# Patient Record
Sex: MALE | Race: WHITE | NOT HISPANIC OR LATINO | Employment: OTHER | URBAN - METROPOLITAN AREA
[De-identification: names, ages, dates, MRNs, and addresses within clinical notes are randomized per-mention and may not be internally consistent; named-entity substitution may affect disease eponyms.]

---

## 2020-01-14 ENCOUNTER — HOSPITAL ENCOUNTER (INPATIENT)
Facility: HOSPITAL | Age: 65
LOS: 20 days | Discharge: NURSING, REHAB OR LONG-TERM CARE | End: 2020-02-03
Attending: INTERNAL MEDICINE | Admitting: THORACIC SURGERY (CARDIOTHORACIC VASCULAR SURGERY)

## 2020-01-14 ENCOUNTER — APPOINTMENT (OUTPATIENT)
Dept: GENERAL RADIOLOGY | Facility: HOSPITAL | Age: 65
End: 2020-01-14

## 2020-01-14 ENCOUNTER — APPOINTMENT (OUTPATIENT)
Dept: CARDIOLOGY | Facility: HOSPITAL | Age: 65
End: 2020-01-14

## 2020-01-14 DIAGNOSIS — I48.91 ATRIAL FIBRILLATION WITH RVR (HCC): ICD-10-CM

## 2020-01-14 DIAGNOSIS — I33.0 ACUTE BACTERIAL ENDOCARDITIS: ICD-10-CM

## 2020-01-14 DIAGNOSIS — I25.10 CORONARY ARTERY DISEASE DUE TO LIPID RICH PLAQUE: Primary | ICD-10-CM

## 2020-01-14 DIAGNOSIS — I25.10 CAD S/P PERCUTANEOUS CORONARY ANGIOPLASTY: ICD-10-CM

## 2020-01-14 DIAGNOSIS — Z72.0 TOBACCO ABUSE: ICD-10-CM

## 2020-01-14 DIAGNOSIS — I05.9 ENDOCARDITIS OF MITRAL VALVE: ICD-10-CM

## 2020-01-14 DIAGNOSIS — I25.83 CORONARY ARTERY DISEASE DUE TO LIPID RICH PLAQUE: Primary | ICD-10-CM

## 2020-01-14 DIAGNOSIS — Z00.6 EXAMINATION FOR NORMAL COMPARISON OR CONTROL IN CLINICAL RESEARCH: ICD-10-CM

## 2020-01-14 DIAGNOSIS — I10 ESSENTIAL HYPERTENSION: ICD-10-CM

## 2020-01-14 DIAGNOSIS — Z98.61 CAD S/P PERCUTANEOUS CORONARY ANGIOPLASTY: ICD-10-CM

## 2020-01-14 DIAGNOSIS — N17.9 ACUTE RENAL FAILURE, UNSPECIFIED ACUTE RENAL FAILURE TYPE (HCC): ICD-10-CM

## 2020-01-14 PROBLEM — E11.40 DIABETIC NEUROPATHY (HCC): Chronic | Status: ACTIVE | Noted: 2020-01-14

## 2020-01-14 PROBLEM — N13.8 BPH WITH OBSTRUCTION/LOWER URINARY TRACT SYMPTOMS: Chronic | Status: ACTIVE | Noted: 2020-01-14

## 2020-01-14 PROBLEM — E78.5 DYSLIPIDEMIA: Chronic | Status: ACTIVE | Noted: 2020-01-14

## 2020-01-14 PROBLEM — K21.9 GERD WITHOUT ESOPHAGITIS: Chronic | Status: ACTIVE | Noted: 2020-01-14

## 2020-01-14 PROBLEM — E11.9 NON-INSULIN DEPENDENT TYPE 2 DIABETES MELLITUS (HCC): Chronic | Status: ACTIVE | Noted: 2020-01-14

## 2020-01-14 PROBLEM — N40.1 BPH WITH OBSTRUCTION/LOWER URINARY TRACT SYMPTOMS: Chronic | Status: ACTIVE | Noted: 2020-01-14

## 2020-01-14 PROBLEM — F41.1 GAD (GENERALIZED ANXIETY DISORDER): Chronic | Status: ACTIVE | Noted: 2020-01-14

## 2020-01-14 PROBLEM — R57.0 SHOCK, CARDIOGENIC (HCC): Status: ACTIVE | Noted: 2020-01-13

## 2020-01-14 PROBLEM — E53.8 B12 DEFICIENCY: Chronic | Status: ACTIVE | Noted: 2020-01-14

## 2020-01-14 PROBLEM — R57.0 SHOCK, CARDIOGENIC (HCC): Status: RESOLVED | Noted: 2020-01-13 | Resolved: 2020-01-14

## 2020-01-14 PROBLEM — E78.5 DYSLIPIDEMIA: Status: ACTIVE | Noted: 2020-01-14

## 2020-01-14 PROBLEM — E66.9 OBESITY (BMI 30-39.9): Chronic | Status: ACTIVE | Noted: 2020-01-14

## 2020-01-14 PROBLEM — E55.9 VITAMIN D DEFICIENCY: Chronic | Status: ACTIVE | Noted: 2020-01-14

## 2020-01-14 LAB
ANION GAP SERPL CALCULATED.3IONS-SCNC: 14 MMOL/L (ref 5–15)
ANION GAP SERPL CALCULATED.3IONS-SCNC: 14 MMOL/L (ref 5–15)
APTT PPP: 28.2 SECONDS (ref 61–76.5)
APTT PPP: 32.4 SECONDS (ref 61–76.5)
APTT PPP: 44.6 SECONDS (ref 61–76.5)
BASOPHILS # BLD AUTO: 0.1 10*3/MM3 (ref 0–0.2)
BASOPHILS # BLD AUTO: 0.1 10*3/MM3 (ref 0–0.2)
BASOPHILS NFR BLD AUTO: 0.7 % (ref 0–1.5)
BASOPHILS NFR BLD AUTO: 0.9 % (ref 0–1.5)
BH CV ECHO MEAS - ACS: 2.4 CM
BH CV ECHO MEAS - AO MAX PG (FULL): 13.5 MMHG
BH CV ECHO MEAS - AO MAX PG: 25.1 MMHG
BH CV ECHO MEAS - AO MEAN PG (FULL): 4.9 MMHG
BH CV ECHO MEAS - AO MEAN PG: 13 MMHG
BH CV ECHO MEAS - AO ROOT AREA (BSA CORRECTED): 1.5
BH CV ECHO MEAS - AO ROOT AREA: 10.3 CM^2
BH CV ECHO MEAS - AO ROOT DIAM: 3.6 CM
BH CV ECHO MEAS - AO V2 MAX: 250.5 CM/SEC
BH CV ECHO MEAS - AO V2 MEAN: 168.7 CM/SEC
BH CV ECHO MEAS - AO V2 VTI: 43 CM
BH CV ECHO MEAS - AVA(I,A): 3.1 CM^2
BH CV ECHO MEAS - AVA(I,D): 3.1 CM^2
BH CV ECHO MEAS - AVA(V,A): 2.8 CM^2
BH CV ECHO MEAS - AVA(V,D): 2.8 CM^2
BH CV ECHO MEAS - BSA(HAYCOCK): 2.5 M^2
BH CV ECHO MEAS - BSA: 2.4 M^2
BH CV ECHO MEAS - BZI_BMI: 39 KILOGRAMS/M^2
BH CV ECHO MEAS - BZI_METRIC_HEIGHT: 177.8 CM
BH CV ECHO MEAS - BZI_METRIC_WEIGHT: 123.4 KG
BH CV ECHO MEAS - EDV(CUBED): 71.1 ML
BH CV ECHO MEAS - EDV(MOD-SP2): 56.5 ML
BH CV ECHO MEAS - EDV(MOD-SP4): 105.5 ML
BH CV ECHO MEAS - EDV(TEICH): 76 ML
BH CV ECHO MEAS - EF(CUBED): 52.2 %
BH CV ECHO MEAS - EF(MOD-BP): 67 %
BH CV ECHO MEAS - EF(MOD-SP2): 53.2 %
BH CV ECHO MEAS - EF(MOD-SP4): 73.9 %
BH CV ECHO MEAS - EF(TEICH): 44.5 %
BH CV ECHO MEAS - ESV(CUBED): 34 ML
BH CV ECHO MEAS - ESV(MOD-SP2): 26.4 ML
BH CV ECHO MEAS - ESV(MOD-SP4): 27.5 ML
BH CV ECHO MEAS - ESV(TEICH): 42.2 ML
BH CV ECHO MEAS - FS: 21.8 %
BH CV ECHO MEAS - IVS/LVPW: 0.92
BH CV ECHO MEAS - IVSD: 1.9 CM
BH CV ECHO MEAS - LA DIMENSION(2D): 3.7 CM
BH CV ECHO MEAS - LA DIMENSION: 3.3 CM
BH CV ECHO MEAS - LA/AO: 0.92
BH CV ECHO MEAS - LV DIASTOLIC VOL/BSA (35-75): 44.3 ML/M^2
BH CV ECHO MEAS - LV MASS(C)D: 402.9 GRAMS
BH CV ECHO MEAS - LV MASS(C)DI: 169.4 GRAMS/M^2
BH CV ECHO MEAS - LV MAX PG: 11.6 MMHG
BH CV ECHO MEAS - LV MEAN PG: 8.1 MMHG
BH CV ECHO MEAS - LV SYSTOLIC VOL/BSA (12-30): 11.6 ML/M^2
BH CV ECHO MEAS - LV V1 MAX: 170.1 CM/SEC
BH CV ECHO MEAS - LV V1 MEAN: 135.5 CM/SEC
BH CV ECHO MEAS - LV V1 VTI: 31.9 CM
BH CV ECHO MEAS - LVIDD: 4.1 CM
BH CV ECHO MEAS - LVIDS: 3.2 CM
BH CV ECHO MEAS - LVOT AREA: 4.1 CM^2
BH CV ECHO MEAS - LVOT DIAM: 2.3 CM
BH CV ECHO MEAS - LVPWD: 2.1 CM
BH CV ECHO MEAS - MV A MAX VEL: 111.8 CM/SEC
BH CV ECHO MEAS - MV DEC SLOPE: 319.3 CM/SEC^2
BH CV ECHO MEAS - MV DEC TIME: 0.3 SEC
BH CV ECHO MEAS - MV E MAX VEL: 48 CM/SEC
BH CV ECHO MEAS - MV E/A: 0.43
BH CV ECHO MEAS - MV MAX PG: 6 MMHG
BH CV ECHO MEAS - MV MEAN PG: 2.4 MMHG
BH CV ECHO MEAS - MV V2 MAX: 122.1 CM/SEC
BH CV ECHO MEAS - MV V2 MEAN: 72.6 CM/SEC
BH CV ECHO MEAS - MV V2 VTI: 28.4 CM
BH CV ECHO MEAS - MVA(VTI): 4.7 CM^2
BH CV ECHO MEAS - RAP SYSTOLE: 8 MMHG
BH CV ECHO MEAS - RV MAX PG: 2.3 MMHG
BH CV ECHO MEAS - RV MEAN PG: 1.3 MMHG
BH CV ECHO MEAS - RV V1 MAX: 76.3 CM/SEC
BH CV ECHO MEAS - RV V1 MEAN: 54.4 CM/SEC
BH CV ECHO MEAS - RV V1 VTI: 15.3 CM
BH CV ECHO MEAS - RVDD: 3.3 CM
BH CV ECHO MEAS - RVSP: 22.7 MMHG
BH CV ECHO MEAS - SI(AO): 185.4 ML/M^2
BH CV ECHO MEAS - SI(CUBED): 15.6 ML/M^2
BH CV ECHO MEAS - SI(LVOT): 55.5 ML/M^2
BH CV ECHO MEAS - SI(MOD-SP2): 12.6 ML/M^2
BH CV ECHO MEAS - SI(MOD-SP4): 32.8 ML/M^2
BH CV ECHO MEAS - SI(TEICH): 14.2 ML/M^2
BH CV ECHO MEAS - SV(AO): 441 ML
BH CV ECHO MEAS - SV(CUBED): 37.1 ML
BH CV ECHO MEAS - SV(LVOT): 132.1 ML
BH CV ECHO MEAS - SV(MOD-SP2): 30.1 ML
BH CV ECHO MEAS - SV(MOD-SP4): 78 ML
BH CV ECHO MEAS - SV(TEICH): 33.9 ML
BH CV ECHO MEAS - TR MAX VEL: 192 CM/SEC
BUN BLD-MCNC: 28 MG/DL (ref 8–23)
BUN BLD-MCNC: 29 MG/DL (ref 8–23)
BUN/CREAT SERPL: 26.2 (ref 7–25)
BUN/CREAT SERPL: 31.9 (ref 7–25)
CALCIUM SPEC-SCNC: 9.3 MG/DL (ref 8.6–10.5)
CALCIUM SPEC-SCNC: 9.3 MG/DL (ref 8.6–10.5)
CHLORIDE SERPL-SCNC: 103 MMOL/L (ref 98–107)
CHLORIDE SERPL-SCNC: 103 MMOL/L (ref 98–107)
CO2 SERPL-SCNC: 21 MMOL/L (ref 22–29)
CO2 SERPL-SCNC: 22 MMOL/L (ref 22–29)
CREAT BLD-MCNC: 0.91 MG/DL (ref 0.76–1.27)
CREAT BLD-MCNC: 1.07 MG/DL (ref 0.76–1.27)
DEPRECATED RDW RBC AUTO: 43.8 FL (ref 37–54)
DEPRECATED RDW RBC AUTO: 43.8 FL (ref 37–54)
EOSINOPHIL # BLD AUTO: 0.4 10*3/MM3 (ref 0–0.4)
EOSINOPHIL # BLD AUTO: 0.4 10*3/MM3 (ref 0–0.4)
EOSINOPHIL NFR BLD AUTO: 3.4 % (ref 0.3–6.2)
EOSINOPHIL NFR BLD AUTO: 3.4 % (ref 0.3–6.2)
ERYTHROCYTE [DISTWIDTH] IN BLOOD BY AUTOMATED COUNT: 14.8 % (ref 12.3–15.4)
ERYTHROCYTE [DISTWIDTH] IN BLOOD BY AUTOMATED COUNT: 14.9 % (ref 12.3–15.4)
GFR SERPL CREATININE-BSD FRML MDRD: 70 ML/MIN/1.73
GFR SERPL CREATININE-BSD FRML MDRD: 84 ML/MIN/1.73
GLUCOSE BLD-MCNC: 85 MG/DL (ref 65–99)
GLUCOSE BLD-MCNC: 89 MG/DL (ref 65–99)
GLUCOSE BLDC GLUCOMTR-MCNC: 83 MG/DL (ref 70–105)
GLUCOSE BLDC GLUCOMTR-MCNC: 86 MG/DL (ref 70–105)
GLUCOSE BLDC GLUCOMTR-MCNC: 96 MG/DL (ref 70–105)
GLUCOSE BLDC GLUCOMTR-MCNC: 97 MG/DL (ref 70–105)
HCT VFR BLD AUTO: 34.6 % (ref 37.5–51)
HCT VFR BLD AUTO: 36.8 % (ref 37.5–51)
HGB BLD-MCNC: 11.2 G/DL (ref 13–17.7)
HGB BLD-MCNC: 11.6 G/DL (ref 13–17.7)
INR PPP: 1.03 (ref 0.9–1.1)
LYMPHOCYTES # BLD AUTO: 1.4 10*3/MM3 (ref 0.7–3.1)
LYMPHOCYTES # BLD AUTO: 1.6 10*3/MM3 (ref 0.7–3.1)
LYMPHOCYTES NFR BLD AUTO: 12.2 % (ref 19.6–45.3)
LYMPHOCYTES NFR BLD AUTO: 14.7 % (ref 19.6–45.3)
MAGNESIUM SERPL-MCNC: 1.9 MG/DL (ref 1.6–2.4)
MCH RBC QN AUTO: 26.3 PG (ref 26.6–33)
MCH RBC QN AUTO: 26.8 PG (ref 26.6–33)
MCHC RBC AUTO-ENTMCNC: 31.6 G/DL (ref 31.5–35.7)
MCHC RBC AUTO-ENTMCNC: 32.4 G/DL (ref 31.5–35.7)
MCV RBC AUTO: 82.9 FL (ref 79–97)
MCV RBC AUTO: 83.4 FL (ref 79–97)
MONOCYTES # BLD AUTO: 0.8 10*3/MM3 (ref 0.1–0.9)
MONOCYTES # BLD AUTO: 0.8 10*3/MM3 (ref 0.1–0.9)
MONOCYTES NFR BLD AUTO: 6.5 % (ref 5–12)
MONOCYTES NFR BLD AUTO: 7.1 % (ref 5–12)
MRSA DNA SPEC QL NAA+PROBE: NORMAL
NEUTROPHILS # BLD AUTO: 8.2 10*3/MM3 (ref 1.7–7)
NEUTROPHILS # BLD AUTO: 9.2 10*3/MM3 (ref 1.7–7)
NEUTROPHILS NFR BLD AUTO: 73.9 % (ref 42.7–76)
NEUTROPHILS NFR BLD AUTO: 77.2 % (ref 42.7–76)
NRBC BLD AUTO-RTO: 0.1 /100 WBC (ref 0–0.2)
NRBC BLD AUTO-RTO: 0.1 /100 WBC (ref 0–0.2)
NT-PROBNP SERPL-MCNC: 357.4 PG/ML (ref 5–900)
PLATELET # BLD AUTO: 279 10*3/MM3 (ref 140–450)
PLATELET # BLD AUTO: 292 10*3/MM3 (ref 140–450)
PMV BLD AUTO: 8 FL (ref 6–12)
PMV BLD AUTO: 8.5 FL (ref 6–12)
POTASSIUM BLD-SCNC: 4.4 MMOL/L (ref 3.5–5.2)
POTASSIUM BLD-SCNC: 4.6 MMOL/L (ref 3.5–5.2)
PROTHROMBIN TIME: 10.8 SECONDS (ref 9.6–11.7)
RBC # BLD AUTO: 4.17 10*6/MM3 (ref 4.14–5.8)
RBC # BLD AUTO: 4.41 10*6/MM3 (ref 4.14–5.8)
SODIUM BLD-SCNC: 138 MMOL/L (ref 136–145)
SODIUM BLD-SCNC: 139 MMOL/L (ref 136–145)
T4 FREE SERPL-MCNC: 1.37 NG/DL (ref 0.93–1.7)
TROPONIN T SERPL-MCNC: 0.01 NG/ML (ref 0–0.03)
TSH SERPL DL<=0.05 MIU/L-ACNC: 2.16 UIU/ML (ref 0.27–4.2)
WBC NRBC COR # BLD: 11.1 10*3/MM3 (ref 3.4–10.8)
WBC NRBC COR # BLD: 11.9 10*3/MM3 (ref 3.4–10.8)

## 2020-01-14 PROCEDURE — 80048 BASIC METABOLIC PNL TOTAL CA: CPT | Performed by: NURSE PRACTITIONER

## 2020-01-14 PROCEDURE — 87150 DNA/RNA AMPLIFIED PROBE: CPT | Performed by: NURSE PRACTITIONER

## 2020-01-14 PROCEDURE — 87186 SC STD MICRODIL/AGAR DIL: CPT | Performed by: NURSE PRACTITIONER

## 2020-01-14 PROCEDURE — 93306 TTE W/DOPPLER COMPLETE: CPT | Performed by: INTERNAL MEDICINE

## 2020-01-14 PROCEDURE — 82962 GLUCOSE BLOOD TEST: CPT

## 2020-01-14 PROCEDURE — 87641 MR-STAPH DNA AMP PROBE: CPT | Performed by: INTERNAL MEDICINE

## 2020-01-14 PROCEDURE — 63710000001 INSULIN GLARGINE PER 5 UNITS: Performed by: NURSE PRACTITIONER

## 2020-01-14 PROCEDURE — 87040 BLOOD CULTURE FOR BACTERIA: CPT | Performed by: NURSE PRACTITIONER

## 2020-01-14 PROCEDURE — 93005 ELECTROCARDIOGRAM TRACING: CPT | Performed by: NURSE PRACTITIONER

## 2020-01-14 PROCEDURE — 99254 IP/OBS CNSLTJ NEW/EST MOD 60: CPT | Performed by: INTERNAL MEDICINE

## 2020-01-14 PROCEDURE — 71045 X-RAY EXAM CHEST 1 VIEW: CPT

## 2020-01-14 PROCEDURE — 84484 ASSAY OF TROPONIN QUANT: CPT | Performed by: NURSE PRACTITIONER

## 2020-01-14 PROCEDURE — 93306 TTE W/DOPPLER COMPLETE: CPT

## 2020-01-14 PROCEDURE — 84439 ASSAY OF FREE THYROXINE: CPT | Performed by: NURSE PRACTITIONER

## 2020-01-14 PROCEDURE — 85025 COMPLETE CBC W/AUTO DIFF WBC: CPT | Performed by: NURSE PRACTITIONER

## 2020-01-14 PROCEDURE — 85610 PROTHROMBIN TIME: CPT | Performed by: NURSE PRACTITIONER

## 2020-01-14 PROCEDURE — 83735 ASSAY OF MAGNESIUM: CPT | Performed by: NURSE PRACTITIONER

## 2020-01-14 PROCEDURE — 85730 THROMBOPLASTIN TIME PARTIAL: CPT | Performed by: INTERNAL MEDICINE

## 2020-01-14 PROCEDURE — 25010000002 HEPARIN (PORCINE) PER 1000 UNITS: Performed by: NURSE PRACTITIONER

## 2020-01-14 PROCEDURE — 25010000002 SULFUR HEXAFLUORIDE MICROSPH 60.7-25 MG RECONSTITUTED SUSPENSION: Performed by: INTERNAL MEDICINE

## 2020-01-14 PROCEDURE — 84443 ASSAY THYROID STIM HORMONE: CPT | Performed by: NURSE PRACTITIONER

## 2020-01-14 PROCEDURE — 83880 ASSAY OF NATRIURETIC PEPTIDE: CPT | Performed by: NURSE PRACTITIONER

## 2020-01-14 PROCEDURE — 25010000002 HYDRALAZINE PER 20 MG: Performed by: NURSE PRACTITIONER

## 2020-01-14 PROCEDURE — 85730 THROMBOPLASTIN TIME PARTIAL: CPT | Performed by: NURSE PRACTITIONER

## 2020-01-14 PROCEDURE — 87077 CULTURE AEROBIC IDENTIFY: CPT | Performed by: NURSE PRACTITIONER

## 2020-01-14 RX ORDER — FINASTERIDE 5 MG/1
5 TABLET, FILM COATED ORAL DAILY
COMMUNITY

## 2020-01-14 RX ORDER — ASPIRIN 81 MG/1
81 TABLET ORAL DAILY
COMMUNITY

## 2020-01-14 RX ORDER — LISINOPRIL 5 MG/1
10 TABLET ORAL DAILY
Status: DISCONTINUED | OUTPATIENT
Start: 2020-01-14 | End: 2020-01-19

## 2020-01-14 RX ORDER — FOLIC ACID 1 MG/1
1 TABLET ORAL DAILY
Status: DISCONTINUED | OUTPATIENT
Start: 2020-01-14 | End: 2020-01-22 | Stop reason: HOSPADM

## 2020-01-14 RX ORDER — SODIUM CHLORIDE 0.9 % (FLUSH) 0.9 %
10 SYRINGE (ML) INJECTION AS NEEDED
Status: DISCONTINUED | OUTPATIENT
Start: 2020-01-14 | End: 2020-01-22 | Stop reason: HOSPADM

## 2020-01-14 RX ORDER — CETIRIZINE HYDROCHLORIDE 10 MG/1
10 TABLET ORAL DAILY
Status: DISCONTINUED | OUTPATIENT
Start: 2020-01-14 | End: 2020-01-22 | Stop reason: HOSPADM

## 2020-01-14 RX ORDER — ASPIRIN 81 MG/1
81 TABLET ORAL DAILY
Status: DISCONTINUED | OUTPATIENT
Start: 2020-01-14 | End: 2020-01-22 | Stop reason: HOSPADM

## 2020-01-14 RX ORDER — INSULIN GLARGINE 100 [IU]/ML
10 INJECTION, SOLUTION SUBCUTANEOUS 2 TIMES DAILY
Status: DISCONTINUED | OUTPATIENT
Start: 2020-01-14 | End: 2020-01-22 | Stop reason: HOSPADM

## 2020-01-14 RX ORDER — BISACODYL 10 MG
10 SUPPOSITORY, RECTAL RECTAL DAILY PRN
Status: DISCONTINUED | OUTPATIENT
Start: 2020-01-14 | End: 2020-01-22 | Stop reason: HOSPADM

## 2020-01-14 RX ORDER — HYDRALAZINE HYDROCHLORIDE 20 MG/ML
20 INJECTION INTRAMUSCULAR; INTRAVENOUS EVERY 6 HOURS PRN
Status: DISCONTINUED | OUTPATIENT
Start: 2020-01-14 | End: 2020-01-19

## 2020-01-14 RX ORDER — FINASTERIDE 5 MG/1
5 TABLET, FILM COATED ORAL DAILY
Status: DISCONTINUED | OUTPATIENT
Start: 2020-01-14 | End: 2020-01-22 | Stop reason: HOSPADM

## 2020-01-14 RX ORDER — ONDANSETRON 2 MG/ML
4 INJECTION INTRAMUSCULAR; INTRAVENOUS EVERY 6 HOURS PRN
Status: DISCONTINUED | OUTPATIENT
Start: 2020-01-14 | End: 2020-01-22 | Stop reason: HOSPADM

## 2020-01-14 RX ORDER — PANTOPRAZOLE SODIUM 40 MG/1
40 TABLET, DELAYED RELEASE ORAL
Status: DISCONTINUED | OUTPATIENT
Start: 2020-01-14 | End: 2020-01-22 | Stop reason: HOSPADM

## 2020-01-14 RX ORDER — PRASUGREL 10 MG/1
10 TABLET, FILM COATED ORAL DAILY
Status: DISCONTINUED | OUTPATIENT
Start: 2020-01-14 | End: 2020-01-17

## 2020-01-14 RX ORDER — PRASUGREL 10 MG/1
10 TABLET, FILM COATED ORAL DAILY
COMMUNITY
End: 2020-02-03 | Stop reason: HOSPADM

## 2020-01-14 RX ORDER — GINSENG 100 MG
CAPSULE ORAL DAILY
Status: DISCONTINUED | OUTPATIENT
Start: 2020-01-14 | End: 2020-01-22 | Stop reason: HOSPADM

## 2020-01-14 RX ORDER — VITAMIN E 268 MG
800 CAPSULE ORAL DAILY
COMMUNITY

## 2020-01-14 RX ORDER — ACETAMINOPHEN 325 MG/1
650 TABLET ORAL EVERY 4 HOURS PRN
Status: DISCONTINUED | OUTPATIENT
Start: 2020-01-14 | End: 2020-01-22 | Stop reason: HOSPADM

## 2020-01-14 RX ORDER — DOCUSATE SODIUM 100 MG/1
100 CAPSULE, LIQUID FILLED ORAL 2 TIMES DAILY PRN
Status: DISCONTINUED | OUTPATIENT
Start: 2020-01-14 | End: 2020-01-22 | Stop reason: HOSPADM

## 2020-01-14 RX ORDER — BISACODYL 10 MG
10 SUPPOSITORY, RECTAL RECTAL DAILY
COMMUNITY
End: 2020-05-20

## 2020-01-14 RX ORDER — INSULIN GLARGINE 100 [IU]/ML
10 INJECTION, SOLUTION SUBCUTANEOUS 2 TIMES DAILY
COMMUNITY
End: 2020-02-03 | Stop reason: HOSPADM

## 2020-01-14 RX ORDER — GABAPENTIN 100 MG/1
200 CAPSULE ORAL 3 TIMES DAILY
Status: DISCONTINUED | OUTPATIENT
Start: 2020-01-14 | End: 2020-01-22 | Stop reason: HOSPADM

## 2020-01-14 RX ORDER — ONDANSETRON 4 MG/1
4 TABLET, FILM COATED ORAL EVERY 6 HOURS PRN
Status: DISCONTINUED | OUTPATIENT
Start: 2020-01-14 | End: 2020-01-22 | Stop reason: HOSPADM

## 2020-01-14 RX ORDER — GINSENG 100 MG
CAPSULE ORAL DAILY
COMMUNITY

## 2020-01-14 RX ORDER — TAMSULOSIN HYDROCHLORIDE 0.4 MG/1
0.4 CAPSULE ORAL DAILY
Status: DISCONTINUED | OUTPATIENT
Start: 2020-01-14 | End: 2020-01-22 | Stop reason: HOSPADM

## 2020-01-14 RX ORDER — FUROSEMIDE 20 MG/1
20 TABLET ORAL DAILY
COMMUNITY
End: 2020-02-03 | Stop reason: HOSPADM

## 2020-01-14 RX ORDER — GABAPENTIN 100 MG/1
200 CAPSULE ORAL 3 TIMES DAILY
COMMUNITY

## 2020-01-14 RX ORDER — ACETAMINOPHEN 650 MG/1
650 SUPPOSITORY RECTAL EVERY 4 HOURS PRN
Status: DISCONTINUED | OUTPATIENT
Start: 2020-01-14 | End: 2020-01-22 | Stop reason: HOSPADM

## 2020-01-14 RX ORDER — PANTOPRAZOLE SODIUM 40 MG/1
40 TABLET, DELAYED RELEASE ORAL EVERY MORNING
Status: DISCONTINUED | OUTPATIENT
Start: 2020-01-14 | End: 2020-01-14 | Stop reason: SDUPTHER

## 2020-01-14 RX ORDER — CHOLECALCIFEROL (VITAMIN D3) 125 MCG
1000 CAPSULE ORAL DAILY
COMMUNITY

## 2020-01-14 RX ORDER — DEXTROSE MONOHYDRATE 25 G/50ML
25 INJECTION, SOLUTION INTRAVENOUS
Status: DISCONTINUED | OUTPATIENT
Start: 2020-01-14 | End: 2020-01-22 | Stop reason: HOSPADM

## 2020-01-14 RX ORDER — ATORVASTATIN CALCIUM 40 MG/1
40 TABLET, FILM COATED ORAL DAILY
COMMUNITY
End: 2020-02-03 | Stop reason: HOSPADM

## 2020-01-14 RX ORDER — FLUOXETINE HYDROCHLORIDE 20 MG/1
40 CAPSULE ORAL DAILY
Status: DISCONTINUED | OUTPATIENT
Start: 2020-01-14 | End: 2020-01-22 | Stop reason: HOSPADM

## 2020-01-14 RX ORDER — LANOLIN ALCOHOL/MO/W.PET/CERES
1000 CREAM (GRAM) TOPICAL DAILY
Status: DISCONTINUED | OUTPATIENT
Start: 2020-01-14 | End: 2020-01-22 | Stop reason: HOSPADM

## 2020-01-14 RX ORDER — SODIUM CHLORIDE 0.9 % (FLUSH) 0.9 %
10 SYRINGE (ML) INJECTION EVERY 12 HOURS SCHEDULED
Status: DISCONTINUED | OUTPATIENT
Start: 2020-01-14 | End: 2020-01-22 | Stop reason: HOSPADM

## 2020-01-14 RX ORDER — ATORVASTATIN CALCIUM 40 MG/1
40 TABLET, FILM COATED ORAL DAILY
Status: DISCONTINUED | OUTPATIENT
Start: 2020-01-14 | End: 2020-01-15

## 2020-01-14 RX ORDER — LORATADINE 10 MG/1
CAPSULE, LIQUID FILLED ORAL
COMMUNITY

## 2020-01-14 RX ORDER — DOCUSATE SODIUM 100 MG/1
100 CAPSULE, LIQUID FILLED ORAL 2 TIMES DAILY
COMMUNITY

## 2020-01-14 RX ORDER — FOLIC ACID 1 MG/1
1 TABLET ORAL DAILY
COMMUNITY

## 2020-01-14 RX ORDER — HEPARIN SODIUM 10000 [USP'U]/100ML
8 INJECTION, SOLUTION INTRAVENOUS
Status: DISCONTINUED | OUTPATIENT
Start: 2020-01-14 | End: 2020-01-14

## 2020-01-14 RX ORDER — LISINOPRIL 10 MG/1
10 TABLET ORAL DAILY
COMMUNITY
End: 2020-02-03 | Stop reason: HOSPADM

## 2020-01-14 RX ORDER — ERGOCALCIFEROL 1.25 MG/1
50000 CAPSULE ORAL WEEKLY
COMMUNITY
End: 2020-05-20

## 2020-01-14 RX ORDER — OMEPRAZOLE 20 MG/1
20 CAPSULE, DELAYED RELEASE ORAL DAILY
COMMUNITY

## 2020-01-14 RX ORDER — TAMSULOSIN HYDROCHLORIDE 0.4 MG/1
1 CAPSULE ORAL DAILY
COMMUNITY

## 2020-01-14 RX ORDER — NICOTINE POLACRILEX 4 MG
15 LOZENGE BUCCAL
Status: DISCONTINUED | OUTPATIENT
Start: 2020-01-14 | End: 2020-01-22 | Stop reason: HOSPADM

## 2020-01-14 RX ORDER — FLUOXETINE HYDROCHLORIDE 20 MG/1
40 CAPSULE ORAL DAILY
COMMUNITY

## 2020-01-14 RX ORDER — CARVEDILOL 25 MG/1
25 TABLET ORAL 2 TIMES DAILY WITH MEALS
COMMUNITY
End: 2020-02-03 | Stop reason: HOSPADM

## 2020-01-14 RX ORDER — BUMETANIDE 0.25 MG/ML
0.5 INJECTION INTRAMUSCULAR; INTRAVENOUS ONCE
Status: COMPLETED | OUTPATIENT
Start: 2020-01-14 | End: 2020-01-14

## 2020-01-14 RX ORDER — FUROSEMIDE 20 MG/1
20 TABLET ORAL DAILY
Status: DISCONTINUED | OUTPATIENT
Start: 2020-01-14 | End: 2020-01-19

## 2020-01-14 RX ADMIN — APIXABAN 5 MG: 5 TABLET, FILM COATED ORAL at 20:23

## 2020-01-14 RX ADMIN — FUROSEMIDE 20 MG: 20 TABLET ORAL at 12:33

## 2020-01-14 RX ADMIN — HYDRALAZINE HYDROCHLORIDE 20 MG: 20 INJECTION INTRAMUSCULAR; INTRAVENOUS at 18:29

## 2020-01-14 RX ADMIN — SODIUM CHLORIDE 5 MG/HR: 0.9 INJECTION, SOLUTION INTRAVENOUS at 07:46

## 2020-01-14 RX ADMIN — HYDRALAZINE HYDROCHLORIDE 20 MG: 20 INJECTION INTRAMUSCULAR; INTRAVENOUS at 05:18

## 2020-01-14 RX ADMIN — PANTOPRAZOLE SODIUM 40 MG: 40 TABLET, DELAYED RELEASE ORAL at 05:18

## 2020-01-14 RX ADMIN — GABAPENTIN 200 MG: 100 CAPSULE ORAL at 20:45

## 2020-01-14 RX ADMIN — SODIUM CHLORIDE, PRESERVATIVE FREE 10 ML: 5 INJECTION INTRAVENOUS at 01:54

## 2020-01-14 RX ADMIN — INSULIN GLARGINE 10 UNITS: 100 INJECTION, SOLUTION SUBCUTANEOUS at 20:43

## 2020-01-14 RX ADMIN — ATORVASTATIN CALCIUM 40 MG: 40 TABLET, FILM COATED ORAL at 12:29

## 2020-01-14 RX ADMIN — FINASTERIDE 5 MG: 5 TABLET, FILM COATED ORAL at 12:33

## 2020-01-14 RX ADMIN — ASPIRIN 81 MG: 81 TABLET, DELAYED RELEASE ORAL at 12:30

## 2020-01-14 RX ADMIN — SODIUM CHLORIDE, PRESERVATIVE FREE 10 ML: 5 INJECTION INTRAVENOUS at 20:24

## 2020-01-14 RX ADMIN — FOLIC ACID 1 MG: 1 TABLET ORAL at 12:33

## 2020-01-14 RX ADMIN — FLUOXETINE 40 MG: 20 CAPSULE ORAL at 12:29

## 2020-01-14 RX ADMIN — GABAPENTIN 200 MG: 100 CAPSULE ORAL at 12:33

## 2020-01-14 RX ADMIN — HEPARIN SODIUM AND DEXTROSE 12 UNITS/KG/HR: 10000; 5 INJECTION INTRAVENOUS at 08:57

## 2020-01-14 RX ADMIN — SULFUR HEXAFLUORIDE 3 ML: KIT at 10:50

## 2020-01-14 RX ADMIN — CETIRIZINE HYDROCHLORIDE 10 MG: 10 TABLET, FILM COATED ORAL at 12:30

## 2020-01-14 RX ADMIN — METOPROLOL TARTRATE 25 MG: 25 TABLET ORAL at 12:30

## 2020-01-14 RX ADMIN — BUMETANIDE 0.5 MG: 0.25 INJECTION INTRAMUSCULAR; INTRAVENOUS at 03:54

## 2020-01-14 RX ADMIN — CYANOCOBALAMIN TAB 1000 MCG 1000 MCG: 1000 TAB at 12:33

## 2020-01-14 RX ADMIN — SODIUM CHLORIDE, PRESERVATIVE FREE 10 ML: 5 INJECTION INTRAVENOUS at 08:59

## 2020-01-14 RX ADMIN — TAMSULOSIN HYDROCHLORIDE 0.4 MG: 0.4 CAPSULE ORAL at 12:29

## 2020-01-14 RX ADMIN — APIXABAN 5 MG: 5 TABLET, FILM COATED ORAL at 12:33

## 2020-01-14 RX ADMIN — LISINOPRIL 10 MG: 5 TABLET ORAL at 12:30

## 2020-01-14 RX ADMIN — GABAPENTIN 200 MG: 100 CAPSULE ORAL at 16:59

## 2020-01-14 RX ADMIN — BACITRACIN: 500 OINTMENT TOPICAL at 12:33

## 2020-01-14 RX ADMIN — INSULIN GLARGINE 10 UNITS: 100 INJECTION, SOLUTION SUBCUTANEOUS at 12:34

## 2020-01-14 RX ADMIN — PRASUGREL 10 MG: 10 TABLET, FILM COATED ORAL at 12:29

## 2020-01-14 RX ADMIN — METOPROLOL TARTRATE 25 MG: 25 TABLET ORAL at 20:23

## 2020-01-14 RX ADMIN — SODIUM CHLORIDE 7.5 MG/HR: 0.9 INJECTION, SOLUTION INTRAVENOUS at 10:53

## 2020-01-15 ENCOUNTER — APPOINTMENT (OUTPATIENT)
Dept: CT IMAGING | Facility: HOSPITAL | Age: 65
End: 2020-01-15

## 2020-01-15 ENCOUNTER — APPOINTMENT (OUTPATIENT)
Dept: MRI IMAGING | Facility: HOSPITAL | Age: 65
End: 2020-01-15

## 2020-01-15 LAB
ANION GAP SERPL CALCULATED.3IONS-SCNC: 12 MMOL/L (ref 5–15)
ANION GAP SERPL CALCULATED.3IONS-SCNC: 12 MMOL/L (ref 5–15)
APTT PPP: 25.3 SECONDS (ref 61–76.5)
APTT PPP: 27.4 SECONDS (ref 24–31)
BACTERIA BLD CULT: ABNORMAL
BACTERIA UR QL AUTO: ABNORMAL /HPF
BASOPHILS # BLD AUTO: 0.1 10*3/MM3 (ref 0–0.2)
BASOPHILS # BLD AUTO: 0.1 10*3/MM3 (ref 0–0.2)
BASOPHILS NFR BLD AUTO: 0.6 % (ref 0–1.5)
BASOPHILS NFR BLD AUTO: 0.8 % (ref 0–1.5)
BILIRUB UR QL STRIP: NEGATIVE
BUN BLD-MCNC: 21 MG/DL (ref 8–23)
BUN BLD-MCNC: 22 MG/DL (ref 8–23)
BUN/CREAT SERPL: 19.3 (ref 7–25)
BUN/CREAT SERPL: 22.1 (ref 7–25)
CALCIUM SPEC-SCNC: 9.3 MG/DL (ref 8.6–10.5)
CALCIUM SPEC-SCNC: 9.3 MG/DL (ref 8.6–10.5)
CHLORIDE SERPL-SCNC: 100 MMOL/L (ref 98–107)
CHLORIDE SERPL-SCNC: 101 MMOL/L (ref 98–107)
CHOLEST SERPL-MCNC: 90 MG/DL (ref 0–200)
CK SERPL-CCNC: 39 U/L (ref 20–200)
CLARITY UR: ABNORMAL
CO2 SERPL-SCNC: 28 MMOL/L (ref 22–29)
CO2 SERPL-SCNC: 29 MMOL/L (ref 22–29)
COLOR UR: YELLOW
CREAT BLD-MCNC: 0.95 MG/DL (ref 0.76–1.27)
CREAT BLD-MCNC: 1.14 MG/DL (ref 0.76–1.27)
DEPRECATED RDW RBC AUTO: 44.2 FL (ref 37–54)
DEPRECATED RDW RBC AUTO: 44.6 FL (ref 37–54)
EOSINOPHIL # BLD AUTO: 0.2 10*3/MM3 (ref 0–0.4)
EOSINOPHIL # BLD AUTO: 0.2 10*3/MM3 (ref 0–0.4)
EOSINOPHIL NFR BLD AUTO: 1.9 % (ref 0.3–6.2)
EOSINOPHIL NFR BLD AUTO: 2.8 % (ref 0.3–6.2)
ERYTHROCYTE [DISTWIDTH] IN BLOOD BY AUTOMATED COUNT: 15.1 % (ref 12.3–15.4)
ERYTHROCYTE [DISTWIDTH] IN BLOOD BY AUTOMATED COUNT: 15.3 % (ref 12.3–15.4)
GFR SERPL CREATININE-BSD FRML MDRD: 65 ML/MIN/1.73
GFR SERPL CREATININE-BSD FRML MDRD: 80 ML/MIN/1.73
GLUCOSE BLD-MCNC: 105 MG/DL (ref 65–99)
GLUCOSE BLD-MCNC: 125 MG/DL (ref 65–99)
GLUCOSE BLDC GLUCOMTR-MCNC: 104 MG/DL (ref 70–105)
GLUCOSE BLDC GLUCOMTR-MCNC: 111 MG/DL (ref 70–105)
GLUCOSE BLDC GLUCOMTR-MCNC: 138 MG/DL (ref 70–105)
GLUCOSE BLDC GLUCOMTR-MCNC: 151 MG/DL (ref 70–105)
GLUCOSE BLDC GLUCOMTR-MCNC: 163 MG/DL (ref 70–105)
GLUCOSE BLDC GLUCOMTR-MCNC: 184 MG/DL (ref 70–105)
GLUCOSE UR STRIP-MCNC: NEGATIVE MG/DL
HBA1C MFR BLD: 5.7 % (ref 3.5–5.6)
HCT VFR BLD AUTO: 34.7 % (ref 37.5–51)
HCT VFR BLD AUTO: 38.8 % (ref 37.5–51)
HDLC SERPL-MCNC: 29 MG/DL (ref 40–60)
HGB BLD-MCNC: 11.6 G/DL (ref 13–17.7)
HGB BLD-MCNC: 12.6 G/DL (ref 13–17.7)
HGB UR QL STRIP.AUTO: ABNORMAL
HYALINE CASTS UR QL AUTO: ABNORMAL /LPF
KETONES UR QL STRIP: NEGATIVE
LDLC SERPL CALC-MCNC: 37 MG/DL (ref 0–100)
LDLC/HDLC SERPL: 1.28 {RATIO}
LEUKOCYTE ESTERASE UR QL STRIP.AUTO: ABNORMAL
LYMPHOCYTES # BLD AUTO: 0.9 10*3/MM3 (ref 0.7–3.1)
LYMPHOCYTES # BLD AUTO: 1.2 10*3/MM3 (ref 0.7–3.1)
LYMPHOCYTES NFR BLD AUTO: 14.1 % (ref 19.6–45.3)
LYMPHOCYTES NFR BLD AUTO: 9.4 % (ref 19.6–45.3)
MCH RBC QN AUTO: 27.1 PG (ref 26.6–33)
MCH RBC QN AUTO: 27.2 PG (ref 26.6–33)
MCHC RBC AUTO-ENTMCNC: 32.5 G/DL (ref 31.5–35.7)
MCHC RBC AUTO-ENTMCNC: 33.3 G/DL (ref 31.5–35.7)
MCV RBC AUTO: 81.6 FL (ref 79–97)
MCV RBC AUTO: 83.4 FL (ref 79–97)
MONOCYTES # BLD AUTO: 0.7 10*3/MM3 (ref 0.1–0.9)
MONOCYTES # BLD AUTO: 0.8 10*3/MM3 (ref 0.1–0.9)
MONOCYTES NFR BLD AUTO: 7.4 % (ref 5–12)
MONOCYTES NFR BLD AUTO: 9.3 % (ref 5–12)
NEUTROPHILS # BLD AUTO: 6.2 10*3/MM3 (ref 1.7–7)
NEUTROPHILS # BLD AUTO: 7.5 10*3/MM3 (ref 1.7–7)
NEUTROPHILS NFR BLD AUTO: 73.2 % (ref 42.7–76)
NEUTROPHILS NFR BLD AUTO: 80.5 % (ref 42.7–76)
NITRITE UR QL STRIP: NEGATIVE
NRBC BLD AUTO-RTO: 0 /100 WBC (ref 0–0.2)
NRBC BLD AUTO-RTO: 0.3 /100 WBC (ref 0–0.2)
PH UR STRIP.AUTO: 6.5 [PH] (ref 5–8)
PLATELET # BLD AUTO: 275 10*3/MM3 (ref 140–450)
PLATELET # BLD AUTO: 343 10*3/MM3 (ref 140–450)
PMV BLD AUTO: 8.2 FL (ref 6–12)
PMV BLD AUTO: 8.8 FL (ref 6–12)
POTASSIUM BLD-SCNC: 3.9 MMOL/L (ref 3.5–5.2)
POTASSIUM BLD-SCNC: 4.4 MMOL/L (ref 3.5–5.2)
PROT UR QL STRIP: NEGATIVE
RBC # BLD AUTO: 4.25 10*6/MM3 (ref 4.14–5.8)
RBC # BLD AUTO: 4.65 10*6/MM3 (ref 4.14–5.8)
RBC # UR: ABNORMAL /HPF
REF LAB TEST METHOD: ABNORMAL
SODIUM BLD-SCNC: 141 MMOL/L (ref 136–145)
SODIUM BLD-SCNC: 141 MMOL/L (ref 136–145)
SP GR UR STRIP: 1.01 (ref 1–1.03)
SQUAMOUS #/AREA URNS HPF: ABNORMAL /HPF
TRIGL SERPL-MCNC: 119 MG/DL (ref 0–150)
UROBILINOGEN UR QL STRIP: ABNORMAL
VIT B12 BLD-MCNC: >2000 PG/ML (ref 211–946)
VLDLC SERPL-MCNC: 23.8 MG/DL
WBC NRBC COR # BLD: 8.4 10*3/MM3 (ref 3.4–10.8)
WBC NRBC COR # BLD: 9.3 10*3/MM3 (ref 3.4–10.8)
WBC UR QL AUTO: ABNORMAL /HPF

## 2020-01-15 PROCEDURE — 63710000001 INSULIN LISPRO (HUMAN) PER 5 UNITS: Performed by: INTERNAL MEDICINE

## 2020-01-15 PROCEDURE — 87086 URINE CULTURE/COLONY COUNT: CPT | Performed by: NURSE PRACTITIONER

## 2020-01-15 PROCEDURE — 87186 SC STD MICRODIL/AGAR DIL: CPT | Performed by: NURSE PRACTITIONER

## 2020-01-15 PROCEDURE — 83036 HEMOGLOBIN GLYCOSYLATED A1C: CPT | Performed by: NURSE PRACTITIONER

## 2020-01-15 PROCEDURE — 63710000001 INSULIN GLARGINE PER 5 UNITS: Performed by: NURSE PRACTITIONER

## 2020-01-15 PROCEDURE — 97110 THERAPEUTIC EXERCISES: CPT

## 2020-01-15 PROCEDURE — 70551 MRI BRAIN STEM W/O DYE: CPT

## 2020-01-15 PROCEDURE — 97166 OT EVAL MOD COMPLEX 45 MIN: CPT

## 2020-01-15 PROCEDURE — 99222 1ST HOSP IP/OBS MODERATE 55: CPT | Performed by: NURSE PRACTITIONER

## 2020-01-15 PROCEDURE — 81001 URINALYSIS AUTO W/SCOPE: CPT | Performed by: NURSE PRACTITIONER

## 2020-01-15 PROCEDURE — 74176 CT ABD & PELVIS W/O CONTRAST: CPT

## 2020-01-15 PROCEDURE — 85025 COMPLETE CBC W/AUTO DIFF WBC: CPT | Performed by: NURSE PRACTITIONER

## 2020-01-15 PROCEDURE — 25010000002 DAPTOMYCIN PER 1 MG: Performed by: INTERNAL MEDICINE

## 2020-01-15 PROCEDURE — 82550 ASSAY OF CK (CPK): CPT | Performed by: NURSE PRACTITIONER

## 2020-01-15 PROCEDURE — 99233 SBSQ HOSP IP/OBS HIGH 50: CPT | Performed by: INTERNAL MEDICINE

## 2020-01-15 PROCEDURE — 70450 CT HEAD/BRAIN W/O DYE: CPT

## 2020-01-15 PROCEDURE — 97163 PT EVAL HIGH COMPLEX 45 MIN: CPT

## 2020-01-15 PROCEDURE — 70498 CT ANGIOGRAPHY NECK: CPT

## 2020-01-15 PROCEDURE — 80048 BASIC METABOLIC PNL TOTAL CA: CPT | Performed by: INTERNAL MEDICINE

## 2020-01-15 PROCEDURE — 0 IOPAMIDOL PER 1 ML: Performed by: INTERNAL MEDICINE

## 2020-01-15 PROCEDURE — 97535 SELF CARE MNGMENT TRAINING: CPT

## 2020-01-15 PROCEDURE — 82962 GLUCOSE BLOOD TEST: CPT

## 2020-01-15 PROCEDURE — 70496 CT ANGIOGRAPHY HEAD: CPT

## 2020-01-15 PROCEDURE — 80048 BASIC METABOLIC PNL TOTAL CA: CPT | Performed by: NURSE PRACTITIONER

## 2020-01-15 PROCEDURE — 85730 THROMBOPLASTIN TIME PARTIAL: CPT | Performed by: NURSE PRACTITIONER

## 2020-01-15 PROCEDURE — 82607 VITAMIN B-12: CPT | Performed by: NURSE PRACTITIONER

## 2020-01-15 PROCEDURE — 87077 CULTURE AEROBIC IDENTIFY: CPT | Performed by: NURSE PRACTITIONER

## 2020-01-15 PROCEDURE — 99232 SBSQ HOSP IP/OBS MODERATE 35: CPT | Performed by: INTERNAL MEDICINE

## 2020-01-15 PROCEDURE — 25010000002 HYDRALAZINE PER 20 MG: Performed by: NURSE PRACTITIONER

## 2020-01-15 PROCEDURE — 80061 LIPID PANEL: CPT | Performed by: NURSE PRACTITIONER

## 2020-01-15 PROCEDURE — 87040 BLOOD CULTURE FOR BACTERIA: CPT | Performed by: NURSE PRACTITIONER

## 2020-01-15 RX ADMIN — LISINOPRIL 10 MG: 5 TABLET ORAL at 08:18

## 2020-01-15 RX ADMIN — TAMSULOSIN HYDROCHLORIDE 0.4 MG: 0.4 CAPSULE ORAL at 08:18

## 2020-01-15 RX ADMIN — SODIUM CHLORIDE, PRESERVATIVE FREE 10 ML: 5 INJECTION INTRAVENOUS at 08:20

## 2020-01-15 RX ADMIN — INSULIN LISPRO 3 UNITS: 100 INJECTION, SOLUTION INTRAVENOUS; SUBCUTANEOUS at 12:09

## 2020-01-15 RX ADMIN — INSULIN GLARGINE 10 UNITS: 100 INJECTION, SOLUTION SUBCUTANEOUS at 08:20

## 2020-01-15 RX ADMIN — METOPROLOL TARTRATE 25 MG: 25 TABLET ORAL at 08:19

## 2020-01-15 RX ADMIN — GABAPENTIN 200 MG: 100 CAPSULE ORAL at 20:56

## 2020-01-15 RX ADMIN — INSULIN GLARGINE 10 UNITS: 100 INJECTION, SOLUTION SUBCUTANEOUS at 20:52

## 2020-01-15 RX ADMIN — IOPAMIDOL 100 ML: 755 INJECTION, SOLUTION INTRAVENOUS at 15:30

## 2020-01-15 RX ADMIN — CETIRIZINE HYDROCHLORIDE 10 MG: 10 TABLET, FILM COATED ORAL at 08:19

## 2020-01-15 RX ADMIN — SODIUM CHLORIDE, PRESERVATIVE FREE 10 ML: 5 INJECTION INTRAVENOUS at 20:56

## 2020-01-15 RX ADMIN — GABAPENTIN 200 MG: 100 CAPSULE ORAL at 18:26

## 2020-01-15 RX ADMIN — GABAPENTIN 200 MG: 100 CAPSULE ORAL at 08:18

## 2020-01-15 RX ADMIN — FUROSEMIDE 20 MG: 20 TABLET ORAL at 08:18

## 2020-01-15 RX ADMIN — FOLIC ACID 1 MG: 1 TABLET ORAL at 08:19

## 2020-01-15 RX ADMIN — FINASTERIDE 5 MG: 5 TABLET, FILM COATED ORAL at 08:18

## 2020-01-15 RX ADMIN — ATORVASTATIN CALCIUM 40 MG: 40 TABLET, FILM COATED ORAL at 08:19

## 2020-01-15 RX ADMIN — DAPTOMYCIN 900 MG: 500 INJECTION, POWDER, LYOPHILIZED, FOR SOLUTION INTRAVENOUS at 11:29

## 2020-01-15 RX ADMIN — HYDRALAZINE HYDROCHLORIDE 20 MG: 20 INJECTION INTRAMUSCULAR; INTRAVENOUS at 05:19

## 2020-01-15 RX ADMIN — APIXABAN 5 MG: 5 TABLET, FILM COATED ORAL at 20:56

## 2020-01-15 RX ADMIN — METOPROLOL TARTRATE 25 MG: 25 TABLET ORAL at 20:56

## 2020-01-15 RX ADMIN — INSULIN LISPRO 3 UNITS: 100 INJECTION, SOLUTION INTRAVENOUS; SUBCUTANEOUS at 18:26

## 2020-01-15 RX ADMIN — ASPIRIN 81 MG: 81 TABLET, DELAYED RELEASE ORAL at 08:18

## 2020-01-15 RX ADMIN — PANTOPRAZOLE SODIUM 40 MG: 40 TABLET, DELAYED RELEASE ORAL at 05:19

## 2020-01-15 RX ADMIN — APIXABAN 5 MG: 5 TABLET, FILM COATED ORAL at 08:18

## 2020-01-15 RX ADMIN — CYANOCOBALAMIN TAB 1000 MCG 1000 MCG: 1000 TAB at 08:18

## 2020-01-15 RX ADMIN — PRASUGREL 10 MG: 10 TABLET, FILM COATED ORAL at 08:18

## 2020-01-15 RX ADMIN — FLUOXETINE 40 MG: 20 CAPSULE ORAL at 08:18

## 2020-01-16 ENCOUNTER — APPOINTMENT (OUTPATIENT)
Dept: OTHER | Facility: HOSPITAL | Age: 65
End: 2020-01-16

## 2020-01-16 ENCOUNTER — APPOINTMENT (OUTPATIENT)
Dept: CARDIOLOGY | Facility: HOSPITAL | Age: 65
End: 2020-01-16

## 2020-01-16 ENCOUNTER — ANESTHESIA EVENT (OUTPATIENT)
Dept: CARDIOLOGY | Facility: HOSPITAL | Age: 65
End: 2020-01-16

## 2020-01-16 ENCOUNTER — ANESTHESIA (OUTPATIENT)
Dept: CARDIOLOGY | Facility: HOSPITAL | Age: 65
End: 2020-01-16

## 2020-01-16 VITALS — OXYGEN SATURATION: 94 % | SYSTOLIC BLOOD PRESSURE: 96 MMHG | DIASTOLIC BLOOD PRESSURE: 52 MMHG

## 2020-01-16 LAB
ANION GAP SERPL CALCULATED.3IONS-SCNC: 12 MMOL/L (ref 5–15)
APTT PPP: 28 SECONDS (ref 61–76.5)
BACTERIA SPEC AEROBE CULT: NORMAL
BASOPHILS # BLD AUTO: 0.1 10*3/MM3 (ref 0–0.2)
BASOPHILS NFR BLD AUTO: 1.1 % (ref 0–1.5)
BH CV ECHO MEAS - BSA(HAYCOCK): 2.4 M^2
BH CV ECHO MEAS - BSA: 2.3 M^2
BH CV ECHO MEAS - BZI_BMI: 35.9 KILOGRAMS/M^2
BH CV ECHO MEAS - BZI_METRIC_HEIGHT: 177.8 CM
BH CV ECHO MEAS - BZI_METRIC_WEIGHT: 113.4 KG
BH CV ECHO MEAS - MV A MAX VEL: 109.4 CM/SEC
BH CV ECHO MEAS - MV DEC SLOPE: 422.4 CM/SEC^2
BH CV ECHO MEAS - MV E MAX VEL: 71.4 CM/SEC
BH CV ECHO MEAS - MV E/A: 0.65
BH CV ECHO MEAS - MV MAX PG: 6.6 MMHG
BH CV ECHO MEAS - MV MEAN PG: 2.3 MMHG
BH CV ECHO MEAS - MV P1/2T MAX VEL: 89.7 CM/SEC
BH CV ECHO MEAS - MV P1/2T: 62.2 MSEC
BH CV ECHO MEAS - MV V2 MAX: 128.6 CM/SEC
BH CV ECHO MEAS - MV V2 MEAN: 69.3 CM/SEC
BH CV ECHO MEAS - MV V2 VTI: 20.9 CM
BH CV ECHO MEAS - MVA P1/2T LCG: 2.5 CM^2
BH CV ECHO MEAS - MVA(P1/2T): 3.5 CM^2
BUN BLD-MCNC: 23 MG/DL (ref 8–23)
BUN/CREAT SERPL: 23.5 (ref 7–25)
CALCIUM SPEC-SCNC: 8.9 MG/DL (ref 8.6–10.5)
CHLORIDE SERPL-SCNC: 103 MMOL/L (ref 98–107)
CO2 SERPL-SCNC: 25 MMOL/L (ref 22–29)
CREAT BLD-MCNC: 0.98 MG/DL (ref 0.76–1.27)
DEPRECATED RDW RBC AUTO: 43.3 FL (ref 37–54)
EOSINOPHIL # BLD AUTO: 0.2 10*3/MM3 (ref 0–0.4)
EOSINOPHIL NFR BLD AUTO: 2.4 % (ref 0.3–6.2)
ERYTHROCYTE [DISTWIDTH] IN BLOOD BY AUTOMATED COUNT: 15 % (ref 12.3–15.4)
GFR SERPL CREATININE-BSD FRML MDRD: 77 ML/MIN/1.73
GLUCOSE BLD-MCNC: 102 MG/DL (ref 65–99)
GLUCOSE BLDC GLUCOMTR-MCNC: 103 MG/DL (ref 70–105)
GLUCOSE BLDC GLUCOMTR-MCNC: 111 MG/DL (ref 70–105)
GLUCOSE BLDC GLUCOMTR-MCNC: 154 MG/DL (ref 70–105)
GLUCOSE BLDC GLUCOMTR-MCNC: 98 MG/DL (ref 70–105)
HCT VFR BLD AUTO: 34.6 % (ref 37.5–51)
HGB BLD-MCNC: 11.4 G/DL (ref 13–17.7)
LYMPHOCYTES # BLD AUTO: 1.4 10*3/MM3 (ref 0.7–3.1)
LYMPHOCYTES NFR BLD AUTO: 17 % (ref 19.6–45.3)
MCH RBC QN AUTO: 26.9 PG (ref 26.6–33)
MCHC RBC AUTO-ENTMCNC: 32.9 G/DL (ref 31.5–35.7)
MCV RBC AUTO: 81.6 FL (ref 79–97)
MONOCYTES # BLD AUTO: 0.8 10*3/MM3 (ref 0.1–0.9)
MONOCYTES NFR BLD AUTO: 10.1 % (ref 5–12)
NEUTROPHILS # BLD AUTO: 5.6 10*3/MM3 (ref 1.7–7)
NEUTROPHILS NFR BLD AUTO: 69.4 % (ref 42.7–76)
NRBC BLD AUTO-RTO: 0 /100 WBC (ref 0–0.2)
PLATELET # BLD AUTO: 288 10*3/MM3 (ref 140–450)
PMV BLD AUTO: 7.9 FL (ref 6–12)
POTASSIUM BLD-SCNC: 3.6 MMOL/L (ref 3.5–5.2)
RBC # BLD AUTO: 4.25 10*6/MM3 (ref 4.14–5.8)
SODIUM BLD-SCNC: 140 MMOL/L (ref 136–145)
WBC NRBC COR # BLD: 8.1 10*3/MM3 (ref 3.4–10.8)

## 2020-01-16 PROCEDURE — 82962 GLUCOSE BLOOD TEST: CPT

## 2020-01-16 PROCEDURE — 93325 DOPPLER ECHO COLOR FLOW MAPG: CPT

## 2020-01-16 PROCEDURE — 97530 THERAPEUTIC ACTIVITIES: CPT

## 2020-01-16 PROCEDURE — 25010000002 PROPOFOL 10 MG/ML EMULSION: Performed by: ANESTHESIOLOGY

## 2020-01-16 PROCEDURE — 93325 DOPPLER ECHO COLOR FLOW MAPG: CPT | Performed by: INTERNAL MEDICINE

## 2020-01-16 PROCEDURE — 99232 SBSQ HOSP IP/OBS MODERATE 35: CPT | Performed by: NURSE PRACTITIONER

## 2020-01-16 PROCEDURE — 85730 THROMBOPLASTIN TIME PARTIAL: CPT | Performed by: NURSE PRACTITIONER

## 2020-01-16 PROCEDURE — 93312 ECHO TRANSESOPHAGEAL: CPT

## 2020-01-16 PROCEDURE — 97164 PT RE-EVAL EST PLAN CARE: CPT

## 2020-01-16 PROCEDURE — 99232 SBSQ HOSP IP/OBS MODERATE 35: CPT | Performed by: INTERNAL MEDICINE

## 2020-01-16 PROCEDURE — 63710000001 INSULIN LISPRO (HUMAN) PER 5 UNITS: Performed by: INTERNAL MEDICINE

## 2020-01-16 PROCEDURE — 93320 DOPPLER ECHO COMPLETE: CPT | Performed by: INTERNAL MEDICINE

## 2020-01-16 PROCEDURE — 25010000002 DAPTOMYCIN PER 1 MG: Performed by: INTERNAL MEDICINE

## 2020-01-16 PROCEDURE — 80048 BASIC METABOLIC PNL TOTAL CA: CPT | Performed by: NURSE PRACTITIONER

## 2020-01-16 PROCEDURE — 63710000001 INSULIN GLARGINE PER 5 UNITS: Performed by: NURSE PRACTITIONER

## 2020-01-16 PROCEDURE — 93312 ECHO TRANSESOPHAGEAL: CPT | Performed by: INTERNAL MEDICINE

## 2020-01-16 PROCEDURE — 93320 DOPPLER ECHO COMPLETE: CPT

## 2020-01-16 PROCEDURE — B24BZZ4 ULTRASONOGRAPHY OF HEART WITH AORTA, TRANSESOPHAGEAL: ICD-10-PCS | Performed by: INTERNAL MEDICINE

## 2020-01-16 PROCEDURE — 85025 COMPLETE CBC W/AUTO DIFF WBC: CPT | Performed by: NURSE PRACTITIONER

## 2020-01-16 RX ORDER — PROPOFOL 10 MG/ML
VIAL (ML) INTRAVENOUS AS NEEDED
Status: DISCONTINUED | OUTPATIENT
Start: 2020-01-16 | End: 2020-01-16 | Stop reason: SURG

## 2020-01-16 RX ADMIN — INSULIN LISPRO 3 UNITS: 100 INJECTION, SOLUTION INTRAVENOUS; SUBCUTANEOUS at 18:10

## 2020-01-16 RX ADMIN — APIXABAN 5 MG: 5 TABLET, FILM COATED ORAL at 20:57

## 2020-01-16 RX ADMIN — BACITRACIN: 500 OINTMENT TOPICAL at 08:50

## 2020-01-16 RX ADMIN — METOPROLOL TARTRATE 25 MG: 25 TABLET ORAL at 20:57

## 2020-01-16 RX ADMIN — SODIUM CHLORIDE, PRESERVATIVE FREE 10 ML: 5 INJECTION INTRAVENOUS at 20:57

## 2020-01-16 RX ADMIN — PROPOFOL 50 MG: 10 INJECTION, EMULSION INTRAVENOUS at 13:01

## 2020-01-16 RX ADMIN — SODIUM CHLORIDE, PRESERVATIVE FREE 10 ML: 5 INJECTION INTRAVENOUS at 08:42

## 2020-01-16 RX ADMIN — DAPTOMYCIN 750 MG: 500 INJECTION, POWDER, LYOPHILIZED, FOR SOLUTION INTRAVENOUS at 14:18

## 2020-01-16 RX ADMIN — PROPOFOL 50 MG: 10 INJECTION, EMULSION INTRAVENOUS at 12:56

## 2020-01-16 RX ADMIN — GABAPENTIN 200 MG: 100 CAPSULE ORAL at 18:10

## 2020-01-16 RX ADMIN — GABAPENTIN 200 MG: 100 CAPSULE ORAL at 20:57

## 2020-01-16 RX ADMIN — PROPOFOL 50 MG: 10 INJECTION, EMULSION INTRAVENOUS at 12:37

## 2020-01-16 RX ADMIN — PROPOFOL 100 MG: 10 INJECTION, EMULSION INTRAVENOUS at 12:48

## 2020-01-16 RX ADMIN — INSULIN GLARGINE 10 UNITS: 100 INJECTION, SOLUTION SUBCUTANEOUS at 21:06

## 2020-01-16 RX ADMIN — PROPOFOL 50 MG: 10 INJECTION, EMULSION INTRAVENOUS at 12:53

## 2020-01-16 NOTE — ANESTHESIA POSTPROCEDURE EVALUATION
Patient: Jamin Hennessy    Procedure Summary     Date:  01/16/20 Room / Location:  [facility]    Anesthesia Start:  1236 Anesthesia Stop:  1308    Procedure:  ADULT TRANSESOPHAGEAL ECHO (AARON) W/ CONT IF NECESSARY PER PROTOCOL Diagnosis:  (Endocarditis)    Scheduled Providers:  Sarmad Littlejohn DO Provider:  Antony Haywood DO    Anesthesia Type:  Not recorded ASA Status:  Not recorded          Anesthesia Type: No value filed.    Vitals  Vitals Value Taken Time   BP 96/52 1/16/2020  1:08 PM   Temp     Pulse     Resp     SpO2 94 % 1/16/2020  1:08 PM           Post Anesthesia Care and Evaluation    Patient location during evaluation: PHASE II  Patient participation: complete - patient participated  Level of consciousness: awake  Pain scale: See nurse's notes for pain score.  Pain management: adequate  Airway patency: patent  Anesthetic complications: No anesthetic complications  PONV Status: none  Cardiovascular status: acceptable  Respiratory status: acceptable  Hydration status: acceptable    Comments: Patient seen and examined postoperatively; vital signs stable; SpO2 greater than or equal to 90%; cardiopulmonary status stable; nausea/vomiting adequately controlled; pain adequately controlled; no apparent anesthesia complications; patient discharged from anesthesia care when discharge criteria were met

## 2020-01-16 NOTE — ANESTHESIA PREPROCEDURE EVALUATION
Anesthesia Evaluation     Patient summary reviewed and Nursing notes reviewed   NPO Solid Status: > 2 hours  NPO Liquid Status: > 2 hours           Airway   Mallampati: I  TM distance: >3 FB  Neck ROM: full  No difficulty expected  Dental - normal exam   (+) edentulous    Pulmonary - normal exam   (+) sleep apnea,   Cardiovascular - normal exam    (+) hypertension poorly controlled 2 medications or greater, past MI  >12 months, CAD, dysrhythmias PAC, hyperlipidemia,       Neuro/Psych  (+) psychiatric history Anxiety,     GI/Hepatic/Renal/Endo    (+)  GERD,  diabetes mellitus type 2 poorly controlled, thyroid problem hypothyroidism    Musculoskeletal (-) negative ROS    Abdominal  - normal exam    Bowel sounds: normal.   Substance History - negative use     OB/GYN negative ob/gyn ROS         Other                        Anesthesia Plan    ASA 3     MAC     intravenous induction     Anesthetic plan, all risks, benefits, and alternatives have been provided, discussed and informed consent has been obtained with: patient.

## 2020-01-17 LAB
ANION GAP SERPL CALCULATED.3IONS-SCNC: 12 MMOL/L (ref 5–15)
APTT PPP: 26.4 SECONDS (ref 61–76.5)
BACTERIA SPEC AEROBE CULT: ABNORMAL
BASOPHILS # BLD AUTO: 0.1 10*3/MM3 (ref 0–0.2)
BASOPHILS NFR BLD AUTO: 1.3 % (ref 0–1.5)
BUN BLD-MCNC: 20 MG/DL (ref 8–23)
BUN/CREAT SERPL: 23.5 (ref 7–25)
CALCIUM SPEC-SCNC: 9 MG/DL (ref 8.6–10.5)
CHLORIDE SERPL-SCNC: 104 MMOL/L (ref 98–107)
CLOSE TME COLL+ADP + EPINEP PNL BLD: 53 % (ref 86–100)
CO2 SERPL-SCNC: 23 MMOL/L (ref 22–29)
CREAT BLD-MCNC: 0.85 MG/DL (ref 0.76–1.27)
DEPRECATED RDW RBC AUTO: 43.8 FL (ref 37–54)
EOSINOPHIL # BLD AUTO: 0.3 10*3/MM3 (ref 0–0.4)
EOSINOPHIL NFR BLD AUTO: 4.1 % (ref 0.3–6.2)
ERYTHROCYTE [DISTWIDTH] IN BLOOD BY AUTOMATED COUNT: 15.1 % (ref 12.3–15.4)
GFR SERPL CREATININE-BSD FRML MDRD: 91 ML/MIN/1.73
GLUCOSE BLD-MCNC: 114 MG/DL (ref 65–99)
GLUCOSE BLDC GLUCOMTR-MCNC: 111 MG/DL (ref 70–105)
GLUCOSE BLDC GLUCOMTR-MCNC: 114 MG/DL (ref 70–105)
GLUCOSE BLDC GLUCOMTR-MCNC: 143 MG/DL (ref 70–105)
GLUCOSE BLDC GLUCOMTR-MCNC: 247 MG/DL (ref 70–105)
GRAM STN SPEC: ABNORMAL
GRAM STN SPEC: ABNORMAL
HCT VFR BLD AUTO: 35.3 % (ref 37.5–51)
HGB BLD-MCNC: 11.7 G/DL (ref 13–17.7)
ISOLATED FROM: ABNORMAL
LYMPHOCYTES # BLD AUTO: 1.3 10*3/MM3 (ref 0.7–3.1)
LYMPHOCYTES NFR BLD AUTO: 15.3 % (ref 19.6–45.3)
MCH RBC QN AUTO: 27.2 PG (ref 26.6–33)
MCHC RBC AUTO-ENTMCNC: 33.2 G/DL (ref 31.5–35.7)
MCV RBC AUTO: 82.2 FL (ref 79–97)
MONOCYTES # BLD AUTO: 0.8 10*3/MM3 (ref 0.1–0.9)
MONOCYTES NFR BLD AUTO: 9.8 % (ref 5–12)
NEUTROPHILS # BLD AUTO: 5.8 10*3/MM3 (ref 1.7–7)
NEUTROPHILS NFR BLD AUTO: 69.5 % (ref 42.7–76)
NRBC BLD AUTO-RTO: 0 /100 WBC (ref 0–0.2)
PLATELET # BLD AUTO: 307 10*3/MM3 (ref 140–450)
PMV BLD AUTO: 7.6 FL (ref 6–12)
POTASSIUM BLD-SCNC: 3.8 MMOL/L (ref 3.5–5.2)
RBC # BLD AUTO: 4.3 10*6/MM3 (ref 4.14–5.8)
SODIUM BLD-SCNC: 139 MMOL/L (ref 136–145)
WBC NRBC COR # BLD: 8.4 10*3/MM3 (ref 3.4–10.8)

## 2020-01-17 PROCEDURE — 63710000001 INSULIN GLARGINE PER 5 UNITS: Performed by: NURSE PRACTITIONER

## 2020-01-17 PROCEDURE — 25010000002 AMPICILLIN PER 500 MG: Performed by: INTERNAL MEDICINE

## 2020-01-17 PROCEDURE — 99255 IP/OBS CONSLTJ NEW/EST HI 80: CPT | Performed by: PHYSICIAN ASSISTANT

## 2020-01-17 PROCEDURE — 87040 BLOOD CULTURE FOR BACTERIA: CPT | Performed by: INTERNAL MEDICINE

## 2020-01-17 PROCEDURE — 99232 SBSQ HOSP IP/OBS MODERATE 35: CPT | Performed by: INTERNAL MEDICINE

## 2020-01-17 PROCEDURE — 97112 NEUROMUSCULAR REEDUCATION: CPT

## 2020-01-17 PROCEDURE — 93010 ELECTROCARDIOGRAM REPORT: CPT | Performed by: INTERNAL MEDICINE

## 2020-01-17 PROCEDURE — 97530 THERAPEUTIC ACTIVITIES: CPT

## 2020-01-17 PROCEDURE — 80048 BASIC METABOLIC PNL TOTAL CA: CPT | Performed by: NURSE PRACTITIONER

## 2020-01-17 PROCEDURE — 25010000002 GENTAMICIN PER 80 MG: Performed by: INTERNAL MEDICINE

## 2020-01-17 PROCEDURE — 63710000001 INSULIN LISPRO (HUMAN) PER 5 UNITS: Performed by: INTERNAL MEDICINE

## 2020-01-17 PROCEDURE — 85025 COMPLETE CBC W/AUTO DIFF WBC: CPT | Performed by: NURSE PRACTITIONER

## 2020-01-17 PROCEDURE — 25010000002 GENTAMICIN PER 80 MG: Performed by: NURSE PRACTITIONER

## 2020-01-17 PROCEDURE — 82962 GLUCOSE BLOOD TEST: CPT

## 2020-01-17 PROCEDURE — 85730 THROMBOPLASTIN TIME PARTIAL: CPT | Performed by: NURSE PRACTITIONER

## 2020-01-17 PROCEDURE — 97535 SELF CARE MNGMENT TRAINING: CPT

## 2020-01-17 PROCEDURE — 85576 BLOOD PLATELET AGGREGATION: CPT | Performed by: NURSE PRACTITIONER

## 2020-01-17 RX ORDER — ATORVASTATIN CALCIUM 40 MG/1
40 TABLET, FILM COATED ORAL NIGHTLY
Status: DISCONTINUED | OUTPATIENT
Start: 2020-01-17 | End: 2020-01-22 | Stop reason: HOSPADM

## 2020-01-17 RX ADMIN — AMPICILLIN SODIUM 2 G: 2 INJECTION, POWDER, FOR SOLUTION INTRAMUSCULAR; INTRAVENOUS at 17:42

## 2020-01-17 RX ADMIN — CETIRIZINE HYDROCHLORIDE 10 MG: 10 TABLET, FILM COATED ORAL at 09:19

## 2020-01-17 RX ADMIN — GABAPENTIN 200 MG: 100 CAPSULE ORAL at 21:45

## 2020-01-17 RX ADMIN — FINASTERIDE 5 MG: 5 TABLET, FILM COATED ORAL at 09:18

## 2020-01-17 RX ADMIN — ATORVASTATIN CALCIUM 40 MG: 40 TABLET, FILM COATED ORAL at 21:45

## 2020-01-17 RX ADMIN — BACITRACIN 1 APPLICATION: 500 OINTMENT TOPICAL at 09:21

## 2020-01-17 RX ADMIN — LISINOPRIL 10 MG: 5 TABLET ORAL at 09:18

## 2020-01-17 RX ADMIN — GABAPENTIN 200 MG: 100 CAPSULE ORAL at 16:44

## 2020-01-17 RX ADMIN — GENTAMICIN SULFATE 100 MG: 40 INJECTION, SOLUTION INTRAMUSCULAR; INTRAVENOUS at 21:20

## 2020-01-17 RX ADMIN — PANTOPRAZOLE SODIUM 40 MG: 40 TABLET, DELAYED RELEASE ORAL at 06:17

## 2020-01-17 RX ADMIN — SODIUM CHLORIDE, PRESERVATIVE FREE 10 ML: 5 INJECTION INTRAVENOUS at 09:31

## 2020-01-17 RX ADMIN — FLUOXETINE 40 MG: 20 CAPSULE ORAL at 09:18

## 2020-01-17 RX ADMIN — GENTAMICIN SULFATE 100 MG: 40 INJECTION, SOLUTION INTRAMUSCULAR; INTRAVENOUS at 13:32

## 2020-01-17 RX ADMIN — INSULIN LISPRO 5 UNITS: 100 INJECTION, SOLUTION INTRAVENOUS; SUBCUTANEOUS at 17:42

## 2020-01-17 RX ADMIN — ASPIRIN 81 MG: 81 TABLET, DELAYED RELEASE ORAL at 09:18

## 2020-01-17 RX ADMIN — PRASUGREL 10 MG: 10 TABLET, FILM COATED ORAL at 09:18

## 2020-01-17 RX ADMIN — AMPICILLIN SODIUM 2 G: 2 INJECTION, POWDER, FOR SOLUTION INTRAMUSCULAR; INTRAVENOUS at 21:46

## 2020-01-17 RX ADMIN — INSULIN GLARGINE 10 UNITS: 100 INJECTION, SOLUTION SUBCUTANEOUS at 09:22

## 2020-01-17 RX ADMIN — CYANOCOBALAMIN TAB 1000 MCG 1000 MCG: 1000 TAB at 09:18

## 2020-01-17 RX ADMIN — AMPICILLIN SODIUM 2 G: 2 INJECTION, POWDER, FOR SOLUTION INTRAMUSCULAR; INTRAVENOUS at 09:30

## 2020-01-17 RX ADMIN — METOPROLOL TARTRATE 25 MG: 25 TABLET ORAL at 21:45

## 2020-01-17 RX ADMIN — INSULIN GLARGINE 10 UNITS: 100 INJECTION, SOLUTION SUBCUTANEOUS at 21:46

## 2020-01-17 RX ADMIN — GABAPENTIN 200 MG: 100 CAPSULE ORAL at 09:18

## 2020-01-17 RX ADMIN — METOPROLOL TARTRATE 25 MG: 25 TABLET ORAL at 09:18

## 2020-01-17 RX ADMIN — TAMSULOSIN HYDROCHLORIDE 0.4 MG: 0.4 CAPSULE ORAL at 09:18

## 2020-01-17 RX ADMIN — APIXABAN 5 MG: 5 TABLET, FILM COATED ORAL at 09:18

## 2020-01-17 RX ADMIN — AMPICILLIN SODIUM 2 G: 2 INJECTION, POWDER, FOR SOLUTION INTRAMUSCULAR; INTRAVENOUS at 14:45

## 2020-01-17 RX ADMIN — FUROSEMIDE 20 MG: 20 TABLET ORAL at 09:18

## 2020-01-17 RX ADMIN — FOLIC ACID 1 MG: 1 TABLET ORAL at 09:18

## 2020-01-18 ENCOUNTER — APPOINTMENT (OUTPATIENT)
Dept: RESPIRATORY THERAPY | Facility: HOSPITAL | Age: 65
End: 2020-01-18

## 2020-01-18 LAB
ALBUMIN SERPL-MCNC: 3.3 G/DL (ref 3.5–5.2)
ANION GAP SERPL CALCULATED.3IONS-SCNC: 10 MMOL/L (ref 5–15)
APTT PPP: 25.6 SECONDS (ref 61–76.5)
BACTERIA SPEC AEROBE CULT: ABNORMAL
BASOPHILS # BLD AUTO: 0.1 10*3/MM3 (ref 0–0.2)
BASOPHILS NFR BLD AUTO: 1 % (ref 0–1.5)
BUN BLD-MCNC: 18 MG/DL (ref 8–23)
BUN/CREAT SERPL: 20.2 (ref 7–25)
CALCIUM SPEC-SCNC: 9 MG/DL (ref 8.6–10.5)
CHLORIDE SERPL-SCNC: 104 MMOL/L (ref 98–107)
CK SERPL-CCNC: 35 U/L (ref 20–200)
CO2 SERPL-SCNC: 26 MMOL/L (ref 22–29)
CREAT BLD-MCNC: 0.89 MG/DL (ref 0.76–1.27)
DEPRECATED RDW RBC AUTO: 43.8 FL (ref 37–54)
EOSINOPHIL # BLD AUTO: 0.3 10*3/MM3 (ref 0–0.4)
EOSINOPHIL NFR BLD AUTO: 3.7 % (ref 0.3–6.2)
ERYTHROCYTE [DISTWIDTH] IN BLOOD BY AUTOMATED COUNT: 15.1 % (ref 12.3–15.4)
GENTAMICIN SERPL-MCNC: 1.73 MCG/ML (ref 0.5–2)
GENTAMICIN SERPL-MCNC: 1.97 MCG/ML (ref 0.5–10)
GENTAMICIN SERPL-MCNC: 3.53 MCG/ML (ref 0.5–10)
GFR SERPL CREATININE-BSD FRML MDRD: 86 ML/MIN/1.73
GLUCOSE BLD-MCNC: 108 MG/DL (ref 65–99)
GLUCOSE BLDC GLUCOMTR-MCNC: 104 MG/DL (ref 70–105)
GLUCOSE BLDC GLUCOMTR-MCNC: 124 MG/DL (ref 70–105)
GLUCOSE BLDC GLUCOMTR-MCNC: 131 MG/DL (ref 70–105)
GLUCOSE BLDC GLUCOMTR-MCNC: 88 MG/DL (ref 70–105)
GRAM STN SPEC: ABNORMAL
GRAM STN SPEC: ABNORMAL
HCT VFR BLD AUTO: 32.5 % (ref 37.5–51)
HGB BLD-MCNC: 10.9 G/DL (ref 13–17.7)
ISOLATED FROM: ABNORMAL
LYMPHOCYTES # BLD AUTO: 0.6 10*3/MM3 (ref 0.7–3.1)
LYMPHOCYTES NFR BLD AUTO: 8.3 % (ref 19.6–45.3)
MCH RBC QN AUTO: 27.5 PG (ref 26.6–33)
MCHC RBC AUTO-ENTMCNC: 33.4 G/DL (ref 31.5–35.7)
MCV RBC AUTO: 82.1 FL (ref 79–97)
MONOCYTES # BLD AUTO: 0.7 10*3/MM3 (ref 0.1–0.9)
MONOCYTES NFR BLD AUTO: 9.2 % (ref 5–12)
NEUTROPHILS # BLD AUTO: 6 10*3/MM3 (ref 1.7–7)
NEUTROPHILS NFR BLD AUTO: 77.8 % (ref 42.7–76)
NRBC BLD AUTO-RTO: 0.1 /100 WBC (ref 0–0.2)
PHOSPHATE SERPL-MCNC: 3.8 MG/DL (ref 2.5–4.5)
PLATELET # BLD AUTO: 289 10*3/MM3 (ref 140–450)
PMV BLD AUTO: 8 FL (ref 6–12)
POTASSIUM BLD-SCNC: 3.9 MMOL/L (ref 3.5–5.2)
RBC # BLD AUTO: 3.96 10*6/MM3 (ref 4.14–5.8)
SODIUM BLD-SCNC: 140 MMOL/L (ref 136–145)
URATE SERPL-MCNC: 6 MG/DL (ref 3.4–7)
WBC NRBC COR # BLD: 7.8 10*3/MM3 (ref 3.4–10.8)

## 2020-01-18 PROCEDURE — 82962 GLUCOSE BLOOD TEST: CPT

## 2020-01-18 PROCEDURE — 80170 ASSAY OF GENTAMICIN: CPT | Performed by: INTERNAL MEDICINE

## 2020-01-18 PROCEDURE — 84550 ASSAY OF BLOOD/URIC ACID: CPT | Performed by: INTERNAL MEDICINE

## 2020-01-18 PROCEDURE — 85730 THROMBOPLASTIN TIME PARTIAL: CPT | Performed by: NURSE PRACTITIONER

## 2020-01-18 PROCEDURE — 94060 EVALUATION OF WHEEZING: CPT

## 2020-01-18 PROCEDURE — 80069 RENAL FUNCTION PANEL: CPT | Performed by: INTERNAL MEDICINE

## 2020-01-18 PROCEDURE — 63710000001 INSULIN GLARGINE PER 5 UNITS: Performed by: NURSE PRACTITIONER

## 2020-01-18 PROCEDURE — 25010000002 AMPICILLIN PER 500 MG: Performed by: INTERNAL MEDICINE

## 2020-01-18 PROCEDURE — 82550 ASSAY OF CK (CPK): CPT | Performed by: INTERNAL MEDICINE

## 2020-01-18 PROCEDURE — 99232 SBSQ HOSP IP/OBS MODERATE 35: CPT | Performed by: INTERNAL MEDICINE

## 2020-01-18 PROCEDURE — 99231 SBSQ HOSP IP/OBS SF/LOW 25: CPT | Performed by: PSYCHIATRY & NEUROLOGY

## 2020-01-18 PROCEDURE — 25010000002 GENTAMICIN PER 80 MG: Performed by: NURSE PRACTITIONER

## 2020-01-18 PROCEDURE — 94640 AIRWAY INHALATION TREATMENT: CPT

## 2020-01-18 PROCEDURE — 94727 GAS DIL/WSHOT DETER LNG VOL: CPT

## 2020-01-18 PROCEDURE — 85025 COMPLETE CBC W/AUTO DIFF WBC: CPT | Performed by: NURSE PRACTITIONER

## 2020-01-18 RX ORDER — ALBUTEROL SULFATE 2.5 MG/3ML
2.5 SOLUTION RESPIRATORY (INHALATION) ONCE AS NEEDED
Status: COMPLETED | OUTPATIENT
Start: 2020-01-18 | End: 2020-01-18

## 2020-01-18 RX ORDER — SODIUM CHLORIDE 9 MG/ML
100 INJECTION, SOLUTION INTRAVENOUS CONTINUOUS
Status: DISPENSED | OUTPATIENT
Start: 2020-01-19 | End: 2020-01-20

## 2020-01-18 RX ADMIN — METOPROLOL TARTRATE 25 MG: 25 TABLET ORAL at 09:09

## 2020-01-18 RX ADMIN — AMPICILLIN SODIUM 2 G: 2 INJECTION, POWDER, FOR SOLUTION INTRAMUSCULAR; INTRAVENOUS at 21:08

## 2020-01-18 RX ADMIN — CYANOCOBALAMIN TAB 1000 MCG 1000 MCG: 1000 TAB at 09:09

## 2020-01-18 RX ADMIN — GENTAMICIN SULFATE 100 MG: 40 INJECTION, SOLUTION INTRAMUSCULAR; INTRAVENOUS at 11:22

## 2020-01-18 RX ADMIN — FUROSEMIDE 20 MG: 20 TABLET ORAL at 09:09

## 2020-01-18 RX ADMIN — AMPICILLIN SODIUM 2 G: 2 INJECTION, POWDER, FOR SOLUTION INTRAMUSCULAR; INTRAVENOUS at 09:09

## 2020-01-18 RX ADMIN — METOPROLOL TARTRATE 25 MG: 25 TABLET ORAL at 21:08

## 2020-01-18 RX ADMIN — BACITRACIN: 500 OINTMENT TOPICAL at 09:11

## 2020-01-18 RX ADMIN — AMPICILLIN SODIUM 2 G: 2 INJECTION, POWDER, FOR SOLUTION INTRAMUSCULAR; INTRAVENOUS at 17:10

## 2020-01-18 RX ADMIN — GABAPENTIN 200 MG: 100 CAPSULE ORAL at 09:09

## 2020-01-18 RX ADMIN — INSULIN GLARGINE 10 UNITS: 100 INJECTION, SOLUTION SUBCUTANEOUS at 09:12

## 2020-01-18 RX ADMIN — ALBUTEROL SULFATE 2.5 MG: 2.5 SOLUTION RESPIRATORY (INHALATION) at 17:00

## 2020-01-18 RX ADMIN — CETIRIZINE HYDROCHLORIDE 10 MG: 10 TABLET, FILM COATED ORAL at 09:09

## 2020-01-18 RX ADMIN — TAMSULOSIN HYDROCHLORIDE 0.4 MG: 0.4 CAPSULE ORAL at 09:11

## 2020-01-18 RX ADMIN — GABAPENTIN 200 MG: 100 CAPSULE ORAL at 21:07

## 2020-01-18 RX ADMIN — AMPICILLIN SODIUM 2 G: 2 INJECTION, POWDER, FOR SOLUTION INTRAMUSCULAR; INTRAVENOUS at 05:52

## 2020-01-18 RX ADMIN — FINASTERIDE 5 MG: 5 TABLET, FILM COATED ORAL at 09:09

## 2020-01-18 RX ADMIN — ASPIRIN 81 MG: 81 TABLET, DELAYED RELEASE ORAL at 09:08

## 2020-01-18 RX ADMIN — FOLIC ACID 1 MG: 1 TABLET ORAL at 09:09

## 2020-01-18 RX ADMIN — SODIUM CHLORIDE, PRESERVATIVE FREE 10 ML: 5 INJECTION INTRAVENOUS at 09:11

## 2020-01-18 RX ADMIN — ATORVASTATIN CALCIUM 40 MG: 40 TABLET, FILM COATED ORAL at 21:07

## 2020-01-18 RX ADMIN — AMPICILLIN SODIUM 2 G: 2 INJECTION, POWDER, FOR SOLUTION INTRAMUSCULAR; INTRAVENOUS at 01:44

## 2020-01-18 RX ADMIN — PANTOPRAZOLE SODIUM 40 MG: 40 TABLET, DELAYED RELEASE ORAL at 05:52

## 2020-01-18 RX ADMIN — GENTAMICIN SULFATE 100 MG: 40 INJECTION, SOLUTION INTRAMUSCULAR; INTRAVENOUS at 03:13

## 2020-01-18 RX ADMIN — INSULIN GLARGINE 10 UNITS: 100 INJECTION, SOLUTION SUBCUTANEOUS at 21:08

## 2020-01-18 RX ADMIN — AMPICILLIN SODIUM 2 G: 2 INJECTION, POWDER, FOR SOLUTION INTRAMUSCULAR; INTRAVENOUS at 13:08

## 2020-01-18 RX ADMIN — LISINOPRIL 10 MG: 5 TABLET ORAL at 09:09

## 2020-01-18 RX ADMIN — GABAPENTIN 200 MG: 100 CAPSULE ORAL at 15:21

## 2020-01-18 RX ADMIN — FLUOXETINE 40 MG: 20 CAPSULE ORAL at 09:09

## 2020-01-19 LAB
ALBUMIN SERPL-MCNC: 3.3 G/DL (ref 3.5–5.2)
ALBUMIN/GLOB SERPL: 0.9 G/DL
ALP SERPL-CCNC: 48 U/L (ref 39–117)
ALT SERPL W P-5'-P-CCNC: 19 U/L (ref 1–41)
ANION GAP SERPL CALCULATED.3IONS-SCNC: 10 MMOL/L (ref 5–15)
APTT PPP: 25.2 SECONDS (ref 61–76.5)
AST SERPL-CCNC: 16 U/L (ref 1–40)
BACTERIA SPEC AEROBE CULT: ABNORMAL
BACTERIA SPEC AEROBE CULT: ABNORMAL
BASOPHILS # BLD AUTO: 0.1 10*3/MM3 (ref 0–0.2)
BASOPHILS NFR BLD AUTO: 1.2 % (ref 0–1.5)
BILIRUB SERPL-MCNC: 0.3 MG/DL (ref 0.2–1.2)
BUN BLD-MCNC: 13 MG/DL (ref 8–23)
BUN/CREAT SERPL: 13.4 (ref 7–25)
CALCIUM SPEC-SCNC: 8.9 MG/DL (ref 8.6–10.5)
CHLORIDE SERPL-SCNC: 104 MMOL/L (ref 98–107)
CO2 SERPL-SCNC: 24 MMOL/L (ref 22–29)
CREAT BLD-MCNC: 0.97 MG/DL (ref 0.76–1.27)
DEPRECATED RDW RBC AUTO: 43.3 FL (ref 37–54)
EOSINOPHIL # BLD AUTO: 0.3 10*3/MM3 (ref 0–0.4)
EOSINOPHIL NFR BLD AUTO: 4 % (ref 0.3–6.2)
ERYTHROCYTE [DISTWIDTH] IN BLOOD BY AUTOMATED COUNT: 14.9 % (ref 12.3–15.4)
GFR SERPL CREATININE-BSD FRML MDRD: 78 ML/MIN/1.73
GLOBULIN UR ELPH-MCNC: 3.5 GM/DL
GLUCOSE BLD-MCNC: 119 MG/DL (ref 65–99)
GLUCOSE BLDC GLUCOMTR-MCNC: 112 MG/DL (ref 70–105)
GLUCOSE BLDC GLUCOMTR-MCNC: 122 MG/DL (ref 70–105)
GLUCOSE BLDC GLUCOMTR-MCNC: 137 MG/DL (ref 70–105)
GLUCOSE BLDC GLUCOMTR-MCNC: 89 MG/DL (ref 70–105)
GRAM STN SPEC: ABNORMAL
HCT VFR BLD AUTO: 32.8 % (ref 37.5–51)
HGB BLD-MCNC: 10.8 G/DL (ref 13–17.7)
ISOLATED FROM: ABNORMAL
ISOLATED FROM: ABNORMAL
LYMPHOCYTES # BLD AUTO: 1 10*3/MM3 (ref 0.7–3.1)
LYMPHOCYTES NFR BLD AUTO: 12 % (ref 19.6–45.3)
MAGNESIUM SERPL-MCNC: 2.1 MG/DL (ref 1.6–2.4)
MCH RBC QN AUTO: 27 PG (ref 26.6–33)
MCHC RBC AUTO-ENTMCNC: 33 G/DL (ref 31.5–35.7)
MCV RBC AUTO: 81.8 FL (ref 79–97)
MONOCYTES # BLD AUTO: 0.8 10*3/MM3 (ref 0.1–0.9)
MONOCYTES NFR BLD AUTO: 9.7 % (ref 5–12)
NEUTROPHILS # BLD AUTO: 6.1 10*3/MM3 (ref 1.7–7)
NEUTROPHILS NFR BLD AUTO: 73.1 % (ref 42.7–76)
NRBC BLD AUTO-RTO: 0.1 /100 WBC (ref 0–0.2)
PHOSPHATE SERPL-MCNC: 3.6 MG/DL (ref 2.5–4.5)
PLATELET # BLD AUTO: 305 10*3/MM3 (ref 140–450)
PMV BLD AUTO: 7.6 FL (ref 6–12)
POTASSIUM BLD-SCNC: 3.9 MMOL/L (ref 3.5–5.2)
PROT SERPL-MCNC: 6.8 G/DL (ref 6–8.5)
RBC # BLD AUTO: 4 10*6/MM3 (ref 4.14–5.8)
SODIUM BLD-SCNC: 138 MMOL/L (ref 136–145)
WBC NRBC COR # BLD: 8.3 10*3/MM3 (ref 3.4–10.8)

## 2020-01-19 PROCEDURE — 63710000001 INSULIN GLARGINE PER 5 UNITS: Performed by: NURSE PRACTITIONER

## 2020-01-19 PROCEDURE — 99232 SBSQ HOSP IP/OBS MODERATE 35: CPT | Performed by: INTERNAL MEDICINE

## 2020-01-19 PROCEDURE — 25010000002 AMPICILLIN PER 500 MG: Performed by: INTERNAL MEDICINE

## 2020-01-19 PROCEDURE — 25010000002 HYDRALAZINE PER 20 MG: Performed by: NURSE PRACTITIONER

## 2020-01-19 PROCEDURE — 85025 COMPLETE CBC W/AUTO DIFF WBC: CPT | Performed by: NURSE PRACTITIONER

## 2020-01-19 PROCEDURE — 82962 GLUCOSE BLOOD TEST: CPT

## 2020-01-19 PROCEDURE — 84100 ASSAY OF PHOSPHORUS: CPT | Performed by: INTERNAL MEDICINE

## 2020-01-19 PROCEDURE — 80053 COMPREHEN METABOLIC PANEL: CPT | Performed by: INTERNAL MEDICINE

## 2020-01-19 PROCEDURE — 99232 SBSQ HOSP IP/OBS MODERATE 35: CPT | Performed by: NURSE PRACTITIONER

## 2020-01-19 PROCEDURE — 25010000002 GENTAMICIN PER 80 MG: Performed by: NURSE PRACTITIONER

## 2020-01-19 PROCEDURE — 83735 ASSAY OF MAGNESIUM: CPT | Performed by: INTERNAL MEDICINE

## 2020-01-19 PROCEDURE — 85730 THROMBOPLASTIN TIME PARTIAL: CPT | Performed by: NURSE PRACTITIONER

## 2020-01-19 RX ORDER — HYDRALAZINE HYDROCHLORIDE 20 MG/ML
10 INJECTION INTRAMUSCULAR; INTRAVENOUS EVERY 4 HOURS PRN
Status: DISCONTINUED | OUTPATIENT
Start: 2020-01-19 | End: 2020-01-19

## 2020-01-19 RX ORDER — HYDRALAZINE HYDROCHLORIDE 20 MG/ML
20 INJECTION INTRAMUSCULAR; INTRAVENOUS EVERY 4 HOURS PRN
Status: DISCONTINUED | OUTPATIENT
Start: 2020-01-19 | End: 2020-01-22 | Stop reason: HOSPADM

## 2020-01-19 RX ORDER — AMLODIPINE BESYLATE 5 MG/1
5 TABLET ORAL
Status: DISCONTINUED | OUTPATIENT
Start: 2020-01-19 | End: 2020-01-22 | Stop reason: HOSPADM

## 2020-01-19 RX ORDER — METOPROLOL TARTRATE 50 MG/1
50 TABLET, FILM COATED ORAL EVERY 12 HOURS SCHEDULED
Status: DISCONTINUED | OUTPATIENT
Start: 2020-01-19 | End: 2020-01-22 | Stop reason: HOSPADM

## 2020-01-19 RX ADMIN — Medication 600 MG: at 21:03

## 2020-01-19 RX ADMIN — AMPICILLIN SODIUM 2 G: 2 INJECTION, POWDER, FOR SOLUTION INTRAMUSCULAR; INTRAVENOUS at 10:35

## 2020-01-19 RX ADMIN — GABAPENTIN 200 MG: 100 CAPSULE ORAL at 21:03

## 2020-01-19 RX ADMIN — FLUOXETINE 40 MG: 20 CAPSULE ORAL at 08:09

## 2020-01-19 RX ADMIN — GABAPENTIN 200 MG: 100 CAPSULE ORAL at 08:09

## 2020-01-19 RX ADMIN — GABAPENTIN 200 MG: 100 CAPSULE ORAL at 16:55

## 2020-01-19 RX ADMIN — ASPIRIN 81 MG: 81 TABLET, DELAYED RELEASE ORAL at 08:10

## 2020-01-19 RX ADMIN — GENTAMICIN SULFATE 100 MG: 40 INJECTION, SOLUTION INTRAMUSCULAR; INTRAVENOUS at 00:56

## 2020-01-19 RX ADMIN — SODIUM CHLORIDE, PRESERVATIVE FREE 10 ML: 5 INJECTION INTRAVENOUS at 21:14

## 2020-01-19 RX ADMIN — FOLIC ACID 1 MG: 1 TABLET ORAL at 08:10

## 2020-01-19 RX ADMIN — FINASTERIDE 5 MG: 5 TABLET, FILM COATED ORAL at 08:10

## 2020-01-19 RX ADMIN — ACETAMINOPHEN 650 MG: 325 TABLET, FILM COATED ORAL at 03:38

## 2020-01-19 RX ADMIN — METOPROLOL TARTRATE 25 MG: 25 TABLET ORAL at 08:10

## 2020-01-19 RX ADMIN — FUROSEMIDE 20 MG: 20 TABLET ORAL at 08:10

## 2020-01-19 RX ADMIN — INSULIN GLARGINE 10 UNITS: 100 INJECTION, SOLUTION SUBCUTANEOUS at 08:08

## 2020-01-19 RX ADMIN — INSULIN GLARGINE 10 UNITS: 100 INJECTION, SOLUTION SUBCUTANEOUS at 21:02

## 2020-01-19 RX ADMIN — GENTAMICIN SULFATE 100 MG: 40 INJECTION, SOLUTION INTRAMUSCULAR; INTRAVENOUS at 13:45

## 2020-01-19 RX ADMIN — LISINOPRIL 10 MG: 5 TABLET ORAL at 08:10

## 2020-01-19 RX ADMIN — TAMSULOSIN HYDROCHLORIDE 0.4 MG: 0.4 CAPSULE ORAL at 08:10

## 2020-01-19 RX ADMIN — AMPICILLIN SODIUM 2 G: 2 INJECTION, POWDER, FOR SOLUTION INTRAMUSCULAR; INTRAVENOUS at 17:38

## 2020-01-19 RX ADMIN — CETIRIZINE HYDROCHLORIDE 10 MG: 10 TABLET, FILM COATED ORAL at 08:09

## 2020-01-19 RX ADMIN — HYDRALAZINE HYDROCHLORIDE 10 MG: 20 INJECTION INTRAMUSCULAR; INTRAVENOUS at 01:40

## 2020-01-19 RX ADMIN — HYDRALAZINE HYDROCHLORIDE 20 MG: 20 INJECTION INTRAMUSCULAR; INTRAVENOUS at 05:17

## 2020-01-19 RX ADMIN — AMPICILLIN SODIUM 2 G: 2 INJECTION, POWDER, FOR SOLUTION INTRAMUSCULAR; INTRAVENOUS at 05:17

## 2020-01-19 RX ADMIN — ATORVASTATIN CALCIUM 40 MG: 40 TABLET, FILM COATED ORAL at 21:03

## 2020-01-19 RX ADMIN — CYANOCOBALAMIN TAB 1000 MCG 1000 MCG: 1000 TAB at 08:09

## 2020-01-19 RX ADMIN — SODIUM CHLORIDE, PRESERVATIVE FREE 10 ML: 5 INJECTION INTRAVENOUS at 08:30

## 2020-01-19 RX ADMIN — AMLODIPINE BESYLATE 5 MG: 5 TABLET ORAL at 10:35

## 2020-01-19 RX ADMIN — AMPICILLIN SODIUM 2 G: 2 INJECTION, POWDER, FOR SOLUTION INTRAMUSCULAR; INTRAVENOUS at 01:39

## 2020-01-19 RX ADMIN — PANTOPRAZOLE SODIUM 40 MG: 40 TABLET, DELAYED RELEASE ORAL at 05:17

## 2020-01-19 RX ADMIN — AMPICILLIN SODIUM 2 G: 2 INJECTION, POWDER, FOR SOLUTION INTRAMUSCULAR; INTRAVENOUS at 21:03

## 2020-01-19 RX ADMIN — AMPICILLIN SODIUM 2 G: 2 INJECTION, POWDER, FOR SOLUTION INTRAMUSCULAR; INTRAVENOUS at 13:45

## 2020-01-19 RX ADMIN — METOPROLOL TARTRATE 50 MG: 50 TABLET, FILM COATED ORAL at 21:03

## 2020-01-19 RX ADMIN — Medication 600 MG: at 08:10

## 2020-01-20 ENCOUNTER — APPOINTMENT (OUTPATIENT)
Dept: CARDIOLOGY | Facility: HOSPITAL | Age: 65
End: 2020-01-20

## 2020-01-20 PROBLEM — I33.0 ACUTE BACTERIAL ENDOCARDITIS: Status: ACTIVE | Noted: 2020-01-13

## 2020-01-20 LAB
ALBUMIN SERPL-MCNC: 3.4 G/DL (ref 3.5–5.2)
ALBUMIN/GLOB SERPL: 1.1 G/DL
ALP SERPL-CCNC: 47 U/L (ref 39–117)
ALT SERPL W P-5'-P-CCNC: 20 U/L (ref 1–41)
ANION GAP SERPL CALCULATED.3IONS-SCNC: 9 MMOL/L (ref 5–15)
APTT PPP: 27 SECONDS (ref 61–76.5)
ARTERIAL PATENCY WRIST A: ABNORMAL
AST SERPL-CCNC: 16 U/L (ref 1–40)
ATMOSPHERIC PRESS: ABNORMAL MM[HG]
BACTERIA UR QL AUTO: ABNORMAL /HPF
BASE EXCESS BLDA CALC-SCNC: -9.9 MMOL/L (ref 0–3)
BASOPHILS # BLD AUTO: 0.1 10*3/MM3 (ref 0–0.2)
BASOPHILS NFR BLD AUTO: 1.3 % (ref 0–1.5)
BDY SITE: ABNORMAL
BH CV XLRA MEAS - DIST GSV THIGH DIST LEFT: 0.32 CM
BH CV XLRA MEAS - DIST GSV THIGH DIST RIGHT: 0.18 CM
BH CV XLRA MEAS - GSV ANKLE DIST LEFT: 0.24 CM
BH CV XLRA MEAS - GSV ANKLE DIST RIGHT: 0.3 CM
BH CV XLRA MEAS - MID GSV CALF LEFT: 0.19 CM
BH CV XLRA MEAS - MID GSV CALF RIGHT: 0.17 CM
BH CV XLRA MEAS - MID GSV THIGH  LEFT: 0.34 CM
BH CV XLRA MEAS - MID GSV THIGH  RIGHT: 0.22 CM
BH CV XLRA MEAS - PROX GSV CALF DIST LEFT: 0.28 CM
BH CV XLRA MEAS - PROX GSV CALF DIST RIGHT: 0.2 CM
BH CV XLRA MEAS - PROX GSV THIGH  LEFT: 0.3 CM
BH CV XLRA MEAS - PROX GSV THIGH  RIGHT: 0.25 CM
BH CV XLRA MEAS LEFT CCA RATIO VEL: 191 CM/SEC
BH CV XLRA MEAS LEFT DIST CCA EDV: -17.4 CM/SEC
BH CV XLRA MEAS LEFT DIST CCA PSV: -84.5 CM/SEC
BH CV XLRA MEAS LEFT DIST ICA EDV: -18.6 CM/SEC
BH CV XLRA MEAS LEFT DIST ICA PSV: -60.6 CM/SEC
BH CV XLRA MEAS LEFT ICA RATIO VEL: -77.4 CM/SEC
BH CV XLRA MEAS LEFT ICA/CCA RATIO: -0.41
BH CV XLRA MEAS LEFT PROX CCA EDV: 23.2 CM/SEC
BH CV XLRA MEAS LEFT PROX CCA PSV: 191 CM/SEC
BH CV XLRA MEAS LEFT PROX ECA PSV: -95.3 CM/SEC
BH CV XLRA MEAS LEFT PROX ICA EDV: -15.4 CM/SEC
BH CV XLRA MEAS LEFT PROX ICA PSV: -77.4 CM/SEC
BH CV XLRA MEAS LEFT PROX SCLA PSV: 187 CM/SEC
BH CV XLRA MEAS LEFT VERTEBRAL A PSV: 65.3 CM/SEC
BH CV XLRA MEAS RIGHT CCA RATIO VEL: 127 CM/SEC
BH CV XLRA MEAS RIGHT DIST CCA EDV: 16.8 CM/SEC
BH CV XLRA MEAS RIGHT DIST CCA PSV: 86.4 CM/SEC
BH CV XLRA MEAS RIGHT DIST ICA EDV: -16.2 CM/SEC
BH CV XLRA MEAS RIGHT DIST ICA PSV: -46.6 CM/SEC
BH CV XLRA MEAS RIGHT ICA RATIO VEL: 68 CM/SEC
BH CV XLRA MEAS RIGHT ICA/CCA RATIO: 0.54
BH CV XLRA MEAS RIGHT PROX CCA EDV: 22.5 CM/SEC
BH CV XLRA MEAS RIGHT PROX CCA PSV: 127 CM/SEC
BH CV XLRA MEAS RIGHT PROX ECA PSV: -64.8 CM/SEC
BH CV XLRA MEAS RIGHT PROX ICA EDV: 18.1 CM/SEC
BH CV XLRA MEAS RIGHT PROX ICA PSV: 68 CM/SEC
BH CV XLRA MEAS RIGHT PROX SCLA PSV: 127 CM/SEC
BH CV XLRA MEAS RIGHT VERTEBRAL A PSV: 47.2 CM/SEC
BILIRUB SERPL-MCNC: 0.3 MG/DL (ref 0.2–1.2)
BILIRUB UR QL STRIP: NEGATIVE
BUN BLD-MCNC: 14 MG/DL (ref 8–23)
BUN/CREAT SERPL: 14 (ref 7–25)
CALCIUM SPEC-SCNC: 8.9 MG/DL (ref 8.6–10.5)
CHLORIDE SERPL-SCNC: 102 MMOL/L (ref 98–107)
CLARITY UR: CLEAR
CLOSE TME COLL+ADP + EPINEP PNL BLD: 76 % (ref 86–100)
CO2 BLDA-SCNC: 15.8 MMOL/L (ref 22–29)
CO2 SERPL-SCNC: 25 MMOL/L (ref 22–29)
COLOR UR: YELLOW
CREAT BLD-MCNC: 1 MG/DL (ref 0.76–1.27)
DEPRECATED RDW RBC AUTO: 43.3 FL (ref 37–54)
EOSINOPHIL # BLD AUTO: 0.4 10*3/MM3 (ref 0–0.4)
EOSINOPHIL NFR BLD AUTO: 4.1 % (ref 0.3–6.2)
ERYTHROCYTE [DISTWIDTH] IN BLOOD BY AUTOMATED COUNT: 14.8 % (ref 12.3–15.4)
GENTAMICIN SERPL-MCNC: 1.27 MCG/ML (ref 0.5–2)
GENTAMICIN SERPL-MCNC: 1.33 MCG/ML (ref 0.5–10)
GENTAMICIN SERPL-MCNC: 2.58 MCG/ML (ref 0.5–10)
GENTAMICIN SERPL-MCNC: 5.14 MCG/ML (ref 0.5–10)
GFR SERPL CREATININE-BSD FRML MDRD: 75 ML/MIN/1.73
GLOBULIN UR ELPH-MCNC: 3.2 GM/DL
GLUCOSE BLD-MCNC: 101 MG/DL (ref 65–99)
GLUCOSE BLDC GLUCOMTR-MCNC: 101 MG/DL (ref 70–105)
GLUCOSE BLDC GLUCOMTR-MCNC: 105 MG/DL (ref 70–105)
GLUCOSE BLDC GLUCOMTR-MCNC: 106 MG/DL (ref 70–105)
GLUCOSE BLDC GLUCOMTR-MCNC: 94 MG/DL (ref 70–105)
GLUCOSE UR STRIP-MCNC: NEGATIVE MG/DL
HCO3 BLDA-SCNC: 14.9 MMOL/L (ref 21–28)
HCT VFR BLD AUTO: 32.8 % (ref 37.5–51)
HEMODILUTION: NO
HGB BLD-MCNC: 10.9 G/DL (ref 13–17.7)
HGB UR QL STRIP.AUTO: ABNORMAL
HYALINE CASTS UR QL AUTO: ABNORMAL /LPF
KETONES UR QL STRIP: NEGATIVE
LEUKOCYTE ESTERASE UR QL STRIP.AUTO: ABNORMAL
LYMPHOCYTES # BLD AUTO: 1.1 10*3/MM3 (ref 0.7–3.1)
LYMPHOCYTES NFR BLD AUTO: 12.4 % (ref 19.6–45.3)
MCH RBC QN AUTO: 27.5 PG (ref 26.6–33)
MCHC RBC AUTO-ENTMCNC: 33.3 G/DL (ref 31.5–35.7)
MCV RBC AUTO: 82.6 FL (ref 79–97)
MODALITY: ABNORMAL
MONOCYTES # BLD AUTO: 0.7 10*3/MM3 (ref 0.1–0.9)
MONOCYTES NFR BLD AUTO: 8.1 % (ref 5–12)
NEUTROPHILS # BLD AUTO: 6.6 10*3/MM3 (ref 1.7–7)
NEUTROPHILS NFR BLD AUTO: 74.1 % (ref 42.7–76)
NITRITE UR QL STRIP: NEGATIVE
NRBC BLD AUTO-RTO: 0 /100 WBC (ref 0–0.2)
PCO2 BLDA: 27.7 MM HG (ref 35–48)
PH BLDA: 7.34 PH UNITS (ref 7.35–7.45)
PH UR STRIP.AUTO: 7.5 [PH] (ref 5–8)
PHOSPHATE SERPL-MCNC: 4 MG/DL (ref 2.5–4.5)
PLATELET # BLD AUTO: 329 10*3/MM3 (ref 140–450)
PMV BLD AUTO: 8.1 FL (ref 6–12)
PO2 BLDA: 83.6 MM HG (ref 83–108)
POTASSIUM BLD-SCNC: 3.9 MMOL/L (ref 3.5–5.2)
POTASSIUM BLD-SCNC: 3.9 MMOL/L (ref 3.5–5.2)
PROT SERPL-MCNC: 6.6 G/DL (ref 6–8.5)
PROT UR QL STRIP: ABNORMAL
RBC # BLD AUTO: 3.97 10*6/MM3 (ref 4.14–5.8)
RBC # UR: ABNORMAL /HPF
REF LAB TEST METHOD: ABNORMAL
SAO2 % BLDCOA: 95.8 % (ref 94–98)
SODIUM BLD-SCNC: 136 MMOL/L (ref 136–145)
SP GR UR STRIP: 1.02 (ref 1–1.03)
SQUAMOUS #/AREA URNS HPF: ABNORMAL /HPF
UROBILINOGEN UR QL STRIP: ABNORMAL
WBC NRBC COR # BLD: 8.9 10*3/MM3 (ref 3.4–10.8)
WBC UR QL AUTO: ABNORMAL /HPF

## 2020-01-20 PROCEDURE — 80053 COMPREHEN METABOLIC PANEL: CPT | Performed by: INTERNAL MEDICINE

## 2020-01-20 PROCEDURE — 4A023N7 MEASUREMENT OF CARDIAC SAMPLING AND PRESSURE, LEFT HEART, PERCUTANEOUS APPROACH: ICD-10-PCS | Performed by: INTERNAL MEDICINE

## 2020-01-20 PROCEDURE — B2111ZZ FLUOROSCOPY OF MULTIPLE CORONARY ARTERIES USING LOW OSMOLAR CONTRAST: ICD-10-PCS | Performed by: INTERNAL MEDICINE

## 2020-01-20 PROCEDURE — 99232 SBSQ HOSP IP/OBS MODERATE 35: CPT | Performed by: PHYSICIAN ASSISTANT

## 2020-01-20 PROCEDURE — 93458 L HRT ARTERY/VENTRICLE ANGIO: CPT | Performed by: INTERNAL MEDICINE

## 2020-01-20 PROCEDURE — 99153 MOD SED SAME PHYS/QHP EA: CPT | Performed by: INTERNAL MEDICINE

## 2020-01-20 PROCEDURE — 25010000002 GENTAMICIN PER 80 MG: Performed by: INTERNAL MEDICINE

## 2020-01-20 PROCEDURE — 25010000002 HYDRALAZINE PER 20 MG: Performed by: NURSE PRACTITIONER

## 2020-01-20 PROCEDURE — 85025 COMPLETE CBC W/AUTO DIFF WBC: CPT | Performed by: NURSE PRACTITIONER

## 2020-01-20 PROCEDURE — 84100 ASSAY OF PHOSPHORUS: CPT | Performed by: INTERNAL MEDICINE

## 2020-01-20 PROCEDURE — 99232 SBSQ HOSP IP/OBS MODERATE 35: CPT | Performed by: INTERNAL MEDICINE

## 2020-01-20 PROCEDURE — 80170 ASSAY OF GENTAMICIN: CPT | Performed by: INTERNAL MEDICINE

## 2020-01-20 PROCEDURE — 85576 BLOOD PLATELET AGGREGATION: CPT | Performed by: INTERNAL MEDICINE

## 2020-01-20 PROCEDURE — 93970 EXTREMITY STUDY: CPT

## 2020-01-20 PROCEDURE — 25010000002 GENTAMICIN PER 80 MG: Performed by: NURSE PRACTITIONER

## 2020-01-20 PROCEDURE — 85730 THROMBOPLASTIN TIME PARTIAL: CPT | Performed by: NURSE PRACTITIONER

## 2020-01-20 PROCEDURE — 81001 URINALYSIS AUTO W/SCOPE: CPT | Performed by: PHYSICIAN ASSISTANT

## 2020-01-20 PROCEDURE — 25010000002 MIDAZOLAM PER 1 MG: Performed by: INTERNAL MEDICINE

## 2020-01-20 PROCEDURE — 82330 ASSAY OF CALCIUM: CPT

## 2020-01-20 PROCEDURE — 84132 ASSAY OF SERUM POTASSIUM: CPT | Performed by: THORACIC SURGERY (CARDIOTHORACIC VASCULAR SURGERY)

## 2020-01-20 PROCEDURE — 82962 GLUCOSE BLOOD TEST: CPT

## 2020-01-20 PROCEDURE — 25010000002 AMPICILLIN PER 500 MG: Performed by: INTERNAL MEDICINE

## 2020-01-20 PROCEDURE — 93880 EXTRACRANIAL BILAT STUDY: CPT

## 2020-01-20 PROCEDURE — C1769 GUIDE WIRE: HCPCS | Performed by: INTERNAL MEDICINE

## 2020-01-20 PROCEDURE — 99152 MOD SED SAME PHYS/QHP 5/>YRS: CPT | Performed by: INTERNAL MEDICINE

## 2020-01-20 PROCEDURE — 82803 BLOOD GASES ANY COMBINATION: CPT

## 2020-01-20 PROCEDURE — 93005 ELECTROCARDIOGRAM TRACING: CPT | Performed by: NURSE PRACTITIONER

## 2020-01-20 PROCEDURE — 85018 HEMOGLOBIN: CPT

## 2020-01-20 PROCEDURE — 63710000001 INSULIN GLARGINE PER 5 UNITS: Performed by: NURSE PRACTITIONER

## 2020-01-20 PROCEDURE — 80051 ELECTROLYTE PANEL: CPT

## 2020-01-20 PROCEDURE — 63710000001 INSULIN GLARGINE PER 5 UNITS: Performed by: INTERNAL MEDICINE

## 2020-01-20 PROCEDURE — 25010000002 FENTANYL CITRATE (PF) 100 MCG/2ML SOLUTION: Performed by: INTERNAL MEDICINE

## 2020-01-20 PROCEDURE — 0 IOPAMIDOL PER 1 ML: Performed by: INTERNAL MEDICINE

## 2020-01-20 PROCEDURE — B2151ZZ FLUOROSCOPY OF LEFT HEART USING LOW OSMOLAR CONTRAST: ICD-10-PCS | Performed by: INTERNAL MEDICINE

## 2020-01-20 PROCEDURE — C1894 INTRO/SHEATH, NON-LASER: HCPCS | Performed by: INTERNAL MEDICINE

## 2020-01-20 PROCEDURE — 25010000002 HYDRALAZINE PER 20 MG: Performed by: INTERNAL MEDICINE

## 2020-01-20 RX ORDER — ATROPINE SULFATE 1 MG/ML
.5-1 INJECTION, SOLUTION INTRAMUSCULAR; INTRAVENOUS; SUBCUTANEOUS
Status: DISCONTINUED | OUTPATIENT
Start: 2020-01-20 | End: 2020-01-22 | Stop reason: HOSPADM

## 2020-01-20 RX ORDER — ONDANSETRON 2 MG/ML
4 INJECTION INTRAMUSCULAR; INTRAVENOUS EVERY 6 HOURS PRN
Status: DISCONTINUED | OUTPATIENT
Start: 2020-01-20 | End: 2020-01-20 | Stop reason: SDUPTHER

## 2020-01-20 RX ORDER — SODIUM CHLORIDE 9 MG/ML
30 INJECTION, SOLUTION INTRAVENOUS CONTINUOUS PRN
Status: DISCONTINUED | OUTPATIENT
Start: 2020-01-22 | End: 2020-01-22 | Stop reason: HOSPADM

## 2020-01-20 RX ORDER — SODIUM CHLORIDE 0.9 % (FLUSH) 0.9 %
30 SYRINGE (ML) INJECTION ONCE AS NEEDED
Status: DISCONTINUED | OUTPATIENT
Start: 2020-01-20 | End: 2020-01-22 | Stop reason: HOSPADM

## 2020-01-20 RX ORDER — CHLORHEXIDINE GLUCONATE 500 MG/1
1 CLOTH TOPICAL EVERY 12 HOURS
Status: DISCONTINUED | OUTPATIENT
Start: 2020-01-20 | End: 2020-01-20

## 2020-01-20 RX ORDER — ONDANSETRON 4 MG/1
4 TABLET, FILM COATED ORAL EVERY 6 HOURS PRN
Status: DISCONTINUED | OUTPATIENT
Start: 2020-01-20 | End: 2020-01-20 | Stop reason: SDUPTHER

## 2020-01-20 RX ORDER — ACETAMINOPHEN 325 MG/1
650 TABLET ORAL EVERY 4 HOURS PRN
Status: DISCONTINUED | OUTPATIENT
Start: 2020-01-20 | End: 2020-01-20 | Stop reason: SDUPTHER

## 2020-01-20 RX ORDER — CHLORHEXIDINE GLUCONATE 500 MG/1
1 CLOTH TOPICAL EVERY 12 HOURS
Status: DISCONTINUED | OUTPATIENT
Start: 2020-01-20 | End: 2020-01-22 | Stop reason: HOSPADM

## 2020-01-20 RX ORDER — FENTANYL CITRATE 50 UG/ML
INJECTION, SOLUTION INTRAMUSCULAR; INTRAVENOUS AS NEEDED
Status: DISCONTINUED | OUTPATIENT
Start: 2020-01-20 | End: 2020-01-20 | Stop reason: HOSPADM

## 2020-01-20 RX ORDER — MIDAZOLAM HYDROCHLORIDE 1 MG/ML
INJECTION INTRAMUSCULAR; INTRAVENOUS AS NEEDED
Status: DISCONTINUED | OUTPATIENT
Start: 2020-01-20 | End: 2020-01-20 | Stop reason: HOSPADM

## 2020-01-20 RX ORDER — SODIUM CHLORIDE 9 MG/ML
250 INJECTION, SOLUTION INTRAVENOUS ONCE AS NEEDED
Status: COMPLETED | OUTPATIENT
Start: 2020-01-20 | End: 2020-01-22

## 2020-01-20 RX ORDER — CHLORHEXIDINE GLUCONATE 0.12 MG/ML
15 RINSE ORAL EVERY 12 HOURS SCHEDULED
Status: DISCONTINUED | OUTPATIENT
Start: 2020-01-20 | End: 2020-01-22

## 2020-01-20 RX ORDER — CHLORHEXIDINE GLUCONATE 0.12 MG/ML
15 RINSE ORAL EVERY 12 HOURS SCHEDULED
Status: DISCONTINUED | OUTPATIENT
Start: 2020-01-21 | End: 2020-01-20

## 2020-01-20 RX ORDER — LIDOCAINE HYDROCHLORIDE 20 MG/ML
INJECTION, SOLUTION INFILTRATION; PERINEURAL AS NEEDED
Status: DISCONTINUED | OUTPATIENT
Start: 2020-01-20 | End: 2020-01-20 | Stop reason: HOSPADM

## 2020-01-20 RX ADMIN — FINASTERIDE 5 MG: 5 TABLET, FILM COATED ORAL at 08:31

## 2020-01-20 RX ADMIN — ACETAMINOPHEN 650 MG: 325 TABLET, FILM COATED ORAL at 21:50

## 2020-01-20 RX ADMIN — AMPICILLIN SODIUM 2 G: 2 INJECTION, POWDER, FOR SOLUTION INTRAMUSCULAR; INTRAVENOUS at 05:24

## 2020-01-20 RX ADMIN — CHLORHEXIDINE GLUCONATE 1 APPLICATION: 500 CLOTH TOPICAL at 22:43

## 2020-01-20 RX ADMIN — HYDROCORTISONE 1 APPLICATION: 25 CREAM TOPICAL at 05:22

## 2020-01-20 RX ADMIN — GENTAMICIN SULFATE 90 MG: 40 INJECTION, SOLUTION INTRAMUSCULAR; INTRAVENOUS at 15:18

## 2020-01-20 RX ADMIN — METOPROLOL TARTRATE 50 MG: 50 TABLET, FILM COATED ORAL at 08:31

## 2020-01-20 RX ADMIN — AMPICILLIN SODIUM 2 G: 2 INJECTION, POWDER, FOR SOLUTION INTRAMUSCULAR; INTRAVENOUS at 21:50

## 2020-01-20 RX ADMIN — PANTOPRAZOLE SODIUM 40 MG: 40 TABLET, DELAYED RELEASE ORAL at 05:23

## 2020-01-20 RX ADMIN — GENTAMICIN SULFATE 100 MG: 40 INJECTION, SOLUTION INTRAMUSCULAR; INTRAVENOUS at 00:40

## 2020-01-20 RX ADMIN — AMPICILLIN SODIUM 2 G: 2 INJECTION, POWDER, FOR SOLUTION INTRAMUSCULAR; INTRAVENOUS at 10:00

## 2020-01-20 RX ADMIN — AMPICILLIN SODIUM 2 G: 2 INJECTION, POWDER, FOR SOLUTION INTRAMUSCULAR; INTRAVENOUS at 10:28

## 2020-01-20 RX ADMIN — METOPROLOL TARTRATE 50 MG: 50 TABLET, FILM COATED ORAL at 17:10

## 2020-01-20 RX ADMIN — INSULIN GLARGINE 10 UNITS: 100 INJECTION, SOLUTION SUBCUTANEOUS at 21:51

## 2020-01-20 RX ADMIN — Medication: at 03:06

## 2020-01-20 RX ADMIN — ACETAMINOPHEN 650 MG: 325 TABLET, FILM COATED ORAL at 10:28

## 2020-01-20 RX ADMIN — Medication 600 MG: at 21:51

## 2020-01-20 RX ADMIN — Medication: at 11:01

## 2020-01-20 RX ADMIN — SODIUM CHLORIDE, PRESERVATIVE FREE 10 ML: 5 INJECTION INTRAVENOUS at 21:51

## 2020-01-20 RX ADMIN — ACETAMINOPHEN 650 MG: 325 TABLET, FILM COATED ORAL at 10:17

## 2020-01-20 RX ADMIN — GABAPENTIN 200 MG: 100 CAPSULE ORAL at 22:42

## 2020-01-20 RX ADMIN — AMPICILLIN SODIUM 2 G: 2 INJECTION, POWDER, FOR SOLUTION INTRAMUSCULAR; INTRAVENOUS at 02:38

## 2020-01-20 RX ADMIN — TAMSULOSIN HYDROCHLORIDE 0.4 MG: 0.4 CAPSULE ORAL at 08:31

## 2020-01-20 RX ADMIN — CETIRIZINE HYDROCHLORIDE 10 MG: 10 TABLET, FILM COATED ORAL at 08:31

## 2020-01-20 RX ADMIN — FLUOXETINE 40 MG: 20 CAPSULE ORAL at 08:31

## 2020-01-20 RX ADMIN — ATORVASTATIN CALCIUM 40 MG: 40 TABLET, FILM COATED ORAL at 21:51

## 2020-01-20 RX ADMIN — Medication 600 MG: at 08:31

## 2020-01-20 RX ADMIN — AMPICILLIN SODIUM 2 G: 2 INJECTION, POWDER, FOR SOLUTION INTRAMUSCULAR; INTRAVENOUS at 14:31

## 2020-01-20 RX ADMIN — CYANOCOBALAMIN TAB 1000 MCG 1000 MCG: 1000 TAB at 08:31

## 2020-01-20 RX ADMIN — SODIUM CHLORIDE, PRESERVATIVE FREE 10 ML: 5 INJECTION INTRAVENOUS at 08:30

## 2020-01-20 RX ADMIN — FOLIC ACID 1 MG: 1 TABLET ORAL at 08:31

## 2020-01-20 RX ADMIN — AMPICILLIN SODIUM 2 G: 2 INJECTION, POWDER, FOR SOLUTION INTRAMUSCULAR; INTRAVENOUS at 17:09

## 2020-01-20 RX ADMIN — ASPIRIN 81 MG: 81 TABLET, DELAYED RELEASE ORAL at 08:31

## 2020-01-20 RX ADMIN — Medication: at 17:55

## 2020-01-20 RX ADMIN — GABAPENTIN 200 MG: 100 CAPSULE ORAL at 08:31

## 2020-01-20 RX ADMIN — INSULIN GLARGINE 10 UNITS: 100 INJECTION, SOLUTION SUBCUTANEOUS at 08:30

## 2020-01-20 RX ADMIN — HYDRALAZINE HYDROCHLORIDE 20 MG: 20 INJECTION INTRAMUSCULAR; INTRAVENOUS at 04:07

## 2020-01-20 RX ADMIN — CHLORHEXIDINE GLUCONATE 0.12% ORAL RINSE 15 ML: 1.2 LIQUID ORAL at 22:43

## 2020-01-20 RX ADMIN — AMLODIPINE BESYLATE 5 MG: 5 TABLET ORAL at 08:31

## 2020-01-20 RX ADMIN — ACETAMINOPHEN 650 MG: 325 TABLET, FILM COATED ORAL at 00:40

## 2020-01-20 RX ADMIN — ACETAMINOPHEN 650 MG: 325 TABLET, FILM COATED ORAL at 05:26

## 2020-01-20 RX ADMIN — GABAPENTIN 200 MG: 100 CAPSULE ORAL at 15:18

## 2020-01-20 RX ADMIN — HYDRALAZINE HYDROCHLORIDE 20 MG: 20 INJECTION INTRAMUSCULAR; INTRAVENOUS at 22:44

## 2020-01-21 ENCOUNTER — ANESTHESIA EVENT (OUTPATIENT)
Dept: PERIOP | Facility: HOSPITAL | Age: 65
End: 2020-01-21

## 2020-01-21 LAB
ABO GROUP BLD: NORMAL
ALBUMIN SERPL-MCNC: 3.9 G/DL (ref 3.5–5.2)
ALBUMIN/GLOB SERPL: 1.1 G/DL
ALP SERPL-CCNC: 57 U/L (ref 39–117)
ALT SERPL W P-5'-P-CCNC: 24 U/L (ref 1–41)
ANION GAP SERPL CALCULATED.3IONS-SCNC: 12 MMOL/L (ref 5–15)
APTT PPP: 24.1 SECONDS (ref 61–76.5)
AST SERPL-CCNC: 26 U/L (ref 1–40)
BASOPHILS # BLD AUTO: 0.1 10*3/MM3 (ref 0–0.2)
BASOPHILS NFR BLD AUTO: 1.1 % (ref 0–1.5)
BILIRUB SERPL-MCNC: 0.3 MG/DL (ref 0.2–1.2)
BLD GP AB SCN SERPL QL: NEGATIVE
BUN BLD-MCNC: 12 MG/DL (ref 8–23)
BUN/CREAT SERPL: 11.2 (ref 7–25)
CALCIUM SPEC-SCNC: 9 MG/DL (ref 8.6–10.5)
CHLORIDE SERPL-SCNC: 102 MMOL/L (ref 98–107)
CLOSE TME COLL+ADP + EPINEP PNL BLD: 73 % (ref 86–100)
CO2 SERPL-SCNC: 23 MMOL/L (ref 22–29)
CREAT BLD-MCNC: 1.07 MG/DL (ref 0.76–1.27)
DEPRECATED RDW RBC AUTO: 44.2 FL (ref 37–54)
EOSINOPHIL # BLD AUTO: 0.4 10*3/MM3 (ref 0–0.4)
EOSINOPHIL NFR BLD AUTO: 3.9 % (ref 0.3–6.2)
ERYTHROCYTE [DISTWIDTH] IN BLOOD BY AUTOMATED COUNT: 15.2 % (ref 12.3–15.4)
GENTAMICIN SERPL-MCNC: 0.88 MCG/ML (ref 0.5–2)
GFR SERPL CREATININE-BSD FRML MDRD: 70 ML/MIN/1.73
GLOBULIN UR ELPH-MCNC: 3.6 GM/DL
GLUCOSE BLD-MCNC: 134 MG/DL (ref 65–99)
GLUCOSE BLDC GLUCOMTR-MCNC: 100 MG/DL (ref 70–105)
GLUCOSE BLDC GLUCOMTR-MCNC: 111 MG/DL (ref 70–105)
GLUCOSE BLDC GLUCOMTR-MCNC: 173 MG/DL (ref 70–105)
GLUCOSE BLDC GLUCOMTR-MCNC: 180 MG/DL (ref 70–105)
HCT VFR BLD AUTO: 37 % (ref 37.5–51)
HGB BLD-MCNC: 12.3 G/DL (ref 13–17.7)
INR PPP: 1.01 (ref 0.9–1.1)
LYMPHOCYTES # BLD AUTO: 0.8 10*3/MM3 (ref 0.7–3.1)
LYMPHOCYTES NFR BLD AUTO: 9.2 % (ref 19.6–45.3)
MCH RBC QN AUTO: 27.1 PG (ref 26.6–33)
MCHC RBC AUTO-ENTMCNC: 33.1 G/DL (ref 31.5–35.7)
MCV RBC AUTO: 81.7 FL (ref 79–97)
MONOCYTES # BLD AUTO: 0.7 10*3/MM3 (ref 0.1–0.9)
MONOCYTES NFR BLD AUTO: 7.8 % (ref 5–12)
NEUTROPHILS # BLD AUTO: 7.1 10*3/MM3 (ref 1.7–7)
NEUTROPHILS NFR BLD AUTO: 78 % (ref 42.7–76)
NRBC BLD AUTO-RTO: 0 /100 WBC (ref 0–0.2)
PHOSPHATE SERPL-MCNC: 3.4 MG/DL (ref 2.5–4.5)
PLATELET # BLD AUTO: 356 10*3/MM3 (ref 140–450)
PMV BLD AUTO: 8 FL (ref 6–12)
POTASSIUM BLD-SCNC: 4 MMOL/L (ref 3.5–5.2)
PROT SERPL-MCNC: 7.5 G/DL (ref 6–8.5)
PROTHROMBIN TIME: 10.6 SECONDS (ref 9.6–11.7)
RBC # BLD AUTO: 4.53 10*6/MM3 (ref 4.14–5.8)
RH BLD: NEGATIVE
SODIUM BLD-SCNC: 137 MMOL/L (ref 136–145)
T&S EXPIRATION DATE: NORMAL
WBC NRBC COR # BLD: 9.1 10*3/MM3 (ref 3.4–10.8)

## 2020-01-21 PROCEDURE — 97530 THERAPEUTIC ACTIVITIES: CPT

## 2020-01-21 PROCEDURE — 25010000002 HYDRALAZINE PER 20 MG: Performed by: INTERNAL MEDICINE

## 2020-01-21 PROCEDURE — 85730 THROMBOPLASTIN TIME PARTIAL: CPT | Performed by: INTERNAL MEDICINE

## 2020-01-21 PROCEDURE — 86901 BLOOD TYPING SEROLOGIC RH(D): CPT | Performed by: PHYSICIAN ASSISTANT

## 2020-01-21 PROCEDURE — 86900 BLOOD TYPING SEROLOGIC ABO: CPT

## 2020-01-21 PROCEDURE — 84100 ASSAY OF PHOSPHORUS: CPT | Performed by: INTERNAL MEDICINE

## 2020-01-21 PROCEDURE — 86923 COMPATIBILITY TEST ELECTRIC: CPT

## 2020-01-21 PROCEDURE — 80053 COMPREHEN METABOLIC PANEL: CPT | Performed by: PHYSICIAN ASSISTANT

## 2020-01-21 PROCEDURE — 94799 UNLISTED PULMONARY SVC/PX: CPT

## 2020-01-21 PROCEDURE — 25010000002 AMPICILLIN PER 500 MG: Performed by: INTERNAL MEDICINE

## 2020-01-21 PROCEDURE — 63710000001 INSULIN LISPRO (HUMAN) PER 5 UNITS: Performed by: INTERNAL MEDICINE

## 2020-01-21 PROCEDURE — 85025 COMPLETE CBC W/AUTO DIFF WBC: CPT | Performed by: INTERNAL MEDICINE

## 2020-01-21 PROCEDURE — 85610 PROTHROMBIN TIME: CPT | Performed by: PHYSICIAN ASSISTANT

## 2020-01-21 PROCEDURE — 63710000001 INSULIN GLARGINE PER 5 UNITS: Performed by: INTERNAL MEDICINE

## 2020-01-21 PROCEDURE — 82962 GLUCOSE BLOOD TEST: CPT

## 2020-01-21 PROCEDURE — 25010000002 GENTAMICIN PER 80 MG: Performed by: INTERNAL MEDICINE

## 2020-01-21 PROCEDURE — 86850 RBC ANTIBODY SCREEN: CPT | Performed by: PHYSICIAN ASSISTANT

## 2020-01-21 PROCEDURE — 86900 BLOOD TYPING SEROLOGIC ABO: CPT | Performed by: PHYSICIAN ASSISTANT

## 2020-01-21 PROCEDURE — 85576 BLOOD PLATELET AGGREGATION: CPT | Performed by: INTERNAL MEDICINE

## 2020-01-21 PROCEDURE — 99232 SBSQ HOSP IP/OBS MODERATE 35: CPT | Performed by: INTERNAL MEDICINE

## 2020-01-21 PROCEDURE — 86901 BLOOD TYPING SEROLOGIC RH(D): CPT

## 2020-01-21 PROCEDURE — 80170 ASSAY OF GENTAMICIN: CPT | Performed by: INTERNAL MEDICINE

## 2020-01-21 PROCEDURE — 97535 SELF CARE MNGMENT TRAINING: CPT

## 2020-01-21 RX ORDER — SODIUM CHLORIDE 9 MG/ML
9 INJECTION, SOLUTION INTRAVENOUS CONTINUOUS PRN
Status: DISCONTINUED | OUTPATIENT
Start: 2020-01-21 | End: 2020-01-22 | Stop reason: HOSPADM

## 2020-01-21 RX ORDER — LIDOCAINE HYDROCHLORIDE 10 MG/ML
0.5 INJECTION, SOLUTION EPIDURAL; INFILTRATION; INTRACAUDAL; PERINEURAL ONCE AS NEEDED
Status: DISCONTINUED | OUTPATIENT
Start: 2020-01-21 | End: 2020-01-22 | Stop reason: HOSPADM

## 2020-01-21 RX ORDER — SODIUM CHLORIDE 0.9 % (FLUSH) 0.9 %
3-10 SYRINGE (ML) INJECTION AS NEEDED
Status: DISCONTINUED | OUTPATIENT
Start: 2020-01-21 | End: 2020-01-22 | Stop reason: HOSPADM

## 2020-01-21 RX ORDER — SODIUM CHLORIDE 0.9 % (FLUSH) 0.9 %
3 SYRINGE (ML) INJECTION EVERY 12 HOURS SCHEDULED
Status: DISCONTINUED | OUTPATIENT
Start: 2020-01-21 | End: 2020-01-22 | Stop reason: HOSPADM

## 2020-01-21 RX ADMIN — GABAPENTIN 200 MG: 100 CAPSULE ORAL at 09:03

## 2020-01-21 RX ADMIN — Medication 3 ML: at 14:29

## 2020-01-21 RX ADMIN — FOLIC ACID 1 MG: 1 TABLET ORAL at 09:03

## 2020-01-21 RX ADMIN — SODIUM CHLORIDE, PRESERVATIVE FREE 10 ML: 5 INJECTION INTRAVENOUS at 20:22

## 2020-01-21 RX ADMIN — BACITRACIN 1 APPLICATION: 500 OINTMENT TOPICAL at 09:03

## 2020-01-21 RX ADMIN — PANTOPRAZOLE SODIUM 40 MG: 40 TABLET, DELAYED RELEASE ORAL at 05:54

## 2020-01-21 RX ADMIN — AMPICILLIN SODIUM 2 G: 2 INJECTION, POWDER, FOR SOLUTION INTRAMUSCULAR; INTRAVENOUS at 09:02

## 2020-01-21 RX ADMIN — AMPICILLIN SODIUM 2 G: 2 INJECTION, POWDER, FOR SOLUTION INTRAMUSCULAR; INTRAVENOUS at 17:19

## 2020-01-21 RX ADMIN — Medication: at 04:12

## 2020-01-21 RX ADMIN — METOPROLOL TARTRATE 50 MG: 50 TABLET, FILM COATED ORAL at 17:19

## 2020-01-21 RX ADMIN — INSULIN GLARGINE 10 UNITS: 100 INJECTION, SOLUTION SUBCUTANEOUS at 20:20

## 2020-01-21 RX ADMIN — CYANOCOBALAMIN TAB 1000 MCG 1000 MCG: 1000 TAB at 09:03

## 2020-01-21 RX ADMIN — CHLORHEXIDINE GLUCONATE 1 APPLICATION: 500 CLOTH TOPICAL at 20:22

## 2020-01-21 RX ADMIN — ACETAMINOPHEN 650 MG: 325 TABLET, FILM COATED ORAL at 05:54

## 2020-01-21 RX ADMIN — INSULIN LISPRO 3 UNITS: 100 INJECTION, SOLUTION INTRAVENOUS; SUBCUTANEOUS at 20:20

## 2020-01-21 RX ADMIN — GENTAMICIN SULFATE 90 MG: 40 INJECTION, SOLUTION INTRAMUSCULAR; INTRAVENOUS at 04:12

## 2020-01-21 RX ADMIN — CHLORHEXIDINE GLUCONATE 0.12% ORAL RINSE 15 ML: 1.2 LIQUID ORAL at 05:54

## 2020-01-21 RX ADMIN — AMPICILLIN SODIUM 2 G: 2 INJECTION, POWDER, FOR SOLUTION INTRAMUSCULAR; INTRAVENOUS at 21:54

## 2020-01-21 RX ADMIN — INSULIN GLARGINE 10 UNITS: 100 INJECTION, SOLUTION SUBCUTANEOUS at 09:02

## 2020-01-21 RX ADMIN — AMPICILLIN SODIUM 2 G: 2 INJECTION, POWDER, FOR SOLUTION INTRAMUSCULAR; INTRAVENOUS at 13:35

## 2020-01-21 RX ADMIN — HYDRALAZINE HYDROCHLORIDE 20 MG: 20 INJECTION INTRAMUSCULAR; INTRAVENOUS at 03:25

## 2020-01-21 RX ADMIN — INSULIN LISPRO 3 UNITS: 100 INJECTION, SOLUTION INTRAVENOUS; SUBCUTANEOUS at 12:16

## 2020-01-21 RX ADMIN — AMPICILLIN SODIUM 2 G: 2 INJECTION, POWDER, FOR SOLUTION INTRAMUSCULAR; INTRAVENOUS at 05:54

## 2020-01-21 RX ADMIN — CHLORHEXIDINE GLUCONATE 1 APPLICATION: 500 CLOTH TOPICAL at 05:55

## 2020-01-21 RX ADMIN — FINASTERIDE 5 MG: 5 TABLET, FILM COATED ORAL at 09:03

## 2020-01-21 RX ADMIN — GABAPENTIN 200 MG: 100 CAPSULE ORAL at 20:21

## 2020-01-21 RX ADMIN — AMPICILLIN SODIUM 2 G: 2 INJECTION, POWDER, FOR SOLUTION INTRAMUSCULAR; INTRAVENOUS at 01:10

## 2020-01-21 RX ADMIN — ASPIRIN 81 MG: 81 TABLET, DELAYED RELEASE ORAL at 09:03

## 2020-01-21 RX ADMIN — TAMSULOSIN HYDROCHLORIDE 0.4 MG: 0.4 CAPSULE ORAL at 09:03

## 2020-01-21 RX ADMIN — ATORVASTATIN CALCIUM 40 MG: 40 TABLET, FILM COATED ORAL at 20:21

## 2020-01-21 RX ADMIN — FLUOXETINE 40 MG: 20 CAPSULE ORAL at 09:02

## 2020-01-21 RX ADMIN — GENTAMICIN SULFATE 90 MG: 40 INJECTION, SOLUTION INTRAMUSCULAR; INTRAVENOUS at 16:06

## 2020-01-21 RX ADMIN — CETIRIZINE HYDROCHLORIDE 10 MG: 10 TABLET, FILM COATED ORAL at 09:03

## 2020-01-21 RX ADMIN — MUPIROCIN 1 APPLICATION: 20 OINTMENT TOPICAL at 20:20

## 2020-01-21 RX ADMIN — AMLODIPINE BESYLATE 5 MG: 5 TABLET ORAL at 09:03

## 2020-01-21 RX ADMIN — SODIUM CHLORIDE, PRESERVATIVE FREE 10 ML: 5 INJECTION INTRAVENOUS at 09:03

## 2020-01-21 RX ADMIN — GABAPENTIN 200 MG: 100 CAPSULE ORAL at 16:06

## 2020-01-21 RX ADMIN — Medication 3 ML: at 20:23

## 2020-01-21 RX ADMIN — METOPROLOL TARTRATE 50 MG: 50 TABLET, FILM COATED ORAL at 05:54

## 2020-01-21 RX ADMIN — CHLORHEXIDINE GLUCONATE 0.12% ORAL RINSE 15 ML: 1.2 LIQUID ORAL at 20:20

## 2020-01-22 ENCOUNTER — ANESTHESIA (OUTPATIENT)
Dept: PERIOP | Facility: HOSPITAL | Age: 65
End: 2020-01-22

## 2020-01-22 ENCOUNTER — APPOINTMENT (OUTPATIENT)
Dept: GENERAL RADIOLOGY | Facility: HOSPITAL | Age: 65
End: 2020-01-22

## 2020-01-22 LAB
ACT BLD: 114 SECONDS (ref 89–137)
ACT BLD: 136 SECONDS (ref 89–137)
ACT BLD: 285 SECONDS (ref 89–137)
ACT BLD: 351 SECONDS (ref 89–137)
ACT BLD: 357 SECONDS (ref 89–137)
ACT BLD: 362 SECONDS (ref 89–137)
ACT BLD: 439 SECONDS (ref 89–137)
ALBUMIN SERPL-MCNC: 3.7 G/DL (ref 3.5–5.2)
ALBUMIN SERPL-MCNC: 3.8 G/DL (ref 3.5–5.2)
ALBUMIN SERPL-MCNC: 3.8 G/DL (ref 3.5–5.2)
ALBUMIN/GLOB SERPL: 1 G/DL
ALP SERPL-CCNC: 54 U/L (ref 39–117)
ALT SERPL W P-5'-P-CCNC: 26 U/L (ref 1–41)
ANION GAP SERPL CALCULATED.3IONS-SCNC: 11 MMOL/L (ref 5–15)
APTT PPP: 25.5 SECONDS (ref 24–31)
APTT PPP: 25.5 SECONDS (ref 61–76.5)
APTT PPP: 26.4 SECONDS (ref 24–31)
ARTERIAL PATENCY WRIST A: ABNORMAL
AST SERPL-CCNC: 23 U/L (ref 1–40)
ATMOSPHERIC PRESS: ABNORMAL MM[HG]
BACTERIA SPEC AEROBE CULT: NORMAL
BASE DEFICIT: ABNORMAL
BASE EXCESS BLDA CALC-SCNC: -2.1 MMOL/L (ref 0–3)
BASE EXCESS BLDA CALC-SCNC: -2.4 MMOL/L (ref 0–3)
BASE EXCESS BLDA CALC-SCNC: -2.8 MMOL/L (ref 0–3)
BASE EXCESS BLDA CALC-SCNC: -3.4 MMOL/L (ref 0–3)
BASE EXCESS BLDA CALC-SCNC: -3.4 MMOL/L (ref 0–3)
BASE EXCESS BLDA CALC-SCNC: -4 MMOL/L (ref 0–3)
BASE EXCESS BLDA CALC-SCNC: -4 MMOL/L (ref 0–3)
BASE EXCESS BLDA CALC-SCNC: -4.3 MMOL/L (ref 0–3)
BASE EXCESS BLDA CALC-SCNC: 0 MMOL/L (ref 0–3)
BASE EXCESS BLDA CALC-SCNC: 0 MMOL/L (ref 0–3)
BASE EXCESS BLDA CALC-SCNC: 1 MMOL/L (ref 0–3)
BASE EXCESS BLDA CALC-SCNC: 3 MMOL/L (ref 0–3)
BASE EXCESS BLDA CALC-SCNC: <0 MMOL/L (ref 0–3)
BASE EXCESS BLDV CALC-SCNC: ABNORMAL MMOL/L
BASOPHILS # BLD AUTO: 0 10*3/MM3 (ref 0–0.2)
BASOPHILS # BLD MANUAL: 0.1 10*3/MM3 (ref 0–0.2)
BASOPHILS NFR BLD AUTO: 0.2 % (ref 0–1.5)
BASOPHILS NFR BLD AUTO: 1 % (ref 0–1.5)
BDY SITE: ABNORMAL
BILIRUB SERPL-MCNC: 0.3 MG/DL (ref 0.2–1.2)
BODY TEMPERATURE: 95.6 C
BODY TEMPERATURE: 96.4 C
BODY TEMPERATURE: 97.3 C
BUN BLD-MCNC: 20 MG/DL (ref 8–23)
BUN BLD-MCNC: 21 MG/DL (ref 8–23)
BUN BLD-MCNC: 21 MG/DL (ref 8–23)
BUN/CREAT SERPL: 14.3 (ref 7–25)
BUN/CREAT SERPL: 14.8 (ref 7–25)
BUN/CREAT SERPL: 16 (ref 7–25)
CA-I BLDA-SCNC: 0.34 MMOL/L (ref 1.15–1.33)
CA-I BLDA-SCNC: 0.97 MMOL/L (ref 1.15–1.33)
CA-I BLDA-SCNC: 1.01 MMOL/L (ref 1.15–1.33)
CA-I BLDA-SCNC: 1.09 MMOL/L (ref 1.12–1.32)
CA-I BLDA-SCNC: 1.09 MMOL/L (ref 1.15–1.33)
CA-I BLDA-SCNC: 1.1 MMOL/L (ref 1.12–1.32)
CA-I BLDA-SCNC: 1.1 MMOL/L (ref 1.12–1.32)
CA-I BLDA-SCNC: 1.11 MMOL/L (ref 1.12–1.32)
CA-I BLDA-SCNC: 1.12 MMOL/L (ref 1.12–1.32)
CA-I BLDA-SCNC: 1.15 MMOL/L (ref 1.15–1.33)
CA-I BLDA-SCNC: 1.15 MMOL/L (ref 1.15–1.33)
CA-I BLDA-SCNC: 1.16 MMOL/L (ref 1.15–1.33)
CA-I BLDA-SCNC: 1.17 MMOL/L (ref 1.15–1.33)
CA-I BLDA-SCNC: 1.23 MMOL/L (ref 1.12–1.32)
CA-I BLDA-SCNC: 1.25 MMOL/L (ref 1.12–1.32)
CA-I BLDA-SCNC: 1.36 MMOL/L (ref 1.12–1.32)
CA-I SERPL ISE-MCNC: 1.2 MMOL/L (ref 1.2–1.3)
CA-I SERPL ISE-MCNC: 1.23 MMOL/L (ref 1.2–1.3)
CALCIUM SPEC-SCNC: 8.6 MG/DL (ref 8.6–10.5)
CALCIUM SPEC-SCNC: 9.2 MG/DL (ref 8.6–10.5)
CALCIUM SPEC-SCNC: 9.4 MG/DL (ref 8.6–10.5)
CHLORIDE SERPL-SCNC: 101 MMOL/L (ref 98–107)
CHLORIDE SERPL-SCNC: 103 MMOL/L (ref 98–107)
CHLORIDE SERPL-SCNC: 109 MMOL/L (ref 98–107)
CLOSE TME COLL+ADP + EPINEP PNL BLD: 71 % (ref 86–100)
CLOSE TME COLL+ADP + EPINEP PNL BLD: 76 % (ref 86–100)
CO2 BLDA-SCNC: 22.3 MMOL/L (ref 22–29)
CO2 BLDA-SCNC: 23.1 MMOL/L (ref 22–29)
CO2 BLDA-SCNC: 23.4 MMOL/L (ref 22–29)
CO2 BLDA-SCNC: 23.6 MMOL/L (ref 22–29)
CO2 BLDA-SCNC: 23.6 MMOL/L (ref 22–29)
CO2 BLDA-SCNC: 24.1 MMOL/L (ref 22–29)
CO2 BLDA-SCNC: 24.3 MMOL/L (ref 22–29)
CO2 BLDA-SCNC: 25 MMOL/L (ref 22–29)
CO2 BLDA-SCNC: 26 MMOL/L (ref 23–27)
CO2 BLDA-SCNC: 26 MMOL/L (ref 23–27)
CO2 BLDA-SCNC: 27 MMOL/L (ref 23–27)
CO2 BLDA-SCNC: 27 MMOL/L (ref 23–27)
CO2 BLDA-SCNC: 28 MMOL/L (ref 23–27)
CO2 BLDA-SCNC: 29 MMOL/L (ref 23–27)
CO2 BLDA-SCNC: 30 MMOL/L (ref 23–27)
CO2 CONTENT VENOUS: 25 MMOL/L (ref 24–29)
CO2 SERPL-SCNC: 22 MMOL/L (ref 22–29)
CO2 SERPL-SCNC: 24 MMOL/L (ref 22–29)
CO2 SERPL-SCNC: 24 MMOL/L (ref 22–29)
CREAT BLD-MCNC: 1.31 MG/DL (ref 0.76–1.27)
CREAT BLD-MCNC: 1.4 MG/DL (ref 0.76–1.27)
CREAT BLD-MCNC: 1.42 MG/DL (ref 0.76–1.27)
DEPRECATED RDW RBC AUTO: 43.3 FL (ref 37–54)
DEPRECATED RDW RBC AUTO: 43.3 FL (ref 37–54)
DEPRECATED RDW RBC AUTO: 43.8 FL (ref 37–54)
DEPRECATED RDW RBC AUTO: 43.8 FL (ref 37–54)
EOSINOPHIL # BLD AUTO: 0 10*3/MM3 (ref 0–0.4)
EOSINOPHIL # BLD MANUAL: 0.7 10*3/MM3 (ref 0–0.4)
EOSINOPHIL NFR BLD AUTO: 0 % (ref 0.3–6.2)
EOSINOPHIL NFR BLD MANUAL: 7 % (ref 0.3–6.2)
ERYTHROCYTE [DISTWIDTH] IN BLOOD BY AUTOMATED COUNT: 14.6 % (ref 12.3–15.4)
ERYTHROCYTE [DISTWIDTH] IN BLOOD BY AUTOMATED COUNT: 14.7 % (ref 12.3–15.4)
ERYTHROCYTE [DISTWIDTH] IN BLOOD BY AUTOMATED COUNT: 14.7 % (ref 12.3–15.4)
ERYTHROCYTE [DISTWIDTH] IN BLOOD BY AUTOMATED COUNT: 14.8 % (ref 12.3–15.4)
FIBRINOGEN PPP-MCNC: 322 MG/DL (ref 210–450)
GFR SERPL CREATININE-BSD FRML MDRD: 50 ML/MIN/1.73
GFR SERPL CREATININE-BSD FRML MDRD: 51 ML/MIN/1.73
GFR SERPL CREATININE-BSD FRML MDRD: 55 ML/MIN/1.73
GIANT PLATELETS: ABNORMAL
GLOBULIN UR ELPH-MCNC: 4 GM/DL
GLUCOSE BLD-MCNC: 107 MG/DL (ref 65–99)
GLUCOSE BLD-MCNC: 113 MG/DL (ref 65–99)
GLUCOSE BLD-MCNC: 129 MG/DL (ref 65–99)
GLUCOSE BLDC GLUCOMTR-MCNC: 112 MG/DL (ref 70–105)
GLUCOSE BLDC GLUCOMTR-MCNC: 116 MG/DL (ref 70–105)
GLUCOSE BLDC GLUCOMTR-MCNC: 117 MG/DL (ref 70–105)
GLUCOSE BLDC GLUCOMTR-MCNC: 118 MG/DL (ref 74–100)
GLUCOSE BLDC GLUCOMTR-MCNC: 121 MG/DL (ref 70–105)
GLUCOSE BLDC GLUCOMTR-MCNC: 129 MG/DL (ref 70–105)
GLUCOSE BLDC GLUCOMTR-MCNC: 131 MG/DL (ref 70–105)
GLUCOSE BLDC GLUCOMTR-MCNC: 133 MG/DL (ref 74–100)
GLUCOSE BLDC GLUCOMTR-MCNC: 138 MG/DL (ref 70–105)
GLUCOSE BLDC GLUCOMTR-MCNC: 141 MG/DL (ref 70–105)
GLUCOSE BLDC GLUCOMTR-MCNC: 141 MG/DL (ref 74–100)
GLUCOSE BLDC GLUCOMTR-MCNC: 142 MG/DL (ref 74–100)
GLUCOSE BLDC GLUCOMTR-MCNC: 147 MG/DL (ref 74–100)
GLUCOSE BLDC GLUCOMTR-MCNC: 148 MG/DL (ref 74–100)
GLUCOSE BLDC GLUCOMTR-MCNC: 148 MG/DL (ref 74–100)
GLUCOSE BLDC GLUCOMTR-MCNC: 59 MG/DL (ref 74–100)
GLUCOSE BLDC GLUCOMTR-MCNC: 99 MG/DL (ref 70–105)
HCO3 BLDA-SCNC: 21.1 MMOL/L (ref 21–28)
HCO3 BLDA-SCNC: 21.8 MMOL/L (ref 21–28)
HCO3 BLDA-SCNC: 22.1 MMOL/L (ref 21–28)
HCO3 BLDA-SCNC: 22.3 MMOL/L (ref 21–28)
HCO3 BLDA-SCNC: 22.3 MMOL/L (ref 21–28)
HCO3 BLDA-SCNC: 22.8 MMOL/L (ref 21–28)
HCO3 BLDA-SCNC: 23.1 MMOL/L (ref 21–28)
HCO3 BLDA-SCNC: 23.6 MMOL/L (ref 21–28)
HCO3 BLDA-SCNC: 24.2 MMOL/L (ref 22–26)
HCO3 BLDA-SCNC: 24.6 MMOL/L (ref 22–26)
HCO3 BLDA-SCNC: 25.2 MMOL/L (ref 22–26)
HCO3 BLDA-SCNC: 25.7 MMOL/L (ref 22–26)
HCO3 BLDA-SCNC: 26 MMOL/L (ref 22–26)
HCO3 BLDA-SCNC: 27.4 MMOL/L (ref 22–26)
HCO3 BLDA-SCNC: 28.3 MMOL/L (ref 22–26)
HCO3 BLDV-SCNC: 23.1 MMOL/L (ref 23–28)
HCT VFR BLD AUTO: 23.9 % (ref 37.5–51)
HCT VFR BLD AUTO: 26.7 % (ref 37.5–51)
HCT VFR BLD AUTO: 26.7 % (ref 37.5–51)
HCT VFR BLD AUTO: 27 % (ref 37.5–51)
HCT VFR BLD AUTO: 36.4 % (ref 37.5–51)
HCT VFR BLDA CALC: 20 % (ref 38–51)
HCT VFR BLDA CALC: 22 % (ref 38–51)
HCT VFR BLDA CALC: 23 % (ref 38–51)
HCT VFR BLDA CALC: 24 % (ref 38–51)
HCT VFR BLDA CALC: 24 % (ref 38–51)
HCT VFR BLDA CALC: 26 % (ref 38–51)
HCT VFR BLDA CALC: 27 % (ref 38–51)
HCT VFR BLDA CALC: 27 % (ref 38–51)
HCT VFR BLDA CALC: 29 % (ref 38–51)
HEMODILUTION: NO
HEMODILUTION: YES
HGB BLD-MCNC: 12.2 G/DL (ref 13–17.7)
HGB BLD-MCNC: 8 G/DL (ref 13–17.7)
HGB BLD-MCNC: 9 G/DL (ref 13–17.7)
HGB BLD-MCNC: 9 G/DL (ref 13–17.7)
HGB BLD-MCNC: 9.5 G/DL (ref 13–17.7)
HGB BLDA-MCNC: 7 G/DL (ref 12–17)
HGB BLDA-MCNC: 7.5 G/DL (ref 12–17)
HGB BLDA-MCNC: 7.8 G/DL (ref 12–17)
HGB BLDA-MCNC: 8 G/DL (ref 12–17)
HGB BLDA-MCNC: 8.2 G/DL (ref 12–17)
HGB BLDA-MCNC: 8.2 G/DL (ref 12–17)
HGB BLDA-MCNC: 8.7 G/DL (ref 12–17)
HGB BLDA-MCNC: 8.8 G/DL (ref 12–17)
HGB BLDA-MCNC: 8.8 G/DL (ref 12–17)
HGB BLDA-MCNC: 9.2 G/DL (ref 12–17)
HGB BLDA-MCNC: 9.3 G/DL (ref 12–17)
HGB BLDA-MCNC: 9.7 G/DL (ref 12–17)
HGB BLDA-MCNC: 9.9 G/DL (ref 12–17)
HGB BLDA-MCNC: 9.9 G/DL (ref 12–17)
HOROWITZ INDEX BLD+IHG-RTO: 40 %
HOROWITZ INDEX BLD+IHG-RTO: 60 %
HOROWITZ INDEX BLD+IHG-RTO: 70 %
INR PPP: 1.23 (ref 0.9–1.1)
INR PPP: 1.25 (ref 0.9–1.1)
LYMPHOCYTES # BLD AUTO: 0.7 10*3/MM3 (ref 0.7–3.1)
LYMPHOCYTES # BLD MANUAL: 0.9 10*3/MM3 (ref 0.7–3.1)
LYMPHOCYTES # BLD MANUAL: 1.71 10*3/MM3 (ref 0.7–3.1)
LYMPHOCYTES NFR BLD AUTO: 3.8 % (ref 19.6–45.3)
LYMPHOCYTES NFR BLD MANUAL: 11 % (ref 5–12)
LYMPHOCYTES NFR BLD MANUAL: 6 % (ref 5–12)
LYMPHOCYTES NFR BLD MANUAL: 7 % (ref 19.6–45.3)
LYMPHOCYTES NFR BLD MANUAL: 9 % (ref 19.6–45.3)
MAGNESIUM SERPL-MCNC: 2.2 MG/DL (ref 1.6–2.4)
MCH RBC QN AUTO: 27.8 PG (ref 26.6–33)
MCH RBC QN AUTO: 27.9 PG (ref 26.6–33)
MCH RBC QN AUTO: 28.4 PG (ref 26.6–33)
MCH RBC QN AUTO: 29.4 PG (ref 26.6–33)
MCHC RBC AUTO-ENTMCNC: 33.6 G/DL (ref 31.5–35.7)
MCHC RBC AUTO-ENTMCNC: 33.6 G/DL (ref 31.5–35.7)
MCHC RBC AUTO-ENTMCNC: 33.9 G/DL (ref 31.5–35.7)
MCHC RBC AUTO-ENTMCNC: 35.1 G/DL (ref 31.5–35.7)
MCV RBC AUTO: 82.4 FL (ref 79–97)
MCV RBC AUTO: 82.9 FL (ref 79–97)
MCV RBC AUTO: 83.7 FL (ref 79–97)
MCV RBC AUTO: 84.5 FL (ref 79–97)
MODALITY: ABNORMAL
MONOCYTES # BLD AUTO: 1.1 10*3/MM3 (ref 0.1–0.9)
MONOCYTES # BLD AUTO: 1.46 10*3/MM3 (ref 0.1–0.9)
MONOCYTES # BLD AUTO: 1.7 10*3/MM3 (ref 0.1–0.9)
MONOCYTES NFR BLD AUTO: 9.7 % (ref 5–12)
NEUTROPHILS # BLD AUTO: 15.1 10*3/MM3 (ref 1.7–7)
NEUTROPHILS # BLD AUTO: 21.23 10*3/MM3 (ref 1.7–7)
NEUTROPHILS # BLD AUTO: 7.2 10*3/MM3 (ref 1.7–7)
NEUTROPHILS NFR BLD AUTO: 86.3 % (ref 42.7–76)
NEUTROPHILS NFR BLD MANUAL: 65 % (ref 42.7–76)
NEUTROPHILS NFR BLD MANUAL: 72 % (ref 42.7–76)
NEUTS BAND NFR BLD MANUAL: 15 % (ref 0–5)
NEUTS BAND NFR BLD MANUAL: 7 % (ref 0–5)
NRBC BLD AUTO-RTO: 0 /100 WBC (ref 0–0.2)
PCO2 BLDA: 39.4 MM HG (ref 35–48)
PCO2 BLDA: 40.4 MM HG (ref 35–48)
PCO2 BLDA: 41.9 MM HG (ref 35–48)
PCO2 BLDA: 42.1 MM HG (ref 35–48)
PCO2 BLDA: 42.4 MM HG (ref 35–48)
PCO2 BLDA: 42.5 MM HG (ref 35–48)
PCO2 BLDA: 43.7 MM HG (ref 35–48)
PCO2 BLDA: 45.1 MM HG (ref 35–48)
PCO2 BLDA: 49 MM HG (ref 35–45)
PCO2 BLDA: 50 MM HG (ref 35–45)
PCO2 BLDA: 51 MM HG (ref 35–45)
PCO2 BLDA: 52 MM HG (ref 35–45)
PCO2 BLDA: 53 MM HG (ref 35–45)
PCO2 BLDA: 53 MM HG (ref 35–45)
PCO2 BLDA: 55 MM HG (ref 35–45)
PCO2 BLDV: 53 MM HG (ref 41–51)
PCO2 TEMP ADJ BLD: 38.3 MM HG (ref 35–48)
PCO2 TEMP ADJ BLD: 40.6 MM HG (ref 35–48)
PCO2 TEMP ADJ BLD: 41.9 MM HG (ref 35–48)
PEEP RESPIRATORY: 5 CM[H2O]
PEEP RESPIRATORY: 8 CM[H2O]
PH BLDA: 7.27 PH UNITS (ref 7.35–7.45)
PH BLDA: 7.27 PH UNITS (ref 7.35–7.45)
PH BLDA: 7.3 PH UNITS (ref 7.35–7.45)
PH BLDA: 7.3 PH UNITS (ref 7.35–7.45)
PH BLDA: 7.31 PH UNITS (ref 7.35–7.45)
PH BLDA: 7.31 PH UNITS (ref 7.35–7.45)
PH BLDA: 7.33 PH UNITS (ref 7.35–7.45)
PH BLDA: 7.34 PH UNITS (ref 7.35–7.45)
PH BLDA: 7.34 PH UNITS (ref 7.35–7.45)
PH BLDA: 7.36 PH UNITS (ref 7.35–7.45)
PH BLDA: 7.37 PH UNITS (ref 7.35–7.45)
PH BLDV: 7.25 PH UNITS (ref 7.31–7.41)
PH, TEMP CORRECTED: 7.33 PH UNITS (ref 7.35–7.45)
PH, TEMP CORRECTED: 7.35 PH UNITS (ref 7.35–7.45)
PH, TEMP CORRECTED: 7.38 PH UNITS (ref 7.35–7.45)
PHOSPHATE SERPL-MCNC: 3.1 MG/DL (ref 2.5–4.5)
PHOSPHATE SERPL-MCNC: 3.4 MG/DL (ref 2.5–4.5)
PHOSPHATE SERPL-MCNC: 3.6 MG/DL (ref 2.5–4.5)
PLATELET # BLD AUTO: 226 10*3/MM3 (ref 140–450)
PLATELET # BLD AUTO: 257 10*3/MM3 (ref 140–450)
PLATELET # BLD AUTO: 377 10*3/MM3 (ref 140–450)
PMV BLD AUTO: 7.5 FL (ref 6–12)
PMV BLD AUTO: 7.6 FL (ref 6–12)
PMV BLD AUTO: 8.3 FL (ref 6–12)
PMV BLD AUTO: 8.3 FL (ref 6–12)
PO2 BLDA: 121.3 MM HG (ref 83–108)
PO2 BLDA: 127.2 MM HG (ref 83–108)
PO2 BLDA: 130.1 MM HG (ref 83–108)
PO2 BLDA: 135.8 MM HG (ref 83–108)
PO2 BLDA: 152.9 MM HG (ref 83–108)
PO2 BLDA: 155.5 MM HG (ref 83–108)
PO2 BLDA: 160.4 MM HG (ref 83–108)
PO2 BLDA: 248 MM HG (ref 80–105)
PO2 BLDA: 280.5 MM HG (ref 83–108)
PO2 BLDA: 365 MM HG (ref 80–105)
PO2 BLDA: 367 MM HG (ref 80–105)
PO2 BLDA: 402 MM HG (ref 80–105)
PO2 BLDA: 414 MM HG (ref 80–105)
PO2 BLDA: 432 MM HG (ref 80–105)
PO2 BLDA: 451 MM HG (ref 80–105)
PO2 BLDV: 50 MM HG
PO2 TEMP ADJ BLD: 113.9 MM HG (ref 83–108)
PO2 TEMP ADJ BLD: 151.3 MM HG (ref 83–108)
PO2 TEMP ADJ BLD: 272.7 MM HG (ref 83–108)
POTASSIUM BLD-SCNC: 3.8 MMOL/L (ref 3.5–5.2)
POTASSIUM BLD-SCNC: 3.8 MMOL/L (ref 3.5–5.2)
POTASSIUM BLD-SCNC: 4.3 MMOL/L (ref 3.5–5.2)
POTASSIUM BLDA-SCNC: 1.8 MMOL/L (ref 3.5–4.5)
POTASSIUM BLDA-SCNC: 3.4 MMOL/L (ref 3.5–4.9)
POTASSIUM BLDA-SCNC: 3.5 MMOL/L (ref 3.5–4.5)
POTASSIUM BLDA-SCNC: 3.5 MMOL/L (ref 3.5–4.9)
POTASSIUM BLDA-SCNC: 3.6 MMOL/L (ref 3.5–4.9)
POTASSIUM BLDA-SCNC: 3.8 MMOL/L (ref 3.5–4.9)
POTASSIUM BLDA-SCNC: 3.8 MMOL/L (ref 3.5–4.9)
POTASSIUM BLDA-SCNC: 3.9 MMOL/L (ref 3.5–4.5)
POTASSIUM BLDA-SCNC: 3.9 MMOL/L (ref 3.5–4.5)
POTASSIUM BLDA-SCNC: 4 MMOL/L (ref 3.5–4.5)
POTASSIUM BLDA-SCNC: 4 MMOL/L (ref 3.5–4.9)
POTASSIUM BLDA-SCNC: 4.1 MMOL/L (ref 3.5–4.5)
POTASSIUM BLDA-SCNC: 4.2 MMOL/L (ref 3.5–4.5)
POTASSIUM BLDA-SCNC: 4.2 MMOL/L (ref 3.5–4.5)
POTASSIUM BLDA-SCNC: 4.2 MMOL/L (ref 3.5–4.9)
POTASSIUM BLDA-SCNC: 4.2 MMOL/L (ref 3.5–4.9)
PROT SERPL-MCNC: 7.8 G/DL (ref 6–8.5)
PROTHROMBIN TIME: 12.5 SECONDS (ref 9.6–11.7)
PROTHROMBIN TIME: 12.7 SECONDS (ref 9.6–11.7)
PSV: 10 CMH2O
RBC # BLD AUTO: 2.83 10*6/MM3 (ref 4.14–5.8)
RBC # BLD AUTO: 3.23 10*6/MM3 (ref 4.14–5.8)
RBC # BLD AUTO: 3.24 10*6/MM3 (ref 4.14–5.8)
RBC # BLD AUTO: 4.39 10*6/MM3 (ref 4.14–5.8)
RBC MORPH BLD: NORMAL
RBC MORPH BLD: NORMAL
RESPIRATORY RATE: 16
SAO2 % BLDCOA: 100 % (ref 95–98)
SAO2 % BLDCOA: 98.6 % (ref 94–98)
SAO2 % BLDCOA: 98.6 % (ref 94–98)
SAO2 % BLDCOA: 98.7 % (ref 94–98)
SAO2 % BLDCOA: 98.9 % (ref 94–98)
SAO2 % BLDCOA: 99.2 % (ref 94–98)
SAO2 % BLDCOA: 99.2 % (ref 94–98)
SAO2 % BLDCOA: 99.3 % (ref 94–98)
SAO2 % BLDCOA: 99.9 % (ref 94–98)
SAO2 % BLDCOV: 78 %
SCAN SLIDE: NORMAL
SCAN SLIDE: NORMAL
SMALL PLATELETS BLD QL SMEAR: ADEQUATE
SODIUM BLD-SCNC: 136 MMOL/L (ref 136–145)
SODIUM BLD-SCNC: 138 MMOL/L (ref 136–145)
SODIUM BLD-SCNC: 142 MMOL/L (ref 136–145)
SODIUM BLDA-SCNC: 138 MMOL/L (ref 138–146)
SODIUM BLDA-SCNC: 138 MMOL/L (ref 138–146)
SODIUM BLDA-SCNC: 139 MMOL/L (ref 138–146)
SODIUM BLDA-SCNC: 139 MMOL/L (ref 138–146)
SODIUM BLDA-SCNC: 140 MMOL/L (ref 138–146)
SODIUM BLDA-SCNC: 140 MMOL/L (ref 138–146)
SODIUM BLDA-SCNC: 141 MMOL/L (ref 138–146)
SODIUM BLDA-SCNC: 142 MMOL/L (ref 138–146)
SODIUM BLDA-SCNC: 142 MMOL/L (ref 138–146)
SODIUM BLDA-SCNC: 143 MMOL/L (ref 138–146)
SODIUM BLDA-SCNC: 145 MMOL/L (ref 138–146)
SODIUM BLDA-SCNC: 145 MMOL/L (ref 138–146)
SODIUM BLDA-SCNC: 156 MMOL/L (ref 138–146)
TOXIC GRANULATION: ABNORMAL
VENTILATOR MODE: ABNORMAL
VT ON VENT VENT: 700 ML
WBC MORPH BLD: NORMAL
WBC NRBC COR # BLD: 10 10*3/MM3 (ref 3.4–10.8)
WBC NRBC COR # BLD: 16.9 10*3/MM3 (ref 3.4–10.8)
WBC NRBC COR # BLD: 17.5 10*3/MM3 (ref 3.4–10.8)
WBC NRBC COR # BLD: 24.4 10*3/MM3 (ref 3.4–10.8)

## 2020-01-22 PROCEDURE — 25010000002 AMPICILLIN PER 500 MG: Performed by: INTERNAL MEDICINE

## 2020-01-22 PROCEDURE — 87116 MYCOBACTERIA CULTURE: CPT | Performed by: THORACIC SURGERY (CARDIOTHORACIC VASCULAR SURGERY)

## 2020-01-22 PROCEDURE — 85014 HEMATOCRIT: CPT | Performed by: THORACIC SURGERY (CARDIOTHORACIC VASCULAR SURGERY)

## 2020-01-22 PROCEDURE — A4648 IMPLANTABLE TISSUE MARKER: HCPCS | Performed by: THORACIC SURGERY (CARDIOTHORACIC VASCULAR SURGERY)

## 2020-01-22 PROCEDURE — 82330 ASSAY OF CALCIUM: CPT | Performed by: INTERNAL MEDICINE

## 2020-01-22 PROCEDURE — 83735 ASSAY OF MAGNESIUM: CPT | Performed by: INTERNAL MEDICINE

## 2020-01-22 PROCEDURE — 02570ZK DESTRUCTION OF LEFT ATRIAL APPENDAGE, OPEN APPROACH: ICD-10-PCS | Performed by: THORACIC SURGERY (CARDIOTHORACIC VASCULAR SURGERY)

## 2020-01-22 PROCEDURE — 87186 SC STD MICRODIL/AGAR DIL: CPT | Performed by: THORACIC SURGERY (CARDIOTHORACIC VASCULAR SURGERY)

## 2020-01-22 PROCEDURE — 85049 AUTOMATED PLATELET COUNT: CPT | Performed by: THORACIC SURGERY (CARDIOTHORACIC VASCULAR SURGERY)

## 2020-01-22 PROCEDURE — 80051 ELECTROLYTE PANEL: CPT

## 2020-01-22 PROCEDURE — 85730 THROMBOPLASTIN TIME PARTIAL: CPT | Performed by: NURSE PRACTITIONER

## 2020-01-22 PROCEDURE — 33430 REPLACEMENT OF MITRAL VALVE: CPT | Performed by: THORACIC SURGERY (CARDIOTHORACIC VASCULAR SURGERY)

## 2020-01-22 PROCEDURE — 02RG08Z REPLACEMENT OF MITRAL VALVE WITH ZOOPLASTIC TISSUE, OPEN APPROACH: ICD-10-PCS | Performed by: THORACIC SURGERY (CARDIOTHORACIC VASCULAR SURGERY)

## 2020-01-22 PROCEDURE — 80069 RENAL FUNCTION PANEL: CPT | Performed by: THORACIC SURGERY (CARDIOTHORACIC VASCULAR SURGERY)

## 2020-01-22 PROCEDURE — 87205 SMEAR GRAM STAIN: CPT | Performed by: THORACIC SURGERY (CARDIOTHORACIC VASCULAR SURGERY)

## 2020-01-22 PROCEDURE — P9041 ALBUMIN (HUMAN),5%, 50ML: HCPCS | Performed by: ANESTHESIOLOGY

## 2020-01-22 PROCEDURE — C1751 CATH, INF, PER/CENT/MIDLINE: HCPCS | Performed by: ANESTHESIOLOGY

## 2020-01-22 PROCEDURE — 25010000002 MAGNESIUM SULFATE PER 500 MG OF MAGNESIUM: Performed by: ANESTHESIOLOGY

## 2020-01-22 PROCEDURE — 33430 REPLACEMENT OF MITRAL VALVE: CPT | Performed by: PHYSICIAN ASSISTANT

## 2020-01-22 PROCEDURE — 25010000002 HYDRALAZINE PER 20 MG: Performed by: INTERNAL MEDICINE

## 2020-01-22 PROCEDURE — 94799 UNLISTED PULMONARY SVC/PX: CPT

## 2020-01-22 PROCEDURE — 25010000002 HEPARIN (PORCINE) PER 1000 UNITS: Performed by: THORACIC SURGERY (CARDIOTHORACIC VASCULAR SURGERY)

## 2020-01-22 PROCEDURE — 99232 SBSQ HOSP IP/OBS MODERATE 35: CPT | Performed by: INTERNAL MEDICINE

## 2020-01-22 PROCEDURE — 85730 THROMBOPLASTIN TIME PARTIAL: CPT | Performed by: THORACIC SURGERY (CARDIOTHORACIC VASCULAR SURGERY)

## 2020-01-22 PROCEDURE — 25010000003 CEFAZOLIN PER 500 MG: Performed by: ANESTHESIOLOGY

## 2020-01-22 PROCEDURE — 93005 ELECTROCARDIOGRAM TRACING: CPT | Performed by: NURSE PRACTITIONER

## 2020-01-22 PROCEDURE — 82962 GLUCOSE BLOOD TEST: CPT

## 2020-01-22 PROCEDURE — 87206 SMEAR FLUORESCENT/ACID STAI: CPT | Performed by: THORACIC SURGERY (CARDIOTHORACIC VASCULAR SURGERY)

## 2020-01-22 PROCEDURE — 82330 ASSAY OF CALCIUM: CPT | Performed by: NURSE PRACTITIONER

## 2020-01-22 PROCEDURE — 85347 COAGULATION TIME ACTIVATED: CPT

## 2020-01-22 PROCEDURE — 87077 CULTURE AEROBIC IDENTIFY: CPT | Performed by: THORACIC SURGERY (CARDIOTHORACIC VASCULAR SURGERY)

## 2020-01-22 PROCEDURE — 85610 PROTHROMBIN TIME: CPT | Performed by: THORACIC SURGERY (CARDIOTHORACIC VASCULAR SURGERY)

## 2020-01-22 PROCEDURE — 25010000002 FENTANYL CITRATE (PF) 250 MCG/5ML SOLUTION: Performed by: ANESTHESIOLOGY

## 2020-01-22 PROCEDURE — 80069 RENAL FUNCTION PANEL: CPT | Performed by: NURSE PRACTITIONER

## 2020-01-22 PROCEDURE — 5A1221Z PERFORMANCE OF CARDIAC OUTPUT, CONTINUOUS: ICD-10-PCS | Performed by: THORACIC SURGERY (CARDIOTHORACIC VASCULAR SURGERY)

## 2020-01-22 PROCEDURE — 25010000002 ALBUMIN HUMAN 5% PER 50 ML: Performed by: THORACIC SURGERY (CARDIOTHORACIC VASCULAR SURGERY)

## 2020-01-22 PROCEDURE — 85007 BL SMEAR W/DIFF WBC COUNT: CPT | Performed by: NURSE PRACTITIONER

## 2020-01-22 PROCEDURE — 85576 BLOOD PLATELET AGGREGATION: CPT | Performed by: INTERNAL MEDICINE

## 2020-01-22 PROCEDURE — 85025 COMPLETE CBC W/AUTO DIFF WBC: CPT | Performed by: NURSE PRACTITIONER

## 2020-01-22 PROCEDURE — 25010000002 AMPICILLIN PER 500 MG: Performed by: NURSE PRACTITIONER

## 2020-01-22 PROCEDURE — P9041 ALBUMIN (HUMAN),5%, 50ML: HCPCS | Performed by: THORACIC SURGERY (CARDIOTHORACIC VASCULAR SURGERY)

## 2020-01-22 PROCEDURE — 88312 SPECIAL STAINS GROUP 1: CPT | Performed by: THORACIC SURGERY (CARDIOTHORACIC VASCULAR SURGERY)

## 2020-01-22 PROCEDURE — 84100 ASSAY OF PHOSPHORUS: CPT | Performed by: INTERNAL MEDICINE

## 2020-01-22 PROCEDURE — 85025 COMPLETE CBC W/AUTO DIFF WBC: CPT | Performed by: THORACIC SURGERY (CARDIOTHORACIC VASCULAR SURGERY)

## 2020-01-22 PROCEDURE — 25010000002 MORPHINE PER 10 MG: Performed by: NURSE PRACTITIONER

## 2020-01-22 PROCEDURE — 87075 CULTR BACTERIA EXCEPT BLOOD: CPT | Performed by: THORACIC SURGERY (CARDIOTHORACIC VASCULAR SURGERY)

## 2020-01-22 PROCEDURE — 85027 COMPLETE CBC AUTOMATED: CPT | Performed by: THORACIC SURGERY (CARDIOTHORACIC VASCULAR SURGERY)

## 2020-01-22 PROCEDURE — 82330 ASSAY OF CALCIUM: CPT

## 2020-01-22 PROCEDURE — 25010000002 HYDROCORTISONE SODIUM SUCCINATE 100 MG RECONSTITUTED SOLUTION: Performed by: ANESTHESIOLOGY

## 2020-01-22 PROCEDURE — 25010000002 VANCOMYCIN 10 G RECONSTITUTED SOLUTION: Performed by: NURSE PRACTITIONER

## 2020-01-22 PROCEDURE — 87176 TISSUE HOMOGENIZATION CULTR: CPT | Performed by: THORACIC SURGERY (CARDIOTHORACIC VASCULAR SURGERY)

## 2020-01-22 PROCEDURE — 94002 VENT MGMT INPAT INIT DAY: CPT

## 2020-01-22 PROCEDURE — 25010000002 ALBUMIN HUMAN 5% PER 50 ML: Performed by: ANESTHESIOLOGY

## 2020-01-22 PROCEDURE — 25010000002 MAGNESIUM SULFATE IN D5W 1G/100ML (PREMIX) 1-5 GM/100ML-% SOLUTION: Performed by: NURSE PRACTITIONER

## 2020-01-22 PROCEDURE — 85384 FIBRINOGEN ACTIVITY: CPT | Performed by: THORACIC SURGERY (CARDIOTHORACIC VASCULAR SURGERY)

## 2020-01-22 PROCEDURE — 25010000002 VANCOMYCIN 1 G RECONSTITUTED SOLUTION 1 EACH VIAL: Performed by: ANESTHESIOLOGY

## 2020-01-22 PROCEDURE — 85014 HEMATOCRIT: CPT

## 2020-01-22 PROCEDURE — 25010000002 GENTAMICIN PER 80 MG: Performed by: INTERNAL MEDICINE

## 2020-01-22 PROCEDURE — 85007 BL SMEAR W/DIFF WBC COUNT: CPT | Performed by: INTERNAL MEDICINE

## 2020-01-22 PROCEDURE — 80053 COMPREHEN METABOLIC PANEL: CPT | Performed by: INTERNAL MEDICINE

## 2020-01-22 PROCEDURE — 25010000002 GENTAMICIN PER 80 MG: Performed by: NURSE PRACTITIONER

## 2020-01-22 PROCEDURE — 84132 ASSAY OF SERUM POTASSIUM: CPT

## 2020-01-22 PROCEDURE — 25010000003 POTASSIUM CHLORIDE 10 MEQ/100ML SOLUTION: Performed by: NURSE PRACTITIONER

## 2020-01-22 PROCEDURE — 25010000002 MIDAZOLAM PER 1 MG: Performed by: ANESTHESIOLOGY

## 2020-01-22 PROCEDURE — 85018 HEMOGLOBIN: CPT | Performed by: THORACIC SURGERY (CARDIOTHORACIC VASCULAR SURGERY)

## 2020-01-22 PROCEDURE — C1729 CATH, DRAINAGE: HCPCS | Performed by: THORACIC SURGERY (CARDIOTHORACIC VASCULAR SURGERY)

## 2020-01-22 PROCEDURE — P9016 RBC LEUKOCYTES REDUCED: HCPCS

## 2020-01-22 PROCEDURE — 87181 SC STD AGAR DILUTION PER AGT: CPT | Performed by: THORACIC SURGERY (CARDIOTHORACIC VASCULAR SURGERY)

## 2020-01-22 PROCEDURE — 85018 HEMOGLOBIN: CPT

## 2020-01-22 PROCEDURE — 85576 BLOOD PLATELET AGGREGATION: CPT | Performed by: THORACIC SURGERY (CARDIOTHORACIC VASCULAR SURGERY)

## 2020-01-22 PROCEDURE — 36430 TRANSFUSION BLD/BLD COMPNT: CPT

## 2020-01-22 PROCEDURE — 82947 ASSAY GLUCOSE BLOOD QUANT: CPT

## 2020-01-22 PROCEDURE — 86900 BLOOD TYPING SEROLOGIC ABO: CPT

## 2020-01-22 PROCEDURE — 88305 TISSUE EXAM BY PATHOLOGIST: CPT | Performed by: THORACIC SURGERY (CARDIOTHORACIC VASCULAR SURGERY)

## 2020-01-22 PROCEDURE — C1713 ANCHOR/SCREW BN/BN,TIS/BN: HCPCS | Performed by: THORACIC SURGERY (CARDIOTHORACIC VASCULAR SURGERY)

## 2020-01-22 PROCEDURE — 84295 ASSAY OF SERUM SODIUM: CPT

## 2020-01-22 PROCEDURE — 25010000002 HEPARIN (PORCINE) PER 1000 UNITS: Performed by: ANESTHESIOLOGY

## 2020-01-22 PROCEDURE — 85730 THROMBOPLASTIN TIME PARTIAL: CPT | Performed by: INTERNAL MEDICINE

## 2020-01-22 PROCEDURE — 85610 PROTHROMBIN TIME: CPT | Performed by: NURSE PRACTITIONER

## 2020-01-22 PROCEDURE — 85025 COMPLETE CBC W/AUTO DIFF WBC: CPT | Performed by: INTERNAL MEDICINE

## 2020-01-22 PROCEDURE — 87102 FUNGUS ISOLATION CULTURE: CPT | Performed by: THORACIC SURGERY (CARDIOTHORACIC VASCULAR SURGERY)

## 2020-01-22 PROCEDURE — 71045 X-RAY EXAM CHEST 1 VIEW: CPT

## 2020-01-22 PROCEDURE — 87070 CULTURE OTHR SPECIMN AEROBIC: CPT | Performed by: THORACIC SURGERY (CARDIOTHORACIC VASCULAR SURGERY)

## 2020-01-22 PROCEDURE — 82803 BLOOD GASES ANY COMBINATION: CPT

## 2020-01-22 PROCEDURE — B24BZZ4 ULTRASONOGRAPHY OF HEART WITH AORTA, TRANSESOPHAGEAL: ICD-10-PCS | Performed by: THORACIC SURGERY (CARDIOTHORACIC VASCULAR SURGERY)

## 2020-01-22 PROCEDURE — 25010000002 PROTAMINE SULFATE PER 10 MG: Performed by: ANESTHESIOLOGY

## 2020-01-22 PROCEDURE — 25010000002 PAPAVERINE PER 60 MG: Performed by: THORACIC SURGERY (CARDIOTHORACIC VASCULAR SURGERY)

## 2020-01-22 PROCEDURE — 63710000001 INSULIN REGULAR HUMAN PER 5 UNITS: Performed by: ANESTHESIOLOGY

## 2020-01-22 PROCEDURE — 93318 ECHO TRANSESOPHAGEAL INTRAOP: CPT | Performed by: ANESTHESIOLOGY

## 2020-01-22 PROCEDURE — 25010000002 EPINEPHRINE 1 MG/ML SOLUTION 30 ML VIAL: Performed by: ANESTHESIOLOGY

## 2020-01-22 PROCEDURE — 25010000002 ONDANSETRON PER 1 MG: Performed by: INTERNAL MEDICINE

## 2020-01-22 DEVICE — COR-KNOT® QUICK LOAD® SINGLES
Type: IMPLANTABLE DEVICE | Site: HEART | Status: FUNCTIONAL
Brand: COR-KNOT® QUICK LOAD®

## 2020-01-22 DEVICE — COR-KNOT MINI® COMBO KITBASE PACKAGE TYPE - KITEACH STERILE PACKAGE KIT CONTAINS (2) SINGLE PATIENT USE COR-KNOT MINI® DEVICES AND (12) COR-KNOT® QUICK LOADS®.
Type: IMPLANTABLE DEVICE | Site: HEART | Status: FUNCTIONAL
Brand: COR-KNOT MINI®

## 2020-01-22 DEVICE — SS SUTURE, 6 PER SLEEVE
Type: IMPLANTABLE DEVICE | Site: STERNUM | Status: FUNCTIONAL
Brand: MYO/WIRE II

## 2020-01-22 DEVICE — VLV MITRAL HANCOCK 2 ULTR T510 31MM: Type: IMPLANTABLE DEVICE | Site: HEART | Status: FUNCTIONAL

## 2020-01-22 RX ORDER — MIDAZOLAM HYDROCHLORIDE 1 MG/ML
INJECTION INTRAMUSCULAR; INTRAVENOUS AS NEEDED
Status: DISCONTINUED | OUTPATIENT
Start: 2020-01-22 | End: 2020-01-22

## 2020-01-22 RX ORDER — NOREPINEPHRINE BITARTRATE/D5W 8 MG/250ML
PLASTIC BAG, INJECTION (ML) INTRAVENOUS CONTINUOUS PRN
Status: DISCONTINUED | OUTPATIENT
Start: 2020-01-22 | End: 2020-01-22 | Stop reason: SURG

## 2020-01-22 RX ORDER — ONDANSETRON 2 MG/ML
4 INJECTION INTRAMUSCULAR; INTRAVENOUS EVERY 6 HOURS PRN
Status: DISCONTINUED | OUTPATIENT
Start: 2020-01-22 | End: 2020-02-03 | Stop reason: HOSPADM

## 2020-01-22 RX ORDER — MAGNESIUM HYDROXIDE 1200 MG/15ML
LIQUID ORAL AS NEEDED
Status: DISCONTINUED | OUTPATIENT
Start: 2020-01-22 | End: 2020-01-22 | Stop reason: HOSPADM

## 2020-01-22 RX ORDER — ACETAMINOPHEN 650 MG/1
650 SUPPOSITORY RECTAL EVERY 6 HOURS
Status: DISPENSED | OUTPATIENT
Start: 2020-01-22 | End: 2020-01-25

## 2020-01-22 RX ORDER — MORPHINE SULFATE 4 MG/ML
2 INJECTION, SOLUTION INTRAMUSCULAR; INTRAVENOUS
Status: DISCONTINUED | OUTPATIENT
Start: 2020-01-22 | End: 2020-01-24

## 2020-01-22 RX ORDER — NICOTINE POLACRILEX 4 MG
15 LOZENGE BUCCAL
Status: DISCONTINUED | OUTPATIENT
Start: 2020-01-22 | End: 2020-02-03 | Stop reason: HOSPADM

## 2020-01-22 RX ORDER — MIDAZOLAM HYDROCHLORIDE 1 MG/ML
INJECTION INTRAMUSCULAR; INTRAVENOUS AS NEEDED
Status: DISCONTINUED | OUTPATIENT
Start: 2020-01-22 | End: 2020-01-22 | Stop reason: SURG

## 2020-01-22 RX ORDER — NOREPINEPHRINE BIT/0.9 % NACL 8 MG/250ML
.02-.06 INFUSION BOTTLE (ML) INTRAVENOUS CONTINUOUS PRN
Status: DISCONTINUED | OUTPATIENT
Start: 2020-01-22 | End: 2020-01-23

## 2020-01-22 RX ORDER — ALBUMIN, HUMAN INJ 5% 5 %
1000 SOLUTION INTRAVENOUS AS NEEDED
Status: ACTIVE | OUTPATIENT
Start: 2020-01-22 | End: 2020-01-23

## 2020-01-22 RX ORDER — PROTAMINE SULFATE 10 MG/ML
INJECTION, SOLUTION INTRAVENOUS AS NEEDED
Status: DISCONTINUED | OUTPATIENT
Start: 2020-01-22 | End: 2020-01-22 | Stop reason: SURG

## 2020-01-22 RX ORDER — DEXTROSE MONOHYDRATE 25 G/50ML
25 INJECTION, SOLUTION INTRAVENOUS
Status: DISCONTINUED | OUTPATIENT
Start: 2020-01-22 | End: 2020-02-03 | Stop reason: HOSPADM

## 2020-01-22 RX ORDER — XYLITOL/YERBA SANTA
2 AEROSOL, SPRAY WITH PUMP (ML) MUCOUS MEMBRANE EVERY 4 HOURS PRN
Status: DISCONTINUED | OUTPATIENT
Start: 2020-01-22 | End: 2020-02-02

## 2020-01-22 RX ORDER — NOREPINEPHRINE BIT/0.9 % NACL 8 MG/250ML
.02-.08 INFUSION BOTTLE (ML) INTRAVENOUS
Status: DISCONTINUED | OUTPATIENT
Start: 2020-01-22 | End: 2020-01-23

## 2020-01-22 RX ORDER — FENTANYL CITRATE 50 UG/ML
INJECTION, SOLUTION INTRAMUSCULAR; INTRAVENOUS AS NEEDED
Status: DISCONTINUED | OUTPATIENT
Start: 2020-01-22 | End: 2020-01-22 | Stop reason: SURG

## 2020-01-22 RX ORDER — CEFAZOLIN SODIUM 1 G/3ML
INJECTION, POWDER, FOR SOLUTION INTRAMUSCULAR; INTRAVENOUS AS NEEDED
Status: DISCONTINUED | OUTPATIENT
Start: 2020-01-22 | End: 2020-01-22 | Stop reason: SURG

## 2020-01-22 RX ORDER — VASOPRESSIN 20 U/ML
INJECTION PARENTERAL AS NEEDED
Status: DISCONTINUED | OUTPATIENT
Start: 2020-01-22 | End: 2020-01-22 | Stop reason: SURG

## 2020-01-22 RX ORDER — POLYETHYLENE GLYCOL 3350 17 G/17G
17 POWDER, FOR SOLUTION ORAL 2 TIMES DAILY
Status: DISCONTINUED | OUTPATIENT
Start: 2020-01-22 | End: 2020-01-22

## 2020-01-22 RX ORDER — PANTOPRAZOLE SODIUM 40 MG/10ML
40 INJECTION, POWDER, LYOPHILIZED, FOR SOLUTION INTRAVENOUS
Status: DISCONTINUED | OUTPATIENT
Start: 2020-01-22 | End: 2020-01-22

## 2020-01-22 RX ORDER — ALBUMIN, HUMAN INJ 5% 5 %
SOLUTION INTRAVENOUS
Status: COMPLETED
Start: 2020-01-22 | End: 2020-01-22

## 2020-01-22 RX ORDER — ALBUMIN, HUMAN INJ 5% 5 %
SOLUTION INTRAVENOUS CONTINUOUS PRN
Status: DISCONTINUED | OUTPATIENT
Start: 2020-01-22 | End: 2020-01-22 | Stop reason: SURG

## 2020-01-22 RX ORDER — POTASSIUM CHLORIDE 7.45 MG/ML
INJECTION INTRAVENOUS
Status: DISPENSED
Start: 2020-01-22 | End: 2020-01-22

## 2020-01-22 RX ORDER — SENNA PLUS 8.6 MG/1
1 TABLET ORAL 2 TIMES DAILY
Status: DISCONTINUED | OUTPATIENT
Start: 2020-01-23 | End: 2020-02-03 | Stop reason: HOSPADM

## 2020-01-22 RX ORDER — CHLORHEXIDINE GLUCONATE 0.12 MG/ML
15 RINSE ORAL EVERY 12 HOURS
Status: DISCONTINUED | OUTPATIENT
Start: 2020-01-23 | End: 2020-02-02

## 2020-01-22 RX ORDER — POTASSIUM CHLORIDE 7.45 MG/ML
10 INJECTION INTRAVENOUS
Status: DISCONTINUED | OUTPATIENT
Start: 2020-01-22 | End: 2020-02-03 | Stop reason: HOSPADM

## 2020-01-22 RX ORDER — ACETAMINOPHEN 500 MG
500 TABLET ORAL EVERY 6 HOURS
Status: DISCONTINUED | OUTPATIENT
Start: 2020-01-22 | End: 2020-01-22

## 2020-01-22 RX ORDER — PANTOPRAZOLE SODIUM 40 MG/10ML
40 INJECTION, POWDER, LYOPHILIZED, FOR SOLUTION INTRAVENOUS ONCE
Status: COMPLETED | OUTPATIENT
Start: 2020-01-22 | End: 2020-01-22

## 2020-01-22 RX ORDER — PHENYLEPHRINE HCL IN 0.9% NACL 0.5 MG/5ML
SYRINGE (ML) INTRAVENOUS AS NEEDED
Status: DISCONTINUED | OUTPATIENT
Start: 2020-01-22 | End: 2020-01-22 | Stop reason: SURG

## 2020-01-22 RX ORDER — NALOXONE HCL 0.4 MG/ML
0.4 VIAL (ML) INJECTION
Status: DISCONTINUED | OUTPATIENT
Start: 2020-01-22 | End: 2020-02-03 | Stop reason: HOSPADM

## 2020-01-22 RX ORDER — VECURONIUM BROMIDE 1 MG/ML
INJECTION, POWDER, LYOPHILIZED, FOR SOLUTION INTRAVENOUS AS NEEDED
Status: DISCONTINUED | OUTPATIENT
Start: 2020-01-22 | End: 2020-01-22 | Stop reason: SURG

## 2020-01-22 RX ORDER — ASPIRIN 81 MG/1
81 TABLET ORAL DAILY
Status: DISCONTINUED | OUTPATIENT
Start: 2020-01-23 | End: 2020-02-03 | Stop reason: HOSPADM

## 2020-01-22 RX ORDER — ACETAMINOPHEN 650 MG/1
650 SUPPOSITORY RECTAL EVERY 6 HOURS
Status: DISCONTINUED | OUTPATIENT
Start: 2020-01-22 | End: 2020-01-22

## 2020-01-22 RX ORDER — MAGNESIUM SULFATE 1 G/100ML
1 INJECTION INTRAVENOUS EVERY 8 HOURS
Status: DISCONTINUED | OUTPATIENT
Start: 2020-01-22 | End: 2020-01-22

## 2020-01-22 RX ORDER — NITROGLYCERIN 10 MG/100ML
5-50 INJECTION INTRAVENOUS CONTINUOUS PRN
Status: DISCONTINUED | OUTPATIENT
Start: 2020-01-22 | End: 2020-01-23

## 2020-01-22 RX ORDER — ALBUMIN, HUMAN INJ 5% 5 %
12.5 SOLUTION INTRAVENOUS ONCE
Status: COMPLETED | OUTPATIENT
Start: 2020-01-22 | End: 2020-01-22

## 2020-01-22 RX ORDER — EPHEDRINE SULFATE/0.9% NACL/PF 25 MG/5 ML
SYRINGE (ML) INTRAVENOUS AS NEEDED
Status: DISCONTINUED | OUTPATIENT
Start: 2020-01-22 | End: 2020-01-22 | Stop reason: SURG

## 2020-01-22 RX ORDER — KETAMINE HYDROCHLORIDE 10 MG/ML
INJECTION INTRAMUSCULAR; INTRAVENOUS AS NEEDED
Status: DISCONTINUED | OUTPATIENT
Start: 2020-01-22 | End: 2020-01-22 | Stop reason: SURG

## 2020-01-22 RX ORDER — AMINOCAPROIC ACID 250 MG/ML
INJECTION, SOLUTION INTRAVENOUS AS NEEDED
Status: DISCONTINUED | OUTPATIENT
Start: 2020-01-22 | End: 2020-01-22 | Stop reason: SURG

## 2020-01-22 RX ORDER — RIFAMPIN 600 MG/10ML
600 INJECTION, POWDER, LYOPHILIZED, FOR SOLUTION INTRAVENOUS ONCE
Status: DISCONTINUED | OUTPATIENT
Start: 2020-01-22 | End: 2020-01-22 | Stop reason: HOSPADM

## 2020-01-22 RX ORDER — POLYETHYLENE GLYCOL 3350 17 G/17G
17 POWDER, FOR SOLUTION ORAL 2 TIMES DAILY
Status: DISCONTINUED | OUTPATIENT
Start: 2020-01-23 | End: 2020-02-03 | Stop reason: HOSPADM

## 2020-01-22 RX ORDER — ACETAMINOPHEN 500 MG
500 TABLET ORAL EVERY 6 HOURS
Status: DISPENSED | OUTPATIENT
Start: 2020-01-22 | End: 2020-01-25

## 2020-01-22 RX ORDER — SENNA PLUS 8.6 MG/1
1 TABLET ORAL 2 TIMES DAILY
Status: DISCONTINUED | OUTPATIENT
Start: 2020-01-22 | End: 2020-01-22

## 2020-01-22 RX ORDER — ASPIRIN 325 MG
325 TABLET ORAL ONCE
Status: COMPLETED | OUTPATIENT
Start: 2020-01-22 | End: 2020-01-22

## 2020-01-22 RX ORDER — HYDROCODONE BITARTRATE AND ACETAMINOPHEN 5; 325 MG/1; MG/1
1 TABLET ORAL EVERY 4 HOURS PRN
Status: DISCONTINUED | OUTPATIENT
Start: 2020-01-22 | End: 2020-01-24

## 2020-01-22 RX ORDER — POTASSIUM CHLORIDE 20 MEQ/1
20 TABLET, EXTENDED RELEASE ORAL AS NEEDED
Status: DISCONTINUED | OUTPATIENT
Start: 2020-01-23 | End: 2020-02-03 | Stop reason: HOSPADM

## 2020-01-22 RX ORDER — ALBUMIN, HUMAN INJ 5% 5 %
500 SOLUTION INTRAVENOUS ONCE
Status: COMPLETED | OUTPATIENT
Start: 2020-01-22 | End: 2020-01-22

## 2020-01-22 RX ORDER — PANTOPRAZOLE SODIUM 40 MG/1
40 TABLET, DELAYED RELEASE ORAL
Status: DISCONTINUED | OUTPATIENT
Start: 2020-01-22 | End: 2020-02-03 | Stop reason: HOSPADM

## 2020-01-22 RX ORDER — POTASSIUM CHLORIDE 1.5 G/1.77G
20 POWDER, FOR SOLUTION ORAL AS NEEDED
Status: DISCONTINUED | OUTPATIENT
Start: 2020-01-23 | End: 2020-02-03 | Stop reason: HOSPADM

## 2020-01-22 RX ORDER — MAGNESIUM SULFATE 1 G/100ML
1 INJECTION INTRAVENOUS EVERY 8 HOURS
Status: COMPLETED | OUTPATIENT
Start: 2020-01-22 | End: 2020-01-23

## 2020-01-22 RX ORDER — HEPARIN SODIUM 1000 [USP'U]/ML
INJECTION, SOLUTION INTRAVENOUS; SUBCUTANEOUS AS NEEDED
Status: DISCONTINUED | OUTPATIENT
Start: 2020-01-22 | End: 2020-01-22 | Stop reason: SURG

## 2020-01-22 RX ADMIN — PHENYLEPHRINE HYDROCHLORIDE 100 MCG: 10 INJECTION INTRAVENOUS at 07:32

## 2020-01-22 RX ADMIN — HYDROCORTISONE SODIUM SUCCINATE 100 MG: 100 INJECTION, POWDER, FOR SOLUTION INTRAMUSCULAR; INTRAVENOUS at 07:37

## 2020-01-22 RX ADMIN — HEPARIN SODIUM 30000 UNITS: 1000 INJECTION, SOLUTION INTRAVENOUS; SUBCUTANEOUS at 08:17

## 2020-01-22 RX ADMIN — AMPICILLIN SODIUM 2 G: 2 INJECTION, POWDER, FOR SOLUTION INTRAMUSCULAR; INTRAVENOUS at 11:00

## 2020-01-22 RX ADMIN — Medication 10 MG: at 07:32

## 2020-01-22 RX ADMIN — POTASSIUM CHLORIDE 10 MEQ: 7.46 INJECTION, SOLUTION INTRAVENOUS at 15:21

## 2020-01-22 RX ADMIN — MORPHINE SULFATE 2 MG: 4 INJECTION INTRAVENOUS at 22:46

## 2020-01-22 RX ADMIN — METOPROLOL TARTRATE 50 MG: 50 TABLET, FILM COATED ORAL at 05:08

## 2020-01-22 RX ADMIN — SODIUM CHLORIDE: 0.9 INJECTION, SOLUTION INTRAVENOUS at 11:29

## 2020-01-22 RX ADMIN — PHENYLEPHRINE HYDROCHLORIDE 150 MCG: 10 INJECTION INTRAVENOUS at 12:58

## 2020-01-22 RX ADMIN — FENTANYL CITRATE 250 MCG: 50 INJECTION, SOLUTION INTRAMUSCULAR; INTRAVENOUS at 08:15

## 2020-01-22 RX ADMIN — VECURONIUM BROMIDE 4 MG: 1 INJECTION, POWDER, LYOPHILIZED, FOR SOLUTION INTRAVENOUS at 08:16

## 2020-01-22 RX ADMIN — MORPHINE SULFATE 2 MG: 4 INJECTION INTRAVENOUS at 19:50

## 2020-01-22 RX ADMIN — FENTANYL CITRATE 250 MCG: 50 INJECTION, SOLUTION INTRAMUSCULAR; INTRAVENOUS at 10:38

## 2020-01-22 RX ADMIN — ALBUMIN HUMAN: 0.05 INJECTION, SOLUTION INTRAVENOUS at 13:24

## 2020-01-22 RX ADMIN — KETAMINE HYDROCHLORIDE 25 MG: 10 INJECTION INTRAMUSCULAR; INTRAVENOUS at 06:49

## 2020-01-22 RX ADMIN — AMINOCAPROIC ACID 10 G: 250 INJECTION, SOLUTION INTRAVENOUS at 08:12

## 2020-01-22 RX ADMIN — SODIUM BICARBONATE 50 MEQ: 84 INJECTION, SOLUTION INTRAVENOUS at 22:12

## 2020-01-22 RX ADMIN — MAGNESIUM SULFATE HEPTAHYDRATE 1 G: 1 INJECTION, SOLUTION INTRAVENOUS at 17:47

## 2020-01-22 RX ADMIN — SODIUM CHLORIDE 4 UNITS/HR: 900 INJECTION INTRAVENOUS at 07:50

## 2020-01-22 RX ADMIN — HYDROCODONE BITARTRATE AND ACETAMINOPHEN 1 TABLET: 5; 325 TABLET ORAL at 20:48

## 2020-01-22 RX ADMIN — SODIUM CHLORIDE: 0.9 INJECTION, SOLUTION INTRAVENOUS at 06:41

## 2020-01-22 RX ADMIN — AMPICILLIN SODIUM 2 G: 2 INJECTION, POWDER, FOR SOLUTION INTRAMUSCULAR; INTRAVENOUS at 05:08

## 2020-01-22 RX ADMIN — VANCOMYCIN HYDROCHLORIDE 1.25 G: 1 INJECTION, POWDER, LYOPHILIZED, FOR SOLUTION INTRAVENOUS at 07:47

## 2020-01-22 RX ADMIN — MUPIROCIN 1 APPLICATION: 20 OINTMENT TOPICAL at 05:14

## 2020-01-22 RX ADMIN — ALBUMIN HUMAN: 0.05 INJECTION, SOLUTION INTRAVENOUS at 13:05

## 2020-01-22 RX ADMIN — VANCOMYCIN HYDROCHLORIDE 1250 MG: 10 INJECTION, POWDER, LYOPHILIZED, FOR SOLUTION INTRAVENOUS at 21:11

## 2020-01-22 RX ADMIN — VASOPRESSIN 4 UNITS: 20 INJECTION INTRAVENOUS at 08:25

## 2020-01-22 RX ADMIN — PHENYLEPHRINE HYDROCHLORIDE 150 MCG: 10 INJECTION INTRAVENOUS at 07:27

## 2020-01-22 RX ADMIN — ACETAMINOPHEN 650 MG: 325 TABLET, FILM COATED ORAL at 04:12

## 2020-01-22 RX ADMIN — MAGNESIUM SULFATE HEPTAHYDRATE 2 G: 500 INJECTION, SOLUTION INTRAMUSCULAR; INTRAVENOUS at 10:37

## 2020-01-22 RX ADMIN — CHLORHEXIDINE GLUCONATE 0.12% ORAL RINSE 15 ML: 1.2 LIQUID ORAL at 05:13

## 2020-01-22 RX ADMIN — VASOPRESSIN 2 UNITS: 20 INJECTION INTRAVENOUS at 07:37

## 2020-01-22 RX ADMIN — VASOPRESSIN 2 UNITS: 20 INJECTION INTRAVENOUS at 07:55

## 2020-01-22 RX ADMIN — AMPICILLIN SODIUM 2 G: 2 INJECTION, POWDER, FOR SOLUTION INTRAMUSCULAR; INTRAVENOUS at 17:47

## 2020-01-22 RX ADMIN — VECURONIUM BROMIDE 6 MG: 1 INJECTION, POWDER, LYOPHILIZED, FOR SOLUTION INTRAVENOUS at 09:08

## 2020-01-22 RX ADMIN — SODIUM CHLORIDE: 0.9 INJECTION, SOLUTION INTRAVENOUS at 12:57

## 2020-01-22 RX ADMIN — GENTAMICIN SULFATE 90 MG: 40 INJECTION, SOLUTION INTRAMUSCULAR; INTRAVENOUS at 16:06

## 2020-01-22 RX ADMIN — ASPIRIN 325 MG ORAL TABLET 325 MG: 325 PILL ORAL at 20:29

## 2020-01-22 RX ADMIN — MORPHINE SULFATE 2 MG: 4 INJECTION INTRAVENOUS at 16:21

## 2020-01-22 RX ADMIN — VECURONIUM BROMIDE 4 MG: 1 INJECTION, POWDER, LYOPHILIZED, FOR SOLUTION INTRAVENOUS at 10:34

## 2020-01-22 RX ADMIN — FENTANYL CITRATE 250 MCG: 50 INJECTION, SOLUTION INTRAMUSCULAR; INTRAVENOUS at 08:04

## 2020-01-22 RX ADMIN — HYDRALAZINE HYDROCHLORIDE 20 MG: 20 INJECTION INTRAMUSCULAR; INTRAVENOUS at 01:11

## 2020-01-22 RX ADMIN — CHLORHEXIDINE GLUCONATE 1 APPLICATION: 500 CLOTH TOPICAL at 05:14

## 2020-01-22 RX ADMIN — CEFAZOLIN SODIUM 2 G: 1 INJECTION, POWDER, FOR SOLUTION INTRAMUSCULAR; INTRAVENOUS at 07:43

## 2020-01-22 RX ADMIN — ALBUMIN HUMAN 12.5 G: 0.05 INJECTION, SOLUTION INTRAVENOUS at 20:40

## 2020-01-22 RX ADMIN — MIDAZOLAM 4 MG: 1 INJECTION INTRAMUSCULAR; INTRAVENOUS at 07:20

## 2020-01-22 RX ADMIN — MUPIROCIN 1 APPLICATION: 20 OINTMENT TOPICAL at 20:29

## 2020-01-22 RX ADMIN — PROTAMINE SULFATE 400 MG: 10 INJECTION, SOLUTION INTRAVENOUS at 11:05

## 2020-01-22 RX ADMIN — FENTANYL CITRATE 250 MCG: 50 INJECTION, SOLUTION INTRAMUSCULAR; INTRAVENOUS at 07:20

## 2020-01-22 RX ADMIN — KETAMINE HYDROCHLORIDE 25 MG: 10 INJECTION INTRAMUSCULAR; INTRAVENOUS at 07:20

## 2020-01-22 RX ADMIN — GENTAMICIN SULFATE 90 MG: 40 INJECTION, SOLUTION INTRAMUSCULAR; INTRAVENOUS at 03:40

## 2020-01-22 RX ADMIN — AMINOCAPROIC ACID 10 G: 250 INJECTION, SOLUTION INTRAVENOUS at 11:04

## 2020-01-22 RX ADMIN — AMPICILLIN SODIUM 2 G: 2 INJECTION, POWDER, FOR SOLUTION INTRAMUSCULAR; INTRAVENOUS at 01:11

## 2020-01-22 RX ADMIN — EPINEPHRINE 0.02 MCG/KG/MIN: 1 INJECTION INTRAMUSCULAR; INTRAVENOUS; SUBCUTANEOUS at 10:44

## 2020-01-22 RX ADMIN — VECURONIUM BROMIDE 6 MG: 1 INJECTION, POWDER, LYOPHILIZED, FOR SOLUTION INTRAVENOUS at 13:38

## 2020-01-22 RX ADMIN — ONDANSETRON 4 MG: 2 INJECTION INTRAMUSCULAR; INTRAVENOUS at 12:18

## 2020-01-22 RX ADMIN — VASOPRESSIN 0.04 UNITS/MIN: 20 INJECTION INTRAVENOUS at 19:14

## 2020-01-22 RX ADMIN — SODIUM CHLORIDE 0.5 MCG/HR: 900 INJECTION INTRAVENOUS at 06:50

## 2020-01-22 RX ADMIN — PANTOPRAZOLE SODIUM 40 MG: 40 INJECTION, POWDER, FOR SOLUTION INTRAVENOUS at 20:29

## 2020-01-22 RX ADMIN — AMPICILLIN SODIUM 2 G: 2 INJECTION, POWDER, FOR SOLUTION INTRAMUSCULAR; INTRAVENOUS at 21:11

## 2020-01-22 RX ADMIN — ALBUMIN HUMAN 250 ML: 0.05 INJECTION, SOLUTION INTRAVENOUS at 16:20

## 2020-01-22 RX ADMIN — MIDAZOLAM 4 MG: 1 INJECTION INTRAMUSCULAR; INTRAVENOUS at 06:49

## 2020-01-22 RX ADMIN — NOREPINEPHRINE BITARTRATE 0.05 MCG/KG/MIN: 8 SOLUTION at 07:45

## 2020-01-22 RX ADMIN — VECURONIUM BROMIDE 10 MG: 1 INJECTION, POWDER, LYOPHILIZED, FOR SOLUTION INTRAVENOUS at 07:20

## 2020-01-22 RX ADMIN — ALBUMIN HUMAN 500 ML: 0.05 INJECTION, SOLUTION INTRAVENOUS at 17:48

## 2020-01-22 NOTE — ANESTHESIA PROCEDURE NOTES
Central Line      Patient reassessed immediately prior to procedure    Staff  Anesthesiologist: Adolfo Valdes MD  Preanesthetic Checklist  Completed: patient identified, site marked, surgical consent, pre-op evaluation, timeout performed, IV checked, risks and benefits discussed and monitors and equipment checked  Central Line Prep  Sterile Tech:gloves, cap, gown, mask and sterile barriers  Prep: chloraprep  Patient monitoring: blood pressure monitoring, continuous pulse oximetry and EKG  Central Line Procedure  Laterality:right  Location:internal jugular  Catheter Type:Cordis  Catheter Size:9 Fr  Guidance:landmark technique and palpation technique  Assessment  Post procedure:biopatch applied, line sutured and occlusive dressing applied  Assessement:blood return through all ports, free fluid flow, chest x-ray ordered and Hamlet Test  Complications:no  Patient Tolerance:patient tolerated the procedure well with no apparent complications  Additional Notes  Sterile prep, drape, gown and gloves.  Negative hamlet negative carotid, no difficulties.  Tolerated well.     Placed pre induction

## 2020-01-22 NOTE — ANESTHESIA PROCEDURE NOTES
Procedure Performed: Emergent/Open-Heart Anesthesia AARON     Start Time:        End Time:      Preanesthesia Checklist:  Patient identified, IV assessed, risks and benefits discussed, monitors and equipment assessed, procedure being performed at surgeon's request and anesthesia consent obtained.    General Procedure Information  Diagnostic Indications for Echo:  assessment of surgical repair and hemodynamic monitoring  Location performed:  OR  Intubated  Bite block placed  Heart visualized  Probe Insertion:  Easy  Probe Type:  Biplane and multiplane  Modalities:  Color flow mapping, pulse wave Doppler and continuous wave Doppler    Echocardiographic and Doppler Measurements    Ventricles    Right Ventricle:  Cavity size normal.  Hypertrophy not present.  Thrombus not present.  Global function mildly impaired.  Ejection Fraction 45%.    Left Ventricle:  Cavity size normal.  Hypertrophy present.  Thrombus not present.  Global Function normal.  Ejection Fraction 60%.      Ventricular Regional Function:  1- Basal Anteroseptal:  normal  2- Basal Anterior:  normal  3- Basal Anterolateral:  normal  4- Basal Inferolateral:  normal  5- Basal Inferior:  normal  6- Basal Inferoseptal:  normal  7- Mid Anteroseptal:  normal  8- Mid Anterior:  normal  9- Mid Anterolateral:  normal  10- Mid Inferolateral:  normal  11- Mid Inferior:  normal  12- Mid Inferoseptal:  normal  13- Apical Anterior:  normal  14- Apical Lateral:  normal  15- Apical Inferior:  normal  16- Apical Septal:  normal  17- [place]:  normal      Valves    Aortic Valve:  Annulus normal.  Stenosis not present.  Regurgitation trace.  Leaflets normal.  Leaflet motions normal.      Mitral Valve:  Annulus normal.  Stenosis not present.  Regurgitation mild.  Leaflets vegetative.  Leaflet motions normal.      Tricuspid Valve:  Annulus normal.  Stenosis not present.  Regurgitation absent.  Leaflets normal.  Leaflet motions normal.    Pulmonic Valve:  Annulus normal.  Stenosis  not present.  Regurgitation absent.          Aorta    Ascending Aorta:  Size normal.  Dissection not present.  Plaque thickness less than 3 mm.  Mobile plaque not present.    Aortic Arch:  Size normal.  Dissection not present.  Plaque thickness less than 3 mm.  Mobile plaque not present.    Descending Aorta:  Size normal.  Dissection not present.  Plaque thickness less than 3 mm.  Mobile plaque not present.          Atria    Right Atrium:  Size normal.    Left Atrium:  Size normal.  Spontaneous echo contrast not present.  Thrombus not present.  Tumor not present.  Device not present.    Left atrial appendage normal.      Septa    Atrial Septum:  Intra-atrial septal morphology normal.      Ventricular Septum:  Intra-ventricular septum morphology normal.          Other Findings  Pleural Effusion:  none  Pulmonary Arteries:  normal      Anesthesia Information  Performed Personally  Anesthesiologist:  Adolfo Valdes MD      Echocardiogram Comments:       AARON placed atraumatically       Mildly red RVSF , NL LVSF   Trace MR with anterior leaflet veg that fluxes into the LVOT and at times through the AV on systole  AV no apparent veg trace AI     Post cpb   MVR bioproth no MR no paravalvular seated well  Improved RVSF with inotropes no other change

## 2020-01-22 NOTE — ANESTHESIA PROCEDURE NOTES
Central Line      Patient reassessed immediately prior to procedure    Staff  Anesthesiologist: Adolfo Valdes MD  Preanesthetic Checklist  Completed: patient identified, site marked, surgical consent, pre-op evaluation, timeout performed, IV checked, risks and benefits discussed and monitors and equipment checked  Central Line Prep  Sterile Tech:gloves, cap, gown, mask and sterile barriers  Prep: chloraprep  Patient monitoring: blood pressure monitoring, continuous pulse oximetry and EKG  Central Line Procedure  Laterality:right  Location:internal jugular  Catheter Type:[place]-Víctor  Catheter Size:9 Fr  Guidance:landmark technique and palpation technique  Assessment  Post procedure:biopatch applied, line sutured and occlusive dressing applied  Assessement:blood return through all ports, free fluid flow, chest x-ray ordered and Hamlet Test  Complications:no  Patient Tolerance:patient tolerated the procedure well with no apparent complications  Additional Notes  Sterile prep, drape, gown and gloves.  Negative hamlet negative carotid, no difficulties.  Tolerated well.

## 2020-01-22 NOTE — ANESTHESIA PROCEDURE NOTES
Arterial Line      Patient reassessed immediately prior to procedure    Patient location during procedure: OR   Line placed for hemodynamic monitoring and ABGs/Labs/ISTAT.  Performed By   Anesthesiologist: Adolfo Valdes MD  Preanesthetic Checklist  Completed: patient identified, site marked, surgical consent, pre-op evaluation, timeout performed, IV checked, risks and benefits discussed and monitors and equipment checked  Arterial Line Prep   Sterile Tech: cap, gloves and mask  Prep: ChloraPrep  Patient monitoring: blood pressure monitoring, continuous pulse oximetry and EKG  Arterial Line Procedure   Laterality:left  Location:  radial artery  Catheter size: 20 G   Guidance: landmark technique and palpation technique  Successful placement: yes  Post Assessment   Dressing Type: occlusive dressing applied, secured with tape and wrist guard applied.   Complications no  Circ/Move/Sens Assessment: unchanged.   Patient Tolerance: patient tolerated the procedure well with no apparent complications  Additional Notes  Pre-procedure:  Patient identified; pre-procedure vital signs, all relevant labs/studies, complete medical/surgical/anesthetic history, full medication list, full allergy list, and NPO status obtained/reviewed; physical assessment performed; anesthetic options, side effects, potential complications, risks, and benefits discussed; questions answered; written anesthesia procedure consent obtained; patient cleared for procedure; IV access in situ    Procedure:  Arterial line placed under anesthesia time, but before induction of general anesthesia; ASA monitor placed; patient positioned; hand hygiene performed; sterile technique maintained throughout the procedure; sterile prep and drape applied; insertion site determined by anatomical landmarks and palpation; skin and subcutaneous tissues numbed by injection of 1% lidocaine; needle placed into the skin and advanced into the target artery with correct placement  confirmed by return of arterial blood; wire slide into the target artery; arterial catheter threaded into the target artery over the needle/wire; needle/wire removed; correct catheter placement confirmed by transducing systemic arterial blood pressure; catheter secured with occlusive dressing and tape    Post-procedure:  Arterial catheter placed successfully; no apparent complications; minimal estimated blood loss; vital signs stable throughout; general anesthesia induced      Placed pre induction

## 2020-01-22 NOTE — ANESTHESIA PROCEDURE NOTES
Airway  Urgency: elective    Date/Time: 1/22/2020 7:26 AM  Airway not difficult    General Information and Staff    Patient location during procedure: OR  Anesthesiologist: Adolfo Valdes MD    Indications and Patient Condition  Indications for airway management: airway protection    Preoxygenated: yes  MILS not maintained throughout  Mask difficulty assessment: 1 - vent by mask    Final Airway Details  Final airway type: endotracheal airway      Successful airway: ETT  Cuffed: yes   Successful intubation technique: direct laryngoscopy  Endotracheal tube insertion site: oral  Blade: Ru  Blade size: 4  ETT size (mm): 8.0  Cormack-Lehane Classification: grade I - full view of glottis  Placement verified by: capnometry and palpation of cuff   Measured from: lips  ETT/EBT  to lips (cm): 22  Number of attempts at approach: 1  Assessment: lips, teeth, and gum same as pre-op and atraumatic intubation    Additional Comments  ASA monitors applied; preoxygenated with 100% FiO2 via anesthesia face mask; induction of general anesthesia; bag-mask ventilation; patient's position optimized; laryngoscopy; cuffed ETT placed; cuff inflated to seal; atraumatic/dentition in preoperative condition; ETT secured in place; correct placement confirmed by bilateral chest rise, tube condensation, and return of EtCO2 > 30 mmHg x3

## 2020-01-22 NOTE — ANESTHESIA POSTPROCEDURE EVALUATION
Patient: Jamin Hennessy    Procedure Summary     Date:  01/22/20 Room / Location:  [facility] / [facility]    Anesthesia Start:  0641 Anesthesia Stop:  1435    Procedure:  MITRAL VALVE REPAIR/REPLACEMENT (N/A Chest) Diagnosis:       Coronary artery disease due to lipid rich plaque      Acute bacterial endocarditis      (Coronary artery disease due to lipid rich plaque [I25.10, I25.83])      (Acute bacterial endocarditis [I33.0])    Surgeon:  Fallon Cole MD Provider:  Adolfo Valdes MD    Anesthesia Type:  general ASA Status:  4          Anesthesia Type: general    Vitals  Vitals Value Taken Time   BP     Temp     Pulse 90 1/22/2020  2:47 PM   Resp     SpO2     Vitals shown include unvalidated device data.        Post Anesthesia Care and Evaluation    Patient location during evaluation: ICU  Patient participation: complete - patient cannot participate  Level of consciousness: obtunded/minimal responses  Pain scale: See nurse's notes for pain score.  Pain management: adequate  Airway patency: patent  Anesthetic complications: No anesthetic complications  PONV Status: none  Cardiovascular status: acceptable  Respiratory status: acceptable  Hydration status: acceptable    Comments: Patient seen and examined postoperatively; vital signs stable; SpO2 greater than or equal to 90%; cardiopulmonary status stable; nausea/vomiting adequately controlled; pain adequately controlled; no apparent anesthesia complications; patient discharged from anesthesia care when discharge criteria were met

## 2020-01-22 NOTE — ANESTHESIA PREPROCEDURE EVALUATION
Anesthesia Evaluation     Patient summary reviewed and Nursing notes reviewed   NPO Solid Status: > 8 hours  NPO Liquid Status: > 8 hours           Airway   Mallampati: II  TM distance: >3 FB  Neck ROM: full  No difficulty expected  Dental - normal exam   (+) edentulous and poor dentition        Pulmonary - normal exam   (+) a smoker,   Cardiovascular - normal exam    ECG reviewed  Patient on routine beta blocker and Beta blocker given within 24 hours of surgery    (+) hypertension, past MI , CAD, cardiac stents dysrhythmias Atrial Fib, hyperlipidemia,       Neuro/Psych  (+) psychiatric history Anxiety,     GI/Hepatic/Renal/Endo    (+) obesity, morbid obesity, GERD,  diabetes mellitus,     Musculoskeletal     Abdominal   (+) obese,     Bowel sounds: normal.   Substance History      OB/GYN          Other            Phys Exam Other: Missing  Teeth extremely poor dentiton with caries, no loose per pt               Anesthesia Plan    ASA 4     general     intravenous induction   Postoperative Plan: Expected vent after surgery  Anesthetic plan, all risks, benefits, and alternatives have been provided, discussed and informed consent has been obtained with: patient.  Use of blood products discussed with patient  Consented to blood products.

## 2020-01-23 ENCOUNTER — APPOINTMENT (OUTPATIENT)
Dept: GENERAL RADIOLOGY | Facility: HOSPITAL | Age: 65
End: 2020-01-23

## 2020-01-23 ENCOUNTER — APPOINTMENT (OUTPATIENT)
Dept: CT IMAGING | Facility: HOSPITAL | Age: 65
End: 2020-01-23

## 2020-01-23 LAB
ABO + RH BLD: NORMAL
ABO + RH BLD: NORMAL
ALBUMIN SERPL-MCNC: 4.1 G/DL (ref 3.5–5.2)
ALBUMIN/GLOB SERPL: 2.1 G/DL
ALP SERPL-CCNC: 29 U/L (ref 39–117)
ALT SERPL W P-5'-P-CCNC: 15 U/L (ref 1–41)
ANION GAP SERPL CALCULATED.3IONS-SCNC: 15 MMOL/L (ref 5–15)
ARTERIAL PATENCY WRIST A: ABNORMAL
AST SERPL-CCNC: 54 U/L (ref 1–40)
ATMOSPHERIC PRESS: ABNORMAL MM[HG]
BACTERIA UR QL AUTO: ABNORMAL /HPF
BASE EXCESS BLDA CALC-SCNC: -2.2 MMOL/L (ref 0–3)
BASE EXCESS BLDA CALC-SCNC: 0 MMOL/L (ref 0–3)
BASE EXCESS BLDA CALC-SCNC: 0.1 MMOL/L (ref 0–3)
BASE EXCESS BLDA CALC-SCNC: 2.3 MMOL/L (ref 0–3)
BASE EXCESS BLDA CALC-SCNC: 2.9 MMOL/L (ref 0–3)
BASOPHILS # BLD AUTO: 0 10*3/MM3 (ref 0–0.2)
BASOPHILS NFR BLD AUTO: 0.3 % (ref 0–1.5)
BDY SITE: ABNORMAL
BH BB BLOOD EXPIRATION DATE: NORMAL
BH BB BLOOD EXPIRATION DATE: NORMAL
BH BB BLOOD TYPE BARCODE: 9500
BH BB BLOOD TYPE BARCODE: 9500
BH BB DISPENSE STATUS: NORMAL
BH BB DISPENSE STATUS: NORMAL
BH BB PRODUCT CODE: NORMAL
BH BB PRODUCT CODE: NORMAL
BH BB UNIT NUMBER: NORMAL
BH BB UNIT NUMBER: NORMAL
BILIRUB SERPL-MCNC: 0.3 MG/DL (ref 0.2–1.2)
BILIRUB UR QL STRIP: NEGATIVE
BUN BLD-MCNC: 25 MG/DL (ref 8–23)
BUN/CREAT SERPL: 15.7 (ref 7–25)
CA-I BLDA-SCNC: 0.88 MMOL/L (ref 1.15–1.33)
CA-I SERPL ISE-MCNC: 1.17 MMOL/L (ref 1.2–1.3)
CALCIUM SPEC-SCNC: 8.4 MG/DL (ref 8.6–10.5)
CHLORIDE SERPL-SCNC: 109 MMOL/L (ref 98–107)
CLARITY UR: CLEAR
CO2 BLDA-SCNC: 24.1 MMOL/L (ref 22–29)
CO2 BLDA-SCNC: 24.5 MMOL/L (ref 22–29)
CO2 BLDA-SCNC: 24.7 MMOL/L (ref 22–29)
CO2 BLDA-SCNC: 25.7 MMOL/L (ref 22–29)
CO2 BLDA-SCNC: 27.3 MMOL/L (ref 22–29)
CO2 SERPL-SCNC: 20 MMOL/L (ref 22–29)
COLOR UR: YELLOW
CREAT BLD-MCNC: 1.59 MG/DL (ref 0.76–1.27)
DEPRECATED RDW RBC AUTO: 45.5 FL (ref 37–54)
EOSINOPHIL # BLD AUTO: 0 10*3/MM3 (ref 0–0.4)
EOSINOPHIL NFR BLD AUTO: 0 % (ref 0.3–6.2)
ERYTHROCYTE [DISTWIDTH] IN BLOOD BY AUTOMATED COUNT: 15.5 % (ref 12.3–15.4)
GENTAMICIN SERPL-MCNC: 1.48 MCG/ML (ref 0.5–2)
GENTAMICIN SERPL-MCNC: 1.98 MCG/ML (ref 0.5–10)
GFR SERPL CREATININE-BSD FRML MDRD: 44 ML/MIN/1.73
GLOBULIN UR ELPH-MCNC: 2 GM/DL
GLUCOSE BLD-MCNC: 143 MG/DL (ref 65–99)
GLUCOSE BLDC GLUCOMTR-MCNC: 103 MG/DL (ref 70–105)
GLUCOSE BLDC GLUCOMTR-MCNC: 104 MG/DL (ref 70–105)
GLUCOSE BLDC GLUCOMTR-MCNC: 111 MG/DL (ref 70–105)
GLUCOSE BLDC GLUCOMTR-MCNC: 121 MG/DL (ref 70–105)
GLUCOSE BLDC GLUCOMTR-MCNC: 121 MG/DL (ref 70–105)
GLUCOSE BLDC GLUCOMTR-MCNC: 129 MG/DL (ref 70–105)
GLUCOSE BLDC GLUCOMTR-MCNC: 130 MG/DL (ref 70–105)
GLUCOSE BLDC GLUCOMTR-MCNC: 137 MG/DL (ref 70–105)
GLUCOSE BLDC GLUCOMTR-MCNC: 140 MG/DL (ref 70–105)
GLUCOSE BLDC GLUCOMTR-MCNC: 146 MG/DL (ref 70–105)
GLUCOSE BLDC GLUCOMTR-MCNC: 157 MG/DL (ref 74–100)
GLUCOSE UR STRIP-MCNC: NEGATIVE MG/DL
HCO3 BLDA-SCNC: 22.9 MMOL/L (ref 21–28)
HCO3 BLDA-SCNC: 23.5 MMOL/L (ref 21–28)
HCO3 BLDA-SCNC: 23.7 MMOL/L (ref 21–28)
HCO3 BLDA-SCNC: 24.8 MMOL/L (ref 21–28)
HCO3 BLDA-SCNC: 26.2 MMOL/L (ref 21–28)
HCT VFR BLD AUTO: 24.5 % (ref 37.5–51)
HCT VFR BLDA CALC: 25 % (ref 38–51)
HEMODILUTION: NO
HGB BLD-MCNC: 8.3 G/DL (ref 13–17.7)
HGB BLDA-MCNC: 8.6 G/DL (ref 12–17)
HGB UR QL STRIP.AUTO: ABNORMAL
HOROWITZ INDEX BLD+IHG-RTO: 100 %
HOROWITZ INDEX BLD+IHG-RTO: 28 %
HOROWITZ INDEX BLD+IHG-RTO: 80 %
HOROWITZ INDEX BLD+IHG-RTO: 80 %
HOROWITZ INDEX BLD+IHG-RTO: <21 %
HYALINE CASTS UR QL AUTO: ABNORMAL /LPF
INR PPP: 1.08 (ref 0.9–1.1)
KETONES UR QL STRIP: NEGATIVE
LAB AP CASE REPORT: NORMAL
LAB AP DIAGNOSIS COMMENT: NORMAL
LEUKOCYTE ESTERASE UR QL STRIP.AUTO: NEGATIVE
LYMPHOCYTES # BLD AUTO: 1 10*3/MM3 (ref 0.7–3.1)
LYMPHOCYTES NFR BLD AUTO: 6.8 % (ref 19.6–45.3)
MAGNESIUM SERPL-MCNC: 2.8 MG/DL (ref 1.6–2.4)
MCH RBC QN AUTO: 27.8 PG (ref 26.6–33)
MCHC RBC AUTO-ENTMCNC: 33.8 G/DL (ref 31.5–35.7)
MCV RBC AUTO: 82.3 FL (ref 79–97)
MODALITY: ABNORMAL
MONOCYTES # BLD AUTO: 2.2 10*3/MM3 (ref 0.1–0.9)
MONOCYTES NFR BLD AUTO: 15.8 % (ref 5–12)
NEUTROPHILS # BLD AUTO: 10.8 10*3/MM3 (ref 1.7–7)
NEUTROPHILS NFR BLD AUTO: 77.1 % (ref 42.7–76)
NITRITE UR QL STRIP: NEGATIVE
NRBC BLD AUTO-RTO: 0 /100 WBC (ref 0–0.2)
PATH REPORT.FINAL DX SPEC: NORMAL
PATH REPORT.GROSS SPEC: NORMAL
PCO2 BLDA: 29.6 MM HG (ref 35–48)
PCO2 BLDA: 32.2 MM HG (ref 35–48)
PCO2 BLDA: 32.7 MM HG (ref 35–48)
PCO2 BLDA: 34 MM HG (ref 35–48)
PCO2 BLDA: 39.6 MM HG (ref 35–48)
PH BLDA: 7.37 PH UNITS (ref 7.35–7.45)
PH BLDA: 7.47 PH UNITS (ref 7.35–7.45)
PH BLDA: 7.47 PH UNITS (ref 7.35–7.45)
PH BLDA: 7.5 PH UNITS (ref 7.35–7.45)
PH BLDA: 7.53 PH UNITS (ref 7.35–7.45)
PH UR STRIP.AUTO: 6.5 [PH] (ref 5–8)
PLATELET # BLD AUTO: 211 10*3/MM3 (ref 140–450)
PMV BLD AUTO: 8 FL (ref 6–12)
PO2 BLDA: 48.9 MM HG (ref 83–108)
PO2 BLDA: 81.3 MM HG (ref 83–108)
PO2 BLDA: 82.6 MM HG (ref 83–108)
PO2 BLDA: 83.1 MM HG (ref 83–108)
PO2 BLDA: 99.7 MM HG (ref 83–108)
POTASSIUM BLD-SCNC: 3.8 MMOL/L (ref 3.5–5.2)
POTASSIUM BLDA-SCNC: 3.9 MMOL/L (ref 3.5–4.5)
PROT SERPL-MCNC: 6.1 G/DL (ref 6–8.5)
PROT UR QL STRIP: ABNORMAL
PROTHROMBIN TIME: 11.1 SECONDS (ref 9.6–11.7)
RBC # BLD AUTO: 2.97 10*6/MM3 (ref 4.14–5.8)
RBC # UR: ABNORMAL /HPF
REF LAB TEST METHOD: ABNORMAL
SAO2 % BLDCOA: 87.1 % (ref 94–98)
SAO2 % BLDCOA: 95.6 % (ref 94–98)
SAO2 % BLDCOA: 96.9 % (ref 94–98)
SAO2 % BLDCOA: 97.4 % (ref 94–98)
SAO2 % BLDCOA: 98.3 % (ref 94–98)
SODIUM BLD-SCNC: 144 MMOL/L (ref 136–145)
SODIUM BLDA-SCNC: 142 MMOL/L (ref 138–146)
SP GR UR STRIP: 1.01 (ref 1–1.03)
SQUAMOUS #/AREA URNS HPF: ABNORMAL /HPF
UNIT  ABO: NORMAL
UNIT  ABO: NORMAL
UNIT  RH: NORMAL
UNIT  RH: NORMAL
UROBILINOGEN UR QL STRIP: ABNORMAL
WBC NRBC COR # BLD: 14 10*3/MM3 (ref 3.4–10.8)
WBC UR QL AUTO: ABNORMAL /HPF

## 2020-01-23 PROCEDURE — 25010000002 EPINEPHRINE 1 MG/ML SOLUTION 5 ML SYRINGE: Performed by: NURSE PRACTITIONER

## 2020-01-23 PROCEDURE — 85025 COMPLETE CBC W/AUTO DIFF WBC: CPT | Performed by: NURSE PRACTITIONER

## 2020-01-23 PROCEDURE — 99232 SBSQ HOSP IP/OBS MODERATE 35: CPT | Performed by: INTERNAL MEDICINE

## 2020-01-23 PROCEDURE — 99024 POSTOP FOLLOW-UP VISIT: CPT | Performed by: NURSE PRACTITIONER

## 2020-01-23 PROCEDURE — 82803 BLOOD GASES ANY COMBINATION: CPT

## 2020-01-23 PROCEDURE — 81001 URINALYSIS AUTO W/SCOPE: CPT | Performed by: NURSE PRACTITIONER

## 2020-01-23 PROCEDURE — 25010000002 MAGNESIUM SULFATE IN D5W 1G/100ML (PREMIX) 1-5 GM/100ML-% SOLUTION: Performed by: NURSE PRACTITIONER

## 2020-01-23 PROCEDURE — 82962 GLUCOSE BLOOD TEST: CPT

## 2020-01-23 PROCEDURE — 97110 THERAPEUTIC EXERCISES: CPT

## 2020-01-23 PROCEDURE — 71045 X-RAY EXAM CHEST 1 VIEW: CPT

## 2020-01-23 PROCEDURE — 25010000002 MORPHINE PER 10 MG: Performed by: NURSE PRACTITIONER

## 2020-01-23 PROCEDURE — 93005 ELECTROCARDIOGRAM TRACING: CPT | Performed by: THORACIC SURGERY (CARDIOTHORACIC VASCULAR SURGERY)

## 2020-01-23 PROCEDURE — 80053 COMPREHEN METABOLIC PANEL: CPT | Performed by: INTERNAL MEDICINE

## 2020-01-23 PROCEDURE — 25010000002 VANCOMYCIN 10 G RECONSTITUTED SOLUTION: Performed by: NURSE PRACTITIONER

## 2020-01-23 PROCEDURE — 25010000002 GENTAMICIN PER 80 MG: Performed by: NURSE PRACTITIONER

## 2020-01-23 PROCEDURE — 92610 EVALUATE SWALLOWING FUNCTION: CPT

## 2020-01-23 PROCEDURE — 25010000002 FUROSEMIDE PER 20 MG: Performed by: NURSE PRACTITIONER

## 2020-01-23 PROCEDURE — 82330 ASSAY OF CALCIUM: CPT | Performed by: NURSE PRACTITIONER

## 2020-01-23 PROCEDURE — 93005 ELECTROCARDIOGRAM TRACING: CPT | Performed by: NURSE PRACTITIONER

## 2020-01-23 PROCEDURE — 83735 ASSAY OF MAGNESIUM: CPT | Performed by: NURSE PRACTITIONER

## 2020-01-23 PROCEDURE — 80170 ASSAY OF GENTAMICIN: CPT | Performed by: INTERNAL MEDICINE

## 2020-01-23 PROCEDURE — 25010000002 GENTAMICIN PER 80 MG: Performed by: INTERNAL MEDICINE

## 2020-01-23 PROCEDURE — 25010000002 CALCIUM GLUCONATE-NACL 1-0.675 GM/50ML-% SOLUTION: Performed by: INTERNAL MEDICINE

## 2020-01-23 PROCEDURE — 70450 CT HEAD/BRAIN W/O DYE: CPT

## 2020-01-23 PROCEDURE — 85610 PROTHROMBIN TIME: CPT | Performed by: NURSE PRACTITIONER

## 2020-01-23 PROCEDURE — 25010000002 HYDRALAZINE PER 20 MG

## 2020-01-23 PROCEDURE — 94799 UNLISTED PULMONARY SVC/PX: CPT

## 2020-01-23 PROCEDURE — 87040 BLOOD CULTURE FOR BACTERIA: CPT | Performed by: NURSE PRACTITIONER

## 2020-01-23 PROCEDURE — 97164 PT RE-EVAL EST PLAN CARE: CPT

## 2020-01-23 PROCEDURE — 25010000002 AMPICILLIN PER 500 MG: Performed by: NURSE PRACTITIONER

## 2020-01-23 PROCEDURE — 25010000002 KETOROLAC TROMETHAMINE PER 15 MG: Performed by: THORACIC SURGERY (CARDIOTHORACIC VASCULAR SURGERY)

## 2020-01-23 RX ORDER — FUROSEMIDE 10 MG/ML
40 INJECTION INTRAMUSCULAR; INTRAVENOUS ONCE
Status: COMPLETED | OUTPATIENT
Start: 2020-01-23 | End: 2020-01-23

## 2020-01-23 RX ORDER — HYDRALAZINE HYDROCHLORIDE 20 MG/ML
10 INJECTION INTRAMUSCULAR; INTRAVENOUS EVERY 6 HOURS PRN
Status: DISCONTINUED | OUTPATIENT
Start: 2020-01-23 | End: 2020-01-26

## 2020-01-23 RX ORDER — CALCIUM GLUCONATE 20 MG/ML
1 INJECTION, SOLUTION INTRAVENOUS ONCE
Status: COMPLETED | OUTPATIENT
Start: 2020-01-23 | End: 2020-01-23

## 2020-01-23 RX ORDER — HYDRALAZINE HYDROCHLORIDE 25 MG/1
50 TABLET, FILM COATED ORAL EVERY 8 HOURS SCHEDULED
Status: DISCONTINUED | OUTPATIENT
Start: 2020-01-23 | End: 2020-01-26

## 2020-01-23 RX ORDER — TRAMADOL HYDROCHLORIDE 50 MG/1
50 TABLET ORAL EVERY 6 HOURS PRN
Status: DISCONTINUED | OUTPATIENT
Start: 2020-01-23 | End: 2020-01-23

## 2020-01-23 RX ORDER — IPRATROPIUM BROMIDE AND ALBUTEROL SULFATE 2.5; .5 MG/3ML; MG/3ML
3 SOLUTION RESPIRATORY (INHALATION)
Status: DISCONTINUED | OUTPATIENT
Start: 2020-01-23 | End: 2020-02-03 | Stop reason: HOSPADM

## 2020-01-23 RX ORDER — HYDRALAZINE HYDROCHLORIDE 20 MG/ML
INJECTION INTRAMUSCULAR; INTRAVENOUS
Status: COMPLETED
Start: 2020-01-23 | End: 2020-01-23

## 2020-01-23 RX ORDER — AMLODIPINE BESYLATE 5 MG/1
10 TABLET ORAL
Status: DISCONTINUED | OUTPATIENT
Start: 2020-01-23 | End: 2020-01-26

## 2020-01-23 RX ORDER — CYCLOBENZAPRINE HCL 10 MG
5 TABLET ORAL 2 TIMES DAILY PRN
Status: DISCONTINUED | OUTPATIENT
Start: 2020-01-23 | End: 2020-02-03 | Stop reason: HOSPADM

## 2020-01-23 RX ORDER — ACETYLCYSTEINE 200 MG/ML
3 SOLUTION ORAL; RESPIRATORY (INHALATION)
Status: DISCONTINUED | OUTPATIENT
Start: 2020-01-23 | End: 2020-02-03 | Stop reason: HOSPADM

## 2020-01-23 RX ORDER — CYCLOBENZAPRINE HCL 10 MG
10 TABLET ORAL 3 TIMES DAILY
Status: DISCONTINUED | OUTPATIENT
Start: 2020-01-23 | End: 2020-01-23

## 2020-01-23 RX ORDER — KETOROLAC TROMETHAMINE 15 MG/ML
15 INJECTION, SOLUTION INTRAMUSCULAR; INTRAVENOUS ONCE
Status: COMPLETED | OUTPATIENT
Start: 2020-01-23 | End: 2020-01-23

## 2020-01-23 RX ORDER — ATORVASTATIN CALCIUM 40 MG/1
40 TABLET, FILM COATED ORAL NIGHTLY
Status: DISCONTINUED | OUTPATIENT
Start: 2020-01-23 | End: 2020-01-26

## 2020-01-23 RX ORDER — HYDRALAZINE HYDROCHLORIDE 20 MG/ML
10 INJECTION INTRAMUSCULAR; INTRAVENOUS EVERY 6 HOURS PRN
Status: DISCONTINUED | OUTPATIENT
Start: 2020-01-23 | End: 2020-01-23

## 2020-01-23 RX ORDER — BUDESONIDE 0.5 MG/2ML
0.5 INHALANT ORAL
Status: DISCONTINUED | OUTPATIENT
Start: 2020-01-23 | End: 2020-01-27

## 2020-01-23 RX ADMIN — SODIUM CHLORIDE 12.5 MG/HR: 0.9 INJECTION, SOLUTION INTRAVENOUS at 21:07

## 2020-01-23 RX ADMIN — CALCIUM GLUCONATE 1 G: 20 INJECTION, SOLUTION INTRAVENOUS at 07:57

## 2020-01-23 RX ADMIN — POLYETHYLENE GLYCOL 3350 17 G: 17 POWDER, FOR SOLUTION ORAL at 21:17

## 2020-01-23 RX ADMIN — SENNOSIDES 1 TABLET: 8.6 TABLET, FILM COATED ORAL at 10:08

## 2020-01-23 RX ADMIN — Medication: at 15:00

## 2020-01-23 RX ADMIN — ACETAMINOPHEN 500 MG: 500 TABLET, FILM COATED ORAL at 10:08

## 2020-01-23 RX ADMIN — CYCLOBENZAPRINE HYDROCHLORIDE 10 MG: 10 TABLET, FILM COATED ORAL at 10:08

## 2020-01-23 RX ADMIN — ASPIRIN 81 MG: 81 TABLET, DELAYED RELEASE ORAL at 10:08

## 2020-01-23 RX ADMIN — KETOROLAC TROMETHAMINE 15 MG: 15 INJECTION, SOLUTION INTRAMUSCULAR; INTRAVENOUS at 23:19

## 2020-01-23 RX ADMIN — SODIUM CHLORIDE 5 MG/HR: 0.9 INJECTION, SOLUTION INTRAVENOUS at 14:53

## 2020-01-23 RX ADMIN — MUPIROCIN 1 APPLICATION: 20 OINTMENT TOPICAL at 21:08

## 2020-01-23 RX ADMIN — MAGNESIUM SULFATE HEPTAHYDRATE 1 G: 1 INJECTION, SOLUTION INTRAVENOUS at 01:09

## 2020-01-23 RX ADMIN — AMPICILLIN SODIUM 2 G: 2 INJECTION, POWDER, FOR SOLUTION INTRAMUSCULAR; INTRAVENOUS at 14:31

## 2020-01-23 RX ADMIN — ACETAMINOPHEN 650 MG: 650 SUPPOSITORY RECTAL at 04:17

## 2020-01-23 RX ADMIN — VANCOMYCIN HYDROCHLORIDE 1250 MG: 10 INJECTION, POWDER, LYOPHILIZED, FOR SOLUTION INTRAVENOUS at 10:06

## 2020-01-23 RX ADMIN — FUROSEMIDE 40 MG: 10 INJECTION, SOLUTION INTRAMUSCULAR; INTRAVENOUS at 18:13

## 2020-01-23 RX ADMIN — POLYETHYLENE GLYCOL 3350 17 G: 17 POWDER, FOR SOLUTION ORAL at 10:07

## 2020-01-23 RX ADMIN — SENNOSIDES 1 TABLET: 8.6 TABLET, FILM COATED ORAL at 21:17

## 2020-01-23 RX ADMIN — GENTAMICIN SULFATE 90 MG: 40 INJECTION, SOLUTION INTRAMUSCULAR; INTRAVENOUS at 21:17

## 2020-01-23 RX ADMIN — MORPHINE SULFATE 2 MG: 4 INJECTION INTRAVENOUS at 01:09

## 2020-01-23 RX ADMIN — AMPICILLIN SODIUM 2 G: 2 INJECTION, POWDER, FOR SOLUTION INTRAMUSCULAR; INTRAVENOUS at 03:28

## 2020-01-23 RX ADMIN — EPINEPHRINE 0.03 MCG/KG/MIN: 1 INJECTION INTRAMUSCULAR; INTRAVENOUS; SUBCUTANEOUS at 05:51

## 2020-01-23 RX ADMIN — CHLORHEXIDINE GLUCONATE 0.12% ORAL RINSE 15 ML: 1.2 LIQUID ORAL at 21:07

## 2020-01-23 RX ADMIN — HYDROCODONE BITARTRATE AND ACETAMINOPHEN 1 TABLET: 5; 325 TABLET ORAL at 07:12

## 2020-01-23 RX ADMIN — MORPHINE SULFATE 2 MG: 4 INJECTION INTRAVENOUS at 05:51

## 2020-01-23 RX ADMIN — AMPICILLIN SODIUM 2 G: 2 INJECTION, POWDER, FOR SOLUTION INTRAMUSCULAR; INTRAVENOUS at 21:17

## 2020-01-23 RX ADMIN — PANTOPRAZOLE SODIUM 40 MG: 40 TABLET, DELAYED RELEASE ORAL at 07:10

## 2020-01-23 RX ADMIN — ACETAMINOPHEN 500 MG: 500 TABLET, FILM COATED ORAL at 14:31

## 2020-01-23 RX ADMIN — AMPICILLIN SODIUM 2 G: 2 INJECTION, POWDER, FOR SOLUTION INTRAMUSCULAR; INTRAVENOUS at 07:09

## 2020-01-23 RX ADMIN — HYDRALAZINE HYDROCHLORIDE 10 MG: 20 INJECTION INTRAMUSCULAR; INTRAVENOUS at 14:22

## 2020-01-23 RX ADMIN — TRAMADOL HYDROCHLORIDE 50 MG: 50 TABLET, FILM COATED ORAL at 12:16

## 2020-01-23 RX ADMIN — VANCOMYCIN HYDROCHLORIDE 1250 MG: 10 INJECTION, POWDER, LYOPHILIZED, FOR SOLUTION INTRAVENOUS at 21:08

## 2020-01-23 RX ADMIN — GENTAMICIN SULFATE 90 MG: 40 INJECTION, SOLUTION INTRAMUSCULAR; INTRAVENOUS at 03:28

## 2020-01-23 RX ADMIN — AMPICILLIN SODIUM 2 G: 2 INJECTION, POWDER, FOR SOLUTION INTRAMUSCULAR; INTRAVENOUS at 10:08

## 2020-01-23 RX ADMIN — ACETAMINOPHEN 500 MG: 500 TABLET, FILM COATED ORAL at 21:07

## 2020-01-23 RX ADMIN — MAGNESIUM SULFATE HEPTAHYDRATE 1 G: 1 INJECTION, SOLUTION INTRAVENOUS at 10:08

## 2020-01-23 RX ADMIN — IPRATROPIUM BROMIDE AND ALBUTEROL SULFATE 3 ML: .5; 3 SOLUTION RESPIRATORY (INHALATION) at 19:45

## 2020-01-23 RX ADMIN — MUPIROCIN 1 APPLICATION: 20 OINTMENT TOPICAL at 12:16

## 2020-01-23 RX ADMIN — AMPICILLIN SODIUM 2 G: 2 INJECTION, POWDER, FOR SOLUTION INTRAMUSCULAR; INTRAVENOUS at 18:13

## 2020-01-23 RX ADMIN — ATORVASTATIN CALCIUM 40 MG: 40 TABLET, FILM COATED ORAL at 21:07

## 2020-01-23 RX ADMIN — Medication: at 12:17

## 2020-01-23 RX ADMIN — MORPHINE SULFATE 2 MG: 4 INJECTION INTRAVENOUS at 03:28

## 2020-01-23 RX ADMIN — BUDESONIDE 0.5 MG: 0.5 INHALANT RESPIRATORY (INHALATION) at 19:45

## 2020-01-23 RX ADMIN — FUROSEMIDE 40 MG: 10 INJECTION, SOLUTION INTRAMUSCULAR; INTRAVENOUS at 12:16

## 2020-01-23 RX ADMIN — CHLORHEXIDINE GLUCONATE 0.12% ORAL RINSE 15 ML: 1.2 LIQUID ORAL at 10:07

## 2020-01-23 RX ADMIN — ACETYLCYSTEINE 3 ML: 200 SOLUTION ORAL; RESPIRATORY (INHALATION) at 19:46

## 2020-01-24 ENCOUNTER — APPOINTMENT (OUTPATIENT)
Dept: GENERAL RADIOLOGY | Facility: HOSPITAL | Age: 65
End: 2020-01-24

## 2020-01-24 LAB
ABO + RH BLD: NORMAL
ALBUMIN SERPL-MCNC: 3.5 G/DL (ref 3.5–5.2)
ALBUMIN/GLOB SERPL: 1.5 G/DL
ALP SERPL-CCNC: 32 U/L (ref 39–117)
ALT SERPL W P-5'-P-CCNC: 13 U/L (ref 1–41)
ANION GAP SERPL CALCULATED.3IONS-SCNC: 13 MMOL/L (ref 5–15)
ARTERIAL PATENCY WRIST A: ABNORMAL
ARTERIAL PATENCY WRIST A: ABNORMAL
AST SERPL-CCNC: 43 U/L (ref 1–40)
ATMOSPHERIC PRESS: ABNORMAL MM[HG]
ATMOSPHERIC PRESS: ABNORMAL MM[HG]
BASE EXCESS BLDA CALC-SCNC: 1.5 MMOL/L (ref 0–3)
BASE EXCESS BLDA CALC-SCNC: 1.8 MMOL/L (ref 0–3)
BASOPHILS # BLD AUTO: 0.1 10*3/MM3 (ref 0–0.2)
BASOPHILS NFR BLD AUTO: 0.6 % (ref 0–1.5)
BDY SITE: ABNORMAL
BDY SITE: ABNORMAL
BH BB BLOOD EXPIRATION DATE: NORMAL
BH BB BLOOD TYPE BARCODE: 9500
BH BB DISPENSE STATUS: NORMAL
BH BB PRODUCT CODE: NORMAL
BH BB UNIT NUMBER: NORMAL
BILIRUB SERPL-MCNC: 0.6 MG/DL (ref 0.2–1.2)
BUN BLD-MCNC: 22 MG/DL (ref 8–23)
BUN/CREAT SERPL: 15.7 (ref 7–25)
CA-I SERPL ISE-MCNC: 1.14 MMOL/L (ref 1.2–1.3)
CALCIUM SPEC-SCNC: 8.5 MG/DL (ref 8.6–10.5)
CHLORIDE SERPL-SCNC: 108 MMOL/L (ref 98–107)
CO2 BLDA-SCNC: 25.7 MMOL/L (ref 22–29)
CO2 BLDA-SCNC: 25.8 MMOL/L (ref 22–29)
CO2 SERPL-SCNC: 23 MMOL/L (ref 22–29)
CREAT BLD-MCNC: 1.4 MG/DL (ref 0.76–1.27)
DEPRECATED RDW RBC AUTO: 45.1 FL (ref 37–54)
EOSINOPHIL # BLD AUTO: 0 10*3/MM3 (ref 0–0.4)
EOSINOPHIL NFR BLD AUTO: 0.1 % (ref 0.3–6.2)
ERYTHROCYTE [DISTWIDTH] IN BLOOD BY AUTOMATED COUNT: 15.4 % (ref 12.3–15.4)
GFR SERPL CREATININE-BSD FRML MDRD: 51 ML/MIN/1.73
GLOBULIN UR ELPH-MCNC: 2.4 GM/DL
GLUCOSE BLD-MCNC: 121 MG/DL (ref 65–99)
GLUCOSE BLDC GLUCOMTR-MCNC: 109 MG/DL (ref 70–105)
GLUCOSE BLDC GLUCOMTR-MCNC: 113 MG/DL (ref 70–105)
GLUCOSE BLDC GLUCOMTR-MCNC: 116 MG/DL (ref 70–105)
GLUCOSE BLDC GLUCOMTR-MCNC: 117 MG/DL (ref 70–105)
GLUCOSE BLDC GLUCOMTR-MCNC: 117 MG/DL (ref 70–105)
GLUCOSE BLDC GLUCOMTR-MCNC: 120 MG/DL (ref 70–105)
GLUCOSE BLDC GLUCOMTR-MCNC: 132 MG/DL (ref 70–105)
GLUCOSE BLDC GLUCOMTR-MCNC: 133 MG/DL (ref 70–105)
GLUCOSE BLDC GLUCOMTR-MCNC: 148 MG/DL (ref 70–105)
GLUCOSE BLDC GLUCOMTR-MCNC: 153 MG/DL (ref 70–105)
GLUCOSE BLDC GLUCOMTR-MCNC: 84 MG/DL (ref 70–105)
HCO3 BLDA-SCNC: 24.8 MMOL/L (ref 21–28)
HCO3 BLDA-SCNC: 24.8 MMOL/L (ref 21–28)
HCT VFR BLD AUTO: 22.5 % (ref 37.5–51)
HEMODILUTION: NO
HEMODILUTION: NO
HGB BLD-MCNC: 7.5 G/DL (ref 13–17.7)
HOROWITZ INDEX BLD+IHG-RTO: 100 %
HOROWITZ INDEX BLD+IHG-RTO: 80 %
LYMPHOCYTES # BLD AUTO: 0.9 10*3/MM3 (ref 0.7–3.1)
LYMPHOCYTES NFR BLD AUTO: 6.7 % (ref 19.6–45.3)
MAGNESIUM SERPL-MCNC: 2.3 MG/DL (ref 1.6–2.4)
MCH RBC QN AUTO: 27.2 PG (ref 26.6–33)
MCHC RBC AUTO-ENTMCNC: 33.2 G/DL (ref 31.5–35.7)
MCV RBC AUTO: 81.8 FL (ref 79–97)
MODALITY: ABNORMAL
MODALITY: ABNORMAL
MONOCYTES # BLD AUTO: 2 10*3/MM3 (ref 0.1–0.9)
MONOCYTES NFR BLD AUTO: 14.4 % (ref 5–12)
NEUTROPHILS # BLD AUTO: 10.9 10*3/MM3 (ref 1.7–7)
NEUTROPHILS NFR BLD AUTO: 78.2 % (ref 42.7–76)
NRBC BLD AUTO-RTO: 0 /100 WBC (ref 0–0.2)
PCO2 BLDA: 31.1 MM HG (ref 35–48)
PCO2 BLDA: 32.3 MM HG (ref 35–48)
PH BLDA: 7.49 PH UNITS (ref 7.35–7.45)
PH BLDA: 7.51 PH UNITS (ref 7.35–7.45)
PHOSPHATE SERPL-MCNC: 3.4 MG/DL (ref 2.5–4.5)
PLATELET # BLD AUTO: 169 10*3/MM3 (ref 140–450)
PMV BLD AUTO: 8.4 FL (ref 6–12)
PO2 BLDA: 64.8 MM HG (ref 83–108)
PO2 BLDA: 84.4 MM HG (ref 83–108)
POTASSIUM BLD-SCNC: 3.1 MMOL/L (ref 3.5–5.2)
PROT SERPL-MCNC: 5.9 G/DL (ref 6–8.5)
RBC # BLD AUTO: 2.75 10*6/MM3 (ref 4.14–5.8)
SAO2 % BLDCOA: 94.2 % (ref 94–98)
SAO2 % BLDCOA: 97.4 % (ref 94–98)
SODIUM BLD-SCNC: 144 MMOL/L (ref 136–145)
UNIT  ABO: NORMAL
UNIT  RH: NORMAL
WBC NRBC COR # BLD: 13.9 10*3/MM3 (ref 3.4–10.8)

## 2020-01-24 PROCEDURE — 25010000002 FUROSEMIDE PER 20 MG: Performed by: INTERNAL MEDICINE

## 2020-01-24 PROCEDURE — 94799 UNLISTED PULMONARY SVC/PX: CPT

## 2020-01-24 PROCEDURE — 25010000002 DIPHENHYDRAMINE PER 50 MG: Performed by: INTERNAL MEDICINE

## 2020-01-24 PROCEDURE — 82962 GLUCOSE BLOOD TEST: CPT

## 2020-01-24 PROCEDURE — 36430 TRANSFUSION BLD/BLD COMPNT: CPT

## 2020-01-24 PROCEDURE — 25010000002 GENTAMICIN PER 80 MG: Performed by: INTERNAL MEDICINE

## 2020-01-24 PROCEDURE — 84100 ASSAY OF PHOSPHORUS: CPT | Performed by: INTERNAL MEDICINE

## 2020-01-24 PROCEDURE — 71045 X-RAY EXAM CHEST 1 VIEW: CPT

## 2020-01-24 PROCEDURE — 86923 COMPATIBILITY TEST ELECTRIC: CPT

## 2020-01-24 PROCEDURE — 85025 COMPLETE CBC W/AUTO DIFF WBC: CPT | Performed by: INTERNAL MEDICINE

## 2020-01-24 PROCEDURE — P9016 RBC LEUKOCYTES REDUCED: HCPCS

## 2020-01-24 PROCEDURE — 97168 OT RE-EVAL EST PLAN CARE: CPT

## 2020-01-24 PROCEDURE — 92526 ORAL FUNCTION THERAPY: CPT

## 2020-01-24 PROCEDURE — 83735 ASSAY OF MAGNESIUM: CPT | Performed by: INTERNAL MEDICINE

## 2020-01-24 PROCEDURE — 82330 ASSAY OF CALCIUM: CPT | Performed by: INTERNAL MEDICINE

## 2020-01-24 PROCEDURE — 25010000003 POTASSIUM CHLORIDE 10 MEQ/100ML SOLUTION: Performed by: NURSE PRACTITIONER

## 2020-01-24 PROCEDURE — 25010000002 AMPICILLIN PER 500 MG: Performed by: NURSE PRACTITIONER

## 2020-01-24 PROCEDURE — 25010000002 ENOXAPARIN PER 10 MG: Performed by: NURSE PRACTITIONER

## 2020-01-24 PROCEDURE — 25010000002 CALCIUM GLUCONATE 2-0.675 GM/100ML-% SOLUTION: Performed by: INTERNAL MEDICINE

## 2020-01-24 PROCEDURE — 97112 NEUROMUSCULAR REEDUCATION: CPT

## 2020-01-24 PROCEDURE — 86900 BLOOD TYPING SEROLOGIC ABO: CPT

## 2020-01-24 PROCEDURE — 82803 BLOOD GASES ANY COMBINATION: CPT

## 2020-01-24 PROCEDURE — 99232 SBSQ HOSP IP/OBS MODERATE 35: CPT | Performed by: INTERNAL MEDICINE

## 2020-01-24 PROCEDURE — 99024 POSTOP FOLLOW-UP VISIT: CPT | Performed by: NURSE PRACTITIONER

## 2020-01-24 PROCEDURE — 80053 COMPREHEN METABOLIC PANEL: CPT | Performed by: INTERNAL MEDICINE

## 2020-01-24 RX ORDER — CALCIUM GLUCONATE 20 MG/ML
2 INJECTION, SOLUTION INTRAVENOUS EVERY 12 HOURS
Status: COMPLETED | OUTPATIENT
Start: 2020-01-24 | End: 2020-01-24

## 2020-01-24 RX ORDER — ACETAMINOPHEN 325 MG/1
650 TABLET ORAL ONCE
Status: COMPLETED | OUTPATIENT
Start: 2020-01-24 | End: 2020-01-24

## 2020-01-24 RX ORDER — DIPHENHYDRAMINE HYDROCHLORIDE 50 MG/ML
25 INJECTION INTRAMUSCULAR; INTRAVENOUS ONCE
Status: COMPLETED | OUTPATIENT
Start: 2020-01-24 | End: 2020-01-24

## 2020-01-24 RX ORDER — CLONIDINE 0.2 MG/24H
1 PATCH, EXTENDED RELEASE TRANSDERMAL WEEKLY
Status: DISCONTINUED | OUTPATIENT
Start: 2020-01-24 | End: 2020-01-31

## 2020-01-24 RX ORDER — FUROSEMIDE 10 MG/ML
20 INJECTION INTRAMUSCULAR; INTRAVENOUS ONCE
Status: COMPLETED | OUTPATIENT
Start: 2020-01-24 | End: 2020-01-24

## 2020-01-24 RX ORDER — ACETAMINOPHEN 650 MG/1
650 SUPPOSITORY RECTAL ONCE
Status: COMPLETED | OUTPATIENT
Start: 2020-01-24 | End: 2020-01-24

## 2020-01-24 RX ADMIN — CHLORHEXIDINE GLUCONATE 0.12% ORAL RINSE 15 ML: 1.2 LIQUID ORAL at 08:45

## 2020-01-24 RX ADMIN — IPRATROPIUM BROMIDE AND ALBUTEROL SULFATE 3 ML: .5; 3 SOLUTION RESPIRATORY (INHALATION) at 15:38

## 2020-01-24 RX ADMIN — SODIUM CHLORIDE 5 MG/HR: 0.9 INJECTION, SOLUTION INTRAVENOUS at 14:30

## 2020-01-24 RX ADMIN — POTASSIUM CHLORIDE 20 MEQ: 1500 TABLET, EXTENDED RELEASE ORAL at 07:43

## 2020-01-24 RX ADMIN — MUPIROCIN 1 APPLICATION: 20 OINTMENT TOPICAL at 08:45

## 2020-01-24 RX ADMIN — BUDESONIDE 0.5 MG: 0.5 INHALANT RESPIRATORY (INHALATION) at 07:41

## 2020-01-24 RX ADMIN — POLYETHYLENE GLYCOL 3350 17 G: 17 POWDER, FOR SOLUTION ORAL at 08:45

## 2020-01-24 RX ADMIN — ATORVASTATIN CALCIUM 40 MG: 40 TABLET, FILM COATED ORAL at 20:36

## 2020-01-24 RX ADMIN — ASPIRIN 81 MG: 81 TABLET, DELAYED RELEASE ORAL at 08:46

## 2020-01-24 RX ADMIN — AMPICILLIN SODIUM 2 G: 2 INJECTION, POWDER, FOR SOLUTION INTRAMUSCULAR; INTRAVENOUS at 14:29

## 2020-01-24 RX ADMIN — FUROSEMIDE 20 MG: 10 INJECTION, SOLUTION INTRAMUSCULAR; INTRAVENOUS at 08:46

## 2020-01-24 RX ADMIN — ACETAMINOPHEN 500 MG: 500 TABLET, FILM COATED ORAL at 20:37

## 2020-01-24 RX ADMIN — DIPHENHYDRAMINE HYDROCHLORIDE 25 MG: 50 INJECTION, SOLUTION INTRAMUSCULAR; INTRAVENOUS at 08:46

## 2020-01-24 RX ADMIN — AMPICILLIN SODIUM 2 G: 2 INJECTION, POWDER, FOR SOLUTION INTRAMUSCULAR; INTRAVENOUS at 21:47

## 2020-01-24 RX ADMIN — SODIUM CHLORIDE 5 MG/HR: 0.9 INJECTION, SOLUTION INTRAVENOUS at 20:30

## 2020-01-24 RX ADMIN — IPRATROPIUM BROMIDE AND ALBUTEROL SULFATE 3 ML: .5; 3 SOLUTION RESPIRATORY (INHALATION) at 11:56

## 2020-01-24 RX ADMIN — ACETYLCYSTEINE 3 ML: 200 SOLUTION ORAL; RESPIRATORY (INHALATION) at 07:41

## 2020-01-24 RX ADMIN — ACETYLCYSTEINE 3 ML: 200 SOLUTION ORAL; RESPIRATORY (INHALATION) at 19:10

## 2020-01-24 RX ADMIN — ACETAMINOPHEN 650 MG: 325 TABLET, FILM COATED ORAL at 08:43

## 2020-01-24 RX ADMIN — CLONIDINE 1 PATCH: 0.2 PATCH, EXTENDED RELEASE TRANSDERMAL at 12:15

## 2020-01-24 RX ADMIN — BUDESONIDE 0.5 MG: 0.5 INHALANT RESPIRATORY (INHALATION) at 19:10

## 2020-01-24 RX ADMIN — POTASSIUM CHLORIDE 10 MEQ: 7.46 INJECTION, SOLUTION INTRAVENOUS at 05:33

## 2020-01-24 RX ADMIN — MUPIROCIN 1 APPLICATION: 20 OINTMENT TOPICAL at 20:36

## 2020-01-24 RX ADMIN — IPRATROPIUM BROMIDE AND ALBUTEROL SULFATE 3 ML: .5; 3 SOLUTION RESPIRATORY (INHALATION) at 19:10

## 2020-01-24 RX ADMIN — HYDRALAZINE HYDROCHLORIDE 50 MG: 25 TABLET, FILM COATED ORAL at 21:47

## 2020-01-24 RX ADMIN — GENTAMICIN SULFATE 120 MG: 40 INJECTION, SOLUTION INTRAMUSCULAR; INTRAVENOUS at 17:00

## 2020-01-24 RX ADMIN — SENNOSIDES 1 TABLET: 8.6 TABLET, FILM COATED ORAL at 20:37

## 2020-01-24 RX ADMIN — ENOXAPARIN SODIUM 40 MG: 40 INJECTION SUBCUTANEOUS at 16:29

## 2020-01-24 RX ADMIN — CHLORHEXIDINE GLUCONATE 0.12% ORAL RINSE 15 ML: 1.2 LIQUID ORAL at 20:36

## 2020-01-24 RX ADMIN — ACETAMINOPHEN 500 MG: 500 TABLET, FILM COATED ORAL at 14:30

## 2020-01-24 RX ADMIN — POTASSIUM CHLORIDE 20 MEQ: 1500 TABLET, EXTENDED RELEASE ORAL at 08:50

## 2020-01-24 RX ADMIN — AMPICILLIN SODIUM 2 G: 2 INJECTION, POWDER, FOR SOLUTION INTRAMUSCULAR; INTRAVENOUS at 18:06

## 2020-01-24 RX ADMIN — POLYETHYLENE GLYCOL 3350 17 G: 17 POWDER, FOR SOLUTION ORAL at 20:36

## 2020-01-24 RX ADMIN — PANTOPRAZOLE SODIUM 40 MG: 40 TABLET, DELAYED RELEASE ORAL at 05:33

## 2020-01-24 RX ADMIN — AMPICILLIN SODIUM 2 G: 2 INJECTION, POWDER, FOR SOLUTION INTRAMUSCULAR; INTRAVENOUS at 03:44

## 2020-01-24 RX ADMIN — SENNOSIDES 1 TABLET: 8.6 TABLET, FILM COATED ORAL at 08:45

## 2020-01-24 RX ADMIN — AMLODIPINE BESYLATE 10 MG: 5 TABLET ORAL at 08:46

## 2020-01-24 RX ADMIN — AMPICILLIN SODIUM 2 G: 2 INJECTION, POWDER, FOR SOLUTION INTRAMUSCULAR; INTRAVENOUS at 09:10

## 2020-01-24 RX ADMIN — CALCIUM GLUCONATE 2 G: 20 INJECTION, SOLUTION INTRAVENOUS at 20:35

## 2020-01-24 RX ADMIN — SODIUM CHLORIDE 5 MG/HR: 0.9 INJECTION, SOLUTION INTRAVENOUS at 00:24

## 2020-01-24 RX ADMIN — AMPICILLIN SODIUM 2 G: 2 INJECTION, POWDER, FOR SOLUTION INTRAMUSCULAR; INTRAVENOUS at 05:34

## 2020-01-24 RX ADMIN — IPRATROPIUM BROMIDE AND ALBUTEROL SULFATE 3 ML: .5; 3 SOLUTION RESPIRATORY (INHALATION) at 07:41

## 2020-01-24 RX ADMIN — CALCIUM GLUCONATE 2 G: 20 INJECTION, SOLUTION INTRAVENOUS at 08:45

## 2020-01-25 LAB
ABO + RH BLD: NORMAL
ALBUMIN SERPL-MCNC: 3.3 G/DL (ref 3.5–5.2)
ALBUMIN/GLOB SERPL: 1.2 G/DL
ALP SERPL-CCNC: 42 U/L (ref 39–117)
ALT SERPL W P-5'-P-CCNC: 16 U/L (ref 1–41)
ANION GAP SERPL CALCULATED.3IONS-SCNC: 11 MMOL/L (ref 5–15)
AST SERPL-CCNC: 34 U/L (ref 1–40)
BASOPHILS # BLD AUTO: 0.1 10*3/MM3 (ref 0–0.2)
BASOPHILS NFR BLD AUTO: 0.8 % (ref 0–1.5)
BH BB BLOOD EXPIRATION DATE: NORMAL
BH BB BLOOD TYPE BARCODE: 9500
BH BB DISPENSE STATUS: NORMAL
BH BB PRODUCT CODE: NORMAL
BH BB UNIT NUMBER: NORMAL
BILIRUB SERPL-MCNC: 0.7 MG/DL (ref 0.2–1.2)
BUN BLD-MCNC: 19 MG/DL (ref 8–23)
BUN/CREAT SERPL: 18.1 (ref 7–25)
CA-I SERPL ISE-MCNC: 1.19 MMOL/L (ref 1.2–1.3)
CALCIUM SPEC-SCNC: 8.7 MG/DL (ref 8.6–10.5)
CHLORIDE SERPL-SCNC: 105 MMOL/L (ref 98–107)
CO2 SERPL-SCNC: 23 MMOL/L (ref 22–29)
CREAT BLD-MCNC: 1.05 MG/DL (ref 0.76–1.27)
DEPRECATED RDW RBC AUTO: 44.2 FL (ref 37–54)
EOSINOPHIL # BLD AUTO: 0.1 10*3/MM3 (ref 0–0.4)
EOSINOPHIL NFR BLD AUTO: 0.6 % (ref 0.3–6.2)
ERYTHROCYTE [DISTWIDTH] IN BLOOD BY AUTOMATED COUNT: 15 % (ref 12.3–15.4)
GENTAMICIN SERPL-MCNC: 3.36 MCG/ML (ref 0.5–10)
GFR SERPL CREATININE-BSD FRML MDRD: 71 ML/MIN/1.73
GLOBULIN UR ELPH-MCNC: 2.7 GM/DL
GLUCOSE BLD-MCNC: 119 MG/DL (ref 65–99)
GLUCOSE BLDC GLUCOMTR-MCNC: 106 MG/DL (ref 70–105)
GLUCOSE BLDC GLUCOMTR-MCNC: 111 MG/DL (ref 70–105)
GLUCOSE BLDC GLUCOMTR-MCNC: 112 MG/DL (ref 70–105)
GLUCOSE BLDC GLUCOMTR-MCNC: 150 MG/DL (ref 70–105)
GLUCOSE BLDC GLUCOMTR-MCNC: 167 MG/DL (ref 70–105)
GLUCOSE BLDC GLUCOMTR-MCNC: 98 MG/DL (ref 70–105)
HCT VFR BLD AUTO: 25.5 % (ref 37.5–51)
HGB BLD-MCNC: 8.6 G/DL (ref 13–17.7)
LYMPHOCYTES # BLD AUTO: 0.7 10*3/MM3 (ref 0.7–3.1)
LYMPHOCYTES NFR BLD AUTO: 5.5 % (ref 19.6–45.3)
MAGNESIUM SERPL-MCNC: 1.9 MG/DL (ref 1.6–2.4)
MCH RBC QN AUTO: 27.8 PG (ref 26.6–33)
MCHC RBC AUTO-ENTMCNC: 33.6 G/DL (ref 31.5–35.7)
MCV RBC AUTO: 82.7 FL (ref 79–97)
MONOCYTES # BLD AUTO: 1 10*3/MM3 (ref 0.1–0.9)
MONOCYTES NFR BLD AUTO: 8.3 % (ref 5–12)
NEUTROPHILS # BLD AUTO: 10.3 10*3/MM3 (ref 1.7–7)
NEUTROPHILS NFR BLD AUTO: 84.8 % (ref 42.7–76)
NRBC BLD AUTO-RTO: 0 /100 WBC (ref 0–0.2)
PHOSPHATE SERPL-MCNC: 2.8 MG/DL (ref 2.5–4.5)
PLATELET # BLD AUTO: 165 10*3/MM3 (ref 140–450)
PMV BLD AUTO: 8.9 FL (ref 6–12)
POTASSIUM BLD-SCNC: 3.2 MMOL/L (ref 3.5–5.2)
POTASSIUM BLD-SCNC: 3.7 MMOL/L (ref 3.5–5.2)
PROT SERPL-MCNC: 6 G/DL (ref 6–8.5)
RBC # BLD AUTO: 3.08 10*6/MM3 (ref 4.14–5.8)
SODIUM BLD-SCNC: 139 MMOL/L (ref 136–145)
UNIT  ABO: NORMAL
UNIT  RH: NORMAL
WBC NRBC COR # BLD: 12.1 10*3/MM3 (ref 3.4–10.8)

## 2020-01-25 PROCEDURE — 84132 ASSAY OF SERUM POTASSIUM: CPT | Performed by: NURSE PRACTITIONER

## 2020-01-25 PROCEDURE — 82962 GLUCOSE BLOOD TEST: CPT

## 2020-01-25 PROCEDURE — 25010000002 ENOXAPARIN PER 10 MG: Performed by: NURSE PRACTITIONER

## 2020-01-25 PROCEDURE — 83735 ASSAY OF MAGNESIUM: CPT | Performed by: INTERNAL MEDICINE

## 2020-01-25 PROCEDURE — 84100 ASSAY OF PHOSPHORUS: CPT | Performed by: INTERNAL MEDICINE

## 2020-01-25 PROCEDURE — 97112 NEUROMUSCULAR REEDUCATION: CPT

## 2020-01-25 PROCEDURE — 97530 THERAPEUTIC ACTIVITIES: CPT

## 2020-01-25 PROCEDURE — 99232 SBSQ HOSP IP/OBS MODERATE 35: CPT | Performed by: INTERNAL MEDICINE

## 2020-01-25 PROCEDURE — 63710000001 INSULIN LISPRO (HUMAN) PER 5 UNITS: Performed by: NURSE PRACTITIONER

## 2020-01-25 PROCEDURE — 94799 UNLISTED PULMONARY SVC/PX: CPT

## 2020-01-25 PROCEDURE — 97110 THERAPEUTIC EXERCISES: CPT

## 2020-01-25 PROCEDURE — 80053 COMPREHEN METABOLIC PANEL: CPT | Performed by: INTERNAL MEDICINE

## 2020-01-25 PROCEDURE — 93010 ELECTROCARDIOGRAM REPORT: CPT | Performed by: INTERNAL MEDICINE

## 2020-01-25 PROCEDURE — 85025 COMPLETE CBC W/AUTO DIFF WBC: CPT | Performed by: INTERNAL MEDICINE

## 2020-01-25 PROCEDURE — 82330 ASSAY OF CALCIUM: CPT | Performed by: INTERNAL MEDICINE

## 2020-01-25 PROCEDURE — 25010000002 AMPICILLIN PER 500 MG: Performed by: NURSE PRACTITIONER

## 2020-01-25 PROCEDURE — 25010000002 CALCIUM GLUCONATE-NACL 1-0.675 GM/50ML-% SOLUTION: Performed by: INTERNAL MEDICINE

## 2020-01-25 PROCEDURE — 25010000002 GENTAMICIN PER 80 MG: Performed by: INTERNAL MEDICINE

## 2020-01-25 PROCEDURE — 80170 ASSAY OF GENTAMICIN: CPT | Performed by: INTERNAL MEDICINE

## 2020-01-25 RX ORDER — FLUOXETINE HYDROCHLORIDE 20 MG/1
40 CAPSULE ORAL DAILY
Status: DISCONTINUED | OUTPATIENT
Start: 2020-01-25 | End: 2020-01-26

## 2020-01-25 RX ORDER — ACETAMINOPHEN 325 MG/1
650 TABLET ORAL EVERY 6 HOURS PRN
Status: DISCONTINUED | OUTPATIENT
Start: 2020-01-25 | End: 2020-01-26

## 2020-01-25 RX ORDER — CALCIUM GLUCONATE 20 MG/ML
1 INJECTION, SOLUTION INTRAVENOUS ONCE
Status: COMPLETED | OUTPATIENT
Start: 2020-01-25 | End: 2020-01-25

## 2020-01-25 RX ORDER — POTASSIUM CHLORIDE 20 MEQ/1
40 TABLET, EXTENDED RELEASE ORAL ONCE
Status: COMPLETED | OUTPATIENT
Start: 2020-01-25 | End: 2020-01-25

## 2020-01-25 RX ADMIN — IPRATROPIUM BROMIDE AND ALBUTEROL SULFATE 3 ML: .5; 3 SOLUTION RESPIRATORY (INHALATION) at 16:08

## 2020-01-25 RX ADMIN — ACETAMINOPHEN 500 MG: 500 TABLET, FILM COATED ORAL at 08:24

## 2020-01-25 RX ADMIN — CHLORHEXIDINE GLUCONATE 0.12% ORAL RINSE 15 ML: 1.2 LIQUID ORAL at 08:24

## 2020-01-25 RX ADMIN — IPRATROPIUM BROMIDE AND ALBUTEROL SULFATE 3 ML: .5; 3 SOLUTION RESPIRATORY (INHALATION) at 19:05

## 2020-01-25 RX ADMIN — HYDRALAZINE HYDROCHLORIDE 50 MG: 25 TABLET, FILM COATED ORAL at 21:34

## 2020-01-25 RX ADMIN — AMPICILLIN SODIUM 2 G: 2 INJECTION, POWDER, FOR SOLUTION INTRAMUSCULAR; INTRAVENOUS at 21:35

## 2020-01-25 RX ADMIN — GENTAMICIN SULFATE 120 MG: 40 INJECTION, SOLUTION INTRAMUSCULAR; INTRAVENOUS at 14:55

## 2020-01-25 RX ADMIN — ASPIRIN 81 MG: 81 TABLET, DELAYED RELEASE ORAL at 08:24

## 2020-01-25 RX ADMIN — AMLODIPINE BESYLATE 10 MG: 5 TABLET ORAL at 08:25

## 2020-01-25 RX ADMIN — ACETYLCYSTEINE 3 ML: 200 SOLUTION ORAL; RESPIRATORY (INHALATION) at 19:05

## 2020-01-25 RX ADMIN — CALCIUM GLUCONATE 1 G: 20 INJECTION, SOLUTION INTRAVENOUS at 08:25

## 2020-01-25 RX ADMIN — BUDESONIDE 0.5 MG: 0.5 INHALANT RESPIRATORY (INHALATION) at 06:55

## 2020-01-25 RX ADMIN — METOPROLOL TARTRATE 12.5 MG: 25 TABLET ORAL at 10:05

## 2020-01-25 RX ADMIN — INSULIN LISPRO 2 UNITS: 100 INJECTION, SOLUTION INTRAVENOUS; SUBCUTANEOUS at 12:39

## 2020-01-25 RX ADMIN — AMPICILLIN SODIUM 2 G: 2 INJECTION, POWDER, FOR SOLUTION INTRAMUSCULAR; INTRAVENOUS at 06:00

## 2020-01-25 RX ADMIN — MUPIROCIN 1 APPLICATION: 20 OINTMENT TOPICAL at 08:25

## 2020-01-25 RX ADMIN — HYDRALAZINE HYDROCHLORIDE 50 MG: 25 TABLET, FILM COATED ORAL at 14:55

## 2020-01-25 RX ADMIN — SENNOSIDES 1 TABLET: 8.6 TABLET, FILM COATED ORAL at 08:24

## 2020-01-25 RX ADMIN — HYDRALAZINE HYDROCHLORIDE 50 MG: 25 TABLET, FILM COATED ORAL at 06:00

## 2020-01-25 RX ADMIN — ACETAMINOPHEN 500 MG: 500 TABLET, FILM COATED ORAL at 14:55

## 2020-01-25 RX ADMIN — ACETYLCYSTEINE 3 ML: 200 SOLUTION ORAL; RESPIRATORY (INHALATION) at 06:55

## 2020-01-25 RX ADMIN — PANTOPRAZOLE SODIUM 40 MG: 40 TABLET, DELAYED RELEASE ORAL at 06:00

## 2020-01-25 RX ADMIN — POTASSIUM CHLORIDE 40 MEQ: 1500 TABLET, EXTENDED RELEASE ORAL at 08:24

## 2020-01-25 RX ADMIN — IPRATROPIUM BROMIDE AND ALBUTEROL SULFATE 3 ML: .5; 3 SOLUTION RESPIRATORY (INHALATION) at 11:08

## 2020-01-25 RX ADMIN — POTASSIUM CHLORIDE 20 MEQ: 1500 TABLET, EXTENDED RELEASE ORAL at 06:00

## 2020-01-25 RX ADMIN — POLYETHYLENE GLYCOL 3350 17 G: 17 POWDER, FOR SOLUTION ORAL at 08:25

## 2020-01-25 RX ADMIN — ACETAMINOPHEN 500 MG: 500 TABLET, FILM COATED ORAL at 03:30

## 2020-01-25 RX ADMIN — IPRATROPIUM BROMIDE AND ALBUTEROL SULFATE 3 ML: .5; 3 SOLUTION RESPIRATORY (INHALATION) at 06:55

## 2020-01-25 RX ADMIN — ATORVASTATIN CALCIUM 40 MG: 40 TABLET, FILM COATED ORAL at 21:34

## 2020-01-25 RX ADMIN — AMPICILLIN SODIUM 2 G: 2 INJECTION, POWDER, FOR SOLUTION INTRAMUSCULAR; INTRAVENOUS at 10:06

## 2020-01-25 RX ADMIN — METOPROLOL TARTRATE 12.5 MG: 25 TABLET ORAL at 21:34

## 2020-01-25 RX ADMIN — FLUOXETINE 40 MG: 20 CAPSULE ORAL at 17:27

## 2020-01-25 RX ADMIN — BUDESONIDE 0.5 MG: 0.5 INHALANT RESPIRATORY (INHALATION) at 19:05

## 2020-01-25 RX ADMIN — MUPIROCIN 1 APPLICATION: 20 OINTMENT TOPICAL at 21:38

## 2020-01-25 RX ADMIN — AMPICILLIN SODIUM 2 G: 2 INJECTION, POWDER, FOR SOLUTION INTRAMUSCULAR; INTRAVENOUS at 03:30

## 2020-01-25 RX ADMIN — ENOXAPARIN SODIUM 40 MG: 40 INJECTION SUBCUTANEOUS at 15:55

## 2020-01-25 RX ADMIN — AMPICILLIN SODIUM 2 G: 2 INJECTION, POWDER, FOR SOLUTION INTRAMUSCULAR; INTRAVENOUS at 14:54

## 2020-01-25 RX ADMIN — CHLORHEXIDINE GLUCONATE 0.12% ORAL RINSE 15 ML: 1.2 LIQUID ORAL at 21:34

## 2020-01-25 RX ADMIN — AMPICILLIN SODIUM 2 G: 2 INJECTION, POWDER, FOR SOLUTION INTRAMUSCULAR; INTRAVENOUS at 17:26

## 2020-01-26 LAB
ALBUMIN SERPL-MCNC: 3.1 G/DL (ref 3.5–5.2)
ALBUMIN/GLOB SERPL: 1.1 G/DL
ALP SERPL-CCNC: 46 U/L (ref 39–117)
ALT SERPL W P-5'-P-CCNC: 18 U/L (ref 1–41)
ANION GAP SERPL CALCULATED.3IONS-SCNC: 10 MMOL/L (ref 5–15)
AST SERPL-CCNC: 25 U/L (ref 1–40)
BASOPHILS # BLD AUTO: 0.1 10*3/MM3 (ref 0–0.2)
BASOPHILS NFR BLD AUTO: 0.8 % (ref 0–1.5)
BILIRUB SERPL-MCNC: 0.5 MG/DL (ref 0.2–1.2)
BUN BLD-MCNC: 17 MG/DL (ref 8–23)
BUN/CREAT SERPL: 16 (ref 7–25)
CALCIUM SPEC-SCNC: 8.1 MG/DL (ref 8.6–10.5)
CHLORIDE SERPL-SCNC: 106 MMOL/L (ref 98–107)
CK SERPL-CCNC: 171 U/L (ref 20–200)
CO2 SERPL-SCNC: 23 MMOL/L (ref 22–29)
CREAT BLD-MCNC: 1.06 MG/DL (ref 0.76–1.27)
DEPRECATED RDW RBC AUTO: 45.1 FL (ref 37–54)
EOSINOPHIL # BLD AUTO: 0.2 10*3/MM3 (ref 0–0.4)
EOSINOPHIL NFR BLD AUTO: 2.5 % (ref 0.3–6.2)
ERYTHROCYTE [DISTWIDTH] IN BLOOD BY AUTOMATED COUNT: 15.3 % (ref 12.3–15.4)
GENTAMICIN SERPL-MCNC: 0.76 MCG/ML (ref 0.5–2)
GFR SERPL CREATININE-BSD FRML MDRD: 70 ML/MIN/1.73
GLOBULIN UR ELPH-MCNC: 2.8 GM/DL
GLUCOSE BLD-MCNC: 108 MG/DL (ref 65–99)
GLUCOSE BLDC GLUCOMTR-MCNC: 132 MG/DL (ref 70–105)
GLUCOSE BLDC GLUCOMTR-MCNC: 205 MG/DL (ref 70–105)
HCT VFR BLD AUTO: 23.4 % (ref 37.5–51)
HGB BLD-MCNC: 7.9 G/DL (ref 13–17.7)
LYMPHOCYTES # BLD AUTO: 1 10*3/MM3 (ref 0.7–3.1)
LYMPHOCYTES NFR BLD AUTO: 10.9 % (ref 19.6–45.3)
MAGNESIUM SERPL-MCNC: 1.9 MG/DL (ref 1.6–2.4)
MCH RBC QN AUTO: 28 PG (ref 26.6–33)
MCHC RBC AUTO-ENTMCNC: 33.8 G/DL (ref 31.5–35.7)
MCV RBC AUTO: 82.8 FL (ref 79–97)
MONOCYTES # BLD AUTO: 0.8 10*3/MM3 (ref 0.1–0.9)
MONOCYTES NFR BLD AUTO: 8.4 % (ref 5–12)
NEUTROPHILS # BLD AUTO: 6.9 10*3/MM3 (ref 1.7–7)
NEUTROPHILS NFR BLD AUTO: 77.4 % (ref 42.7–76)
NRBC BLD AUTO-RTO: 0 /100 WBC (ref 0–0.2)
PHOSPHATE SERPL-MCNC: 3 MG/DL (ref 2.5–4.5)
PLATELET # BLD AUTO: 197 10*3/MM3 (ref 140–450)
PMV BLD AUTO: 8.8 FL (ref 6–12)
POTASSIUM BLD-SCNC: 3.3 MMOL/L (ref 3.5–5.2)
PROT SERPL-MCNC: 5.9 G/DL (ref 6–8.5)
RBC # BLD AUTO: 2.82 10*6/MM3 (ref 4.14–5.8)
SODIUM BLD-SCNC: 139 MMOL/L (ref 136–145)
WBC NRBC COR # BLD: 8.9 10*3/MM3 (ref 3.4–10.8)

## 2020-01-26 PROCEDURE — 83735 ASSAY OF MAGNESIUM: CPT | Performed by: INTERNAL MEDICINE

## 2020-01-26 PROCEDURE — 25010000002 GENTAMICIN PER 80 MG: Performed by: INTERNAL MEDICINE

## 2020-01-26 PROCEDURE — 25010000002 ENOXAPARIN PER 10 MG: Performed by: NURSE PRACTITIONER

## 2020-01-26 PROCEDURE — 94799 UNLISTED PULMONARY SVC/PX: CPT

## 2020-01-26 PROCEDURE — 80170 ASSAY OF GENTAMICIN: CPT | Performed by: INTERNAL MEDICINE

## 2020-01-26 PROCEDURE — 25010000002 DAPTOMYCIN PER 1 MG: Performed by: INTERNAL MEDICINE

## 2020-01-26 PROCEDURE — 63710000001 INSULIN LISPRO (HUMAN) PER 5 UNITS: Performed by: NURSE PRACTITIONER

## 2020-01-26 PROCEDURE — 25010000002 AMPICILLIN PER 500 MG: Performed by: NURSE PRACTITIONER

## 2020-01-26 PROCEDURE — 80053 COMPREHEN METABOLIC PANEL: CPT | Performed by: INTERNAL MEDICINE

## 2020-01-26 PROCEDURE — 99232 SBSQ HOSP IP/OBS MODERATE 35: CPT | Performed by: INTERNAL MEDICINE

## 2020-01-26 PROCEDURE — 82550 ASSAY OF CK (CPK): CPT | Performed by: INTERNAL MEDICINE

## 2020-01-26 PROCEDURE — 85025 COMPLETE CBC W/AUTO DIFF WBC: CPT | Performed by: INTERNAL MEDICINE

## 2020-01-26 PROCEDURE — 82962 GLUCOSE BLOOD TEST: CPT

## 2020-01-26 PROCEDURE — 25010000002 CALCIUM GLUCONATE-NACL 1-0.675 GM/50ML-% SOLUTION: Performed by: INTERNAL MEDICINE

## 2020-01-26 PROCEDURE — 25010000002 MAGNESIUM SULFATE 2 GM/50ML SOLUTION: Performed by: THORACIC SURGERY (CARDIOTHORACIC VASCULAR SURGERY)

## 2020-01-26 PROCEDURE — 84100 ASSAY OF PHOSPHORUS: CPT | Performed by: INTERNAL MEDICINE

## 2020-01-26 RX ORDER — ACETAMINOPHEN 500 MG
500 TABLET ORAL EVERY 6 HOURS PRN
Status: DISCONTINUED | OUTPATIENT
Start: 2020-01-26 | End: 2020-02-03 | Stop reason: HOSPADM

## 2020-01-26 RX ORDER — MAGNESIUM SULFATE HEPTAHYDRATE 40 MG/ML
2 INJECTION, SOLUTION INTRAVENOUS ONCE
Status: COMPLETED | OUTPATIENT
Start: 2020-01-26 | End: 2020-01-26

## 2020-01-26 RX ORDER — CALCIUM GLUCONATE 20 MG/ML
1 INJECTION, SOLUTION INTRAVENOUS ONCE
Status: COMPLETED | OUTPATIENT
Start: 2020-01-26 | End: 2020-01-26

## 2020-01-26 RX ORDER — FUROSEMIDE 40 MG/1
40 TABLET ORAL DAILY
Status: DISCONTINUED | OUTPATIENT
Start: 2020-01-26 | End: 2020-02-03 | Stop reason: HOSPADM

## 2020-01-26 RX ORDER — POTASSIUM CHLORIDE 20 MEQ/1
20 TABLET, EXTENDED RELEASE ORAL DAILY
Status: DISCONTINUED | OUTPATIENT
Start: 2020-01-26 | End: 2020-02-03 | Stop reason: HOSPADM

## 2020-01-26 RX ORDER — GABAPENTIN 100 MG/1
200 CAPSULE ORAL 3 TIMES DAILY
Status: DISCONTINUED | OUTPATIENT
Start: 2020-01-26 | End: 2020-01-26

## 2020-01-26 RX ORDER — AMLODIPINE BESYLATE 5 MG/1
2.5 TABLET ORAL
Status: DISCONTINUED | OUTPATIENT
Start: 2020-01-27 | End: 2020-01-27

## 2020-01-26 RX ORDER — POTASSIUM CHLORIDE 20 MEQ/1
40 TABLET, EXTENDED RELEASE ORAL
Status: COMPLETED | OUTPATIENT
Start: 2020-01-26 | End: 2020-01-26

## 2020-01-26 RX ORDER — GABAPENTIN 100 MG/1
200 CAPSULE ORAL 2 TIMES DAILY
Status: DISCONTINUED | OUTPATIENT
Start: 2020-01-26 | End: 2020-02-03 | Stop reason: HOSPADM

## 2020-01-26 RX ORDER — FLUOXETINE HYDROCHLORIDE 20 MG/1
20 CAPSULE ORAL DAILY
Status: DISCONTINUED | OUTPATIENT
Start: 2020-01-27 | End: 2020-02-03 | Stop reason: HOSPADM

## 2020-01-26 RX ADMIN — MAGNESIUM SULFATE HEPTAHYDRATE 2 G: 40 INJECTION, SOLUTION INTRAVENOUS at 10:20

## 2020-01-26 RX ADMIN — AMPICILLIN SODIUM 2 G: 2 INJECTION, POWDER, FOR SOLUTION INTRAMUSCULAR; INTRAVENOUS at 13:36

## 2020-01-26 RX ADMIN — CYCLOBENZAPRINE HYDROCHLORIDE 5 MG: 10 TABLET, FILM COATED ORAL at 21:08

## 2020-01-26 RX ADMIN — ENOXAPARIN SODIUM 40 MG: 40 INJECTION SUBCUTANEOUS at 16:32

## 2020-01-26 RX ADMIN — MUPIROCIN 1 APPLICATION: 20 OINTMENT TOPICAL at 21:10

## 2020-01-26 RX ADMIN — CHLORHEXIDINE GLUCONATE 0.12% ORAL RINSE 15 ML: 1.2 LIQUID ORAL at 08:24

## 2020-01-26 RX ADMIN — FUROSEMIDE 40 MG: 40 TABLET ORAL at 10:19

## 2020-01-26 RX ADMIN — AMPICILLIN SODIUM 2 G: 2 INJECTION, POWDER, FOR SOLUTION INTRAMUSCULAR; INTRAVENOUS at 21:10

## 2020-01-26 RX ADMIN — CHLORHEXIDINE GLUCONATE 0.12% ORAL RINSE 15 ML: 1.2 LIQUID ORAL at 21:10

## 2020-01-26 RX ADMIN — IPRATROPIUM BROMIDE AND ALBUTEROL SULFATE 3 ML: .5; 3 SOLUTION RESPIRATORY (INHALATION) at 18:39

## 2020-01-26 RX ADMIN — SENNOSIDES 1 TABLET: 8.6 TABLET, FILM COATED ORAL at 08:23

## 2020-01-26 RX ADMIN — POLYETHYLENE GLYCOL 3350 17 G: 17 POWDER, FOR SOLUTION ORAL at 08:22

## 2020-01-26 RX ADMIN — GABAPENTIN 200 MG: 100 CAPSULE ORAL at 21:08

## 2020-01-26 RX ADMIN — AMLODIPINE BESYLATE 10 MG: 5 TABLET ORAL at 08:23

## 2020-01-26 RX ADMIN — CALCIUM GLUCONATE 1 G: 20 INJECTION, SOLUTION INTRAVENOUS at 08:22

## 2020-01-26 RX ADMIN — AMPICILLIN SODIUM 2 G: 2 INJECTION, POWDER, FOR SOLUTION INTRAMUSCULAR; INTRAVENOUS at 10:21

## 2020-01-26 RX ADMIN — INSULIN LISPRO 5 UNITS: 100 INJECTION, SOLUTION INTRAVENOUS; SUBCUTANEOUS at 11:52

## 2020-01-26 RX ADMIN — MUPIROCIN 1 APPLICATION: 20 OINTMENT TOPICAL at 08:25

## 2020-01-26 RX ADMIN — ACETYLCYSTEINE 3 ML: 200 SOLUTION ORAL; RESPIRATORY (INHALATION) at 07:02

## 2020-01-26 RX ADMIN — IPRATROPIUM BROMIDE AND ALBUTEROL SULFATE 3 ML: .5; 3 SOLUTION RESPIRATORY (INHALATION) at 15:47

## 2020-01-26 RX ADMIN — GENTAMICIN SULFATE 120 MG: 40 INJECTION, SOLUTION INTRAMUSCULAR; INTRAVENOUS at 14:35

## 2020-01-26 RX ADMIN — POTASSIUM CHLORIDE 40 MEQ: 1500 TABLET, EXTENDED RELEASE ORAL at 08:22

## 2020-01-26 RX ADMIN — ACETAMINOPHEN 650 MG: 325 TABLET, FILM COATED ORAL at 01:49

## 2020-01-26 RX ADMIN — PANTOPRAZOLE SODIUM 40 MG: 40 TABLET, DELAYED RELEASE ORAL at 07:16

## 2020-01-26 RX ADMIN — HYDRALAZINE HYDROCHLORIDE 50 MG: 25 TABLET, FILM COATED ORAL at 06:11

## 2020-01-26 RX ADMIN — IPRATROPIUM BROMIDE AND ALBUTEROL SULFATE 3 ML: .5; 3 SOLUTION RESPIRATORY (INHALATION) at 11:33

## 2020-01-26 RX ADMIN — ASPIRIN 81 MG: 81 TABLET, DELAYED RELEASE ORAL at 08:23

## 2020-01-26 RX ADMIN — AMPICILLIN SODIUM 2 G: 2 INJECTION, POWDER, FOR SOLUTION INTRAMUSCULAR; INTRAVENOUS at 01:48

## 2020-01-26 RX ADMIN — FLUOXETINE 40 MG: 20 CAPSULE ORAL at 08:23

## 2020-01-26 RX ADMIN — METOPROLOL TARTRATE 12.5 MG: 25 TABLET ORAL at 21:08

## 2020-01-26 RX ADMIN — BUDESONIDE 0.5 MG: 0.5 INHALANT RESPIRATORY (INHALATION) at 18:39

## 2020-01-26 RX ADMIN — AMPICILLIN SODIUM 2 G: 2 INJECTION, POWDER, FOR SOLUTION INTRAMUSCULAR; INTRAVENOUS at 17:07

## 2020-01-26 RX ADMIN — POTASSIUM CHLORIDE 40 MEQ: 1500 TABLET, EXTENDED RELEASE ORAL at 10:19

## 2020-01-26 RX ADMIN — METOPROLOL TARTRATE 12.5 MG: 25 TABLET ORAL at 08:23

## 2020-01-26 RX ADMIN — BUDESONIDE 0.5 MG: 0.5 INHALANT RESPIRATORY (INHALATION) at 07:02

## 2020-01-26 RX ADMIN — IPRATROPIUM BROMIDE AND ALBUTEROL SULFATE 3 ML: .5; 3 SOLUTION RESPIRATORY (INHALATION) at 07:02

## 2020-01-26 RX ADMIN — DAPTOMYCIN 750 MG: 500 INJECTION, POWDER, LYOPHILIZED, FOR SOLUTION INTRAVENOUS at 10:25

## 2020-01-26 RX ADMIN — GABAPENTIN 200 MG: 100 CAPSULE ORAL at 08:56

## 2020-01-26 RX ADMIN — AMPICILLIN SODIUM 2 G: 2 INJECTION, POWDER, FOR SOLUTION INTRAMUSCULAR; INTRAVENOUS at 06:11

## 2020-01-26 RX ADMIN — ACETAMINOPHEN 500 MG: 500 TABLET, FILM COATED ORAL at 21:08

## 2020-01-26 RX ADMIN — ACETYLCYSTEINE 3 ML: 200 SOLUTION ORAL; RESPIRATORY (INHALATION) at 18:39

## 2020-01-27 PROBLEM — Z95.2 S/P MVR (MITRAL VALVE REPLACEMENT): Status: ACTIVE | Noted: 2020-01-27

## 2020-01-27 PROBLEM — I05.9 ENDOCARDITIS OF MITRAL VALVE: Status: ACTIVE | Noted: 2020-01-27

## 2020-01-27 LAB
ALBUMIN SERPL-MCNC: 3 G/DL (ref 3.5–5.2)
ALBUMIN/GLOB SERPL: 0.9 G/DL
ALP SERPL-CCNC: 50 U/L (ref 39–117)
ALT SERPL W P-5'-P-CCNC: 23 U/L (ref 1–41)
ANION GAP SERPL CALCULATED.3IONS-SCNC: 13 MMOL/L (ref 5–15)
AST SERPL-CCNC: 27 U/L (ref 1–40)
BACTERIA SPEC AEROBE CULT: ABNORMAL
BACTERIA SPEC ANAEROBE CULT: NORMAL
BASOPHILS # BLD AUTO: 0.1 10*3/MM3 (ref 0–0.2)
BASOPHILS NFR BLD AUTO: 1 % (ref 0–1.5)
BILIRUB SERPL-MCNC: 0.5 MG/DL (ref 0.2–1.2)
BUN BLD-MCNC: 14 MG/DL (ref 8–23)
BUN/CREAT SERPL: 12.8 (ref 7–25)
CA-I SERPL ISE-MCNC: 1.15 MMOL/L (ref 1.2–1.3)
CALCIUM SPEC-SCNC: 8 MG/DL (ref 8.6–10.5)
CHLORIDE SERPL-SCNC: 105 MMOL/L (ref 98–107)
CK SERPL-CCNC: 113 U/L (ref 20–200)
CO2 SERPL-SCNC: 21 MMOL/L (ref 22–29)
CREAT BLD-MCNC: 1.09 MG/DL (ref 0.76–1.27)
DEPRECATED RDW RBC AUTO: 45.1 FL (ref 37–54)
EOSINOPHIL # BLD AUTO: 0.5 10*3/MM3 (ref 0–0.4)
EOSINOPHIL NFR BLD AUTO: 5.5 % (ref 0.3–6.2)
ERYTHROCYTE [DISTWIDTH] IN BLOOD BY AUTOMATED COUNT: 15.4 % (ref 12.3–15.4)
GFR SERPL CREATININE-BSD FRML MDRD: 68 ML/MIN/1.73
GLOBULIN UR ELPH-MCNC: 3.2 GM/DL
GLUCOSE BLD-MCNC: 102 MG/DL (ref 65–99)
GLUCOSE BLDC GLUCOMTR-MCNC: 106 MG/DL (ref 70–105)
GLUCOSE BLDC GLUCOMTR-MCNC: 115 MG/DL (ref 70–105)
GLUCOSE BLDC GLUCOMTR-MCNC: 125 MG/DL (ref 70–105)
GLUCOSE BLDC GLUCOMTR-MCNC: 133 MG/DL (ref 70–105)
GRAM STN SPEC: ABNORMAL
GRAM STN SPEC: ABNORMAL
HCT VFR BLD AUTO: 26.1 % (ref 37.5–51)
HGB BLD-MCNC: 8.6 G/DL (ref 13–17.7)
LYMPHOCYTES # BLD AUTO: 1 10*3/MM3 (ref 0.7–3.1)
LYMPHOCYTES NFR BLD AUTO: 12.3 % (ref 19.6–45.3)
MAGNESIUM SERPL-MCNC: 2.1 MG/DL (ref 1.6–2.4)
MCH RBC QN AUTO: 27.3 PG (ref 26.6–33)
MCHC RBC AUTO-ENTMCNC: 33.1 G/DL (ref 31.5–35.7)
MCV RBC AUTO: 82.4 FL (ref 79–97)
MONOCYTES # BLD AUTO: 0.8 10*3/MM3 (ref 0.1–0.9)
MONOCYTES NFR BLD AUTO: 9.4 % (ref 5–12)
NEUTROPHILS # BLD AUTO: 6 10*3/MM3 (ref 1.7–7)
NEUTROPHILS NFR BLD AUTO: 71.8 % (ref 42.7–76)
NRBC BLD AUTO-RTO: 0.1 /100 WBC (ref 0–0.2)
PHOSPHATE SERPL-MCNC: 3.2 MG/DL (ref 2.5–4.5)
PLATELET # BLD AUTO: 230 10*3/MM3 (ref 140–450)
PMV BLD AUTO: 8.5 FL (ref 6–12)
POTASSIUM BLD-SCNC: 3.4 MMOL/L (ref 3.5–5.2)
POTASSIUM BLD-SCNC: 3.4 MMOL/L (ref 3.5–5.2)
POTASSIUM BLD-SCNC: 3.6 MMOL/L (ref 3.5–5.2)
PROT SERPL-MCNC: 6.2 G/DL (ref 6–8.5)
RBC # BLD AUTO: 3.16 10*6/MM3 (ref 4.14–5.8)
SODIUM BLD-SCNC: 139 MMOL/L (ref 136–145)
WBC NRBC COR # BLD: 8.3 10*3/MM3 (ref 3.4–10.8)

## 2020-01-27 PROCEDURE — 94799 UNLISTED PULMONARY SVC/PX: CPT

## 2020-01-27 PROCEDURE — 97530 THERAPEUTIC ACTIVITIES: CPT

## 2020-01-27 PROCEDURE — 82330 ASSAY OF CALCIUM: CPT | Performed by: INTERNAL MEDICINE

## 2020-01-27 PROCEDURE — 99024 POSTOP FOLLOW-UP VISIT: CPT | Performed by: NURSE PRACTITIONER

## 2020-01-27 PROCEDURE — 85025 COMPLETE CBC W/AUTO DIFF WBC: CPT | Performed by: INTERNAL MEDICINE

## 2020-01-27 PROCEDURE — 97535 SELF CARE MNGMENT TRAINING: CPT

## 2020-01-27 PROCEDURE — 84132 ASSAY OF SERUM POTASSIUM: CPT | Performed by: THORACIC SURGERY (CARDIOTHORACIC VASCULAR SURGERY)

## 2020-01-27 PROCEDURE — 83735 ASSAY OF MAGNESIUM: CPT | Performed by: INTERNAL MEDICINE

## 2020-01-27 PROCEDURE — 84132 ASSAY OF SERUM POTASSIUM: CPT | Performed by: INTERNAL MEDICINE

## 2020-01-27 PROCEDURE — 84100 ASSAY OF PHOSPHORUS: CPT | Performed by: INTERNAL MEDICINE

## 2020-01-27 PROCEDURE — 82962 GLUCOSE BLOOD TEST: CPT

## 2020-01-27 PROCEDURE — 82550 ASSAY OF CK (CPK): CPT | Performed by: INTERNAL MEDICINE

## 2020-01-27 PROCEDURE — 97112 NEUROMUSCULAR REEDUCATION: CPT

## 2020-01-27 PROCEDURE — 99232 SBSQ HOSP IP/OBS MODERATE 35: CPT | Performed by: INTERNAL MEDICINE

## 2020-01-27 PROCEDURE — 25010000002 DAPTOMYCIN PER 1 MG: Performed by: INTERNAL MEDICINE

## 2020-01-27 PROCEDURE — 92526 ORAL FUNCTION THERAPY: CPT

## 2020-01-27 PROCEDURE — 80053 COMPREHEN METABOLIC PANEL: CPT | Performed by: INTERNAL MEDICINE

## 2020-01-27 PROCEDURE — 25010000002 CEFTRIAXONE PER 250 MG: Performed by: INTERNAL MEDICINE

## 2020-01-27 PROCEDURE — 25010000002 ENOXAPARIN PER 10 MG: Performed by: NURSE PRACTITIONER

## 2020-01-27 PROCEDURE — C1751 CATH, INF, PER/CENT/MIDLINE: HCPCS

## 2020-01-27 PROCEDURE — 25010000002 CALCIUM GLUCONATE-NACL 1-0.675 GM/50ML-% SOLUTION: Performed by: INTERNAL MEDICINE

## 2020-01-27 PROCEDURE — 05HY33Z INSERTION OF INFUSION DEVICE INTO UPPER VEIN, PERCUTANEOUS APPROACH: ICD-10-PCS | Performed by: NURSE PRACTITIONER

## 2020-01-27 PROCEDURE — 25010000002 AMPICILLIN PER 500 MG: Performed by: NURSE PRACTITIONER

## 2020-01-27 RX ORDER — CALCIUM GLUCONATE 20 MG/ML
1 INJECTION, SOLUTION INTRAVENOUS EVERY 12 HOURS
Qty: 50 ML | Refills: 0 | Status: CANCELLED
Start: 2020-01-27 | End: 2020-01-28

## 2020-01-27 RX ORDER — POTASSIUM CHLORIDE 20 MEQ/1
40 TABLET, EXTENDED RELEASE ORAL ONCE
Status: DISCONTINUED | OUTPATIENT
Start: 2020-01-27 | End: 2020-01-27

## 2020-01-27 RX ORDER — SODIUM BICARBONATE 650 MG/1
1300 TABLET ORAL ONCE
Status: COMPLETED | OUTPATIENT
Start: 2020-01-27 | End: 2020-01-27

## 2020-01-27 RX ORDER — CALCIUM GLUCONATE 20 MG/ML
1 INJECTION, SOLUTION INTRAVENOUS EVERY 12 HOURS
Status: COMPLETED | OUTPATIENT
Start: 2020-01-27 | End: 2020-01-27

## 2020-01-27 RX ORDER — POTASSIUM CHLORIDE 20 MEQ/1
40 TABLET, EXTENDED RELEASE ORAL ONCE
Status: COMPLETED | OUTPATIENT
Start: 2020-01-27 | End: 2020-01-27

## 2020-01-27 RX ADMIN — CALCIUM GLUCONATE 1 G: 20 INJECTION, SOLUTION INTRAVENOUS at 07:47

## 2020-01-27 RX ADMIN — AMPICILLIN SODIUM 2 G: 2 INJECTION, POWDER, FOR SOLUTION INTRAMUSCULAR; INTRAVENOUS at 03:05

## 2020-01-27 RX ADMIN — PANTOPRAZOLE SODIUM 40 MG: 40 TABLET, DELAYED RELEASE ORAL at 05:36

## 2020-01-27 RX ADMIN — AMPICILLIN SODIUM 2 G: 2 INJECTION, POWDER, FOR SOLUTION INTRAMUSCULAR; INTRAVENOUS at 10:06

## 2020-01-27 RX ADMIN — FLUOXETINE 20 MG: 20 CAPSULE ORAL at 08:01

## 2020-01-27 RX ADMIN — GABAPENTIN 200 MG: 100 CAPSULE ORAL at 21:56

## 2020-01-27 RX ADMIN — ACETYLCYSTEINE 3 ML: 200 SOLUTION ORAL; RESPIRATORY (INHALATION) at 18:53

## 2020-01-27 RX ADMIN — IPRATROPIUM BROMIDE AND ALBUTEROL SULFATE 3 ML: .5; 3 SOLUTION RESPIRATORY (INHALATION) at 12:07

## 2020-01-27 RX ADMIN — BUDESONIDE 0.5 MG: 0.5 INHALANT RESPIRATORY (INHALATION) at 06:56

## 2020-01-27 RX ADMIN — DAPTOMYCIN 750 MG: 500 INJECTION, POWDER, LYOPHILIZED, FOR SOLUTION INTRAVENOUS at 11:57

## 2020-01-27 RX ADMIN — IPRATROPIUM BROMIDE AND ALBUTEROL SULFATE 3 ML: .5; 3 SOLUTION RESPIRATORY (INHALATION) at 06:56

## 2020-01-27 RX ADMIN — IPRATROPIUM BROMIDE AND ALBUTEROL SULFATE 3 ML: .5; 3 SOLUTION RESPIRATORY (INHALATION) at 18:53

## 2020-01-27 RX ADMIN — ACETAMINOPHEN 500 MG: 500 TABLET, FILM COATED ORAL at 12:20

## 2020-01-27 RX ADMIN — DAPTOMYCIN 150 MG: 500 INJECTION, POWDER, LYOPHILIZED, FOR SOLUTION INTRAVENOUS at 14:33

## 2020-01-27 RX ADMIN — ENOXAPARIN SODIUM 40 MG: 40 INJECTION SUBCUTANEOUS at 15:06

## 2020-01-27 RX ADMIN — CEFTRIAXONE SODIUM 2 G: 2 INJECTION, POWDER, FOR SOLUTION INTRAMUSCULAR; INTRAVENOUS at 14:33

## 2020-01-27 RX ADMIN — METOPROLOL TARTRATE 12.5 MG: 25 TABLET ORAL at 08:00

## 2020-01-27 RX ADMIN — CYCLOBENZAPRINE HYDROCHLORIDE 5 MG: 10 TABLET, FILM COATED ORAL at 21:57

## 2020-01-27 RX ADMIN — CHLORHEXIDINE GLUCONATE 0.12% ORAL RINSE 15 ML: 1.2 LIQUID ORAL at 21:58

## 2020-01-27 RX ADMIN — CALCIUM GLUCONATE 1 G: 20 INJECTION, SOLUTION INTRAVENOUS at 18:51

## 2020-01-27 RX ADMIN — ACETYLCYSTEINE 3 ML: 200 SOLUTION ORAL; RESPIRATORY (INHALATION) at 06:56

## 2020-01-27 RX ADMIN — SODIUM BICARBONATE 650 MG TABLET 1300 MG: at 07:47

## 2020-01-27 RX ADMIN — ACETAMINOPHEN 500 MG: 500 TABLET, FILM COATED ORAL at 18:17

## 2020-01-27 RX ADMIN — ACETAMINOPHEN 500 MG: 500 TABLET, FILM COATED ORAL at 03:06

## 2020-01-27 RX ADMIN — MUPIROCIN 1 APPLICATION: 20 OINTMENT TOPICAL at 08:00

## 2020-01-27 RX ADMIN — AMPICILLIN SODIUM 2 G: 2 INJECTION, POWDER, FOR SOLUTION INTRAMUSCULAR; INTRAVENOUS at 05:36

## 2020-01-27 RX ADMIN — GABAPENTIN 200 MG: 100 CAPSULE ORAL at 08:00

## 2020-01-27 RX ADMIN — POTASSIUM CHLORIDE 20 MEQ: 1500 TABLET, EXTENDED RELEASE ORAL at 15:05

## 2020-01-27 RX ADMIN — POTASSIUM CHLORIDE 40 MEQ: 1500 TABLET, EXTENDED RELEASE ORAL at 07:47

## 2020-01-27 RX ADMIN — ASPIRIN 81 MG: 81 TABLET, DELAYED RELEASE ORAL at 08:00

## 2020-01-27 RX ADMIN — METOPROLOL TARTRATE 12.5 MG: 25 TABLET ORAL at 21:56

## 2020-01-27 RX ADMIN — AMLODIPINE BESYLATE 2.5 MG: 5 TABLET ORAL at 08:01

## 2020-01-27 RX ADMIN — FUROSEMIDE 40 MG: 40 TABLET ORAL at 08:00

## 2020-01-27 RX ADMIN — IPRATROPIUM BROMIDE AND ALBUTEROL SULFATE 3 ML: .5; 3 SOLUTION RESPIRATORY (INHALATION) at 15:10

## 2020-01-28 LAB
ALBUMIN SERPL-MCNC: 3.1 G/DL (ref 3.5–5.2)
ALBUMIN/GLOB SERPL: 1.1 G/DL
ALP SERPL-CCNC: 45 U/L (ref 39–117)
ALT SERPL W P-5'-P-CCNC: 27 U/L (ref 1–41)
ANION GAP SERPL CALCULATED.3IONS-SCNC: 11 MMOL/L (ref 5–15)
AST SERPL-CCNC: 27 U/L (ref 1–40)
BACTERIA SPEC AEROBE CULT: NORMAL
BASOPHILS # BLD AUTO: 0 10*3/MM3 (ref 0–0.2)
BASOPHILS NFR BLD AUTO: 0.3 % (ref 0–1.5)
BILIRUB SERPL-MCNC: 0.4 MG/DL (ref 0.2–1.2)
BUN BLD-MCNC: 13 MG/DL (ref 8–23)
BUN/CREAT SERPL: 12.7 (ref 7–25)
CA-I SERPL ISE-MCNC: 1.16 MMOL/L (ref 1.2–1.3)
CALCIUM SPEC-SCNC: 8.6 MG/DL (ref 8.6–10.5)
CHLORIDE SERPL-SCNC: 104 MMOL/L (ref 98–107)
CK SERPL-CCNC: 91 U/L (ref 20–200)
CO2 SERPL-SCNC: 22 MMOL/L (ref 22–29)
CREAT BLD-MCNC: 1.02 MG/DL (ref 0.76–1.27)
DEPRECATED RDW RBC AUTO: 45.9 FL (ref 37–54)
EOSINOPHIL # BLD AUTO: 0.6 10*3/MM3 (ref 0–0.4)
EOSINOPHIL NFR BLD AUTO: 7.1 % (ref 0.3–6.2)
ERYTHROCYTE [DISTWIDTH] IN BLOOD BY AUTOMATED COUNT: 15.7 % (ref 12.3–15.4)
GFR SERPL CREATININE-BSD FRML MDRD: 74 ML/MIN/1.73
GLOBULIN UR ELPH-MCNC: 2.8 GM/DL
GLUCOSE BLD-MCNC: 113 MG/DL (ref 65–99)
GLUCOSE BLDC GLUCOMTR-MCNC: 107 MG/DL (ref 70–105)
GLUCOSE BLDC GLUCOMTR-MCNC: 112 MG/DL (ref 70–105)
GLUCOSE BLDC GLUCOMTR-MCNC: 97 MG/DL (ref 70–105)
HCT VFR BLD AUTO: 23.5 % (ref 37.5–51)
HGB BLD-MCNC: 8 G/DL (ref 13–17.7)
LYMPHOCYTES # BLD AUTO: 1.1 10*3/MM3 (ref 0.7–3.1)
LYMPHOCYTES NFR BLD AUTO: 13.5 % (ref 19.6–45.3)
MAGNESIUM SERPL-MCNC: 2.2 MG/DL (ref 1.6–2.4)
MCH RBC QN AUTO: 28.3 PG (ref 26.6–33)
MCHC RBC AUTO-ENTMCNC: 34 G/DL (ref 31.5–35.7)
MCV RBC AUTO: 83.2 FL (ref 79–97)
MONOCYTES # BLD AUTO: 0.8 10*3/MM3 (ref 0.1–0.9)
MONOCYTES NFR BLD AUTO: 9.1 % (ref 5–12)
NEUTROPHILS # BLD AUTO: 5.9 10*3/MM3 (ref 1.7–7)
NEUTROPHILS NFR BLD AUTO: 70 % (ref 42.7–76)
NRBC BLD AUTO-RTO: 0 /100 WBC (ref 0–0.2)
PHOSPHATE SERPL-MCNC: 3.7 MG/DL (ref 2.5–4.5)
PLATELET # BLD AUTO: 226 10*3/MM3 (ref 140–450)
PMV BLD AUTO: 8.3 FL (ref 6–12)
POTASSIUM BLD-SCNC: 3.5 MMOL/L (ref 3.5–5.2)
POTASSIUM BLD-SCNC: 3.5 MMOL/L (ref 3.5–5.2)
PROT SERPL-MCNC: 5.9 G/DL (ref 6–8.5)
RBC # BLD AUTO: 2.82 10*6/MM3 (ref 4.14–5.8)
SODIUM BLD-SCNC: 137 MMOL/L (ref 136–145)
WBC NRBC COR # BLD: 8.4 10*3/MM3 (ref 3.4–10.8)

## 2020-01-28 PROCEDURE — 84100 ASSAY OF PHOSPHORUS: CPT | Performed by: INTERNAL MEDICINE

## 2020-01-28 PROCEDURE — 82962 GLUCOSE BLOOD TEST: CPT

## 2020-01-28 PROCEDURE — 82330 ASSAY OF CALCIUM: CPT | Performed by: INTERNAL MEDICINE

## 2020-01-28 PROCEDURE — 25010000002 ENOXAPARIN PER 10 MG: Performed by: NURSE PRACTITIONER

## 2020-01-28 PROCEDURE — 82550 ASSAY OF CK (CPK): CPT | Performed by: INTERNAL MEDICINE

## 2020-01-28 PROCEDURE — 85025 COMPLETE CBC W/AUTO DIFF WBC: CPT | Performed by: INTERNAL MEDICINE

## 2020-01-28 PROCEDURE — 94799 UNLISTED PULMONARY SVC/PX: CPT

## 2020-01-28 PROCEDURE — 83735 ASSAY OF MAGNESIUM: CPT | Performed by: INTERNAL MEDICINE

## 2020-01-28 PROCEDURE — 80053 COMPREHEN METABOLIC PANEL: CPT | Performed by: INTERNAL MEDICINE

## 2020-01-28 PROCEDURE — 84132 ASSAY OF SERUM POTASSIUM: CPT | Performed by: NURSE PRACTITIONER

## 2020-01-28 PROCEDURE — 25010000002 DAPTOMYCIN PER 1 MG: Performed by: INTERNAL MEDICINE

## 2020-01-28 PROCEDURE — 97110 THERAPEUTIC EXERCISES: CPT

## 2020-01-28 PROCEDURE — 25010000002 CEFTRIAXONE PER 250 MG: Performed by: INTERNAL MEDICINE

## 2020-01-28 RX ORDER — ACETAMINOPHEN 500 MG
500 TABLET ORAL EVERY 6 HOURS PRN
Start: 2020-01-28

## 2020-01-28 RX ORDER — CLONIDINE 0.2 MG/24H
1 PATCH, EXTENDED RELEASE TRANSDERMAL WEEKLY
Qty: 8 PATCH | Refills: 2 | Status: SHIPPED | OUTPATIENT
Start: 2020-01-31 | End: 2020-02-03

## 2020-01-28 RX ORDER — FUROSEMIDE 40 MG/1
40 TABLET ORAL DAILY
Qty: 30 TABLET | Refills: 1 | Status: SHIPPED | OUTPATIENT
Start: 2020-01-28 | End: 2020-02-03

## 2020-01-28 RX ORDER — POTASSIUM CHLORIDE 20 MEQ/1
20 TABLET, EXTENDED RELEASE ORAL DAILY
Qty: 30 TABLET | Refills: 1 | Status: SHIPPED | OUTPATIENT
Start: 2020-01-28 | End: 2020-02-03

## 2020-01-28 RX ORDER — CYCLOBENZAPRINE HCL 5 MG
5 TABLET ORAL 2 TIMES DAILY PRN
Qty: 15 TABLET | Refills: 0 | Status: SHIPPED | OUTPATIENT
Start: 2020-01-28 | End: 2020-02-03

## 2020-01-28 RX ADMIN — ACETAMINOPHEN 500 MG: 500 TABLET, FILM COATED ORAL at 06:19

## 2020-01-28 RX ADMIN — IPRATROPIUM BROMIDE AND ALBUTEROL SULFATE 3 ML: .5; 3 SOLUTION RESPIRATORY (INHALATION) at 11:25

## 2020-01-28 RX ADMIN — CEFTRIAXONE SODIUM 2 G: 2 INJECTION, POWDER, FOR SOLUTION INTRAMUSCULAR; INTRAVENOUS at 04:40

## 2020-01-28 RX ADMIN — ENOXAPARIN SODIUM 40 MG: 40 INJECTION SUBCUTANEOUS at 16:45

## 2020-01-28 RX ADMIN — DAPTOMYCIN 900 MG: 500 INJECTION, POWDER, LYOPHILIZED, FOR SOLUTION INTRAVENOUS at 12:21

## 2020-01-28 RX ADMIN — ASPIRIN 81 MG: 81 TABLET, DELAYED RELEASE ORAL at 08:05

## 2020-01-28 RX ADMIN — IPRATROPIUM BROMIDE AND ALBUTEROL SULFATE 3 ML: .5; 3 SOLUTION RESPIRATORY (INHALATION) at 19:35

## 2020-01-28 RX ADMIN — CEFTRIAXONE SODIUM 2 G: 2 INJECTION, POWDER, FOR SOLUTION INTRAMUSCULAR; INTRAVENOUS at 16:45

## 2020-01-28 RX ADMIN — IPRATROPIUM BROMIDE AND ALBUTEROL SULFATE 3 ML: .5; 3 SOLUTION RESPIRATORY (INHALATION) at 08:04

## 2020-01-28 RX ADMIN — POTASSIUM CHLORIDE 20 MEQ: 1500 TABLET, EXTENDED RELEASE ORAL at 06:19

## 2020-01-28 RX ADMIN — PANTOPRAZOLE SODIUM 40 MG: 40 TABLET, DELAYED RELEASE ORAL at 05:08

## 2020-01-28 RX ADMIN — ACETYLCYSTEINE 3 ML: 200 SOLUTION ORAL; RESPIRATORY (INHALATION) at 19:35

## 2020-01-28 RX ADMIN — GABAPENTIN 200 MG: 100 CAPSULE ORAL at 08:05

## 2020-01-28 RX ADMIN — FLUOXETINE 20 MG: 20 CAPSULE ORAL at 08:05

## 2020-01-28 RX ADMIN — ACETAMINOPHEN 500 MG: 500 TABLET, FILM COATED ORAL at 19:45

## 2020-01-28 RX ADMIN — METOPROLOL TARTRATE 12.5 MG: 25 TABLET ORAL at 20:26

## 2020-01-28 RX ADMIN — ACETAMINOPHEN 500 MG: 500 TABLET, FILM COATED ORAL at 00:04

## 2020-01-28 RX ADMIN — FUROSEMIDE 40 MG: 40 TABLET ORAL at 08:05

## 2020-01-28 RX ADMIN — GABAPENTIN 200 MG: 100 CAPSULE ORAL at 20:26

## 2020-01-28 RX ADMIN — CHLORHEXIDINE GLUCONATE 0.12% ORAL RINSE 15 ML: 1.2 LIQUID ORAL at 20:26

## 2020-01-28 RX ADMIN — IPRATROPIUM BROMIDE AND ALBUTEROL SULFATE 3 ML: .5; 3 SOLUTION RESPIRATORY (INHALATION) at 15:40

## 2020-01-28 RX ADMIN — METOPROLOL TARTRATE 12.5 MG: 25 TABLET ORAL at 08:05

## 2020-01-28 RX ADMIN — POTASSIUM CHLORIDE 20 MEQ: 1500 TABLET, EXTENDED RELEASE ORAL at 08:06

## 2020-01-28 RX ADMIN — ACETYLCYSTEINE 3 ML: 200 SOLUTION ORAL; RESPIRATORY (INHALATION) at 08:04

## 2020-01-29 ENCOUNTER — APPOINTMENT (OUTPATIENT)
Dept: CARDIOLOGY | Facility: HOSPITAL | Age: 65
End: 2020-01-29

## 2020-01-29 ENCOUNTER — APPOINTMENT (OUTPATIENT)
Dept: GENERAL RADIOLOGY | Facility: HOSPITAL | Age: 65
End: 2020-01-29

## 2020-01-29 LAB
ALBUMIN SERPL-MCNC: 3.2 G/DL (ref 3.5–5.2)
ALBUMIN/GLOB SERPL: 1.1 G/DL
ALP SERPL-CCNC: 48 U/L (ref 39–117)
ALT SERPL W P-5'-P-CCNC: 51 U/L (ref 1–41)
ANION GAP SERPL CALCULATED.3IONS-SCNC: 12 MMOL/L (ref 5–15)
AST SERPL-CCNC: 43 U/L (ref 1–40)
BASOPHILS # BLD AUTO: 0 10*3/MM3 (ref 0–0.2)
BASOPHILS NFR BLD AUTO: 0.3 % (ref 0–1.5)
BH CV ECHO MEAS - AO ROOT AREA (BSA CORRECTED): 1.3
BH CV ECHO MEAS - AO ROOT AREA: 7.3 CM^2
BH CV ECHO MEAS - AO ROOT DIAM: 3.1 CM
BH CV ECHO MEAS - BSA(HAYCOCK): 2.5 M^2
BH CV ECHO MEAS - BSA: 2.3 M^2
BH CV ECHO MEAS - BZI_BMI: 38.7 KILOGRAMS/M^2
BH CV ECHO MEAS - BZI_METRIC_HEIGHT: 175.3 CM
BH CV ECHO MEAS - BZI_METRIC_WEIGHT: 118.8 KG
BH CV ECHO MEAS - EDV(CUBED): 47 ML
BH CV ECHO MEAS - EDV(TEICH): 54.8 ML
BH CV ECHO MEAS - EF(CUBED): 70.9 %
BH CV ECHO MEAS - EF(TEICH): 63.5 %
BH CV ECHO MEAS - ESV(CUBED): 13.7 ML
BH CV ECHO MEAS - ESV(TEICH): 20 ML
BH CV ECHO MEAS - FS: 33.7 %
BH CV ECHO MEAS - IVS/LVPW: 1.3
BH CV ECHO MEAS - IVSD: 2 CM
BH CV ECHO MEAS - LA DIMENSION(2D): 4.3 CM
BH CV ECHO MEAS - LV MASS(C)D: 255.2 GRAMS
BH CV ECHO MEAS - LV MASS(C)DI: 110.1 GRAMS/M^2
BH CV ECHO MEAS - LVIDD: 3.6 CM
BH CV ECHO MEAS - LVIDS: 2.4 CM
BH CV ECHO MEAS - LVOT AREA: 3.3 CM^2
BH CV ECHO MEAS - LVOT DIAM: 2.1 CM
BH CV ECHO MEAS - LVPWD: 1.5 CM
BH CV ECHO MEAS - MV A MAX VEL: 153.8 CM/SEC
BH CV ECHO MEAS - MV DEC SLOPE: 410.3 CM/SEC^2
BH CV ECHO MEAS - MV DEC TIME: 0.38 SEC
BH CV ECHO MEAS - MV E MAX VEL: 157 CM/SEC
BH CV ECHO MEAS - MV E/A: 1
BH CV ECHO MEAS - MV MAX PG: 10.4 MMHG
BH CV ECHO MEAS - MV MEAN PG: 5.2 MMHG
BH CV ECHO MEAS - MV V2 MAX: 160.9 CM/SEC
BH CV ECHO MEAS - MV V2 MEAN: 108.8 CM/SEC
BH CV ECHO MEAS - MV V2 VTI: 54.2 CM
BH CV ECHO MEAS - RAP SYSTOLE: 3 MMHG
BH CV ECHO MEAS - RVDD: 2.6 CM
BH CV ECHO MEAS - RVSP: 14.5 MMHG
BH CV ECHO MEAS - SI(CUBED): 14.4 ML/M^2
BH CV ECHO MEAS - SI(TEICH): 15 ML/M^2
BH CV ECHO MEAS - SV(CUBED): 33.3 ML
BH CV ECHO MEAS - SV(TEICH): 34.8 ML
BH CV ECHO MEAS - TR MAX VEL: 169.8 CM/SEC
BILIRUB SERPL-MCNC: 0.3 MG/DL (ref 0.2–1.2)
BUN BLD-MCNC: 12 MG/DL (ref 8–23)
BUN/CREAT SERPL: 13.2 (ref 7–25)
CALCIUM SPEC-SCNC: 8.4 MG/DL (ref 8.6–10.5)
CHLORIDE SERPL-SCNC: 103 MMOL/L (ref 98–107)
CK SERPL-CCNC: 91 U/L (ref 20–200)
CO2 SERPL-SCNC: 21 MMOL/L (ref 22–29)
CREAT BLD-MCNC: 0.91 MG/DL (ref 0.76–1.27)
DEPRECATED RDW RBC AUTO: 46.8 FL (ref 37–54)
EOSINOPHIL # BLD AUTO: 0.6 10*3/MM3 (ref 0–0.4)
EOSINOPHIL NFR BLD AUTO: 7.5 % (ref 0.3–6.2)
ERYTHROCYTE [DISTWIDTH] IN BLOOD BY AUTOMATED COUNT: 16.1 % (ref 12.3–15.4)
GFR SERPL CREATININE-BSD FRML MDRD: 84 ML/MIN/1.73
GLOBULIN UR ELPH-MCNC: 3 GM/DL
GLUCOSE BLD-MCNC: 119 MG/DL (ref 65–99)
GLUCOSE BLDC GLUCOMTR-MCNC: 143 MG/DL (ref 70–105)
GLUCOSE BLDC GLUCOMTR-MCNC: 155 MG/DL (ref 70–105)
GLUCOSE BLDC GLUCOMTR-MCNC: 92 MG/DL (ref 70–105)
GLUCOSE BLDC GLUCOMTR-MCNC: 99 MG/DL (ref 70–105)
HCT VFR BLD AUTO: 25.9 % (ref 37.5–51)
HGB BLD-MCNC: 8.6 G/DL (ref 13–17.7)
LYMPHOCYTES # BLD AUTO: 1 10*3/MM3 (ref 0.7–3.1)
LYMPHOCYTES NFR BLD AUTO: 12.7 % (ref 19.6–45.3)
MAGNESIUM SERPL-MCNC: 2.3 MG/DL (ref 1.6–2.4)
MAXIMAL PREDICTED HEART RATE: 156 BPM
MCH RBC QN AUTO: 27.1 PG (ref 26.6–33)
MCHC RBC AUTO-ENTMCNC: 33.1 G/DL (ref 31.5–35.7)
MCV RBC AUTO: 82 FL (ref 79–97)
MONOCYTES # BLD AUTO: 0.7 10*3/MM3 (ref 0.1–0.9)
MONOCYTES NFR BLD AUTO: 8.3 % (ref 5–12)
NEUTROPHILS # BLD AUTO: 5.7 10*3/MM3 (ref 1.7–7)
NEUTROPHILS NFR BLD AUTO: 71.2 % (ref 42.7–76)
NRBC BLD AUTO-RTO: 0.1 /100 WBC (ref 0–0.2)
PHOSPHATE SERPL-MCNC: 3.5 MG/DL (ref 2.5–4.5)
PLATELET # BLD AUTO: 297 10*3/MM3 (ref 140–450)
PMV BLD AUTO: 7.9 FL (ref 6–12)
POTASSIUM BLD-SCNC: 3.4 MMOL/L (ref 3.5–5.2)
PROT SERPL-MCNC: 6.2 G/DL (ref 6–8.5)
RBC # BLD AUTO: 3.16 10*6/MM3 (ref 4.14–5.8)
SODIUM BLD-SCNC: 136 MMOL/L (ref 136–145)
STRESS TARGET HR: 133 BPM
WBC NRBC COR # BLD: 8.1 10*3/MM3 (ref 3.4–10.8)

## 2020-01-29 PROCEDURE — 83735 ASSAY OF MAGNESIUM: CPT | Performed by: INTERNAL MEDICINE

## 2020-01-29 PROCEDURE — 94799 UNLISTED PULMONARY SVC/PX: CPT

## 2020-01-29 PROCEDURE — 93306 TTE W/DOPPLER COMPLETE: CPT

## 2020-01-29 PROCEDURE — 25010000002 MAGNESIUM SULFATE 2 GM/50ML SOLUTION: Performed by: NURSE PRACTITIONER

## 2020-01-29 PROCEDURE — 25010000002 AMIODARONE IN DEXTROSE 5% 150-4.21 MG/100ML-% SOLUTION: Performed by: INTERNAL MEDICINE

## 2020-01-29 PROCEDURE — 93010 ELECTROCARDIOGRAM REPORT: CPT | Performed by: INTERNAL MEDICINE

## 2020-01-29 PROCEDURE — 25010000002 AMIODARONE PER 30 MG: Performed by: INTERNAL MEDICINE

## 2020-01-29 PROCEDURE — 99024 POSTOP FOLLOW-UP VISIT: CPT | Performed by: NURSE PRACTITIONER

## 2020-01-29 PROCEDURE — 97110 THERAPEUTIC EXERCISES: CPT

## 2020-01-29 PROCEDURE — 99233 SBSQ HOSP IP/OBS HIGH 50: CPT | Performed by: INTERNAL MEDICINE

## 2020-01-29 PROCEDURE — 97530 THERAPEUTIC ACTIVITIES: CPT

## 2020-01-29 PROCEDURE — 25010000002 DAPTOMYCIN PER 1 MG: Performed by: INTERNAL MEDICINE

## 2020-01-29 PROCEDURE — 93321 DOPPLER ECHO F-UP/LMTD STD: CPT

## 2020-01-29 PROCEDURE — 84100 ASSAY OF PHOSPHORUS: CPT | Performed by: INTERNAL MEDICINE

## 2020-01-29 PROCEDURE — 82962 GLUCOSE BLOOD TEST: CPT

## 2020-01-29 PROCEDURE — 93306 TTE W/DOPPLER COMPLETE: CPT | Performed by: INTERNAL MEDICINE

## 2020-01-29 PROCEDURE — 85025 COMPLETE CBC W/AUTO DIFF WBC: CPT | Performed by: INTERNAL MEDICINE

## 2020-01-29 PROCEDURE — 93005 ELECTROCARDIOGRAM TRACING: CPT | Performed by: THORACIC SURGERY (CARDIOTHORACIC VASCULAR SURGERY)

## 2020-01-29 PROCEDURE — 71045 X-RAY EXAM CHEST 1 VIEW: CPT

## 2020-01-29 PROCEDURE — 82550 ASSAY OF CK (CPK): CPT | Performed by: INTERNAL MEDICINE

## 2020-01-29 PROCEDURE — 93308 TTE F-UP OR LMTD: CPT

## 2020-01-29 PROCEDURE — 25010000002 CEFTRIAXONE PER 250 MG: Performed by: INTERNAL MEDICINE

## 2020-01-29 PROCEDURE — 25010000002 ENOXAPARIN PER 10 MG: Performed by: NURSE PRACTITIONER

## 2020-01-29 PROCEDURE — 80053 COMPREHEN METABOLIC PANEL: CPT | Performed by: INTERNAL MEDICINE

## 2020-01-29 PROCEDURE — 63710000001 INSULIN LISPRO (HUMAN) PER 5 UNITS: Performed by: NURSE PRACTITIONER

## 2020-01-29 PROCEDURE — 93325 DOPPLER ECHO COLOR FLOW MAPG: CPT

## 2020-01-29 RX ORDER — MAGNESIUM SULFATE HEPTAHYDRATE 40 MG/ML
2 INJECTION, SOLUTION INTRAVENOUS ONCE
Status: COMPLETED | OUTPATIENT
Start: 2020-01-29 | End: 2020-01-29

## 2020-01-29 RX ORDER — AMIODARONE HCL/D5W 450 MG/250
1 PLASTIC BAG, INJECTION (ML) INTRAVENOUS CONTINUOUS
Status: DISCONTINUED | OUTPATIENT
Start: 2020-01-29 | End: 2020-01-29

## 2020-01-29 RX ORDER — AMIODARONE HCL/D5W 450 MG/250
0.5 PLASTIC BAG, INJECTION (ML) INTRAVENOUS CONTINUOUS
Status: DISCONTINUED | OUTPATIENT
Start: 2020-01-29 | End: 2020-01-30

## 2020-01-29 RX ORDER — POTASSIUM CHLORIDE 20 MEQ/1
20 TABLET, EXTENDED RELEASE ORAL ONCE
Status: COMPLETED | OUTPATIENT
Start: 2020-01-29 | End: 2020-01-29

## 2020-01-29 RX ADMIN — IPRATROPIUM BROMIDE AND ALBUTEROL SULFATE 3 ML: .5; 3 SOLUTION RESPIRATORY (INHALATION) at 19:08

## 2020-01-29 RX ADMIN — GABAPENTIN 200 MG: 100 CAPSULE ORAL at 09:12

## 2020-01-29 RX ADMIN — CEFTRIAXONE SODIUM 2 G: 2 INJECTION, POWDER, FOR SOLUTION INTRAMUSCULAR; INTRAVENOUS at 14:17

## 2020-01-29 RX ADMIN — ACETAMINOPHEN 500 MG: 500 TABLET, FILM COATED ORAL at 01:04

## 2020-01-29 RX ADMIN — ASPIRIN 81 MG: 81 TABLET, DELAYED RELEASE ORAL at 09:11

## 2020-01-29 RX ADMIN — GABAPENTIN 200 MG: 100 CAPSULE ORAL at 20:56

## 2020-01-29 RX ADMIN — IPRATROPIUM BROMIDE AND ALBUTEROL SULFATE 3 ML: .5; 3 SOLUTION RESPIRATORY (INHALATION) at 15:30

## 2020-01-29 RX ADMIN — AMIODARONE HYDROCHLORIDE 150 MG: 1.5 INJECTION, SOLUTION INTRAVENOUS at 10:49

## 2020-01-29 RX ADMIN — IPRATROPIUM BROMIDE AND ALBUTEROL SULFATE 3 ML: .5; 3 SOLUTION RESPIRATORY (INHALATION) at 11:07

## 2020-01-29 RX ADMIN — METOPROLOL TARTRATE 25 MG: 25 TABLET ORAL at 20:56

## 2020-01-29 RX ADMIN — POTASSIUM CHLORIDE 20 MEQ: 1500 TABLET, EXTENDED RELEASE ORAL at 07:08

## 2020-01-29 RX ADMIN — PANTOPRAZOLE SODIUM 40 MG: 40 TABLET, DELAYED RELEASE ORAL at 06:09

## 2020-01-29 RX ADMIN — METOPROLOL TARTRATE 12.5 MG: 25 TABLET ORAL at 10:49

## 2020-01-29 RX ADMIN — FUROSEMIDE 40 MG: 40 TABLET ORAL at 09:12

## 2020-01-29 RX ADMIN — DAPTOMYCIN 900 MG: 500 INJECTION, POWDER, LYOPHILIZED, FOR SOLUTION INTRAVENOUS at 11:30

## 2020-01-29 RX ADMIN — AMIODARONE HYDROCHLORIDE 0.5 MG/MIN: 50 INJECTION, SOLUTION INTRAVENOUS at 20:56

## 2020-01-29 RX ADMIN — METOPROLOL TARTRATE 12.5 MG: 25 TABLET ORAL at 09:12

## 2020-01-29 RX ADMIN — CYCLOBENZAPRINE HYDROCHLORIDE 5 MG: 10 TABLET, FILM COATED ORAL at 01:04

## 2020-01-29 RX ADMIN — AMIODARONE HYDROCHLORIDE 1 MG/MIN: 50 INJECTION, SOLUTION INTRAVENOUS at 10:49

## 2020-01-29 RX ADMIN — IPRATROPIUM BROMIDE AND ALBUTEROL SULFATE 3 ML: .5; 3 SOLUTION RESPIRATORY (INHALATION) at 07:08

## 2020-01-29 RX ADMIN — POLYETHYLENE GLYCOL 3350 17 G: 17 POWDER, FOR SOLUTION ORAL at 09:12

## 2020-01-29 RX ADMIN — CEFTRIAXONE SODIUM 2 G: 2 INJECTION, POWDER, FOR SOLUTION INTRAMUSCULAR; INTRAVENOUS at 01:04

## 2020-01-29 RX ADMIN — POTASSIUM CHLORIDE 20 MEQ: 1500 TABLET, EXTENDED RELEASE ORAL at 09:12

## 2020-01-29 RX ADMIN — POTASSIUM CHLORIDE 20 MEQ: 1500 TABLET, EXTENDED RELEASE ORAL at 09:18

## 2020-01-29 RX ADMIN — MAGNESIUM SULFATE HEPTAHYDRATE 2 G: 40 INJECTION, SOLUTION INTRAVENOUS at 10:49

## 2020-01-29 RX ADMIN — ACETYLCYSTEINE 3 ML: 200 SOLUTION ORAL; RESPIRATORY (INHALATION) at 07:09

## 2020-01-29 RX ADMIN — CHLORHEXIDINE GLUCONATE 0.12% ORAL RINSE 15 ML: 1.2 LIQUID ORAL at 09:11

## 2020-01-29 RX ADMIN — INSULIN LISPRO 3 UNITS: 100 INJECTION, SOLUTION INTRAVENOUS; SUBCUTANEOUS at 17:15

## 2020-01-29 RX ADMIN — ACETAMINOPHEN 500 MG: 500 TABLET, FILM COATED ORAL at 21:00

## 2020-01-29 RX ADMIN — FLUOXETINE 20 MG: 20 CAPSULE ORAL at 09:12

## 2020-01-29 RX ADMIN — AMIODARONE HYDROCHLORIDE 0.5 MG/MIN: 50 INJECTION, SOLUTION INTRAVENOUS at 17:17

## 2020-01-29 RX ADMIN — ACETAMINOPHEN 500 MG: 500 TABLET, FILM COATED ORAL at 10:48

## 2020-01-29 RX ADMIN — ACETYLCYSTEINE 3 ML: 200 SOLUTION ORAL; RESPIRATORY (INHALATION) at 19:08

## 2020-01-29 RX ADMIN — ENOXAPARIN SODIUM 40 MG: 40 INJECTION SUBCUTANEOUS at 17:13

## 2020-01-29 RX ADMIN — SENNOSIDES 1 TABLET: 8.6 TABLET, FILM COATED ORAL at 09:12

## 2020-01-30 PROBLEM — I47.29 NON-SUSTAINED VENTRICULAR TACHYCARDIA (HCC): Status: ACTIVE | Noted: 2020-01-30

## 2020-01-30 LAB
ALBUMIN SERPL-MCNC: 3.3 G/DL (ref 3.5–5.2)
ALBUMIN/GLOB SERPL: 1.1 G/DL
ALP SERPL-CCNC: 54 U/L (ref 39–117)
ALT SERPL W P-5'-P-CCNC: 43 U/L (ref 1–41)
ANION GAP SERPL CALCULATED.3IONS-SCNC: 12 MMOL/L (ref 5–15)
ANISOCYTOSIS BLD QL: ABNORMAL
AST SERPL-CCNC: 36 U/L (ref 1–40)
BILIRUB SERPL-MCNC: 0.2 MG/DL (ref 0.2–1.2)
BUN BLD-MCNC: 16 MG/DL (ref 8–23)
BUN/CREAT SERPL: 16.7 (ref 7–25)
CALCIUM SPEC-SCNC: 8.4 MG/DL (ref 8.6–10.5)
CHLORIDE SERPL-SCNC: 105 MMOL/L (ref 98–107)
CK SERPL-CCNC: 93 U/L (ref 20–200)
CO2 SERPL-SCNC: 20 MMOL/L (ref 22–29)
CREAT BLD-MCNC: 0.96 MG/DL (ref 0.76–1.27)
DEPRECATED RDW RBC AUTO: 45.1 FL (ref 37–54)
EOSINOPHIL # BLD MANUAL: 0.22 10*3/MM3 (ref 0–0.4)
EOSINOPHIL NFR BLD MANUAL: 2 % (ref 0.3–6.2)
ERYTHROCYTE [DISTWIDTH] IN BLOOD BY AUTOMATED COUNT: 15.6 % (ref 12.3–15.4)
GFR SERPL CREATININE-BSD FRML MDRD: 79 ML/MIN/1.73
GLOBULIN UR ELPH-MCNC: 3 GM/DL
GLUCOSE BLD-MCNC: 163 MG/DL (ref 65–99)
GLUCOSE BLDC GLUCOMTR-MCNC: 113 MG/DL (ref 70–105)
GLUCOSE BLDC GLUCOMTR-MCNC: 185 MG/DL (ref 70–105)
GLUCOSE BLDC GLUCOMTR-MCNC: 95 MG/DL (ref 70–105)
HCT VFR BLD AUTO: 26.4 % (ref 37.5–51)
HGB BLD-MCNC: 8.8 G/DL (ref 13–17.7)
LYMPHOCYTES # BLD MANUAL: 0.97 10*3/MM3 (ref 0.7–3.1)
LYMPHOCYTES NFR BLD MANUAL: 7 % (ref 5–12)
LYMPHOCYTES NFR BLD MANUAL: 9 % (ref 19.6–45.3)
MAGNESIUM SERPL-MCNC: 2.5 MG/DL (ref 1.6–2.4)
MCH RBC QN AUTO: 27.3 PG (ref 26.6–33)
MCHC RBC AUTO-ENTMCNC: 33.2 G/DL (ref 31.5–35.7)
MCV RBC AUTO: 82.4 FL (ref 79–97)
MONOCYTES # BLD AUTO: 0.76 10*3/MM3 (ref 0.1–0.9)
NEUTROPHILS # BLD AUTO: 8.86 10*3/MM3 (ref 1.7–7)
NEUTROPHILS NFR BLD MANUAL: 77 % (ref 42.7–76)
NEUTS BAND NFR BLD MANUAL: 5 % (ref 0–5)
PHOSPHATE SERPL-MCNC: 3.4 MG/DL (ref 2.5–4.5)
PLAT MORPH BLD: NORMAL
PLATELET # BLD AUTO: 345 10*3/MM3 (ref 140–450)
PMV BLD AUTO: 7.9 FL (ref 6–12)
POLYCHROMASIA BLD QL SMEAR: ABNORMAL
POTASSIUM BLD-SCNC: 3.9 MMOL/L (ref 3.5–5.2)
PROT SERPL-MCNC: 6.3 G/DL (ref 6–8.5)
RBC # BLD AUTO: 3.2 10*6/MM3 (ref 4.14–5.8)
SCAN SLIDE: NORMAL
SODIUM BLD-SCNC: 137 MMOL/L (ref 136–145)
WBC MORPH BLD: NORMAL
WBC NRBC COR # BLD: 10.8 10*3/MM3 (ref 3.4–10.8)

## 2020-01-30 PROCEDURE — 85007 BL SMEAR W/DIFF WBC COUNT: CPT | Performed by: INTERNAL MEDICINE

## 2020-01-30 PROCEDURE — 93010 ELECTROCARDIOGRAM REPORT: CPT | Performed by: INTERNAL MEDICINE

## 2020-01-30 PROCEDURE — 97116 GAIT TRAINING THERAPY: CPT

## 2020-01-30 PROCEDURE — 25010000002 CEFTRIAXONE PER 250 MG: Performed by: INTERNAL MEDICINE

## 2020-01-30 PROCEDURE — 25010000002 DAPTOMYCIN PER 1 MG: Performed by: INTERNAL MEDICINE

## 2020-01-30 PROCEDURE — 25010000002 AMIODARONE PER 30 MG: Performed by: INTERNAL MEDICINE

## 2020-01-30 PROCEDURE — 99024 POSTOP FOLLOW-UP VISIT: CPT | Performed by: NURSE PRACTITIONER

## 2020-01-30 PROCEDURE — 82962 GLUCOSE BLOOD TEST: CPT

## 2020-01-30 PROCEDURE — 97112 NEUROMUSCULAR REEDUCATION: CPT

## 2020-01-30 PROCEDURE — 82550 ASSAY OF CK (CPK): CPT | Performed by: INTERNAL MEDICINE

## 2020-01-30 PROCEDURE — 63710000001 INSULIN LISPRO (HUMAN) PER 5 UNITS: Performed by: NURSE PRACTITIONER

## 2020-01-30 PROCEDURE — 97530 THERAPEUTIC ACTIVITIES: CPT

## 2020-01-30 PROCEDURE — 83735 ASSAY OF MAGNESIUM: CPT | Performed by: INTERNAL MEDICINE

## 2020-01-30 PROCEDURE — 99232 SBSQ HOSP IP/OBS MODERATE 35: CPT | Performed by: INTERNAL MEDICINE

## 2020-01-30 PROCEDURE — 94799 UNLISTED PULMONARY SVC/PX: CPT

## 2020-01-30 PROCEDURE — 97535 SELF CARE MNGMENT TRAINING: CPT

## 2020-01-30 PROCEDURE — 84100 ASSAY OF PHOSPHORUS: CPT | Performed by: INTERNAL MEDICINE

## 2020-01-30 PROCEDURE — 85025 COMPLETE CBC W/AUTO DIFF WBC: CPT | Performed by: INTERNAL MEDICINE

## 2020-01-30 PROCEDURE — 97110 THERAPEUTIC EXERCISES: CPT

## 2020-01-30 PROCEDURE — 80053 COMPREHEN METABOLIC PANEL: CPT | Performed by: INTERNAL MEDICINE

## 2020-01-30 RX ORDER — DOCUSATE SODIUM 100 MG/1
100 CAPSULE, LIQUID FILLED ORAL 2 TIMES DAILY
Status: DISCONTINUED | OUTPATIENT
Start: 2020-01-30 | End: 2020-02-03 | Stop reason: HOSPADM

## 2020-01-30 RX ORDER — SODIUM BICARBONATE 650 MG/1
1300 TABLET ORAL ONCE
Status: COMPLETED | OUTPATIENT
Start: 2020-01-30 | End: 2020-01-30

## 2020-01-30 RX ORDER — LACTULOSE 10 G/15ML
30 SOLUTION ORAL DAILY
Status: DISCONTINUED | OUTPATIENT
Start: 2020-01-30 | End: 2020-01-30

## 2020-01-30 RX ORDER — AMIODARONE HYDROCHLORIDE 200 MG/1
200 TABLET ORAL EVERY 12 HOURS SCHEDULED
Status: DISCONTINUED | OUTPATIENT
Start: 2020-01-30 | End: 2020-02-02

## 2020-01-30 RX ADMIN — GABAPENTIN 200 MG: 100 CAPSULE ORAL at 08:07

## 2020-01-30 RX ADMIN — DAPTOMYCIN 900 MG: 500 INJECTION, POWDER, LYOPHILIZED, FOR SOLUTION INTRAVENOUS at 12:31

## 2020-01-30 RX ADMIN — PANTOPRAZOLE SODIUM 40 MG: 40 TABLET, DELAYED RELEASE ORAL at 06:21

## 2020-01-30 RX ADMIN — IPRATROPIUM BROMIDE AND ALBUTEROL SULFATE 3 ML: .5; 3 SOLUTION RESPIRATORY (INHALATION) at 20:03

## 2020-01-30 RX ADMIN — CEFTRIAXONE SODIUM 2 G: 2 INJECTION, POWDER, FOR SOLUTION INTRAMUSCULAR; INTRAVENOUS at 04:27

## 2020-01-30 RX ADMIN — AMIODARONE HYDROCHLORIDE 200 MG: 200 TABLET ORAL at 20:29

## 2020-01-30 RX ADMIN — ACETAMINOPHEN 500 MG: 500 TABLET, FILM COATED ORAL at 06:21

## 2020-01-30 RX ADMIN — FLUOXETINE 20 MG: 20 CAPSULE ORAL at 08:08

## 2020-01-30 RX ADMIN — METOPROLOL TARTRATE 25 MG: 25 TABLET ORAL at 08:08

## 2020-01-30 RX ADMIN — GABAPENTIN 200 MG: 100 CAPSULE ORAL at 20:24

## 2020-01-30 RX ADMIN — ACETYLCYSTEINE 4 ML: 200 SOLUTION ORAL; RESPIRATORY (INHALATION) at 20:03

## 2020-01-30 RX ADMIN — SODIUM BICARBONATE 650 MG TABLET 1300 MG: at 08:08

## 2020-01-30 RX ADMIN — AMIODARONE HYDROCHLORIDE 0.5 MG/MIN: 50 INJECTION, SOLUTION INTRAVENOUS at 09:20

## 2020-01-30 RX ADMIN — METOPROLOL TARTRATE 25 MG: 25 TABLET ORAL at 20:24

## 2020-01-30 RX ADMIN — FUROSEMIDE 40 MG: 40 TABLET ORAL at 08:07

## 2020-01-30 RX ADMIN — ACETAMINOPHEN 500 MG: 500 TABLET, FILM COATED ORAL at 20:24

## 2020-01-30 RX ADMIN — CHLORHEXIDINE GLUCONATE 0.12% ORAL RINSE 15 ML: 1.2 LIQUID ORAL at 08:08

## 2020-01-30 RX ADMIN — ASPIRIN 81 MG: 81 TABLET, DELAYED RELEASE ORAL at 08:07

## 2020-01-30 RX ADMIN — POTASSIUM CHLORIDE 20 MEQ: 1500 TABLET, EXTENDED RELEASE ORAL at 08:08

## 2020-01-30 RX ADMIN — INSULIN LISPRO 3 UNITS: 100 INJECTION, SOLUTION INTRAVENOUS; SUBCUTANEOUS at 20:28

## 2020-01-31 LAB
ALBUMIN SERPL-MCNC: 3 G/DL (ref 3.5–5.2)
ALBUMIN/GLOB SERPL: 0.9 G/DL
ALP SERPL-CCNC: 51 U/L (ref 39–117)
ALT SERPL W P-5'-P-CCNC: 49 U/L (ref 1–41)
ANION GAP SERPL CALCULATED.3IONS-SCNC: 11 MMOL/L (ref 5–15)
AST SERPL-CCNC: 34 U/L (ref 1–40)
BASOPHILS # BLD AUTO: 0.1 10*3/MM3 (ref 0–0.2)
BASOPHILS NFR BLD AUTO: 1.5 % (ref 0–1.5)
BILIRUB SERPL-MCNC: 0.2 MG/DL (ref 0.2–1.2)
BUN BLD-MCNC: 17 MG/DL (ref 8–23)
BUN/CREAT SERPL: 17.7 (ref 7–25)
CALCIUM SPEC-SCNC: 8.5 MG/DL (ref 8.6–10.5)
CHLORIDE SERPL-SCNC: 106 MMOL/L (ref 98–107)
CK SERPL-CCNC: 66 U/L (ref 20–200)
CO2 SERPL-SCNC: 21 MMOL/L (ref 22–29)
CREAT BLD-MCNC: 0.96 MG/DL (ref 0.76–1.27)
DEPRECATED RDW RBC AUTO: 46.8 FL (ref 37–54)
EOSINOPHIL # BLD AUTO: 0.6 10*3/MM3 (ref 0–0.4)
EOSINOPHIL NFR BLD AUTO: 6.8 % (ref 0.3–6.2)
ERYTHROCYTE [DISTWIDTH] IN BLOOD BY AUTOMATED COUNT: 15.7 % (ref 12.3–15.4)
GFR SERPL CREATININE-BSD FRML MDRD: 79 ML/MIN/1.73
GLOBULIN UR ELPH-MCNC: 3.3 GM/DL
GLUCOSE BLD-MCNC: 116 MG/DL (ref 65–99)
GLUCOSE BLDC GLUCOMTR-MCNC: 101 MG/DL (ref 70–105)
GLUCOSE BLDC GLUCOMTR-MCNC: 116 MG/DL (ref 70–105)
GLUCOSE BLDC GLUCOMTR-MCNC: 124 MG/DL (ref 70–105)
HCT VFR BLD AUTO: 24.9 % (ref 37.5–51)
HGB BLD-MCNC: 8.3 G/DL (ref 13–17.7)
LYMPHOCYTES # BLD AUTO: 1.3 10*3/MM3 (ref 0.7–3.1)
LYMPHOCYTES NFR BLD AUTO: 14.2 % (ref 19.6–45.3)
MCH RBC QN AUTO: 27.7 PG (ref 26.6–33)
MCHC RBC AUTO-ENTMCNC: 33.3 G/DL (ref 31.5–35.7)
MCV RBC AUTO: 83.2 FL (ref 79–97)
MONOCYTES # BLD AUTO: 0.7 10*3/MM3 (ref 0.1–0.9)
MONOCYTES NFR BLD AUTO: 7.3 % (ref 5–12)
NEUTROPHILS # BLD AUTO: 6.2 10*3/MM3 (ref 1.7–7)
NEUTROPHILS NFR BLD AUTO: 70.2 % (ref 42.7–76)
NRBC BLD AUTO-RTO: 0 /100 WBC (ref 0–0.2)
PLATELET # BLD AUTO: 352 10*3/MM3 (ref 140–450)
PMV BLD AUTO: 8 FL (ref 6–12)
POTASSIUM BLD-SCNC: 3.6 MMOL/L (ref 3.5–5.2)
PROT SERPL-MCNC: 6.3 G/DL (ref 6–8.5)
RBC # BLD AUTO: 3 10*6/MM3 (ref 4.14–5.8)
SODIUM BLD-SCNC: 138 MMOL/L (ref 136–145)
WBC NRBC COR # BLD: 8.9 10*3/MM3 (ref 3.4–10.8)

## 2020-01-31 PROCEDURE — 94799 UNLISTED PULMONARY SVC/PX: CPT

## 2020-01-31 PROCEDURE — 85025 COMPLETE CBC W/AUTO DIFF WBC: CPT | Performed by: INTERNAL MEDICINE

## 2020-01-31 PROCEDURE — 92526 ORAL FUNCTION THERAPY: CPT

## 2020-01-31 PROCEDURE — 97535 SELF CARE MNGMENT TRAINING: CPT

## 2020-01-31 PROCEDURE — 99232 SBSQ HOSP IP/OBS MODERATE 35: CPT | Performed by: INTERNAL MEDICINE

## 2020-01-31 PROCEDURE — 25010000002 CEFTRIAXONE PER 250 MG: Performed by: INTERNAL MEDICINE

## 2020-01-31 PROCEDURE — 82962 GLUCOSE BLOOD TEST: CPT

## 2020-01-31 PROCEDURE — 25010000002 DAPTOMYCIN PER 1 MG: Performed by: INTERNAL MEDICINE

## 2020-01-31 PROCEDURE — 25010000002 ENOXAPARIN PER 10 MG: Performed by: NURSE PRACTITIONER

## 2020-01-31 PROCEDURE — 82550 ASSAY OF CK (CPK): CPT | Performed by: INTERNAL MEDICINE

## 2020-01-31 PROCEDURE — 80053 COMPREHEN METABOLIC PANEL: CPT | Performed by: INTERNAL MEDICINE

## 2020-01-31 PROCEDURE — 99024 POSTOP FOLLOW-UP VISIT: CPT | Performed by: NURSE PRACTITIONER

## 2020-01-31 PROCEDURE — 97530 THERAPEUTIC ACTIVITIES: CPT

## 2020-01-31 PROCEDURE — 97112 NEUROMUSCULAR REEDUCATION: CPT

## 2020-01-31 RX ORDER — CLONIDINE HYDROCHLORIDE 0.1 MG/1
0.1 TABLET ORAL EVERY 8 HOURS SCHEDULED
Status: DISCONTINUED | OUTPATIENT
Start: 2020-01-31 | End: 2020-02-01

## 2020-01-31 RX ORDER — POTASSIUM CHLORIDE 20 MEQ/1
40 TABLET, EXTENDED RELEASE ORAL ONCE
Status: COMPLETED | OUTPATIENT
Start: 2020-01-31 | End: 2020-01-31

## 2020-01-31 RX ORDER — SODIUM BICARBONATE 650 MG/1
1300 TABLET ORAL ONCE
Status: COMPLETED | OUTPATIENT
Start: 2020-01-31 | End: 2020-01-31

## 2020-01-31 RX ADMIN — ACETYLCYSTEINE 3 ML: 200 SOLUTION ORAL; RESPIRATORY (INHALATION) at 06:40

## 2020-01-31 RX ADMIN — DAPTOMYCIN 900 MG: 500 INJECTION, POWDER, LYOPHILIZED, FOR SOLUTION INTRAVENOUS at 13:29

## 2020-01-31 RX ADMIN — CLONIDINE HYDROCHLORIDE 0.1 MG: 0.1 TABLET ORAL at 21:43

## 2020-01-31 RX ADMIN — AMIODARONE HYDROCHLORIDE 200 MG: 200 TABLET ORAL at 09:34

## 2020-01-31 RX ADMIN — ASPIRIN 81 MG: 81 TABLET, DELAYED RELEASE ORAL at 09:34

## 2020-01-31 RX ADMIN — CEFTRIAXONE SODIUM 2 G: 2 INJECTION, POWDER, FOR SOLUTION INTRAMUSCULAR; INTRAVENOUS at 02:58

## 2020-01-31 RX ADMIN — GABAPENTIN 200 MG: 100 CAPSULE ORAL at 21:43

## 2020-01-31 RX ADMIN — CEFTRIAXONE SODIUM 2 G: 2 INJECTION, POWDER, FOR SOLUTION INTRAMUSCULAR; INTRAVENOUS at 14:24

## 2020-01-31 RX ADMIN — PANTOPRAZOLE SODIUM 40 MG: 40 TABLET, DELAYED RELEASE ORAL at 06:45

## 2020-01-31 RX ADMIN — AMIODARONE HYDROCHLORIDE 200 MG: 200 TABLET ORAL at 21:43

## 2020-01-31 RX ADMIN — METOPROLOL TARTRATE 25 MG: 25 TABLET ORAL at 09:34

## 2020-01-31 RX ADMIN — FUROSEMIDE 40 MG: 40 TABLET ORAL at 09:34

## 2020-01-31 RX ADMIN — SODIUM BICARBONATE 650 MG TABLET 1300 MG: at 09:35

## 2020-01-31 RX ADMIN — CHLORHEXIDINE GLUCONATE 0.12% ORAL RINSE 15 ML: 1.2 LIQUID ORAL at 21:43

## 2020-01-31 RX ADMIN — IPRATROPIUM BROMIDE AND ALBUTEROL SULFATE 3 ML: .5; 3 SOLUTION RESPIRATORY (INHALATION) at 15:25

## 2020-01-31 RX ADMIN — IPRATROPIUM BROMIDE AND ALBUTEROL SULFATE 3 ML: .5; 3 SOLUTION RESPIRATORY (INHALATION) at 06:40

## 2020-01-31 RX ADMIN — DOCUSATE SODIUM 100 MG: 100 CAPSULE, LIQUID FILLED ORAL at 09:34

## 2020-01-31 RX ADMIN — DOCUSATE SODIUM 100 MG: 100 CAPSULE, LIQUID FILLED ORAL at 21:43

## 2020-01-31 RX ADMIN — ACETAMINOPHEN 500 MG: 500 TABLET, FILM COATED ORAL at 02:58

## 2020-01-31 RX ADMIN — POTASSIUM CHLORIDE 40 MEQ: 1500 TABLET, EXTENDED RELEASE ORAL at 11:47

## 2020-01-31 RX ADMIN — FLUOXETINE 20 MG: 20 CAPSULE ORAL at 09:34

## 2020-01-31 RX ADMIN — ENOXAPARIN SODIUM 40 MG: 40 INJECTION SUBCUTANEOUS at 16:46

## 2020-01-31 RX ADMIN — CLONIDINE 1 PATCH: 0.2 PATCH, EXTENDED RELEASE TRANSDERMAL at 09:35

## 2020-01-31 RX ADMIN — GABAPENTIN 200 MG: 100 CAPSULE ORAL at 09:34

## 2020-01-31 RX ADMIN — METOPROLOL TARTRATE 25 MG: 25 TABLET ORAL at 21:43

## 2020-01-31 RX ADMIN — POTASSIUM CHLORIDE 20 MEQ: 1500 TABLET, EXTENDED RELEASE ORAL at 09:35

## 2020-02-01 LAB
ALBUMIN SERPL-MCNC: 3 G/DL (ref 3.5–5.2)
ALBUMIN/GLOB SERPL: 1 G/DL
ALP SERPL-CCNC: 51 U/L (ref 39–117)
ALT SERPL W P-5'-P-CCNC: 51 U/L (ref 1–41)
ANION GAP SERPL CALCULATED.3IONS-SCNC: 11 MMOL/L (ref 5–15)
AST SERPL-CCNC: 34 U/L (ref 1–40)
BASOPHILS # BLD AUTO: 0 10*3/MM3 (ref 0–0.2)
BASOPHILS NFR BLD AUTO: 0.4 % (ref 0–1.5)
BILIRUB SERPL-MCNC: 0.2 MG/DL (ref 0.2–1.2)
BUN BLD-MCNC: 19 MG/DL (ref 8–23)
BUN/CREAT SERPL: 19.8 (ref 7–25)
CA-I SERPL ISE-MCNC: 1.21 MMOL/L (ref 1.2–1.3)
CALCIUM SPEC-SCNC: 8.4 MG/DL (ref 8.6–10.5)
CHLORIDE SERPL-SCNC: 107 MMOL/L (ref 98–107)
CK SERPL-CCNC: 67 U/L (ref 20–200)
CO2 SERPL-SCNC: 21 MMOL/L (ref 22–29)
CREAT BLD-MCNC: 0.96 MG/DL (ref 0.76–1.27)
DEPRECATED RDW RBC AUTO: 46.8 FL (ref 37–54)
EOSINOPHIL # BLD AUTO: 0.5 10*3/MM3 (ref 0–0.4)
EOSINOPHIL NFR BLD AUTO: 6.7 % (ref 0.3–6.2)
ERYTHROCYTE [DISTWIDTH] IN BLOOD BY AUTOMATED COUNT: 16.1 % (ref 12.3–15.4)
GFR SERPL CREATININE-BSD FRML MDRD: 79 ML/MIN/1.73
GLOBULIN UR ELPH-MCNC: 3.1 GM/DL
GLUCOSE BLD-MCNC: 111 MG/DL (ref 65–99)
GLUCOSE BLDC GLUCOMTR-MCNC: 108 MG/DL (ref 70–105)
GLUCOSE BLDC GLUCOMTR-MCNC: 150 MG/DL (ref 70–105)
GLUCOSE BLDC GLUCOMTR-MCNC: 152 MG/DL (ref 70–105)
GLUCOSE BLDC GLUCOMTR-MCNC: 89 MG/DL (ref 70–105)
HCT VFR BLD AUTO: 24.8 % (ref 37.5–51)
HGB BLD-MCNC: 7.9 G/DL (ref 13–17.7)
LYMPHOCYTES # BLD AUTO: 1.4 10*3/MM3 (ref 0.7–3.1)
LYMPHOCYTES NFR BLD AUTO: 17.6 % (ref 19.6–45.3)
MAGNESIUM SERPL-MCNC: 2.3 MG/DL (ref 1.6–2.4)
MCH RBC QN AUTO: 26.1 PG (ref 26.6–33)
MCHC RBC AUTO-ENTMCNC: 31.8 G/DL (ref 31.5–35.7)
MCV RBC AUTO: 82.2 FL (ref 79–97)
MONOCYTES # BLD AUTO: 0.6 10*3/MM3 (ref 0.1–0.9)
MONOCYTES NFR BLD AUTO: 7.4 % (ref 5–12)
NEUTROPHILS # BLD AUTO: 5.4 10*3/MM3 (ref 1.7–7)
NEUTROPHILS NFR BLD AUTO: 67.9 % (ref 42.7–76)
NRBC BLD AUTO-RTO: 0 /100 WBC (ref 0–0.2)
PHOSPHATE SERPL-MCNC: 3.3 MG/DL (ref 2.5–4.5)
PLATELET # BLD AUTO: 373 10*3/MM3 (ref 140–450)
PMV BLD AUTO: 7.5 FL (ref 6–12)
POTASSIUM BLD-SCNC: 3.7 MMOL/L (ref 3.5–5.2)
PROT SERPL-MCNC: 6.1 G/DL (ref 6–8.5)
RBC # BLD AUTO: 3.01 10*6/MM3 (ref 4.14–5.8)
SODIUM BLD-SCNC: 139 MMOL/L (ref 136–145)
WBC NRBC COR # BLD: 8 10*3/MM3 (ref 3.4–10.8)

## 2020-02-01 PROCEDURE — 97110 THERAPEUTIC EXERCISES: CPT

## 2020-02-01 PROCEDURE — 85025 COMPLETE CBC W/AUTO DIFF WBC: CPT | Performed by: INTERNAL MEDICINE

## 2020-02-01 PROCEDURE — 25010000002 CEFTRIAXONE PER 250 MG: Performed by: INTERNAL MEDICINE

## 2020-02-01 PROCEDURE — 82330 ASSAY OF CALCIUM: CPT | Performed by: INTERNAL MEDICINE

## 2020-02-01 PROCEDURE — 82550 ASSAY OF CK (CPK): CPT | Performed by: INTERNAL MEDICINE

## 2020-02-01 PROCEDURE — 82962 GLUCOSE BLOOD TEST: CPT

## 2020-02-01 PROCEDURE — 25010000002 DAPTOMYCIN PER 1 MG: Performed by: INTERNAL MEDICINE

## 2020-02-01 PROCEDURE — 94799 UNLISTED PULMONARY SVC/PX: CPT

## 2020-02-01 PROCEDURE — 97112 NEUROMUSCULAR REEDUCATION: CPT

## 2020-02-01 PROCEDURE — 80053 COMPREHEN METABOLIC PANEL: CPT | Performed by: INTERNAL MEDICINE

## 2020-02-01 PROCEDURE — 25010000002 ENOXAPARIN PER 10 MG: Performed by: NURSE PRACTITIONER

## 2020-02-01 PROCEDURE — 63710000001 INSULIN LISPRO (HUMAN) PER 5 UNITS: Performed by: NURSE PRACTITIONER

## 2020-02-01 PROCEDURE — 97530 THERAPEUTIC ACTIVITIES: CPT

## 2020-02-01 PROCEDURE — 99024 POSTOP FOLLOW-UP VISIT: CPT | Performed by: NURSE PRACTITIONER

## 2020-02-01 PROCEDURE — 84100 ASSAY OF PHOSPHORUS: CPT | Performed by: INTERNAL MEDICINE

## 2020-02-01 PROCEDURE — 83735 ASSAY OF MAGNESIUM: CPT | Performed by: INTERNAL MEDICINE

## 2020-02-01 PROCEDURE — 99232 SBSQ HOSP IP/OBS MODERATE 35: CPT | Performed by: INTERNAL MEDICINE

## 2020-02-01 RX ORDER — CLONIDINE HYDROCHLORIDE 0.1 MG/1
0.1 TABLET ORAL EVERY 12 HOURS SCHEDULED
Status: DISCONTINUED | OUTPATIENT
Start: 2020-02-01 | End: 2020-02-02

## 2020-02-01 RX ADMIN — ACETAMINOPHEN 500 MG: 500 TABLET, FILM COATED ORAL at 09:57

## 2020-02-01 RX ADMIN — ASPIRIN 81 MG: 81 TABLET, DELAYED RELEASE ORAL at 09:57

## 2020-02-01 RX ADMIN — ACETYLCYSTEINE 3 ML: 200 SOLUTION ORAL; RESPIRATORY (INHALATION) at 19:30

## 2020-02-01 RX ADMIN — DOCUSATE SODIUM 100 MG: 100 CAPSULE, LIQUID FILLED ORAL at 20:44

## 2020-02-01 RX ADMIN — IPRATROPIUM BROMIDE AND ALBUTEROL SULFATE 3 ML: .5; 3 SOLUTION RESPIRATORY (INHALATION) at 07:22

## 2020-02-01 RX ADMIN — AMIODARONE HYDROCHLORIDE 200 MG: 200 TABLET ORAL at 09:57

## 2020-02-01 RX ADMIN — ACETYLCYSTEINE 3 ML: 200 SOLUTION ORAL; RESPIRATORY (INHALATION) at 07:22

## 2020-02-01 RX ADMIN — POTASSIUM CHLORIDE 20 MEQ: 1500 TABLET, EXTENDED RELEASE ORAL at 09:56

## 2020-02-01 RX ADMIN — CLONIDINE HYDROCHLORIDE 0.1 MG: 0.1 TABLET ORAL at 20:44

## 2020-02-01 RX ADMIN — INSULIN LISPRO 3 UNITS: 100 INJECTION, SOLUTION INTRAVENOUS; SUBCUTANEOUS at 09:57

## 2020-02-01 RX ADMIN — CEFTRIAXONE SODIUM 2 G: 2 INJECTION, POWDER, FOR SOLUTION INTRAMUSCULAR; INTRAVENOUS at 13:56

## 2020-02-01 RX ADMIN — CLONIDINE HYDROCHLORIDE 0.1 MG: 0.1 TABLET ORAL at 05:36

## 2020-02-01 RX ADMIN — METOPROLOL TARTRATE 25 MG: 25 TABLET ORAL at 09:57

## 2020-02-01 RX ADMIN — GABAPENTIN 200 MG: 100 CAPSULE ORAL at 20:44

## 2020-02-01 RX ADMIN — ENOXAPARIN SODIUM 40 MG: 40 INJECTION SUBCUTANEOUS at 17:31

## 2020-02-01 RX ADMIN — CHLORHEXIDINE GLUCONATE 0.12% ORAL RINSE 15 ML: 1.2 LIQUID ORAL at 09:56

## 2020-02-01 RX ADMIN — CHLORHEXIDINE GLUCONATE 0.12% ORAL RINSE 15 ML: 1.2 LIQUID ORAL at 20:44

## 2020-02-01 RX ADMIN — FUROSEMIDE 40 MG: 40 TABLET ORAL at 09:56

## 2020-02-01 RX ADMIN — CYCLOBENZAPRINE HYDROCHLORIDE 5 MG: 10 TABLET, FILM COATED ORAL at 09:55

## 2020-02-01 RX ADMIN — FLUOXETINE 20 MG: 20 CAPSULE ORAL at 09:56

## 2020-02-01 RX ADMIN — POLYETHYLENE GLYCOL 3350 17 G: 17 POWDER, FOR SOLUTION ORAL at 09:56

## 2020-02-01 RX ADMIN — METOPROLOL TARTRATE 25 MG: 25 TABLET ORAL at 20:44

## 2020-02-01 RX ADMIN — IPRATROPIUM BROMIDE AND ALBUTEROL SULFATE 3 ML: .5; 3 SOLUTION RESPIRATORY (INHALATION) at 11:10

## 2020-02-01 RX ADMIN — CEFTRIAXONE SODIUM 2 G: 2 INJECTION, POWDER, FOR SOLUTION INTRAMUSCULAR; INTRAVENOUS at 02:27

## 2020-02-01 RX ADMIN — AMIODARONE HYDROCHLORIDE 200 MG: 200 TABLET ORAL at 20:44

## 2020-02-01 RX ADMIN — PANTOPRAZOLE SODIUM 40 MG: 40 TABLET, DELAYED RELEASE ORAL at 05:36

## 2020-02-01 RX ADMIN — DAPTOMYCIN 900 MG: 500 INJECTION, POWDER, LYOPHILIZED, FOR SOLUTION INTRAVENOUS at 13:55

## 2020-02-01 RX ADMIN — IPRATROPIUM BROMIDE AND ALBUTEROL SULFATE 3 ML: .5; 3 SOLUTION RESPIRATORY (INHALATION) at 19:31

## 2020-02-01 RX ADMIN — GABAPENTIN 200 MG: 100 CAPSULE ORAL at 09:57

## 2020-02-01 RX ADMIN — SENNOSIDES 1 TABLET: 8.6 TABLET, FILM COATED ORAL at 09:57

## 2020-02-01 RX ADMIN — INSULIN LISPRO 3 UNITS: 100 INJECTION, SOLUTION INTRAVENOUS; SUBCUTANEOUS at 17:31

## 2020-02-01 RX ADMIN — DOCUSATE SODIUM 100 MG: 100 CAPSULE, LIQUID FILLED ORAL at 09:56

## 2020-02-01 NOTE — PROGRESS NOTES
Cardiology Progress Note      Admiting Physician:  Fallon Cole MD   LOS: 18 days       Reason For Followup:  Mitral valve endocarditis  Paroxysmal atrial fibrillation  Coronary artery disease  Nonsustained ventricular tachycardia      Subjective:    Interval History:  Seen and examined.  Chart and labs reviewed.  Patient reports mild tenderness at incision site.  Reports being tired and fatigued.    Review of Systems:  A complete review of systems was undertaken with the pertinent cardiovascular findings listed in history of present illness and all other systems being negative.     Assessment & Plan    Impressions:  Mitral valve endocarditis status post bioprosthetic mitral valve replacement  Coronary artery disease  Episodic nonsustained monomorphic ventricular tachycardia     None since addition of amiodarone  Paroxysmal atrial fibrillation  Essential hypertension currently well controlled  Obesity    Recommendations:  Continuation of his current cardiovascular regimen.  Continue to monitor rhythm  Antimicrobials as per infectious disease  Await[facility] from [facility]        Objective:    Medication Review:   Scheduled Meds:    acetylcysteine 3 mL Nebulization BID - RT   amiodarone 200 mg Oral Q12H   aspirin 81 mg Oral Daily   cefTRIAXone 2 g Intravenous Q12H   chlorhexidine 15 mL Mouth/Throat Q12H   cloNIDine 0.1 mg Oral Q8H   DAPTOmycin 10 mg/kg (Adjusted) Intravenous Q24H   docusate sodium 100 mg Oral BID   enoxaparin 40 mg Subcutaneous Daily   FLUoxetine 20 mg Oral Daily   furosemide 40 mg Oral Daily   gabapentin 200 mg Oral BID   insulin lispro 0-14 Units Subcutaneous 4x Daily With Meals & Nightly   ipratropium-albuterol 3 mL Nebulization 4x Daily - RT   metoprolol tartrate 25 mg Oral Q12H   pantoprazole 40 mg Oral Q AM   polyethylene glycol 17 g Oral BID   potassium chloride 20 mEq Oral Daily   senna 1 tablet Oral BID     Continuous Infusions:     PRN Meds:.•  acetaminophen  •  cyclobenzaprine  •   dextrose  •  dextrose  •  glucagon (human recombinant)  •  insulin lispro **AND** insulin lispro  •  MOUTH KOTE  •  [DISCONTINUED] Morphine **AND** naloxone  •  ondansetron  •  potassium chloride **OR** potassium chloride  •  potassium chloride **OR** potassium chloride    Patient Active Problem List   Diagnosis   • Atrial fibrillation with RVR (CMS/HCC)   • Coronary artery disease due to lipid rich plaque   • CAD S/P percutaneous coronary angioplasty   • Essential hypertension   • Dyslipidemia   • Non-insulin dependent type 2 diabetes mellitus (CMS/HCC)   • Diabetic neuropathy (CMS/HCC)   • GERD without esophagitis   • BPH with obstruction/lower urinary tract symptoms   • B12 deficiency   • Vitamin D deficiency   • JARRETT (generalized anxiety disorder)   • Tobacco abuse   • Obesity (BMI 30-39.9)   • Acute bacterial endocarditis   • S/P MVR (mitral valve replacement)   • Endocarditis of mitral valve   • Non-sustained ventricular tachycardia (CMS/HCC)         Physical Exam:    General: Alert, cooperative, no distress, appears stated age  Head:  Normocephalic, atraumatic, mucous membranes moist  Eyes:  Conjunctiva/corneas clear, EOM's intact     Neck:  Supple,  no bruit  Lungs: Clear to auscultation bilaterally, no wheezes rhonchi rales are noted  Chest wall: Tender to palpation.  Incision intact  Heart::  Regular rate and rhythm, S1 and S2 normal, 1/6 holosystolic murmur.  No rub or gallop  Abdomen: Soft, non-tender, nondistended bowel sounds active  Extremities: No cyanosis, clubbing, 1+ edema  Pulses: 2+ and symmetric all extremities  Skin:  Incision clean dry and intact  Neuro/psych: A&O x3. CN II through XII are grossly intact with appropriate affect    Vital Signs:  Vitals:    02/01/20 0537 02/01/20 0722 02/01/20 0730 02/01/20 0823   BP:       Pulse:  61 64    Resp:  16     Temp:    96.5 °F (35.8 °C)   TempSrc:       SpO2:  96%     Weight: 121 kg (267 lb 3.2 oz)      Height:         Wt Readings from Last 1  Encounters:   02/01/20 121 kg (267 lb 3.2 oz)       Intake/Output Summary (Last 24 hours) at 2/1/2020 1128  Last data filed at 1/31/2020 1700  Gross per 24 hour   Intake 600 ml   Output --   Net 600 ml         Results Review:     CBC    Results from last 7 days   Lab Units 02/01/20  0307 01/31/20  0604 01/30/20  0434 01/29/20  0608 01/28/20  0445 01/27/20  0304 01/26/20  0607   WBC 10*3/mm3 8.00 8.90 10.80 8.10 8.40 8.30 8.90   HEMOGLOBIN g/dL 7.9* 8.3* 8.8* 8.6* 8.0* 8.6* 7.9*   PLATELETS 10*3/mm3 373 352 345 297 226 230 197     Cr Clearance Estimated Creatinine Clearance: 99.8 mL/min (by C-G formula based on SCr of 0.96 mg/dL).  Coag       HbA1C   Lab Results   Component Value Date    HGBA1C 5.7 (H) 01/15/2020     Blood Glucose   Glucose   Date/Time Value Ref Range Status   02/01/2020 0817 150 (H) 70 - 105 mg/dL Final     Comment:     Serial Number: [id number]:  [id number]   01/31/2020 1956 124 (H) 70 - 105 mg/dL Final     Comment:     Serial Number: [id number]:  [id number]   01/31/2020 1700 116 (H) 70 - 105 mg/dL Final     Comment:     Serial Number: [id number]:  [id number]   01/31/2020 1202 101 70 - 105 mg/dL Final     Comment:     Serial Number: [id number]:  [id number]   01/30/2020 1954 185 (H) 70 - 105 mg/dL Final     Comment:     Serial Number: [id number]:  [id number]   01/30/2020 1146 95 70 - 105 mg/dL Final     Comment:     Serial Number: [id number]:  [id number]   01/30/2020 0733 113 (H) 70 - 105 mg/dL Final     Comment:     Serial Number: [id number]:  [id number]   01/29/2020 2044 92 70 - 105 mg/dL Final     Comment:     Serial Number: [id number]:  [id number]     Infection       CMP   Results from last 7 days   Lab Units 02/01/20  0307 01/31/20  0604 01/30/20  0434 01/29/20  0608 01/28/20  1225 01/28/20  0445 01/27/20  1855  01/27/20  0304 01/26/20  0607   SODIUM mmol/L 139 138 137 136  --  137  --   --  139 139   POTASSIUM mmol/L 3.7 3.6 3.9 3.4* 3.5 3.5 3.4*   < >  3.4* 3.3*   CHLORIDE mmol/L 107 106 105 103  --  104  --   --  105 106   CO2 mmol/L 21.0* 21.0* 20.0* 21.0*  --  22.0  --   --  21.0* 23.0   BUN mg/dL 19 17 16 12  --  13  --   --  14 17   CREATININE mg/dL 0.96 0.96 0.96 0.91  --  1.02  --   --  1.09 1.06   GLUCOSE mg/dL 111* 116* 163* 119*  --  113*  --   --  102* 108*   ALBUMIN g/dL 3.00* 3.00* 3.30* 3.20*  --  3.10*  --   --  3.00* 3.10*   BILIRUBIN mg/dL 0.2 0.2 0.2 0.3  --  0.4  --   --  0.5 0.5   ALK PHOS U/L 51 51 54 48  --  45  --   --  50 46   AST (SGOT) U/L 34 34 36 43*  --  27  --   --  27 25   ALT (SGPT) U/L 51* 49* 43* 51*  --  27  --   --  23 18    < > = values in this interval not displayed.     ABG        UA        TERESA  No results found for: POCMETH, POCAMPHET, POCBARBITUR, POCBENZO, POCCOCAINE, POCOPIATES, POCOXYCODO, POCPHENCYC, POCPROPOXY, POCTHC, POCTRICYC  Lysis Labs   Results from last 7 days   Lab Units 02/01/20  0307 01/31/20  0604 01/30/20  0434 01/29/20  0608 01/28/20  0445 01/27/20  0304 01/26/20  0607   HEMOGLOBIN g/dL 7.9* 8.3* 8.8* 8.6* 8.0* 8.6* 7.9*   PLATELETS 10*3/mm3 373 352 345 297 226 230 197   CREATININE mg/dL 0.96 0.96 0.96 0.91 1.02 1.09 1.06     Radiology(recent) No radiology results for the last day      Results from last 7 days   Lab Units 02/01/20  0307   CK TOTAL U/L 67       Imaging Results (Last 24 Hours)     ** No results found for the last 24 hours. **          Cardiac Studies:  Echo-   Results for orders placed during the hospital encounter of 01/14/20   Adult Transthoracic Echo Complete W/ Cont if Necessary Per Protocol    Narrative · Estimated EF appears to be in the range of 66 - 70%. Normal left   ventricular cavity size noted. All left ventricular wall segments contract   normally. Left ventricular wall thickness is consistent with moderate   septal asymmetric hypertrophy. Left ventricular diastolic function not   assessed on this study. LVOT gradient not interrogated.  · Left atrial cavity size is mildly  dilated.  · There is a bioprosthetic mitral valve present. Leaflets were not well   visualized. The mean gradient across the valve was 5.2 mmHg. There appears   to be mild mitral insufficiency but color flow interrogation was   inadequate.        Stress Myoview-  Cath-        Sarmad Littlejohn DO  02/01/20  11:28 AM

## 2020-02-01 NOTE — THERAPY TREATMENT NOTE
Patient Name: Jamin Hennessy  : 1955    MRN: [id number]                              Today's Date: 2020       Admit Date: 2020    Visit Dx:     ICD-10-CM ICD-9-CM   1. Coronary artery disease due to lipid rich plaque I25.10 414.00    I25.83 414.3   2. Examination for normal comparison or control in clinical research Z00.6 V70.7   3. Acute bacterial endocarditis I33.0 421.0   4. Atrial fibrillation with RVR (CMS/[facility]) I48.91 427.31   5. Essential hypertension I10 401.9   6. Tobacco abuse Z72.0 305.1   7. CAD S/P percutaneous coronary angioplasty I25.10 414.01    Z98.61 V45.82   8. Endocarditis of mitral valve I05.8 394.9   9. Acute renal failure, unspecified acute renal failure type (CMS/[facility]) N17.9 584.9     Patient Active Problem List   Diagnosis   • Atrial fibrillation with RVR (CMS/[facility])   • Coronary artery disease due to lipid rich plaque   • CAD S/P percutaneous coronary angioplasty   • Essential hypertension   • Dyslipidemia   • Non-insulin dependent type 2 diabetes mellitus (CMS/[facility])   • Diabetic neuropathy (CMS/[facility])   • GERD without esophagitis   • BPH with obstruction/lower urinary tract symptoms   • B12 deficiency   • Vitamin D deficiency   • JARRETT (generalized anxiety disorder)   • Tobacco abuse   • Obesity (BMI 30-39.9)   • Acute bacterial endocarditis   • S/P MVR (mitral valve replacement)   • Endocarditis of mitral valve   • Non-sustained ventricular tachycardia (CMS/[facility])     Past Medical History:   Diagnosis Date   • A-fib (CMS/[facility])    • CAD (coronary artery disease)    • Elevated cholesterol    • Hypertension    • MI (myocardial infarction) (CMS/[facility])    • Non-insulin dependent type 2 diabetes mellitus (CMS/[facility])      Past Surgical History:   Procedure Laterality Date   • CARDIAC CATHETERIZATION     • CARDIAC CATHETERIZATION N/A 2020    Procedure: Left Heart Cath;  Surgeon: Migdalia Beth MD;  Location: [facility] CATH INVASIVE LOCATION;  Service: Cardiovascular   • CARDIAC  CATHETERIZATION N/A 1/20/2020    Procedure: Left ventriculography;  Surgeon: Migdalia Beth MD;  Location: [facility] CATH INVASIVE LOCATION;  Service: Cardiovascular   • CARDIAC CATHETERIZATION N/A 1/20/2020    Procedure: Coronary angiography;  Surgeon: Migdalia Beth MD;  Location: [facility] CATH INVASIVE LOCATION;  Service: Cardiovascular   • CORONARY ANGIOPLASTY WITH STENT PLACEMENT       General Information     Row Name 02/01/20 1651          PT Evaluation Time/Intention    Document Type  therapy note (daily note)  -     Mode of Treatment  physical therapy  -     Row Name 02/01/20 1651          General Information    Patient Profile Reviewed?  yes  -     Existing Precautions/Restrictions  sternal  -     Row Name 02/01/20 1651          Safety Issues, Functional Mobility    Impairments Affecting Function (Mobility)  balance;coordination;muscle tone abnormal;postural/trunk control;strength  -     Comment, Safety Issues/Impairments (Mobility)  pt very weak in LLE due to MS, fatigues quickly  -       User Key  (r) = Recorded By, (t) = Taken By, (c) = Cosigned By    Initials Name Provider Type     Caro Taylor PTA Physical Therapy Assistant        Mobility     Row Name 02/01/20 1652          Sit-Stand Transfer    Sit-Stand [place] (Transfers)  moderate assist (50% patient effort);2 person assist  -     Assistive Device (Sit-Stand Transfers)  walker, front-wheeled  -     Row Name 02/01/20 1652          Gait/Stairs Assessment/Training    Comment (Gait/Stairs)  unable to take steps today but was able to stand approx 2 mins at chair  -       User Key  (r) = Recorded By, (t) = Taken By, (c) = Cosigned By    Initials Name Provider Type     Caro Taylor PTA Physical Therapy Assistant        Obj/Interventions     Row Name 02/01/20 1654          Therapeutic Exercise    Sets/Reps (Therapeutic Exercise)  LE seated exs x 5, assist on L to incr ROM  -     Row Name 02/01/20 1654           Static Sitting Balance    Level of [place] (Unsupported Sitting, Static Balance)  supervision  -     Sitting Position (Unsupported Sitting, Static Balance)  sitting in chair  -     Row Name 02/01/20 1654          Dynamic Sitting Balance    Level of [place], Reaches Outside Midline (Sitting, Dynamic Balance)  minimal assist, 75% patient effort  -     Sitting Position, Reaches Outside Midline (Sitting, Dynamic Balance)  sitting in chair  -     Comment, Reaches Outside Midline (Sitting, Dynamic Balance)  assost to scoot to front of chair and not use UE  -     Row Name 02/01/20 1654          Static Standing Balance    Level of [place] (Supported Standing, Static Balance)  minimal assist, 75% patient effort;2 person assist  -     Time Able to Maintain Position (Supported Standing, Static Balance)  1 to 2 minutes  -     Assistive Device Utilized (Supported Standing, Static Balance)  walker, rolling  -     Comment (Supported Standing, Static Balance)  once up at rwx was able to stand x 2 mins to work on lat shifting  -       User Key  (r) = Recorded By, (t) = Taken By, (c) = Cosigned By    Initials Name Provider Type    Caro Maxwell PTA Physical Therapy Assistant        Goals/Plan     Row Name 02/01/20 1659          Transfer Goal 1 (PT)    Progress/Outcome (Transfer Goal 1, PT)  progress slower than expected;goal ongoing  -     Row Name 02/01/20 1659          Gait Training Goal 1 (PT)    Progress/Outcome (Gait Training Goal 1, PT)  unable to make needed progress;goal ongoing  -     Row Name 02/01/20 1659          ROM Goal 1 (PT)    Progress/Outcome (ROM Goal 1, PT)  progress slower than expected;goal ongoing  -     Row Name 02/01/20 1659          Patient Education Goal (PT)    Progress/Outcome (Patient Education Goal, PT)  goal ongoing  -       User Key  (r) = Recorded By, (t) = Taken By, (c) = Cosigned By    Initials Name Provider Type    Caro Maxwell PTA  Physical Therapy Assistant        Clinical Impression     Row Name 02/01/20 1657          Pain Scale: Numbers Pre/Post-Treatment    Pre/Post Treatment Pain Comment  had pain meds and not in any pain right now  -     Row Name 02/01/20 1657          Plan of Care Review    Plan of Care Reviewed With  patient  -     Progress  no change  -     Outcome Summary  pt cont to fatigue easily, didn't want to try amb today but stood at rwx x 2 mins min assist x2. Does better hugging pillow vs trying to close harness.Got very tearful about family situation. Will need rehab at [facility]     Row Name 02/01/20 1657          Physical Therapy Clinical Impression    Criteria for Skilled Interventions Met (PT Clinical Impression)  treatment indicated  -     Rehab Potential (PT Clinical Summary)  fair, will monitor progress [facility]  -     Row Name 02/01/20 1657          Positioning and Restraints    Pre-Treatment Position  sitting in chair/recliner  -     Post Treatment Position  chair  -     In Chair  sitting;call light within reach;exit alarm on  -       User Key  (r) = Recorded By, (t) = Taken By, (c) = Cosigned By    Initials Name Provider Type    Caro Maxwell PTA Physical Therapy Assistant        Outcome Measures     Row Name 02/01/20 1701          How much help from another person do you currently need...    Turning from your back to your side while in flat bed without using bedrails?  2  -MC     Moving from lying on back to sitting on the side of a flat bed without bedrails?  2  -MC     Moving to and from a bed to a chair (including a wheelchair)?  2  -MC     Standing up from a chair using your arms (e.g., wheelchair, bedside chair)?  2  -MC     Climbing 3-5 steps with a railing?  1  -MC     To walk in hospital room?  1  -MC     AM-PAC 6 Clicks Score (PT)  10  -       User Key  (r) = Recorded By, (t) = Taken By, (c) = Cosigned By    Initials Name Provider Type    Caro Maxwell PTA Physical Therapy  Assistant          PT Recommendation and Plan  Planned Therapy Interventions (PT Eval): bed mobility training, postural re-education, transfer training, strengthening, gait training  Outcome Summary/Treatment Plan (PT)  Anticipated Discharge Disposition (PT): inpatient rehabilitation facility  Plan of Care Reviewed With: patient  Progress: no change  Outcome Summary: pt cont to fatigue easily, didn't want to try amb today but stood at rwx x 2 mins min assist x2. Does better hugging pillow vs trying to close harness.Got very tearful about family situation. [place] [facility] [place]hab [facility] [place]     Time Calculation:   PT Charges     Row Name 02/01/20 1702             Time Calculation    Start Time  1245  -      Stop Time  1310  -      Time Calculation (min)  25 min  -      PT Received On  02/01/20  -      PT - Next Appointment  02/03/20  -         Time Calculation- PT    Total Timed Code Minutes- PT  25 minute(s)  -        User Key  (r) = Recorded By, (t) = Taken By, (c) = Cosigned By    Initials Name Provider Type     Caro Taylor PTA Physical Therapy Assistant        Therapy Charges for Today     Code Description Service Date Service Provider Modifiers Qty    74447875790  PT NEUROMUSC RE EDUCATION EA 15 MIN 2/1/2020 Caro Taylor, PTA GP 1    11625213033 HC PT THERAPEUTIC ACT EA 15 MIN 2/1/2020 Caro Taylor, PTA GP 1    73447611502 HC PT THER PROC EA 15 MIN 2/1/2020 Caro Taylor, DALJIT GP 1          PT G-Codes  Outcome Measure Options: AM-PAC 6 Clicks Daily Activity (OT)  AM-PAC 6 Clicks Score (PT): 10  AM-PAC 6 Clicks Score (OT): 9  Modified [place] Scale: 4 - Moderately severe disability.  Unable to walk without assistance, and unable to attend to own bodily needs without assistance.    Caro Taylor PTA  2/1/2020

## 2020-02-01 NOTE — PROGRESS NOTES
Pulmonary/ Critical Care progress Note        Patient Name:  Jamin Hennessy    :  1955    Medical Record:  [id number]    Primary Care Physician     Kajal Sanchez  Jamin Hennessy is a 64 y.o. male with a PMH of CAD s/p stent approximately a year ago, HTN, HLD, tobacco abuse, diabetes, MS and previous episode of atrial fib with RVR who was presented to the ER at [email or web address] due to shortness of breath and jaw pain.   He denies diaphoresis, nausea or vomiting.  Upon arrival to [place] ER he was hypotensive with a SBP in the 70's with a heart rate in the 150's.   He was started on Levophed, given amio as well as Ketamine for sedation and then was cardioverted with immediate improvement in his blood pressure to the 140's.   Patient has been transferred to [facility] for further care.  Patient states that he has had one previous episode of atrial fibrillation requiring requiring cardioversion.  Patient currently states that his shortness of breath and jaw pain are resolved.      1/15:  No new issues overnight.  Resp status is stable   no SOA/CP    transferred to [facility]  : currently in sinus rhythm.  Feels well today. No chest pain. No soa on 2.5L  : Maintaining sinus rhythm.  No shortness of air.  No chest pain  : Denies any shortness of breath.  Remains on supplemental oxygen.  :  No SOA, no new issues overnight.  On RA, no gtts   20:  Seen post-op MVR.  He is intubated and on pressors of epi, vaso, and husam  :  Extubated and with post-op pain.  Some confusion.  Off pressors  : Remains extubated.  Oxygen requirement currently on simple facemask.  Pain well controlled after receiving Toradol last night.  : confused of month/year but improving conversationally.  Afib with RVR this morning.  Tolerating meals.   : awake. Currently on 2 L N/C. C/o surgical site pain. Tolerating PO. No N/V  :.  Currently on room air.  Pain under better control.  No complaints of  nausea or vomiting.  No fever or chills overnight.  No cough or productive phlegm  :  On RA, reported no new issues.  Feeling better.   :  On RA and OOB.  Without complaints.  Feeling ok.  :  OOB to chair.  On RA.  Feels ok and without complaints.  : Awake.Remains on RA. Tolerating PO diet. Working with PT  : No acute events overnight.  Sitting up in a chair comfortably, tolerating room air, no shortness of air    Review of Systems    As above    Allergies    No Known Allergies    Medications    Scheduled Meds:    acetylcysteine 3 mL Nebulization BID - RT   amiodarone 200 mg Oral Q12H   aspirin 81 mg Oral Daily   cefTRIAXone 2 g Intravenous Q12H   chlorhexidine 15 mL Mouth/Throat Q12H   cloNIDine 0.1 mg Oral Q12H   DAPTOmycin 10 mg/kg (Adjusted) Intravenous Q24H   docusate sodium 100 mg Oral BID   enoxaparin 40 mg Subcutaneous Daily   FLUoxetine 20 mg Oral Daily   furosemide 40 mg Oral Daily   gabapentin 200 mg Oral BID   insulin lispro 0-14 Units Subcutaneous 4x Daily With Meals & Nightly   ipratropium-albuterol 3 mL Nebulization 4x Daily - RT   metoprolol tartrate 25 mg Oral Q12H   pantoprazole 40 mg Oral Q AM   polyethylene glycol 17 g Oral BID   potassium chloride 20 mEq Oral Daily   senna 1 tablet Oral BID     Continuous Infusions:     PRN Meds:.•  acetaminophen  •  cyclobenzaprine  •  dextrose  •  dextrose  •  glucagon (human recombinant)  •  insulin lispro **AND** insulin lispro  •  MOUTH KOTE  •  [DISCONTINUED] Morphine **AND** naloxone  •  ondansetron  •  potassium chloride **OR** potassium chloride  •  potassium chloride **OR** potassium chloride      Physical Exam    tMax 24 hrs:  Temp (24hrs), Av.4 °F (36.3 °C), Min:96.5 °F (35.8 °C), Max:98.2 °F (36.8 °C)    Vitals Ranges:  Temp:  [96.5 °F (35.8 °C)-98.2 °F (36.8 °C)] 97.3 °F (36.3 °C)  Heart Rate:  [58-75] 64  Resp:  [16] 16  BP: (100-128)/(61-90) 116/70  Intake and Output Last 3 Shifts:  I/O last 3 completed shifts:  In: 1090  [P.O.:1090]  Out: -     General Appearance: Awake, no distress, appears stated age  Head:  Normocephalic, without obvious abnormality, atraumatic  Eyes: conjunctiva/corneas clear , EOMI  Neck:  Supple,  no JVD.  Trachea midline  Lungs: clear to diminished air entry bilaterally, no crackles or wheezes  Chest wall:  No tenderness  Heart:  Regular rate and rhythm, S1 and S2 normal, no murmur, rub or gallop  Abdomen:  Soft, non-tender, bowel sounds active all four quadrants, no rebound or guarding  Extremities:  Extremities normal, no cyanosis + edema  Skin:  No rashes or lesions  Neurologic: Awake, 5 x 5 power in all extremities.  Cranial nerves II through XII grossly intact.      labs    Lab Results (last 24 hours)     Procedure Component Value Units Date/Time    POC Glucose Once [[id number]]  (Normal) Collected:  02/01/20 1159    Specimen:  Blood Updated:  02/01/20 1204     Glucose 89 mg/dL      Comment: Serial Number: [id number]:  [id number]       POC Glucose Once [[id number]]  (Abnormal) Collected:  02/01/20 0817    Specimen:  Blood Updated:  02/01/20 0820     Glucose 150 mg/dL      Comment: Serial Number: [id number]:  [id number]       Calcium, Ionized [[id number]]  (Normal) Collected:  02/01/20 0307    Specimen:  Blood Updated:  02/01/20 0339     Ionized Calcium 1.21 mmol/L     Comprehensive Metabolic Panel [[id number]]  (Abnormal) Collected:  02/01/20 0307    Specimen:  Blood Updated:  02/01/20 0331     Glucose 111 mg/dL      BUN 19 mg/dL      Creatinine 0.96 mg/dL      Sodium 139 mmol/L      Potassium 3.7 mmol/L      Chloride 107 mmol/L      CO2 21.0 mmol/L      Calcium 8.4 mg/dL      Total Protein 6.1 g/dL      Albumin 3.00 g/dL      ALT (SGPT) 51 U/L      AST (SGOT) 34 U/L      Alkaline Phosphatase 51 U/L      Total Bilirubin 0.2 mg/dL      eGFR Non African Amer 79 mL/min/1.73      Globulin 3.1 gm/dL      A/G Ratio 1.0 g/dL      BUN/Creatinine Ratio 19.8     Anion Gap 11.0 mmol/L     Narrative:        GFR Normal >60  Chronic Kidney Disease <60  Kidney Failure <15      CK [[id number]]  (Normal) Collected:  02/01/20 0307    Specimen:  Blood Updated:  02/01/20 0331     Creatine Kinase 67 U/L     Magnesium [[id number]]  (Normal) Collected:  02/01/20 0307    Specimen:  Blood Updated:  02/01/20 0331     Magnesium 2.3 mg/dL     Phosphorus [[id number]]  (Normal) Collected:  02/01/20 0307    Specimen:  Blood Updated:  02/01/20 0331     Phosphorus 3.3 mg/dL     CBC & Differential [[id number]] Collected:  02/01/20 0307    Specimen:  Blood Updated:  02/01/20 0313    Narrative:       The following orders were created for panel order CBC & Differential.  Procedure                               Abnormality         Status                     ---------                               -----------         ------                     CBC Auto Differential[[id number]]        Abnormal            Final result                 Please view results for these tests on the individual orders.    CBC Auto Differential [[id number]]  (Abnormal) Collected:  02/01/20 0307    Specimen:  Blood Updated:  02/01/20 0313     WBC 8.00 10*3/mm3      RBC 3.01 10*6/mm3      Hemoglobin 7.9 g/dL      Hematocrit 24.8 %      MCV 82.2 fL      MCH 26.1 pg      MCHC 31.8 g/dL      RDW 16.1 %      RDW-SD 46.8 fl      MPV 7.5 fL      Platelets 373 10*3/mm3      Neutrophil % 67.9 %      Lymphocyte % 17.6 %      Monocyte % 7.4 %      Eosinophil % 6.7 %      Basophil % 0.4 %      Neutrophils, Absolute 5.40 10*3/mm3      Lymphocytes, Absolute 1.40 10*3/mm3      Monocytes, Absolute 0.60 10*3/mm3      Eosinophils, Absolute 0.50 10*3/mm3      Basophils, Absolute 0.00 10*3/mm3      nRBC 0.0 /100 WBC     POC Glucose Once [[id number]]  (Abnormal) Collected:  01/31/20 1956    Specimen:  Blood Updated:  01/31/20 1958     Glucose 124 mg/dL      Comment: Serial Number: [id number]:  [id number]       POC Glucose Once [[id number]]  (Abnormal) Collected:  01/31/20 1700    Specimen:  Blood  Updated:  01/31/20 1710     Glucose 116 mg/dL      Comment: Serial Number: [id number]:  [id number]             Imaging & Other Studies    Imaging Results (Last 72 Hours)     ** No results found for the last 72 hours. 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  EF 56-60%   -CV Surgery following  -Antibiotic management per ID. Remains on ampicillin and Gent. Daptomycin started 11/26  -Dapto and Rocephin x 6 weeks per ID.    Acute respiratory failure due to cardiac surgery  -extubated.  Off supplemental oxygen this morning  -IS and Flutter valve. Remains on Pulmicort and DuoNeb. D/C Pulmicort   (Bedside PFT reviewed, suggestive of restrictive defect. FEV1 46%.  +bronchdilator response.  Diffusion capacity normal when corrected)  -Can switch to PRN albuterol HFA at D/C    H/o CAD s/p stent approximately a year ago  -Cardiology following  -Remains on aspirin, beta-blocker    -s/p cardiac cath 1/21  -Statin was held 1/26 due to interaction with Daptomycin    Paroxysmal Atrial fibrillation   -Rate controlled, metoprolol  -Cardiology to decide about need for therapeutic anticoagulation    Non-sustained V-tach  -on Amio PO per cardiology     Diabetes   -Insulin gtt transitioned off  -SSI for tight glycemic control    Hypertension  -Continue metoprolol    Possible history of multiple sclerosis  -Neurology following  -CT scan of the head 1/23: Chronic small vessel changes.  No acute abnormalities.    Tobacco abuse  -Counseled for cessation    Hyperlipidemia  -holding statin due to daptomycin    GERD  -On Protonix.     Diabetic neuropathy    Snoring and probable obstructive sleep apnea  -Sleep at 8pm, awakens approximately 4-5am.  Occasional post-prandial nap  -Recommend sleep study as outpatient    PUD: Protonix.(PPI at Home)  Insulin:  Sliding scale  VTE:  SCDs, prophylactic lovenox  Nutrition: Tolerating  p.o. Diet    Started home dose prozac    PT/OT evaluation and treatment    RIJ central line, FC and pacer wires - D/C  PICC line in place  Pulmonary status is unchanged.Continue current therapy. We will continue to follow.    Sleep study as an outpatient once out of rehab in 4 to 6 weeks.  Dispo:  Rehab/[facility]. Await insurance approval    Attending physician statement:  Above note scribed by nurse practitioner for me and later reviewed for accuracy. I've examined the patient and reviewed all labs and images.   I have directly participated in the evaluation and management of this patient.  Evangelista Neri MD  Pulmonary and CCM  [facility]

## 2020-02-01 NOTE — PLAN OF CARE
Problem: Patient Care Overview  Goal: Plan of Care Review  Outcome: Ongoing (interventions implemented as appropriate)  Flowsheets  Taken 2/1/2020 0506  Progress: no change  Outcome Summary: Patient continues to have issues with helping staff when it comes to turning. Otherwise patient has done well throughout the night and has slept without issue.  Taken 2/1/2020 8956  Plan of Care Reviewed With: patient

## 2020-02-01 NOTE — PROGRESS NOTES
"NEPHROLOGY PROGRESS NOTE------[facility]    [facility]  [phone]  Frederick George MD      Patient Care Team:  Kajal Sanchez as PCP - General (Internal Medicine)  Frederick George MD as Consulting Physician (Nephrology)      Provider:  Frederick George MD  Patient Name: Jamin Hennessy  :  1955    SUBJECTIVE:     Patient up in chair. No SOB, Cp, palpitations, cramping, dysuria.     Medication:    acetylcysteine 3 mL Nebulization BID - RT   amiodarone 200 mg Oral Q12H   aspirin 81 mg Oral Daily   cefTRIAXone 2 g Intravenous Q12H   chlorhexidine 15 mL Mouth/Throat Q12H   cloNIDine 0.1 mg Oral Q8H   DAPTOmycin 10 mg/kg (Adjusted) Intravenous Q24H   docusate sodium 100 mg Oral BID   enoxaparin 40 mg Subcutaneous Daily   FLUoxetine 20 mg Oral Daily   furosemide 40 mg Oral Daily   gabapentin 200 mg Oral BID   insulin lispro 0-14 Units Subcutaneous 4x Daily With Meals & Nightly   ipratropium-albuterol 3 mL Nebulization 4x Daily - RT   metoprolol tartrate 25 mg Oral Q12H   pantoprazole 40 mg Oral Q AM   polyethylene glycol 17 g Oral BID   potassium chloride 20 mEq Oral Daily   senna 1 tablet Oral BID          OBJECTIVE    Vital Sign Min/Max for last 24 hours  Temp  Min: 96.5 °F (35.8 °C)  Max: 97.9 °F (36.6 °C)   BP  Min: 94/54  Max: 132/94   Pulse  Min: 58  Max: 75   Resp  Min: 16  Max: 16   SpO2  Min: 95 %  Max: 98 %   No data recorded   Weight  Min: 121 kg (267 lb 3.2 oz)  Max: 121 kg (267 lb 3.2 oz)     Flowsheet Rows      First Filed Value   Admission Height  177.8 cm (70\") Documented at 2020   Admission Weight  124 kg (272 lb 7.8 oz) Documented at 2020 003          No intake/output data recorded.  I/O last 3 completed shifts:  In: 1090 [P.O.:1090]  Out: -     Physical Exam: Off gtt's. Still with molina cath.  General Appearance: alert, appears stated age and cooperative  Head: normocephalic, without obvious abnormality and atraumatic  Eyes: conjunctivae " and sclerae normal and no icterus  Neck: supple and no JVD  Lungs: DECREASED BS BIBASILAR  Heart: PACED +CHRIS  Chest: Wall no abnormalities observed  Abdomen: normal bowel sounds and soft non-tender +OBESITY  Extremities: moves extremities well, no edema, no cyanosis and no redness  Skin: no bleeding, bruising or rash, turgor normal, color normal and no lesions noted  Neurologic: Alert, and oriented. No focal deficits    Labs:    WBC WBC   Date Value Ref Range Status   02/01/2020 8.00 3.40 - 10.80 10*3/mm3 Final   01/31/2020 8.90 3.40 - 10.80 10*3/mm3 Final   01/30/2020 10.80 3.40 - 10.80 10*3/mm3 Final      HGB Hemoglobin   Date Value Ref Range Status   02/01/2020 7.9 (L) 13.0 - 17.7 g/dL Final   01/31/2020 8.3 (L) 13.0 - 17.7 g/dL Final   01/30/2020 8.8 (L) 13.0 - 17.7 g/dL Final      HCT Hematocrit   Date Value Ref Range Status   02/01/2020 24.8 (L) 37.5 - 51.0 % Final   01/31/2020 24.9 (L) 37.5 - 51.0 % Final   01/30/2020 26.4 (L) 37.5 - 51.0 % Final      Platlets No results found for: LABPLAT   MCV MCV   Date Value Ref Range Status   02/01/2020 82.2 79.0 - 97.0 fL Final   01/31/2020 83.2 79.0 - 97.0 fL Final   01/30/2020 82.4 79.0 - 97.0 fL Final          Sodium Sodium   Date Value Ref Range Status   02/01/2020 139 136 - 145 mmol/L Final   01/31/2020 138 136 - 145 mmol/L Final   01/30/2020 137 136 - 145 mmol/L Final      Potassium Potassium   Date Value Ref Range Status   02/01/2020 3.7 3.5 - 5.2 mmol/L Final   01/31/2020 3.6 3.5 - 5.2 mmol/L Final   01/30/2020 3.9 3.5 - 5.2 mmol/L Final      Chloride Chloride   Date Value Ref Range Status   02/01/2020 107 98 - 107 mmol/L Final   01/31/2020 106 98 - 107 mmol/L Final   01/30/2020 105 98 - 107 mmol/L Final      CO2 CO2   Date Value Ref Range Status   02/01/2020 21.0 (L) 22.0 - 29.0 mmol/L Final   01/31/2020 21.0 (L) 22.0 - 29.0 mmol/L Final   01/30/2020 20.0 (L) 22.0 - 29.0 mmol/L Final      BUN BUN   Date Value Ref Range Status   02/01/2020 19 8 - 23 mg/dL Final    01/31/2020 17 8 - 23 mg/dL Final   01/30/2020 16 8 - 23 mg/dL Final      Creatinine Creatinine   Date Value Ref Range Status   02/01/2020 0.96 0.76 - 1.27 mg/dL Final   01/31/2020 0.96 0.76 - 1.27 mg/dL Final   01/30/2020 0.96 0.76 - 1.27 mg/dL Final      Calcium Calcium   Date Value Ref Range Status   02/01/2020 8.4 (L) 8.6 - 10.5 mg/dL Final   01/31/2020 8.5 (L) 8.6 - 10.5 mg/dL Final   01/30/2020 8.4 (L) 8.6 - 10.5 mg/dL Final      PO4 No components found for: PO4   Albumin Albumin   Date Value Ref Range Status   02/01/2020 3.00 (L) 3.50 - 5.20 g/dL Final   01/31/2020 3.00 (L) 3.50 - 5.20 g/dL Final   01/30/2020 3.30 (L) 3.50 - 5.20 g/dL Final      Magnesium Magnesium   Date Value Ref Range Status   02/01/2020 2.3 1.6 - 2.4 mg/dL Final   01/30/2020 2.5 (H) 1.6 - 2.4 mg/dL Final      Uric Acid No components found for: URIC ACID     Imaging Results (Last 72 Hours)     ** No results found for the last 72 hours. **          Results for orders placed during the hospital encounter of 01/14/20   XR Chest 1 View    Narrative Examination: XR CHEST 1 VW-     Date of Exam: 1/29/2020 10:12 AM     Indication: cardiac arrythmia; I25.10-Atherosclerotic heart disease of  native coronary artery without angina pectoris; I25.83-Coronary  atherosclerosis due to lipid rich plaque; Z00.6-Encounter for  examination for normal comparison and control in clinical research  program; I33.0-Acute and subacute infective endocarditis;  I48.91-Unspecified atrial fibrillation; I10-Essential (primary)  hypertension; Z72.0-Tobac.     Comparison: Radiograph 01/24/2020     Technique: 1 view of the chest      Findings:  Questionable mild patchy airspace changes are present within the right  lung base. There is probable small right-sided pleural effusion. There  is a right nephrectomy PICC with the tip in the distal SVC. The heart  appears mildly enlarged. Stable median sternotomy wires are present. No  pneumothorax. No acute osseous abnormality  identified. No acute osseous  abnormality identified.       Impression Patchy airspace changes within the right lung base, likely related to  pneumonia. There is a small right-sided pleural effusion. A right upper  extremity PICC is present with the tip in the distal SVC.     Electronically Signed By-Shannan Winter On:1/29/2020 10:34 AM  This report was finalized on [id number] by  Shannan Winter, .   XR Chest 1 View    Narrative DATE OF EXAM:  1/24/2020 2:11 PM     PROCEDURE:  XR CHEST 1 VW-     INDICATIONS:  Post chest tube removal     COMPARISON:  1/24/2004 20 3:00 AM     TECHNIQUE:   Single radiographic view of the chest was obtained.     FINDINGS:  Sternotomy wires overlie the midline. Prosthetic heart valve is again  noted. There is a right IJ introducer sheath present. There is no  evidence of pneumothorax seen. There are low lung volumes. There is  patchy opacity in the right lung base suggesting atelectasis. Heart size  is again prominent. Pulmonary vascularity appears stable.       Impression No evidence of post chest tube removal pneumothorax.     Right basilar atelectasis. Low lung volumes.     Cardiomegaly.     Electronically Signed By-Charbel Laureano On:1/24/2020 2:26 PM  This report was finalized on [id number] by  Charbel Laureano, .   XR Chest 1 View    Narrative DATE OF EXAM:  1/23/2020 5:35 PM     PROCEDURE:  XR CHEST 1 VW-     INDICATIONS:  declining respiratory status; I25.10-Atherosclerotic heart disease of  native coronary artery without angina pectoris; I25.83-Coronary  atherosclerosis due to lipid rich plaque; Z00.6-Encounter for  examination for normal comparison and control in clinical research  program; I33.0-Acute and subacute infective endocarditis;  I48.91-Unspecified atrial fibrillation; I10-Essential (primary)  hypertension; history smoking. Heart disease. Atrial fibrillation.  Coronary artery disease. Status post heart surgery. Shortness of breath  and cough today     COMPARISON:  Single  frontal view chest performed on 01/23/2020 at 0428 hours     TECHNIQUE:   Single radiographic AP view of the chest was obtained.     FINDINGS:  Single frontal view chest reveals the patient rotated slightly to the  right. The patient is status post sternotomy and surgical replacement of  the mitral valve. The heart and mediastinum are normal. Since previous  study the right internal jugular Tanner-Víctor central venous line catheter  has been removed. The inferior tip of the right internal jugular central  venous line catheter sheath overlies the superior vena cava. There are  bilateral diffuse increased lung markings consistent with chronic lung  disease. No evidence of pneumonia or pleural effusion or pneumothorax or  mass right or left lungs.        Impression    1. Bilateral diffuse increased lung markings consistent with chronic  lung disease and possibly mild congestive heart failure.  2. No focal arising the right or left lungs.     Electronically Signed By-DR. Melvin Taylor MD On:1/23/2020 5:50  PM  This report was finalized on [id number] by DR. Melvin Taylor MD.       Results for orders placed during the hospital encounter of 01/14/20   Duplex Vein Mapping Lower Extremity - Bilateral CAR    Narrative · The left greater saphenous vein is patent and of adequate size in the   thigh.            ASSESSMENT / PLAN      Atrial fibrillation with RVR (CMS/HCC)    Coronary artery disease due to lipid rich plaque    CAD S/P percutaneous coronary angioplasty    Essential hypertension    Dyslipidemia    Non-insulin dependent type 2 diabetes mellitus (CMS/HCC)    Diabetic neuropathy (CMS/HCC)    GERD without esophagitis    BPH with obstruction/lower urinary tract symptoms    B12 deficiency    Vitamin D deficiency    JARRETT (generalized anxiety disorder)    Tobacco abuse    Obesity (BMI 30-39.9)    Acute bacterial endocarditis    S/P MVR (mitral valve replacement)    Endocarditis of mitral valve     Non-sustained ventricular tachycardia (CMS/HCC)    1. CRF/CKD STG 2----Mild. Nonoliguric. Creatinine stable post op.  Follow renal function with abx that patient is being given    2. HTN------Back down BP meds a little to avoid hypotension    3. CAD S/P CABG AND MVR------per CT Surgery    4. AFIB WITH RVR----per Cardiology.      5. DMII-----BS darlyn. Glucometers, SSI    6. OBESITY    7. H/O NEPHROLITHIASIS    8. BACTEREMIA/ENDOCARDITIS-----Abx per ID. S/p MVR    9. HYPOCALCEMIA----Replaced    10. ANEMIA --- H/H up     11. HYPOKALEMIA------Replace po      12. METABOLIC ACIDOSIS------po NaHCO3 x one again    AWAITING REHAB PLACEMENT  Cr stable  Decrease clonidine    Frederick George MD  [facility]  [phone]  02/01/20  11:49 AM

## 2020-02-01 NOTE — PROGRESS NOTES
Infectious Diseases Progress Note      LOS: 18 days   Patient Care Team:  Kajal Sanchez as PCP - General (Internal Medicine)  Frederick George MD as Consulting Physician (Nephrology)        Subjective      The patient has been afebrile for the last 24 hours.  The patient is hemodynamically stable.  He is awake and alert today.  He is currently on room air.       Review of Systems:   Review of Systems   Constitutional: Negative.    HENT: Negative.    Cardiovascular: Negative.    Gastrointestinal: Negative.    Genitourinary: Negative.    Musculoskeletal: Negative.    Skin: Negative.    Neurological: Negative.    Hematological: Negative.    Psychiatric/Behavioral: Negative.         Objective     Vital Signs  Temp:  [96.5 °F (35.8 °C)-98.2 °F (36.8 °C)] 98.2 °F (36.8 °C)  Heart Rate:  [58-75] 58  Resp:  [16] 16  BP: ()/(54-90) 116/70    Physical Exam:  Physical Exam   Constitutional: He appears well-developed and well-nourished.   HENT:   Head: Normocephalic and atraumatic.   Eyes: Pupils are equal, round, and reactive to light. Conjunctivae and EOM are normal.   Neck: Neck supple.   Cardiovascular: Normal rate, regular rhythm and normal heart sounds.   Pulmonary/Chest: Effort normal. He has rales.   Abdominal: Soft. Bowel sounds are normal.   Musculoskeletal: Normal range of motion. He exhibits no edema.   Degenerative changes patient appears to have some general weakness due to the multiple sclerosis    Neurological: He is alert.   Skin: Skin is warm and dry.   Sternal wound is healing nicely   Vitals reviewed.       Results Review:    I have reviewed all clinical data, test, lab, and imaging results.     Radiology  No Radiology Exams Resulted Within Past 24 Hours    Cardiology    Laboratory  Results from last 7 days   Lab Units 02/01/20  0307   WBC 10*3/mm3 8.00   HEMOGLOBIN g/dL 7.9*   HEMATOCRIT % 24.8*   PLATELETS 10*3/mm3 373     Results from last 7 days   Lab Units 02/01/20  0307   SODIUM mmol/L 139    POTASSIUM mmol/L 3.7   CHLORIDE mmol/L 107   CO2 mmol/L 21.0*   BUN mg/dL 19   CREATININE mg/dL 0.96   GLUCOSE mg/dL 111*   CALCIUM mg/dL 8.4*     Results from last 7 days   Lab Units 02/01/20  0307   SODIUM mmol/L 139   POTASSIUM mmol/L 3.7   CHLORIDE mmol/L 107   CO2 mmol/L 21.0*   BUN mg/dL 19   CREATININE mg/dL 0.96   GLUCOSE mg/dL 111*   CALCIUM mg/dL 8.4*     Results from last 7 days   Lab Units 02/01/20  0307   CK TOTAL U/L 67             Microbiology   Microbiology Results (last 10 days)     Procedure Component Value - Date/Time    Blood Culture - Blood, Blood, Venous Line [[id number]] Collected:  01/23/20 1910    Lab Status:  Final result Specimen:  Blood, Venous Line Updated:  01/28/20 1930     Blood Culture No growth at 5 days          Medication Review:       Schedule Meds    acetylcysteine 3 mL Nebulization BID - RT   amiodarone 200 mg Oral Q12H   aspirin 81 mg Oral Daily   cefTRIAXone 2 g Intravenous Q12H   chlorhexidine 15 mL Mouth/Throat Q12H   cloNIDine 0.1 mg Oral Q12H   DAPTOmycin 10 mg/kg (Adjusted) Intravenous Q24H   docusate sodium 100 mg Oral BID   enoxaparin 40 mg Subcutaneous Daily   FLUoxetine 20 mg Oral Daily   furosemide 40 mg Oral Daily   gabapentin 200 mg Oral BID   insulin lispro 0-14 Units Subcutaneous 4x Daily With Meals & Nightly   ipratropium-albuterol 3 mL Nebulization 4x Daily - RT   metoprolol tartrate 25 mg Oral Q12H   pantoprazole 40 mg Oral Q AM   polyethylene glycol 17 g Oral BID   potassium chloride 20 mEq Oral Daily   senna 1 tablet Oral BID       Infusion Meds       PRN Meds  •  acetaminophen  •  cyclobenzaprine  •  dextrose  •  dextrose  •  glucagon (human recombinant)  •  insulin lispro **AND** insulin lispro  •  MOUTH KOTE  •  [DISCONTINUED] Morphine **AND** naloxone  •  ondansetron  •  potassium chloride **OR** potassium chloride  •  potassium chloride **OR** potassium chloride        Assessment/Plan       Antimicrobial Therapy   1.  IV daptomycin    day   2.  IV  Rocephin    day   3.        Day   4.      Day  5.      Day      Assessment      Mitral valve endocarditis.  Blood culture grow Enterococcus faecium.  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  Initial susceptibility showed that the organism susceptible to ampicillin and gentamicin.  Repeat blood cultures are negative.   Transesophageal echo showed large mobile vegetation on the mitral valve with no significant mitral regurgitation.  The patient is at high risk of throwing emboli to different organs including the brain and causing CVA.  The patient eventually underwent mitral valve replacement on January 22, 2020.  Mitral valve tissue culture is growing Enterococcus faecium and the organism was resistant to ampicillin.  This is a very difficult case to manage since the blood culture organism screen VRE and the mitral valve grew the same organism and now it is resistant to ampicillin.  Vancomycin will not be a good option although was reported susceptible with ANGEL of 1.0 but the organism carries vancomycin-resistant gene.  The organism was tested susceptible for daptomycin.  That treatment of this case is very complicated.  We cannot use the first or second line of therapy because of the resistance     Pyuria associated with bacteriuria on admission.  Urine culture growing mixed organisms     Multiple sclerosis  -Patient is wheelchair-bound     Type 2 diabetes     Tobacco abuse    Toxic metabolic encephalopathy present on admission resolved    Acute kidney injury.  Resolved        Plan       Continue IV daptomycin but increase dose to 10 mg/kg IV daily  Place statins/atorvastatin on hold during daptomycin treatment.  Statin can be restarted on 3/9/2020   Continue Rocephin 2 g IV every 12 hours  Treat patient with the above antimicrobial therapy for 6 weeks  Last day of IV antibiotics will be March 8, 2020  The patient will need PICC line before discharge for the duration of antibiotics  The  patient will need weekly labs including CBC, CMP and CPK for 6 weeks  Labs in a.m. including CPK  The patient is waiting on rehab placement        Allison Box MD  02/01/20  2:50 PM     Note is dictated utilizing voice recognition software/Dragon

## 2020-02-01 NOTE — PROGRESS NOTES
S/P POD#10 MVR--[place]  EF 56-60% (AARON)    Subjective:  Denies any complaints    Plans to go to rehab 1/28 but cancelled d/t antibiotics coverage--now working on LTACH eval and transfer  No events overnight  Drips: None    Valve Cx (1/22/2020)-- rare Enterococcus faecium      Intake/Output Summary (Last 24 hours) at 2/1/2020 1046  Last data filed at 1/31/2020 1700  Gross per 24 hour   Intake 600 ml   Output --   Net 600 ml     Temp:  [96.5 °F (35.8 °C)-97.9 °F (36.6 °C)] 96.5 °F (35.8 °C)  Heart Rate:  [61-75] 64  Resp:  [16] 16  BP: ()/(54-94) 116/70    Results from last 7 days   Lab Units 02/01/20  0307 01/31/20  0604   WBC 10*3/mm3 8.00 8.90   HEMOGLOBIN g/dL 7.9* 8.3*   HEMATOCRIT % 24.8* 24.9*   PLATELETS 10*3/mm3 373 352     Results from last 7 days   Lab Units 02/01/20  0307   CREATININE mg/dL 0.96   POTASSIUM mmol/L 3.7   SODIUM mmol/L 139   MAGNESIUM mg/dL 2.3   PHOSPHORUS mg/dL 3.3       Physical Exam:  Awake, NAD, up in chair  Tele:  SR 70's with PACs  Diminished bases, 100% room air  Sternal incision healing well  Benign abd, + BM  Trace BLE edema    Assessment/Plan:  Principal Problem:    Atrial fibrillation with RVR (CMS/[facility])  Active Problems:    Coronary artery disease due to lipid rich plaque    CAD S/P percutaneous coronary angioplasty    Essential hypertension    Dyslipidemia    Non-insulin dependent type 2 diabetes mellitus (CMS/HCC)    Diabetic neuropathy (CMS/[facility])    GERD without esophagitis    BPH with obstruction/lower urinary tract symptoms    B12 deficiency    Vitamin D deficiency    JARRETT (generalized anxiety disorder)    Tobacco abuse    Obesity (BMI 30-39.9)    Acute bacterial endocarditis    Mitral valve endocarditis s/p POD#10 MVR--on asa/bb  Enterococcus bacteremia/VRE---on dapto/ceftriaxone, ID following   Postop NSVT-- Amio, BB  CAD with stent 1 year ago---last dose Effient 1/17,statin  Paroxysmal atrial fibrillation with RVR---last dose Eliquis  1/17  HTN--clonidine  HLD--statin  DM type 2 with neuropathy--SSI  GERD-- Protonix  Multiple sclerosis---mainly wheelchair bound, PT/OT following   Current tobacco abuse   BPH with incontinence   Obesity---BMI 38.19  Generalized anxiety disorder--home meds  Preop Encephalopathy---resolved     Routine care  Mobilize as able  Looking at LTACH--[place] [facility] jory started 1/28    Najma Castaneda, APRN  2/1/2020  10:46 AM

## 2020-02-01 NOTE — PLAN OF CARE
Problem: Patient Care Overview  Goal: Plan of Care Review  Outcome: Ongoing (interventions implemented as appropriate)  Flowsheets (Taken 2/1/2020 8600)  Progress: no change  Plan of Care Reviewed With: patient  Outcome Summary: pt cont to fatigue easily, didn't want to try amb today but stood at rwx x 2 mins min assist x2. Does better hugging pillow vs trying to close harness.Got very tearful about family situation. Will need rehab at [place]

## 2020-02-01 NOTE — PROGRESS NOTES
Continued Stay Note  MINDI Sawyer     Patient Name: Jamin Hennessy  MRN: [id number]  Today's Date: 2/1/2020    Admit Date: 1/14/2020    Discharge Plan     Row Name 02/01/20 1623       Plan    Plan  [facility] at d/c pending pre-cert. Pre-cert started 2/1 through [employer]. PASRR approved.     Plan Comments  SW confirmed with [place] [facility] liaison (Lori) that pre-cert was started with pt's new insurance provider ([employer]) today.         Vianey Rohades, JERILYNW, LSW  PRN   Phone: [phone]

## 2020-02-02 LAB
ALBUMIN SERPL-MCNC: 3.2 G/DL (ref 3.5–5.2)
ALBUMIN/GLOB SERPL: 1.1 G/DL
ALP SERPL-CCNC: 58 U/L (ref 39–117)
ALT SERPL W P-5'-P-CCNC: 65 U/L (ref 1–41)
ANION GAP SERPL CALCULATED.3IONS-SCNC: 12 MMOL/L (ref 5–15)
AST SERPL-CCNC: 39 U/L (ref 1–40)
BASOPHILS # BLD AUTO: 0.2 10*3/MM3 (ref 0–0.2)
BASOPHILS NFR BLD AUTO: 2.7 % (ref 0–1.5)
BILIRUB SERPL-MCNC: 0.2 MG/DL (ref 0.2–1.2)
BUN BLD-MCNC: 16 MG/DL (ref 8–23)
BUN/CREAT SERPL: 15.5 (ref 7–25)
CALCIUM SPEC-SCNC: 8.5 MG/DL (ref 8.6–10.5)
CHLORIDE SERPL-SCNC: 108 MMOL/L (ref 98–107)
CK SERPL-CCNC: 56 U/L (ref 20–200)
CO2 SERPL-SCNC: 22 MMOL/L (ref 22–29)
CREAT BLD-MCNC: 1.03 MG/DL (ref 0.76–1.27)
DEPRECATED RDW RBC AUTO: 47.3 FL (ref 37–54)
EOSINOPHIL # BLD AUTO: 0.5 10*3/MM3 (ref 0–0.4)
EOSINOPHIL NFR BLD AUTO: 6.8 % (ref 0.3–6.2)
ERYTHROCYTE [DISTWIDTH] IN BLOOD BY AUTOMATED COUNT: 16.2 % (ref 12.3–15.4)
GFR SERPL CREATININE-BSD FRML MDRD: 73 ML/MIN/1.73
GLOBULIN UR ELPH-MCNC: 2.8 GM/DL
GLUCOSE BLD-MCNC: 113 MG/DL (ref 65–99)
GLUCOSE BLDC GLUCOMTR-MCNC: 107 MG/DL (ref 70–105)
GLUCOSE BLDC GLUCOMTR-MCNC: 108 MG/DL (ref 70–105)
GLUCOSE BLDC GLUCOMTR-MCNC: 96 MG/DL (ref 70–105)
GLUCOSE BLDC GLUCOMTR-MCNC: 97 MG/DL (ref 70–105)
HCT VFR BLD AUTO: 27.3 % (ref 37.5–51)
HGB BLD-MCNC: 8.8 G/DL (ref 13–17.7)
LYMPHOCYTES # BLD AUTO: 1.1 10*3/MM3 (ref 0.7–3.1)
LYMPHOCYTES NFR BLD AUTO: 17.3 % (ref 19.6–45.3)
MCH RBC QN AUTO: 26.5 PG (ref 26.6–33)
MCHC RBC AUTO-ENTMCNC: 32.2 G/DL (ref 31.5–35.7)
MCV RBC AUTO: 82.1 FL (ref 79–97)
MONOCYTES # BLD AUTO: 0.5 10*3/MM3 (ref 0.1–0.9)
MONOCYTES NFR BLD AUTO: 7.6 % (ref 5–12)
NEUTROPHILS # BLD AUTO: 4.3 10*3/MM3 (ref 1.7–7)
NEUTROPHILS NFR BLD AUTO: 65.6 % (ref 42.7–76)
NRBC BLD AUTO-RTO: 0 /100 WBC (ref 0–0.2)
PLATELET # BLD AUTO: 349 10*3/MM3 (ref 140–450)
PMV BLD AUTO: 7.5 FL (ref 6–12)
POTASSIUM BLD-SCNC: 3.8 MMOL/L (ref 3.5–5.2)
PROT SERPL-MCNC: 6 G/DL (ref 6–8.5)
RBC # BLD AUTO: 3.33 10*6/MM3 (ref 4.14–5.8)
SODIUM BLD-SCNC: 142 MMOL/L (ref 136–145)
WBC NRBC COR # BLD: 6.6 10*3/MM3 (ref 3.4–10.8)

## 2020-02-02 PROCEDURE — 94799 UNLISTED PULMONARY SVC/PX: CPT

## 2020-02-02 PROCEDURE — 25010000002 DAPTOMYCIN PER 1 MG: Performed by: INTERNAL MEDICINE

## 2020-02-02 PROCEDURE — 82962 GLUCOSE BLOOD TEST: CPT

## 2020-02-02 PROCEDURE — 25010000002 CEFTRIAXONE PER 250 MG: Performed by: INTERNAL MEDICINE

## 2020-02-02 PROCEDURE — 99232 SBSQ HOSP IP/OBS MODERATE 35: CPT | Performed by: INTERNAL MEDICINE

## 2020-02-02 PROCEDURE — 85025 COMPLETE CBC W/AUTO DIFF WBC: CPT | Performed by: INTERNAL MEDICINE

## 2020-02-02 PROCEDURE — 82550 ASSAY OF CK (CPK): CPT | Performed by: INTERNAL MEDICINE

## 2020-02-02 PROCEDURE — 99024 POSTOP FOLLOW-UP VISIT: CPT | Performed by: NURSE PRACTITIONER

## 2020-02-02 PROCEDURE — 80053 COMPREHEN METABOLIC PANEL: CPT | Performed by: INTERNAL MEDICINE

## 2020-02-02 PROCEDURE — 25010000002 ENOXAPARIN PER 10 MG: Performed by: NURSE PRACTITIONER

## 2020-02-02 RX ORDER — AMIODARONE HYDROCHLORIDE 200 MG/1
200 TABLET ORAL
Status: DISCONTINUED | OUTPATIENT
Start: 2020-02-03 | End: 2020-02-03 | Stop reason: HOSPADM

## 2020-02-02 RX ORDER — CLONIDINE HYDROCHLORIDE 0.1 MG/1
0.1 TABLET ORAL DAILY
Status: DISCONTINUED | OUTPATIENT
Start: 2020-02-03 | End: 2020-02-03

## 2020-02-02 RX ADMIN — GABAPENTIN 200 MG: 100 CAPSULE ORAL at 20:02

## 2020-02-02 RX ADMIN — IPRATROPIUM BROMIDE AND ALBUTEROL SULFATE 3 ML: .5; 3 SOLUTION RESPIRATORY (INHALATION) at 18:50

## 2020-02-02 RX ADMIN — AMIODARONE HYDROCHLORIDE 200 MG: 200 TABLET ORAL at 08:27

## 2020-02-02 RX ADMIN — CYCLOBENZAPRINE HYDROCHLORIDE 5 MG: 10 TABLET, FILM COATED ORAL at 20:03

## 2020-02-02 RX ADMIN — GABAPENTIN 200 MG: 100 CAPSULE ORAL at 08:34

## 2020-02-02 RX ADMIN — ACETYLCYSTEINE 3 ML: 200 SOLUTION ORAL; RESPIRATORY (INHALATION) at 06:49

## 2020-02-02 RX ADMIN — ASPIRIN 81 MG: 81 TABLET, DELAYED RELEASE ORAL at 08:26

## 2020-02-02 RX ADMIN — FLUOXETINE 20 MG: 20 CAPSULE ORAL at 08:27

## 2020-02-02 RX ADMIN — ACETAMINOPHEN 500 MG: 500 TABLET, FILM COATED ORAL at 20:02

## 2020-02-02 RX ADMIN — CYCLOBENZAPRINE HYDROCHLORIDE 5 MG: 10 TABLET, FILM COATED ORAL at 08:34

## 2020-02-02 RX ADMIN — DAPTOMYCIN 900 MG: 500 INJECTION, POWDER, LYOPHILIZED, FOR SOLUTION INTRAVENOUS at 11:52

## 2020-02-02 RX ADMIN — POLYETHYLENE GLYCOL 3350 17 G: 17 POWDER, FOR SOLUTION ORAL at 08:27

## 2020-02-02 RX ADMIN — METOPROLOL TARTRATE 25 MG: 25 TABLET ORAL at 20:03

## 2020-02-02 RX ADMIN — ENOXAPARIN SODIUM 40 MG: 40 INJECTION SUBCUTANEOUS at 17:05

## 2020-02-02 RX ADMIN — IPRATROPIUM BROMIDE AND ALBUTEROL SULFATE 3 ML: .5; 3 SOLUTION RESPIRATORY (INHALATION) at 14:56

## 2020-02-02 RX ADMIN — CEFTRIAXONE SODIUM 2 G: 2 INJECTION, POWDER, FOR SOLUTION INTRAMUSCULAR; INTRAVENOUS at 02:30

## 2020-02-02 RX ADMIN — FUROSEMIDE 40 MG: 40 TABLET ORAL at 08:34

## 2020-02-02 RX ADMIN — SENNOSIDES 1 TABLET: 8.6 TABLET, FILM COATED ORAL at 08:26

## 2020-02-02 RX ADMIN — ACETYLCYSTEINE 3 ML: 200 SOLUTION ORAL; RESPIRATORY (INHALATION) at 18:50

## 2020-02-02 RX ADMIN — ACETAMINOPHEN 500 MG: 500 TABLET, FILM COATED ORAL at 08:34

## 2020-02-02 RX ADMIN — CLONIDINE HYDROCHLORIDE 0.1 MG: 0.1 TABLET ORAL at 08:26

## 2020-02-02 RX ADMIN — CEFTRIAXONE SODIUM 2 G: 2 INJECTION, POWDER, FOR SOLUTION INTRAMUSCULAR; INTRAVENOUS at 14:19

## 2020-02-02 RX ADMIN — DOCUSATE SODIUM 100 MG: 100 CAPSULE, LIQUID FILLED ORAL at 08:26

## 2020-02-02 RX ADMIN — PANTOPRAZOLE SODIUM 40 MG: 40 TABLET, DELAYED RELEASE ORAL at 05:14

## 2020-02-02 RX ADMIN — POTASSIUM CHLORIDE 20 MEQ: 1500 TABLET, EXTENDED RELEASE ORAL at 08:26

## 2020-02-02 RX ADMIN — METOPROLOL TARTRATE 25 MG: 25 TABLET ORAL at 08:26

## 2020-02-02 RX ADMIN — IPRATROPIUM BROMIDE AND ALBUTEROL SULFATE 3 ML: .5; 3 SOLUTION RESPIRATORY (INHALATION) at 06:49

## 2020-02-02 NOTE — PROGRESS NOTES
"NEPHROLOGY PROGRESS NOTE------[facility] [place]    [facility] [place]  [phone]  Frederick George MD      Patient Care Team:  Kajal Sanchez as PCP - General (Internal Medicine)  Frederick George MD as Consulting Physician (Nephrology)      Provider:  Frederick George MD  Patient Name: Jamin Hennessy  :  1955    SUBJECTIVE:     Patient up in chair. No SOB, Cp, palpitations, cramping, dysuria.     Medication:    acetylcysteine 3 mL Nebulization BID - RT   amiodarone 200 mg Oral Q12H   aspirin 81 mg Oral Daily   cefTRIAXone 2 g Intravenous Q12H   chlorhexidine 15 mL Mouth/Throat Q12H   cloNIDine 0.1 mg Oral Q12H   DAPTOmycin 10 mg/kg (Adjusted) Intravenous Q24H   docusate sodium 100 mg Oral BID   enoxaparin 40 mg Subcutaneous Daily   FLUoxetine 20 mg Oral Daily   furosemide 40 mg Oral Daily   gabapentin 200 mg Oral BID   insulin lispro 0-14 Units Subcutaneous 4x Daily With Meals & Nightly   ipratropium-albuterol 3 mL Nebulization 4x Daily - RT   metoprolol tartrate 25 mg Oral Q12H   pantoprazole 40 mg Oral Q AM   polyethylene glycol 17 g Oral BID   potassium chloride 20 mEq Oral Daily   senna 1 tablet Oral BID          OBJECTIVE    Vital Sign Min/Max for last 24 hours  Temp  Min: 96.8 °F (36 °C)  Max: 97.3 °F (36.3 °C)   BP  Min: 93/65  Max: 127/54   Pulse  Min: 60  Max: 77   Resp  Min: 16  Max: 16   SpO2  Min: 95 %  Max: 95 %   No data recorded   Weight  Min: 121 kg (266 lb 12.1 oz)  Max: 121 kg (266 lb 12.1 oz)     Flowsheet Rows      First Filed Value   Admission Height  177.8 cm (70\") Documented at 2020   Admission Weight  124 kg (272 lb 7.8 oz) Documented at 2020 003          No intake/output data recorded.  No intake/output data recorded.    Physical Exam: Off gtt's. Still with molina cath.  General Appearance: alert, appears stated age and cooperative  Head: normocephalic, without obvious abnormality and atraumatic  Eyes: conjunctivae and sclerae normal and no " icterus  Neck: supple and no JVD  Lungs: DECREASED BS BIBASILAR  Heart: PACED +CHRIS  Chest: Wall no abnormalities observed  Abdomen: normal bowel sounds and soft non-tender +OBESITY  Extremities: moves extremities well, no edema, no cyanosis and no redness  Skin: no bleeding, bruising or rash, turgor normal, color normal and no lesions noted  Neurologic: Alert, and oriented. No focal deficits    Labs:    WBC WBC   Date Value Ref Range Status   02/02/2020 6.60 3.40 - 10.80 10*3/mm3 Final   02/01/2020 8.00 3.40 - 10.80 10*3/mm3 Final   01/31/2020 8.90 3.40 - 10.80 10*3/mm3 Final      HGB Hemoglobin   Date Value Ref Range Status   02/02/2020 8.8 (L) 13.0 - 17.7 g/dL Final   02/01/2020 7.9 (L) 13.0 - 17.7 g/dL Final   01/31/2020 8.3 (L) 13.0 - 17.7 g/dL Final      HCT Hematocrit   Date Value Ref Range Status   02/02/2020 27.3 (L) 37.5 - 51.0 % Final   02/01/2020 24.8 (L) 37.5 - 51.0 % Final   01/31/2020 24.9 (L) 37.5 - 51.0 % Final      Platlets No results found for: LABPLAT   MCV MCV   Date Value Ref Range Status   02/02/2020 82.1 79.0 - 97.0 fL Final   02/01/2020 82.2 79.0 - 97.0 fL Final   01/31/2020 83.2 79.0 - 97.0 fL Final          Sodium Sodium   Date Value Ref Range Status   02/02/2020 142 136 - 145 mmol/L Final   02/01/2020 139 136 - 145 mmol/L Final   01/31/2020 138 136 - 145 mmol/L Final      Potassium Potassium   Date Value Ref Range Status   02/02/2020 3.8 3.5 - 5.2 mmol/L Final   02/01/2020 3.7 3.5 - 5.2 mmol/L Final   01/31/2020 3.6 3.5 - 5.2 mmol/L Final      Chloride Chloride   Date Value Ref Range Status   02/02/2020 108 (H) 98 - 107 mmol/L Final   02/01/2020 107 98 - 107 mmol/L Final   01/31/2020 106 98 - 107 mmol/L Final      CO2 CO2   Date Value Ref Range Status   02/02/2020 22.0 22.0 - 29.0 mmol/L Final   02/01/2020 21.0 (L) 22.0 - 29.0 mmol/L Final   01/31/2020 21.0 (L) 22.0 - 29.0 mmol/L Final      BUN BUN   Date Value Ref Range Status   02/02/2020 16 8 - 23 mg/dL Final   02/01/2020 19 8 - 23  mg/dL Final   01/31/2020 17 8 - 23 mg/dL Final      Creatinine Creatinine   Date Value Ref Range Status   02/02/2020 1.03 0.76 - 1.27 mg/dL Final   02/01/2020 0.96 0.76 - 1.27 mg/dL Final   01/31/2020 0.96 0.76 - 1.27 mg/dL Final      Calcium Calcium   Date Value Ref Range Status   02/02/2020 8.5 (L) 8.6 - 10.5 mg/dL Final   02/01/2020 8.4 (L) 8.6 - 10.5 mg/dL Final   01/31/2020 8.5 (L) 8.6 - 10.5 mg/dL Final      PO4 No components found for: PO4   Albumin Albumin   Date Value Ref Range Status   02/02/2020 3.20 (L) 3.50 - 5.20 g/dL Final   02/01/2020 3.00 (L) 3.50 - 5.20 g/dL Final   01/31/2020 3.00 (L) 3.50 - 5.20 g/dL Final      Magnesium Magnesium   Date Value Ref Range Status   02/01/2020 2.3 1.6 - 2.4 mg/dL Final      Uric Acid No components found for: URIC ACID     Imaging Results (Last 72 Hours)     ** No results found for the last 72 hours. **          Results for orders placed during the hospital encounter of 01/14/20   XR Chest 1 View    Narrative Examination: XR CHEST 1 VW-     Date of Exam: 1/29/2020 10:12 AM     Indication: cardiac arrythmia; I25.10-Atherosclerotic heart disease of  native coronary artery without angina pectoris; I25.83-Coronary  atherosclerosis due to lipid rich plaque; Z00.6-Encounter for  examination for normal comparison and control in clinical research  program; I33.0-Acute and subacute infective endocarditis;  I48.91-Unspecified atrial fibrillation; I10-Essential (primary)  hypertension; Z72.0-[place]ac.     Comparison: Radiograph 01/24/2020     Technique: 1 view of the chest      Findings:  Questionable mild patchy airspace changes are present within the right  lung base. There is probable small right-sided pleural effusion. There  is a right nephrectomy PICC with the tip in the distal SVC. The heart  appears mildly enlarged. Stable median sternotomy wires are present. No  pneumothorax. No acute osseous abnormality identified. No acute osseous  abnormality identified.        Impression Patchy airspace changes within the right lung base, likely related to  pneumonia. There is a small right-sided pleural effusion. A right upper  extremity PICC is present with the tip in the distal SVC.     Electronically Signed By-Shannan Winter On:1/29/2020 10:34 AM  This report was finalized on [id number] by  Shannan Winter, .   XR Chest 1 View    Narrative DATE OF EXAM:  1/24/2020 2:11 PM     PROCEDURE:  XR CHEST 1 VW-     INDICATIONS:  Post chest tube removal     COMPARISON:  1/24/2004 20 3:00 AM     TECHNIQUE:   Single radiographic view of the chest was obtained.     FINDINGS:  Sternotomy wires overlie the midline. Prosthetic heart valve is again  noted. There is a right IJ introducer sheath present. There is no  evidence of pneumothorax seen. There are low lung volumes. There is  patchy opacity in the right lung base suggesting atelectasis. Heart size  is again prominent. Pulmonary vascularity appears stable.       Impression No evidence of post chest tube removal pneumothorax.     Right basilar atelectasis. Low lung volumes.     Cardiomegaly.     Electronically Signed By-Charbel Laureano On:1/24/2020 2:26 PM  This report was finalized on [id number] by  Charbel Laureano, .   XR Chest 1 View    Narrative DATE OF EXAM:  1/23/2020 5:35 PM     PROCEDURE:  XR CHEST 1 VW-     INDICATIONS:  declining respiratory status; I25.10-Atherosclerotic heart disease of  native coronary artery without angina pectoris; I25.83-Coronary  atherosclerosis due to lipid rich plaque; Z00.6-Encounter for  examination for normal comparison and control in clinical research  program; I33.0-Acute and subacute infective endocarditis;  I48.91-Unspecified atrial fibrillation; I10-Essential (primary)  hypertension; history smoking. Heart disease. Atrial fibrillation.  Coronary artery disease. Status post heart surgery. Shortness of breath  and cough today     COMPARISON:  Single frontal view chest performed on 01/23/2020 at 0428 hours      TECHNIQUE:   Single radiographic AP view of the chest was obtained.     FINDINGS:  Single frontal view chest reveals the patient rotated slightly to the  right. The patient is status post sternotomy and surgical replacement of  the mitral valve. The heart and mediastinum are normal. Since previous  study the right internal jugular [place] central venous line catheter  has been removed. The inferior tip of the right internal jugular central  venous line catheter sheath overlies the superior vena cava. There are  bilateral diffuse increased lung markings consistent with chronic lung  disease. No evidence of pneumonia or pleural effusion or pneumothorax or  mass right or left lungs.        Impression    1. Bilateral diffuse increased lung markings consistent with chronic  lung disease and possibly mild congestive heart failure.  2. No focal arising the right or left lungs.     Electronically Signed By-DR. Melvin Taylor MD On:1/23/2020 5:50  PM  This report was finalized on [id number] by DR. Melvin Taylor MD.       Results for orders placed during the hospital encounter of 01/14/20   Duplex Vein Mapping Lower Extremity - Bilateral CAR    Narrative · The left greater saphenous vein is patent and of adequate size in the   thigh.            ASSESSMENT / PLAN      Atrial fibrillation with RVR (CMS/HCC)    Coronary artery disease due to lipid rich plaque    CAD S/P percutaneous coronary angioplasty    Essential hypertension    Dyslipidemia    Non-insulin dependent type 2 diabetes mellitus (CMS/HCC)    Diabetic neuropathy (CMS/HCC)    GERD without esophagitis    BPH with obstruction/lower urinary tract symptoms    B12 deficiency    Vitamin D deficiency    JARRETT (generalized anxiety disorder)    Tobacco abuse    Obesity (BMI 30-39.9)    Acute bacterial endocarditis    S/P MVR (mitral valve replacement)    Endocarditis of mitral valve    Non-sustained ventricular tachycardia (CMS/HCC)    1. CRF/CKD STG  2----Mild. Nonoliguric. Creatinine stable post op.  Follow renal function with abx that patient is being given    2. HTN------Back down BP meds a little to avoid hypotension    3. CAD S/P CABG AND MVR------per CT Surgery    4. AFIB WITH RVR----per Cardiology.      5. DMII-----BS okay. Glucometers, SSI    6. OBESITY    7. H/O NEPHROLITHIASIS    8. BACTEREMIA/ENDOCARDITIS-----Abx per ID. S/p MVR    9. HYPOCALCEMIA----Replaced    10. ANEMIA --- H/H up     11. HYPOKALEMIA------Replace po      12. METABOLIC ACIDOSIS------po NaHCO3 x one again    AWAITING REHAB PLACEMENT  Cr stable  BP ok, wean off clonidine    Frederick George MD  [facility]  [phone]  02/02/20  12:18 PM

## 2020-02-02 NOTE — PROGRESS NOTES
Cardiology Progress Note      Admiting Physician:  Fallon Cole MD   LOS: 19 days       Reason For Followup:  Mitral valve endocarditis  Paroxysmal atrial fibrillation  Coronary artery disease  Nonsustained ventricular tachycardia      Subjective:    Interval History:  Seen and examined.  Chart and labs reviewed.  Patient reports mild tenderness at incision site.  Reports being tired and fatigued.  Still awaiting rehab placement.    Review of Systems:  A complete review of systems was undertaken with the pertinent cardiovascular findings listed in history of present illness and all other systems being negative.     Assessment & Plan    Impressions:  Mitral valve endocarditis status post bioprosthetic mitral valve replacement  Coronary artery disease  Episodic nonsustained monomorphic ventricular tachycardia     None since addition of amiodarone  Paroxysmal atrial fibrillation  Essential hypertension currently well controlled  Obesity    Recommendations:  Continuation of his current cardiovascular regimen.  Continue to monitor rhythm  Decrease amiodarone  Antimicrobials as per infectious disease  Awaiting pre-CERT from anth        Objective:    Medication Review:   Scheduled Meds:    acetylcysteine 3 mL Nebulization BID - RT   amiodarone 200 mg Oral Q12H   aspirin 81 mg Oral Daily   cefTRIAXone 2 g Intravenous Q12H   chlorhexidine 15 mL Mouth/Throat Q12H   [START ON 2/3/2020] cloNIDine 0.1 mg Oral Daily   DAPTOmycin 10 mg/kg (Adjusted) Intravenous Q24H   docusate sodium 100 mg Oral BID   enoxaparin 40 mg Subcutaneous Daily   FLUoxetine 20 mg Oral Daily   furosemide 40 mg Oral Daily   gabapentin 200 mg Oral BID   insulin lispro 0-14 Units Subcutaneous 4x Daily With Meals & Nightly   ipratropium-albuterol 3 mL Nebulization 4x Daily - RT   metoprolol tartrate 25 mg Oral Q12H   pantoprazole 40 mg Oral Q AM   polyethylene glycol 17 g Oral BID   potassium chloride 20 mEq Oral Daily   senna 1 tablet Oral BID      Continuous Infusions:     PRN Meds:.•  acetaminophen  •  cyclobenzaprine  •  dextrose  •  dextrose  •  glucagon (human recombinant)  •  insulin lispro **AND** insulin lispro  •  [DISCONTINUED] Morphine **AND** naloxone  •  ondansetron  •  potassium chloride **OR** potassium chloride  •  potassium chloride **OR** potassium chloride    Patient Active Problem List   Diagnosis   • Atrial fibrillation with RVR (CMS/[facility])   • Coronary artery disease due to lipid rich plaque   • CAD S/P percutaneous coronary angioplasty   • Essential hypertension   • Dyslipidemia   • Non-insulin dependent type 2 diabetes mellitus (CMS/HCC)   • Diabetic neuropathy (CMS/HCC)   • GERD without esophagitis   • BPH with obstruction/lower urinary tract symptoms   • B12 deficiency   • Vitamin D deficiency   • JARRETT (generalized anxiety disorder)   • Tobacco abuse   • Obesity (BMI 30-39.9)   • Acute bacterial endocarditis   • S/P MVR (mitral valve replacement)   • Endocarditis of mitral valve   • Non-sustained ventricular tachycardia (CMS/[facility])         Physical Exam:    General: Alert, cooperative, no distress, appears stated age  Head:  Normocephalic, atraumatic, mucous membranes moist  Eyes:  Conjunctiva/corneas clear, EOM's intact     Neck:  Supple,  no bruit  Lungs: Clear to auscultation bilaterally, no wheezes rhonchi rales are noted  Chest wall: Tender to palpation.  Incision intact  Heart::  Regular rate and rhythm, S1 and S2 normal, 1/6 holosystolic murmur.  No rub or gallop  Abdomen: Soft, non-tender, nondistended bowel sounds active  Extremities: No cyanosis, clubbing, 1+ edema  Pulses: 2+ and symmetric all extremities  Skin:  Incision clean dry and intact  Neuro/psych: A&O x3. CN II through XII are grossly intact with appropriate affect    Vital Signs:  Vitals:    02/02/20 0649 02/02/20 0652 02/02/20 0700 02/02/20 1202   BP:       Pulse: 61 61  65   Resp: 16      Temp:   97.2 °F (36.2 °C)    TempSrc:   Axillary    SpO2: 95%      Weight:        Height:         Wt Readings from Last 1 Encounters:   02/02/20 121 kg (266 lb 12.1 oz)     No intake or output data in the 24 hours ending 02/02/20 1302      Results Review:     CBC    Results from last 7 days   Lab Units 02/02/20  0320 02/01/20  0307 01/31/20  0604 01/30/20  0434 01/29/20  0608 01/28/20  0445 01/27/20  0304   WBC 10*3/mm3 6.60 8.00 8.90 10.80 8.10 8.40 8.30   HEMOGLOBIN g/dL 8.8* 7.9* 8.3* 8.8* 8.6* 8.0* 8.6*   PLATELETS 10*3/mm3 349 373 352 345 297 226 230     Cr Clearance Estimated Creatinine Clearance: 93.1 mL/min (by C-G formula based on SCr of 1.03 mg/dL).  Coag       HbA1C   Lab Results   Component Value Date    HGBA1C 5.7 (H) 01/15/2020     Blood Glucose   Glucose   Date/Time Value Ref Range Status   02/02/2020 1149 107 (H) 70 - 105 mg/dL Final     Comment:     Serial Number: [id number]:  [id number]   02/02/2020 0741 96 70 - 105 mg/dL Final     Comment:     Serial Number: [id number]:  [id number]   02/01/2020 2013 108 (H) 70 - 105 mg/dL Final     Comment:     Serial Number: [id number]:  [id number]   02/01/2020 1634 152 (H) 70 - 105 mg/dL Final     Comment:     Serial Number: [id number]:  [id number]   02/01/2020 1159 89 70 - 105 mg/dL Final     Comment:     Serial Number: [id number]:  [id number]   02/01/2020 0817 150 (H) 70 - 105 mg/dL Final     Comment:     Serial Number: [id number]:  [id number]   01/31/2020 1956 124 (H) 70 - 105 mg/dL Final     Comment:     Serial Number: [id number]:  [id number]   01/31/2020 1700 116 (H) 70 - 105 mg/dL Final     Comment:     Serial Number: [id number]:  [id number]     Infection       CMP   Results from last 7 days   Lab Units 02/02/20  0320 02/01/20  0307 01/31/20  0604 01/30/20  0434 01/29/20  0608 01/28/20  1225 01/28/20  0445  01/27/20  0304   SODIUM mmol/L 142 139 138 137 136  --  137  --  139   POTASSIUM mmol/L 3.8 3.7 3.6 3.9 3.4* 3.5 3.5   < > 3.4*   CHLORIDE mmol/L 108* 107 106 105 103  --  104  --   105   CO2 mmol/L 22.0 21.0* 21.0* 20.0* 21.0*  --  22.0  --  21.0*   BUN mg/dL 16 19 17 16 12  --  13  --  14   CREATININE mg/dL 1.03 0.96 0.96 0.96 0.91  --  1.02  --  1.09   GLUCOSE mg/dL 113* 111* 116* 163* 119*  --  113*  --  102*   ALBUMIN g/dL 3.20* 3.00* 3.00* 3.30* 3.20*  --  3.10*  --  3.00*   BILIRUBIN mg/dL 0.2 0.2 0.2 0.2 0.3  --  0.4  --  0.5   ALK PHOS U/L 58 51 51 54 48  --  45  --  50   AST (SGOT) U/L 39 34 34 36 43*  --  27  --  27   ALT (SGPT) U/L 65* 51* 49* 43* 51*  --  27  --  23    < > = values in this interval not displayed.     ABG        UA        TERESA  No results found for: POCMETH, POCAMPHET, POCBARBITUR, POCBENZO, POCCOCAINE, POCOPIATES, POCOXYCODO, POCPHENCYC, POCPROPOXY, POCTHC, POCTRICYC  Lysis Labs   Results from last 7 days   Lab Units 02/02/20  0320 02/01/20  0307 01/31/20  0604 01/30/20  0434 01/29/20  0608 01/28/20  0445 01/27/20  0304   HEMOGLOBIN g/dL 8.8* 7.9* 8.3* 8.8* 8.6* 8.0* 8.6*   PLATELETS 10*3/mm3 349 373 352 345 297 226 230   CREATININE mg/dL 1.03 0.96 0.96 0.96 0.91 1.02 1.09     Radiology(recent) No radiology results for the last day      Results from last 7 days   Lab Units 02/02/20  0320   CK TOTAL U/L 56       Imaging Results (Last 24 Hours)     ** No results found for the last 24 hours. **          Cardiac Studies:  Echo-   Results for orders placed during the hospital encounter of 01/14/20   Adult Transthoracic Echo Complete W/ Cont if Necessary Per Protocol    Narrative · Estimated EF appears to be in the range of 66 - 70%. Normal left   ventricular cavity size noted. All left ventricular wall segments contract   normally. Left ventricular wall thickness is consistent with moderate   septal asymmetric hypertrophy. Left ventricular diastolic function not   assessed on this study. LVOT gradient not interrogated.  · Left atrial cavity size is mildly dilated.  · There is a bioprosthetic mitral valve present. Leaflets were not well   visualized. The mean gradient  across the valve was 5.2 mmHg. There appears   to be mild mitral insufficiency but color flow interrogation was   inadequate.        Stress Myoview-  Cath-        Sarmad Littlejohn DO  02/02/20  1:02 PM

## 2020-02-02 NOTE — PROGRESS NOTES
S/P POD#11 MVR--[place]  EF 56-60% (AARON)    Subjective:  Denies any complaints    Plans to go to rehab 1/28 but cancelled d/t antibiotics coverage--now working on LTACH eval and transfer  No events overnight  Drips: None    Valve Cx (1/22/2020)-- rare Enterococcus faecium    No intake or output data in the 24 hours ending 02/02/20 1051  Temp:  [96.8 °F (36 °C)-98.2 °F (36.8 °C)] 97.2 °F (36.2 °C)  Heart Rate:  [58-77] 61  Resp:  [16] 16  BP: ()/(44-76) 120/76    Results from last 7 days   Lab Units 02/02/20  0320 02/01/20  0307   WBC 10*3/mm3 6.60 8.00   HEMOGLOBIN g/dL 8.8* 7.9*   HEMATOCRIT % 27.3* 24.8*   PLATELETS 10*3/mm3 349 373     Results from last 7 days   Lab Units 02/02/20  0320 02/01/20  0307   CREATININE mg/dL 1.03 0.96   POTASSIUM mmol/L 3.8 3.7   SODIUM mmol/L 142 139   MAGNESIUM mg/dL  --  2.3   PHOSPHORUS mg/dL  --  3.3       Physical Exam:  Awake, NAD, up in chair  Tele:  SR 70's with PACs  Diminished bases, 100% room air  Sternal incision healing well  Benign abd, + BM  Trace BLE edema    Assessment/Plan:  Principal Problem:    Atrial fibrillation with RVR (CMS/HCC)  Active Problems:    Coronary artery disease due to lipid rich plaque    CAD S/P percutaneous coronary angioplasty    Essential hypertension    Dyslipidemia    Non-insulin dependent type 2 diabetes mellitus (CMS/HCC)    Diabetic neuropathy (CMS/HCC)    GERD without esophagitis    BPH with obstruction/lower urinary tract symptoms    B12 deficiency    Vitamin D deficiency    JARRETT (generalized anxiety disorder)    Tobacco abuse    Obesity (BMI 30-39.9)    Acute bacterial endocarditis    Mitral valve endocarditis s/p POD#11 MVR--on asa/bb  Enterococcus bacteremia/VRE---on dapto/ceftriaxone, ID following   Postop NSVT-- Amio, BB  CAD with stent 1 year ago---last dose Effient 1/17,statin  Paroxysmal atrial fibrillation with RVR---last dose Eliquis 1/17  HTN--clonidine  HLD--statin  DM type 2 with neuropathy--SSI  GERD-- Protonix  Multiple  sclerosis---mainly wheelchair bound, PT/OT following   Current tobacco abuse   BPH with incontinence   Obesity---BMI 38.19  Generalized anxiety disorder--home meds  Preop Encephalopathy---resolved     Routine care--give lab holiday tomorrow  Mobilize as able  Looking at LTACH--Mayuri Chaudhary started 1/28    Najma Castaneda, YUE  2/2/2020  10:51 AM

## 2020-02-02 NOTE — PLAN OF CARE
Problem: Patient Care Overview  Goal: Plan of Care Review  Outcome: Ongoing (interventions implemented as appropriate)  Flowsheets (Taken 2/2/2020 9965)  Progress: no change  Plan of Care Reviewed With: patient  Outcome Summary: No changes. Up to chair this amrebecca. D/C once precert obtained.

## 2020-02-02 NOTE — PLAN OF CARE
Problem: Patient Care Overview  Goal: Plan of Care Review  Outcome: Ongoing (interventions implemented as appropriate)  Flowsheets  Taken 2/2/2020 0502  Progress: no change  Outcome Summary: Patient continues to require a lot of help in order to change bed. States he is having dizzy spells while in being turned but that he had this before surgery. Does not want to help at all when it comes to turning. Otherwise patient has slept throughout my shift. Will attempt to get into chair this morning but stated he wasn't feeling up to it when asked previously. Will continue to monitor while under my care.  Taken 2/1/2020 2030  Plan of Care Reviewed With: patient

## 2020-02-02 NOTE — PROGRESS NOTES
Infectious Diseases Progress Note      LOS: 19 days   Patient Care Team:  Kajal Sanchez as PCP - General (Internal Medicine)  Frederick George MD as Consulting Physician (Nephrology)        Subjective      The patient has been afebrile for the last 24 hours.  The patient is hemodynamically stable.  He is awake and alert today.  He is currently on room air.       Review of Systems:   Review of Systems   Constitutional: Negative.    HENT: Negative.    Cardiovascular: Negative.    Gastrointestinal: Negative.    Genitourinary: Negative.    Musculoskeletal: Negative.    Skin: Negative.    Neurological: Negative.    Hematological: Negative.    Psychiatric/Behavioral: Negative.         Objective     Vital Signs  Temp:  [96.8 °F (36 °C)-97.3 °F (36.3 °C)] 97.2 °F (36.2 °C)  Heart Rate:  [60-77] 65  Resp:  [16] 16  BP: (106-123)/(44-76) 120/76    Physical Exam:  Physical Exam   Constitutional: He appears well-developed and well-nourished.   HENT:   Head: Normocephalic and atraumatic.   Eyes: Pupils are equal, round, and reactive to light. Conjunctivae and EOM are normal.   Neck: Neck supple.   Cardiovascular: Normal rate, regular rhythm and normal heart sounds.   Pulmonary/Chest: Effort normal. He has rales.   Abdominal: Soft. Bowel sounds are normal.   Musculoskeletal: Normal range of motion. He exhibits no edema.   Degenerative changes patient appears to have some general weakness due to the multiple sclerosis    Neurological: He is alert.   Skin: Skin is warm and dry.   Sternal wound is healing nicely   Vitals reviewed.       Results Review:    I have reviewed all clinical data, test, lab, and imaging results.     Radiology  No Radiology Exams Resulted Within Past 24 Hours    Cardiology    Laboratory  Results from last 7 days   Lab Units 02/02/20  0320   WBC 10*3/mm3 6.60   HEMOGLOBIN g/dL 8.8*   HEMATOCRIT % 27.3*   PLATELETS 10*3/mm3 349     Results from last 7 days   Lab Units 02/02/20  0320   SODIUM mmol/L 142    POTASSIUM mmol/L 3.8   CHLORIDE mmol/L 108*   CO2 mmol/L 22.0   BUN mg/dL 16   CREATININE mg/dL 1.03   GLUCOSE mg/dL 113*   CALCIUM mg/dL 8.5*     Results from last 7 days   Lab Units 02/02/20  0320   SODIUM mmol/L 142   POTASSIUM mmol/L 3.8   CHLORIDE mmol/L 108*   CO2 mmol/L 22.0   BUN mg/dL 16   CREATININE mg/dL 1.03   GLUCOSE mg/dL 113*   CALCIUM mg/dL 8.5*     Results from last 7 days   Lab Units 02/02/20  0320   CK TOTAL U/L 56             Microbiology   Microbiology Results (last 10 days)     Procedure Component Value - Date/Time    Blood Culture - Blood, Blood, Venous Line [[id number]] Collected:  01/23/20 1910    Lab Status:  Final result Specimen:  Blood, Venous Line Updated:  01/28/20 1930     Blood Culture No growth at 5 days          Medication Review:       Schedule Meds    acetylcysteine 3 mL Nebulization BID - RT   amiodarone 200 mg Oral Q12H   aspirin 81 mg Oral Daily   cefTRIAXone 2 g Intravenous Q12H   [START ON 2/3/2020] cloNIDine 0.1 mg Oral Daily   DAPTOmycin 10 mg/kg (Adjusted) Intravenous Q24H   docusate sodium 100 mg Oral BID   enoxaparin 40 mg Subcutaneous Daily   FLUoxetine 20 mg Oral Daily   furosemide 40 mg Oral Daily   gabapentin 200 mg Oral BID   insulin lispro 0-14 Units Subcutaneous 4x Daily With Meals & Nightly   ipratropium-albuterol 3 mL Nebulization 4x Daily - RT   metoprolol tartrate 25 mg Oral Q12H   pantoprazole 40 mg Oral Q AM   polyethylene glycol 17 g Oral BID   potassium chloride 20 mEq Oral Daily   senna 1 tablet Oral BID       Infusion Meds       PRN Meds  •  acetaminophen  •  cyclobenzaprine  •  dextrose  •  dextrose  •  glucagon (human recombinant)  •  insulin lispro **AND** insulin lispro  •  [DISCONTINUED] Morphine **AND** naloxone  •  ondansetron  •  potassium chloride **OR** potassium chloride  •  potassium chloride **OR** potassium chloride        Assessment/Plan       Antimicrobial Therapy   1.  IV daptomycin    day   2.  IV Rocephin    day   3.        Day    4.      Day  5.      Day      Assessment      Mitral valve endocarditis.  Blood culture grow Enterococcus faecium.  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  Initial susceptibility showed that the organism susceptible to ampicillin and gentamicin.  Repeat blood cultures are negative.   Transesophageal echo showed large mobile vegetation on the mitral valve with no significant mitral regurgitation.  The patient is at high risk of throwing emboli to different organs including the brain and causing CVA.  The patient eventually underwent mitral valve replacement on January 22, 2020.  Mitral valve tissue culture is growing Enterococcus faecium and the organism was resistant to ampicillin.  This is a very difficult case to manage since the blood culture organism screen VRE and the mitral valve grew the same organism and now it is resistant to ampicillin.  Vancomycin will not be a good option although was reported susceptible with ANGEL of 1.0 but the organism carries vancomycin-resistant gene.  The organism was tested susceptible for daptomycin.  That treatment of this case is very complicated.  We cannot use the first or second line of therapy because of the resistance     Pyuria associated with bacteriuria on admission.  Urine culture growing mixed organisms     Multiple sclerosis  -Patient is wheelchair-bound     Type 2 diabetes     Tobacco abuse    Toxic metabolic encephalopathy present on admission resolved    Acute kidney injury.  Resolved        Plan       Continue IV daptomycin but increase dose to 10 mg/kg IV daily  Place statins/atorvastatin on hold during daptomycin treatment.  Statin can be restarted on 3/9/2020   Continue Rocephin 2 g IV every 12 hours  Treat patient with the above antimicrobial therapy for 6 weeks  Last day of IV antibiotics will be March 8, 2020  The patient will need PICC line before discharge for the duration of antibiotics  The patient will need weekly labs  including CBC, CMP and CPK for 6 weeks  Labs in a.m. including CPK  The patient is waiting on rehab placement        Allison Box MD  02/02/20  1:45 PM     Note is dictated utilizing voice recognition software/Dragon

## 2020-02-02 NOTE — PROGRESS NOTES
Patient is stable from a pulmonary standpoint and tolerating room air.  Continue all current treatments.  We will sign off this time.  Please do not hesitate to call if we can be of any further assistance.    Looking at [facility]--Mayuri Chaudhary started 1/28    Shelli Burt APRN  [facility]  [phone]

## 2020-02-03 VITALS
SYSTOLIC BLOOD PRESSURE: 124 MMHG | OXYGEN SATURATION: 92 % | HEART RATE: 85 BPM | HEIGHT: 69 IN | WEIGHT: 263.23 LBS | RESPIRATION RATE: 16 BRPM | DIASTOLIC BLOOD PRESSURE: 75 MMHG | TEMPERATURE: 98 F | BODY MASS INDEX: 38.99 KG/M2

## 2020-02-03 LAB
ALBUMIN SERPL-MCNC: 3.2 G/DL (ref 3.5–5.2)
ALBUMIN/GLOB SERPL: 1.1 G/DL
ALP SERPL-CCNC: 63 U/L (ref 39–117)
ALT SERPL W P-5'-P-CCNC: 59 U/L (ref 1–41)
ANION GAP SERPL CALCULATED.3IONS-SCNC: 12 MMOL/L (ref 5–15)
AST SERPL-CCNC: 35 U/L (ref 1–40)
BASOPHILS # BLD AUTO: 0 10*3/MM3 (ref 0–0.2)
BASOPHILS NFR BLD AUTO: 0.2 % (ref 0–1.5)
BILIRUB SERPL-MCNC: 0.2 MG/DL (ref 0.2–1.2)
BUN BLD-MCNC: 19 MG/DL (ref 8–23)
BUN/CREAT SERPL: 18.3 (ref 7–25)
CALCIUM SPEC-SCNC: 8.7 MG/DL (ref 8.6–10.5)
CHLORIDE SERPL-SCNC: 104 MMOL/L (ref 98–107)
CK SERPL-CCNC: 49 U/L (ref 20–200)
CO2 SERPL-SCNC: 22 MMOL/L (ref 22–29)
CREAT BLD-MCNC: 1.04 MG/DL (ref 0.76–1.27)
DEPRECATED RDW RBC AUTO: 45.9 FL (ref 37–54)
EOSINOPHIL # BLD AUTO: 0.6 10*3/MM3 (ref 0–0.4)
EOSINOPHIL NFR BLD AUTO: 7.1 % (ref 0.3–6.2)
ERYTHROCYTE [DISTWIDTH] IN BLOOD BY AUTOMATED COUNT: 15.9 % (ref 12.3–15.4)
GFR SERPL CREATININE-BSD FRML MDRD: 72 ML/MIN/1.73
GLOBULIN UR ELPH-MCNC: 2.8 GM/DL
GLUCOSE BLD-MCNC: 115 MG/DL (ref 65–99)
GLUCOSE BLDC GLUCOMTR-MCNC: 105 MG/DL (ref 70–105)
GLUCOSE BLDC GLUCOMTR-MCNC: 114 MG/DL (ref 70–105)
GLUCOSE BLDC GLUCOMTR-MCNC: 138 MG/DL (ref 70–105)
GLUCOSE BLDC GLUCOMTR-MCNC: 97 MG/DL (ref 70–105)
HCT VFR BLD AUTO: 24.8 % (ref 37.5–51)
HGB BLD-MCNC: 8.1 G/DL (ref 13–17.7)
LYMPHOCYTES # BLD AUTO: 1 10*3/MM3 (ref 0.7–3.1)
LYMPHOCYTES NFR BLD AUTO: 12.1 % (ref 19.6–45.3)
MCH RBC QN AUTO: 26.9 PG (ref 26.6–33)
MCHC RBC AUTO-ENTMCNC: 32.7 G/DL (ref 31.5–35.7)
MCV RBC AUTO: 82.3 FL (ref 79–97)
MONOCYTES # BLD AUTO: 0.6 10*3/MM3 (ref 0.1–0.9)
MONOCYTES NFR BLD AUTO: 7.5 % (ref 5–12)
NEUTROPHILS # BLD AUTO: 6.2 10*3/MM3 (ref 1.7–7)
NEUTROPHILS NFR BLD AUTO: 73.1 % (ref 42.7–76)
NRBC BLD AUTO-RTO: 0 /100 WBC (ref 0–0.2)
PLATELET # BLD AUTO: 370 10*3/MM3 (ref 140–450)
PMV BLD AUTO: 7.4 FL (ref 6–12)
POTASSIUM BLD-SCNC: 3.9 MMOL/L (ref 3.5–5.2)
PROT SERPL-MCNC: 6 G/DL (ref 6–8.5)
RBC # BLD AUTO: 3.01 10*6/MM3 (ref 4.14–5.8)
SODIUM BLD-SCNC: 138 MMOL/L (ref 136–145)
WBC NRBC COR # BLD: 8.4 10*3/MM3 (ref 3.4–10.8)

## 2020-02-03 PROCEDURE — 97530 THERAPEUTIC ACTIVITIES: CPT

## 2020-02-03 PROCEDURE — 94799 UNLISTED PULMONARY SVC/PX: CPT

## 2020-02-03 PROCEDURE — 97535 SELF CARE MNGMENT TRAINING: CPT

## 2020-02-03 PROCEDURE — 99232 SBSQ HOSP IP/OBS MODERATE 35: CPT | Performed by: INTERNAL MEDICINE

## 2020-02-03 PROCEDURE — 80053 COMPREHEN METABOLIC PANEL: CPT | Performed by: INTERNAL MEDICINE

## 2020-02-03 PROCEDURE — 99024 POSTOP FOLLOW-UP VISIT: CPT | Performed by: NURSE PRACTITIONER

## 2020-02-03 PROCEDURE — 25010000002 CEFTRIAXONE PER 250 MG: Performed by: INTERNAL MEDICINE

## 2020-02-03 PROCEDURE — 97112 NEUROMUSCULAR REEDUCATION: CPT

## 2020-02-03 PROCEDURE — 82550 ASSAY OF CK (CPK): CPT | Performed by: INTERNAL MEDICINE

## 2020-02-03 PROCEDURE — 82962 GLUCOSE BLOOD TEST: CPT

## 2020-02-03 PROCEDURE — 85025 COMPLETE CBC W/AUTO DIFF WBC: CPT | Performed by: INTERNAL MEDICINE

## 2020-02-03 PROCEDURE — 25010000002 DAPTOMYCIN PER 1 MG: Performed by: INTERNAL MEDICINE

## 2020-02-03 PROCEDURE — 25010000002 ENOXAPARIN PER 10 MG: Performed by: NURSE PRACTITIONER

## 2020-02-03 RX ORDER — POTASSIUM CHLORIDE 20 MEQ/1
20 TABLET, EXTENDED RELEASE ORAL DAILY
Qty: 30 TABLET | Refills: 1 | Status: SHIPPED | OUTPATIENT
Start: 2020-02-03 | End: 2020-03-10 | Stop reason: HOSPADM

## 2020-02-03 RX ORDER — AMIODARONE HYDROCHLORIDE 200 MG/1
200 TABLET ORAL
Qty: 60 TABLET | Refills: 1 | Status: SHIPPED | OUTPATIENT
Start: 2020-02-04 | End: 2020-03-10 | Stop reason: HOSPADM

## 2020-02-03 RX ORDER — CLONIDINE 0.2 MG/24H
1 PATCH, EXTENDED RELEASE TRANSDERMAL WEEKLY
Qty: 8 PATCH | Refills: 2 | Status: SHIPPED | OUTPATIENT
Start: 2020-02-03 | End: 2020-03-10 | Stop reason: HOSPADM

## 2020-02-03 RX ORDER — AMLODIPINE BESYLATE 5 MG/1
5 TABLET ORAL
Qty: 30 TABLET | Refills: 1 | Status: SHIPPED | OUTPATIENT
Start: 2020-02-04 | End: 2020-02-03

## 2020-02-03 RX ORDER — AMLODIPINE BESYLATE 5 MG/1
5 TABLET ORAL
Status: DISCONTINUED | OUTPATIENT
Start: 2020-02-03 | End: 2020-02-03 | Stop reason: HOSPADM

## 2020-02-03 RX ORDER — AMIODARONE HYDROCHLORIDE 200 MG/1
200 TABLET ORAL
Qty: 60 TABLET | Refills: 1 | Status: SHIPPED | OUTPATIENT
Start: 2020-02-04 | End: 2020-02-03

## 2020-02-03 RX ORDER — CYCLOBENZAPRINE HCL 5 MG
5 TABLET ORAL 2 TIMES DAILY PRN
Qty: 15 TABLET | Refills: 0 | Status: SHIPPED | OUTPATIENT
Start: 2020-02-03

## 2020-02-03 RX ORDER — FUROSEMIDE 40 MG/1
40 TABLET ORAL DAILY
Qty: 30 TABLET | Refills: 1 | Status: SHIPPED | OUTPATIENT
Start: 2020-02-03 | End: 2020-03-10 | Stop reason: HOSPADM

## 2020-02-03 RX ORDER — AMLODIPINE BESYLATE 5 MG/1
5 TABLET ORAL
Qty: 30 TABLET | Refills: 1 | Status: SHIPPED | OUTPATIENT
Start: 2020-02-04 | End: 2020-03-10 | Stop reason: HOSPADM

## 2020-02-03 RX ADMIN — ACETAMINOPHEN 500 MG: 500 TABLET, FILM COATED ORAL at 08:32

## 2020-02-03 RX ADMIN — POTASSIUM CHLORIDE 20 MEQ: 1500 TABLET, EXTENDED RELEASE ORAL at 08:32

## 2020-02-03 RX ADMIN — CEFTRIAXONE SODIUM 2 G: 2 INJECTION, POWDER, FOR SOLUTION INTRAMUSCULAR; INTRAVENOUS at 13:37

## 2020-02-03 RX ADMIN — FUROSEMIDE 40 MG: 40 TABLET ORAL at 08:32

## 2020-02-03 RX ADMIN — DAPTOMYCIN 900 MG: 500 INJECTION, POWDER, LYOPHILIZED, FOR SOLUTION INTRAVENOUS at 11:07

## 2020-02-03 RX ADMIN — CEFTRIAXONE SODIUM 2 G: 2 INJECTION, POWDER, FOR SOLUTION INTRAMUSCULAR; INTRAVENOUS at 02:32

## 2020-02-03 RX ADMIN — GABAPENTIN 200 MG: 100 CAPSULE ORAL at 08:32

## 2020-02-03 RX ADMIN — IPRATROPIUM BROMIDE AND ALBUTEROL SULFATE 3 ML: .5; 3 SOLUTION RESPIRATORY (INHALATION) at 11:36

## 2020-02-03 RX ADMIN — AMIODARONE HYDROCHLORIDE 200 MG: 200 TABLET ORAL at 08:32

## 2020-02-03 RX ADMIN — FLUOXETINE 20 MG: 20 CAPSULE ORAL at 08:32

## 2020-02-03 RX ADMIN — ACETYLCYSTEINE 3 ML: 200 SOLUTION ORAL; RESPIRATORY (INHALATION) at 07:24

## 2020-02-03 RX ADMIN — IPRATROPIUM BROMIDE AND ALBUTEROL SULFATE 3 ML: .5; 3 SOLUTION RESPIRATORY (INHALATION) at 14:58

## 2020-02-03 RX ADMIN — IPRATROPIUM BROMIDE AND ALBUTEROL SULFATE 3 ML: .5; 3 SOLUTION RESPIRATORY (INHALATION) at 07:24

## 2020-02-03 RX ADMIN — ACETAMINOPHEN 500 MG: 500 TABLET, FILM COATED ORAL at 17:45

## 2020-02-03 RX ADMIN — ENOXAPARIN SODIUM 40 MG: 40 INJECTION SUBCUTANEOUS at 16:25

## 2020-02-03 RX ADMIN — AMLODIPINE BESYLATE 5 MG: 5 TABLET ORAL at 08:32

## 2020-02-03 RX ADMIN — PANTOPRAZOLE SODIUM 40 MG: 40 TABLET, DELAYED RELEASE ORAL at 07:18

## 2020-02-03 RX ADMIN — ASPIRIN 81 MG: 81 TABLET, DELAYED RELEASE ORAL at 08:32

## 2020-02-03 NOTE — PROGRESS NOTES
"NEPHROLOGY PROGRESS NOTE------[facility]    [facility]  [phone]  Frederick George MD      Patient Care Team:  Kajal Sanchez as PCP - General (Internal Medicine)  Frederick George MD as Consulting Physician (Nephrology)      Provider:  Frederick George MD  Patient Name: Jamin Hennessy  :  1955    SUBJECTIVE:     No SOB, Cp, palpitations, cramping, dysuria.     Medication:    acetylcysteine 3 mL Nebulization BID - RT   amiodarone 200 mg Oral Q24H   aspirin 81 mg Oral Daily   cefTRIAXone 2 g Intravenous Q12H   cloNIDine 0.1 mg Oral Daily   DAPTOmycin 10 mg/kg (Adjusted) Intravenous Q24H   docusate sodium 100 mg Oral BID   enoxaparin 40 mg Subcutaneous Daily   FLUoxetine 20 mg Oral Daily   furosemide 40 mg Oral Daily   gabapentin 200 mg Oral BID   insulin lispro 0-14 Units Subcutaneous 4x Daily With Meals & Nightly   ipratropium-albuterol 3 mL Nebulization 4x Daily - RT   metoprolol tartrate 25 mg Oral Q12H   pantoprazole 40 mg Oral Q AM   polyethylene glycol 17 g Oral BID   potassium chloride 20 mEq Oral Daily   senna 1 tablet Oral BID          OBJECTIVE    Vital Sign Min/Max for last 24 hours  Temp  Min: 97.1 °F (36.2 °C)  Max: 98.3 °F (36.8 °C)   BP  Min: 106/71  Max: 148/67   Pulse  Min: 60  Max: 72   Resp  Min: 16  Max: 16   SpO2  Min: 95 %  Max: 98 %   No data recorded   No data recorded     Flowsheet Rows      First Filed Value   Admission Height  177.8 cm (70\") Documented at 2020   Admission Weight  124 kg (272 lb 7.8 oz) Documented at 2020 003          I/O this shift:  In: 40 [P.O.:40]  Out: -   I/O last 3 completed shifts:  In: 440 [P.O.:240; I.V.:200]  Out: -     Physical Exam: Off gtt's. Still with molina cath.  General Appearance: alert, appears stated age and cooperative  Head: normocephalic, without obvious abnormality and atraumatic  Eyes: conjunctivae and sclerae normal and no icterus  Neck: supple and no JVD  Lungs: DECREASED BS " BIBASILAR  Heart: PACED +CHRIS  Chest: Wall no abnormalities observed  Abdomen: normal bowel sounds and soft non-tender +OBESITY  Extremities: moves extremities well, no edema, no cyanosis and no redness  Skin: no bleeding, bruising or rash, turgor normal, color normal and no lesions noted  Neurologic: Alert, and oriented. No focal deficits    Labs:    WBC WBC   Date Value Ref Range Status   02/03/2020 8.40 3.40 - 10.80 10*3/mm3 Final   02/02/2020 6.60 3.40 - 10.80 10*3/mm3 Final   02/01/2020 8.00 3.40 - 10.80 10*3/mm3 Final      HGB Hemoglobin   Date Value Ref Range Status   02/03/2020 8.1 (L) 13.0 - 17.7 g/dL Final   02/02/2020 8.8 (L) 13.0 - 17.7 g/dL Final   02/01/2020 7.9 (L) 13.0 - 17.7 g/dL Final      HCT Hematocrit   Date Value Ref Range Status   02/03/2020 24.8 (L) 37.5 - 51.0 % Final   02/02/2020 27.3 (L) 37.5 - 51.0 % Final   02/01/2020 24.8 (L) 37.5 - 51.0 % Final      Platlets No results found for: LABPLAT   MCV MCV   Date Value Ref Range Status   02/03/2020 82.3 79.0 - 97.0 fL Final   02/02/2020 82.1 79.0 - 97.0 fL Final   02/01/2020 82.2 79.0 - 97.0 fL Final          Sodium Sodium   Date Value Ref Range Status   02/03/2020 138 136 - 145 mmol/L Final   02/02/2020 142 136 - 145 mmol/L Final   02/01/2020 139 136 - 145 mmol/L Final      Potassium Potassium   Date Value Ref Range Status   02/03/2020 3.9 3.5 - 5.2 mmol/L Final   02/02/2020 3.8 3.5 - 5.2 mmol/L Final   02/01/2020 3.7 3.5 - 5.2 mmol/L Final      Chloride Chloride   Date Value Ref Range Status   02/03/2020 104 98 - 107 mmol/L Final   02/02/2020 108 (H) 98 - 107 mmol/L Final   02/01/2020 107 98 - 107 mmol/L Final      CO2 CO2   Date Value Ref Range Status   02/03/2020 22.0 22.0 - 29.0 mmol/L Final   02/02/2020 22.0 22.0 - 29.0 mmol/L Final   02/01/2020 21.0 (L) 22.0 - 29.0 mmol/L Final      BUN BUN   Date Value Ref Range Status   02/03/2020 19 8 - 23 mg/dL Final   02/02/2020 16 8 - 23 mg/dL Final   02/01/2020 19 8 - 23 mg/dL Final      Creatinine  Creatinine   Date Value Ref Range Status   02/03/2020 1.04 0.76 - 1.27 mg/dL Final   02/02/2020 1.03 0.76 - 1.27 mg/dL Final   02/01/2020 0.96 0.76 - 1.27 mg/dL Final      Calcium Calcium   Date Value Ref Range Status   02/03/2020 8.7 8.6 - 10.5 mg/dL Final   02/02/2020 8.5 (L) 8.6 - 10.5 mg/dL Final   02/01/2020 8.4 (L) 8.6 - 10.5 mg/dL Final      PO4 No components found for: PO4   Albumin Albumin   Date Value Ref Range Status   02/03/2020 3.20 (L) 3.50 - 5.20 g/dL Final   02/02/2020 3.20 (L) 3.50 - 5.20 g/dL Final   02/01/2020 3.00 (L) 3.50 - 5.20 g/dL Final      Magnesium Magnesium   Date Value Ref Range Status   02/01/2020 2.3 1.6 - 2.4 mg/dL Final      Uric Acid No components found for: URIC ACID     Imaging Results (Last 72 Hours)     ** No results found for the last 72 hours. **          Results for orders placed during the hospital encounter of 01/14/20   XR Chest 1 View    Narrative Examination: XR CHEST 1 VW-     Date of Exam: 1/29/2020 10:12 AM     Indication: cardiac arrythmia; I25.10-Atherosclerotic heart disease of  native coronary artery without angina pectoris; I25.83-Coronary  atherosclerosis due to lipid rich plaque; Z00.6-Encounter for  examination for normal comparison and control in clinical research  program; I33.0-Acute and subacute infective endocarditis;  I48.91-Unspecified atrial fibrillation; I10-Essential (primary)  hypertension; Z72.0-Tobac.     Comparison: Radiograph 01/24/2020     Technique: 1 view of the chest      Findings:  Questionable mild patchy airspace changes are present within the right  lung base. There is probable small right-sided pleural effusion. There  is a right nephrectomy PICC with the tip in the distal SVC. The heart  appears mildly enlarged. Stable median sternotomy wires are present. No  pneumothorax. No acute osseous abnormality identified. No acute osseous  abnormality identified.       Impression Patchy airspace changes within the right lung base, likely  related to  pneumonia. There is a small right-sided pleural effusion. A right upper  extremity PICC is present with the tip in the distal SVC.     Electronically Signed By-Shannan Winter On:1/29/2020 10:34 AM  This report was finalized on [id number] by  Shannan Winter, .   XR Chest 1 View    Narrative DATE OF EXAM:  1/24/2020 2:11 PM     PROCEDURE:  XR CHEST 1 VW-     INDICATIONS:  Post chest tube removal     COMPARISON:  1/24/2004 20 3:00 AM     TECHNIQUE:   Single radiographic view of the chest was obtained.     FINDINGS:  Sternotomy wires overlie the midline. Prosthetic heart valve is again  noted. There is a right IJ introducer sheath present. There is no  evidence of pneumothorax seen. There are low lung volumes. There is  patchy opacity in the right lung base suggesting atelectasis. Heart size  is again prominent. Pulmonary vascularity appears stable.       Impression No evidence of post chest tube removal pneumothorax.     Right basilar atelectasis. Low lung volumes.     Cardiomegaly.     Electronically Signed By-Charbel Laureano On:1/24/2020 2:26 PM  This report was finalized on [id number] by  Charbel Laureano, .   XR Chest 1 View    Narrative DATE OF EXAM:  1/23/2020 5:35 PM     PROCEDURE:  XR CHEST 1 VW-     INDICATIONS:  declining respiratory status; I25.10-Atherosclerotic heart disease of  native coronary artery without angina pectoris; I25.83-Coronary  atherosclerosis due to lipid rich plaque; Z00.6-Encounter for  examination for normal comparison and control in clinical research  program; I33.0-Acute and subacute infective endocarditis;  I48.91-Unspecified atrial fibrillation; I10-Essential (primary)  hypertension; history smoking. Heart disease. Atrial fibrillation.  Coronary artery disease. Status post heart surgery. Shortness of breath  and cough today     COMPARISON:  Single frontal view chest performed on 01/23/2020 at 0428 hours     TECHNIQUE:   Single radiographic AP view of the chest was obtained.      FINDINGS:  Single frontal view chest reveals the patient rotated slightly to the  right. The patient is status post sternotomy and surgical replacement of  the mitral valve. The heart and mediastinum are normal. Since previous  study the right internal jugular [place] central venous line catheter  has been removed. The inferior tip of the right internal jugular central  venous line catheter sheath overlies the superior vena cava. There are  bilateral diffuse increased lung markings consistent with chronic lung  disease. No evidence of pneumonia or pleural effusion or pneumothorax or  mass right or left lungs.        Impression    1. Bilateral diffuse increased lung markings consistent with chronic  lung disease and possibly mild congestive heart failure.  2. No focal arising the right or left lungs.     Electronically Signed By-DR. Melvin Taylor MD On:1/23/2020 5:50  PM  This report was finalized on [id number] by DR. Melvin Taylor MD.       Results for orders placed during the hospital encounter of 01/14/20   Duplex Vein Mapping Lower Extremity - Bilateral CAR    Narrative · The left greater saphenous vein is patent and of adequate size in the   thigh.            ASSESSMENT / PLAN      Atrial fibrillation with RVR (CMS/HCC)    Coronary artery disease due to lipid rich plaque    CAD S/P percutaneous coronary angioplasty    Essential hypertension    Dyslipidemia    Non-insulin dependent type 2 diabetes mellitus (CMS/HCC)    Diabetic neuropathy (CMS/HCC)    GERD without esophagitis    BPH with obstruction/lower urinary tract symptoms    B12 deficiency    Vitamin D deficiency    JARRETT (generalized anxiety disorder)    Tobacco abuse    Obesity (BMI 30-39.9)    Acute bacterial endocarditis    S/P MVR (mitral valve replacement)    Endocarditis of mitral valve    Non-sustained ventricular tachycardia (CMS/HCC)    1. CRF/CKD STG 2----Mild. Nonoliguric. Creatinine stable post op.  Follow renal function  with abx that patient is being given    2. HTN------Back down BP meds a little to avoid hypotension    3. CAD S/P CABG AND MVR------per CT Surgery    4. AFIB WITH RVR----per Cardiology.      5. DMII-----BS okay. Glucometers, SSI    6. OBESITY    7. H/O NEPHROLITHIASIS    8. BACTEREMIA/ENDOCARDITIS-----Abx per ID. S/p MVR    9. HYPOCALCEMIA----Replaced    10. ANEMIA --- H/H up     11. HYPOKALEMIA------Replace po      12. METABOLIC ACIDOSIS------po NaHCO3 x one again    AWAITING REHAB PLACEMENT  Cr stable  BP ok, wean off clonidine  Add amlodipine    Frederick George MD  [facility]  [phone]  02/03/20  6:57 AM

## 2020-02-03 NOTE — PROGRESS NOTES
Infectious Diseases Progress Note      LOS: 20 days   Patient Care Team:  Kajal Sanchez as PCP - General (Internal Medicine)  Frederick George MD as Consulting Physician (Nephrology)        Subjective      The patient has been afebrile for the last 24 hours.  The patient is hemodynamically stable.  He is awake and alert today.  He is currently on room air.       Review of Systems:   Review of Systems   Constitutional: Negative.    HENT: Negative.    Cardiovascular: Negative.    Gastrointestinal: Negative.    Genitourinary: Negative.    Musculoskeletal: Negative.    Skin: Negative.    Neurological: Negative.    Hematological: Negative.    Psychiatric/Behavioral: Negative.         Objective     Vital Signs  Temp:  [95.4 °F (35.2 °C)-99.2 °F (37.3 °C)] 95.4 °F (35.2 °C)  Heart Rate:  [60-81] 69  Resp:  [16] 16  BP: (106-135)/(65-77) 125/65    Physical Exam:  Physical Exam   Constitutional: He appears well-developed and well-nourished.   HENT:   Head: Normocephalic and atraumatic.   Eyes: Pupils are equal, round, and reactive to light. Conjunctivae and EOM are normal.   Neck: Neck supple.   Cardiovascular: Normal rate, regular rhythm and normal heart sounds.   Pulmonary/Chest: Effort normal. He has rales.   Abdominal: Soft. Bowel sounds are normal.   Musculoskeletal: Normal range of motion. He exhibits no edema.   Degenerative changes patient appears to have some general weakness due to the multiple sclerosis    Neurological: He is alert.   Skin: Skin is warm and dry.   Sternal wound is healing nicely   Vitals reviewed.       Results Review:    I have reviewed all clinical data, test, lab, and imaging results.     Radiology  No Radiology Exams Resulted Within Past 24 Hours    Cardiology    Laboratory  Results from last 7 days   Lab Units 02/03/20  0231   WBC 10*3/mm3 8.40   HEMOGLOBIN g/dL 8.1*   HEMATOCRIT % 24.8*   PLATELETS 10*3/mm3 370     Results from last 7 days   Lab Units 02/03/20  0231   SODIUM mmol/L 138    POTASSIUM mmol/L 3.9   CHLORIDE mmol/L 104   CO2 mmol/L 22.0   BUN mg/dL 19   CREATININE mg/dL 1.04   GLUCOSE mg/dL 115*   CALCIUM mg/dL 8.7     Results from last 7 days   Lab Units 02/03/20  0231   SODIUM mmol/L 138   POTASSIUM mmol/L 3.9   CHLORIDE mmol/L 104   CO2 mmol/L 22.0   BUN mg/dL 19   CREATININE mg/dL 1.04   GLUCOSE mg/dL 115*   CALCIUM mg/dL 8.7     Results from last 7 days   Lab Units 02/03/20  0231   CK TOTAL U/L 49             Microbiology   Microbiology Results (last 10 days)     ** No results found for the last 240 hours. **          Medication Review:       Schedule Meds    acetylcysteine 3 mL Nebulization BID - RT   amiodarone 200 mg Oral Q24H   amLODIPine 5 mg Oral Q24H   aspirin 81 mg Oral Daily   cefTRIAXone 2 g Intravenous Q12H   DAPTOmycin 10 mg/kg (Adjusted) Intravenous Q24H   docusate sodium 100 mg Oral BID   enoxaparin 40 mg Subcutaneous Daily   FLUoxetine 20 mg Oral Daily   furosemide 40 mg Oral Daily   gabapentin 200 mg Oral BID   insulin lispro 0-14 Units Subcutaneous 4x Daily With Meals & Nightly   ipratropium-albuterol 3 mL Nebulization 4x Daily - RT   metoprolol tartrate 25 mg Oral Q12H   pantoprazole 40 mg Oral Q AM   polyethylene glycol 17 g Oral BID   potassium chloride 20 mEq Oral Daily   senna 1 tablet Oral BID       Infusion Meds       PRN Meds  •  acetaminophen  •  cyclobenzaprine  •  dextrose  •  dextrose  •  glucagon (human recombinant)  •  insulin lispro **AND** insulin lispro  •  [DISCONTINUED] Morphine **AND** naloxone  •  ondansetron  •  potassium chloride **OR** potassium chloride  •  potassium chloride **OR** potassium chloride        Assessment/Plan       Antimicrobial Therapy   1.  IV daptomycin    day   2.  IV Rocephin    day   3.        Day   4.      Day  5.      Day      Assessment      Mitral valve endocarditis.  Blood culture grow Enterococcus faecium.  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  Initial susceptibility showed that the  organism susceptible to ampicillin and gentamicin.  Repeat blood cultures are negative.   Transesophageal echo showed large mobile vegetation on the mitral valve with no significant mitral regurgitation.  The patient is at high risk of throwing emboli to different organs including the brain and causing CVA.  The patient eventually underwent mitral valve replacement on January 22, 2020.  Mitral valve tissue culture is growing Enterococcus faecium and the organism was resistant to ampicillin.  This is a very difficult case to manage since the blood culture organism screen VRE and the mitral valve grew the same organism and now it is resistant to ampicillin.  Vancomycin will not be a good option although was reported susceptible with ANGEL of 1.0 but the organism carries vancomycin-resistant gene.  The organism was tested susceptible for daptomycin.  That treatment of this case is very complicated.  We cannot use the first or second line of therapy because of the resistance     Pyuria associated with bacteriuria on admission.  Urine culture growing mixed organisms     Multiple sclerosis  -Patient is wheelchair-bound     Type 2 diabetes     Tobacco abuse    Toxic metabolic encephalopathy present on admission resolved    Acute kidney injury.  Resolved        Plan       Continue IV daptomycin but increase dose to 10 mg/kg IV daily  Place statins/atorvastatin on hold during daptomycin treatment.  Statin can be restarted on 3/9/2020   Continue Rocephin 2 g IV every 12 hours  Treat patient with the above antimicrobial therapy for 6 weeks  Last day of IV antibiotics will be March 8, 2020  The patient will need PICC line before discharge for the duration of antibiotics  The patient will need weekly labs including CBC, CMP and CPK for 6 weeks  Okay to discharge patient to rehab facility  Removed PICC line after finishing antimicrobial therapy on March 8, 2020        Allison Box MD  02/03/20  12:35 PM     Note is dictated  utilizing voice recognition software/Dragon

## 2020-02-03 NOTE — THERAPY TREATMENT NOTE
Patient Name: Jamin Hennessy  : 1955    MRN: [id number]                              Today's Date: 2/3/2020       Admit Date: 2020    Visit Dx:     ICD-10-CM ICD-9-CM   1. Coronary artery disease due to lipid rich plaque I25.10 414.00    I25.83 414.3   2. Examination for normal comparison or control in clinical research Z00.6 V70.7   3. Acute bacterial endocarditis I33.0 421.0   4. Atrial fibrillation with RVR (CMS/[facility]) I48.91 427.31   5. Essential hypertension I10 401.9   6. Tobacco abuse Z72.0 305.1   7. CAD S/P percutaneous coronary angioplasty I25.10 414.01    Z98.61 V45.82   8. Endocarditis of mitral valve I05.8 394.9   9. Acute renal failure, unspecified acute renal failure type (CMS/[facility]) N17.9 584.9     Patient Active Problem List   Diagnosis   • Atrial fibrillation with RVR (CMS/[facility])   • Coronary artery disease due to lipid rich plaque   • CAD S/P percutaneous coronary angioplasty   • Essential hypertension   • Dyslipidemia   • Non-insulin dependent type 2 diabetes mellitus (CMS/[facility])   • Diabetic neuropathy (CMS/[facility])   • GERD without esophagitis   • BPH with obstruction/lower urinary tract symptoms   • B12 deficiency   • Vitamin D deficiency   • JARRETT (generalized anxiety disorder)   • Tobacco abuse   • Obesity (BMI 30-39.9)   • Acute bacterial endocarditis   • S/P MVR (mitral valve replacement)   • Endocarditis of mitral valve   • Non-sustained ventricular tachycardia (CMS/[facility])     Past Medical History:   Diagnosis Date   • A-fib (CMS/[facility])    • CAD (coronary artery disease)    • Elevated cholesterol    • Hypertension    • MI (myocardial infarction) (CMS/[facility])    • Non-insulin dependent type 2 diabetes mellitus (CMS/[facility])      Past Surgical History:   Procedure Laterality Date   • CARDIAC CATHETERIZATION     • CARDIAC CATHETERIZATION N/A 2020    Procedure: Left Heart Cath;  Surgeon: Migdalia Beth MD;  Location: [facility] CATH INVASIVE LOCATION;  Service: Cardiovascular   • CARDIAC  CATHETERIZATION N/A 1/20/2020    Procedure: Left ventriculography;  Surgeon: Migdalia Beth MD;  Location: [facility] CATH INVASIVE LOCATION;  Service: Cardiovascular   • CARDIAC CATHETERIZATION N/A 1/20/2020    Procedure: Coronary angiography;  Surgeon: Migdalia Beth MD;  Location: [facility] CATH INVASIVE LOCATION;  Service: Cardiovascular   • CORONARY ANGIOPLASTY WITH STENT PLACEMENT       General Information     Row Name 02/03/20 1252          PT Evaluation Time/Intention    Document Type  therapy note (daily note)  -MB     Mode of Treatment  physical therapy  -MB     Row Name 02/03/20 1252          General Information    Patient Profile Reviewed?  yes  -MB     Existing Precautions/Restrictions  sternal  -MB     Row Name 02/03/20 1252          Cognitive Assessment/Intervention- PT/OT    Orientation Status (Cognition)  oriented x 4  -MB     Cognitive Assessment/Intervention Comment  Pt agitated that he has not left hospital yet.  -MB     Row Name 02/03/20 1252          Safety Issues, Functional Mobility    Safety Issues Affecting Function (Mobility)  awareness of need for assistance;insight into deficits/self awareness  -MB     Impairments Affecting Function (Mobility)  balance;coordination;muscle tone abnormal;postural/trunk control;strength  -MB     Comment, Safety Issues/Impairments (Mobility)  Fatigues quickly 2/2 MS  -MB       User Key  (r) = Recorded By, (t) = Taken By, (c) = Cosigned By    Initials Name Provider Type    Zahra Valle PTA Physical Therapy Assistant        Mobility     Row Name 02/03/20 1253          Bed Mobility Assessment/Treatment    Comment (Bed Mobility)  Pt sitting EOB with Nsg upon arrival to room. Pt Max A x2 for sit to supine and repositioning in bed.  -MB     Row Name 02/03/20 1253          Sit-Stand Transfer    Sit-Stand [place] (Transfers)  moderate assist (50% patient effort);2 person assist;verbal cues Completed 2x for funct mob and strengthening. needs cues  to tuck bottom.  -MB     Assistive Device (Sit-Stand Transfers)  walker, front-wheeled  -MB     Row Name 02/03/20 1253          Gait/Stairs Assessment/Training    Distance in Feet (Gait)  Took 2 shoft, shuffled steps to HOB with Max A x2 and v/cs. Pt LE buckle at times. Unsafe.  -MB     Row Name 02/03/20 1253          Mobility Assessment/Intervention    Extremity Weight-bearing Status  right upper extremity;left upper extremity  -MB     Left Upper Extremity (Weight-bearing Status)  non weight-bearing (NWB)  -MB       User Key  (r) = Recorded By, (t) = Taken By, (c) = Cosigned By    Initials Name Provider Type    MB Zahra Esparza PTA Physical Therapy Assistant        Obj/Interventions     Row Name 02/03/20 1256          Static Sitting Balance    Level of [place] (Unsupported Sitting, Static Balance)  standby assist  -MB     Sitting Position (Unsupported Sitting, Static Balance)  sitting on edge of bed  -MB     Time Able to Maintain Position (Unsupported Sitting, Static Balance)  more than 5 minutes  -MB     Row Name 02/03/20 1256          Static Standing Balance    Level of [place] (Supported Standing, Static Balance)  minimal assist, 75% patient effort;moderate assist, 50 to 74% patient effort;2 person assist  -MB     Time Able to Maintain Position (Supported Standing, Static Balance)  45 to 60 seconds  -MB     Assistive Device Utilized (Supported Standing, Static Balance)  walker, rolling  -MB     Comment (Supported Standing, Static Balance)  Pt has chronic back pain and was not able to fully stand erect.   -MB     Row Name 02/03/20 1256          Dynamic Standing Balance    Level of [place], Reaches Outside Midline (Standing, Dynamic Balance)  maximal assist, 25 to 49% patient effort;2 person assist  -MB     Time Able to Maintain Position, Reaches Outside Midline (Standing, Dynamic Balance)  15 to 30 seconds  -MB     Assistive Device Utilized (Supported Standing, Dynamic Balance)  walker, rolling   "-MB     Comment, Reaches Outside Midline (Standing, Dynamic Balance)  Poor side steps to HOB  -MB       User Key  (r) = Recorded By, (t) = Taken By, (c) = Cosigned By    Initials Name Provider Type    Zahra Valle PTA Physical Therapy Assistant        Goals/Plan    No documentation.       Clinical Impression     Row Name 02/03/20 1257          Pain Scale: Numbers Pre/Post-Treatment    Pain Scale: Numbers, Pretreatment  6/10  -MB     Pain Scale: Numbers, Post-Treatment  6/10  -MB     Pre/Post Treatment Pain Comment  Back and incisional site.  -MB     Pain Intervention(s)  Repositioned  -MB     Row Name 02/03/20 1257          Pain Scale: FACES Pre/Post-Treatment    Pain: FACES Scale, Pretreatment  4-->hurts little more  -MB     Pain: FACES Scale, Post-Treatment  4-->hurts little more  -MB     Row Name 02/03/20 1257          Plan of Care Review    Plan of Care Reviewed With  patient  -MB     Progress  improving  -MB     Outcome Summary  Pts mobility has slightly improved throughout his stay, still very unstable and has intermittent buckling at LEs 2/2 MS, fatigues very quickly. Pt agitated that he is still in hospital, \"they are lying to me and keeping me here.\" Educated pt. However, pt is motivated to get better.  Will require IP rehab at [facility] to address global funct deficits.   -MB     Row Name 02/03/20 1257          Physical Therapy Clinical Impression    Criteria for Skilled Interventions Met (PT Clinical Impression)  treatment indicated  -MB     Rehab Potential (PT Clinical Summary)  fair, will monitor progress closely  -MB     Row Name 02/03/20 1257          Vital Signs    Recovery Time  Vitals stable  -MB     Row Name 02/03/20 1257          Positioning and Restraints    Pre-Treatment Position  in bed  -MB     Post Treatment Position  bed  -MB     In Bed  notified nsg;fowlers;call light within reach;encouraged to call for assist;exit alarm on  -MB       User Key  (r) = Recorded By, (t) = Taken By, (c) = " "Cosigned By    Initials Name Provider Type    Zahra Valle PTA Physical Therapy Assistant        Outcome Measures    No documentation.         PT Recommendation and Plan     Outcome Summary/Treatment Plan (PT)  Anticipated Discharge Disposition (PT): inpatient rehabilitation facility  Plan of Care Reviewed With: patient  Progress: improving  Outcome Summary: Pts mobility has slightly improved throughout his stay, still very unstable and has intermittent buckling at LEs 2/2 MS, fatigues very quickly. Pt agitated that he is still in hospital, \"they are lying to me and keeping me here.\" Educated pt. However, pt is motivated to get better.  [facility]ct deficits.      Time Calculation:   PT Charges     Row Name 02/03/20 1300             Time Calculation    Start Time  1038  -MB      Stop Time  1055  -MB      Time Calculation (min)  17 min  -MB      PT Received On  02/03/20  -MB      PT - Next Appointment  02/04/20  -MB         Time Calculation- PT    Total Timed Code Minutes- PT  17 minute(s)  -MB        User Key  (r) = Recorded By, (t) = Taken By, (c) = Cosigned By    Initials Name Provider Type    Zahra Valle PTA Physical Therapy Assistant        Therapy Charges for Today     Code Description Service Date Service Provider Modifiers Qty    58625916386 HC PT NEUROMUSC RE EDUCATION EA 15 MIN 2/3/2020 Zahra Esparza PTA GP 1    08504251185 HC PT THERAPEUTIC ACT EA 15 MIN 2/3/2020 Zahra Esparza PTA GP 1          PT G-Codes  Outcome Measure Options: AM-PAC 6 Clicks Daily Activity (OT)  AM-PAC 6 Clicks Score (PT): 10  AM-PAC 6 Clicks Score (OT): 9  Modified [place] Scale: 4 - Moderately severe disability.  Unable to walk without assistance, and unable to attend to own bodily needs without assistance.    Zahra Esparza PTA  2/3/2020       "

## 2020-02-03 NOTE — PROGRESS NOTES
S/P POD#12 MVR--[place]  EF 56-60% (AARON)    Subjective:  Denies any complaints    Plans to go to rehab 1/28 but cancelled d/t antibiotics coverage--now working on LTACH eval and transfer  No events overnight  Drips: None    Valve Cx (1/22/2020)-- rare Enterococcus faecium      Intake/Output Summary (Last 24 hours) at 2/3/2020 0952  Last data filed at 2/3/2020 0800  Gross per 24 hour   Intake 600 ml   Output --   Net 600 ml     Temp:  [97.1 °F (36.2 °C)-99.2 °F (37.3 °C)] 99.2 °F (37.3 °C)  Heart Rate:  [60-79] 73  Resp:  [16] 16  BP: (106-135)/(67-77) 125/72    Results from last 7 days   Lab Units 02/03/20  0231 02/02/20  0320   WBC 10*3/mm3 8.40 6.60   HEMOGLOBIN g/dL 8.1* 8.8*   HEMATOCRIT % 24.8* 27.3*   PLATELETS 10*3/mm3 370 349     Results from last 7 days   Lab Units 02/03/20  0231  02/01/20  0307   CREATININE mg/dL 1.04   < > 0.96   POTASSIUM mmol/L 3.9   < > 3.7   SODIUM mmol/L 138   < > 139   MAGNESIUM mg/dL  --   --  2.3   PHOSPHORUS mg/dL  --   --  3.3    < > = values in this interval not displayed.       Physical Exam:  Awake, NAD, up in chair  Tele:  SR 70's  Diminished bases, 100% room air  Sternal incision healing well  Benign abd, + BM  Trace BLE edema    Assessment/Plan:  Principal Problem:    Atrial fibrillation with RVR (CMS/HCC)  Active Problems:    Coronary artery disease due to lipid rich plaque    CAD S/P percutaneous coronary angioplasty    Essential hypertension    Dyslipidemia    Non-insulin dependent type 2 diabetes mellitus (CMS/HCC)    Diabetic neuropathy (CMS/HCC)    GERD without esophagitis    BPH with obstruction/lower urinary tract symptoms    B12 deficiency    Vitamin D deficiency    JARRETT (generalized anxiety disorder)    Tobacco abuse    Obesity (BMI 30-39.9)    Acute bacterial endocarditis    Mitral valve endocarditis s/p POD#12 MVR--on asa/bb  Enterococcus bacteremia/VRE---on dapto/ceftriaxone, ID following   Postop NSVT-- Amio, BB  CAD with stent 1 year ago---last dose Effient  1/17,statin  Paroxysmal atrial fibrillation with RVR---last dose Eliquis 1/17  HTN--clonidine  HLD--statin  DM type 2 with neuropathy--SSI  GERD-- Protonix  Multiple sclerosis---mainly wheelchair bound, PT/OT following   Current tobacco abuse   BPH with incontinence   Obesity---BMI 38.19  Generalized anxiety disorder--home meds  Preop Encephalopathy---resolved     Routine care  Mobilize as able  Discharge to [facility]    SHADIA DE LA GARZA, APRN  2/3/2020  9:52 AM     Making good progress.  Plan to discharge today.

## 2020-02-03 NOTE — THERAPY TREATMENT NOTE
Acute Care - Occupational Therapy Treatment Note  [facility]     Patient Name: Jamin Hennessy  : 1955  MRN: [id number]  Today's Date: 2/3/2020             Admit Date: 2020       ICD-10-CM ICD-9-CM   1. Coronary artery disease due to lipid rich plaque I25.10 414.00    I25.83 414.3   2. Examination for normal comparison or control in clinical research Z00.6 V70.7   3. Acute bacterial endocarditis I33.0 421.0   4. Atrial fibrillation with RVR (CMS/[facility]) I48.91 427.31   5. Essential hypertension I10 401.9   6. Tobacco abuse Z72.0 305.1   7. CAD S/P percutaneous coronary angioplasty I25.10 414.01    Z98.61 V45.82   8. Endocarditis of mitral valve I05.8 394.9   9. Acute renal failure, unspecified acute renal failure type (CMS/[facility]) N17.9 584.9     Patient Active Problem List   Diagnosis   • Atrial fibrillation with RVR (CMS/[facility])   • Coronary artery disease due to lipid rich plaque   • CAD S/P percutaneous coronary angioplasty   • Essential hypertension   • Dyslipidemia   • Non-insulin dependent type 2 diabetes mellitus (CMS/[facility])   • Diabetic neuropathy (CMS/[facility])   • GERD without esophagitis   • BPH with obstruction/lower urinary tract symptoms   • B12 deficiency   • Vitamin D deficiency   • JARRETT (generalized anxiety disorder)   • Tobacco abuse   • Obesity (BMI 30-39.9)   • Acute bacterial endocarditis   • S/P MVR (mitral valve replacement)   • Endocarditis of mitral valve   • Non-sustained ventricular tachycardia (CMS/[facility])     Past Medical History:   Diagnosis Date   • A-fib (CMS/[facility])    • CAD (coronary artery disease)    • Elevated cholesterol    • Hypertension    • MI (myocardial infarction) (CMS/[facility])    • Non-insulin dependent type 2 diabetes mellitus (CMS/[facility])      Past Surgical History:   Procedure Laterality Date   • CARDIAC CATHETERIZATION     • CARDIAC CATHETERIZATION N/A 2020    Procedure: Left Heart Cath;  Surgeon: Migdalia Beth MD;  Location: [facility] INVASIVE LOCATION;  Service:  Cardiovascular   • CARDIAC CATHETERIZATION N/A 1/20/2020    Procedure: Left ventriculography;  Surgeon: Migdalia Beth MD;  Location: [facility] CATH INVASIVE LOCATION;  Service: Cardiovascular   • CARDIAC CATHETERIZATION N/A 1/20/2020    Procedure: Coronary angiography;  Surgeon: Migdalia Beth MD;  Location: [facility] CATH INVASIVE LOCATION;  Service: Cardiovascular   • CORONARY ANGIOPLASTY WITH STENT PLACEMENT         Therapy Treatment    Rehabilitation Treatment Summary     Row Name 02/03/20 1500             Treatment Time/Intention    Discipline  occupational therapist  -AL      Document Type  therapy note (daily note)  -AL      Subjective Information  complains of;fatigue;weakness  -AL      Mode of Treatment  occupational therapy  -AL      Patient Effort  adequate  -AL      Comment  pt very fatigued, frustrated w/ remaining in hospital, not being dc'd to rehab yet.  -AL      Recorded by [AL] Kaylyn Hauser OT 02/03/20 1539      Row Name 02/03/20 1500             Safety Issues, Functional Mobility    Safety Issues Affecting Function (Mobility)  safety precautions follow-through/compliance  -AL      Recorded by [AL] Kaylyn Hauser OT 02/03/20 1539      Row Name 02/03/20 1500             Bed Mobility Assessment/Treatment    Sit-Supine [place] (Bed Mobility)  2 person assist;maximum assist (25% patient effort)  -AL      Recorded by [AL] Kaylyn Hauser OT 02/03/20 1539      Row Name 02/03/20 1500             Functional Mobility    Functional Mobility- Comment  pt completed sit to stand transfers with mod a-max a x 2 persons, with standing tolerance accross trials 30 sec or less. was able to take only few shuffled side steps w/ mod a x 2 for balance. needs extended sitting rest between trials. cga/min a for balance at eob. increasing fatigue noted.   -AL      Recorded by [AL] Kaylyn Hauser OT 02/03/20 1539      Row Name 02/03/20 1500             Bed-Chair Transfer    Bed-Chair [place]  (Transfers)  -- refused trnasfer to chair; didnt sleep last night  -AL      Recorded by [AL] Kaylyn Hauser, OT 02/03/20 1539      Row Name 02/03/20 1500             Sit-Stand Transfer    Sit-Stand [place] (Transfers)  moderate assist (50% patient effort);2 person assist;maximum assist (25% patient effort);verbal cues;nonverbal cues (demo/gesture)  -AL      Assistive Device (Sit-Stand Transfers)  walker, front-wheeled  -AL      Recorded by [AL] Kaylyn Hauser, OT 02/03/20 1539      Row Name 02/03/20 1500             Stand-Sit Transfer    Stand-Sit [place] (Transfers)  minimum assist (75% patient effort);2 person assist  -AL      Recorded by [AL] Kaylyn Hauser, OT 02/03/20 1539      Row Name 02/03/20 1500             Lower Body Dressing Assessment/Training    Lower Body Dressing [place] Level  lower body dressing skills;dependent (less than 25% patient effort)  -AL      Recorded by [AL] Kaylyn Hauser OT 02/03/20 1539      Row Name 02/03/20 1500             Toileting Assessment/Training    [place] Level (Toileting)  toileting skills;dependent (less than 25% patient effort)  -AL      Comment (Toileting)  pt had accident of urine upon standing, urinated on floor. td for change of gown/linens/hygiene.  on 2nd trial pt not able to hold urinal in place; td   -AL      Recorded by [AL] Kaylyn Hauser, OT 02/03/20 1539      Row Name 02/03/20 1500             Positioning and Restraints    Pre-Treatment Position  in bed  -AL      Post Treatment Position  bed  -AL      In Bed  supine;call light within reach;encouraged to call for assist;exit alarm on  -AL      Recorded by [AL] Kaylyn Hauser, OT 02/03/20 1539      Row Name                [REMOVED] Wound 01/15/20 1811 Right heel     Wound - Properties Group Date first assessed: 01/15/20 [AY] Time first assessed: 1811 [AY] Side: Right [AY] Location: heel [AY] Primary Wound Type: -- [AY], Scab  Resolution Date: 02/03/20 [SB] Resolution Time: 1236 [SB]  Recorded by:  [AY] Ruth Ramsay RN 01/16/20 1812 [SB] Monica Reed RN 02/03/20 1236    Row Name                [REMOVED] Wound 01/15/20 Right anterior toe     Wound - Properties Group Date first assessed: 01/15/20 [AY] Side: Right [AY] Orientation: anterior [AY] Location: toe [AY] Primary Wound Type: -- [AY], Scab  Resolution Date: 02/03/20 [SB] Resolution Time: 1236 [SB] Recorded by:  [AY] Ruth Ramsay RN 01/16/20 1812 [SB] Monica Reed RN 02/03/20 1236    Row Name                Wound 01/22/20 sternal Incision    Wound - Properties Group Date first assessed: 01/22/20 [TR] Present on Hospital Admission: N [TR] Location: sternal [TR] Primary Wound Type: Incision [TR], midsternal surgical incision  Recorded by:  [TR] Gera Ayala RN 01/22/20 0919    Row Name                Wound 01/27/20 0800 Right anterior knee Abrasion    Wound - Properties Group Date first assessed: 01/27/20 [CO] Time first assessed: 0800 [CO] Side: Right [CO] Orientation: anterior [CO] Location: knee [CO] Primary Wound Type: Abrasion [CO] Recorded by:  [CO] Chanel Zayas RN 01/27/20 1745    Row Name 02/03/20 1500             Plan of Care Review    Plan of Care Reviewed With  patient  -AL      Outcome Summary  pt very fatigued today, needed increased rest and tolerated very little oob activity. still needs a very high level of a with basic mobility and self care. dc to rehab remains appropriate.   -AL      Recorded by [AL] Kaylyn Hauser, OT 02/03/20 1539        User Key  (r) = Recorded By, (t) = Taken By, (c) = Cosigned By    Initials Name Effective Dates Discipline    Kaylyn Grubbs, OT 03/01/19 -  OT    Chanel Rios RN 03/01/19 -  Nurse    Gera Estrada RN 04/22/19 -  Nurse    Monica Kam RN 11/11/19 -  Nurse    Ruth Huynh RN 08/02/19 -  Nurse        Wound 01/22/20 sternal Incision (Active)   Dressing Appearance dry;intact;open to air 2/3/2020 12:00 PM   Closure Approximated;Liquid  skin adhesive;Open to air 2/3/2020 12:00 PM   Base clean;dry 2/3/2020  4:00 AM   Drainage Amount none 2/3/2020 12:00 PM   Care, Wound cleansed with;sterile normal saline 2/2/2020  8:00 PM   Dressing Care, Wound open to air 2/3/2020  4:00 AM       Wound 01/27/20 0800 Right anterior knee Abrasion (Active)   Dressing Appearance no drainage;dry;intact 2/3/2020 12:00 PM   Closure None 2/3/2020  4:00 AM   Periwound intact;dry;pink 2/3/2020  4:00 AM   Periwound Temperature warm 2/3/2020  4:00 AM   Periwound Skin Turgor soft 2/3/2020  4:00 AM   Drainage Amount none 2/3/2020 12:00 PM   Care, Wound cleansed with;sterile normal saline 2/2/2020  8:00 PM           OT Recommendation and Plan     Plan of Care Review  Plan of Care Reviewed With: patient  Plan of Care Reviewed With: patient  Outcome Summary: pt very fatigued today, needed increased rest and tolerated very little oob activity. still needs a very high level of a with basic mobility and self care. dc to rehab remains appropriate.        Time Calculation:   Time Calculation- OT     Row Name 02/03/20 1532             Time Calculation- OT    OT Start Time  1025  -AL      OT Stop Time  1051  -AL      OT Time Calculation (min)  26 min  -AL      Total Timed Code Minutes- OT  26 minute(s)  -AL      OT Received On  02/03/20  -AL      OT - Next Appointment  02/05/20  -AL        User Key  (r) = Recorded By, (t) = Taken By, (c) = Cosigned By    Initials Name Provider Type    AL Kaylyn Hauser OT Occupational Therapist        Therapy Charges for Today     Code Description Service Date Service Provider Modifiers Qty    06027547434 HC OT THERAPEUTIC ACT EA 15 MIN 2/3/2020 Kaylyn Hauser OT GO 2    12976770976 HC OT SELF CARE/MGMT/TRAIN EA 15 MIN 2/3/2020 Kaylyn Hauser OT GO 1               Kaylyn Hauser OT  2/3/2020

## 2020-02-03 NOTE — SIGNIFICANT NOTE
02/03/20 1400   Discharge of Care   Discharge Mode stretcher   Discharge Destination inpatient rehabilitation facility   Nurse Report Given To Kiersten -- at [facility]   Discharged Accompanied by self only   Discharge Contact Information if Applicable [facility]- [phone]   Discharge Teaching Done  Yes   Learning Method Explanation;Written Materials   Information given to patient and wife, Debbi. No further questions at this time. Patient will discharge around 1700 via ambulance.

## 2020-02-03 NOTE — PROGRESS NOTES
Continued Stay Note  MINDI Sawyer     Patient Name: Jamin Hennessy  MRN: [id number]  Today's Date: 2/3/2020    Admit Date: 1/14/2020    Discharge Plan     Row Name 02/03/20 1016       Plan    Plan  MT Plan: [facility] accepted. PASRR approved. Precert approved 2/3 and expires 2/5.        Discharge Codes    No documentation.       Expected Discharge Date and Time     Expected Discharge Date Expected Discharge Time    Jan 28, 2020         [facility]    Phone # [phone]  Cell #[phone]  Fax#[phone]  [email or web address]\    [facility]

## 2020-02-03 NOTE — PROGRESS NOTES
Cardiology Progress Note      Admiting Physician:  Fallon Cole MD   LOS: 20 days             Cardiology assessment and plan     Endocarditis of the mitral valve with mitral valve vegetation large in size secondary to enterococcal bacteremia with sepsis  Paroxysmal atrial fibrillation   Hypertension  Obesity  Prior coronary artery disease with prior PCI and stenting  Cardiac catheterization showing severe single-vessel coronary artery disease involving ostium of the PLB branch       Normal LV systolic function  Successful mitral valve surgery with mitral valve replacement  Hemodynamically stable  Continue current medical therapy  Continue amiodarone and beta-blocker  Continue current dose of diuretics  Stable from cardiac standpoint            Reason For Followup:  Mitral valve endocarditis  Paroxysmal atrial fibrillation  Coronary artery disease  Nonsustained ventricular tachycardia      Subjective:    Interval History:  Seen and examined.  Chart and labs reviewed.  Reports feeling much better today.  Still awaiting rehab placement.    Review of Systems:  A complete review of systems was undertaken with the pertinent cardiovascular findings listed in history of present illness and all other systems being negative.     Assessment & Plan    Impressions:  Mitral valve endocarditis status post bioprosthetic mitral valve replacement  Coronary artery disease  Episodic nonsustained monomorphic ventricular tachycardia     None since addition of amiodarone  Paroxysmal atrial fibrillation  Essential hypertension currently well controlled  Obesity    Recommendations:  Continuation of his current cardiovascular regimen.  Continue to monitor rhythm  Antimicrobials as per infectious disease  Awaiting pre-CERT from anth        Objective:    Medication Review:   Scheduled Meds:    acetylcysteine 3 mL Nebulization BID - RT   amiodarone 200 mg Oral Q24H   amLODIPine 5 mg Oral Q24H   aspirin 81 mg Oral Daily   cefTRIAXone 2 g  Intravenous Q12H   DAPTOmycin 10 mg/kg (Adjusted) Intravenous Q24H   docusate sodium 100 mg Oral BID   enoxaparin 40 mg Subcutaneous Daily   FLUoxetine 20 mg Oral Daily   furosemide 40 mg Oral Daily   gabapentin 200 mg Oral BID   insulin lispro 0-14 Units Subcutaneous 4x Daily With Meals & Nightly   ipratropium-albuterol 3 mL Nebulization 4x Daily - RT   metoprolol tartrate 25 mg Oral Q12H   pantoprazole 40 mg Oral Q AM   polyethylene glycol 17 g Oral BID   potassium chloride 20 mEq Oral Daily   senna 1 tablet Oral BID     Continuous Infusions:     PRN Meds:.•  acetaminophen  •  cyclobenzaprine  •  dextrose  •  dextrose  •  glucagon (human recombinant)  •  insulin lispro **AND** insulin lispro  •  [DISCONTINUED] Morphine **AND** naloxone  •  ondansetron  •  potassium chloride **OR** potassium chloride  •  potassium chloride **OR** potassium chloride    Patient Active Problem List   Diagnosis   • Atrial fibrillation with RVR (CMS/[facility])   • Coronary artery disease due to lipid rich plaque   • CAD S/P percutaneous coronary angioplasty   • Essential hypertension   • Dyslipidemia   • Non-insulin dependent type 2 diabetes mellitus (CMS/[facility])   • Diabetic neuropathy (CMS/[facility])   • GERD without esophagitis   • BPH with obstruction/lower urinary tract symptoms   • B12 deficiency   • Vitamin D deficiency   • JARRETT (generalized anxiety disorder)   • Tobacco abuse   • Obesity (BMI 30-39.9)   • Acute bacterial endocarditis   • S/P MVR (mitral valve replacement)   • Endocarditis of mitral valve   • Non-sustained ventricular tachycardia (CMS/[facility])         Physical Exam:    General: Alert, cooperative, no distress, appears stated age  Head:  Normocephalic, atraumatic, mucous membranes moist  Eyes:  Conjunctiva/corneas clear, EOM's intact     Neck:  Supple,  no bruit  Lungs: Clear to auscultation bilaterally, no wheezes rhonchi rales are noted  Chest wall: Tender to palpation.  Incision intact  Heart::  Regular rate and rhythm, S1 and S2  normal, 1/6 holosystolic murmur.  No rub or gallop  Abdomen: Soft, non-tender, nondistended bowel sounds active  Extremities: No cyanosis, clubbing, 1+ edema  Pulses: 2+ and symmetric all extremities  Skin:  Incision clean dry and intact  Neuro/psych: A&O x3. CN II through XII are grossly intact with appropriate affect    Vital Signs:  Vitals:    02/03/20 1116 02/03/20 1136 02/03/20 1200 02/03/20 1239   BP:       Pulse: 71 69     Resp:  16     Temp:   95.4 °F (35.2 °C) 97.7 °F (36.5 °C)   TempSrc:   Oral Oral   SpO2: 94% 99%     Weight:       Height:         Wt Readings from Last 1 Encounters:   02/03/20 119 kg (263 lb 3.7 oz)       Intake/Output Summary (Last 24 hours) at 2/3/2020 1245  Last data filed at 2/3/2020 0800  Gross per 24 hour   Intake 360 ml   Output --   Net 360 ml         Results Review:     CBC    Results from last 7 days   Lab Units 02/03/20  0231 02/02/20  0320 02/01/20  0307 01/31/20  0604 01/30/20  0434 01/29/20  0608 01/28/20  0445   WBC 10*3/mm3 8.40 6.60 8.00 8.90 10.80 8.10 8.40   HEMOGLOBIN g/dL 8.1* 8.8* 7.9* 8.3* 8.8* 8.6* 8.0*   PLATELETS 10*3/mm3 370 349 373 352 345 297 226     Cr Clearance Estimated Creatinine Clearance: 91.3 mL/min (by C-G formula based on SCr of 1.04 mg/dL).  [facility] [place]       [place]A1C   Lab Results   Component Value Date    HGBA1C 5.7 (H) 01/15/2020     Blood Glucose   Glucose   Date/Time Value Ref Range Status   02/03/2020 1152 97 70 - 105 mg/dL Final     Comment:     Serial Number: [id number]:  [id number]   02/03/2020 0756 114 (H) 70 - 105 mg/dL Final     Comment:     Serial Number: [id number]:  [id number]   02/03/2020 0643 105 70 - 105 mg/dL Final     Comment:     Serial Number: [id number]:  [id number]   02/02/2020 2001 108 (H) 70 - 105 mg/dL Final     Comment:     Serial Number: [id number]:  [id number]   02/02/2020 1646 97 70 - 105 mg/dL Final     Comment:     Serial Number: [id number]:  [id number]   02/02/2020 1149 107 (H) 70 - 105 mg/dL Final      Comment:     Serial Number: [id number]:  [id number]   02/02/2020 0741 96 70 - 105 mg/dL Final     Comment:     Serial Number: [id number]:  [id number]   02/01/2020 2013 108 (H) 70 - 105 mg/dL Final     Comment:     Serial Number: [id number]:  [id number]     Infection       CMP   Results from last 7 days   Lab Units 02/03/20  0231 02/02/20  0320 02/01/20  0307 01/31/20  0604 01/30/20  0434 01/29/20  0608 01/28/20  1225 01/28/20  0445   SODIUM mmol/L 138 142 139 138 137 136  --  137   POTASSIUM mmol/L 3.9 3.8 3.7 3.6 3.9 3.4* 3.5 3.5   CHLORIDE mmol/L 104 108* 107 106 105 103  --  104   CO2 mmol/L 22.0 22.0 21.0* 21.0* 20.0* 21.0*  --  22.0   BUN mg/dL 19 16 19 17 16 12  --  13   CREATININE mg/dL 1.04 1.03 0.96 0.96 0.96 0.91  --  1.02   GLUCOSE mg/dL 115* 113* 111* 116* 163* 119*  --  113*   ALBUMIN g/dL 3.20* 3.20* 3.00* 3.00* 3.30* 3.20*  --  3.10*   BILIRUBIN mg/dL 0.2 0.2 0.2 0.2 0.2 0.3  --  0.4   ALK PHOS U/L 63 58 51 51 54 48  --  45   AST (SGOT) U/L 35 39 34 34 36 43*  --  27   ALT (SGPT) U/L 59* 65* 51* 49* 43* 51*  --  27     ABG        UA        TERESA  No results found for: POCMETH, POCAMPHET, POCBARBITUR, POCBENZO, POCCOCAINE, POCOPIATES, POCOXYCODO, POCPHENCYC, POCPROPOXY, POCTHC, POCTRICYC  Lysis Labs   Results from last 7 days   Lab Units 02/03/20  0231 02/02/20  0320 02/01/20  0307 01/31/20  0604 01/30/20  0434 01/29/20  0608 01/28/20  0445   HEMOGLOBIN g/dL 8.1* 8.8* 7.9* 8.3* 8.8* 8.6* 8.0*   PLATELETS 10*3/mm3 370 349 373 352 345 297 226   CREATININE mg/dL 1.04 1.03 0.96 0.96 0.96 0.91 1.02     Radiology(recent) No radiology results for the last day      Results from last 7 days   Lab Units 02/03/20  0231   CK TOTAL U/L 49       Imaging Results (Last 24 Hours)     ** No results found for the last 24 hours. **          Cardiac Studies:  Echo-   Results for orders placed during the hospital encounter of 01/14/20   Adult Transthoracic Echo Complete W/ Cont if Necessary Per Protocol     Narrative · Estimated EF appears to be in the range of 66 - 70%. Normal left   ventricular cavity size noted. All left ventricular wall segments contract   normally. Left ventricular wall thickness is consistent with moderate   septal asymmetric hypertrophy. Left ventricular diastolic function not   assessed on this study. LVOT gradient not interrogated.  · Left atrial cavity size is mildly dilated.  · There is a bioprosthetic mitral valve present. Leaflets were not well   visualized. The mean gradient across the valve was 5.2 mmHg. There appears   to be mild mitral insufficiency but color flow interrogation was   inadequate.        Stress Myoview-  Cath-        YUE Kerns  02/03/20  12:45 PM   Electronically signed by YUE Kerns, 02/03/20, 12:45 PM.

## 2020-02-04 NOTE — PROGRESS NOTES
Case Management Discharge Note      Final Note: DC to [facility]    Provided post acute provider list?: Yes  Post Acute Provider List: Long Term Acute Care  Post Acute Provider Quality & Resource List: Refused  Delivered To: Patient, Support Person  Support Person: Wife: Debbi  Method of Delivery: In person, Telephone         Final Discharge Disposition Code: 03 - skilled nursing facility (SNF)

## 2020-02-13 ENCOUNTER — TELEPHONE (OUTPATIENT)
Dept: CARDIAC REHAB | Facility: HOSPITAL | Age: 65
End: 2020-02-13

## 2020-02-14 ENCOUNTER — TELEPHONE (OUTPATIENT)
Dept: CARDIAC REHAB | Facility: HOSPITAL | Age: 65
End: 2020-02-14

## 2020-02-19 LAB — FUNGUS WND CULT: NORMAL

## 2020-02-20 PROCEDURE — 93010 ELECTROCARDIOGRAM REPORT: CPT | Performed by: INTERNAL MEDICINE

## 2020-02-27 ENCOUNTER — HOSPITAL ENCOUNTER (INPATIENT)
Facility: HOSPITAL | Age: 65
LOS: 12 days | Discharge: NURSING, REHAB OR LONG-TERM CARE | End: 2020-03-10
Attending: INTERNAL MEDICINE | Admitting: THORACIC SURGERY (CARDIOTHORACIC VASCULAR SURGERY)

## 2020-02-27 ENCOUNTER — APPOINTMENT (OUTPATIENT)
Dept: CARDIOLOGY | Facility: HOSPITAL | Age: 65
End: 2020-02-27

## 2020-02-27 ENCOUNTER — APPOINTMENT (OUTPATIENT)
Dept: CT IMAGING | Facility: HOSPITAL | Age: 65
End: 2020-02-27

## 2020-02-27 DIAGNOSIS — Z95.2 S/P MVR (MITRAL VALVE REPLACEMENT): Chronic | ICD-10-CM

## 2020-02-27 DIAGNOSIS — J18.9 HCAP (HEALTHCARE-ASSOCIATED PNEUMONIA): ICD-10-CM

## 2020-02-27 DIAGNOSIS — A41.9 SEVERE SEPSIS (HCC): ICD-10-CM

## 2020-02-27 DIAGNOSIS — R65.20 SEVERE SEPSIS (HCC): ICD-10-CM

## 2020-02-27 DIAGNOSIS — J18.9 PNEUMONIA OF LEFT LUNG DUE TO INFECTIOUS ORGANISM, UNSPECIFIED PART OF LUNG: Primary | ICD-10-CM

## 2020-02-27 PROBLEM — I21.9 ACUTE MYOCARDIAL INFARCTION (HCC): Status: ACTIVE | Noted: 2020-02-27

## 2020-02-27 PROBLEM — E78.00 HYPERCHOLESTEROLEMIA: Status: ACTIVE | Noted: 2020-02-27

## 2020-02-27 PROBLEM — I05.9 ENDOCARDITIS OF MITRAL VALVE: Chronic | Status: ACTIVE | Noted: 2020-01-27

## 2020-02-27 PROBLEM — I95.9 HYPOTENSION: Status: ACTIVE | Noted: 2020-02-27

## 2020-02-27 PROBLEM — I95.9 SEPSIS ASSOCIATED HYPOTENSION (HCC): Status: ACTIVE | Noted: 2020-02-27

## 2020-02-27 PROBLEM — E56.0 VITAMIN E DEFICIENCY: Chronic | Status: ACTIVE | Noted: 2020-02-27

## 2020-02-27 PROBLEM — R07.9 CHEST PAIN: Status: ACTIVE | Noted: 2020-02-27

## 2020-02-27 PROBLEM — R06.02 SHORTNESS OF BREATH: Status: ACTIVE | Noted: 2020-02-27

## 2020-02-27 LAB
ALBUMIN SERPL-MCNC: 2.3 G/DL (ref 3.5–5.2)
ALBUMIN/GLOB SERPL: 0.6 G/DL
ALP SERPL-CCNC: 90 U/L (ref 39–117)
ALT SERPL W P-5'-P-CCNC: 38 U/L (ref 1–41)
ANION GAP SERPL CALCULATED.3IONS-SCNC: 12 MMOL/L (ref 5–15)
ARTERIAL PATENCY WRIST A: POSITIVE
AST SERPL-CCNC: 33 U/L (ref 1–40)
ATMOSPHERIC PRESS: ABNORMAL MM[HG]
BASE EXCESS BLDA CALC-SCNC: 0.9 MMOL/L (ref 0–3)
BASOPHILS # BLD AUTO: 0.1 10*3/MM3 (ref 0–0.2)
BASOPHILS NFR BLD AUTO: 0.9 % (ref 0–1.5)
BDY SITE: ABNORMAL
BH CV LOW VAS LEFT LESSER SAPH VESSEL: 1
BH CV LOWER VASCULAR LEFT COMMON FEMORAL AUGMENT: NORMAL
BH CV LOWER VASCULAR LEFT COMMON FEMORAL COMPETENT: NORMAL
BH CV LOWER VASCULAR LEFT COMMON FEMORAL COMPRESS: NORMAL
BH CV LOWER VASCULAR LEFT COMMON FEMORAL PHASIC: NORMAL
BH CV LOWER VASCULAR LEFT COMMON FEMORAL SPONT: NORMAL
BH CV LOWER VASCULAR LEFT DISTAL FEMORAL COMPRESS: NORMAL
BH CV LOWER VASCULAR LEFT GASTRONEMIUS COMPRESS: NORMAL
BH CV LOWER VASCULAR LEFT GREATER SAPH AK COMPRESS: NORMAL
BH CV LOWER VASCULAR LEFT GREATER SAPH BK COMPRESS: NORMAL
BH CV LOWER VASCULAR LEFT LESSER SAPH COMPRESS: NORMAL
BH CV LOWER VASCULAR LEFT LESSER SAPH THROMBUS: NORMAL
BH CV LOWER VASCULAR LEFT MID FEMORAL AUGMENT: NORMAL
BH CV LOWER VASCULAR LEFT MID FEMORAL COMPETENT: NORMAL
BH CV LOWER VASCULAR LEFT MID FEMORAL COMPRESS: NORMAL
BH CV LOWER VASCULAR LEFT MID FEMORAL PHASIC: NORMAL
BH CV LOWER VASCULAR LEFT MID FEMORAL SPONT: NORMAL
BH CV LOWER VASCULAR LEFT PERONEAL COMPRESS: NORMAL
BH CV LOWER VASCULAR LEFT POPLITEAL AUGMENT: NORMAL
BH CV LOWER VASCULAR LEFT POPLITEAL COMPETENT: NORMAL
BH CV LOWER VASCULAR LEFT POPLITEAL COMPRESS: NORMAL
BH CV LOWER VASCULAR LEFT POPLITEAL PHASIC: NORMAL
BH CV LOWER VASCULAR LEFT POPLITEAL SPONT: NORMAL
BH CV LOWER VASCULAR LEFT POSTERIOR TIBIAL COMPRESS: NORMAL
BH CV LOWER VASCULAR LEFT PROXIMAL FEMORAL COMPRESS: NORMAL
BH CV LOWER VASCULAR LEFT SAPHENOFEMORAL JUNCTION AUGMENT: NORMAL
BH CV LOWER VASCULAR LEFT SAPHENOFEMORAL JUNCTION COMPETENT: NORMAL
BH CV LOWER VASCULAR LEFT SAPHENOFEMORAL JUNCTION COMPRESS: NORMAL
BH CV LOWER VASCULAR LEFT SAPHENOFEMORAL JUNCTION PHASIC: NORMAL
BH CV LOWER VASCULAR LEFT SAPHENOFEMORAL JUNCTION SPONT: NORMAL
BH CV LOWER VASCULAR RIGHT COMMON FEMORAL AUGMENT: NORMAL
BH CV LOWER VASCULAR RIGHT COMMON FEMORAL COMPETENT: NORMAL
BH CV LOWER VASCULAR RIGHT COMMON FEMORAL COMPRESS: NORMAL
BH CV LOWER VASCULAR RIGHT COMMON FEMORAL PHASIC: NORMAL
BH CV LOWER VASCULAR RIGHT COMMON FEMORAL SPONT: NORMAL
BH CV LOWER VASCULAR RIGHT DISTAL FEMORAL COMPRESS: NORMAL
BH CV LOWER VASCULAR RIGHT GASTRONEMIUS COMPRESS: NORMAL
BH CV LOWER VASCULAR RIGHT GREATER SAPH AK COMPRESS: NORMAL
BH CV LOWER VASCULAR RIGHT GREATER SAPH BK COMPRESS: NORMAL
BH CV LOWER VASCULAR RIGHT LESSER SAPH COMPRESS: NORMAL
BH CV LOWER VASCULAR RIGHT MID FEMORAL AUGMENT: NORMAL
BH CV LOWER VASCULAR RIGHT MID FEMORAL COMPETENT: NORMAL
BH CV LOWER VASCULAR RIGHT MID FEMORAL COMPRESS: NORMAL
BH CV LOWER VASCULAR RIGHT MID FEMORAL PHASIC: NORMAL
BH CV LOWER VASCULAR RIGHT MID FEMORAL SPONT: NORMAL
BH CV LOWER VASCULAR RIGHT PERONEAL COMPRESS: NORMAL
BH CV LOWER VASCULAR RIGHT POPLITEAL AUGMENT: NORMAL
BH CV LOWER VASCULAR RIGHT POPLITEAL COMPETENT: NORMAL
BH CV LOWER VASCULAR RIGHT POPLITEAL COMPRESS: NORMAL
BH CV LOWER VASCULAR RIGHT POPLITEAL PHASIC: NORMAL
BH CV LOWER VASCULAR RIGHT POPLITEAL SPONT: NORMAL
BH CV LOWER VASCULAR RIGHT POSTERIOR TIBIAL COMPRESS: NORMAL
BH CV LOWER VASCULAR RIGHT PROXIMAL FEMORAL COMPRESS: NORMAL
BH CV LOWER VASCULAR RIGHT SAPHENOFEMORAL JUNCTION AUGMENT: NORMAL
BH CV LOWER VASCULAR RIGHT SAPHENOFEMORAL JUNCTION COMPETENT: NORMAL
BH CV LOWER VASCULAR RIGHT SAPHENOFEMORAL JUNCTION COMPRESS: NORMAL
BH CV LOWER VASCULAR RIGHT SAPHENOFEMORAL JUNCTION PHASIC: NORMAL
BH CV LOWER VASCULAR RIGHT SAPHENOFEMORAL JUNCTION SPONT: NORMAL
BILIRUB SERPL-MCNC: 0.2 MG/DL (ref 0.2–1.2)
BUN BLD-MCNC: 16 MG/DL (ref 8–23)
BUN/CREAT SERPL: 15.5 (ref 7–25)
CALCIUM SPEC-SCNC: 8.4 MG/DL (ref 8.6–10.5)
CHLORIDE SERPL-SCNC: 105 MMOL/L (ref 98–107)
CO2 BLDA-SCNC: 25.2 MMOL/L (ref 22–29)
CO2 SERPL-SCNC: 23 MMOL/L (ref 22–29)
CREAT BLD-MCNC: 1.03 MG/DL (ref 0.76–1.27)
D-LACTATE SERPL-SCNC: 1 MMOL/L (ref 0.5–2)
DEPRECATED RDW RBC AUTO: 47.7 FL (ref 37–54)
EOSINOPHIL # BLD AUTO: 2.7 10*3/MM3 (ref 0–0.4)
EOSINOPHIL NFR BLD AUTO: 16.7 % (ref 0.3–6.2)
ERYTHROCYTE [DISTWIDTH] IN BLOOD BY AUTOMATED COUNT: 17.6 % (ref 12.3–15.4)
GFR SERPL CREATININE-BSD FRML MDRD: 73 ML/MIN/1.73
GLOBULIN UR ELPH-MCNC: 3.7 GM/DL
GLUCOSE BLD-MCNC: 118 MG/DL (ref 65–99)
GLUCOSE BLDC GLUCOMTR-MCNC: 136 MG/DL (ref 70–105)
GLUCOSE BLDC GLUCOMTR-MCNC: 92 MG/DL (ref 70–105)
HBA1C MFR BLD: 6.6 % (ref 3.5–5.6)
HCO3 BLDA-SCNC: 24.2 MMOL/L (ref 21–28)
HCT VFR BLD AUTO: 22.7 % (ref 37.5–51)
HEMODILUTION: NO
HGB BLD-MCNC: 7.3 G/DL (ref 13–17.7)
HOROWITZ INDEX BLD+IHG-RTO: 35 %
LYMPHOCYTES # BLD AUTO: 0.8 10*3/MM3 (ref 0.7–3.1)
LYMPHOCYTES NFR BLD AUTO: 5 % (ref 19.6–45.3)
MCH RBC QN AUTO: 24.4 PG (ref 26.6–33)
MCHC RBC AUTO-ENTMCNC: 32.1 G/DL (ref 31.5–35.7)
MCV RBC AUTO: 76 FL (ref 79–97)
MODALITY: ABNORMAL
MONOCYTES # BLD AUTO: 1 10*3/MM3 (ref 0.1–0.9)
MONOCYTES NFR BLD AUTO: 6.5 % (ref 5–12)
NEUTROPHILS # BLD AUTO: 11.3 10*3/MM3 (ref 1.7–7)
NEUTROPHILS NFR BLD AUTO: 70.9 % (ref 42.7–76)
NRBC BLD AUTO-RTO: 0 /100 WBC (ref 0–0.2)
PCO2 BLDA: 32.1 MM HG (ref 35–48)
PH BLDA: 7.49 PH UNITS (ref 7.35–7.45)
PLATELET # BLD AUTO: 535 10*3/MM3 (ref 140–450)
PMV BLD AUTO: 7.5 FL (ref 6–12)
PO2 BLDA: 56.9 MM HG (ref 83–108)
POTASSIUM BLD-SCNC: 3.5 MMOL/L (ref 3.5–5.2)
PROCALCITONIN SERPL-MCNC: 0.12 NG/ML (ref 0.1–0.25)
PROT SERPL-MCNC: 6 G/DL (ref 6–8.5)
RBC # BLD AUTO: 2.99 10*6/MM3 (ref 4.14–5.8)
SAO2 % BLDCOA: 91.6 % (ref 94–98)
SODIUM BLD-SCNC: 140 MMOL/L (ref 136–145)
TROPONIN T SERPL-MCNC: 0.05 NG/ML (ref 0–0.03)
TROPONIN T SERPL-MCNC: 0.06 NG/ML (ref 0–0.03)
TROPONIN T SERPL-MCNC: 0.06 NG/ML (ref 0–0.03)
WBC NRBC COR # BLD: 16 10*3/MM3 (ref 3.4–10.8)

## 2020-02-27 PROCEDURE — 36600 WITHDRAWAL OF ARTERIAL BLOOD: CPT

## 2020-02-27 PROCEDURE — 85025 COMPLETE CBC W/AUTO DIFF WBC: CPT | Performed by: NURSE PRACTITIONER

## 2020-02-27 PROCEDURE — 80053 COMPREHEN METABOLIC PANEL: CPT | Performed by: NURSE PRACTITIONER

## 2020-02-27 PROCEDURE — 82803 BLOOD GASES ANY COMBINATION: CPT

## 2020-02-27 PROCEDURE — 82962 GLUCOSE BLOOD TEST: CPT

## 2020-02-27 PROCEDURE — 93970 EXTREMITY STUDY: CPT

## 2020-02-27 PROCEDURE — 25010000002 DAPTOMYCIN PER 1 MG: Performed by: NURSE PRACTITIONER

## 2020-02-27 PROCEDURE — 99223 1ST HOSP IP/OBS HIGH 75: CPT | Performed by: INTERNAL MEDICINE

## 2020-02-27 PROCEDURE — 25010000002 CEFTRIAXONE PER 250 MG: Performed by: NURSE PRACTITIONER

## 2020-02-27 PROCEDURE — 0099U HC BIOFIRE FILMARRAY RESP PANEL 1: CPT | Performed by: NURSE PRACTITIONER

## 2020-02-27 PROCEDURE — 83036 HEMOGLOBIN GLYCOSYLATED A1C: CPT | Performed by: NURSE PRACTITIONER

## 2020-02-27 PROCEDURE — 87040 BLOOD CULTURE FOR BACTERIA: CPT | Performed by: NURSE PRACTITIONER

## 2020-02-27 PROCEDURE — 83605 ASSAY OF LACTIC ACID: CPT | Performed by: NURSE PRACTITIONER

## 2020-02-27 PROCEDURE — 84145 PROCALCITONIN (PCT): CPT | Performed by: NURSE PRACTITIONER

## 2020-02-27 PROCEDURE — 84484 ASSAY OF TROPONIN QUANT: CPT | Performed by: NURSE PRACTITIONER

## 2020-02-27 PROCEDURE — 71250 CT THORAX DX C-: CPT

## 2020-02-27 RX ORDER — SODIUM CHLORIDE 0.9 % (FLUSH) 0.9 %
10 SYRINGE (ML) INJECTION AS NEEDED
Status: DISCONTINUED | OUTPATIENT
Start: 2020-02-27 | End: 2020-03-10 | Stop reason: HOSPADM

## 2020-02-27 RX ORDER — L.ACID,PARA/B.BIFIDUM/S.THERM 8B CELL
1 CAPSULE ORAL 2 TIMES DAILY
Status: DISCONTINUED | OUTPATIENT
Start: 2020-02-27 | End: 2020-03-10 | Stop reason: HOSPADM

## 2020-02-27 RX ORDER — VITAMIN E 268 MG
800 CAPSULE ORAL DAILY
Status: DISCONTINUED | OUTPATIENT
Start: 2020-02-28 | End: 2020-03-10 | Stop reason: HOSPADM

## 2020-02-27 RX ORDER — ONDANSETRON 2 MG/ML
4 INJECTION INTRAMUSCULAR; INTRAVENOUS EVERY 6 HOURS PRN
Status: DISCONTINUED | OUTPATIENT
Start: 2020-02-27 | End: 2020-03-10 | Stop reason: HOSPADM

## 2020-02-27 RX ORDER — FINASTERIDE 5 MG/1
5 TABLET, FILM COATED ORAL DAILY
Status: DISCONTINUED | OUTPATIENT
Start: 2020-02-28 | End: 2020-03-10 | Stop reason: HOSPADM

## 2020-02-27 RX ORDER — FOLIC ACID 1 MG/1
1 TABLET ORAL DAILY
Status: DISCONTINUED | OUTPATIENT
Start: 2020-02-28 | End: 2020-03-10 | Stop reason: HOSPADM

## 2020-02-27 RX ORDER — GABAPENTIN 100 MG/1
200 CAPSULE ORAL 3 TIMES DAILY
Status: DISCONTINUED | OUTPATIENT
Start: 2020-02-27 | End: 2020-03-08

## 2020-02-27 RX ORDER — AMIODARONE HYDROCHLORIDE 200 MG/1
200 TABLET ORAL
Status: DISCONTINUED | OUTPATIENT
Start: 2020-02-28 | End: 2020-03-08

## 2020-02-27 RX ORDER — BENZONATATE 100 MG/1
200 CAPSULE ORAL 3 TIMES DAILY PRN
Status: DISCONTINUED | OUTPATIENT
Start: 2020-02-27 | End: 2020-03-10 | Stop reason: HOSPADM

## 2020-02-27 RX ORDER — MAGNESIUM SULFATE HEPTAHYDRATE 40 MG/ML
2 INJECTION, SOLUTION INTRAVENOUS AS NEEDED
Status: DISCONTINUED | OUTPATIENT
Start: 2020-02-27 | End: 2020-03-10 | Stop reason: HOSPADM

## 2020-02-27 RX ORDER — MAGNESIUM SULFATE 1 G/100ML
1 INJECTION INTRAVENOUS AS NEEDED
Status: DISCONTINUED | OUTPATIENT
Start: 2020-02-27 | End: 2020-03-10 | Stop reason: HOSPADM

## 2020-02-27 RX ORDER — BISACODYL 10 MG
10 SUPPOSITORY, RECTAL RECTAL DAILY PRN
Status: DISCONTINUED | OUTPATIENT
Start: 2020-02-27 | End: 2020-03-10 | Stop reason: HOSPADM

## 2020-02-27 RX ORDER — SODIUM CHLORIDE 0.9 % (FLUSH) 0.9 %
10 SYRINGE (ML) INJECTION EVERY 12 HOURS SCHEDULED
Status: DISCONTINUED | OUTPATIENT
Start: 2020-02-27 | End: 2020-03-10 | Stop reason: HOSPADM

## 2020-02-27 RX ORDER — GUAIFENESIN 600 MG/1
1200 TABLET, EXTENDED RELEASE ORAL EVERY 12 HOURS SCHEDULED
Status: DISCONTINUED | OUTPATIENT
Start: 2020-02-27 | End: 2020-03-10 | Stop reason: HOSPADM

## 2020-02-27 RX ORDER — ACETAMINOPHEN 160 MG/5ML
650 SOLUTION ORAL EVERY 4 HOURS PRN
Status: DISCONTINUED | OUTPATIENT
Start: 2020-02-27 | End: 2020-03-10 | Stop reason: HOSPADM

## 2020-02-27 RX ORDER — PANTOPRAZOLE SODIUM 40 MG/1
40 TABLET, DELAYED RELEASE ORAL EVERY MORNING
Status: DISCONTINUED | OUTPATIENT
Start: 2020-02-28 | End: 2020-03-10 | Stop reason: HOSPADM

## 2020-02-27 RX ORDER — POTASSIUM CHLORIDE 20 MEQ/1
40 TABLET, EXTENDED RELEASE ORAL AS NEEDED
Status: DISCONTINUED | OUTPATIENT
Start: 2020-02-27 | End: 2020-03-10 | Stop reason: HOSPADM

## 2020-02-27 RX ORDER — DEXTROSE MONOHYDRATE 25 G/50ML
25 INJECTION, SOLUTION INTRAVENOUS
Status: DISCONTINUED | OUTPATIENT
Start: 2020-02-27 | End: 2020-03-10 | Stop reason: HOSPADM

## 2020-02-27 RX ORDER — ACETAMINOPHEN 650 MG/1
650 SUPPOSITORY RECTAL EVERY 4 HOURS PRN
Status: DISCONTINUED | OUTPATIENT
Start: 2020-02-27 | End: 2020-03-10 | Stop reason: HOSPADM

## 2020-02-27 RX ORDER — NICOTINE POLACRILEX 4 MG
15 LOZENGE BUCCAL
Status: DISCONTINUED | OUTPATIENT
Start: 2020-02-27 | End: 2020-03-10 | Stop reason: HOSPADM

## 2020-02-27 RX ORDER — FLUOXETINE HYDROCHLORIDE 20 MG/1
40 CAPSULE ORAL DAILY
Status: DISCONTINUED | OUTPATIENT
Start: 2020-02-28 | End: 2020-03-10 | Stop reason: HOSPADM

## 2020-02-27 RX ORDER — IPRATROPIUM BROMIDE AND ALBUTEROL SULFATE 2.5; .5 MG/3ML; MG/3ML
3 SOLUTION RESPIRATORY (INHALATION) EVERY 4 HOURS PRN
Status: DISCONTINUED | OUTPATIENT
Start: 2020-02-27 | End: 2020-03-10 | Stop reason: HOSPADM

## 2020-02-27 RX ORDER — TAMSULOSIN HYDROCHLORIDE 0.4 MG/1
0.4 CAPSULE ORAL DAILY
Status: DISCONTINUED | OUTPATIENT
Start: 2020-02-28 | End: 2020-03-10 | Stop reason: HOSPADM

## 2020-02-27 RX ORDER — LANOLIN ALCOHOL/MO/W.PET/CERES
1000 CREAM (GRAM) TOPICAL DAILY
Status: DISCONTINUED | OUTPATIENT
Start: 2020-02-28 | End: 2020-03-10 | Stop reason: HOSPADM

## 2020-02-27 RX ORDER — CYCLOBENZAPRINE HCL 10 MG
5 TABLET ORAL 2 TIMES DAILY PRN
Status: DISCONTINUED | OUTPATIENT
Start: 2020-02-27 | End: 2020-03-10 | Stop reason: HOSPADM

## 2020-02-27 RX ORDER — CETIRIZINE HYDROCHLORIDE 10 MG/1
10 TABLET ORAL DAILY
Status: DISCONTINUED | OUTPATIENT
Start: 2020-02-28 | End: 2020-03-10 | Stop reason: HOSPADM

## 2020-02-27 RX ORDER — ONDANSETRON 4 MG/1
4 TABLET, FILM COATED ORAL EVERY 6 HOURS PRN
Status: DISCONTINUED | OUTPATIENT
Start: 2020-02-27 | End: 2020-03-10 | Stop reason: HOSPADM

## 2020-02-27 RX ORDER — ACETAMINOPHEN 325 MG/1
650 TABLET ORAL EVERY 4 HOURS PRN
Status: DISCONTINUED | OUTPATIENT
Start: 2020-02-27 | End: 2020-03-10 | Stop reason: HOSPADM

## 2020-02-27 RX ORDER — VANCOMYCIN 1.75 GRAM/500 ML IN 0.9 % SODIUM CHLORIDE INTRAVENOUS
1750 ONCE
Status: DISCONTINUED | OUTPATIENT
Start: 2020-02-27 | End: 2020-02-27

## 2020-02-27 RX ADMIN — Medication 1 CAPSULE: at 20:32

## 2020-02-27 RX ADMIN — GABAPENTIN 200 MG: 100 CAPSULE ORAL at 15:03

## 2020-02-27 RX ADMIN — GUAIFENESIN 1200 MG: 600 TABLET, EXTENDED RELEASE ORAL at 20:32

## 2020-02-27 RX ADMIN — GABAPENTIN 200 MG: 100 CAPSULE ORAL at 20:32

## 2020-02-27 RX ADMIN — CEFTRIAXONE SODIUM 1 G: 1 INJECTION, POWDER, FOR SOLUTION INTRAMUSCULAR; INTRAVENOUS at 14:56

## 2020-02-27 RX ADMIN — GUAIFENESIN 1200 MG: 600 TABLET, EXTENDED RELEASE ORAL at 14:56

## 2020-02-27 RX ADMIN — APIXABAN 5 MG: 5 TABLET, FILM COATED ORAL at 20:32

## 2020-02-27 RX ADMIN — Medication 10 ML: at 20:33

## 2020-02-27 RX ADMIN — Medication 10 ML: at 15:03

## 2020-02-27 RX ADMIN — DAPTOMYCIN 900 MG: 500 INJECTION, POWDER, LYOPHILIZED, FOR SOLUTION INTRAVENOUS at 17:32

## 2020-02-27 RX ADMIN — POTASSIUM CHLORIDE 40 MEQ: 1500 TABLET, EXTENDED RELEASE ORAL at 22:00

## 2020-02-28 LAB
APTT PPP: 26.5 SECONDS (ref 61–76.5)
ARTERIAL PATENCY WRIST A: POSITIVE
ATMOSPHERIC PRESS: ABNORMAL MM[HG]
B PERT DNA SPEC QL NAA+PROBE: NOT DETECTED
BASE EXCESS BLDA CALC-SCNC: 0.5 MMOL/L (ref 0–3)
BASOPHILS # BLD AUTO: 0.2 10*3/MM3 (ref 0–0.2)
BASOPHILS # BLD AUTO: 0.2 10*3/MM3 (ref 0–0.2)
BASOPHILS NFR BLD AUTO: 1.1 % (ref 0–1.5)
BASOPHILS NFR BLD AUTO: 1.4 % (ref 0–1.5)
BDY SITE: ABNORMAL
C PNEUM DNA NPH QL NAA+NON-PROBE: NOT DETECTED
CK SERPL-CCNC: 33 U/L (ref 20–200)
CO2 BLDA-SCNC: 25.7 MMOL/L (ref 22–29)
D-LACTATE SERPL-SCNC: 1 MMOL/L (ref 0.5–2)
DEPRECATED RDW RBC AUTO: 46.8 FL (ref 37–54)
DEPRECATED RDW RBC AUTO: 47.7 FL (ref 37–54)
EOSINOPHIL # BLD AUTO: 2.9 10*3/MM3 (ref 0–0.4)
EOSINOPHIL # BLD AUTO: 3 10*3/MM3 (ref 0–0.4)
EOSINOPHIL NFR BLD AUTO: 17.5 % (ref 0.3–6.2)
EOSINOPHIL NFR BLD AUTO: 20.5 % (ref 0.3–6.2)
ERYTHROCYTE [DISTWIDTH] IN BLOOD BY AUTOMATED COUNT: 17.4 % (ref 12.3–15.4)
ERYTHROCYTE [DISTWIDTH] IN BLOOD BY AUTOMATED COUNT: 17.5 % (ref 12.3–15.4)
FLUAV H1 2009 PAND RNA NPH QL NAA+PROBE: NOT DETECTED
FLUAV H1 HA GENE NPH QL NAA+PROBE: NOT DETECTED
FLUAV H3 RNA NPH QL NAA+PROBE: NOT DETECTED
FLUAV SUBTYP SPEC NAA+PROBE: NOT DETECTED
FLUBV RNA ISLT QL NAA+PROBE: NOT DETECTED
GLUCOSE BLDC GLUCOMTR-MCNC: 103 MG/DL (ref 70–105)
GLUCOSE BLDC GLUCOMTR-MCNC: 123 MG/DL (ref 70–105)
GLUCOSE BLDC GLUCOMTR-MCNC: 123 MG/DL (ref 70–105)
GLUCOSE BLDC GLUCOMTR-MCNC: 134 MG/DL (ref 70–105)
HADV DNA SPEC NAA+PROBE: NOT DETECTED
HCO3 BLDA-SCNC: 24.6 MMOL/L (ref 21–28)
HCOV 229E RNA SPEC QL NAA+PROBE: NOT DETECTED
HCOV HKU1 RNA SPEC QL NAA+PROBE: NOT DETECTED
HCOV NL63 RNA SPEC QL NAA+PROBE: NOT DETECTED
HCOV OC43 RNA SPEC QL NAA+PROBE: NOT DETECTED
HCT VFR BLD AUTO: 21.9 % (ref 37.5–51)
HCT VFR BLD AUTO: 22.8 % (ref 37.5–51)
HEMODILUTION: NO
HGB BLD-MCNC: 7.2 G/DL (ref 13–17.7)
HGB BLD-MCNC: 7.3 G/DL (ref 13–17.7)
HMPV RNA NPH QL NAA+NON-PROBE: NOT DETECTED
HOROWITZ INDEX BLD+IHG-RTO: 44 %
HPIV1 RNA SPEC QL NAA+PROBE: NOT DETECTED
HPIV2 RNA SPEC QL NAA+PROBE: NOT DETECTED
HPIV3 RNA NPH QL NAA+PROBE: NOT DETECTED
HPIV4 P GENE NPH QL NAA+PROBE: NOT DETECTED
INR PPP: 1.22 (ref 0.9–1.1)
L PNEUMO1 AG UR QL IA: NEGATIVE
LYMPHOCYTES # BLD AUTO: 0.7 10*3/MM3 (ref 0.7–3.1)
LYMPHOCYTES # BLD AUTO: 1 10*3/MM3 (ref 0.7–3.1)
LYMPHOCYTES NFR BLD AUTO: 4.3 % (ref 19.6–45.3)
LYMPHOCYTES NFR BLD AUTO: 6.9 % (ref 19.6–45.3)
M PNEUMO IGG SER IA-ACNC: NOT DETECTED
MAGNESIUM SERPL-MCNC: 2.1 MG/DL (ref 1.6–2.4)
MCH RBC QN AUTO: 24.4 PG (ref 26.6–33)
MCH RBC QN AUTO: 24.9 PG (ref 26.6–33)
MCHC RBC AUTO-ENTMCNC: 31.9 G/DL (ref 31.5–35.7)
MCHC RBC AUTO-ENTMCNC: 33 G/DL (ref 31.5–35.7)
MCV RBC AUTO: 75.5 FL (ref 79–97)
MCV RBC AUTO: 76.4 FL (ref 79–97)
MODALITY: ABNORMAL
MONOCYTES # BLD AUTO: 1.1 10*3/MM3 (ref 0.1–0.9)
MONOCYTES # BLD AUTO: 1.3 10*3/MM3 (ref 0.1–0.9)
MONOCYTES NFR BLD AUTO: 7.4 % (ref 5–12)
MONOCYTES NFR BLD AUTO: 7.5 % (ref 5–12)
NEUTROPHILS # BLD AUTO: 11.7 10*3/MM3 (ref 1.7–7)
NEUTROPHILS # BLD AUTO: 9.3 10*3/MM3 (ref 1.7–7)
NEUTROPHILS NFR BLD AUTO: 63.8 % (ref 42.7–76)
NEUTROPHILS NFR BLD AUTO: 69.6 % (ref 42.7–76)
NRBC BLD AUTO-RTO: 0 /100 WBC (ref 0–0.2)
NRBC BLD AUTO-RTO: 0 /100 WBC (ref 0–0.2)
PCO2 BLDA: 35.2 MM HG (ref 35–48)
PH BLDA: 7.45 PH UNITS (ref 7.35–7.45)
PLATELET # BLD AUTO: 562 10*3/MM3 (ref 140–450)
PLATELET # BLD AUTO: 581 10*3/MM3 (ref 140–450)
PMV BLD AUTO: 7.6 FL (ref 6–12)
PMV BLD AUTO: 7.8 FL (ref 6–12)
PO2 BLDA: 58.8 MM HG (ref 83–108)
POTASSIUM BLD-SCNC: 3.9 MMOL/L (ref 3.5–5.2)
PROCALCITONIN SERPL-MCNC: 0.1 NG/ML (ref 0.1–0.25)
PROTHROMBIN TIME: 12.4 SECONDS (ref 9.6–11.7)
RBC # BLD AUTO: 2.9 10*6/MM3 (ref 4.14–5.8)
RBC # BLD AUTO: 2.98 10*6/MM3 (ref 4.14–5.8)
RHINOVIRUS RNA SPEC NAA+PROBE: NOT DETECTED
RSV RNA NPH QL NAA+NON-PROBE: NOT DETECTED
S PNEUM AG SPEC QL LA: NEGATIVE
SAO2 % BLDCOA: 91.5 % (ref 94–98)
WBC NRBC COR # BLD: 14.6 10*3/MM3 (ref 3.4–10.8)
WBC NRBC COR # BLD: 16.8 10*3/MM3 (ref 3.4–10.8)

## 2020-02-28 PROCEDURE — 97162 PT EVAL MOD COMPLEX 30 MIN: CPT

## 2020-02-28 PROCEDURE — 85610 PROTHROMBIN TIME: CPT | Performed by: NURSE PRACTITIONER

## 2020-02-28 PROCEDURE — 36600 WITHDRAWAL OF ARTERIAL BLOOD: CPT

## 2020-02-28 PROCEDURE — 97166 OT EVAL MOD COMPLEX 45 MIN: CPT

## 2020-02-28 PROCEDURE — 83605 ASSAY OF LACTIC ACID: CPT | Performed by: NURSE PRACTITIONER

## 2020-02-28 PROCEDURE — 87205 SMEAR GRAM STAIN: CPT | Performed by: NURSE PRACTITIONER

## 2020-02-28 PROCEDURE — 85025 COMPLETE CBC W/AUTO DIFF WBC: CPT | Performed by: NURSE PRACTITIONER

## 2020-02-28 PROCEDURE — 25010000002 VANCOMYCIN 10 G RECONSTITUTED SOLUTION: Performed by: NURSE PRACTITIONER

## 2020-02-28 PROCEDURE — 85730 THROMBOPLASTIN TIME PARTIAL: CPT | Performed by: NURSE PRACTITIONER

## 2020-02-28 PROCEDURE — 87899 AGENT NOS ASSAY W/OPTIC: CPT | Performed by: NURSE PRACTITIONER

## 2020-02-28 PROCEDURE — 87070 CULTURE OTHR SPECIMN AEROBIC: CPT | Performed by: NURSE PRACTITIONER

## 2020-02-28 PROCEDURE — 83735 ASSAY OF MAGNESIUM: CPT | Performed by: NURSE PRACTITIONER

## 2020-02-28 PROCEDURE — 99233 SBSQ HOSP IP/OBS HIGH 50: CPT | Performed by: INTERNAL MEDICINE

## 2020-02-28 PROCEDURE — 25010000002 CEFTRIAXONE PER 250 MG: Performed by: INTERNAL MEDICINE

## 2020-02-28 PROCEDURE — 84145 PROCALCITONIN (PCT): CPT | Performed by: NURSE PRACTITIONER

## 2020-02-28 PROCEDURE — 25010000002 HEPARIN (PORCINE) PER 1000 UNITS: Performed by: NURSE PRACTITIONER

## 2020-02-28 PROCEDURE — 25010000002 MEROPENEM PER 100 MG: Performed by: NURSE PRACTITIONER

## 2020-02-28 PROCEDURE — 82803 BLOOD GASES ANY COMBINATION: CPT

## 2020-02-28 PROCEDURE — 82962 GLUCOSE BLOOD TEST: CPT

## 2020-02-28 PROCEDURE — 82550 ASSAY OF CK (CPK): CPT | Performed by: INTERNAL MEDICINE

## 2020-02-28 PROCEDURE — 84132 ASSAY OF SERUM POTASSIUM: CPT | Performed by: NURSE PRACTITIONER

## 2020-02-28 RX ORDER — HEPARIN SODIUM 10000 [USP'U]/100ML
12.6 INJECTION, SOLUTION INTRAVENOUS
Status: DISCONTINUED | OUTPATIENT
Start: 2020-02-28 | End: 2020-03-01

## 2020-02-28 RX ORDER — FUROSEMIDE 10 MG/ML
20 INJECTION INTRAMUSCULAR; INTRAVENOUS ONCE
Status: COMPLETED | OUTPATIENT
Start: 2020-02-29 | End: 2020-02-28

## 2020-02-28 RX ORDER — VANCOMYCIN HYDROCHLORIDE
20 ONCE
Status: COMPLETED | OUTPATIENT
Start: 2020-02-28 | End: 2020-02-28

## 2020-02-28 RX ORDER — IPRATROPIUM BROMIDE AND ALBUTEROL SULFATE 2.5; .5 MG/3ML; MG/3ML
3 SOLUTION RESPIRATORY (INHALATION)
Status: DISCONTINUED | OUTPATIENT
Start: 2020-02-28 | End: 2020-03-10 | Stop reason: HOSPADM

## 2020-02-28 RX ADMIN — GABAPENTIN 200 MG: 100 CAPSULE ORAL at 17:09

## 2020-02-28 RX ADMIN — APIXABAN 5 MG: 5 TABLET, FILM COATED ORAL at 09:35

## 2020-02-28 RX ADMIN — VANCOMYCIN HYDROCHLORIDE 1500 MG: 10 INJECTION, POWDER, LYOPHILIZED, FOR SOLUTION INTRAVENOUS at 15:46

## 2020-02-28 RX ADMIN — POTASSIUM CHLORIDE 40 MEQ: 1500 TABLET, EXTENDED RELEASE ORAL at 03:52

## 2020-02-28 RX ADMIN — Medication 1 CAPSULE: at 09:35

## 2020-02-28 RX ADMIN — Medication 10 ML: at 09:35

## 2020-02-28 RX ADMIN — Medication 800 UNITS: at 09:35

## 2020-02-28 RX ADMIN — TAMSULOSIN HYDROCHLORIDE 0.4 MG: 0.4 CAPSULE ORAL at 09:35

## 2020-02-28 RX ADMIN — GABAPENTIN 200 MG: 100 CAPSULE ORAL at 09:36

## 2020-02-28 RX ADMIN — FUROSEMIDE 20 MG: 10 INJECTION, SOLUTION INTRAMUSCULAR; INTRAVENOUS at 23:59

## 2020-02-28 RX ADMIN — GUAIFENESIN 1200 MG: 600 TABLET, EXTENDED RELEASE ORAL at 21:49

## 2020-02-28 RX ADMIN — MEROPENEM 500 MG: 500 INJECTION, POWDER, FOR SOLUTION INTRAVENOUS at 23:25

## 2020-02-28 RX ADMIN — MEROPENEM 1 G: 1 INJECTION, POWDER, FOR SOLUTION INTRAVENOUS at 15:05

## 2020-02-28 RX ADMIN — Medication 1 CAPSULE: at 21:50

## 2020-02-28 RX ADMIN — Medication 10 ML: at 21:50

## 2020-02-28 RX ADMIN — FLUOXETINE 40 MG: 20 CAPSULE ORAL at 09:36

## 2020-02-28 RX ADMIN — FOLIC ACID 1 MG: 1 TABLET ORAL at 09:36

## 2020-02-28 RX ADMIN — GABAPENTIN 200 MG: 100 CAPSULE ORAL at 21:50

## 2020-02-28 RX ADMIN — CETIRIZINE HYDROCHLORIDE 10 MG: 10 TABLET, FILM COATED ORAL at 09:36

## 2020-02-28 RX ADMIN — HEPARIN SODIUM AND DEXTROSE 12.6 UNITS/KG/HR: 10000; 5 INJECTION INTRAVENOUS at 23:29

## 2020-02-28 RX ADMIN — AMIODARONE HYDROCHLORIDE 200 MG: 200 TABLET ORAL at 09:35

## 2020-02-28 RX ADMIN — FINASTERIDE 5 MG: 5 TABLET, FILM COATED ORAL at 09:36

## 2020-02-28 RX ADMIN — CEFTRIAXONE SODIUM 2 G: 2 INJECTION, POWDER, FOR SOLUTION INTRAMUSCULAR; INTRAVENOUS at 21:49

## 2020-02-28 RX ADMIN — PANTOPRAZOLE SODIUM 40 MG: 40 TABLET, DELAYED RELEASE ORAL at 06:14

## 2020-02-28 RX ADMIN — GUAIFENESIN 1200 MG: 600 TABLET, EXTENDED RELEASE ORAL at 09:35

## 2020-02-28 RX ADMIN — CEFTRIAXONE SODIUM 2 G: 2 INJECTION, POWDER, FOR SOLUTION INTRAMUSCULAR; INTRAVENOUS at 09:44

## 2020-02-28 RX ADMIN — CYANOCOBALAMIN TAB 1000 MCG 1000 MCG: 1000 TAB at 09:36

## 2020-02-29 PROBLEM — A41.9 SEVERE SEPSIS (HCC): Status: ACTIVE | Noted: 2020-02-28

## 2020-02-29 PROBLEM — J18.9 HCAP (HEALTHCARE-ASSOCIATED PNEUMONIA): Status: ACTIVE | Noted: 2020-02-28

## 2020-02-29 PROBLEM — R65.20 SEVERE SEPSIS (HCC): Status: ACTIVE | Noted: 2020-02-28

## 2020-02-29 LAB
ANION GAP SERPL CALCULATED.3IONS-SCNC: 12 MMOL/L (ref 5–15)
APTT PPP: 33.9 SECONDS (ref 61–76.5)
APTT PPP: 47.1 SECONDS (ref 61–76.5)
APTT PPP: 55.2 SECONDS (ref 61–76.5)
BASOPHILS # BLD AUTO: 0.2 10*3/MM3 (ref 0–0.2)
BASOPHILS NFR BLD AUTO: 1.4 % (ref 0–1.5)
BUN BLD-MCNC: 16 MG/DL (ref 8–23)
BUN/CREAT SERPL: 18.2 (ref 7–25)
CALCIUM SPEC-SCNC: 8.4 MG/DL (ref 8.6–10.5)
CHLORIDE SERPL-SCNC: 106 MMOL/L (ref 98–107)
CO2 SERPL-SCNC: 24 MMOL/L (ref 22–29)
CREAT BLD-MCNC: 0.88 MG/DL (ref 0.76–1.27)
D-LACTATE SERPL-SCNC: 0.7 MMOL/L (ref 0.5–2)
DEPRECATED RDW RBC AUTO: 47.7 FL (ref 37–54)
EOSINOPHIL # BLD AUTO: 2.9 10*3/MM3 (ref 0–0.4)
EOSINOPHIL NFR BLD AUTO: 21.6 % (ref 0.3–6.2)
ERYTHROCYTE [DISTWIDTH] IN BLOOD BY AUTOMATED COUNT: 17.6 % (ref 12.3–15.4)
GFR SERPL CREATININE-BSD FRML MDRD: 87 ML/MIN/1.73
GLUCOSE BLD-MCNC: 154 MG/DL (ref 65–99)
GLUCOSE BLDC GLUCOMTR-MCNC: 100 MG/DL (ref 70–105)
GLUCOSE BLDC GLUCOMTR-MCNC: 110 MG/DL (ref 70–105)
GLUCOSE BLDC GLUCOMTR-MCNC: 122 MG/DL (ref 70–105)
GLUCOSE BLDC GLUCOMTR-MCNC: 87 MG/DL (ref 70–105)
HCT VFR BLD AUTO: 23 % (ref 37.5–51)
HGB BLD-MCNC: 7.3 G/DL (ref 13–17.7)
LYMPHOCYTES # BLD AUTO: 1 10*3/MM3 (ref 0.7–3.1)
LYMPHOCYTES NFR BLD AUTO: 7.4 % (ref 19.6–45.3)
MAGNESIUM SERPL-MCNC: 2 MG/DL (ref 1.6–2.4)
MCH RBC QN AUTO: 24.2 PG (ref 26.6–33)
MCHC RBC AUTO-ENTMCNC: 31.7 G/DL (ref 31.5–35.7)
MCV RBC AUTO: 76.5 FL (ref 79–97)
MONOCYTES # BLD AUTO: 0.9 10*3/MM3 (ref 0.1–0.9)
MONOCYTES NFR BLD AUTO: 7.1 % (ref 5–12)
NEUTROPHILS # BLD AUTO: 8.3 10*3/MM3 (ref 1.7–7)
NEUTROPHILS NFR BLD AUTO: 62.5 % (ref 42.7–76)
NRBC BLD AUTO-RTO: 0 /100 WBC (ref 0–0.2)
NT-PROBNP SERPL-MCNC: 1553 PG/ML (ref 5–900)
PLATELET # BLD AUTO: 581 10*3/MM3 (ref 140–450)
PMV BLD AUTO: 7.3 FL (ref 6–12)
POTASSIUM BLD-SCNC: 3.8 MMOL/L (ref 3.5–5.2)
RBC # BLD AUTO: 3.01 10*6/MM3 (ref 4.14–5.8)
SODIUM BLD-SCNC: 142 MMOL/L (ref 136–145)
WBC NRBC COR # BLD: 13.3 10*3/MM3 (ref 3.4–10.8)

## 2020-02-29 PROCEDURE — 85730 THROMBOPLASTIN TIME PARTIAL: CPT | Performed by: NURSE PRACTITIONER

## 2020-02-29 PROCEDURE — 83605 ASSAY OF LACTIC ACID: CPT | Performed by: NURSE PRACTITIONER

## 2020-02-29 PROCEDURE — 82962 GLUCOSE BLOOD TEST: CPT

## 2020-02-29 PROCEDURE — 25010000002 MEROPENEM PER 100 MG: Performed by: NURSE PRACTITIONER

## 2020-02-29 PROCEDURE — 25010000002 CEFTRIAXONE PER 250 MG: Performed by: INTERNAL MEDICINE

## 2020-02-29 PROCEDURE — 83880 ASSAY OF NATRIURETIC PEPTIDE: CPT | Performed by: NURSE PRACTITIONER

## 2020-02-29 PROCEDURE — 25010000002 VANCOMYCIN 1 G RECONSTITUTED SOLUTION 1 EACH VIAL: Performed by: NURSE PRACTITIONER

## 2020-02-29 PROCEDURE — 25010000002 HEPARIN (PORCINE) PER 1000 UNITS: Performed by: NURSE PRACTITIONER

## 2020-02-29 PROCEDURE — 94799 UNLISTED PULMONARY SVC/PX: CPT

## 2020-02-29 PROCEDURE — 83735 ASSAY OF MAGNESIUM: CPT | Performed by: NURSE PRACTITIONER

## 2020-02-29 PROCEDURE — 25010000002 MEROPENEM PER 100 MG: Performed by: INTERNAL MEDICINE

## 2020-02-29 PROCEDURE — 25010000002 FUROSEMIDE PER 20 MG: Performed by: NURSE PRACTITIONER

## 2020-02-29 PROCEDURE — 85025 COMPLETE CBC W/AUTO DIFF WBC: CPT | Performed by: NURSE PRACTITIONER

## 2020-02-29 PROCEDURE — 80048 BASIC METABOLIC PNL TOTAL CA: CPT | Performed by: NURSE PRACTITIONER

## 2020-02-29 RX ORDER — ASPIRIN 81 MG/1
81 TABLET ORAL DAILY
Status: DISCONTINUED | OUTPATIENT
Start: 2020-02-29 | End: 2020-03-10 | Stop reason: HOSPADM

## 2020-02-29 RX ADMIN — CEFTRIAXONE SODIUM 2 G: 2 INJECTION, POWDER, FOR SOLUTION INTRAMUSCULAR; INTRAVENOUS at 08:08

## 2020-02-29 RX ADMIN — MEROPENEM 1 G: 1 INJECTION, POWDER, FOR SOLUTION INTRAVENOUS at 19:26

## 2020-02-29 RX ADMIN — IPRATROPIUM BROMIDE AND ALBUTEROL SULFATE 3 ML: .5; 3 SOLUTION RESPIRATORY (INHALATION) at 08:00

## 2020-02-29 RX ADMIN — Medication 10 ML: at 08:11

## 2020-02-29 RX ADMIN — Medication 1 CAPSULE: at 08:09

## 2020-02-29 RX ADMIN — GUAIFENESIN 1200 MG: 600 TABLET, EXTENDED RELEASE ORAL at 08:09

## 2020-02-29 RX ADMIN — CYANOCOBALAMIN TAB 1000 MCG 1000 MCG: 1000 TAB at 08:10

## 2020-02-29 RX ADMIN — GABAPENTIN 200 MG: 100 CAPSULE ORAL at 20:34

## 2020-02-29 RX ADMIN — Medication 800 UNITS: at 08:09

## 2020-02-29 RX ADMIN — AMIODARONE HYDROCHLORIDE 200 MG: 200 TABLET ORAL at 08:09

## 2020-02-29 RX ADMIN — PANTOPRAZOLE SODIUM 40 MG: 40 TABLET, DELAYED RELEASE ORAL at 06:28

## 2020-02-29 RX ADMIN — GUAIFENESIN 1200 MG: 600 TABLET, EXTENDED RELEASE ORAL at 20:33

## 2020-02-29 RX ADMIN — CYCLOBENZAPRINE HYDROCHLORIDE 5 MG: 10 TABLET, FILM COATED ORAL at 20:50

## 2020-02-29 RX ADMIN — MEROPENEM 500 MG: 500 INJECTION, POWDER, FOR SOLUTION INTRAVENOUS at 08:10

## 2020-02-29 RX ADMIN — FOLIC ACID 1 MG: 1 TABLET ORAL at 08:10

## 2020-02-29 RX ADMIN — IPRATROPIUM BROMIDE AND ALBUTEROL SULFATE 3 ML: .5; 3 SOLUTION RESPIRATORY (INHALATION) at 11:11

## 2020-02-29 RX ADMIN — GABAPENTIN 200 MG: 100 CAPSULE ORAL at 08:09

## 2020-02-29 RX ADMIN — TAMSULOSIN HYDROCHLORIDE 0.4 MG: 0.4 CAPSULE ORAL at 08:10

## 2020-02-29 RX ADMIN — CETIRIZINE HYDROCHLORIDE 10 MG: 10 TABLET, FILM COATED ORAL at 08:10

## 2020-02-29 RX ADMIN — ASPIRIN 81 MG: 81 TABLET, DELAYED RELEASE ORAL at 08:09

## 2020-02-29 RX ADMIN — Medication 1 CAPSULE: at 20:33

## 2020-02-29 RX ADMIN — GABAPENTIN 200 MG: 100 CAPSULE ORAL at 16:20

## 2020-02-29 RX ADMIN — SODIUM CHLORIDE 1000 MG: 900 INJECTION, SOLUTION INTRAVENOUS at 13:32

## 2020-02-29 RX ADMIN — IPRATROPIUM BROMIDE AND ALBUTEROL SULFATE 3 ML: .5; 3 SOLUTION RESPIRATORY (INHALATION) at 19:57

## 2020-02-29 RX ADMIN — MEROPENEM 1 G: 1 INJECTION, POWDER, FOR SOLUTION INTRAVENOUS at 11:37

## 2020-02-29 RX ADMIN — SODIUM CHLORIDE 1000 MG: 900 INJECTION, SOLUTION INTRAVENOUS at 02:37

## 2020-02-29 RX ADMIN — MEROPENEM 500 MG: 500 INJECTION, POWDER, FOR SOLUTION INTRAVENOUS at 03:41

## 2020-02-29 RX ADMIN — HEPARIN SODIUM AND DEXTROSE 16.6 UNITS/KG/HR: 10000; 5 INJECTION INTRAVENOUS at 12:19

## 2020-02-29 RX ADMIN — FINASTERIDE 5 MG: 5 TABLET, FILM COATED ORAL at 08:09

## 2020-02-29 RX ADMIN — FLUOXETINE 40 MG: 20 CAPSULE ORAL at 08:10

## 2020-02-29 RX ADMIN — Medication 10 ML: at 20:34

## 2020-03-01 ENCOUNTER — APPOINTMENT (OUTPATIENT)
Dept: GENERAL RADIOLOGY | Facility: HOSPITAL | Age: 65
End: 2020-03-01

## 2020-03-01 LAB
ALBUMIN SERPL-MCNC: 2.6 G/DL (ref 3.5–5.2)
ALBUMIN/GLOB SERPL: 0.7 G/DL
ALP SERPL-CCNC: 99 U/L (ref 39–117)
ALT SERPL W P-5'-P-CCNC: 86 U/L (ref 1–41)
ANION GAP SERPL CALCULATED.3IONS-SCNC: 12 MMOL/L (ref 5–15)
APTT PPP: 116.8 SECONDS (ref 61–76.5)
AST SERPL-CCNC: 86 U/L (ref 1–40)
BACTERIA SPEC RESP CULT: NORMAL
BASOPHILS # BLD AUTO: 0.3 10*3/MM3 (ref 0–0.2)
BASOPHILS NFR BLD AUTO: 2.2 % (ref 0–1.5)
BILIRUB SERPL-MCNC: 0.2 MG/DL (ref 0.2–1.2)
BUN BLD-MCNC: 16 MG/DL (ref 8–23)
BUN/CREAT SERPL: 18.2 (ref 7–25)
CALCIUM SPEC-SCNC: 8.2 MG/DL (ref 8.6–10.5)
CHLORIDE SERPL-SCNC: 105 MMOL/L (ref 98–107)
CO2 SERPL-SCNC: 24 MMOL/L (ref 22–29)
CREAT BLD-MCNC: 0.88 MG/DL (ref 0.76–1.27)
DEPRECATED RDW RBC AUTO: 47.7 FL (ref 37–54)
EOSINOPHIL # BLD AUTO: 2.8 10*3/MM3 (ref 0–0.4)
EOSINOPHIL NFR BLD AUTO: 20 % (ref 0.3–6.2)
ERYTHROCYTE [DISTWIDTH] IN BLOOD BY AUTOMATED COUNT: 17.6 % (ref 12.3–15.4)
GFR SERPL CREATININE-BSD FRML MDRD: 87 ML/MIN/1.73
GLOBULIN UR ELPH-MCNC: 3.9 GM/DL
GLUCOSE BLD-MCNC: 140 MG/DL (ref 65–99)
GLUCOSE BLDC GLUCOMTR-MCNC: 127 MG/DL (ref 70–105)
GLUCOSE BLDC GLUCOMTR-MCNC: 212 MG/DL (ref 70–105)
GLUCOSE BLDC GLUCOMTR-MCNC: 234 MG/DL (ref 70–105)
GRAM STN SPEC: NORMAL
HCT VFR BLD AUTO: 25.6 % (ref 37.5–51)
HGB BLD-MCNC: 8.3 G/DL (ref 13–17.7)
LYMPHOCYTES # BLD AUTO: 1.4 10*3/MM3 (ref 0.7–3.1)
LYMPHOCYTES NFR BLD AUTO: 10.4 % (ref 19.6–45.3)
MAGNESIUM SERPL-MCNC: 2 MG/DL (ref 1.6–2.4)
MCH RBC QN AUTO: 24.7 PG (ref 26.6–33)
MCHC RBC AUTO-ENTMCNC: 32.4 G/DL (ref 31.5–35.7)
MCV RBC AUTO: 76.3 FL (ref 79–97)
MONOCYTES # BLD AUTO: 0.9 10*3/MM3 (ref 0.1–0.9)
MONOCYTES NFR BLD AUTO: 6.8 % (ref 5–12)
NEUTROPHILS # BLD AUTO: 8.4 10*3/MM3 (ref 1.7–7)
NEUTROPHILS NFR BLD AUTO: 60.6 % (ref 42.7–76)
NRBC BLD AUTO-RTO: 0 /100 WBC (ref 0–0.2)
PLATELET # BLD AUTO: 684 10*3/MM3 (ref 140–450)
PMV BLD AUTO: 7.6 FL (ref 6–12)
POTASSIUM BLD-SCNC: 3.8 MMOL/L (ref 3.5–5.2)
PROT SERPL-MCNC: 6.5 G/DL (ref 6–8.5)
RBC # BLD AUTO: 3.36 10*6/MM3 (ref 4.14–5.8)
SODIUM BLD-SCNC: 141 MMOL/L (ref 136–145)
VANCOMYCIN PEAK SERPL-MCNC: 21.8 MCG/ML (ref 20–40)
VANCOMYCIN SERPL-MCNC: 20.1 MCG/ML (ref 5–40)
VANCOMYCIN TROUGH SERPL-MCNC: 14 MCG/ML (ref 5–20)
WBC NRBC COR # BLD: 13.8 10*3/MM3 (ref 3.4–10.8)

## 2020-03-01 PROCEDURE — 80202 ASSAY OF VANCOMYCIN: CPT | Performed by: NURSE PRACTITIONER

## 2020-03-01 PROCEDURE — 25010000002 MEROPENEM PER 100 MG: Performed by: INTERNAL MEDICINE

## 2020-03-01 PROCEDURE — 94799 UNLISTED PULMONARY SVC/PX: CPT

## 2020-03-01 PROCEDURE — 63710000001 INSULIN LISPRO (HUMAN) PER 5 UNITS: Performed by: NURSE PRACTITIONER

## 2020-03-01 PROCEDURE — 85025 COMPLETE CBC W/AUTO DIFF WBC: CPT | Performed by: NURSE PRACTITIONER

## 2020-03-01 PROCEDURE — 71045 X-RAY EXAM CHEST 1 VIEW: CPT

## 2020-03-01 PROCEDURE — 82962 GLUCOSE BLOOD TEST: CPT

## 2020-03-01 PROCEDURE — 80053 COMPREHEN METABOLIC PANEL: CPT | Performed by: INTERNAL MEDICINE

## 2020-03-01 PROCEDURE — 25010000002 CEFTRIAXONE PER 250 MG: Performed by: INTERNAL MEDICINE

## 2020-03-01 PROCEDURE — 83735 ASSAY OF MAGNESIUM: CPT | Performed by: NURSE PRACTITIONER

## 2020-03-01 PROCEDURE — 25010000002 VANCOMYCIN 1 G RECONSTITUTED SOLUTION 1 EACH VIAL: Performed by: NURSE PRACTITIONER

## 2020-03-01 PROCEDURE — 85730 THROMBOPLASTIN TIME PARTIAL: CPT | Performed by: NURSE PRACTITIONER

## 2020-03-01 RX ORDER — ACETYLCYSTEINE 200 MG/ML
4 SOLUTION ORAL; RESPIRATORY (INHALATION)
Status: COMPLETED | OUTPATIENT
Start: 2020-03-01 | End: 2020-03-03

## 2020-03-01 RX ADMIN — Medication 1 CAPSULE: at 08:04

## 2020-03-01 RX ADMIN — MEROPENEM 1 G: 1 INJECTION, POWDER, FOR SOLUTION INTRAVENOUS at 03:13

## 2020-03-01 RX ADMIN — GUAIFENESIN 1200 MG: 600 TABLET, EXTENDED RELEASE ORAL at 08:05

## 2020-03-01 RX ADMIN — ACETYLCYSTEINE 4 ML: 200 SOLUTION ORAL; RESPIRATORY (INHALATION) at 11:01

## 2020-03-01 RX ADMIN — GUAIFENESIN 1200 MG: 600 TABLET, EXTENDED RELEASE ORAL at 20:33

## 2020-03-01 RX ADMIN — APIXABAN 5 MG: 5 TABLET, FILM COATED ORAL at 20:33

## 2020-03-01 RX ADMIN — IPRATROPIUM BROMIDE AND ALBUTEROL SULFATE 3 ML: .5; 3 SOLUTION RESPIRATORY (INHALATION) at 07:40

## 2020-03-01 RX ADMIN — INSULIN LISPRO 4 UNITS: 100 INJECTION, SOLUTION INTRAVENOUS; SUBCUTANEOUS at 20:33

## 2020-03-01 RX ADMIN — TAMSULOSIN HYDROCHLORIDE 0.4 MG: 0.4 CAPSULE ORAL at 08:05

## 2020-03-01 RX ADMIN — Medication 10 ML: at 20:35

## 2020-03-01 RX ADMIN — FINASTERIDE 5 MG: 5 TABLET, FILM COATED ORAL at 08:05

## 2020-03-01 RX ADMIN — IPRATROPIUM BROMIDE AND ALBUTEROL SULFATE 3 ML: .5; 3 SOLUTION RESPIRATORY (INHALATION) at 15:30

## 2020-03-01 RX ADMIN — GABAPENTIN 200 MG: 100 CAPSULE ORAL at 15:04

## 2020-03-01 RX ADMIN — AMIODARONE HYDROCHLORIDE 200 MG: 200 TABLET ORAL at 08:05

## 2020-03-01 RX ADMIN — CETIRIZINE HYDROCHLORIDE 10 MG: 10 TABLET, FILM COATED ORAL at 08:05

## 2020-03-01 RX ADMIN — IPRATROPIUM BROMIDE AND ALBUTEROL SULFATE 3 ML: .5; 3 SOLUTION RESPIRATORY (INHALATION) at 11:01

## 2020-03-01 RX ADMIN — MEROPENEM 1 G: 1 INJECTION, POWDER, FOR SOLUTION INTRAVENOUS at 12:08

## 2020-03-01 RX ADMIN — FLUOXETINE 40 MG: 20 CAPSULE ORAL at 08:04

## 2020-03-01 RX ADMIN — APIXABAN 5 MG: 5 TABLET, FILM COATED ORAL at 10:31

## 2020-03-01 RX ADMIN — FOLIC ACID 1 MG: 1 TABLET ORAL at 08:04

## 2020-03-01 RX ADMIN — SODIUM CHLORIDE 1000 MG: 900 INJECTION, SOLUTION INTRAVENOUS at 13:44

## 2020-03-01 RX ADMIN — Medication 1 CAPSULE: at 20:33

## 2020-03-01 RX ADMIN — MEROPENEM 1 G: 1 INJECTION, POWDER, FOR SOLUTION INTRAVENOUS at 20:32

## 2020-03-01 RX ADMIN — IPRATROPIUM BROMIDE AND ALBUTEROL SULFATE 3 ML: .5; 3 SOLUTION RESPIRATORY (INHALATION) at 19:50

## 2020-03-01 RX ADMIN — GABAPENTIN 200 MG: 100 CAPSULE ORAL at 08:05

## 2020-03-01 RX ADMIN — CEFTRIAXONE SODIUM 2 G: 2 INJECTION, POWDER, FOR SOLUTION INTRAMUSCULAR; INTRAVENOUS at 08:05

## 2020-03-01 RX ADMIN — Medication 10 ML: at 08:05

## 2020-03-01 RX ADMIN — Medication 800 UNITS: at 08:04

## 2020-03-01 RX ADMIN — INSULIN LISPRO 4 UNITS: 100 INJECTION, SOLUTION INTRAVENOUS; SUBCUTANEOUS at 17:12

## 2020-03-01 RX ADMIN — CEFTRIAXONE SODIUM 2 G: 2 INJECTION, POWDER, FOR SOLUTION INTRAMUSCULAR; INTRAVENOUS at 20:32

## 2020-03-01 RX ADMIN — ACETYLCYSTEINE 4 ML: 200 SOLUTION ORAL; RESPIRATORY (INHALATION) at 19:50

## 2020-03-01 RX ADMIN — SODIUM CHLORIDE 1000 MG: 900 INJECTION, SOLUTION INTRAVENOUS at 01:10

## 2020-03-01 RX ADMIN — CYANOCOBALAMIN TAB 1000 MCG 1000 MCG: 1000 TAB at 08:04

## 2020-03-01 RX ADMIN — PANTOPRAZOLE SODIUM 40 MG: 40 TABLET, DELAYED RELEASE ORAL at 06:00

## 2020-03-01 RX ADMIN — GABAPENTIN 200 MG: 100 CAPSULE ORAL at 20:33

## 2020-03-01 RX ADMIN — CEFTRIAXONE SODIUM 2 G: 2 INJECTION, POWDER, FOR SOLUTION INTRAMUSCULAR; INTRAVENOUS at 00:22

## 2020-03-01 RX ADMIN — ASPIRIN 81 MG: 81 TABLET, DELAYED RELEASE ORAL at 08:04

## 2020-03-01 NOTE — PROGRESS NOTES
"S/P tissue MVR/MICHELLE ligation--[place] on 1/22/20    Pt known to our service d/t Enterococcus (VRE) MV IE.  He underwent tissue MVR on 1/22/2020 by Dr. Cole.    His postop course was lengthy given discharge planning r/t daptomycin.  He also had resp insufficiency issues and recurrent atrial fib.  Eliquis was restarted prior to transfer to [facility].    He was noted to have hypotension this morning and was transferred to [facility] and diagnosed with pna.  WBC was 18.4, hgb 7.6 at [facility].  He was transferred to [facility] for further care and mmgt.    Subjective:  Reports he \"coughed up thick chunky grey\" sputum this morning     No events overnight  ID d/w [facility] re: bronch; [facility] does not feel it is needed  On dapto/ceftriaxone/vanc/Merrem  [facility] requesting our service be admitting team--orders placed  O2 requirements down to 3L  afebrile      Intake/Output Summary (Last 24 hours) at 3/1/2020 1049  Last data filed at 3/1/2020 0600  Gross per 24 hour   Intake 2302 ml   Output --   Net 2302 ml     Temp:  [97.5 °F (36.4 °C)-98.9 °F (37.2 °C)] 97.5 °F (36.4 °C)  Heart Rate:  [72-88] 88  Resp:  [16] 16  BP: (122-150)/(64-83) 150/69      Results from last 7 days   Lab Units 03/01/20  0351 02/29/20  0537 02/28/20  2209   WBC 10*3/mm3 13.80* 13.30* 14.60*   HEMOGLOBIN g/dL 8.3* 7.3* 7.3*   HEMATOCRIT % 25.6* 23.0* 22.8*   PLATELETS 10*3/mm3 684* 581* 581*   INR   --   --  1.22*     Results from last 7 days   Lab Units 03/01/20  0351   CREATININE mg/dL 0.88   POTASSIUM mmol/L 3.8   SODIUM mmol/L 141   MAGNESIUM mg/dL 2.0       Physical Exam:  Neuro intact, nad, resting in bed, awake and conversant  Tele:  afib 80s  Diminished bases, 4L 96%, reports productive cough at times  Sternotomy well healed, sternum stable  Benign abd, tolerating po intake  Trace edema    Assessment/Plan:  Active Problems:    Coronary artery disease due to lipid rich plaque    Dyslipidemia    Non-insulin dependent type 2 diabetes mellitus (CMS/HCC)    Diabetic " neuropathy (CMS/HCC)    GERD without esophagitis    BPH with obstruction/lower urinary tract symptoms    B12 deficiency    Vitamin D deficiency    JARRETT (generalized anxiety disorder)    Tobacco abuse    Obesity (BMI 30-39.9)    S/P MVR (mitral valve replacement)    Endocarditis of mitral valve    Hypotension    Shortness of breath    Sepsis associated hypotension (CMS/HCC)    Chest pain    Vitamin E deficiency    Severe sepsis (CMS/HCC)    HCAP (healthcare-associated pneumonia)    Readmit for PNA--per hospitalist  Mitral valve endocarditis s/p tissue MVR--stable  Enterococcus bacteremia/VRE--cont dapto/Rocephin   CAD with stent 1 year ago  Paroxysmal atrial fibrillation with RVR--in afib currently  HTN--stable  HLD--no statin while on dapto  DM type 2 with neuropathy--SSl  GERD  Multiple sclerosis---mainly wheelchair bound  Current tobacco abuse   BPH with incontinence   Obesity---BMI 38.19  Generalized anxiety disorder     Restarting Eliquis back today  Mucomyst nebs  D/w Dr. Crook  Anticipate back to rehab at discharge    Najma Castaneda, YUE  3/1/2020  10:49 AM

## 2020-03-01 NOTE — PROGRESS NOTES
Infectious Diseases Progress Note      LOS: 3 days   Patient Care Team:  Kajal Sanchez as PCP - General (Internal Medicine)  Frederick George MD as Consulting Physician (Nephrology)        Subjective       The patient has been afebrile for the last 24 hours.  The patient is currently on 4 L of oxygen via nasal cannula.  He is awake and alert.  He remained hemodynamically stable requiring no vasopressors.      Review of Systems:   Review of Systems   Constitutional: Positive for fatigue.   HENT: Negative.    Respiratory: Positive for cough and shortness of breath.    Cardiovascular:        Chest soreness around sternal incision    Gastrointestinal: Negative.    Genitourinary: Negative.    Musculoskeletal: Positive for arthralgias.   Skin: Negative.    Neurological: Positive for weakness.   Psychiatric/Behavioral: Negative.         Objective     Vital Signs  Temp:  [97.5 °F (36.4 °C)-98.9 °F (37.2 °C)] 97.6 °F (36.4 °C)  Heart Rate:  [] 115  Resp:  [16-20] 20  BP: (122-150)/(64-83) 150/69    Physical Exam:  Physical Exam   Constitutional: He appears well-developed and well-nourished.   HENT:   Head: Normocephalic and atraumatic.   Eyes: Pupils are equal, round, and reactive to light. Conjunctivae and EOM are normal.   Neck: Neck supple.   Cardiovascular: Normal rate, regular rhythm and normal heart sounds.   Pulmonary/Chest: Effort normal. He has rales.   Extensive crackles on the left side   Abdominal: Soft. Bowel sounds are normal.   Musculoskeletal:   Degenerative changes patient appears to have some general weakness due to the multiple sclerosis    Neurological: He is alert.   Alert and oriented x2-patient seems a lot more confused today   Skin: Skin is warm and dry.   Chest soreness around sternal incision    Psychiatric: He has a normal mood and affect.   Vitals reviewed.       Results Review:    I have reviewed all clinical data, test, lab, and imaging results.     Radiology  Xr Chest 1 View    Result  Date: 3/1/2020  DATE OF EXAM: 3/1/2020 4:19 AM  PROCEDURE: XR CHEST 1 VW-  INDICATIONS: Left-sided pneumonia.  COMPARISONS: Chest x-ray, 01/29/2020, 10:09 AM. Chest CT, 02/27/2020, 9:15 PM.  TECHNIQUE: Single radiographic AP view of the chest was obtained.  FINDINGS: Diffuse pulmonary consolidation is seen throughout the left lung with air bronchograms. There may be partial aeration of the left lung base. Similar findings are seen in the chest CT examination from 02/27/2020. Minimal if any acute airspace disease is seen on the right. The findings on the left suggest pneumonia. Aspiration pneumonia would be in the differential diagnosis. Probably no cardiac enlargement is present. No definite pneumothorax is seen. The patient has undergone median sternotomy and mitral valve replacement surgery. Small bilateral pleural effusions may be present. There may be pleural-parenchymal scarring in the right pulmonary apex.       No significant interval change is suspected radiographically in the diffuse airspace disease throughout the left lung, which may represent pneumonia.  Minimal, if any, acute airspace disease is suspected on the right.  Electronically Signed By-Dr. Brandon Morrison MD On:3/1/2020 5:04 AM This report was finalized on [id number] by Dr. Brandon Morrison MD.      Cardiology    Laboratory    Results from last 7 days   Lab Units 03/01/20  0351 02/29/20  0537 02/28/20 2209 02/28/20 0324 02/27/20  1152   WBC 10*3/mm3 13.80* 13.30* 14.60* 16.80* 16.00*   HEMOGLOBIN g/dL 8.3* 7.3* 7.3* 7.2* 7.3*   HEMATOCRIT % 25.6* 23.0* 22.8* 21.9* 22.7*   PLATELETS 10*3/mm3 684* 581* 581* 562* 535*     Results from last 7 days   Lab Units 03/01/20  0351 02/29/20  0537 02/28/20 0324 02/27/20  1152   SODIUM mmol/L 141 142  --  140   POTASSIUM mmol/L 3.8 3.8 3.9 3.5   CHLORIDE mmol/L 105 106  --  105   CO2 mmol/L 24.0 24.0  --  23.0   BUN mg/dL 16 16  --  16   CREATININE mg/dL 0.88 0.88  --  1.03   GLUCOSE mg/dL 140* 154*  --   118*   ALBUMIN g/dL 2.60*  --   --  2.30*   BILIRUBIN mg/dL 0.2  --   --  0.2   ALK PHOS U/L 99  --   --  90   AST (SGOT) U/L 86*  --   --  33   ALT (SGPT) U/L 86*  --   --  38   CALCIUM mg/dL 8.2* 8.4*  --  8.4*     Results from last 7 days   Lab Units 02/28/20  0324   CK TOTAL U/L 33             Microbiology   Microbiology Results (last 10 days)     Procedure Component Value - Date/Time    Respiratory Culture - Sputum, Cough [[id number]] Collected:  02/28/20 1123    Lab Status:  Final result Specimen:  Sputum from Cough Updated:  03/01/20 1203     Respiratory Culture Scant growth (1+) Normal Respiratory Cheryl     Gram Stain Moderate (3+) WBCs per low power field      Rare (1+) Epithelial cells per low power field      Rare (1+) Mixed bacterial morphotypes seen on Gram Stain    Legionella Antigen, Urine - Urine, Urine, Clean Catch [[id number]]  (Normal) Collected:  02/28/20 0402    Lab Status:  Final result Specimen:  Urine, Clean Catch Updated:  02/28/20 0439     LEGIONELLA ANTIGEN, URINE Negative    S. Pneumo Ag Urine or CSF - Urine, Urine, Clean Catch [[id number]]  (Normal) Collected:  02/28/20 0402    Lab Status:  Final result Specimen:  Urine, Clean Catch Updated:  02/28/20 0439     Strep Pneumo Ag Negative    Respiratory Panel, PCR - Swab, Nasopharynx [[id number]]  (Normal) Collected:  02/27/20 2349    Lab Status:  Final result Specimen:  Swab from Nasopharynx Updated:  02/28/20 0131     ADENOVIRUS, PCR Not Detected     Coronavirus 229E Not Detected     Coronavirus HKU1 Not Detected     Coronavirus NL63 Not Detected     Coronavirus OC43 Not Detected     Human Metapneumovirus Not Detected     Human Rhinovirus/Enterovirus Not Detected     Influenza B PCR Not Detected     Parainfluenza Virus 1 Not Detected     Parainfluenza Virus 2 Not Detected     Parainfluenza Virus 3 Not Detected     Parainfluenza Virus 4 Not Detected     Bordetella pertussis pcr Not Detected     Influenza A H1 2009 PCR Not Detected      Chlamydophila pneumoniae PCR Not Detected     Mycoplasma pneumo by PCR Not Detected     Influenza A PCR Not Detected     Influenza A H3 Not Detected     Influenza A H1 Not Detected     RSV, PCR Not Detected    Blood Culture - Blood, Arm, Right [[id number]] Collected:  02/27/20 1157    Lab Status:  Preliminary result Specimen:  Blood from Arm, Right Updated:  03/01/20 1215     Blood Culture No growth at 3 days    Blood Culture - Blood, Arm, Left [[id number]] Collected:  02/27/20 1152    Lab Status:  Preliminary result Specimen:  Blood from Arm, Left Updated:  03/01/20 1215     Blood Culture No growth at 3 days          Medication Review:       Schedule Meds    !Vancomycin Level Draw Needed  Does not apply Once   !Vancomycin Level Draw Needed  Does not apply Once   acetylcysteine 4 mL Nebulization BID - RT   amiodarone 200 mg Oral Q24H   apixaban 5 mg Oral Q12H   aspirin 81 mg Oral Daily   cefTRIAXone 2 g Intravenous Q12H   cetirizine 10 mg Oral Daily   finasteride 5 mg Oral Daily   FLUoxetine 40 mg Oral Daily   folic acid 1 mg Oral Daily   gabapentin 200 mg Oral TID   guaiFENesin 1,200 mg Oral Q12H   insulin lispro 0-9 Units Subcutaneous 4x Daily With Meals & Nightly   ipratropium-albuterol 3 mL Nebulization Q4H While Awake - RT   lactobacillus acidophilus 1 capsule Oral BID   meropenem 1 g Intravenous Q8H   pantoprazole 40 mg Oral QAM   sodium chloride 10 mL Intravenous Q12H   tamsulosin 0.4 mg Oral Daily   vancomycin 15 mg/kg (Ideal) Intravenous Q12H   vitamin B-12 1,000 mcg Oral Daily   vitamin E 800 Units Oral Daily       Infusion Meds    Pharmacy to dose vancomycin        PRN Meds  acetaminophen **OR** acetaminophen **OR** acetaminophen  •  benzonatate  •  bisacodyl  •  cyclobenzaprine  •  dextrose  •  dextrose  •  glucagon (human recombinant)  •  insulin lispro **AND** insulin lispro  •  ipratropium-albuterol  •  magnesium sulfate **OR** magnesium sulfate in D5W 1g/100mL (PREMIX)  •  ondansetron **OR**  ondansetron  •  Pharmacy to dose vancomycin  •  potassium chloride  •  sodium chloride        Assessment/Plan       Antimicrobial Therapy   1.      day  2.  IV Rocephin    day  3.  IV vancomycin    day    4.  IV Merrem     Day   5.      Day      Assessment      Extensive left upper lobe and left lower lobe infiltrate.  The presentation is highly consistent with pneumonia.  Aspiration is less likely since it is involving all lobes of the left lung but I am concerned about hospital-acquired pneumonia.  The patient is currently on 5 L of oxygen via nasal cannula.  Sputum culture did not grow any significant pathogen.  Repeat chest x-ray did not show improvement    Mild reactive leukocytosis     Status post mitral valve replacement for mitral valve endocarditis on January 22, 2020 with enterococcus faecium.  The patient was finishing 6 weeks of IV daptomycin and IV Rocephin at the rehab facility and the last day of the IV antibiotics was supposed to be March 8, 2020     Type 2 diabetes     Tobacco abuse     Plan     Continue IV Rocephin 2 g every 12 hours  Continue IV vancomycin  Continue IV meropenem 1 g every 8 hours  Supportive care        Allison Box MD  03/01/20  12:58 PM     Note is dictated utilizing voice recognition software/Dragon

## 2020-03-01 NOTE — PROGRESS NOTES
[facility]/PULM/CC PROGRESS NOTE     HISTORY OF PRESENT ILLNESS:  Jamin Hennessy is a 64 y.o. 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  Patient reported that earlier that morning, while at [facility], his blood pressure checked and was reportedly low.  It was at that time, that EMS was contacted, and patient was taken to the outlying facility emergency department.  During his stay in the ED, patient was diagnosed with pneumonia.  He had reported shortness of breath, cough with yellow sputum production that started the preceding early morning.  Patient denied nausea, vomiting, constipation, diarrhea, fever, or chills.  Patient did mention some chest soreness, but denies arm pain, neck pain, or jaw pain.  Notably, patient had a recent mitral valve replacement secondary to known endocarditis.  This patient is well-known to this practice, as we were following patient during his recent hospitalization.  At time of discharge, patient was recommended to complete 6 weeks of IV antibiotics with daptomycin and Rocephin per infectious disease.  Patient was discharged to [facility], and the plan was for antibiotic completion on 3/8/2020.  Patient was transferred to [facility] and admitted initially to the hospitalist team for further treatment and evaluation of patient condition.  Patient has required no vasopressors.  Infectious disease as well as cardiothoracic surgery have been consulted.  Patient presented with right upper extremity midline that was placed during his previous admission.  Due to patient's pulmonary status, with increased oxygen  "supplementation needs, cardiothoracic surgery requested patient to be transferred to [facility] for closer monitoring.  Previous work-up included bilateral lower extremity venous Doppler which reported \"chronic left lower extremity superficial thrombophlebitis noted in the small saphenous.\"  It is been reported that patient had a chest x-ray from outlying facility indicating pneumonia, and CT of the chest at this facility revealed \"extensive airspace and interstitial opacity throughout the left lung with smaller patchy air peripheral opacities in the right lung.  Only the right upper lobe multifocal infection/pneumonia is the most likely etiology.  There is mild mediastinal adenopathy which is likely reactive/infectious.  There are small layering bilateral pleural effusions.\"  Per infectious disease, in review of patient's previous medical record, patient had blood cultures that were positive for enterococcus faecium, which screened positive for VRE based on PCR.  Initially organism was susceptible to ampicillin and gentamicin.  Repeat blood cultures were negative.  Following patient's surgery on 1/22/2020 (MVR), the mitral valve tissue culture was also positive for enterococcus faecium, but sensitivities revealed resistance to ampicillin.  ID was consulted as the case has become rather complex, and that first and second line antibiotic therapies are revealing resistance.  At time of transfer, patient initially was on 3 L/min via nasal cannula, but after being transferred in bed, required an increase to 6 L/min via high flow nasal cannula.  Patient denies chest pain, neck pain, arm pain, jaw pain, nausea, vomiting, constipation, diarrhea, blood in urine or stool.  Patient does admit to some mild shortness of breath, frequent cough with yellow sputum production, but denies fever or chills.     [facility] was consulted for further evaluation and treatment of pneumonia, and to aid improvement in pulmonary status.  Thank you for " "this consultation, we will follow along on this patient.        SUBJECTIVE    2/29: Patient reports feeling somewhat better today.  Tolerating 5 L O2.  No shortness of air at rest.  3/1: Patient reports feeling well today.  He reports shortness of air is improving.  Demonstrates good compliance with I-S.  Tolerating O2 at 4 L by nasal cannula    OBJECTIVE    /69   Pulse 115   Temp 97.6 °F (36.4 °C) (Oral)   Resp 20   Ht 175.3 cm (69.02\")   Wt 115 kg (254 lb 6.6 oz)   SpO2 96%   BMI 37.55 kg/m²   Intake/Output last 3 shifts:  I/O last 3 completed shifts:  In: 3366 [P.O.:440; I.V.:2376; IV Piggyback:550]  Out: -   Intake/Output this shift:  No intake/output data recorded.    PHYSICAL EXAM:       Constitutional:  Well developed, well nourished, no acute distress, acutely ill-appearing, chronically ill-appearing  Eyes:  PERRL, conjunctiva normal, EOMI   HENT:  Atraumatic, external ears normal, nose normal. Neck-normal range of motion, no tenderness, supple, trachea midline  Respiratory:  Clear and diminished.  No crackles or wheezing. non-labored respirations without accessory muscle use  Cardiovascular:  Normal rate, normal rhythm, no murmurs, no gallops, no rubs   GI:  Soft, nondistended, normal bowel sounds, nontender, no rebound, no guarding   :  deferred   Musculoskeletal: Bilateral lower extremity edema 2+, no tenderness, no deformities  Integument:  Well hydrated, no rash.  Right sided posterior lateral chest with abrasion, right second and third toe with unstageable but without erythema or warmth, no drainage noted.  Sternal incision healing well  Neurologic:  Alert & oriented x 3, no lateralizing deficits  Psychiatric:  Speech and behavior appropriate    Scheduled Meds:    acetylcysteine 4 mL Nebulization BID - RT   amiodarone 200 mg Oral Q24H   apixaban 5 mg Oral Q12H   aspirin 81 mg Oral Daily   cefTRIAXone 2 g Intravenous Q12H   cetirizine 10 mg Oral Daily   finasteride 5 mg Oral Daily "   FLUoxetine 40 mg Oral Daily   folic acid 1 mg Oral Daily   gabapentin 200 mg Oral TID   guaiFENesin 1,200 mg Oral Q12H   insulin lispro 0-9 Units Subcutaneous 4x Daily With Meals & Nightly   ipratropium-albuterol 3 mL Nebulization Q4H While Awake - RT   lactobacillus acidophilus 1 capsule Oral BID   meropenem 1 g Intravenous Q8H   pantoprazole 40 mg Oral QAM   sodium chloride 10 mL Intravenous Q12H   tamsulosin 0.4 mg Oral Daily   vancomycin 15 mg/kg (Ideal) Intravenous Q12H   vitamin B-12 1,000 mcg Oral Daily   vitamin E 800 Units Oral Daily       Continuous Infusions:    Pharmacy to dose vancomycin        PRN Meds:•  acetaminophen **OR** acetaminophen **OR** acetaminophen  •  benzonatate  •  bisacodyl  •  cyclobenzaprine  •  dextrose  •  dextrose  •  glucagon (human recombinant)  •  insulin lispro **AND** insulin lispro  •  ipratropium-albuterol  •  magnesium sulfate **OR** magnesium sulfate in D5W 1g/100mL (PREMIX)  •  ondansetron **OR** ondansetron  •  Pharmacy to dose vancomycin  •  potassium chloride  •  sodium chloride     Data Review:  Lab Results (last 24 hours)     Procedure Component Value Units Date/Time    Vancomycin, Trough [[id number]]  (Normal) Collected:  03/01/20 1256    Specimen:  Blood Updated:  03/01/20 1328     Vancomycin Trough 14.00 mcg/mL     Blood Culture - Blood, Arm, Left [[id number]] Collected:  02/27/20 1152    Specimen:  Blood from Arm, Left Updated:  03/01/20 1215     Blood Culture No growth at 3 days    Blood Culture - Blood, Arm, Right [[id number]] Collected:  02/27/20 1157    Specimen:  Blood from Arm, Right Updated:  03/01/20 1215     Blood Culture No growth at 3 days    Respiratory Culture - Sputum, Cough [[id number]] Collected:  02/28/20 1123    Specimen:  Sputum from Cough Updated:  03/01/20 1203     Respiratory Culture Scant growth (1+) Normal Respiratory Cheryl     Gram Stain Moderate (3+) WBCs per low power field      Rare (1+) Epithelial cells per low power field      Rare  (1+) Mixed bacterial morphotypes seen on Gram Stain    POC Glucose Once [[id number]]  (Abnormal) Collected:  03/01/20 1137    Specimen:  Blood Updated:  03/01/20 1143     Glucose 127 mg/dL      Comment: Serial Number: [id number]:  [id number]       Vancomycin, Peak [[id number]]  (Normal) Collected:  03/01/20 0558    Specimen:  Blood Updated:  03/01/20 0705     Vancomycin Peak 21.80 mcg/mL     Vancomycin, Random [[id number]]  (Normal) Collected:  03/01/20 0558    Specimen:  Blood Updated:  03/01/20 0646     Vancomycin Random 20.10 mcg/mL     Comprehensive Metabolic Panel [[id number]]  (Abnormal) Collected:  03/01/20 0351    Specimen:  Blood Updated:  03/01/20 0424     Glucose 140 mg/dL      BUN 16 mg/dL      Creatinine 0.88 mg/dL      Sodium 141 mmol/L      Potassium 3.8 mmol/L      Chloride 105 mmol/L      CO2 24.0 mmol/L      Calcium 8.2 mg/dL      Total Protein 6.5 g/dL      Albumin 2.60 g/dL      ALT (SGPT) 86 U/L      AST (SGOT) 86 U/L      Alkaline Phosphatase 99 U/L      Total Bilirubin 0.2 mg/dL      eGFR Non African Amer 87 mL/min/1.73      Globulin 3.9 gm/dL      A/G Ratio 0.7 g/dL      BUN/Creatinine Ratio 18.2     Anion Gap 12.0 mmol/L     Narrative:       GFR Normal >60  Chronic Kidney Disease <60  Kidney Failure <15      Magnesium [[id number]]  (Normal) Collected:  03/01/20 0351    Specimen:  Blood Updated:  03/01/20 0423     Magnesium 2.0 mg/dL     aPTT [[id number]]  (Abnormal) Collected:  03/01/20 0351    Specimen:  Blood Updated:  03/01/20 0412     .8 seconds     CBC & Differential [[id number]] Collected:  03/01/20 0351    Specimen:  Blood Updated:  03/01/20 0403    Narrative:       The following orders were created for panel order CBC & Differential.  Procedure                               Abnormality         Status                     ---------                               -----------         ------                     CBC Auto Differential[[id number]]        Abnormal            Final result                  Please view results for these tests on the individual orders.    CBC Auto Differential [[id number]]  (Abnormal) Collected:  03/01/20 0351    Specimen:  Blood Updated:  03/01/20 0403     WBC 13.80 10*3/mm3      RBC 3.36 10*6/mm3      Hemoglobin 8.3 g/dL      Hematocrit 25.6 %      MCV 76.3 fL      MCH 24.7 pg      MCHC 32.4 g/dL      RDW 17.6 %      RDW-SD 47.7 fl      MPV 7.6 fL      Platelets 684 10*3/mm3      Neutrophil % 60.6 %      Lymphocyte % 10.4 %      Monocyte % 6.8 %      Eosinophil % 20.0 %      Basophil % 2.2 %      Neutrophils, Absolute 8.40 10*3/mm3      Lymphocytes, Absolute 1.40 10*3/mm3      Monocytes, Absolute 0.90 10*3/mm3      Eosinophils, Absolute 2.80 10*3/mm3      Basophils, Absolute 0.30 10*3/mm3      nRBC 0.0 /100 WBC     aPTT [[id number]]  (Abnormal) Collected:  02/29/20 2045    Specimen:  Blood Updated:  02/29/20 2109     PTT 55.2 seconds     POC Glucose Once [[id number]]  (Normal) Collected:  02/29/20 2007    Specimen:  Blood Updated:  02/29/20 2009     Glucose 100 mg/dL      Comment: Serial Number: [id number]:  [id number]       POC Glucose Once [[id number]]  (Normal) Collected:  02/29/20 1640    Specimen:  Blood Updated:  02/29/20 1654     Glucose 87 mg/dL      Comment: Serial Number: [id number]:  [id number]                Imaging:  Imaging Results (Last 72 Hours)     Procedure Component Value Units Date/Time    XR Chest 1 View [[id number]] Collected:  03/01/20 0500     Updated:  03/01/20 0506    Narrative:       DATE OF EXAM:  3/1/2020 4:19 AM     PROCEDURE:  XR CHEST 1 VW-     INDICATIONS:  Left-sided pneumonia.     COMPARISONS:  Chest x-ray, 01/29/2020, 10:09 AM. Chest CT, 02/27/2020, 9:15 PM.     TECHNIQUE:   Single radiographic AP view of the chest was obtained.     FINDINGS:  Diffuse pulmonary consolidation is seen throughout the left lung with  air bronchograms. There may be partial aeration of the left lung base.  Similar findings are seen in the chest CT  examination from 02/27/2020.  Minimal if any acute airspace disease is seen on the right. The findings  on the left suggest pneumonia. Aspiration pneumonia would be in the  differential diagnosis. Probably no cardiac enlargement is present. No  definite pneumothorax is seen. The patient has undergone median  sternotomy and mitral valve replacement surgery. Small bilateral pleural  effusions may be present. There may be pleural-parenchymal scarring in  the right pulmonary apex.          Impression:       No significant interval change is suspected radiographically in the  diffuse airspace disease throughout the left lung, which may represent  pneumonia.      Minimal, if any, acute airspace disease is suspected on the right.     Electronically Signed By-Dr. Brandon Morrison MD On:3/1/2020 5:04 AM  This report was finalized on [id number] by Dr. Brandon Morrison MD.    CT Chest Without Contrast [[id number]] Collected:  02/27/20 2237     Updated:  02/27/20 2246    Narrative:          DATE OF EXAM:  2/27/2020 9:22 PM     PROCEDURE:  CT CHEST WO CONTRAST-     INDICATIONS:   Pleural effusion     COMPARISON:   Chest radiograph dated 1/29/2020 and 1/14/2020     TECHNIQUE:  Routine transaxial slices were obtained through the chest without the  administration of intravenous contrast. Reconstructed coronal and  sagittal images were also obtained. Automated exposure control and  iterative construction methods were used.     FINDINGS:  There are small layering bilateral pleural effusions. There is mild  dependent bibasilar atelectasis. Additionally there is extensive  airspace and interstitial opacity throughout the left upper lobe similar  but less extensive opacities in the left lower lobe these findings are  new since 01/29/2020. There are much smaller peripheral patchy airspace  and groundglass opacities in the right upper lobe to a lesser extent the  right middle and right lower lobes. Overall appearance is compatible  with  multifocal pneumonia/infection. The tracheobronchial tree is widely  patent. No areas of cavitation. There is mild mediastinal adenopathy  which is likely reactive/infectious. Densely calcified right hilar node  is indicative of prior granulomatous disease. There is a densely  calcified granuloma in the inferior right upper lobe. The unenhanced  thoracic aorta is normal in course and caliber. There is a prosthetic  mitral valve as well as coronary artery calcifications and sternotomy  wires. There is a trace pericardial effusion. Visualized bony structures  are intact. Limited images of the upper abdomen demonstrate evidence of  gastric bypass and a large amount of stool in the visualized colon. A  2.6 cm appearing gastric lipoma is also present. Gallstones are noted.  Partially imaged is a left ureteral stent. No aggressive focal osseous  lesion or acute bony abnormality.        Impression:          1. Extensive airspace and interstitial opacity throughout the left lung  with smaller patchier peripheral opacities in the right lung. Only the  right upper lobe multifocal infection/pneumonia is the most likely  etiology. There is mild mediastinal adenopathy which is likely  reactive/infectious. There are small layering bilateral pleural  effusions.  2. Other chronic ancillary findings are described above including  gallstones.     Electronically Signed By-Piter Swan DO. On:2/27/2020 10:44 PM  This report was finalized on [id number] by  Piter Swan DO..            ASSESSMENT/PLAN:     Coronary artery disease due to lipid rich plaque    Dyslipidemia    Non-insulin dependent type 2 diabetes mellitus (CMS/HCC)    Diabetic neuropathy (CMS/HCC)    GERD without esophagitis    BPH with obstruction/lower urinary tract symptoms    B12 deficiency    Vitamin D deficiency    JARRETT (generalized anxiety disorder)    Tobacco abuse    Obesity (BMI 30-39.9)    S/P MVR (mitral valve replacement)    Endocarditis of mitral  valve    Hypotension    Shortness of breath    Sepsis associated hypotension (CMS/HCC)    Chest pain    Vitamin E deficiency    Severe sepsis (CMS/HCC)    HCAP (healthcare-associated pneumonia)         Severe sepsis without septic shock, secondary to pneumonia  -IVF bolus given at outlying facility  -Lactic Acid 1.0  -Cultures-pending  -Urine antigens-negative  -RVP-negative  -Reviewed CT Chest.  -Procalcitonin-0.10  -Rocephin, Merrem, and vancomycin, ID managing antibiotics     Sepsis associated hypotension, improving  -Did not require vasopressors.  -Stable, continue to monitor.     HCAP  -Reviewed CT Chest.  -Aggressive pulmonary toileting  -Follow sputum cultures  -IS/Flutter Valve encouraged and ordered  -Antibiotics as above.  -Continue Mucinex, Zyrtec     Acute respiratory failure with hypoxia, likely secondary to pneumonia; R/O Volume overload  -From past admission, bedside PFT reviewed: Suggestive of restrictive defect. FEV1 46%.  +bronchdilator response.  Diffusion capacity normal when corrected.  -Oxygen supplementation, titrate to maintain oxygen saturation >91%, currently on 4 L  -Duonebs scheduled and PRN  -BNP 1553  - intermittent diuresis     Enterococcus MV Endocarditis; S/P MVR (tissue)/MICHELLE ligation (1/22/2020)  -Previous hospitalization cultures: 1) Blood cultures-Enterococcus faecium, positive for VRE on PCR; initially susciptible to ampicillin and gentamicin.  2) MV tissue culture with Enterococcus faecium, resistant to ampicillin, susceptible for daptomycin.  -Initially placed on IV Rocephin & Daptomycin x 6 weeks by ID with end date of 3/8/2020 (during previous hospitalization).  -ID consulted and managing antibiotics, as above.  -CTS following.     CAD  -Continue ASA     Hyperlipidemia  -Statin therapy on hold due to previously being on daptomycin, resume when clinically appropriate     PAF (controlled), on chronic anticoagulation with Eliquis  -Continue amiodarone  -Was on heparin drip can  now transition back to Eliquis     Diabetes mellitus, type 2, with neuropathy  -strict glycemic control recommended  -Accuchecks AC/HS with sliding scale insulin  -Hold oral diabetic medications until out of ICU.  -Continue gabapentin     GERD  -Continue protonix     BPH  -Continue proscar, flomax     General Anxiety Disorder  -Continue prozac     Multiple sclerosis  -Mainly wheelchair bound, limited mobility  -PT/OT     Obesity  -BMI 37.91  -Counseled on lifestyle modifications to improve overall health     Tobacco Abuse, current  -Counseled on smoking cessation     Vitamin D/B12 deficiency  -Continue folic acid, B12 supplements     High Risk     Accuchecks with SSI  Code Status: CPR, Full Interventions  VTE Prophylaxis:  Eliquis/SCDs  PUD Prophylaxis: Protonix    Attending physician statement:  High risk   Above note scribed by nurse practitioner for me and later reviewed for accuracy. I've examined the patient and reviewed all labs and images.   I have directly participated in the evaluation and management of this patient.  Evangelista Neri MD  Pulmonary and [facility]  [facility]

## 2020-03-01 NOTE — PROGRESS NOTES
"Pharmacy Antimicrobial Dosing Service    Subjective:  Jamin Hennessy is a 64 y.o.male with pneumonia and mitral valve endocarditis s/p tissue MVR. ID consulted. Pharmacy consult to dose vancomycin.     PMH: VRE      Assessment/Plan    1. Day #3 Vancomycin: Goal -600 mcg*h/mL. Calculated AUC was 480 mcg*hr/mL with K = 0.064 (h-1) and half life of 11 hours. Will continue vancomycin 1000 mg (11 mg/kg DBW) IV q12h. If patient continues on vancomycin will obtain repeat levels in 3-5 days.    2. Day #4 Ceftriaxone: 2 g IV q12h per ID    3. Day #3 Meropenem: 1000 mg IV q8h for estCrCl > 50 mL/min per ID.     Will continue to monitor drug levels, renal function, culture and sensitivities, and patient clinical status.       Objective:  Relevant clinical data and objective history reviewed:  175.3 cm (69.02\")   115 kg (254 lb 6.6 oz)   Ideal body weight: 70.7 kg (155 lb 15.1 oz)  Adjusted ideal body weight: 88.6 kg (195 lb 5.3 oz)  Body mass index is 37.55 kg/m².    Results from last 7 days   Lab Units 03/01/20  1256 03/01/20  0558   VANCOMYCIN PK mcg/mL  --  21.80   VANCOMYCIN TR mcg/mL 14.00  --    VANCOMYCIN RM mcg/mL  --  20.10     Results from last 7 days   Lab Units 03/01/20  0351 02/29/20  0537 02/27/20  1152   CREATININE mg/dL 0.88 0.88 1.03     Estimated Creatinine Clearance: 106 mL/min (by C-G formula based on SCr of 0.88 mg/dL).  I/O last 3 completed shifts:  In: 3366 [P.O.:440; I.V.:2376; IV Piggyback:550]  Out: -     Results from last 7 days   Lab Units 03/01/20  0351 02/29/20  0537 02/28/20  2209   WBC 10*3/mm3 13.80* 13.30* 14.60*     Temperature    03/01/20 0400 03/01/20 0800 03/01/20 1200   Temp: 98.4 °F (36.9 °C) 97.5 °F (36.4 °C) 97.6 °F (36.4 °C)     Baseline culture/source/susceptibility:  Microbiology Results (last 10 days)       Procedure Component Value - Date/Time    Respiratory Culture - Sputum, Cough [[id number]] Collected:  02/28/20 1123    Lab Status:  Final result Specimen:  Sputum from " Cough Updated:  03/01/20 1203     Respiratory Culture Scant growth (1+) Normal Respiratory Cheryl     Gram Stain Moderate (3+) WBCs per low power field      Rare (1+) Epithelial cells per low power field      Rare (1+) Mixed bacterial morphotypes seen on Gram Stain    Legionella Antigen, Urine - Urine, Urine, Clean Catch [[id number]]  (Normal) Collected:  02/28/20 0402    Lab Status:  Final result Specimen:  Urine, Clean Catch Updated:  02/28/20 0439     LEGIONELLA ANTIGEN, URINE Negative    S. Pneumo Ag Urine or CSF - Urine, Urine, Clean Catch [[id number]]  (Normal) Collected:  02/28/20 0402    Lab Status:  Final result Specimen:  Urine, Clean Catch Updated:  02/28/20 0439     Strep Pneumo Ag Negative    Respiratory Panel, PCR - Swab, Nasopharynx [[id number]]  (Normal) Collected:  02/27/20 2349    Lab Status:  Final result Specimen:  Swab from Nasopharynx Updated:  02/28/20 0131     ADENOVIRUS, PCR Not Detected     Coronavirus 229E Not Detected     Coronavirus HKU1 Not Detected     Coronavirus NL63 Not Detected     Coronavirus OC43 Not Detected     Human Metapneumovirus Not Detected     Human Rhinovirus/Enterovirus Not Detected     Influenza B PCR Not Detected     Parainfluenza Virus 1 Not Detected     Parainfluenza Virus 2 Not Detected     Parainfluenza Virus 3 Not Detected     Parainfluenza Virus 4 Not Detected     Bordetella pertussis pcr Not Detected     Influenza A H1 2009 PCR Not Detected     Chlamydophila pneumoniae PCR Not Detected     Mycoplasma pneumo by PCR Not Detected     Influenza A PCR Not Detected     Influenza A H3 Not Detected     Influenza A H1 Not Detected     RSV, PCR Not Detected    Blood Culture - Blood, Arm, Right [[id number]] Collected:  02/27/20 1157    Lab Status:  Preliminary result Specimen:  Blood from Arm, Right Updated:  03/01/20 1215     Blood Culture No growth at 3 days    Blood Culture - Blood, Arm, Left [[id number]] Collected:  02/27/20 1152    Lab Status:  Preliminary result Specimen:   Blood from Arm, Left Updated:  03/01/20 1215     Blood Culture No growth at 3 days             Anti-Infectives (From admission, onward)      Ordered     Dose/Rate Route Frequency Start Stop    02/28/20 1343  !Vancomycin Level Draw Needed     Ordering Provider:  Fay Bradford APRN     Does not apply Once 03/01/20 1300      02/28/20 1343  !Vancomycin Level Draw Needed     Ordering Provider:  Fay Bradford APRN     Does not apply Once 03/01/20 0600      02/29/20 1108  meropenem (MERREM) 1 g in sodium chloride 0.9 % 100 mL IVPB  Review   Ordering Provider:  Allison Box MD    1 g  over 30 Minutes Intravenous Every 8 Hours 02/29/20 1200 03/06/20 1159    02/28/20 1325  vancomycin (VANCOCIN) 1,000 mg in sodium chloride 0.9 % 250 mL IVPB  Review   Ordering Provider:  Fay Bradford APRN    15 mg/kg × 70.7 kg (Ideal)  over 60 Minutes Intravenous Every 12 Hours 02/29/20 0200 03/07/20 0159    02/28/20 1341  meropenem (MERREM) 1 g in sodium chloride 0.9 % 100 mL IVPB     Ordering Provider:  Fay Bradford APRN    1 g  over 30 Minutes Intravenous Once 02/28/20 1500 02/28/20 1535    02/28/20 1325  vancomycin IVPB 1500 mg in 0.9% NaCl (Premix) 250 mL     Ordering Provider:  Fay Bradford APRN    20 mg/kg × 70.7 kg (Ideal)  over 90 Minutes Intravenous Once 02/28/20 1400 02/28/20 1716    02/28/20 1241  Pharmacy to dose vancomycin  Review   Ordering Provider:  Fay Bradford APRN     Does not apply Continuous PRN 02/28/20 1239 03/06/20 1238    02/28/20 0837  cefTRIAXone (ROCEPHIN) 2 g in sodium chloride 0.9 % 100 mL IVPB  Review   Note to Pharmacy:  Patient to be on until 3/8/2020   Ordering Provider:  Allison Box MD    2 g  200 mL/hr over 30 Minutes Intravenous Every 12 Hours 02/28/20 0845 03/09/20 0859            Candice Bryant PharmD  03/01/20 1:38 PM

## 2020-03-01 NOTE — PLAN OF CARE
Problem: Patient Care Overview  Goal: Plan of Care Review  Outcome: Ongoing (interventions implemented as appropriate)  Flowsheets (Taken 3/1/2020 0215)  Progress: no change  Plan of Care Reviewed With: patient  Outcome Summary: Pt is currently receiving 5L high flow nasal cannula. VS stable at this time. Will continue to monitor.  Goal: Individualization and Mutuality  Outcome: Ongoing (interventions implemented as appropriate)  Goal: Discharge Needs Assessment  Outcome: Ongoing (interventions implemented as appropriate)  Goal: Interprofessional Rounds/Family Conf  Outcome: Ongoing (interventions implemented as appropriate)     Problem: Pneumonia (Adult)  Goal: Signs and Symptoms of Listed Potential Problems Will be Absent, Minimized or Managed (Pneumonia)  Outcome: Ongoing (interventions implemented as appropriate)  Flowsheets (Taken 3/1/2020 0215)  Problems Assessed (Pneumonia): all  Problems Present (Pneumonia): respiratory compromise     Problem: Fall Risk (Adult)  Goal: Identify Related Risk Factors and Signs and Symptoms  Outcome: Ongoing (interventions implemented as appropriate)  Flowsheets (Taken 3/1/2020 0215)  Related Risk Factors (Fall Risk): age-related changes; environment unfamiliar  Signs and Symptoms (Fall Risk): presence of risk factors  Goal: Absence of Fall  Outcome: Ongoing (interventions implemented as appropriate)  Flowsheets (Taken 3/1/2020 0215)  Absence of Fall: achieves outcome     Problem: Skin Injury Risk (Adult)  Goal: Identify Related Risk Factors and Signs and Symptoms  Outcome: Ongoing (interventions implemented as appropriate)  Flowsheets (Taken 3/1/2020 0215)  Related Risk Factors (Skin Injury Risk): advanced age; body weight extremes; hospitalization prolonged; mobility impaired  Goal: Skin Health and Integrity  Outcome: Ongoing (interventions implemented as appropriate)  Flowsheets (Taken 3/1/2020 0215)  Skin Health and Integrity: achieves outcome     Problem: Pain, Chronic  (Adult)  Goal: Identify Related Risk Factors and Signs and Symptoms  Outcome: Ongoing (interventions implemented as appropriate)  Flowsheets (Taken 3/1/2020 0215)  Signs and Symptoms (Chronic Pain): verbalization of pain/discomfort for a prolonged time period  Goal: Acceptable Pain/Comfort Level and Functional Ability  Outcome: Ongoing (interventions implemented as appropriate)  Flowsheets (Taken 3/1/2020 0215)  Acceptable Pain/Comfort Level and Functional Ability: achieves outcome

## 2020-03-01 NOTE — PLAN OF CARE
Problem: Patient Care Overview  Goal: Plan of Care Review  Outcome: Ongoing (interventions implemented as appropriate)  Goal: Individualization and Mutuality  Outcome: Ongoing (interventions implemented as appropriate)  Goal: Discharge Needs Assessment  Outcome: Ongoing (interventions implemented as appropriate)  Goal: Interprofessional Rounds/Family Conf  Outcome: Ongoing (interventions implemented as appropriate)     Problem: Pneumonia (Adult)  Goal: Signs and Symptoms of Listed Potential Problems Will be Absent, Minimized or Managed (Pneumonia)  Outcome: Ongoing (interventions implemented as appropriate)     Problem: Fall Risk (Adult)  Goal: Identify Related Risk Factors and Signs and Symptoms  Outcome: Ongoing (interventions implemented as appropriate)  Goal: Absence of Fall  Outcome: Ongoing (interventions implemented as appropriate)     Problem: Skin Injury Risk (Adult)  Goal: Identify Related Risk Factors and Signs and Symptoms  Outcome: Ongoing (interventions implemented as appropriate)  Goal: Skin Health and Integrity  Outcome: Ongoing (interventions implemented as appropriate)

## 2020-03-02 LAB
ALBUMIN SERPL-MCNC: 2.2 G/DL (ref 3.5–5.2)
ALBUMIN/GLOB SERPL: 0.6 G/DL
ALP SERPL-CCNC: 88 U/L (ref 39–117)
ALT SERPL W P-5'-P-CCNC: 72 U/L (ref 1–41)
ANION GAP SERPL CALCULATED.3IONS-SCNC: 9 MMOL/L (ref 5–15)
APTT PPP: 26.5 SECONDS (ref 61–76.5)
AST SERPL-CCNC: 54 U/L (ref 1–40)
BASOPHILS # BLD AUTO: 0.2 10*3/MM3 (ref 0–0.2)
BASOPHILS NFR BLD AUTO: 2.1 % (ref 0–1.5)
BILIRUB SERPL-MCNC: <0.2 MG/DL (ref 0.2–1.2)
BUN BLD-MCNC: 15 MG/DL (ref 8–23)
BUN/CREAT SERPL: 17.2 (ref 7–25)
CALCIUM SPEC-SCNC: 8.6 MG/DL (ref 8.6–10.5)
CHLORIDE SERPL-SCNC: 108 MMOL/L (ref 98–107)
CO2 SERPL-SCNC: 25 MMOL/L (ref 22–29)
CREAT BLD-MCNC: 0.87 MG/DL (ref 0.76–1.27)
DEPRECATED RDW RBC AUTO: 48.6 FL (ref 37–54)
EOSINOPHIL # BLD AUTO: 2 10*3/MM3 (ref 0–0.4)
EOSINOPHIL NFR BLD AUTO: 19.1 % (ref 0.3–6.2)
ERYTHROCYTE [DISTWIDTH] IN BLOOD BY AUTOMATED COUNT: 18 % (ref 12.3–15.4)
GFR SERPL CREATININE-BSD FRML MDRD: 88 ML/MIN/1.73
GLOBULIN UR ELPH-MCNC: 3.8 GM/DL
GLUCOSE BLD-MCNC: 119 MG/DL (ref 65–99)
GLUCOSE BLDC GLUCOMTR-MCNC: 120 MG/DL (ref 70–105)
GLUCOSE BLDC GLUCOMTR-MCNC: 121 MG/DL (ref 70–105)
GLUCOSE BLDC GLUCOMTR-MCNC: 163 MG/DL (ref 70–105)
GLUCOSE BLDC GLUCOMTR-MCNC: 98 MG/DL (ref 70–105)
HCT VFR BLD AUTO: 24.5 % (ref 37.5–51)
HGB BLD-MCNC: 8.3 G/DL (ref 13–17.7)
LYMPHOCYTES # BLD AUTO: 0.9 10*3/MM3 (ref 0.7–3.1)
LYMPHOCYTES NFR BLD AUTO: 8.2 % (ref 19.6–45.3)
MAGNESIUM SERPL-MCNC: 2.1 MG/DL (ref 1.6–2.4)
MCH RBC QN AUTO: 25.5 PG (ref 26.6–33)
MCHC RBC AUTO-ENTMCNC: 34 G/DL (ref 31.5–35.7)
MCV RBC AUTO: 75 FL (ref 79–97)
MONOCYTES # BLD AUTO: 0.8 10*3/MM3 (ref 0.1–0.9)
MONOCYTES NFR BLD AUTO: 7.1 % (ref 5–12)
NEUTROPHILS # BLD AUTO: 6.8 10*3/MM3 (ref 1.7–7)
NEUTROPHILS NFR BLD AUTO: 63.5 % (ref 42.7–76)
NRBC BLD AUTO-RTO: 0 /100 WBC (ref 0–0.2)
PLATELET # BLD AUTO: 682 10*3/MM3 (ref 140–450)
PMV BLD AUTO: 7.3 FL (ref 6–12)
POTASSIUM BLD-SCNC: 4.2 MMOL/L (ref 3.5–5.2)
PROT SERPL-MCNC: 6 G/DL (ref 6–8.5)
RBC # BLD AUTO: 3.26 10*6/MM3 (ref 4.14–5.8)
SODIUM BLD-SCNC: 142 MMOL/L (ref 136–145)
WBC NRBC COR # BLD: 10.7 10*3/MM3 (ref 3.4–10.8)

## 2020-03-02 PROCEDURE — 97110 THERAPEUTIC EXERCISES: CPT

## 2020-03-02 PROCEDURE — 94799 UNLISTED PULMONARY SVC/PX: CPT

## 2020-03-02 PROCEDURE — 85025 COMPLETE CBC W/AUTO DIFF WBC: CPT | Performed by: INTERNAL MEDICINE

## 2020-03-02 PROCEDURE — 25010000002 VANCOMYCIN 1 G RECONSTITUTED SOLUTION 1 EACH VIAL: Performed by: NURSE PRACTITIONER

## 2020-03-02 PROCEDURE — 97530 THERAPEUTIC ACTIVITIES: CPT

## 2020-03-02 PROCEDURE — 25010000002 MEROPENEM PER 100 MG: Performed by: INTERNAL MEDICINE

## 2020-03-02 PROCEDURE — 82962 GLUCOSE BLOOD TEST: CPT

## 2020-03-02 PROCEDURE — 25010000002 CEFTRIAXONE PER 250 MG: Performed by: INTERNAL MEDICINE

## 2020-03-02 PROCEDURE — 97535 SELF CARE MNGMENT TRAINING: CPT

## 2020-03-02 PROCEDURE — 99024 POSTOP FOLLOW-UP VISIT: CPT | Performed by: NURSE PRACTITIONER

## 2020-03-02 PROCEDURE — 83735 ASSAY OF MAGNESIUM: CPT | Performed by: NURSE PRACTITIONER

## 2020-03-02 PROCEDURE — 63710000001 INSULIN LISPRO (HUMAN) PER 5 UNITS: Performed by: NURSE PRACTITIONER

## 2020-03-02 PROCEDURE — 85730 THROMBOPLASTIN TIME PARTIAL: CPT | Performed by: NURSE PRACTITIONER

## 2020-03-02 PROCEDURE — 80053 COMPREHEN METABOLIC PANEL: CPT | Performed by: INTERNAL MEDICINE

## 2020-03-02 RX ADMIN — GUAIFENESIN 1200 MG: 600 TABLET, EXTENDED RELEASE ORAL at 08:33

## 2020-03-02 RX ADMIN — Medication 1 CAPSULE: at 08:34

## 2020-03-02 RX ADMIN — FLUOXETINE 40 MG: 20 CAPSULE ORAL at 08:34

## 2020-03-02 RX ADMIN — GABAPENTIN 200 MG: 100 CAPSULE ORAL at 20:01

## 2020-03-02 RX ADMIN — MEROPENEM 1 G: 1 INJECTION, POWDER, FOR SOLUTION INTRAVENOUS at 20:02

## 2020-03-02 RX ADMIN — ASPIRIN 81 MG: 81 TABLET, DELAYED RELEASE ORAL at 08:34

## 2020-03-02 RX ADMIN — Medication 10 ML: at 08:34

## 2020-03-02 RX ADMIN — GABAPENTIN 200 MG: 100 CAPSULE ORAL at 08:34

## 2020-03-02 RX ADMIN — MEROPENEM 1 G: 1 INJECTION, POWDER, FOR SOLUTION INTRAVENOUS at 20:49

## 2020-03-02 RX ADMIN — IPRATROPIUM BROMIDE AND ALBUTEROL SULFATE 3 ML: .5; 3 SOLUTION RESPIRATORY (INHALATION) at 18:49

## 2020-03-02 RX ADMIN — IPRATROPIUM BROMIDE AND ALBUTEROL SULFATE 3 ML: .5; 3 SOLUTION RESPIRATORY (INHALATION) at 07:50

## 2020-03-02 RX ADMIN — MEROPENEM 1 G: 1 INJECTION, POWDER, FOR SOLUTION INTRAVENOUS at 12:08

## 2020-03-02 RX ADMIN — TAMSULOSIN HYDROCHLORIDE 0.4 MG: 0.4 CAPSULE ORAL at 08:34

## 2020-03-02 RX ADMIN — ACETYLCYSTEINE 4 ML: 200 SOLUTION ORAL; RESPIRATORY (INHALATION) at 18:50

## 2020-03-02 RX ADMIN — APIXABAN 5 MG: 5 TABLET, FILM COATED ORAL at 20:02

## 2020-03-02 RX ADMIN — IPRATROPIUM BROMIDE AND ALBUTEROL SULFATE 3 ML: .5; 3 SOLUTION RESPIRATORY (INHALATION) at 11:50

## 2020-03-02 RX ADMIN — AMIODARONE HYDROCHLORIDE 200 MG: 200 TABLET ORAL at 08:33

## 2020-03-02 RX ADMIN — MEROPENEM 1 G: 1 INJECTION, POWDER, FOR SOLUTION INTRAVENOUS at 03:41

## 2020-03-02 RX ADMIN — CEFTRIAXONE SODIUM 2 G: 2 INJECTION, POWDER, FOR SOLUTION INTRAMUSCULAR; INTRAVENOUS at 08:33

## 2020-03-02 RX ADMIN — PANTOPRAZOLE SODIUM 40 MG: 40 TABLET, DELAYED RELEASE ORAL at 06:11

## 2020-03-02 RX ADMIN — APIXABAN 5 MG: 5 TABLET, FILM COATED ORAL at 08:34

## 2020-03-02 RX ADMIN — CYANOCOBALAMIN TAB 1000 MCG 1000 MCG: 1000 TAB at 08:34

## 2020-03-02 RX ADMIN — Medication 800 UNITS: at 08:36

## 2020-03-02 RX ADMIN — SODIUM CHLORIDE 1000 MG: 900 INJECTION, SOLUTION INTRAVENOUS at 01:06

## 2020-03-02 RX ADMIN — IPRATROPIUM BROMIDE AND ALBUTEROL SULFATE 3 ML: .5; 3 SOLUTION RESPIRATORY (INHALATION) at 15:25

## 2020-03-02 RX ADMIN — GABAPENTIN 200 MG: 100 CAPSULE ORAL at 16:58

## 2020-03-02 RX ADMIN — ACETYLCYSTEINE 4 ML: 200 SOLUTION ORAL; RESPIRATORY (INHALATION) at 07:50

## 2020-03-02 RX ADMIN — INSULIN LISPRO 2 UNITS: 100 INJECTION, SOLUTION INTRAVENOUS; SUBCUTANEOUS at 12:07

## 2020-03-02 RX ADMIN — Medication 10 ML: at 20:04

## 2020-03-02 RX ADMIN — FOLIC ACID 1 MG: 1 TABLET ORAL at 08:33

## 2020-03-02 RX ADMIN — CEFTRIAXONE SODIUM 2 G: 2 INJECTION, POWDER, FOR SOLUTION INTRAMUSCULAR; INTRAVENOUS at 20:02

## 2020-03-02 RX ADMIN — FINASTERIDE 5 MG: 5 TABLET, FILM COATED ORAL at 08:34

## 2020-03-02 RX ADMIN — CETIRIZINE HYDROCHLORIDE 10 MG: 10 TABLET, FILM COATED ORAL at 08:34

## 2020-03-02 RX ADMIN — SODIUM CHLORIDE 1000 MG: 900 INJECTION, SOLUTION INTRAVENOUS at 14:04

## 2020-03-02 RX ADMIN — GUAIFENESIN 1200 MG: 600 TABLET, EXTENDED RELEASE ORAL at 20:01

## 2020-03-02 RX ADMIN — Medication 1 CAPSULE: at 20:02

## 2020-03-02 NOTE — NURSING NOTE
I obtained report on this patient and was told that he had pressure injuries on his buttocks. When I looked at the patient, there was no pressure injuries anywhere on him. I completed the injuries on the flowsheets.

## 2020-03-02 NOTE — PROGRESS NOTES
Continued Stay Note  MINDI Sawyer     Patient Name: Jamin Hennessy  MRN: [id number]  Today's Date: 3/2/2020    Admit Date: 2/27/2020    Discharge Plan     Row Name 03/02/20 1545       Plan    Plan Comments  barriers to dc: on IV rocephin, vanc, o2 at5 l            Renetta Hernandez RN

## 2020-03-02 NOTE — PROGRESS NOTES
Infectious Diseases Progress Note      LOS: 4 days   Patient Care Team:  Kajal Sanchez as PCP - General (Internal Medicine)  Frederick George MD as Consulting Physician (Nephrology)        Subjective       The patient has been afebrile for the last 24 hours.  The patient is currently on 4 L of oxygen via nasal cannula.  He is awake and alert.  He remained hemodynamically stable requiring no vasopressors.      Review of Systems:   Review of Systems   Constitutional: Positive for fatigue.   HENT: Negative.    Respiratory: Positive for cough and shortness of breath.    Cardiovascular:        Chest soreness around sternal incision    Gastrointestinal: Negative.    Genitourinary: Negative.    Musculoskeletal: Positive for arthralgias.   Skin: Negative.    Neurological: Positive for weakness.   Psychiatric/Behavioral: Negative.         Objective     Vital Signs  Temp:  [96.5 °F (35.8 °C)-98.7 °F (37.1 °C)] 96.5 °F (35.8 °C)  Heart Rate:  [] 90  Resp:  [20] 20  BP: ()/(54-86) 99/58    Physical Exam:  Physical Exam   Constitutional: He appears well-developed and well-nourished.   HENT:   Head: Normocephalic and atraumatic.   Eyes: Pupils are equal, round, and reactive to light. Conjunctivae and EOM are normal.   Neck: Neck supple.   Cardiovascular: Normal rate, regular rhythm and normal heart sounds.   Pulmonary/Chest: Effort normal. He has rales.   Extensive crackles on the left side   Abdominal: Soft. Bowel sounds are normal.   Musculoskeletal:   Degenerative changes patient appears to have some general weakness due to the multiple sclerosis    Neurological: He is alert.   Alert and oriented x2-patient patient seems more alert today   Skin: Skin is warm and dry.   Chest soreness around sternal incision    Psychiatric: He has a normal mood and affect.   Vitals reviewed.       Results Review:    I have reviewed all clinical data, test, lab, and imaging results.     Radiology  No Radiology Exams Resulted Within  Past 24 Hours    Cardiology    Laboratory    Results from last 7 days   Lab Units 03/02/20  0647 03/01/20  0351 02/29/20  0537 02/28/20  2209 02/28/20 0324 02/27/20  1152   WBC 10*3/mm3 10.70 13.80* 13.30* 14.60* 16.80* 16.00*   HEMOGLOBIN g/dL 8.3* 8.3* 7.3* 7.3* 7.2* 7.3*   HEMATOCRIT % 24.5* 25.6* 23.0* 22.8* 21.9* 22.7*   PLATELETS 10*3/mm3 682* 684* 581* 581* 562* 535*     Results from last 7 days   Lab Units 03/02/20  0647 03/01/20 0351 02/29/20 0537 02/28/20 0324 02/27/20  1152   SODIUM mmol/L 142 141 142  --  140   POTASSIUM mmol/L 4.2 3.8 3.8 3.9 3.5   CHLORIDE mmol/L 108* 105 106  --  105   CO2 mmol/L 25.0 24.0 24.0  --  23.0   BUN mg/dL 15 16 16  --  16   CREATININE mg/dL 0.87 0.88 0.88  --  1.03   GLUCOSE mg/dL 119* 140* 154*  --  118*   ALBUMIN g/dL 2.20* 2.60*  --   --  2.30*   BILIRUBIN mg/dL <0.2* 0.2  --   --  0.2   ALK PHOS U/L 88 99  --   --  90   AST (SGOT) U/L 54* 86*  --   --  33   ALT (SGPT) U/L 72* 86*  --   --  38   CALCIUM mg/dL 8.6 8.2* 8.4*  --  8.4*     Results from last 7 days   Lab Units 02/28/20  0324   CK TOTAL U/L 33             Microbiology   Microbiology Results (last 10 days)     Procedure Component Value - Date/Time    Respiratory Culture - Sputum, Cough [[id number]] Collected:  02/28/20 1123    Lab Status:  Final result Specimen:  Sputum from Cough Updated:  03/01/20 1203     Respiratory Culture Scant growth (1+) Normal Respiratory Cheryl     Gram Stain Moderate (3+) WBCs per low power field      Rare (1+) Epithelial cells per low power field      Rare (1+) Mixed bacterial morphotypes seen on Gram Stain    Legionella Antigen, Urine - Urine, Urine, Clean Catch [[id number]]  (Normal) Collected:  02/28/20 0402    Lab Status:  Final result Specimen:  Urine, Clean Catch Updated:  02/28/20 0439     LEGIONELLA ANTIGEN, URINE Negative    S. Pneumo Ag Urine or CSF - Urine, Urine, Clean Catch [[id number]]  (Normal) Collected:  02/28/20 0402    Lab Status:  Final result Specimen:  Urine,  Clean Catch Updated:  02/28/20 0439     Strep Pneumo Ag Negative    Respiratory Panel, PCR - Swab, Nasopharynx [[id number]]  (Normal) Collected:  02/27/20 2349    Lab Status:  Final result Specimen:  Swab from Nasopharynx Updated:  02/28/20 0131     ADENOVIRUS, PCR Not Detected     Coronavirus 229E Not Detected     Coronavirus HKU1 Not Detected     Coronavirus NL63 Not Detected     Coronavirus OC43 Not Detected     Human Metapneumovirus Not Detected     Human Rhinovirus/Enterovirus Not Detected     Influenza B PCR Not Detected     Parainfluenza Virus 1 Not Detected     Parainfluenza Virus 2 Not Detected     Parainfluenza Virus 3 Not Detected     Parainfluenza Virus 4 Not Detected     Bordetella pertussis pcr Not Detected     Influenza A H1 2009 PCR Not Detected     Chlamydophila pneumoniae PCR Not Detected     Mycoplasma pneumo by PCR Not Detected     Influenza A PCR Not Detected     Influenza A H3 Not Detected     Influenza A H1 Not Detected     RSV, PCR Not Detected    Blood Culture - Blood, Arm, Right [[id number]] Collected:  02/27/20 1157    Lab Status:  Preliminary result Specimen:  Blood from Arm, Right Updated:  03/02/20 1215     Blood Culture No growth at 4 days    Blood Culture - Blood, Arm, Left [[id number]] Collected:  02/27/20 1152    Lab Status:  Preliminary result Specimen:  Blood from Arm, Left Updated:  03/02/20 1215     Blood Culture No growth at 4 days          Medication Review:       Schedule Meds    acetylcysteine 4 mL Nebulization BID - RT   amiodarone 200 mg Oral Q24H   apixaban 5 mg Oral Q12H   aspirin 81 mg Oral Daily   cefTRIAXone 2 g Intravenous Q12H   cetirizine 10 mg Oral Daily   finasteride 5 mg Oral Daily   FLUoxetine 40 mg Oral Daily   folic acid 1 mg Oral Daily   gabapentin 200 mg Oral TID   guaiFENesin 1,200 mg Oral Q12H   insulin lispro 0-9 Units Subcutaneous 4x Daily With Meals & Nightly   ipratropium-albuterol 3 mL Nebulization Q4H While Awake - RT   lactobacillus acidophilus 1  capsule Oral BID   meropenem 1 g Intravenous Q8H   pantoprazole 40 mg Oral QAM   sodium chloride 10 mL Intravenous Q12H   tamsulosin 0.4 mg Oral Daily   vancomycin 15 mg/kg (Ideal) Intravenous Q12H   vitamin B-12 1,000 mcg Oral Daily   vitamin E 800 Units Oral Daily       Infusion Meds    Pharmacy to dose vancomycin        PRN Meds  •  acetaminophen **OR** acetaminophen **OR** acetaminophen  •  benzonatate  •  bisacodyl  •  cyclobenzaprine  •  dextrose  •  dextrose  •  glucagon (human recombinant)  •  insulin lispro **AND** insulin lispro  •  ipratropium-albuterol  •  magnesium sulfate **OR** magnesium sulfate in D5W 1g/100mL (PREMIX)  •  ondansetron **OR** ondansetron  •  Pharmacy to dose vancomycin  •  potassium chloride  •  sodium chloride        Assessment/Plan       Antimicrobial Therapy   1.      day  2.  IV Rocephin    day  3.  IV vancomycin    day    4.  IV Merrem     Day   5.      Day      Assessment      Extensive left upper lobe and left lower lobe infiltrate.  The presentation is highly consistent with pneumonia.  Aspiration is less likely since it is involving all lobes of the left lung but I am concerned about hospital-acquired pneumonia.  The patient is currently on 5 L of oxygen via nasal cannula.  Sputum culture did not grow any significant pathogen.  Repeat chest x-ray did not show improvement    Mild reactive leukocytosis     Status post mitral valve replacement for mitral valve endocarditis on January 22, 2020 with enterococcus faecium.  The patient was finishing 6 weeks of IV daptomycin and IV Rocephin at the rehab facility and the last day of the IV antibiotics was supposed to be March 8, 2020     Type 2 diabetes     Tobacco abuse     Plan     Continue IV Rocephin 2 g every 12 hours.  We will discontinue IV Rocephin on March 8, 2020  Continue IV vancomycin for a total of 10 days  Continue IV meropenem 1 g every 8 hours for a total of 10 days  Repeat chest x-ray in a.m.  A.m. labs  Supportive  care        Allison Box MD  03/02/20  12:52 PM     Note is dictated utilizing voice recognition software/Dragon

## 2020-03-02 NOTE — PROGRESS NOTES
S/P tissue MVR/MICHELLE ligation--[place] on 1/22/20    Pt known to our service d/t Enterococcus (VRE) MV IE.  He underwent tissue MVR on 1/22/2020 by Dr. Cole.    His postop course was lengthy given discharge planning r/t daptomycin.  He also had resp insufficiency issues and recurrent atrial fib.  Eliquis was restarted prior to transfer to [facility].    He was noted to have hypotension this morning and was transferred to [facility] and diagnosed with pna.  WBC was 18.4, hgb 7.6 at [place].  He was transferred to [facility] for further care and mmgt.    Subjective:  Up to chair. No complaints this am  No events overnight  On ceftriaxone/vanc/Merrem  O2 requirements down to 3L  afebrile      Intake/Output Summary (Last 24 hours) at 3/2/2020 1323  Last data filed at 3/2/2020 1008  Gross per 24 hour   Intake 1993 ml   Output --   Net 1993 ml     Temp:  [96.5 °F (35.8 °C)-98.7 °F (37.1 °C)] 96.5 °F (35.8 °C)  Heart Rate:  [] 90  Resp:  [20] 20  BP: ()/(54-86) 99/58      Results from last 7 days   Lab Units 03/02/20  0647 03/01/20  0351  02/28/20  2209   WBC 10*3/mm3 10.70 13.80*   < > 14.60*   HEMOGLOBIN g/dL 8.3* 8.3*   < > 7.3*   HEMATOCRIT % 24.5* 25.6*   < > 22.8*   PLATELETS 10*3/mm3 682* 684*   < > 581*   INR   --   --   --  1.22*    < > = values in this interval not displayed.     Results from last 7 days   Lab Units 03/02/20  0647   CREATININE mg/dL 0.87   POTASSIUM mmol/L 4.2   SODIUM mmol/L 142   MAGNESIUM mg/dL 2.1       Physical Exam:  Neuro intact, nad, resting in bed, drowsy  Tele:  SR 90s  Diminished bases, 3L 96%, reports productive cough at times  Sternotomy well healed, sternum stable  Benign abd, tolerating po intake  Trace edema    Assessment/Plan:  Active Problems:    Coronary artery disease due to lipid rich plaque    Dyslipidemia    Non-insulin dependent type 2 diabetes mellitus (CMS/HCC)    Diabetic neuropathy (CMS/HCC)    GERD without esophagitis    BPH with obstruction/lower urinary tract  symptoms    B12 deficiency    Vitamin D deficiency    JARRETT (generalized anxiety disorder)    Tobacco abuse    Obesity (BMI 30-39.9)    S/P MVR (mitral valve replacement)    Endocarditis of mitral valve    Hypotension    Shortness of breath    Sepsis associated hypotension (CMS/HCC)    Chest pain    Vitamin E deficiency    Severe sepsis (CMS/HCC)    HCAP (healthcare-associated pneumonia)    Readmit for PNA--per hospitalist  Mitral valve endocarditis s/p tissue MVR--stable  Enterococcus bacteremia/VRE--cont dapto/Rocephin   CAD with stent 1 year ago  Paroxysmal atrial fibrillation with RVR--in SR currently  HTN--stable  HLD--no statin while on dapto  DM type 2 with neuropathy--SSl  GERD  Multiple sclerosis---mainly wheelchair bound  Current tobacco abuse   BPH with incontinence   Obesity---BMI 38.19  Generalized anxiety disorder     Routine care  Antibiotics per ID  Anticipate back to rehab at discharge    SHADIA DE LA GARZA, YUE  3/2/2020  1:23 PM

## 2020-03-02 NOTE — THERAPY TREATMENT NOTE
Acute Care - Occupational Therapy Treatment Note  [facility]     Patient Name: Jamin Hennessy  : 1955  MRN: [id number]  Today's Date: 3/2/2020             Admit Date: 2020     No diagnosis found.  Patient Active Problem List   Diagnosis   • Atrial fibrillation with RVR (CMS/[facility])   • Coronary artery disease due to lipid rich plaque   • CAD S/P percutaneous coronary angioplasty   • Essential hypertension   • Dyslipidemia   • Non-insulin dependent type 2 diabetes mellitus (CMS/[facility])   • Diabetic neuropathy (CMS/[facility])   • GERD without esophagitis   • BPH with obstruction/lower urinary tract symptoms   • B12 deficiency   • Vitamin D deficiency   • JARRETT (generalized anxiety disorder)   • Tobacco abuse   • Obesity (BMI 30-39.9)   • Acute bacterial endocarditis   • S/P MVR (mitral valve replacement)   • Endocarditis of mitral valve   • Non-sustained ventricular tachycardia (CMS/[facility])   • Acute myocardial infarction (CMS/[facility])   • Hypercholesterolemia   • Hypotension   • Shortness of breath   • Sepsis associated hypotension (CMS/[facility])   • Chest pain   • Vitamin E deficiency   • Severe sepsis (CMS/[facility])   • HCAP (healthcare-associated pneumonia)     Past Medical History:   Diagnosis Date   • A-fib (CMS/[facility])    • CAD (coronary artery disease)    • Elevated cholesterol    • Hypertension    • MI (myocardial infarction) (CMS/[facility])    • Non-insulin dependent type 2 diabetes mellitus (CMS/[facility])      Past Surgical History:   Procedure Laterality Date   • CARDIAC CATHETERIZATION     • CARDIAC CATHETERIZATION N/A 2020    Procedure: Left Heart Cath;  Surgeon: Migdalia Beth MD;  Location: [facility] INVASIVE LOCATION;  Service: Cardiovascular   • CARDIAC CATHETERIZATION N/A 2020    Procedure: Left ventriculography;  Surgeon: Migdalia Beth MD;  Location: [facility] INVASIVE LOCATION;  Service: Cardiovascular   • CARDIAC CATHETERIZATION N/A 2020    Procedure: Coronary angiography;  Surgeon:  Migdalia Beth MD;  Location: [facility] CATH INVASIVE LOCATION;  Service: Cardiovascular   • CORONARY ANGIOPLASTY WITH STENT PLACEMENT     • MITRAL VALVE REPAIR/REPLACEMENT N/A 1/22/2020    Procedure: MITRAL VALVE REPAIR/REPLACEMENT;  Surgeon: Fallon Cole MD;  Location: [facility] CVOR;  Service: Cardiothoracic;  Laterality: N/A;  patient has endocarditis mitral valve replaced with 31mm knox II       Therapy Treatment    Rehabilitation Treatment Summary     Row Name 03/02/20 1448             Treatment Time/Intention    Discipline  occupational therapist  -AL      Document Type  therapy note (daily note)  -AL      Subjective Information  complains of;weakness fear of falling  -AL      Mode of Treatment  occupational therapy  -AL      Patient Effort  good  -AL      Recorded by [AL] Kaylyn Hauser OT 03/02/20 1500      Row Name 03/02/20 1448             Bed Mobility Assessment/Treatment    Sit-Supine [place] (Bed Mobility)  maximum assist (25% patient effort);2 person assist;verbal cues;nonverbal cues (demo/gesture)  -AL      Comment (Bed Mobility)  needs repeated cues not to reach/pull on bedrails  -AL      Recorded by [AL] Kaylyn Hauser OT 03/02/20 1500      Row Name 03/02/20 1448             Functional Mobility    Functional Mobility- Comment  pt able perform sit to stands w/ mod a x2, - max a x 2 today. increased assist w/ add'l trials/increasing fatigue. pt able to perform sps transfer from chair to sitting eob w/ mod a x2 + max vc's. needs repeated cues, manual a with ue's, cues to attend to sternal precautions. pt very anxious w/ standing acivity d/t fof. attempts to reach for therapist, railings, etc despite cues. able to stand at bedside for approx 45 sec w/ min -mod a x 2 persons.   -AL      Recorded by [AL] Kaylyn Hauser OT 03/02/20 1500      Row Name 03/02/20 1448             Transfer Assessment/Treatment    Transfer Assessment/Treatment  bed-chair transfer  -AL      Recorded by  [AL] Kaylyn Hauser, OT 03/02/20 1500      Row Name 03/02/20 1448             Bed-Chair Transfer    Bed-Chair [place] (Transfers)  moderate assist (50% patient effort);2 person assist;verbal cues;nonverbal cues (demo/gesture);set up  -AL      Recorded by [AL] Kaylyn Hauser, OT 03/02/20 1500      Row Name 03/02/20 1448             Sit-Stand Transfer    Sit-Stand [place] (Transfers)  moderate assist (50% patient effort);2 person assist;set up;verbal cues;nonverbal cues (demo/gesture)  -AL      Recorded by [AL] Kaylyn Hauser, OT 03/02/20 1500      Row Name 03/02/20 1448             ADL Assessment/Intervention    BADL Assessment/Intervention  lower body dressing;feeding  -AL      Recorded by [AL] Kaylyn Hauser, OT 03/02/20 1500      Row Name 03/02/20 1448             Lower Body Dressing Assessment/Training    Lower Body Dressing [place] Level  lower body dressing skills;dependent (less than 25% patient effort) doff/don brief  -AL      Lower Body Dressing Position  supported standing  -AL      Comment (Lower Body Dressing)  assist x 2 for stand balalnce while 3rd person assisted w/ donning brief.   -AL      Recorded by [AL] Kaylyn Hauser, OT 03/02/20 1500      Row Name 03/02/20 1448             Self-Feeding Assessment/Training    [place] Level (Feeding)  feeding skills;set up  -AL      Recorded by [AL] Kaylyn Hauser, OT 03/02/20 1500      Row Name 03/02/20 1448             Toileting Assessment/Training    [place] Level (Toileting)  dependent (less than 25% patient effort)  -AL      Recorded by [AL] Kaylyn Hauser, OT 03/02/20 1500      Row Name 03/02/20 1448             Positioning and Restraints    Pre-Treatment Position  sitting in chair/recliner  -AL      Post Treatment Position  bed  -AL      In Bed  supine;call light within reach;encouraged to call for assist;exit alarm on;patient within staff view;with nsg  -AL      Recorded by [AL] Kaylyn Hauser, OT 03/02/20 1500      Row  Name 03/02/20 1200             Plan of Care Review    Progress  improving  -AL      Outcome Summary  pt able to show some progress w/ sit to stands and transfers today, completing w/ mod a x 2 Persons. he is still very anxious and needs a lot of cueing for attn to sternal precautions and proper completion of mobility tasks. still requiring td for lb adls. pt limited by high anxiety and fear of falling. he needed extended education/instruction on plb / relaxation techniques which he was able to improve upon w/ repeated instruction, but will need ongiong education. he remains at high risk for falls. ip rehab remains appropriate.   -AL      Recorded by [AL] Kaylyn Hauser, OT 03/02/20 1500        User Key  (r) = Recorded By, (t) = Taken By, (c) = Cosigned By    Initials Name Effective Dates Discipline    AL Kaylyn Hauser, OT 03/01/19 -  OT           Rehab Goal Summary     Row Name 03/02/20 1249             Bed Mobility Goal 1 (PT)    Progress/Outcomes (Bed Mobility Goal 1, PT)  progress slower than expected  -CR         Transfer Goal 1 (PT)    [place] Level/Cues Needed (Transfer Goal 1, PT)  moderate assist (50-74% patient effort)  -CR      Progress/Outcome (Transfer Goal 1, PT)  progress slower than expected;medical status inhibiting progress;goal revised this date  -CR        User Key  (r) = Recorded By, (t) = Taken By, (c) = Cosigned By    Initials Name Provider Type Discipline    CR Reyes, Carmela, PT Physical Therapist PT            OT Recommendation and Plan     Outcome Summary: pt able to show some progress w/ sit to stands and transfers today, completing w/ mod a x 2 Persons. he is still very anxious and needs a lot of cueing for attn to sternal precautions and proper completion of mobility tasks. still requiring td for lb adls. pt limited by high anxiety and fear of falling. he needed extended education/instruction on plb / relaxation techniques which he was able to improve upon w/ repeated  instruction, but will need ongiong education. he remains at high risk for falls. ip rehab remains appropriate.        Time Calculation:   Time Calculation- OT     Row Name 03/02/20 1451             Time Calculation- OT    OT Start Time  1345  -AL      OT Stop Time  1410  -AL      OT Time Calculation (min)  25 min  -AL      Total Timed Code Minutes- OT  25 minute(s)  -AL      OT Received On  03/02/20  -AL      OT - Next Appointment  03/04/20  -AL        User Key  (r) = Recorded By, (t) = Taken By, (c) = Cosigned By    Initials Name Provider Type    AL Kaylyn Hauser OT Occupational Therapist        Therapy Charges for Today     Code Description Service Date Service Provider Modifiers Qty    94394601342 HC OT SELF CARE/MGMT/TRAIN EA 15 MIN 3/2/2020 Kaylyn Hauser OT GO 1    83613045532 HC OT THERAPEUTIC ACT EA 15 MIN 3/2/2020 Kaylyn Hauser OT GO 2               Kaylyn Hauser OT  3/2/2020

## 2020-03-02 NOTE — PLAN OF CARE
Problem: Patient Care Overview  Goal: Plan of Care Review  Outcome: Ongoing (interventions implemented as appropriate)  Flowsheets (Taken 3/2/2020 4938)  Progress: improving  Plan of Care Reviewed With: patient  Outcome Summary: Patient remains on 4L of oxygen at this time. patient requires several verbal cues to maintain sternal precautions. Patient denies any pain. Continue to give antibiotics. VSS. Will continue to monitor.

## 2020-03-02 NOTE — PLAN OF CARE
Problem: Patient Care Overview  Goal: Plan of Care Review  3/2/2020 1504 by Kaylyn Hauser, OT  Outcome: Ongoing (interventions implemented as appropriate)  Flowsheets  Taken 3/2/2020 1448 by Monica Reed RN  Plan of Care Reviewed With: patient  Taken 3/2/2020 1504 by Kaylyn Hauser, OT  Outcome Summary: pt able to show some progress w/ sit to stands and transfers today, completing w/ mod a x 2 Persons. he is still very anxious and needs a lot of cueing for attn to sternal precautions and proper completion of mobility tasks. still requiring td for lb adls. pt limited by high anxiety and fear of falling. he needed extended education/instruction on plb / relaxation techniques which he was able to improve upon w/ repeated instruction, but will need ongiong education. he remains at high risk for falls. ip rehab remains appropriate  3/2/2020 1500 by Kaylyn Hauser, OT  Outcome: Ongoing (interventions implemented as appropriate)

## 2020-03-02 NOTE — THERAPY TREATMENT NOTE
Patient Name: Jamin Hennessy  : 1955    MRN: [id number]                              Today's Date: 3/2/2020       Admit Date: 2020    Visit Dx: No diagnosis found.  Patient Active Problem List   Diagnosis   • Atrial fibrillation with RVR (CMS/[facility])   • Coronary artery disease due to lipid rich plaque   • CAD S/P percutaneous coronary angioplasty   • Essential hypertension   • Dyslipidemia   • Non-insulin dependent type 2 diabetes mellitus (CMS/[facility])   • Diabetic neuropathy (CMS/[facility])   • GERD without esophagitis   • BPH with obstruction/lower urinary tract symptoms   • B12 deficiency   • Vitamin D deficiency   • JARRETT (generalized anxiety disorder)   • Tobacco abuse   • Obesity (BMI 30-39.9)   • Acute bacterial endocarditis   • S/P MVR (mitral valve replacement)   • Endocarditis of mitral valve   • Non-sustained ventricular tachycardia (CMS/[facility])   • Acute myocardial infarction (CMS/[facility])   • Hypercholesterolemia   • Hypotension   • Shortness of breath   • Sepsis associated hypotension (CMS/[facility])   • Chest pain   • Vitamin E deficiency   • Severe sepsis (CMS/[facility])   • HCAP (healthcare-associated pneumonia)     Past Medical History:   Diagnosis Date   • A-fib (CMS/[facility])    • CAD (coronary artery disease)    • Elevated cholesterol    • Hypertension    • MI (myocardial infarction) (CMS/[facility])    • Non-insulin dependent type 2 diabetes mellitus (CMS/[facility])      Past Surgical History:   Procedure Laterality Date   • CARDIAC CATHETERIZATION     • CARDIAC CATHETERIZATION N/A 2020    Procedure: Left Heart Cath;  Surgeon: Migdalia Beth MD;  Location: [facility] INVASIVE LOCATION;  Service: Cardiovascular   • CARDIAC CATHETERIZATION N/A 2020    Procedure: Left ventriculography;  Surgeon: Migdalia Beth MD;  Location: [facility] INVASIVE LOCATION;  Service: Cardiovascular   • CARDIAC CATHETERIZATION N/A 2020    Procedure: Coronary angiography;  Surgeon: Migdalia Beth MD;  Location:  [facility] CATH INVASIVE LOCATION;  Service: Cardiovascular   • CORONARY ANGIOPLASTY WITH STENT PLACEMENT     • MITRAL VALVE REPAIR/REPLACEMENT N/A 1/22/2020    Procedure: MITRAL VALVE REPAIR/REPLACEMENT;  Surgeon: Fallon Cole MD;  Location: [facility] CVOR;  Service: Cardiothoracic;  Laterality: N/A;  patient has endocarditis mitral valve replaced with 31mm knox II     General Information     Row Name 03/02/20 1244          PT Evaluation Time/Intention    Document Type  therapy note (daily note)  -CR     Mode of Treatment  physical therapy  -CR     Row Name 03/02/20 1244          Cognitive Assessment/Intervention- PT/OT    Orientation Status (Cognition)  oriented x 3  -CR     Row Name 03/02/20 1244          Safety Issues, Functional Mobility    Impairments Affecting Function (Mobility)  pain  -CR       User Key  (r) = Recorded By, (t) = Taken By, (c) = Cosigned By    Initials Name Provider Type    CR Reyes, Carmela, PT Physical Therapist        Mobility     Row Name 03/02/20 1244          Bed Mobility Assessment/Treatment    Supine-Sit [place] (Bed Mobility)  maximum assist (25% patient effort);2 person assist;verbal cues  -CR     Assistive Device (Bed Mobility)  draw sheet;bed rails  -CR     Comment (Bed Mobility)  not able to follow sternal precautions  -CR     Row Name 03/02/20 1245          Bed-Chair Transfer    Bed-Chair [place] (Transfers)  maximum assist (25% patient effort);2 person assist;1 person to manage equipment  -CR     Row Name 03/02/20 1245          Sit-Stand Transfer    Sit-Stand [place] (Transfers)  maximum assist (25% patient effort);2 person assist  -CR       User Key  (r) = Recorded By, (t) = Taken By, (c) = Cosigned By    Initials Name Provider Type    CR Reyes, Carmela, PT Physical Therapist        Obj/Interventions     Row Name 03/02/20 1246          Static Sitting Balance    Level of [place] (Unsupported Sitting, Static Balance)  contact guard assist  -CR     Sitting  Position (Unsupported Sitting, Static Balance)  sitting on edge of bed  -CR     Time Able to Maintain Position (Unsupported Sitting, Static Balance)  2 to 3 minutes  -CR     Row Name 03/02/20 1246          Static Standing Balance    Level of [place] (Supported Standing, Static Balance)  maximal assist, 25 to 49% patient effort;2 person assist  -CR     Time Able to Maintain Position (Supported Standing, Static Balance)  30 to 45 seconds  -CR       User Key  (r) = Recorded By, (t) = Taken By, (c) = Cosigned By    Initials Name Provider Type    CR Reyes, Carmela, PT Physical Therapist        Goals/Plan     Row Name 03/02/20 1249          Bed Mobility Goal 1 (PT)    Progress/Outcomes (Bed Mobility Goal 1, PT)  progress slower than expected  -CR     Row Name 03/02/20 1249          Transfer Goal 1 (PT)    [place] Level/Cues Needed (Transfer Goal 1, PT)  moderate assist (50-74% patient effort)  -CR     Progress/Outcome (Transfer Goal 1, PT)  progress slower than expected;medical status inhibiting progress;goal revised this date  -CR       User Key  (r) = Recorded By, (t) = Taken By, (c) = Cosigned By    Initials Name Provider Type    CR Reyes, Carmela, PT Physical Therapist        Clinical Impression     Row Name 03/02/20 1246          Pain Assessment    Additional Documentation  Pain Scale: Numbers Pre/Post-Treatment (Group)  -CR     Row Name 03/02/20 1246          Pain Scale: Numbers Pre/Post-Treatment    Pain Scale: Numbers, Pretreatment  0/10 - no pain  -CR     Pain Scale: Numbers, Post-Treatment  0/10 - no pain  -CR     Row Name 03/02/20 1246          Plan of Care Review    Plan of Care Reviewed With  patient  -CR     Progress  no change  -CR     Outcome Summary  Pt unable to verbalize nor maintain sternal precautions despite cues. Trialed standing with A of 2 with knee block but pt unable to get fully upright due to marked BLE mm weakness. Pt will need extensive assistance and care upon d/c.   -CR     [place]  Name 03/02/20 1246          Positioning and Restraints    Pre-Treatment Position  in bed  -CR     Post Treatment Position  chair  -CR     In Chair  notified nsg;sitting;call light within reach  -CR       User Key  (r) = Recorded By, (t) = Taken By, (c) = Cosigned By    Initials Name Provider Type    CR Reyes, Carmela, PT Physical Therapist        Outcome Measures     Row Name 03/02/20 1249          How much help from another person do you currently need...    Turning from your back to your side while in flat bed without using bedrails?  2  -CR     Moving from lying on back to sitting on the side of a flat bed without bedrails?  2  -CR     Moving to and from a bed to a chair (including a wheelchair)?  2  -CR     Standing up from a chair using your arms (e.g., wheelchair, bedside chair)?  2  -CR     Climbing 3-5 steps with a railing?  1  -CR     To walk in hospital room?  1  -CR     AM-PAC 6 Clicks Score (PT)  10  -CR     Row Name 03/02/20 1249          Functional Assessment    Outcome Measure Options  AM-PAC 6 Clicks Basic Mobility (PT)  -CR       User Key  (r) = Recorded By, (t) = Taken By, (c) = Cosigned By    Initials Name Provider Type    CR Reyes, Carmela, PT Physical Therapist          PT Recommendation and Plan     Plan of Care Reviewed With: patient  Progress: no change  Outcome Summary: Pt unable to verbalize nor maintain sternal precautions despite cues. Trialed standing with A of 2 with knee block but pt unable to get fully upright due to marked BLE mm weakness. Pt will need extensive assistance and care upon d/c.      Time Calculation:   PT Charges     Row Name 03/02/20 1251             Time Calculation    Start Time  0912  -CR      Stop Time  0928  -CR      Time Calculation (min)  16 min  -CR      PT Received On  03/02/20  -CR      PT - Next Appointment  03/03/20  -CR         Time Calculation- PT    Total Timed Code Minutes- PT  16 minute(s)  -CR        User Key  (r) = Recorded By, (t) = Taken By, (c) =  Cosigned By    Initials Name Provider Type    CR Reyes, Carmela, PT Physical Therapist        Therapy Charges for Today     Code Description Service Date Service Provider Modifiers Qty    71071975285 HC PT THERAPEUTIC ACT EA 15 MIN 3/2/2020 Reyes, Carmela, PT GP 1    19821233097 HC PT THER PROC EA 15 MIN 3/2/2020 Reyes, Carmela, PT GP 1          PT G-Codes  Outcome Measure Options: AM-PAC 6 Clicks Basic Mobility (PT)  AM-PAC 6 Clicks Score (PT): 10  Modified [place] Scale: 5 - Severe disability.  Bedridden, incontinent, and requiring constant nursing care and attention.    Carmela Reyes, PT  3/2/2020

## 2020-03-03 ENCOUNTER — APPOINTMENT (OUTPATIENT)
Dept: GENERAL RADIOLOGY | Facility: HOSPITAL | Age: 65
End: 2020-03-03

## 2020-03-03 PROBLEM — J18.9 PNEUMONIA OF LEFT LUNG DUE TO INFECTIOUS ORGANISM: Status: ACTIVE | Noted: 2020-02-27

## 2020-03-03 LAB
ALBUMIN SERPL-MCNC: 2.5 G/DL (ref 3.5–5.2)
ALBUMIN/GLOB SERPL: 0.7 G/DL
ALP SERPL-CCNC: 90 U/L (ref 39–117)
ALT SERPL W P-5'-P-CCNC: 76 U/L (ref 1–41)
ANION GAP SERPL CALCULATED.3IONS-SCNC: 12 MMOL/L (ref 5–15)
APTT PPP: 28 SECONDS (ref 61–76.5)
AST SERPL-CCNC: 56 U/L (ref 1–40)
BACTERIA SPEC AEROBE CULT: NORMAL
BACTERIA SPEC AEROBE CULT: NORMAL
BASOPHILS # BLD AUTO: 0.2 10*3/MM3 (ref 0–0.2)
BASOPHILS NFR BLD AUTO: 2 % (ref 0–1.5)
BILIRUB SERPL-MCNC: <0.2 MG/DL (ref 0.2–1.2)
BUN BLD-MCNC: 14 MG/DL (ref 8–23)
BUN/CREAT SERPL: 15.7 (ref 7–25)
CALCIUM SPEC-SCNC: 8.4 MG/DL (ref 8.6–10.5)
CHLORIDE SERPL-SCNC: 103 MMOL/L (ref 98–107)
CO2 SERPL-SCNC: 25 MMOL/L (ref 22–29)
CREAT BLD-MCNC: 0.89 MG/DL (ref 0.76–1.27)
DEPRECATED RDW RBC AUTO: 48.1 FL (ref 37–54)
EOSINOPHIL # BLD AUTO: 2.2 10*3/MM3 (ref 0–0.4)
EOSINOPHIL NFR BLD AUTO: 17.8 % (ref 0.3–6.2)
ERYTHROCYTE [DISTWIDTH] IN BLOOD BY AUTOMATED COUNT: 17.9 % (ref 12.3–15.4)
GFR SERPL CREATININE-BSD FRML MDRD: 86 ML/MIN/1.73
GLOBULIN UR ELPH-MCNC: 3.8 GM/DL
GLUCOSE BLD-MCNC: 135 MG/DL (ref 65–99)
GLUCOSE BLDC GLUCOMTR-MCNC: 108 MG/DL (ref 70–105)
GLUCOSE BLDC GLUCOMTR-MCNC: 122 MG/DL (ref 70–105)
GLUCOSE BLDC GLUCOMTR-MCNC: 167 MG/DL (ref 70–105)
GLUCOSE BLDC GLUCOMTR-MCNC: 81 MG/DL (ref 70–105)
HCT VFR BLD AUTO: 26.1 % (ref 37.5–51)
HGB BLD-MCNC: 8.4 G/DL (ref 13–17.7)
LYMPHOCYTES # BLD AUTO: 1.2 10*3/MM3 (ref 0.7–3.1)
LYMPHOCYTES NFR BLD AUTO: 9.3 % (ref 19.6–45.3)
MAGNESIUM SERPL-MCNC: 2.2 MG/DL (ref 1.6–2.4)
MCH RBC QN AUTO: 24.2 PG (ref 26.6–33)
MCHC RBC AUTO-ENTMCNC: 32 G/DL (ref 31.5–35.7)
MCV RBC AUTO: 75.6 FL (ref 79–97)
MONOCYTES # BLD AUTO: 0.8 10*3/MM3 (ref 0.1–0.9)
MONOCYTES NFR BLD AUTO: 6.2 % (ref 5–12)
NEUTROPHILS # BLD AUTO: 8.1 10*3/MM3 (ref 1.7–7)
NEUTROPHILS NFR BLD AUTO: 64.7 % (ref 42.7–76)
NRBC BLD AUTO-RTO: 0.1 /100 WBC (ref 0–0.2)
PLATELET # BLD AUTO: 753 10*3/MM3 (ref 140–450)
PMV BLD AUTO: 7.2 FL (ref 6–12)
POTASSIUM BLD-SCNC: 4.4 MMOL/L (ref 3.5–5.2)
PROT SERPL-MCNC: 6.3 G/DL (ref 6–8.5)
RBC # BLD AUTO: 3.46 10*6/MM3 (ref 4.14–5.8)
SODIUM BLD-SCNC: 140 MMOL/L (ref 136–145)
WBC NRBC COR # BLD: 12.5 10*3/MM3 (ref 3.4–10.8)

## 2020-03-03 PROCEDURE — 83735 ASSAY OF MAGNESIUM: CPT | Performed by: NURSE PRACTITIONER

## 2020-03-03 PROCEDURE — 25010000002 CEFTRIAXONE PER 250 MG: Performed by: INTERNAL MEDICINE

## 2020-03-03 PROCEDURE — 99024 POSTOP FOLLOW-UP VISIT: CPT | Performed by: NURSE PRACTITIONER

## 2020-03-03 PROCEDURE — 85730 THROMBOPLASTIN TIME PARTIAL: CPT | Performed by: NURSE PRACTITIONER

## 2020-03-03 PROCEDURE — 82962 GLUCOSE BLOOD TEST: CPT

## 2020-03-03 PROCEDURE — 85025 COMPLETE CBC W/AUTO DIFF WBC: CPT | Performed by: NURSE PRACTITIONER

## 2020-03-03 PROCEDURE — 63710000001 INSULIN LISPRO (HUMAN) PER 5 UNITS: Performed by: NURSE PRACTITIONER

## 2020-03-03 PROCEDURE — 25010000002 VANCOMYCIN 1 G RECONSTITUTED SOLUTION 1 EACH VIAL: Performed by: NURSE PRACTITIONER

## 2020-03-03 PROCEDURE — 97530 THERAPEUTIC ACTIVITIES: CPT

## 2020-03-03 PROCEDURE — 94799 UNLISTED PULMONARY SVC/PX: CPT

## 2020-03-03 PROCEDURE — 80053 COMPREHEN METABOLIC PANEL: CPT | Performed by: INTERNAL MEDICINE

## 2020-03-03 PROCEDURE — 25010000002 MEROPENEM PER 100 MG: Performed by: INTERNAL MEDICINE

## 2020-03-03 PROCEDURE — 71045 X-RAY EXAM CHEST 1 VIEW: CPT

## 2020-03-03 RX ADMIN — MEROPENEM 1 G: 1 INJECTION, POWDER, FOR SOLUTION INTRAVENOUS at 04:50

## 2020-03-03 RX ADMIN — MEROPENEM 1 G: 1 INJECTION, POWDER, FOR SOLUTION INTRAVENOUS at 11:54

## 2020-03-03 RX ADMIN — INSULIN LISPRO 2 UNITS: 100 INJECTION, SOLUTION INTRAVENOUS; SUBCUTANEOUS at 11:55

## 2020-03-03 RX ADMIN — FINASTERIDE 5 MG: 5 TABLET, FILM COATED ORAL at 08:07

## 2020-03-03 RX ADMIN — ACETYLCYSTEINE 4 ML: 200 SOLUTION ORAL; RESPIRATORY (INHALATION) at 21:01

## 2020-03-03 RX ADMIN — FLUOXETINE 40 MG: 20 CAPSULE ORAL at 08:07

## 2020-03-03 RX ADMIN — MEROPENEM 1 G: 1 INJECTION, POWDER, FOR SOLUTION INTRAVENOUS at 21:09

## 2020-03-03 RX ADMIN — IPRATROPIUM BROMIDE AND ALBUTEROL SULFATE 3 ML: .5; 3 SOLUTION RESPIRATORY (INHALATION) at 16:00

## 2020-03-03 RX ADMIN — APIXABAN 5 MG: 5 TABLET, FILM COATED ORAL at 08:07

## 2020-03-03 RX ADMIN — Medication 1 CAPSULE: at 21:09

## 2020-03-03 RX ADMIN — AMIODARONE HYDROCHLORIDE 200 MG: 200 TABLET ORAL at 08:07

## 2020-03-03 RX ADMIN — Medication 10 ML: at 08:08

## 2020-03-03 RX ADMIN — SODIUM CHLORIDE 1000 MG: 900 INJECTION, SOLUTION INTRAVENOUS at 02:55

## 2020-03-03 RX ADMIN — GABAPENTIN 200 MG: 100 CAPSULE ORAL at 08:06

## 2020-03-03 RX ADMIN — CETIRIZINE HYDROCHLORIDE 10 MG: 10 TABLET, FILM COATED ORAL at 08:07

## 2020-03-03 RX ADMIN — FOLIC ACID 1 MG: 1 TABLET ORAL at 08:07

## 2020-03-03 RX ADMIN — GABAPENTIN 200 MG: 100 CAPSULE ORAL at 15:27

## 2020-03-03 RX ADMIN — TAMSULOSIN HYDROCHLORIDE 0.4 MG: 0.4 CAPSULE ORAL at 08:07

## 2020-03-03 RX ADMIN — IPRATROPIUM BROMIDE AND ALBUTEROL SULFATE 3 ML: .5; 3 SOLUTION RESPIRATORY (INHALATION) at 21:01

## 2020-03-03 RX ADMIN — ASPIRIN 81 MG: 81 TABLET, DELAYED RELEASE ORAL at 08:06

## 2020-03-03 RX ADMIN — SODIUM CHLORIDE 1000 MG: 900 INJECTION, SOLUTION INTRAVENOUS at 14:07

## 2020-03-03 RX ADMIN — APIXABAN 5 MG: 5 TABLET, FILM COATED ORAL at 21:09

## 2020-03-03 RX ADMIN — GUAIFENESIN 1200 MG: 600 TABLET, EXTENDED RELEASE ORAL at 21:09

## 2020-03-03 RX ADMIN — GABAPENTIN 200 MG: 100 CAPSULE ORAL at 21:09

## 2020-03-03 RX ADMIN — CYANOCOBALAMIN TAB 1000 MCG 1000 MCG: 1000 TAB at 08:07

## 2020-03-03 RX ADMIN — CEFTRIAXONE SODIUM 2 G: 2 INJECTION, POWDER, FOR SOLUTION INTRAMUSCULAR; INTRAVENOUS at 08:06

## 2020-03-03 RX ADMIN — CEFTRIAXONE SODIUM 2 G: 2 INJECTION, POWDER, FOR SOLUTION INTRAMUSCULAR; INTRAVENOUS at 21:43

## 2020-03-03 RX ADMIN — PANTOPRAZOLE SODIUM 40 MG: 40 TABLET, DELAYED RELEASE ORAL at 08:07

## 2020-03-03 RX ADMIN — Medication 10 ML: at 21:10

## 2020-03-03 RX ADMIN — Medication 800 UNITS: at 08:07

## 2020-03-03 RX ADMIN — GUAIFENESIN 1200 MG: 600 TABLET, EXTENDED RELEASE ORAL at 08:06

## 2020-03-03 RX ADMIN — Medication 1 CAPSULE: at 08:07

## 2020-03-03 RX ADMIN — ACETYLCYSTEINE 4 ML: 200 SOLUTION ORAL; RESPIRATORY (INHALATION) at 07:55

## 2020-03-03 RX ADMIN — IPRATROPIUM BROMIDE AND ALBUTEROL SULFATE 3 ML: .5; 3 SOLUTION RESPIRATORY (INHALATION) at 11:50

## 2020-03-03 RX ADMIN — IPRATROPIUM BROMIDE AND ALBUTEROL SULFATE 3 ML: .5; 3 SOLUTION RESPIRATORY (INHALATION) at 07:55

## 2020-03-03 NOTE — PLAN OF CARE
Pt has limited functional prognosis at this time as his premorbid level of functioning 3 months ago was (I) SPS w/c transfers & some assist for ADL. He had not been able to make progress in therapy at rehab and was utilizing a [place] lift as his new baseline for transfers. Now with acute infection he is even more weak & functionally limited. OT recommends the use of a [place] for transfers & Pt can work on static standing w/ 2-person assist to work toward more (I) transfers. OT will continue 3X/week therapy sessions & if he is able to tolerate more activity he may be increased to 5 days/week.

## 2020-03-03 NOTE — PROGRESS NOTES
[facility]/PULM/CC PROGRESS NOTE     HISTORY OF PRESENT ILLNESS:  Jamin Hennessy is a 64 y.o. 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  Patient reported that earlier that morning, while at [facility], his blood pressure checked and was reportedly low.  It was at that time, that EMS was contacted, and patient was taken to the outlying facility emergency department.  During his stay in the ED, patient was diagnosed with pneumonia.  He had reported shortness of breath, cough with yellow sputum production that started the preceding early morning.  Patient denied nausea, vomiting, constipation, diarrhea, fever, or chills.  Patient did mention some chest soreness, but denies arm pain, neck pain, or jaw pain.  Notably, patient had a recent mitral valve replacement secondary to known endocarditis.  This patient is well-known to this practice, as we were following patient during his recent hospitalization.  At time of discharge, patient was recommended to complete 6 weeks of IV antibiotics with daptomycin and Rocephin per infectious disease.  Patient was discharged to [facility], and the plan was for antibiotic completion on 3/8/2020.  Patient was transferred to [facility] and admitted initially to the hospitalist team for further treatment and evaluation of patient condition.  Patient has required no vasopressors.  Infectious disease as well as cardiothoracic surgery have been consulted.  Patient presented with right upper extremity midline that was placed during his previous admission.  Due to patient's pulmonary status, with increased oxygen  "supplementation needs, cardiothoracic surgery requested patient to be transferred to [facility] for closer monitoring.  Previous work-up included bilateral lower extremity venous Doppler which reported \"chronic left lower extremity superficial thrombophlebitis noted in the small saphenous.\"  It is been reported that patient had a chest x-ray from outlying facility indicating pneumonia, and CT of the chest at this facility revealed \"extensive airspace and interstitial opacity throughout the left lung with smaller patchy air peripheral opacities in the right lung.  Only the right upper lobe multifocal infection/pneumonia is the most likely etiology.  There is mild mediastinal adenopathy which is likely reactive/infectious.  There are small layering bilateral pleural effusions.\"  Per infectious disease, in review of patient's previous medical record, patient had blood cultures that were positive for enterococcus faecium, which screened positive for VRE based on PCR.  Initially organism was susceptible to ampicillin and gentamicin.  Repeat blood cultures were negative.  Following patient's surgery on 1/22/2020 (MVR), the mitral valve tissue culture was also positive for enterococcus faecium, but sensitivities revealed resistance to ampicillin.  ID was consulted as the case has become rather complex, and that first and second line antibiotic therapies are revealing resistance.  At time of transfer, patient initially was on 3 L/min via nasal cannula, but after being transferred in bed, required an increase to 6 L/min via high flow nasal cannula.  Patient denies chest pain, neck pain, arm pain, jaw pain, nausea, vomiting, constipation, diarrhea, blood in urine or stool.  Patient does admit to some mild shortness of breath, frequent cough with yellow sputum production, but denies fever or chills.     [facility] was consulted for further evaluation and treatment of pneumonia, and to aid improvement in pulmonary status.  Thank you for " "this consultation, we will follow along on this patient.        SUBJECTIVE    2/29: Patient reports feeling somewhat better today.  Tolerating 5 L O2.  No shortness of air at rest.  3/1: Patient reports feeling well today.  He reports shortness of air is improving.  Demonstrates good compliance with I-S.  Tolerating O2 at 4 L by nasal cannula  3/2:  Doing ok.  Reports some SOA and cough.  On supplemental oxygen, 4 liters  3/3:  No new issues.  Feeling some better.  Cough improved.  On 2L nasal cannula    OBJECTIVE    /86   Pulse 92   Temp 97.7 °F (36.5 °C)   Resp 20   Ht 175.3 cm (69.02\")   Wt 119 kg (261 lb 14.5 oz)   SpO2 98%   BMI 38.66 kg/m²   Intake/Output last 3 shifts:  I/O last 3 completed shifts:  In: 4039 [P.O.:1320; I.V.:2269; IV Piggyback:450]  Out: -   Intake/Output this shift:  No intake/output data recorded.    PHYSICAL EXAM:       Constitutional:  Well developed, well nourished, no acute distress, acutely ill-appearing, chronically ill-appearing  Eyes:  PERRL, conjunctiva normal, EOMI   HENT:  Atraumatic, external ears normal, nose normal. Neck-normal range of motion, no tenderness, supple, trachea midline  Respiratory:  Clear and diminished.  No crackles or wheezing. non-labored respirations without accessory muscle use  Cardiovascular:  Normal rate, normal rhythm, no murmurs, no gallops, no rubs   GI:  Soft, nondistended, normal bowel sounds, nontender, no rebound, no guarding   :  deferred   Musculoskeletal: Bilateral lower extremity edema 2+, no tenderness, no deformities  Integument:  Well hydrated, no rash.  Right sided posterior lateral chest with abrasion, right second and third toe with unstageable but without erythema or warmth, no drainage noted.  Sternal incision healing well  Neurologic:  Alert & oriented x 3, no lateralizing deficits  Psychiatric:  Speech and behavior appropriate    Scheduled Meds:    acetylcysteine 4 mL Nebulization BID - RT   amiodarone 200 mg Oral Q24H "   apixaban 5 mg Oral Q12H   aspirin 81 mg Oral Daily   cefTRIAXone 2 g Intravenous Q12H   cetirizine 10 mg Oral Daily   finasteride 5 mg Oral Daily   FLUoxetine 40 mg Oral Daily   folic acid 1 mg Oral Daily   gabapentin 200 mg Oral TID   guaiFENesin 1,200 mg Oral Q12H   insulin lispro 0-9 Units Subcutaneous 4x Daily With Meals & Nightly   ipratropium-albuterol 3 mL Nebulization Q4H While Awake - RT   lactobacillus acidophilus 1 capsule Oral BID   meropenem 1 g Intravenous Q8H   pantoprazole 40 mg Oral QAM   sodium chloride 10 mL Intravenous Q12H   tamsulosin 0.4 mg Oral Daily   vancomycin 15 mg/kg (Ideal) Intravenous Q12H   vitamin B-12 1,000 mcg Oral Daily   vitamin E 800 Units Oral Daily       Continuous Infusions:    Pharmacy to dose vancomycin        PRN Meds:•  acetaminophen **OR** acetaminophen **OR** acetaminophen  •  benzonatate  •  bisacodyl  •  cyclobenzaprine  •  dextrose  •  dextrose  •  glucagon (human recombinant)  •  insulin lispro **AND** insulin lispro  •  ipratropium-albuterol  •  magnesium sulfate **OR** magnesium sulfate in D5W 1g/100mL (PREMIX)  •  ondansetron **OR** ondansetron  •  Pharmacy to dose vancomycin  •  potassium chloride  •  sodium chloride     Data Review:  Lab Results (last 24 hours)     Procedure Component Value Units Date/Time    POC Glucose Once [[id number]]  (Abnormal) Collected:  03/03/20 0751    Specimen:  Blood Updated:  03/03/20 0753     Glucose 108 mg/dL      Comment: Serial Number: [id number]:  [id number]       Magnesium [[id number]]  (Normal) Collected:  03/03/20 0516    Specimen:  Blood Updated:  03/03/20 0553     Magnesium 2.2 mg/dL     Comprehensive Metabolic Panel [[id number]]  (Abnormal) Collected:  03/03/20 0516    Specimen:  Blood Updated:  03/03/20 0553     Glucose 135 mg/dL      BUN 14 mg/dL      Creatinine 0.89 mg/dL      Sodium 140 mmol/L      Potassium 4.4 mmol/L      Chloride 103 mmol/L      CO2 25.0 mmol/L      Calcium 8.4 mg/dL      Total Protein 6.3  g/dL      Albumin 2.50 g/dL      ALT (SGPT) 76 U/L      AST (SGOT) 56 U/L      Alkaline Phosphatase 90 U/L      Total Bilirubin <0.2 mg/dL      eGFR Non African Amer 86 mL/min/1.73      Globulin 3.8 gm/dL      A/G Ratio 0.7 g/dL      BUN/Creatinine Ratio 15.7     Anion Gap 12.0 mmol/L     Narrative:       GFR Normal >60  Chronic Kidney Disease <60  Kidney Failure <15      aPTT [[id number]]  (Abnormal) Collected:  03/03/20 0516    Specimen:  Blood Updated:  03/03/20 0545     PTT 28.0 seconds     CBC & Differential [[id number]] Collected:  03/03/20 0517    Specimen:  Blood Updated:  03/03/20 0534    Narrative:       The following orders were created for panel order CBC & Differential.  Procedure                               Abnormality         Status                     ---------                               -----------         ------                     CBC Auto Differential[[id number]]        Abnormal            Final result                 Please view results for these tests on the individual orders.    CBC Auto Differential [[id number]]  (Abnormal) Collected:  03/03/20 0517    Specimen:  Blood Updated:  03/03/20 0534     WBC 12.50 10*3/mm3      RBC 3.46 10*6/mm3      Hemoglobin 8.4 g/dL      Hematocrit 26.1 %      MCV 75.6 fL      MCH 24.2 pg      MCHC 32.0 g/dL      RDW 17.9 %      RDW-SD 48.1 fl      MPV 7.2 fL      Platelets 753 10*3/mm3      Neutrophil % 64.7 %      Lymphocyte % 9.3 %      Monocyte % 6.2 %      Eosinophil % 17.8 %      Basophil % 2.0 %      Neutrophils, Absolute 8.10 10*3/mm3      Lymphocytes, Absolute 1.20 10*3/mm3      Monocytes, Absolute 0.80 10*3/mm3      Eosinophils, Absolute 2.20 10*3/mm3      Basophils, Absolute 0.20 10*3/mm3      nRBC 0.1 /100 WBC     POC Glucose Once [[id number]]  (Normal) Collected:  03/02/20 2107    Specimen:  Blood Updated:  03/02/20 2108     Glucose 98 mg/dL      Comment: Serial Number: [id number]:  [id number]       POC Glucose Once [[id number]]  (Abnormal)  Collected:  03/02/20 1701    Specimen:  Blood Updated:  03/02/20 1703     Glucose 121 mg/dL      Comment: Serial Number: [id number]:  [id number]       Blood Culture - Blood, Arm, Left [[id number]] Collected:  02/27/20 1152    Specimen:  Blood from Arm, Left Updated:  03/02/20 1215     Blood Culture No growth at 4 days    Blood Culture - Blood, Arm, Right [[id number]] Collected:  02/27/20 1157    Specimen:  Blood from Arm, Right Updated:  03/02/20 1215     Blood Culture No growth at 4 days    POC Glucose Once [[id number]]  (Abnormal) Collected:  03/02/20 1147    Specimen:  Blood Updated:  03/02/20 1203     Glucose 163 mg/dL      Comment: Serial Number: [id number]:  [id number]                Imaging:  Imaging Results (Last 72 Hours)     Procedure Component Value Units Date/Time    XR Chest 1 View [[id number]] Collected:  03/03/20 0829     Updated:  03/03/20 0833    Narrative:       DATE OF EXAM:  3/3/2020 7:35 AM     PROCEDURE:  XR CHEST 1 VW-     INDICATIONS:  Pneumonia. Follow-up. Coronary artery disease. Diabetes. HISTORY of  mitral valve replacement. Sepsis.       COMPARISON:  AP portable chest 03/01/2020. CT chest 02/27/2020.     TECHNIQUE:   Single radiographic view of the chest was obtained.     FINDINGS:  Dense consolidative changes with air bronchograms are seen within the  left lung with sparing at the level of the costophrenic angle, not  thought to be significantly changed, allowing for slight differences in  technique, compared to prior. Suspected minimal infiltrate in the right  base, without focal right lung consolidation. Heart size is stable with  signs of prior median sternotomy and valve replacement. No pneumothorax.  Questionable trace bilateral pleural effusions. Degenerative changes of  the shoulders.       Impression:       Dense airspace disease throughout the left lung, compatible with the  provided history of pneumonia. Suspected minimal right basilar  infiltrate. No significant  change from 03/01/2020, allowing for  differences in technique..     Electronically Signed By-Dr. Riana Lowery MD On:3/3/2020 8:31 AM  This report was finalized on [id number] by Dr. Riana Lowery MD.    XR Chest 1 View [[id number]] Collected:  03/01/20 0500     Updated:  03/01/20 0506    Narrative:       DATE OF EXAM:  3/1/2020 4:19 AM     PROCEDURE:  XR CHEST 1 VW-     INDICATIONS:  Left-sided pneumonia.     COMPARISONS:  Chest x-ray, 01/29/2020, 10:09 AM. Chest CT, 02/27/2020, 9:15 PM.     TECHNIQUE:   Single radiographic AP view of the chest was obtained.     FINDINGS:  Diffuse pulmonary consolidation is seen throughout the left lung with  air bronchograms. There may be partial aeration of the left lung base.  Similar findings are seen in the chest CT examination from 02/27/2020.  Minimal if any acute airspace disease is seen on the right. The findings  on the left suggest pneumonia. Aspiration pneumonia would be in the  differential diagnosis. Probably no cardiac enlargement is present. No  definite pneumothorax is seen. The patient has undergone median  sternotomy and mitral valve replacement surgery. Small bilateral pleural  effusions may be present. There may be pleural-parenchymal scarring in  the right pulmonary apex.          Impression:       No significant interval change is suspected radiographically in the  diffuse airspace disease throughout the left lung, which may represent  pneumonia.      Minimal, if any, acute airspace disease is suspected on the right.     Electronically Signed By-Dr. Brandon Morrison MD On:3/1/2020 5:04 AM  This report was finalized on [id number] by Dr. Brandon Morrison MD.            ASSESSMENT/PLAN:     Coronary artery disease due to lipid rich plaque    Dyslipidemia    Non-insulin dependent type 2 diabetes mellitus (CMS/HCC)    Diabetic neuropathy (CMS/HCC)    GERD without esophagitis    BPH with obstruction/lower urinary tract symptoms    B12 deficiency    Vitamin D  deficiency    JARRETT (generalized anxiety disorder)    Tobacco abuse    Obesity (BMI 30-39.9)    S/P MVR (mitral valve replacement)    Endocarditis of mitral valve    Hypotension    Shortness of breath    Sepsis associated hypotension (CMS/HCC)    Chest pain    Vitamin E deficiency    Severe sepsis (CMS/HCC)    HCAP (healthcare-associated pneumonia)         Severe sepsis without septic shock, secondary to pneumonia  -IVF bolus given at outlying facility  -Lactic Acid 1.0  -Cultures-pending  -Urine antigens-negative  -RVP-negative  -Reviewed CT Chest.  -Procalcitonin-0.10  -Rocephin, Merrem, and vancomycin, ID managing antibiotics  -Imaging with dense infiltrate in left lung, possible need for bronch.  Will discuss with ID  -WBC increased some to 12.5     Sepsis associated hypotension, improving  -Did not require vasopressors  -Stable, continue to monitor     HCAP  -Reviewed CT Chest.  -Aggressive pulmonary toileting  -Follow sputum cultures  -IS/Flutter Valve encouraged and ordered  -Antibiotics as above per ID  -Continue Mucinex, Zyrtec     Acute respiratory failure with hypoxia, likely secondary to pneumonia; R/O Volume overload  -From past admission, bedside PFT reviewed: Suggestive of restrictive defect. FEV1 46%.  +bronchdilator response.  Diffusion capacity normal when corrected.  -Oxygen supplementation, titrate to maintain oxygen saturation >91%, currently on 2 L  -Duonebs scheduled and PRN  -BNP 1553  -intermittent diuresis  -sputum 2/28 - normal respiratory nelson      Enterococcus MV Endocarditis; S/P MVR (tissue)/MICHELLE ligation (1/22/2020)  -Previous hospitalization cultures: 1) Blood cultures-Enterococcus faecium, positive for VRE on PCR; initially susciptible to ampicillin and gentamicin.  2) MV tissue culture with Enterococcus faecium, resistant to ampicillin, susceptible for daptomycin.  -Initially placed on IV Rocephin & Daptomycin x 6 weeks by ID with end date of 3/8/2020 (during previous  hospitalization).  -ID consulted and managing antibiotics, as above.  -CTS following     CAD  -Continue ASA     Hyperlipidemia  -Statin therapy on hold due to previously being on daptomycin, resume when clinically appropriate     PAF (controlled), on chronic anticoagulation with Eliquis  -Continue amiodarone  -D/C heparin drip and now on Eliquis     Diabetes mellitus, type 2, with neuropathy  -strict glycemic control recommended  -Accuchecks AC/HS with sliding scale insulin  -Hold oral diabetic medications until out of ICU  -Continue gabapentin     GERD  -Continue protonix     BPH  -Continue proscar, flomax     General Anxiety Disorder  -Continue prozac     Multiple sclerosis  -Mainly wheelchair bound, limited mobility  -PT/OT     Obesity  -BMI 37.91  -Counseled on lifestyle modifications to improve overall health     Tobacco Abuse, current  -Counseled on smoking cessation     Vitamin D/B12 deficiency  -Continue folic acid, B12 supplements       High Risk     Accuchecks with SSI  Code Status: CPR, Full Interventions  VTE Prophylaxis:  Eliquis/SCDs  PUD Prophylaxis: Protonix    Midline  Diet ordered     Cont course, resp status stable

## 2020-03-03 NOTE — PROGRESS NOTES
Infectious Diseases Progress Note      LOS: 5 days   Patient Care Team:  Kajal Sanchez as PCP - General (Internal Medicine)  Frederick George MD as Consulting Physician (Nephrology)        Subjective       The patient has been afebrile for the last 24 hours.  The patient is currently on 1 L of oxygen via nasal cannula.  He is awake and alert.  He remained hemodynamically stable requiring no vasopressors.      Review of Systems:   Review of Systems   Constitutional: Positive for fatigue.   HENT: Negative.    Respiratory: Positive for cough and shortness of breath.    Cardiovascular:        Chest soreness around sternal incision    Gastrointestinal: Negative.    Genitourinary: Negative.    Musculoskeletal: Positive for arthralgias.   Skin: Negative.    Neurological: Positive for weakness.   Psychiatric/Behavioral: Negative.         Objective     Vital Signs  Temp:  [97.3 °F (36.3 °C)-98.3 °F (36.8 °C)] 97.3 °F (36.3 °C)  Heart Rate:  [80-96] 94  Resp:  [20] 20  BP: (105-139)/(59-86) 120/70    Physical Exam:  Physical Exam   Constitutional: He appears well-developed and well-nourished.   HENT:   Head: Normocephalic and atraumatic.   Eyes: Pupils are equal, round, and reactive to light. Conjunctivae and EOM are normal.   Neck: Neck supple.   Cardiovascular: Normal rate, regular rhythm and normal heart sounds.   Pulmonary/Chest: Effort normal. He has rales.   Extensive crackles on the left side   Abdominal: Soft. Bowel sounds are normal.   Musculoskeletal:   Degenerative changes patient appears to have some general weakness due to the multiple sclerosis    Neurological: He is alert.   Alert and oriented x2-patient patient seems more alert today   Skin: Skin is warm and dry.   Chest soreness around sternal incision    Psychiatric: He has a normal mood and affect.   Vitals reviewed.       Results Review:    I have reviewed all clinical data, test, lab, and imaging results.     Radiology  Xr Chest 1 View    Result Date:  3/3/2020  DATE OF EXAM: 3/3/2020 7:35 AM  PROCEDURE: XR CHEST 1 VW-  INDICATIONS: Pneumonia. Follow-up. Coronary artery disease. Diabetes. HISTORY of mitral valve replacement. Sepsis.   COMPARISON: [facility] portable chest 03/01/2020. CT chest 02/27/2020.  TECHNIQUE: Single radiographic view of the chest was obtained.  FINDINGS: Dense consolidative changes with air bronchograms are seen within the left lung with sparing at the level of the costophrenic angle, not thought to be significantly changed, allowing for slight differences in technique, compared to prior. Suspected minimal infiltrate in the right base, without focal right lung consolidation. Heart size is stable with signs of prior median sternotomy and valve replacement. No pneumothorax. Questionable trace bilateral pleural effusions. Degenerative changes of the shoulders.      Dense airspace disease throughout the left lung, compatible with the provided history of pneumonia. Suspected minimal right basilar infiltrate. No significant change from 03/01/2020, allowing for differences in technique..  Electronically Signed By-Dr. Riana Lowery MD On:3/3/2020 8:31 AM This report was finalized on [id number] by Dr. Riana Lowery MD.      Cardiology    Laboratory    Results from last 7 days   Lab Units 03/03/20  0517 03/02/20  0647 03/01/20  0351 02/29/20  0537 02/28/20 2209 02/28/20 0324 02/27/20  1152   WBC 10*3/mm3 12.50* 10.70 13.80* 13.30* 14.60* 16.80* 16.00*   HEMOGLOBIN g/dL 8.4* 8.3* 8.3* 7.3* 7.3* 7.2* 7.3*   HEMATOCRIT % 26.1* 24.5* 25.6* 23.0* 22.8* 21.9* 22.7*   PLATELETS 10*3/mm3 753* 682* 684* 581* 581* 562* 535*     Results from last 7 days   Lab Units 03/03/20  0516 03/02/20  0647 03/01/20  0351 02/29/20  0537 02/28/20  0324 02/27/20  1152   SODIUM mmol/L 140 142 141 142  --  140   POTASSIUM mmol/L 4.4 4.2 3.8 3.8 3.9 3.5   CHLORIDE mmol/L 103 108* 105 106  --  105   CO2 mmol/L 25.0 25.0 24.0 24.0  --  23.0   BUN mg/dL 14 15 16 16  --  16    CREATININE mg/dL 0.89 0.87 0.88 0.88  --  1.03   GLUCOSE mg/dL 135* 119* 140* 154*  --  118*   ALBUMIN g/dL 2.50* 2.20* 2.60*  --   --  2.30*   BILIRUBIN mg/dL <0.2* <0.2* 0.2  --   --  0.2   ALK PHOS U/L 90 88 99  --   --  90   AST (SGOT) U/L 56* 54* 86*  --   --  33   ALT (SGPT) U/L 76* 72* 86*  --   --  38   CALCIUM mg/dL 8.4* 8.6 8.2* 8.4*  --  8.4*     Results from last 7 days   Lab Units 02/28/20  0324   CK TOTAL U/L 33             Microbiology   Microbiology Results (last 10 days)     Procedure Component Value - Date/Time    Respiratory Culture - Sputum, Cough [[id number]] Collected:  02/28/20 1123    Lab Status:  Final result Specimen:  Sputum from Cough Updated:  03/01/20 1203     Respiratory Culture Scant growth (1+) Normal Respiratory Cheryl     Gram Stain Moderate (3+) WBCs per low power field      Rare (1+) Epithelial cells per low power field      Rare (1+) Mixed bacterial morphotypes seen on Gram Stain    Legionella Antigen, Urine - Urine, Urine, Clean Catch [[id number]]  (Normal) Collected:  02/28/20 0402    Lab Status:  Final result Specimen:  Urine, Clean Catch Updated:  02/28/20 0439     LEGIONELLA ANTIGEN, URINE Negative    S. Pneumo Ag Urine or CSF - Urine, Urine, Clean Catch [[id number]]  (Normal) Collected:  02/28/20 0402    Lab Status:  Final result Specimen:  Urine, Clean Catch Updated:  02/28/20 0439     Strep Pneumo Ag Negative    Respiratory Panel, PCR - Swab, Nasopharynx [[id number]]  (Normal) Collected:  02/27/20 2349    Lab Status:  Final result Specimen:  Swab from Nasopharynx Updated:  02/28/20 0131     ADENOVIRUS, PCR Not Detected     Coronavirus 229E Not Detected     Coronavirus HKU1 Not Detected     Coronavirus NL63 Not Detected     Coronavirus OC43 Not Detected     Human Metapneumovirus Not Detected     Human Rhinovirus/Enterovirus Not Detected     Influenza B PCR Not Detected     Parainfluenza Virus 1 Not Detected     Parainfluenza Virus 2 Not Detected     Parainfluenza Virus 3 Not  Detected     Parainfluenza Virus 4 Not Detected     Bordetella pertussis pcr Not Detected     Influenza A H1 2009 PCR Not Detected     Chlamydophila pneumoniae PCR Not Detected     Mycoplasma pneumo by PCR Not Detected     Influenza A PCR Not Detected     Influenza A H3 Not Detected     Influenza A H1 Not Detected     RSV, PCR Not Detected    Blood Culture - Blood, Arm, Right [[id number]] Collected:  02/27/20 1157    Lab Status:  Final result Specimen:  Blood from Arm, Right Updated:  03/03/20 1215     Blood Culture No growth at 5 days    Blood Culture - Blood, Arm, Left [[id number]] Collected:  02/27/20 1152    Lab Status:  Final result Specimen:  Blood from Arm, Left Updated:  03/03/20 1215     Blood Culture No growth at 5 days          Medication Review:       Schedule Meds    acetylcysteine 4 mL Nebulization BID - RT   amiodarone 200 mg Oral Q24H   apixaban 5 mg Oral Q12H   aspirin 81 mg Oral Daily   cefTRIAXone 2 g Intravenous Q12H   cetirizine 10 mg Oral Daily   finasteride 5 mg Oral Daily   FLUoxetine 40 mg Oral Daily   folic acid 1 mg Oral Daily   gabapentin 200 mg Oral TID   guaiFENesin 1,200 mg Oral Q12H   insulin lispro 0-9 Units Subcutaneous 4x Daily With Meals & Nightly   ipratropium-albuterol 3 mL Nebulization Q4H While Awake - RT   lactobacillus acidophilus 1 capsule Oral BID   meropenem 1 g Intravenous Q8H   pantoprazole 40 mg Oral QAM   sodium chloride 10 mL Intravenous Q12H   tamsulosin 0.4 mg Oral Daily   vancomycin 15 mg/kg (Ideal) Intravenous Q12H   vitamin B-12 1,000 mcg Oral Daily   vitamin E 800 Units Oral Daily       Infusion Meds    Pharmacy to dose vancomycin        PRN Meds  •  acetaminophen **OR** acetaminophen **OR** acetaminophen  •  benzonatate  •  bisacodyl  •  cyclobenzaprine  •  dextrose  •  dextrose  •  glucagon (human recombinant)  •  insulin lispro **AND** insulin lispro  •  ipratropium-albuterol  •  magnesium sulfate **OR** magnesium sulfate in D5W 1g/100mL (PREMIX)  •   ondansetron **OR** ondansetron  •  Pharmacy to dose vancomycin  •  potassium chloride  •  sodium chloride        Assessment/Plan       Antimicrobial Therapy   1.      day  2.  IV Rocephin    day  3.  IV vancomycin    day    4.  IV Merrem     Day   5.      Day      Assessment      Extensive left upper lobe and left lower lobe infiltrate.  The presentation is highly consistent with pneumonia.  Aspiration is less likely since it is involving all lobes of the left lung but I am concerned about hospital-acquired pneumonia.  The patient is currently on 5 L of oxygen via nasal cannula.  Sputum culture did not grow any significant pathogen.  Repeat chest x-ray did not show improvement    Mild reactive leukocytosis     Status post mitral valve replacement for mitral valve endocarditis on January 22, 2020 with enterococcus faecium.  The patient was finishing 6 weeks of IV daptomycin and IV Rocephin at the rehab facility and the last day of the IV antibiotics was supposed to be March 8, 2020     Type 2 diabetes     Tobacco abuse     Plan     Continue IV Rocephin 2 g every 12 hours.  We will discontinue IV Rocephin on March 8, 2020  Continue IV vancomycin for a total of 10 days  Continue IV meropenem 1 g every 8 hours for a total of 10 days  Case was discussed with ICU team  Bronchoscopy in a..        Allison Box MD  03/03/20  12:27 PM     Note is dictated utilizing voice recognition software/Dragon

## 2020-03-03 NOTE — PLAN OF CARE
Problem: Patient Care Overview  Goal: Plan of Care Review  Outcome: Ongoing (interventions implemented as appropriate)  Flowsheets (Taken 3/3/2020 1312)  Progress: no change  Plan of Care Reviewed With: patient  Outcome Summary: Patient remains on 3 different antibiotics. He is on 1L of oxygen at this time. Patient is extremely weak at this time. No continuous drips are going. Will continue to monitor progress.

## 2020-03-03 NOTE — PROGRESS NOTES
"Pharmacy Antimicrobial Dosing Service    Subjective:  Jamin Hennessy is a 64 y.o.male with PNA and previous mitral valve endocarditis s/p tissue MVR (Jan 22, 2020). Patient was to receive x6 weeks of IV antibiotics with last day of therapy being March 8, 2020. ID is following. Bronchoscopy is planned for tomorrow morning.    PMH: VRE, T2DM with neuropathy, Tobacco Abuse      Assessment/Plan    1. Day #5/10 Vancomycin: Goal -600 mcg*h/mL. Calculated AUC was 480 mcg*hr/mL with K = 0.064 (h-1) and half life of 11 hours. Will continue vancomycin 1000 mg (~11 mg/kg DBW) IV q12h. Repeat trough to be obtained tomorrow. Total of 10 days vancomycin therapy needed, per ID.    2. Day #6 Ceftriaxone: 2g IV q12h. Last day ceftriaxone therapy to be March 8th, per ID.    3. Day #5/10 Meropenem: 1000 mg IV q8h for estCrCl > 50 mL/min. Total of 10 days meropenem therapy needed, per ID.    Will continue to monitor drug levels, renal function, culture and sensitivities, and patient clinical status.       Objective:  Relevant clinical data and objective history reviewed:  175.3 cm (69.02\")   119 kg (261 lb 14.5 oz)   Ideal body weight: 70.7 kg (155 lb 15.1 oz)  Adjusted ideal body weight: 90 kg (198 lb 5.3 oz)  Body mass index is 38.66 kg/m².    Results from last 7 days   Lab Units 03/01/20  1256 03/01/20  0558   VANCOMYCIN PK mcg/mL  --  21.80   VANCOMYCIN TR mcg/mL 14.00  --    VANCOMYCIN RM mcg/mL  --  20.10     Results from last 7 days   Lab Units 03/03/20  0516 03/02/20  0647 03/01/20  0351   CREATININE mg/dL 0.89 0.87 0.88     Estimated Creatinine Clearance: 106.7 mL/min (by C-G formula based on SCr of 0.89 mg/dL).  I/O last 3 completed shifts:  In: 4039 [P.O.:1320; I.V.:2269; IV Piggyback:450]  Out: -     Results from last 7 days   Lab Units 03/03/20  0517 03/02/20  0647 03/01/20  0351   WBC 10*3/mm3 12.50* 10.70 13.80*     Temperature    03/03/20 0400 03/03/20 0802 03/03/20 1100   Temp: 97.7 °F (36.5 °C) 97.7 °F (36.5 " °C) 97.3 °F (36.3 °C)     Baseline culture/source/susceptibility:  Microbiology Results (last 10 days)       Procedure Component Value - Date/Time    Respiratory Culture - Sputum, Cough [[id number]] Collected:  02/28/20 1123    Lab Status:  Final result Specimen:  Sputum from Cough Updated:  03/01/20 1203     Respiratory Culture Scant growth (1+) Normal Respiratory Cheryl     Gram Stain Moderate (3+) WBCs per low power field      Rare (1+) Epithelial cells per low power field      Rare (1+) Mixed bacterial morphotypes seen on Gram Stain    Legionella Antigen, Urine - Urine, Urine, Clean Catch [[id number]]  (Normal) Collected:  02/28/20 0402    Lab Status:  Final result Specimen:  Urine, Clean Catch Updated:  02/28/20 0439     LEGIONELLA ANTIGEN, URINE Negative    S. Pneumo Ag Urine or CSF - Urine, Urine, Clean Catch [[id number]]  (Normal) Collected:  02/28/20 0402    Lab Status:  Final result Specimen:  Urine, Clean Catch Updated:  02/28/20 0439     Strep Pneumo Ag Negative    Respiratory Panel, PCR - Swab, Nasopharynx [[id number]]  (Normal) Collected:  02/27/20 2349    Lab Status:  Final result Specimen:  Swab from Nasopharynx Updated:  02/28/20 0131     ADENOVIRUS, PCR Not Detected     Coronavirus 229E Not Detected     Coronavirus HKU1 Not Detected     Coronavirus NL63 Not Detected     Coronavirus OC43 Not Detected     Human Metapneumovirus Not Detected     Human Rhinovirus/Enterovirus Not Detected     Influenza B PCR Not Detected     Parainfluenza Virus 1 Not Detected     Parainfluenza Virus 2 Not Detected     Parainfluenza Virus 3 Not Detected     Parainfluenza Virus 4 Not Detected     Bordetella pertussis pcr Not Detected     Influenza A H1 2009 PCR Not Detected     Chlamydophila pneumoniae PCR Not Detected     Mycoplasma pneumo by PCR Not Detected     Influenza A PCR Not Detected     Influenza A H3 Not Detected     Influenza A H1 Not Detected     RSV, PCR Not Detected    Blood Culture - Blood, Arm, Right  [[id number]] Collected:  02/27/20 1157    Lab Status:  Final result Specimen:  Blood from Arm, Right Updated:  03/03/20 1215     Blood Culture No growth at 5 days    Blood Culture - Blood, Arm, Left [[id number]] Collected:  02/27/20 1152    Lab Status:  Final result Specimen:  Blood from Arm, Left Updated:  03/03/20 1215     Blood Culture No growth at 5 days             Anti-Infectives (From admission, onward)      Ordered     Dose/Rate Route Frequency Start Stop    02/29/20 1108  meropenem (MERREM) 1 g in sodium chloride 0.9 % 100 mL IVPB     Candice Bryant, PharmD reviewed the order on 03/01/20 1341.   Ordering Provider:  Allison Box MD    1 g  over 30 Minutes Intravenous Every 8 Hours 02/29/20 1200 03/06/20 1159    02/28/20 1325  vancomycin (VANCOCIN) 1,000 mg in sodium chloride 0.9 % 250 mL IVPB     Candice Bryant, PharmD reviewed the order on 03/01/20 1341.   Ordering Provider:  Fay Bradford APRN    15 mg/kg × 70.7 kg (Ideal)  over 60 Minutes Intravenous Every 12 Hours 02/29/20 0200 03/07/20 0159    02/28/20 1341  meropenem (MERREM) 1 g in sodium chloride 0.9 % 100 mL IVPB     Ordering Provider:  Fay Bradford APRN    1 g  over 30 Minutes Intravenous Once 02/28/20 1500 02/28/20 1535    02/28/20 1325  vancomycin IVPB 1500 mg in 0.9% NaCl (Premix) 250 mL     Ordering Provider:  Fay Bradford APRN    20 mg/kg × 70.7 kg (Ideal)  over 90 Minutes Intravenous Once 02/28/20 1400 02/28/20 1716    02/28/20 1241  Pharmacy to dose vancomycin     Candice Bryant, PharmD reviewed the order on 03/01/20 1341.   Ordering Provider:  Fay Bradford APRN     Does not apply Continuous PRN 02/28/20 1239 03/06/20 1238    02/28/20 0837  cefTRIAXone (ROCEPHIN) 2 g in sodium chloride 0.9 % 100 mL IVPB     Note to Pharmacy:  Patient to be on until 3/8/2020   Candice Bryant, PharmD reviewed the order on 03/01/20 8713.   Ordering Provider:  Allison Box MD    2 g  200 mL/hr over 30 Minutes  Intravenous Every 12 Hours 02/28/20 0845 03/09/20 0859            Darvin Crandall, Pharmacy Intern  03/03/20 1:13 PM

## 2020-03-03 NOTE — THERAPY TREATMENT NOTE
Acute Care - Occupational Therapy Treatment Note  [facility]     Patient Name: Jamin Hennessy  : 1955  MRN: [id number]  Today's Date: 3/3/2020             Admit Date: 2020       ICD-10-CM ICD-9-CM   1. Pneumonia of left lung due to infectious organism, unspecified part of lung J18.9 486     Patient Active Problem List   Diagnosis   • Atrial fibrillation with RVR (CMS/[facility])   • Coronary artery disease due to lipid rich plaque   • CAD S/P percutaneous coronary angioplasty   • Essential hypertension   • Dyslipidemia   • Non-insulin dependent type 2 diabetes mellitus (CMS/[facility])   • Diabetic neuropathy (CMS/[facility])   • GERD without esophagitis   • BPH with obstruction/lower urinary tract symptoms   • B12 deficiency   • Vitamin D deficiency   • JARRETT (generalized anxiety disorder)   • Tobacco abuse   • Obesity (BMI 30-39.9)   • Acute bacterial endocarditis   • S/P MVR (mitral valve replacement)   • Endocarditis of mitral valve   • Non-sustained ventricular tachycardia (CMS/[facility])   • Acute myocardial infarction (CMS/[facility])   • Hypercholesterolemia   • Hypotension   • Shortness of breath   • Sepsis associated hypotension (CMS/[facility])   • Chest pain   • Vitamin E deficiency   • Severe sepsis (CMS/[facility])   • HCAP (healthcare-associated pneumonia)   • Pneumonia of left lung due to infectious organism     Past Medical History:   Diagnosis Date   • A-fib (CMS/[facility])    • CAD (coronary artery disease)    • Elevated cholesterol    • Hypertension    • MI (myocardial infarction) (CMS/[facility])    • Non-insulin dependent type 2 diabetes mellitus (CMS/[facility])      Past Surgical History:   Procedure Laterality Date   • CARDIAC CATHETERIZATION     • CARDIAC CATHETERIZATION N/A 2020    Procedure: Left Heart Cath;  Surgeon: Migdalia Beth MD;  Location: [facility] INVASIVE LOCATION;  Service: Cardiovascular   • CARDIAC CATHETERIZATION N/A 2020    Procedure: Left ventriculography;  Surgeon: Migdalia Beth MD;  Location:   [facility] CATH INVASIVE LOCATION;  Service: Cardiovascular   • CARDIAC CATHETERIZATION N/A 1/20/2020    Procedure: Coronary angiography;  Surgeon: Migdalia Beth MD;  Location: [facility] CATH INVASIVE LOCATION;  Service: Cardiovascular   • CORONARY ANGIOPLASTY WITH STENT PLACEMENT     • MITRAL VALVE REPAIR/REPLACEMENT N/A 1/22/2020    Procedure: MITRAL VALVE REPAIR/REPLACEMENT;  Surgeon: Fallon Cole MD;  Location: [facility] CVOR;  Service: Cardiothoracic;  Laterality: N/A;  patient has endocarditis mitral valve replaced with 31mm knox II       Therapy Treatment    Rehabilitation Treatment Summary     Row Name 03/03/20 1312             Treatment Time/Intention    Comment  RN sees OT and requests assist & assessment of Pt transfer skills. RN reports she is frustrated that Pt is not seen daily by PT & OT. Pt had been unable to progress his functional mobility at rehab & was using [place] lift to transfer. Before January cardiac Sx Pt baseline for home functioning was to complete mod (I) SPS transfers heavily relying on his arms. He is totally unable to attend to cues to keep from pushing & pulling on his arms during functional mobility. Pt was EOB having BM in diaper when OT arrived. He was unable to pivot w/o undue safety risk so OT initiated steps to aquire [place] lift pad, meanwhile Pt was dependently transfered by aide.  -MH      Existing Precautions/Restrictions  fall;cardiac;sternal  -MH      Recorded by [] Ailyn Anderson OT 03/03/20 1408      Row Name 03/03/20 1312             Vital Signs    Intratreatment Heart Rate (beats/min)  120  -MH      Recorded by [] Ailyn Anderson OT 03/03/20 1408      Row Name 03/03/20 1312             Cognitive Assessment Intervention- SLP    Cognitive Function (Cognition)  moderate impairment  -MH      Recorded by [] Ailyn Anderson OT 03/03/20 1408      Row Name 03/03/20 1312             Safety Issues, Functional Mobility    Safety Issues Affecting Function (Mobility)   ability to follow commands;at risk behavior observed;impulsivity;judgment;insight into deficits/self awareness;safety precautions follow-through/compliance;problem solving;sequencing abilities  -      Impairments Affecting Function (Mobility)  balance;cognition;coordination;endurance/activity tolerance;postural/trunk control;strength  -      Recorded by [] Ailyn Anderson OT 03/03/20 1408      Row Name 03/03/20 1312             Sit-Stand Transfer    Sit-Stand [place] (Transfers)  2 person assist;dependent (less than 25% patient effort) attempt X3 w/ different positioning. Pt non-com sternal prec  -MH      Recorded by [] Ailyn Anderson OT 03/03/20 1408      Row Name 03/03/20 1312             ADL Assessment/Intervention    BADL Assessment/Intervention  toileting  -      Recorded by [] Ailyn Anderson OT 03/03/20 1408      Row Name 03/03/20 1312             Lower Body Dressing Assessment/Training    Lower Body Dressing [place] Level  doff;don;undergarment;two person assist required;dependent (less than 25% patient effort)  -      Recorded by [] Ailyn Anderson OT 03/03/20 1408      Row Name 03/03/20 1312             Toileting Assessment/Training    [place] Level (Toileting)  dependent (less than 25% patient effort) incontinent  -      Comment (Toileting)  3-persons required to stand for pedro care. In bed Pt uses 2-assist for changing brief & pedro hygiene,  -      Recorded by [] Ailyn Anderson OT 03/03/20 1408      Row Name 03/03/20 1312             Static Standing Balance    Level of [place] (Supported Standing, Static Balance)  2 person assist;dependent, less than 25% patient effort  -      Time Able to Maintain Position (Supported Standing, Static Balance)  15 to 30 seconds  -      Recorded by [] Ailyn Anderson OT 03/03/20 1408      Row Name 03/03/20 1312             Positioning and Restraints    Pre-Treatment Position  in bed  -      Post Treatment Position  bed   -MH      In Bed  sitting EOB;with nsg;with other staff  -MH      Recorded by [] Ailyn Anderson, OT 03/03/20 1408      Row Name 03/03/20 1312             Pain Scale: Numbers Pre/Post-Treatment    Pain Scale: Numbers, Pretreatment  0/10 - no pain  -MH      Pain Scale: Numbers, Post-Treatment  0/10 - no pain  -MH      Recorded by [] Ailyn Anderson, OT 03/03/20 1408      Row Name 03/03/20 1312             Coping    Observed Emotional State  accepting;anxious;cooperative;flat  -MH      Recorded by [] Ailyn Anderson, OT 03/03/20 1408      Row Name 03/03/20 1312             Outcome Summary/Treatment Plan (OT)    Anticipated Discharge Disposition (OT)  skilled nursing facility  -      Recorded by [] Ailyn Anderson, OT 03/03/20 1408        User Key  (r) = Recorded By, (t) = Taken By, (c) = Cosigned By    Initials Name Effective Dates Discipline     Ailyn Anderson, OT 03/01/19 -  OT                 OT Recommendation and Plan  Outcome Summary/Treatment Plan (OT)  Anticipated Discharge Disposition (OT): skilled nursing facility  Plan of Care Review  Plan of Care Reviewed With: patient  Plan of Care Reviewed With: patient  Outcome Measures     Row Name 03/03/20 1400             How much help from another person do you currently need...    Turning from your back to your side while in flat bed without using bedrails?  2  -MH      Moving from lying on back to sitting on the side of a flat bed without bedrails?  2  -MH      Moving to and from a bed to a chair (including a wheelchair)?  1  -MH      Standing up from a chair using your arms (e.g., wheelchair, bedside chair)?  1  -MH      Climbing 3-5 steps with a railing?  1  -MH      To walk in hospital room?  1  -MH      AM-PAC 6 Clicks Score (PT)  8  -MH         Modified [place] Scale    Modified [place] Scale  5 - Severe disability.  Bedridden, incontinent, and requiring constant nursing care and attention.  -        User Key  (r) = Recorded By, (t) = Taken By, (c) =  Cosigned By    Initials Name Provider Type     Ailyn Anderson OT Occupational Therapist           Time Calculation:   Time Calculation- OT     Row Name 03/03/20 1413             Time Calculation-     OT Start Time  1010  -      OT Stop Time  1020  -      OT Time Calculation (min)  10 min  -      Total Timed Code Minutes- OT  10 minute(s)  -      OT Received On  03/03/20  -      OT - Next Appointment  03/05/20  -        User Key  (r) = Recorded By, (t) = Taken By, (c) = Cosigned By    Initials Name Provider Type     Ailyn Anderson, OT Occupational Therapist        Therapy Charges for Today     Code Description Service Date Service Provider Modifiers Qty    [id number] HC OT THERAPEUTIC ACT EA 15 MIN 3/3/2020 Ailyn Anderson OT GO 1               Ailyn Anderson OT  3/3/2020

## 2020-03-03 NOTE — PROGRESS NOTES
"S/P tissue MVR/MICHELLE ligation--[place]las on 1/22/20    Pt known to our service d/t Enterococcus (VRE) MV IE.  He underwent tissue MVR on 1/22/2020 by Dr. Cole.    His postop course was lengthy given discharge planning r/t daptomycin.  He also had resp insufficiency issues and recurrent atrial fib.  Eliquis was restarted prior to transfer to [facility].    He was noted to have hypotension this morning and was transferred to [facility] and diagnosed with pna.  WBC was 18.4, hgb 7.6 at [place].  He was transferred to [facility] for further care and mmgt.    Subjective:  Up to chair. No complaints this am  No events overnight  CXR-- continued \"dense airspace disease\"    On ceftriaxone/vanc/Merrem  O2 requirements down to 3L  Remains afebrile      Intake/Output Summary (Last 24 hours) at 3/3/2020 1055  Last data filed at 3/3/2020 0600  Gross per 24 hour   Intake 2366 ml   Output --   Net 2366 ml     Temp:  [97.4 °F (36.3 °C)-98.3 °F (36.8 °C)] 97.7 °F (36.5 °C)  Heart Rate:  [80-96] 92  Resp:  [20] 20  BP: ()/(58-86) 131/86      Results from last 7 days   Lab Units 03/03/20  0517 03/02/20  0647  02/28/20  2209   WBC 10*3/mm3 12.50* 10.70   < > 14.60*   HEMOGLOBIN g/dL 8.4* 8.3*   < > 7.3*   HEMATOCRIT % 26.1* 24.5*   < > 22.8*   PLATELETS 10*3/mm3 753* 682*   < > 581*   INR   --   --   --  1.22*    < > = values in this interval not displayed.     Results from last 7 days   Lab Units 03/03/20  0516   CREATININE mg/dL 0.89   POTASSIUM mmol/L 4.4   SODIUM mmol/L 140   MAGNESIUM mg/dL 2.2       Physical Exam:  Neuro intact, nad, resting in bed, drowsy  Tele:  SR 90s  Diminished bases, 3L 96%, reports productive cough at times  Sternotomy well healed, sternum stable  Benign abd, tolerating po intake  Trace edema    Assessment/Plan:  Active Problems:    Coronary artery disease due to lipid rich plaque    Dyslipidemia    Non-insulin dependent type 2 diabetes mellitus (CMS/HCC)    Diabetic neuropathy (CMS/HCC)    GERD without " esophagitis    BPH with obstruction/lower urinary tract symptoms    B12 deficiency    Vitamin D deficiency    JARRETT (generalized anxiety disorder)    Tobacco abuse    Obesity (BMI 30-39.9)    S/P MVR (mitral valve replacement)    Endocarditis of mitral valve    Hypotension    Shortness of breath    Sepsis associated hypotension (CMS/HCC)    Chest pain    Vitamin E deficiency    Severe sepsis (CMS/HCC)    HCAP (healthcare-associated pneumonia)    Readmit for PNA--per hospitalist  Mitral valve endocarditis s/p tissue MVR--stable  Enterococcus bacteremia/VRE--cont dapto/Rocephin   CAD with stent 1 year ago  Paroxysmal atrial fibrillation with RVR--in SR currently  HTN--stable  HLD--no statin while on dapto  DM type 2 with neuropathy--SSl  GERD  Multiple sclerosis---mainly wheelchair bound  Current tobacco abuse   BPH with incontinence   Obesity---BMI 38.19  Generalized anxiety disorder     Routine care  Antibiotics per ID  Anticipate back to rehab at discharge    SHADIA DE LA GARZA, YUE  3/3/2020  10:55 AM     I spoke with Dr. Taylor and Dr CATE Cole. Plan for bronchoscopy tomorrow. Continue antibiotics for endocarditis for a few more days. Antibiotics for pneumonia may be modified, depending on bronchoscopy guided cultures.

## 2020-03-03 NOTE — THERAPY TREATMENT NOTE
Patient Name: Jamin Hennessy  : 1955    MRN: [id number]                              Today's Date: 3/3/2020       Admit Date: 2020    Visit Dx:     ICD-10-CM ICD-9-CM   1. Pneumonia of left lung due to infectious organism, unspecified part of lung J18.9 486     Patient Active Problem List   Diagnosis   • Atrial fibrillation with RVR (CMS/[facility])   • Coronary artery disease due to lipid rich plaque   • CAD S/P percutaneous coronary angioplasty   • Essential hypertension   • Dyslipidemia   • Non-insulin dependent type 2 diabetes mellitus (CMS/[facility])   • Diabetic neuropathy (CMS/[facility])   • GERD without esophagitis   • BPH with obstruction/lower urinary tract symptoms   • B12 deficiency   • Vitamin D deficiency   • JARRETT (generalized anxiety disorder)   • Tobacco abuse   • Obesity (BMI 30-39.9)   • Acute bacterial endocarditis   • S/P MVR (mitral valve replacement)   • Endocarditis of mitral valve   • Non-sustained ventricular tachycardia (CMS/[facility])   • Acute myocardial infarction (CMS/[facility])   • Hypercholesterolemia   • Hypotension   • Shortness of breath   • Sepsis associated hypotension (CMS/HCC)   • Chest pain   • Vitamin E deficiency   • Severe sepsis (CMS/[facility])   • HCAP (healthcare-associated pneumonia)   • Pneumonia of left lung due to infectious organism     Past Medical History:   Diagnosis Date   • A-fib (CMS/HCC)    • CAD (coronary artery disease)    • Elevated cholesterol    • Hypertension    • MI (myocardial infarction) (CMS/HCC)    • Non-insulin dependent type 2 diabetes mellitus (CMS/[facility])      Past Surgical History:   Procedure Laterality Date   • CARDIAC CATHETERIZATION     • CARDIAC CATHETERIZATION N/A 2020    Procedure: Left Heart Cath;  Surgeon: Migdalia Beth MD;  Location: [facility] CATH INVASIVE LOCATION;  Service: Cardiovascular   • CARDIAC CATHETERIZATION N/A 2020    Procedure: Left ventriculography;  Surgeon: Migdalia Beth MD;  Location: [facility] CATH INVASIVE LOCATION;   Service: Cardiovascular   • CARDIAC CATHETERIZATION N/A 1/20/2020    Procedure: Coronary angiography;  Surgeon: Migdalia Beth MD;  Location: [facility] CATH INVASIVE LOCATION;  Service: Cardiovascular   • CORONARY ANGIOPLASTY WITH STENT PLACEMENT     • MITRAL VALVE REPAIR/REPLACEMENT N/A 1/22/2020    Procedure: MITRAL VALVE REPAIR/REPLACEMENT;  Surgeon: Fallon Cole MD;  Location: [facility] CVOR;  Service: Cardiothoracic;  Laterality: N/A;  patient has endocarditis mitral valve replaced with 31mm knox II     General Information     Row Name 03/03/20 1745          PT Evaluation Time/Intention    Document Type  therapy note (daily note)  -SC     Mode of Treatment  individual therapy;physical therapy  -SC     Row Name 03/03/20 1745          General Information    Existing Precautions/Restrictions  fall;cardiac;sternal pt has ms and was not ambulatory prior to heart surgery  -SC     Row Name 03/03/20 1745          Cognitive Assessment/Intervention- PT/OT    Orientation Status (Cognition)  oriented x 4  -SC     Row Name 03/03/20 1745          Safety Issues, Functional Mobility    Impairments Affecting Function (Mobility)  balance;coordination;endurance/activity tolerance;postural/trunk control;strength;muscle tone abnormal  -SC     Comment, Safety Issues/Impairments (Mobility)  pt has MS  -SC       User Key  (r) = Recorded By, (t) = Taken By, (c) = Cosigned By    Initials Name Provider Type    SC Monica Galaviz PTA Physical Therapy Assistant        Mobility     Row Name 03/03/20 1748          Bed Mobility Assessment/Treatment    Bed Mobility Assessment/Treatment  sit-supine  -SC     Sit-Supine [place] (Bed Mobility)  moderate assist (50% patient effort);2 person assist  -SC     Assistive Device (Bed Mobility)  draw sheet  -SC     Comment (Bed Mobility)  cues to use heart hugger for sternal precautions  -SC     Row Name 03/03/20 5134          Bed-Chair Transfer    Bed-Chair [place] (Transfers)  2  person assist;verbal cues;set up;maximum assist (25% patient effort) chair to the bed.  pt uanble to initiate the pivot to the bed  -SC     Row Name 03/03/20 1747          Sit-Stand Transfer    Sit-Stand [place] (Transfers)  moderate assist (50% patient effort);2 person assist  -SC     Row Name 03/03/20 1747          Gait/Stairs Assessment/Training    Comment (Gait/Stairs)  no appropriate  -SC       User Key  (r) = Recorded By, (t) = Taken By, (c) = Cosigned By    Initials Name Provider Type    Monica Good, DALJIT Physical Therapy Assistant        Obj/Interventions     Row Name 03/03/20 1750          Static Sitting Balance    Level of [place] (Unsupported Sitting, Static Balance)  contact guard assist  -SC     Sitting Position (Unsupported Sitting, Static Balance)  sitting on edge of bed  -SC     Row Name 03/03/20 1750          Dynamic Sitting Balance    Level of [place], Reaches Outside Midline (Sitting, Dynamic Balance)  unable to perform activity  -SC     Row Name 03/03/20 1750          Static Standing Balance    Level of [place] (Supported Standing, Static Balance)  moderate assist, 50 to 74% patient effort;2 person assist  -SC     Row Name 03/03/20 1750          Dynamic Standing Balance    Level of [place], Reaches Outside Midline (Standing, Dynamic Balance)  maximal assist, 25 to 49% patient effort;2 person assist  -SC       User Key  (r) = Recorded By, (t) = Taken By, (c) = Cosigned By    Initials Name Provider Type    Monica Good PTA Physical Therapy Assistant        Goals/Plan    No documentation.       Clinical Impression     Row Name 03/03/20 1751          Pain Assessment    Additional Documentation  Pain Scale: FACES Pre/Post-Treatment (Group)  -SC     Row Name 03/03/20 1751          Pain Scale: FACES Pre/Post-Treatment    Pain: FACES Scale, Pretreatment  0-->no hurt  -SC     Pain: FACES Scale, Post-Treatment  0-->no hurt  -SC     Pre/Post Treatment Pain Comment   no pain reported  -[facility] Name 03/03/20 1751          Physical Therapy Clinical Impression    Rehab Potential (PT Clinical Summary)  good, to achieve stated therapy goals  -[facility] Name 03/03/20 1751          Positioning and Restraints    Pre-Treatment Position  sitting in chair/recliner  -SC     Post Treatment Position  bed  -SC     In Bed  notified nsg;supine;call light within reach;exit alarm on  -SC       User Key  (r) = Recorded By, (t) = Taken By, (c) = Cosigned By    Initials Name Provider Type    Monica Good PTA Physical Therapy Assistant        Outcome Measures     [facility] Name 03/03/20 1752          How much help from another person do you currently need...    Turning from your back to your side while in flat bed without using bedrails?  2  -SC     Moving from lying on back to sitting on the side of a flat bed without bedrails?  2  -SC     Moving to and from a bed to a chair (including a wheelchair)?  1  -SC     Standing up from a chair using your arms (e.g., wheelchair, bedside chair)?  2  -SC     Climbing 3-5 steps with a railing?  1  -SC     To walk in hospital room?  1  -SC     AM-PAC 6 Clicks Score (PT)  9  -[facility] Name 03/03/20 1752          Functional Assessment    Outcome Measure Options  AM-PAC 6 Clicks Basic Mobility (PT)  -SC       User Key  (r) = Recorded By, (t) = Taken By, (c) = Cosigned By    Initials Name Provider Type    Monica Good PTA Physical Therapy Assistant          PT Recommendation and Plan     Outcome Summary/Treatment Plan (PT)  Anticipated Discharge Disposition (PT): inpatient rehabilitation facility  Plan of Care Reviewed With: patient  Progress: improving  Outcome Summary: pt stood up from the chair with mod assist of 2 which facilitated pivot ot the chair.  pt still very far from his PLOF but has good potential to cont to improve with physical therapy.  Recommend IP rehab at d/c to allow pt to recover his mobility     Time Calculation:   PT Charges      Row Name 03/03/20 1755             Time Calculation    Start Time  1325  -SC      Stop Time  1340  -SC      Time Calculation (min)  15 min  -SC      PT Received On  03/03/20  -SC      PT - Next Appointment  03/05/20  -SC         Time Calculation- PT    Total Timed Code Minutes- PT  15 minute(s)  -SC         Timed Charges    31909 - PT Therapeutic Activity Minutes  15  -SC        User Key  (r) = Recorded By, (t) = Taken By, (c) = Cosigned By    Initials Name Provider Type    SC Monica Galaviz PTA Physical Therapy Assistant        Therapy Charges for Today     Code Description Service Date Service Provider Modifiers Qty    [id number]  PT THERAPEUTIC ACT EA 15 MIN 3/3/2020 Monica Galaviz PTA GP 1          PT G-Codes  Outcome Measure Options: AM-PAC 6 Clicks Basic Mobility (PT)  AM-PAC 6 Clicks Score (PT): 9  Modified [place] Scale: 5 - Severe disability.  Bedridden, incontinent, and requiring constant nursing care and attention.    Monica Galaviz PTA  3/3/2020

## 2020-03-03 NOTE — PLAN OF CARE
Problem: Patient Care Overview  Goal: Plan of Care Review  Outcome: Ongoing (interventions implemented as appropriate)  Flowsheets (Taken 3/3/2020 3230)  Progress: improving  Plan of Care Reviewed With: patient  Outcome Summary: pt stood up from the chair with mod assist of 2 which facilitated pivot ot the chair.  pt still very far from his PLOF but has good potential to cont to improve with physical therapy.  Recommend IP rehab at d/c to allow pt to recover his mobility

## 2020-03-04 ENCOUNTER — ANESTHESIA EVENT (OUTPATIENT)
Dept: GASTROENTEROLOGY | Facility: HOSPITAL | Age: 65
End: 2020-03-04

## 2020-03-04 ENCOUNTER — ANESTHESIA (OUTPATIENT)
Dept: GASTROENTEROLOGY | Facility: HOSPITAL | Age: 65
End: 2020-03-04

## 2020-03-04 LAB
ALBUMIN SERPL-MCNC: 2.5 G/DL (ref 3.5–5.2)
ALBUMIN/GLOB SERPL: 0.7 G/DL
ALP SERPL-CCNC: 89 U/L (ref 39–117)
ALT SERPL W P-5'-P-CCNC: 93 U/L (ref 1–41)
ANION GAP SERPL CALCULATED.3IONS-SCNC: 9 MMOL/L (ref 5–15)
APTT PPP: 29.8 SECONDS (ref 61–76.5)
AST SERPL-CCNC: 79 U/L (ref 1–40)
BASOPHILS # BLD MANUAL: 0.12 10*3/MM3 (ref 0–0.2)
BASOPHILS NFR BLD AUTO: 1 % (ref 0–1.5)
BILIRUB SERPL-MCNC: <0.2 MG/DL (ref 0.2–1.2)
BUN BLD-MCNC: 14 MG/DL (ref 8–23)
BUN/CREAT SERPL: 14.9 (ref 7–25)
CALCIUM SPEC-SCNC: 8.2 MG/DL (ref 8.6–10.5)
CHLORIDE SERPL-SCNC: 108 MMOL/L (ref 98–107)
CO2 SERPL-SCNC: 25 MMOL/L (ref 22–29)
CREAT BLD-MCNC: 0.94 MG/DL (ref 0.76–1.27)
DEPRECATED RDW RBC AUTO: 49 FL (ref 37–54)
EOSINOPHIL # BLD MANUAL: 1.67 10*3/MM3 (ref 0–0.4)
EOSINOPHIL NFR BLD MANUAL: 14 % (ref 0.3–6.2)
ERYTHROCYTE [DISTWIDTH] IN BLOOD BY AUTOMATED COUNT: 18.3 % (ref 12.3–15.4)
GFR SERPL CREATININE-BSD FRML MDRD: 81 ML/MIN/1.73
GLOBULIN UR ELPH-MCNC: 3.6 GM/DL
GLUCOSE BLD-MCNC: 122 MG/DL (ref 65–99)
GLUCOSE BLDC GLUCOMTR-MCNC: 101 MG/DL (ref 70–105)
GLUCOSE BLDC GLUCOMTR-MCNC: 120 MG/DL (ref 70–105)
GLUCOSE BLDC GLUCOMTR-MCNC: 120 MG/DL (ref 70–105)
GLUCOSE BLDC GLUCOMTR-MCNC: 123 MG/DL (ref 70–105)
GLUCOSE BLDC GLUCOMTR-MCNC: 91 MG/DL (ref 70–105)
HCT VFR BLD AUTO: 24.1 % (ref 37.5–51)
HGB BLD-MCNC: 7.8 G/DL (ref 13–17.7)
LYMPHOCYTES # BLD MANUAL: 1.31 10*3/MM3 (ref 0.7–3.1)
LYMPHOCYTES NFR BLD MANUAL: 11 % (ref 19.6–45.3)
LYMPHOCYTES NFR BLD MANUAL: 6 % (ref 5–12)
MAGNESIUM SERPL-MCNC: 2.2 MG/DL (ref 1.6–2.4)
MCH RBC QN AUTO: 24.5 PG (ref 26.6–33)
MCHC RBC AUTO-ENTMCNC: 32.6 G/DL (ref 31.5–35.7)
MCV RBC AUTO: 75 FL (ref 79–97)
METAMYELOCYTES NFR BLD MANUAL: 1 % (ref 0–0)
MICROCYTES BLD QL: ABNORMAL
MONOCYTES # BLD AUTO: 0.71 10*3/MM3 (ref 0.1–0.9)
MYCOBACTERIUM SPEC CULT: NORMAL
NEUTROPHILS # BLD AUTO: 7.97 10*3/MM3 (ref 1.7–7)
NEUTROPHILS NFR BLD MANUAL: 62 % (ref 42.7–76)
NEUTS BAND NFR BLD MANUAL: 5 % (ref 0–5)
NIGHT BLUE STAIN TISS: NORMAL
PLAT MORPH BLD: NORMAL
PLATELET # BLD AUTO: 664 10*3/MM3 (ref 140–450)
PMV BLD AUTO: 7.3 FL (ref 6–12)
POIKILOCYTOSIS BLD QL SMEAR: ABNORMAL
POTASSIUM BLD-SCNC: 4.1 MMOL/L (ref 3.5–5.2)
PROT SERPL-MCNC: 6.1 G/DL (ref 6–8.5)
RBC # BLD AUTO: 3.21 10*6/MM3 (ref 4.14–5.8)
SCAN SLIDE: NORMAL
SODIUM BLD-SCNC: 142 MMOL/L (ref 136–145)
VANCOMYCIN TROUGH SERPL-MCNC: 20.1 MCG/ML (ref 5–20)
WBC MORPH BLD: NORMAL
WBC NRBC COR # BLD: 11.9 10*3/MM3 (ref 3.4–10.8)

## 2020-03-04 PROCEDURE — 25010000002 CEFTRIAXONE PER 250 MG: Performed by: NURSE PRACTITIONER

## 2020-03-04 PROCEDURE — 0B9H8ZX DRAINAGE OF LUNG LINGULA, VIA NATURAL OR ARTIFICIAL OPENING ENDOSCOPIC, DIAGNOSTIC: ICD-10-PCS | Performed by: INTERNAL MEDICINE

## 2020-03-04 PROCEDURE — 87071 CULTURE AEROBIC QUANT OTHER: CPT | Performed by: INTERNAL MEDICINE

## 2020-03-04 PROCEDURE — 25010000002 MEROPENEM PER 100 MG: Performed by: NURSE PRACTITIONER

## 2020-03-04 PROCEDURE — 87205 SMEAR GRAM STAIN: CPT | Performed by: INTERNAL MEDICINE

## 2020-03-04 PROCEDURE — 82962 GLUCOSE BLOOD TEST: CPT

## 2020-03-04 PROCEDURE — 85730 THROMBOPLASTIN TIME PARTIAL: CPT | Performed by: NURSE PRACTITIONER

## 2020-03-04 PROCEDURE — 94799 UNLISTED PULMONARY SVC/PX: CPT

## 2020-03-04 PROCEDURE — 80202 ASSAY OF VANCOMYCIN: CPT | Performed by: INTERNAL MEDICINE

## 2020-03-04 PROCEDURE — 80053 COMPREHEN METABOLIC PANEL: CPT | Performed by: INTERNAL MEDICINE

## 2020-03-04 PROCEDURE — 85007 BL SMEAR W/DIFF WBC COUNT: CPT | Performed by: INTERNAL MEDICINE

## 2020-03-04 PROCEDURE — 25010000002 PROPOFOL 200 MG/20ML EMULSION: Performed by: ANESTHESIOLOGY

## 2020-03-04 PROCEDURE — 88108 CYTOPATH CONCENTRATE TECH: CPT | Performed by: INTERNAL MEDICINE

## 2020-03-04 PROCEDURE — 25010000002 MEROPENEM PER 100 MG: Performed by: INTERNAL MEDICINE

## 2020-03-04 PROCEDURE — 25010000002 CEFTRIAXONE PER 250 MG: Performed by: INTERNAL MEDICINE

## 2020-03-04 PROCEDURE — 25010000002 VANCOMYCIN 750 MG RECONSTITUTED SOLUTION 1 EACH VIAL: Performed by: INTERNAL MEDICINE

## 2020-03-04 PROCEDURE — 85025 COMPLETE CBC W/AUTO DIFF WBC: CPT | Performed by: INTERNAL MEDICINE

## 2020-03-04 PROCEDURE — 87798 DETECT AGENT NOS DNA AMP: CPT | Performed by: THORACIC SURGERY (CARDIOTHORACIC VASCULAR SURGERY)

## 2020-03-04 PROCEDURE — 87206 SMEAR FLUORESCENT/ACID STAI: CPT | Performed by: INTERNAL MEDICINE

## 2020-03-04 PROCEDURE — 25010000002 VANCOMYCIN 1 G RECONSTITUTED SOLUTION 1 EACH VIAL: Performed by: NURSE PRACTITIONER

## 2020-03-04 PROCEDURE — 87102 FUNGUS ISOLATION CULTURE: CPT | Performed by: INTERNAL MEDICINE

## 2020-03-04 PROCEDURE — 87116 MYCOBACTERIA CULTURE: CPT | Performed by: INTERNAL MEDICINE

## 2020-03-04 PROCEDURE — 99024 POSTOP FOLLOW-UP VISIT: CPT | Performed by: NURSE PRACTITIONER

## 2020-03-04 PROCEDURE — 25010000003 LIDOCAINE 1 % SOLUTION: Performed by: INTERNAL MEDICINE

## 2020-03-04 PROCEDURE — 83735 ASSAY OF MAGNESIUM: CPT | Performed by: NURSE PRACTITIONER

## 2020-03-04 RX ORDER — SODIUM CHLORIDE 0.9 % (FLUSH) 0.9 %
10 SYRINGE (ML) INJECTION AS NEEDED
Status: DISCONTINUED | OUTPATIENT
Start: 2020-03-04 | End: 2020-03-04 | Stop reason: HOSPADM

## 2020-03-04 RX ORDER — LIDOCAINE HYDROCHLORIDE 10 MG/ML
INJECTION, SOLUTION INFILTRATION; PERINEURAL AS NEEDED
Status: DISCONTINUED | OUTPATIENT
Start: 2020-03-04 | End: 2020-03-04 | Stop reason: HOSPADM

## 2020-03-04 RX ORDER — LIDOCAINE HYDROCHLORIDE 20 MG/ML
JELLY TOPICAL
Status: DISPENSED
Start: 2020-03-04 | End: 2020-03-04

## 2020-03-04 RX ORDER — LIDOCAINE HYDROCHLORIDE 20 MG/ML
INJECTION, SOLUTION INTRAVENOUS
Status: DISPENSED
Start: 2020-03-04 | End: 2020-03-04

## 2020-03-04 RX ORDER — LIDOCAINE HYDROCHLORIDE 20 MG/ML
INJECTION, SOLUTION INFILTRATION; PERINEURAL AS NEEDED
Status: DISCONTINUED | OUTPATIENT
Start: 2020-03-04 | End: 2020-03-04 | Stop reason: HOSPADM

## 2020-03-04 RX ORDER — LIDOCAINE HYDROCHLORIDE 10 MG/ML
INJECTION, SOLUTION EPIDURAL; INFILTRATION; INTRACAUDAL; PERINEURAL
Status: DISPENSED
Start: 2020-03-04 | End: 2020-03-04

## 2020-03-04 RX ORDER — PROPOFOL 10 MG/ML
INJECTION, EMULSION INTRAVENOUS AS NEEDED
Status: DISCONTINUED | OUTPATIENT
Start: 2020-03-04 | End: 2020-03-04 | Stop reason: SURG

## 2020-03-04 RX ORDER — SODIUM CHLORIDE 0.9 % (FLUSH) 0.9 %
10 SYRINGE (ML) INJECTION EVERY 12 HOURS SCHEDULED
Status: DISCONTINUED | OUTPATIENT
Start: 2020-03-04 | End: 2020-03-04 | Stop reason: HOSPADM

## 2020-03-04 RX ORDER — SODIUM CHLORIDE 9 MG/ML
9 INJECTION, SOLUTION INTRAVENOUS CONTINUOUS PRN
Status: DISCONTINUED | OUTPATIENT
Start: 2020-03-04 | End: 2020-03-04 | Stop reason: HOSPADM

## 2020-03-04 RX ORDER — LIDOCAINE HYDROCHLORIDE 20 MG/ML
INJECTION, SOLUTION EPIDURAL; INFILTRATION; INTRACAUDAL; PERINEURAL AS NEEDED
Status: DISCONTINUED | OUTPATIENT
Start: 2020-03-04 | End: 2020-03-04 | Stop reason: SURG

## 2020-03-04 RX ADMIN — IPRATROPIUM BROMIDE AND ALBUTEROL SULFATE 3 ML: .5; 3 SOLUTION RESPIRATORY (INHALATION) at 20:49

## 2020-03-04 RX ADMIN — GUAIFENESIN 1200 MG: 600 TABLET, EXTENDED RELEASE ORAL at 11:28

## 2020-03-04 RX ADMIN — FLUOXETINE 40 MG: 20 CAPSULE ORAL at 11:29

## 2020-03-04 RX ADMIN — AMIODARONE HYDROCHLORIDE 200 MG: 200 TABLET ORAL at 11:30

## 2020-03-04 RX ADMIN — MEROPENEM 1 G: 1 INJECTION, POWDER, FOR SOLUTION INTRAVENOUS at 03:04

## 2020-03-04 RX ADMIN — GABAPENTIN 200 MG: 100 CAPSULE ORAL at 20:59

## 2020-03-04 RX ADMIN — PROPOFOL 25 MG: 10 INJECTION, EMULSION INTRAVENOUS at 07:38

## 2020-03-04 RX ADMIN — LIDOCAINE HYDROCHLORIDE 25 MG: 20 INJECTION, SOLUTION EPIDURAL; INFILTRATION; INTRACAUDAL; PERINEURAL at 07:36

## 2020-03-04 RX ADMIN — CEFTRIAXONE SODIUM 2 G: 2 INJECTION, POWDER, FOR SOLUTION INTRAMUSCULAR; INTRAVENOUS at 20:59

## 2020-03-04 RX ADMIN — Medication 10 ML: at 11:31

## 2020-03-04 RX ADMIN — PROPOFOL 25 MG: 10 INJECTION, EMULSION INTRAVENOUS at 07:37

## 2020-03-04 RX ADMIN — CETIRIZINE HYDROCHLORIDE 10 MG: 10 TABLET, FILM COATED ORAL at 11:29

## 2020-03-04 RX ADMIN — Medication 10 ML: at 21:00

## 2020-03-04 RX ADMIN — Medication 800 UNITS: at 11:28

## 2020-03-04 RX ADMIN — GABAPENTIN 200 MG: 100 CAPSULE ORAL at 11:29

## 2020-03-04 RX ADMIN — TAMSULOSIN HYDROCHLORIDE 0.4 MG: 0.4 CAPSULE ORAL at 11:30

## 2020-03-04 RX ADMIN — FINASTERIDE 5 MG: 5 TABLET, FILM COATED ORAL at 11:29

## 2020-03-04 RX ADMIN — PROPOFOL 25 MG: 10 INJECTION, EMULSION INTRAVENOUS at 07:40

## 2020-03-04 RX ADMIN — APIXABAN 5 MG: 5 TABLET, FILM COATED ORAL at 11:30

## 2020-03-04 RX ADMIN — PROPOFOL 25 MG: 10 INJECTION, EMULSION INTRAVENOUS at 07:42

## 2020-03-04 RX ADMIN — SODIUM CHLORIDE 9 ML/HR: 0.9 INJECTION, SOLUTION INTRAVENOUS at 07:24

## 2020-03-04 RX ADMIN — ASPIRIN 81 MG: 81 TABLET, DELAYED RELEASE ORAL at 11:28

## 2020-03-04 RX ADMIN — VANCOMYCIN HYDROCHLORIDE 750 MG: 750 INJECTION, POWDER, LYOPHILIZED, FOR SOLUTION INTRAVENOUS at 17:29

## 2020-03-04 RX ADMIN — MEROPENEM 1 G: 1 INJECTION, POWDER, FOR SOLUTION INTRAVENOUS at 14:26

## 2020-03-04 RX ADMIN — FOLIC ACID 1 MG: 1 TABLET ORAL at 11:29

## 2020-03-04 RX ADMIN — APIXABAN 5 MG: 5 TABLET, FILM COATED ORAL at 20:59

## 2020-03-04 RX ADMIN — CEFTRIAXONE SODIUM 2 G: 2 INJECTION, POWDER, FOR SOLUTION INTRAMUSCULAR; INTRAVENOUS at 11:29

## 2020-03-04 RX ADMIN — SODIUM CHLORIDE 1000 MG: 900 INJECTION, SOLUTION INTRAVENOUS at 02:13

## 2020-03-04 RX ADMIN — MEROPENEM 1 G: 1 INJECTION, POWDER, FOR SOLUTION INTRAVENOUS at 20:17

## 2020-03-04 RX ADMIN — Medication 1 CAPSULE: at 20:59

## 2020-03-04 RX ADMIN — Medication 1 CAPSULE: at 11:28

## 2020-03-04 RX ADMIN — IPRATROPIUM BROMIDE AND ALBUTEROL SULFATE 3 ML: .5; 3 SOLUTION RESPIRATORY (INHALATION) at 23:54

## 2020-03-04 RX ADMIN — PROPOFOL 75 MG: 10 INJECTION, EMULSION INTRAVENOUS at 07:36

## 2020-03-04 RX ADMIN — GUAIFENESIN 1200 MG: 600 TABLET, EXTENDED RELEASE ORAL at 20:59

## 2020-03-04 RX ADMIN — CYANOCOBALAMIN TAB 1000 MCG 1000 MCG: 1000 TAB at 11:31

## 2020-03-04 RX ADMIN — GABAPENTIN 200 MG: 100 CAPSULE ORAL at 17:29

## 2020-03-04 NOTE — ANESTHESIA PREPROCEDURE EVALUATION
Anesthesia Evaluation     Patient summary reviewed and Nursing notes reviewed   NPO Solid Status: > 8 hours  NPO Liquid Status: > 8 hours           Airway   Mallampati: II  TM distance: >3 FB  Neck ROM: full  No difficulty expected  Dental - normal exam   (+) edentulous and poor dentition        Pulmonary - normal exam   (+) a smoker,   Cardiovascular     ECG reviewed  Patient on routine beta blocker and Beta blocker given within 24 hours of surgery  Rhythm: irregular  Rate: abnormal    (+) hypertension, past MI , CAD, cardiac stents dysrhythmias Atrial Fib, hyperlipidemia,       Neuro/Psych  (+) psychiatric history Anxiety,     GI/Hepatic/Renal/Endo    (+) obesity, morbid obesity, GERD,  diabetes mellitus,     Musculoskeletal     Abdominal   (+) obese,     Bowel sounds: normal.   Substance History      OB/GYN          Other            Phys Exam Other: Missing  Teeth extremely poor dentiton with caries, no loose per pt                   Anesthesia Plan    ASA 4     MAC     intravenous induction   Postoperative Plan: Expected vent after surgery  Anesthetic plan, all risks, benefits, and alternatives have been provided, discussed and informed consent has been obtained with: patient.  Use of blood products discussed with consented to blood products.

## 2020-03-04 NOTE — PROGRESS NOTES
"Pharmacy Antimicrobial Dosing Service    Subjective:  Jamin Hennessy is a 64 y.o.male with PNA and previous mitral valve endocarditis s/p tissue MVR (Jan 22, 2020). Patient was to receive x6 weeks of IV antibiotics with last day of therapy being March 8, 2020. ID is following. Pt had bronchoscopy this morning.    PMH: VRE, T2DM with neuropathy, Tobacco Abuse      Assessment/Plan    1. Day #6/10 Vancomycin: Goal -600 mcg*h/mL. Patient is currently receiving 1000 mg (~11 mg/kg DBW) IV q12h. Levels obtained on 3/1 resulted in calculated , K 0.064 hr-1, and t1/2 11 hours. Repeat trough level obtained today resulted at 20.1 mcg.mL. Renal function is stable so it is likely that patient has accumulated given large BMI. Will decrease dose to 750 mg (~8.5 mg/kg DBW) IV q12h and check peak level 3/5 at 2000 and trough level 3/6 at 0300.    2. Day #7 Ceftriaxone: 2g IV q12h. Last day 3/8/20 per ID.    3. Day #6/10 Meropenem: 1000 mg IV q8h for estCrCl > 50 mL/min.    Will continue to monitor drug levels, renal function, culture and sensitivities, and patient clinical status.       Objective:  Relevant clinical data and objective history reviewed:  175.3 cm (69.02\")   119 kg (262 lb 5.6 oz)   Ideal body weight: 70.7 kg (155 lb 15.1 oz)  Adjusted ideal body weight: 90 kg (198 lb 8.1 oz)  Body mass index is 38.72 kg/m².    Results from last 7 days   Lab Units 03/04/20  1336 03/01/20  1256 03/01/20  0558   VANCOMYCIN PK mcg/mL  --   --  21.80   VANCOMYCIN TR mcg/mL 20.10* 14.00  --    VANCOMYCIN RM mcg/mL  --   --  20.10     Results from last 7 days   Lab Units 03/04/20  0328 03/03/20  0516 03/02/20  0647   CREATININE mg/dL 0.94 0.89 0.87     Estimated Creatinine Clearance: 101.1 mL/min (by C-G formula based on SCr of 0.94 mg/dL).  I/O last 3 completed shifts:  In: 3079 [P.O.:960; I.V.:1219; IV Piggyback:900]  Out: -     Results from last 7 days   Lab Units 03/04/20  0328 03/03/20  0517 03/02/20  0647   WBC " 10*3/mm3 11.90* 12.50* 10.70     Temperature    03/04/20 0258 03/04/20 0523 03/04/20 1100   Temp: 97.5 °F (36.4 °C) 97.6 °F (36.4 °C) 97.6 °F (36.4 °C)     Baseline culture/source/susceptibility:  Microbiology Results (last 10 days)       Procedure Component Value - Date/Time    AFB Culture - Lavage, Lung, Lingula [[id number]] Collected:  03/04/20 0743    Lab Status:  Preliminary result Specimen:  Lavage from Lung, Lingula Updated:  03/04/20 1021     AFB Stain No acid fast bacilli seen    BAL Culture, Quantitative - Lavage, Lung, Lingula [[id number]] Collected:  03/04/20 0743    Lab Status:  Preliminary result Specimen:  Lavage from Lung, Lingula Updated:  03/04/20 1030     Gram Stain Moderate (3+) WBCs per low power field      Moderate (3+) Epithelial cells per low power field      Few (2+) Gram positive cocci in pairs and clusters    Respiratory Culture - Sputum, Cough [[id number]] Collected:  02/28/20 1123    Lab Status:  Final result Specimen:  Sputum from Cough Updated:  03/01/20 1203     Respiratory Culture Scant growth (1+) Normal Respiratory Cheryl     Gram Stain Moderate (3+) WBCs per low power field      Rare (1+) Epithelial cells per low power field      Rare (1+) Mixed bacterial morphotypes seen on Gram Stain    Legionella Antigen, Urine - Urine, Urine, Clean Catch [[id number]]  (Normal) Collected:  02/28/20 0402    Lab Status:  Final result Specimen:  Urine, Clean Catch Updated:  02/28/20 0439     LEGIONELLA ANTIGEN, URINE Negative    S. Pneumo Ag Urine or CSF - Urine, Urine, Clean Catch [[id number]]  (Normal) Collected:  02/28/20 0402    Lab Status:  Final result Specimen:  Urine, Clean Catch Updated:  02/28/20 0439     Strep Pneumo Ag Negative    Respiratory Panel, PCR - Swab, Nasopharynx [[id number]]  (Normal) Collected:  02/27/20 7100    Lab Status:  Final result Specimen:  Swab from Nasopharynx Updated:  02/28/20 0131     ADENOVIRUS, PCR Not Detected     Coronavirus 229E Not Detected     Coronavirus HKU1  Not Detected     Coronavirus NL63 Not Detected     Coronavirus OC43 Not Detected     Human Metapneumovirus Not Detected     Human Rhinovirus/Enterovirus Not Detected     Influenza B PCR Not Detected     Parainfluenza Virus 1 Not Detected     Parainfluenza Virus 2 Not Detected     Parainfluenza Virus 3 Not Detected     Parainfluenza Virus 4 Not Detected     Bordetella pertussis pcr Not Detected     Influenza A H1 2009 PCR Not Detected     Chlamydophila pneumoniae PCR Not Detected     Mycoplasma pneumo by PCR Not Detected     Influenza A PCR Not Detected     Influenza A H3 Not Detected     Influenza A H1 Not Detected     RSV, PCR Not Detected    Blood Culture - Blood, Arm, Right [[id number]] Collected:  02/27/20 1157    Lab Status:  Final result Specimen:  Blood from Arm, Right Updated:  03/03/20 1215     Blood Culture No growth at 5 days    Blood Culture - Blood, Arm, Left [[id number]] Collected:  02/27/20 1152    Lab Status:  Final result Specimen:  Blood from Arm, Left Updated:  03/03/20 1215     Blood Culture No growth at 5 days             Anti-Infectives (From admission, onward)      Ordered     Dose/Rate Route Frequency Start Stop    03/03/20 1407  !Vancomycin Level Draw Needed     Ordering Provider:  Allison Box MD     Does not apply Once 03/04/20 1300      02/29/20 1108  meropenem (MERREM) 1 g in sodium chloride 0.9 % 100 mL IVPB     Fay Bradford APRN reviewed the order on 03/04/20 1412.   Ordering Provider:  Fay Bradford APRN    1 g  over 30 Minutes Intravenous Every 8 Hours 02/29/20 1200 03/08/20 2359    02/28/20 1325  vancomycin (VANCOCIN) 1,000 mg in sodium chloride 0.9 % 250 mL IVPB     Fay Bradford APRN reviewed the order on 03/04/20 1412.   Ordering Provider:  Fay Bradford APRN    15 mg/kg × 70.7 kg (Ideal)  over 60 Minutes Intravenous Every 12 Hours 02/29/20 0200 03/08/20 2359    02/28/20 1341  meropenem (MERREM) 1 g in sodium chloride 0.9 % 100 mL IVPB      Ordering Provider:  Fay Bradford APRN    1 g  over 30 Minutes Intravenous Once 02/28/20 1500 02/28/20 1535    02/28/20 1325  vancomycin IVPB 1500 mg in 0.9% NaCl (Premix) 250 mL     Ordering Provider:  Fay Bradford APRN    20 mg/kg × 70.7 kg (Ideal)  over 90 Minutes Intravenous Once 02/28/20 1400 02/28/20 1716    02/28/20 1241  Pharmacy to dose vancomycin     Candice Braynt, PharmD reviewed the order on 03/01/20 1341.   Ordering Provider:  Fay Bradford APRN     Does not apply Continuous PRN 02/28/20 1239 03/06/20 1238    02/28/20 0837  cefTRIAXone (ROCEPHIN) 2 g in sodium chloride 0.9 % 100 mL IVPB     Note to Pharmacy:  Patient to be on until 3/8/2020   Fay Bradford APRN reviewed the order on 03/04/20 1412.   Ordering Provider:  Fay Bradford APRN    2 g  200 mL/hr over 30 Minutes Intravenous Every 12 Hours 02/28/20 0845 03/08/20 7542          Yoanna oBse, PharmD  03/04/20 3:00 PM

## 2020-03-04 NOTE — NURSING NOTE
Pt arrived from [facility] at this time.  Pt ambulated from wheelchair to bed with strong assist of 2.  3L NC applied.  No distress noted. Will continue to monitor.

## 2020-03-04 NOTE — ANESTHESIA POSTPROCEDURE EVALUATION
Patient: Jamin Hennessy    Procedure Summary     Date:  03/04/20 Room / Location:  [facility] ENDO VIRTUAL RM / [facility] ENDOSCOPY    Anesthesia Start:  0728 Anesthesia Stop:      Procedure:  BRONCHOSCOPY with bronchioalveolar lavage (N/A Bronchus) Diagnosis:       Pneumonia of left lung due to infectious organism, unspecified part of lung      (Pneumonia of left lung due to infectious organism, unspecified part of lung [J18.9])    Surgeon:  Melissa Cole MD Provider:  Ray Kidd MD    Anesthesia Type:  MAC ASA Status:  4          Anesthesia Type: MAC    Vitals  Vitals Value Taken Time   BP 96/69 3/4/2020  8:08 AM   Temp     Pulse 98 3/4/2020  8:08 AM   Resp 23 3/4/2020  8:08 AM   SpO2 92 % 3/4/2020  8:08 AM           Post Anesthesia Care and Evaluation    Patient location during evaluation: PACU  Patient participation: complete - patient participated  Level of consciousness: awake  Pain management: adequate  Airway patency: patent  Anesthetic complications: No anesthetic complications  PONV Status: none  Cardiovascular status: acceptable  Respiratory status: acceptable and nasal cannula  Hydration status: acceptable    Comments: Patient seen and examined postoperatively; vital signs stable; SpO2 greater than or equal to 90%; cardiopulmonary status stable; nausea/vomiting adequately controlled; pain adequately controlled; no apparent anesthesia complications; patient discharged from anesthesia care when discharge criteria were met

## 2020-03-04 NOTE — PROGRESS NOTES
Continued Stay Note  MINDI Sawyer     Patient Name: Jamin Hennessy  MRN: [id number]  Today's Date: 3/4/2020    Admit Date: 2/27/2020    Discharge Plan     Row Name 03/04/20 1139       Plan    Plan  Return to [facility]. Precert started 3/4. No PASRR needed.      NEGRO Paredes    Phone: [phone]  Cell: [phone]  Fax: [phone]  [email or web address]

## 2020-03-04 NOTE — PROGRESS NOTES
"S/P tissue MVR/MICHELLE ligation--[place]las on 1/22/20    Pt known to our service d/t Enterococcus (VRE) MV IE.  He underwent tissue MVR on 1/22/2020 by Dr. Cole.    His postop course was lengthy given discharge planning r/t daptomycin.  He also had resp insufficiency issues and recurrent atrial fib.  Eliquis was restarted prior to transfer to [facility].    He was noted to have hypotension this morning and was transferred to [facility] and diagnosed with pna.  WBC was 18.4, hgb 7.6 at [facility].  He was transferred to [facility] for further care and mmgt.    Patient underwent bronchoscopy this am. Noted broncheomalacia and tracheomalacia.    Subjective: esting in bed. States his breathing feels \"fine\"  No events overnight    On ceftriaxone/vanc/Merrem  O2 requirements down to 4L  Remains afebrile      Intake/Output Summary (Last 24 hours) at 3/4/2020 1155  Last data filed at 3/4/2020 0304  Gross per 24 hour   Intake 1332 ml   Output --   Net 1332 ml     Temp:  [97.5 °F (36.4 °C)-97.6 °F (36.4 °C)] 97.6 °F (36.4 °C)  Heart Rate:  [] 98  Resp:  [18-23] 23  BP: ()/(53-85) 96/69      Results from last 7 days   Lab Units 03/04/20  0328 03/03/20  0517  02/28/20  2209   WBC 10*3/mm3 11.90* 12.50*   < > 14.60*   HEMOGLOBIN g/dL 7.8* 8.4*   < > 7.3*   HEMATOCRIT % 24.1* 26.1*   < > 22.8*   PLATELETS 10*3/mm3 664* 753*   < > 581*   INR   --   --   --  1.22*    < > = values in this interval not displayed.     Results from last 7 days   Lab Units 03/04/20  0328   CREATININE mg/dL 0.94   POTASSIUM mmol/L 4.1   SODIUM mmol/L 142   MAGNESIUM mg/dL 2.2       Physical Exam:  Neuro intact, nad, resting in bed  Tele:  SR 90s  Diminished bases, 4L 96%, reports productive cough at times  Sternotomy well healed, sternum stable  Benign abd, tolerating po intake, slightly obese.  Trace edema  GCS 15.  Alert and oriented x3.    Assessment/Plan:  Principal Problem:    Pneumonia of left lung due to infectious organism  Active Problems:    " Coronary artery disease due to lipid rich plaque    Dyslipidemia    Non-insulin dependent type 2 diabetes mellitus (CMS/HCC)    Diabetic neuropathy (CMS/HCC)    GERD without esophagitis    BPH with obstruction/lower urinary tract symptoms    B12 deficiency    Vitamin D deficiency    JARRETT (generalized anxiety disorder)    Tobacco abuse    Obesity (BMI 30-39.9)    S/P MVR (mitral valve replacement)    Endocarditis of mitral valve    Hypotension    Shortness of breath    Sepsis associated hypotension (CMS/HCC)    Chest pain    Vitamin E deficiency    Severe sepsis (CMS/HCC)    HCAP (healthcare-associated pneumonia)    Readmit for PNA--per hospitalist  Mitral valve endocarditis s/p tissue MVR--stable  Enterococcus bacteremia/VRE--cont dapto/Rocephin   CAD with stent 1 year ago  Paroxysmal atrial fibrillation with RVR--in SR currently  HTN--stable  HLD--no statin while on dapto  DM type 2 with neuropathy--SSl  GERD  Multiple sclerosis---mainly wheelchair bound  Current tobacco abuse   BPH with incontinence   Obesity---BMI 38.19  Generalized anxiety disorder     Routine care  Antibiotics per ID  Anticipate back to rehab at discharge    SHADIA DE LA GARZA, YUE  3/4/2020  11:55 AM     Making good progress.  He appears to be improving on a daily basis.  Continue antibiotics until this coming weekend and then reassess him.  If he continues to make progress at that point we will transfer to rehab.

## 2020-03-04 NOTE — OP NOTE
Pre-op Diagnosis: Pneumonia of left lung due to infectious organism, unspecified part of lung [J18.9]    Post-op Diagnosis: Pneumonia of left lung due to infectious organism, unspecified part of lung [J18.9]    Surgeon: Melissa Cole MD    Anesthesia: MAC    Operation: Flexible fiberoptic bronchoscopy, diagnostic BAL left lingula     Findings: Vocal cords normal  Changes of tracheobronchomalacia throughout the airway.  No endobronchial lesions or mucous plugs noted.    Specimen: BAL left lingula    Estimated Blood Loss: None      Complications: None immediate    Indications and History:  The patient with left lung pneumonia.  The risks, benefits, complications, treatment options and expected outcomes were discussed with the patient and informed consent was obtained. The possibilities of reaction to medication, pulmonary aspiration, pneumothorax, bleeding, respiratory failure and failure to diagnose a condition and creating a complication requiring transfusion or operation were discussed with the patient who freely signed the consent.      Description of Procedure:  After the induction of topical nasopharyngeal anesthesia, the bronchoscope was passed through the oropharynx . The vocal cords were visualized and2 % lidocaine solution was topically placed onto the cords. The cords were normal. The scope was then passed into the trachea.  1% plain lidocaine was used topically on the carol.  Careful inspection of the tracheal lumen was accomplished.     An entire bronchial exam was performed including the right and left bronchi with the following findings:     Endobronchial findings:   Trachea and carol normal except for changes of tracheomalacia noted.  Bronchoscope was then taken to the right side.  Right upper lobe, middle lobe and lower lobe segments were visualized.  Changes of some bronchomalacia noted.  Minimal secretions which were suctioned out with the help of saline.  No endobronchial lesions  noted.  Bronchoscope was taken to the left side.  Left upper lobe, lingula and lower lobe segments were visualized.  Changes of bronchomalacia noted.  No mucous plugging noted.  Some clear secretions noted.  No endobronchial lesions.  BAL was then done from left lingula which will be sent for appropriate studies  The Patient tolerated the procedure well. There were no immediate complications.        Image of trachea with tracheomalacia    THIS DOCUMENT HAS BEEN ELECTRONICALLY SIGNED BY  Melissa Cole MD  7:49 AM

## 2020-03-04 NOTE — PROGRESS NOTES
KPA/PULM/CC PROGRESS NOTE     HISTORY OF PRESENT ILLNESS:  Jamin Hennessy is a 64 y.o. 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  Patient reported that earlier that morning, while at [facility], his blood pressure checked and was reportedly low.  It was at that time, that EMS was contacted, and patient was taken to the outlying facility emergency department.  During his stay in the ED, patient was diagnosed with pneumonia.  He had reported shortness of breath, cough with yellow sputum production that started the preceding early morning.  Patient denied nausea, vomiting, constipation, diarrhea, fever, or chills.  Patient did mention some chest soreness, but denies arm pain, neck pain, or jaw pain.  Notably, patient had a recent mitral valve replacement secondary to known endocarditis.  This patient is well-known to this practice, as we were following patient during his recent hospitalization.  At time of discharge, patient was recommended to complete 6 weeks of IV antibiotics with daptomycin and Rocephin per infectious disease.  Patient was discharged to [facility], and the plan was for antibiotic completion on 3/8/2020.  Patient was transferred to [facility] and admitted initially to the hospitalist team for further treatment and evaluation of patient condition.  Patient has required no vasopressors.  Infectious disease as well as cardiothoracic surgery have been consulted.  Patient presented with right upper extremity midline that was placed during his previous admission.  Due to patient's pulmonary status, with increased oxygen  "supplementation needs, cardiothoracic surgery requested patient to be transferred to [facility] for closer monitoring.  Previous work-up included bilateral lower extremity venous Doppler which reported \"chronic left lower extremity superficial thrombophlebitis noted in the small saphenous.\"  It is been reported that patient had a chest x-ray from outlying facility indicating pneumonia, and CT of the chest at this facility revealed \"extensive airspace and interstitial opacity throughout the left lung with smaller patchy air peripheral opacities in the right lung.  Only the right upper lobe multifocal infection/pneumonia is the most likely etiology.  There is mild mediastinal adenopathy which is likely reactive/infectious.  There are small layering bilateral pleural effusions.\"  Per infectious disease, in review of patient's previous medical record, patient had blood cultures that were positive for enterococcus faecium, which screened positive for VRE based on PCR.  Initially organism was susceptible to ampicillin and gentamicin.  Repeat blood cultures were negative.  Following patient's surgery on 1/22/2020 (MVR), the mitral valve tissue culture was also positive for enterococcus faecium, but sensitivities revealed resistance to ampicillin.  ID was consulted as the case has become rather complex, and that first and second line antibiotic therapies are revealing resistance.  At time of transfer, patient initially was on 3 L/min via nasal cannula, but after being transferred in bed, required an increase to 6 L/min via high flow nasal cannula.  Patient denies chest pain, neck pain, arm pain, jaw pain, nausea, vomiting, constipation, diarrhea, blood in urine or stool.  Patient does admit to some mild shortness of breath, frequent cough with yellow sputum production, but denies fever or chills.     [facility] was consulted for further evaluation and treatment of pneumonia, and to aid improvement in pulmonary status.  Thank you for " "this consultation, we will follow along on this patient.        SUBJECTIVE    2/29: Patient reports feeling somewhat better today.  Tolerating 5 L O2.  No shortness of air at rest.  3/1: Patient reports feeling well today.  He reports shortness of air is improving.  Demonstrates good compliance with I-S.  Tolerating O2 at 4 L by nasal cannula  3/2:  Doing ok.  Reports some SOA and cough.  On supplemental oxygen, 4 liters  3/3:  No new issues.  Feeling some better.  Cough improved.  On 2L nasal cannula  3/4: Breathing some better.  Minimal cough.  No complaints of chest pain.  No nausea or vomiting.    OBJECTIVE    /85 (BP Location: Left arm, Patient Position: Lying)   Pulse 97   Temp 97.6 °F (36.4 °C) (Oral)   Resp 19   Ht 175.3 cm (69.02\")   Wt 119 kg (262 lb 5.6 oz)   SpO2 93%   BMI 38.72 kg/m²   Intake/Output last 3 shifts:  I/O last 3 completed shifts:  In: 3079 [P.O.:960; I.V.:1219; IV Piggyback:900]  Out: -   Intake/Output this shift:  No intake/output data recorded.    PHYSICAL EXAM:       Constitutional:  Well developed, well nourished, no acute distress, acutely ill-appearing, chronically ill-appearing  Eyes:  PERRL, conjunctiva normal, EOMI   HENT:  Atraumatic, external ears normal, nose normal. Neck-normal range of motion, no tenderness, supple, trachea midline  Respiratory:  Clear and diminished.  No crackles or wheezing. non-labored respirations without accessory muscle use  Cardiovascular:  Normal rate, normal rhythm, no murmurs, no gallops, no rubs   GI:  Soft, nondistended, normal bowel sounds, nontender, no rebound, no guarding   :  deferred   Musculoskeletal: Bilateral lower extremity edema 2+, no tenderness, no deformities  Integument:  Well hydrated, no rash.  Right sided posterior lateral chest with abrasion, right second and third toe with unstageable but without erythema or warmth, no drainage noted.  Sternal incision healing well  Neurologic:  Alert & oriented x 3, no " lateralizing deficits  Psychiatric:  Speech and behavior appropriate    Scheduled Meds:    Lidocaine HCl (Cardiac) PF      Lidocaine HCl Urethral/Mucosal/Topical 2%      lidocaine PF 1%      !Vancomycin Level Draw Needed  Does not apply Once   amiodarone 200 mg Oral Q24H   apixaban 5 mg Oral Q12H   aspirin 81 mg Oral Daily   cefTRIAXone 2 g Intravenous Q12H   cetirizine 10 mg Oral Daily   finasteride 5 mg Oral Daily   FLUoxetine 40 mg Oral Daily   folic acid 1 mg Oral Daily   gabapentin 200 mg Oral TID   guaiFENesin 1,200 mg Oral Q12H   insulin lispro 0-9 Units Subcutaneous 4x Daily With Meals & Nightly   ipratropium-albuterol 3 mL Nebulization Q4H While Awake - RT   lactobacillus acidophilus 1 capsule Oral BID   meropenem 1 g Intravenous Q8H   pantoprazole 40 mg Oral QAM   sodium chloride 10 mL Intravenous Q12H   sodium chloride 10 mL Intravenous Q12H   tamsulosin 0.4 mg Oral Daily   vancomycin 15 mg/kg (Ideal) Intravenous Q12H   vitamin B-12 1,000 mcg Oral Daily   vitamin E 800 Units Oral Daily       Continuous Infusions:    Pharmacy to dose vancomycin     sodium chloride 9 mL/hr Last Rate: 9 mL/hr (03/04/20 0724)       PRN Meds:acetaminophen **OR** acetaminophen **OR** acetaminophen  •  benzonatate  •  bisacodyl  •  cyclobenzaprine  •  dextrose  •  dextrose  •  glucagon (human recombinant)  •  insulin lispro **AND** insulin lispro  •  ipratropium-albuterol  •  magnesium sulfate **OR** magnesium sulfate in D5W 1g/100mL (PREMIX)  •  ondansetron **OR** ondansetron  •  Pharmacy to dose vancomycin  •  potassium chloride  •  sodium chloride  •  sodium chloride  •  sodium chloride     Data Review:  Lab Results (last 24 hours)     Procedure Component Value Units Date/Time    POC Glucose Once [[id number]]  (Abnormal) Collected:  03/04/20 0710    Specimen:  Blood Updated:  03/04/20 0711     Glucose 123 mg/dL      Comment: Serial Number: [id number]:  [id number]       CBC & Differential [[id number]] Collected:  03/04/20  0328    Specimen:  Blood Updated:  03/04/20 0623    Narrative:       The following orders were created for panel order CBC & Differential.  Procedure                               Abnormality         Status                     ---------                               -----------         ------                     CBC Auto Differential[[id number]]        Abnormal            Final result                 Please view results for these tests on the individual orders.    CBC Auto Differential [[id number]]  (Abnormal) Collected:  03/04/20 0328    Specimen:  Blood Updated:  03/04/20 0623     WBC 11.90 10*3/mm3      RBC 3.21 10*6/mm3      Hemoglobin 7.8 g/dL      Hematocrit 24.1 %      MCV 75.0 fL      MCH 24.5 pg      MCHC 32.6 g/dL      RDW 18.3 %      RDW-SD 49.0 fl      MPV 7.3 fL      Platelets 664 10*3/mm3     Scan Slide [[id number]] Collected:  03/04/20 0328    Specimen:  Blood Updated:  03/04/20 0623     Scan Slide --     Comment: See Manual Differential Results       Manual Differential [[id number]]  (Abnormal) Collected:  03/04/20 0328    Specimen:  Blood Updated:  03/04/20 0623     Neutrophil % 62.0 %      Lymphocyte % 11.0 %      Monocyte % 6.0 %      Eosinophil % 14.0 %      Basophil % 1.0 %      Bands %  5.0 %      Metamyelocyte % 1.0 %      Neutrophils Absolute 7.97 10*3/mm3      Lymphocytes Absolute 1.31 10*3/mm3      Monocytes Absolute 0.71 10*3/mm3      Eosinophils Absolute 1.67 10*3/mm3      Basophils Absolute 0.12 10*3/mm3      Microcytes Slight/1+     Poikilocytes Slight/1+     WBC Morphology Normal     Platelet Morphology Normal    Narrative:       Slide Reviewed      Magnesium [[id number]]  (Normal) Collected:  03/04/20 0328    Specimen:  Blood Updated:  03/04/20 0411     Magnesium 2.2 mg/dL     Comprehensive Metabolic Panel [[id number]]  (Abnormal) Collected:  03/04/20 0328    Specimen:  Blood Updated:  03/04/20 0411     Glucose 122 mg/dL      BUN 14 mg/dL      Creatinine 0.94 mg/dL      Sodium 142 mmol/L       Potassium 4.1 mmol/L      Chloride 108 mmol/L      CO2 25.0 mmol/L      Calcium 8.2 mg/dL      Total Protein 6.1 g/dL      Albumin 2.50 g/dL      ALT (SGPT) 93 U/L      AST (SGOT) 79 U/L      Alkaline Phosphatase 89 U/L      Total Bilirubin <0.2 mg/dL      eGFR Non African Amer 81 mL/min/1.73      Globulin 3.6 gm/dL      A/G Ratio 0.7 g/dL      BUN/Creatinine Ratio 14.9     Anion Gap 9.0 mmol/L     Narrative:       GFR Normal >60  Chronic Kidney Disease <60  Kidney Failure <15      aPTT [[id number]]  (Abnormal) Collected:  03/04/20 0328    Specimen:  Blood Updated:  03/04/20 0350     PTT 29.8 seconds     POC Glucose Once [[id number]]  (Abnormal) Collected:  03/03/20 2010    Specimen:  Blood Updated:  03/03/20 2011     Glucose 122 mg/dL      Comment: Serial Number: [id number]:  [id number]       POC Glucose Once [[id number]]  (Normal) Collected:  03/03/20 1654    Specimen:  Blood Updated:  03/03/20 1659     Glucose 81 mg/dL      Comment: Serial Number: [id number]:  [id number]       Blood Culture - Blood, Arm, Left [[id number]] Collected:  02/27/20 1152    Specimen:  Blood from Arm, Left Updated:  03/03/20 1215     Blood Culture No growth at 5 days    Blood Culture - Blood, Arm, Right [[id number]] Collected:  02/27/20 1157    Specimen:  Blood from Arm, Right Updated:  03/03/20 1215     Blood Culture No growth at 5 days    POC Glucose Once [[id number]]  (Abnormal) Collected:  03/03/20 1128    Specimen:  Blood Updated:  03/03/20 1135     Glucose 167 mg/dL      Comment: Serial Number: [id number]:  [id number]       POC Glucose Once [[id number]]  (Abnormal) Collected:  03/03/20 0751    Specimen:  Blood Updated:  03/03/20 0753     Glucose 108 mg/dL      Comment: Serial Number: [id number]:  [id number]                Imaging:  Imaging Results (Last 72 Hours)     Procedure Component Value Units Date/Time    XR Chest 1 View [[id number]] Collected:  03/03/20 0829     Updated:  03/03/20 0833    Narrative:        DATE OF EXAM:  3/3/2020 7:35 AM     PROCEDURE:  XR CHEST 1 VW-     INDICATIONS:  Pneumonia. Follow-up. Coronary artery disease. Diabetes. HISTORY of  mitral valve replacement. Sepsis.       COMPARISON:  AP portable chest 03/01/2020. CT chest 02/27/2020.     TECHNIQUE:   Single radiographic view of the chest was obtained.     FINDINGS:  Dense consolidative changes with air bronchograms are seen within the  left lung with sparing at the level of the costophrenic angle, not  thought to be significantly changed, allowing for slight differences in  technique, compared to prior. Suspected minimal infiltrate in the right  base, without focal right lung consolidation. Heart size is stable with  signs of prior median sternotomy and valve replacement. No pneumothorax.  Questionable trace bilateral pleural effusions. Degenerative changes of  the shoulders.       Impression:       Dense airspace disease throughout the left lung, compatible with the  provided history of pneumonia. Suspected minimal right basilar  infiltrate. No significant change from 03/01/2020, allowing for  differences in technique..     Electronically Signed By-Dr. Riana Lowery MD On:3/3/2020 8:31 AM  This report was finalized on [id number] by Dr. Riana Lowery MD.            ASSESSMENT/PLAN:     Pneumonia of left lung due to infectious organism    Coronary artery disease due to lipid rich plaque    Dyslipidemia    Non-insulin dependent type 2 diabetes mellitus (CMS/HCC)    Diabetic neuropathy (CMS/HCC)    GERD without esophagitis    BPH with obstruction/lower urinary tract symptoms    B12 deficiency    Vitamin D deficiency    JARRETT (generalized anxiety disorder)    Tobacco abuse    Obesity (BMI 30-39.9)    S/P MVR (mitral valve replacement)    Endocarditis of mitral valve    Hypotension    Shortness of breath    Sepsis associated hypotension (CMS/HCC)    Chest pain    Vitamin E deficiency    Severe sepsis (CMS/HCC)    HCAP (healthcare-associated  pneumonia)         Severe sepsis without septic shock, secondary to pneumonia  -IVF bolus given at outlying facility  -Lactic Acid 1.0  -Cultures-pending  -Urine antigens-negative  -RVP-negative  -Reviewed CT Chest.  -Procalcitonin-0.10  -Rocephin, Merrem, and vancomycin, ID managing antibiotics  -Imaging with dense infiltrate in left lung, proceed with bronch today  -WBC some better today     Sepsis associated hypotension, improving  -Did not require vasopressors  -Stable, continue to monitor     HCAP  -Reviewed CT Chest.  -Aggressive pulmonary toileting  -Follow sputum cultures  -IS/Flutter Valve encouraged and ordered  -Antibiotics as above per ID  -Continue Mucinex, Zyrtec     Acute respiratory failure with hypoxia, likely secondary to pneumonia; R/O Volume overload  -From past admission, bedside PFT reviewed: Suggestive of restrictive defect. FEV1 46%.  +bronchdilator response.  Diffusion capacity normal when corrected.  -Oxygen supplementation, titrate to maintain oxygen saturation >91%, currently on 2 L  -Duonebs scheduled and PRN  -BNP 1553  -intermittent diuresis  -sputum 2/28 - normal respiratory nelson      Enterococcus MV Endocarditis; S/P MVR (tissue)/MICHELLE ligation (1/22/2020)  -Previous hospitalization cultures: 1) Blood cultures-Enterococcus faecium, positive for VRE on PCR; initially susciptible to ampicillin and gentamicin.  2) MV tissue culture with Enterococcus faecium, resistant to ampicillin, susceptible for daptomycin.  -Initially placed on IV Rocephin & Daptomycin x 6 weeks by ID with end date of 3/8/2020 (during previous hospitalization).  -ID consulted and managing antibiotics, as above.  -CTS following     CAD  -Continue ASA     Hyperlipidemia  -Statin therapy on hold due to previously being on daptomycin, resume when clinically appropriate     PAF (controlled), on chronic anticoagulation with Eliquis  -Continue amiodarone  -D/C heparin drip and now on Eliquis     Diabetes mellitus, type 2,  with neuropathy  -strict glycemic control recommended  -Accuchecks AC/HS with sliding scale insulin  -Hold oral diabetic medications until out of ICU  -Continue gabapentin     GERD  -Continue protonix     BPH  -Continue proscar, flomax     General Anxiety Disorder  -Continue prozac     Multiple sclerosis  -Mainly wheelchair bound, limited mobility  -PT/OT     Obesity  -BMI 37.91  -Counseled on lifestyle modifications to improve overall health     Tobacco Abuse, current  -Counseled on smoking cessation     Vitamin D/B12 deficiency  -Continue folic acid, B12 supplements       High Risk     Accuchecks with SSI  Code Status: CPR, Full Interventions  VTE Prophylaxis:  Eliquis/SCDs  PUD Prophylaxis: Protonix    Midline  Diet ordered

## 2020-03-04 NOTE — PLAN OF CARE
Problem: Patient Care Overview  Goal: Plan of Care Review  Flowsheets  Taken 3/4/2020 1713  Progress: improving  Outcome Summary: bronch today with bronchial washings  Taken 3/4/2020 1600  Plan of Care Reviewed With: patient  Goal: Interprofessional Rounds/Family Conf  Flowsheets (Taken 3/4/2020 1713)  Summary: patient had bronch today with bronchial washings  Participants: ; nursing; patient; pharmacy; social work/services     Problem: Pneumonia (Adult)  Goal: Signs and Symptoms of Listed Potential Problems Will be Absent, Minimized or Managed (Pneumonia)  Flowsheets (Taken 3/1/2020 0215 by Nain Sequeira, RN)  Problems Assessed (Pneumonia): all  Problems Present (Pneumonia): respiratory compromise     Problem: Fall Risk (Adult)  Goal: Identify Related Risk Factors and Signs and Symptoms  Flowsheets (Taken 3/1/2020 0215 by Nain Sequeira RN)  Related Risk Factors (Fall Risk): age-related changes;environment unfamiliar  Signs and Symptoms (Fall Risk): presence of risk factors  Goal: Absence of Fall  Flowsheets (Taken 3/4/2020 1713)  Absence of Fall: making progress toward outcome     Problem: Skin Injury Risk (Adult)  Goal: Identify Related Risk Factors and Signs and Symptoms  Flowsheets (Taken 3/1/2020 0215 by Nain Sequeira RN)  Related Risk Factors (Skin Injury Risk): advanced age;body weight extremes;hospitalization prolonged;mobility impaired  Goal: Skin Health and Integrity  Flowsheets (Taken 3/4/2020 1713)  Skin Health and Integrity: making progress toward outcome     Problem: Pain, Chronic (Adult)  Goal: Identify Related Risk Factors and Signs and Symptoms  Note:   Will continue to monitor   Goal: Acceptable Pain/Comfort Level and Functional Ability  Flowsheets (Taken 3/4/2020 1713)  Acceptable Pain/Comfort Level and Functional Ability: making progress toward outcome

## 2020-03-05 PROBLEM — R65.20 SEVERE SEPSIS (HCC): Status: RESOLVED | Noted: 2020-02-28 | Resolved: 2020-03-05

## 2020-03-05 PROBLEM — R74.8 ELEVATED LIVER ENZYMES: Chronic | Status: ACTIVE | Noted: 2020-03-05

## 2020-03-05 PROBLEM — L01.00 IMPETIGO: Status: ACTIVE | Noted: 2020-03-05

## 2020-03-05 PROBLEM — D50.9 MICROCYTIC ANEMIA: Chronic | Status: ACTIVE | Noted: 2020-03-05

## 2020-03-05 PROBLEM — D72.10 EOSINOPHILIA: Chronic | Status: ACTIVE | Noted: 2020-03-05

## 2020-03-05 PROBLEM — A41.9 SEVERE SEPSIS (HCC): Status: RESOLVED | Noted: 2020-02-28 | Resolved: 2020-03-05

## 2020-03-05 LAB
ANION GAP SERPL CALCULATED.3IONS-SCNC: 10 MMOL/L (ref 5–15)
APTT PPP: 27.1 SECONDS (ref 61–76.5)
BASOPHILS # BLD AUTO: 0.2 10*3/MM3 (ref 0–0.2)
BASOPHILS NFR BLD AUTO: 2.1 % (ref 0–1.5)
BUN BLD-MCNC: 15 MG/DL (ref 8–23)
BUN/CREAT SERPL: 15.2 (ref 7–25)
CALCIUM SPEC-SCNC: 8.6 MG/DL (ref 8.6–10.5)
CHLORIDE SERPL-SCNC: 107 MMOL/L (ref 98–107)
CO2 SERPL-SCNC: 24 MMOL/L (ref 22–29)
CREAT BLD-MCNC: 0.99 MG/DL (ref 0.76–1.27)
DEPRECATED RDW RBC AUTO: 48.1 FL (ref 37–54)
EOSINOPHIL # BLD AUTO: 1.7 10*3/MM3 (ref 0–0.4)
EOSINOPHIL NFR BLD AUTO: 16.8 % (ref 0.3–6.2)
ERYTHROCYTE [DISTWIDTH] IN BLOOD BY AUTOMATED COUNT: 17.9 % (ref 12.3–15.4)
GFR SERPL CREATININE-BSD FRML MDRD: 76 ML/MIN/1.73
GLUCOSE BLD-MCNC: 124 MG/DL (ref 65–99)
GLUCOSE BLDC GLUCOMTR-MCNC: 109 MG/DL (ref 70–105)
GLUCOSE BLDC GLUCOMTR-MCNC: 109 MG/DL (ref 70–105)
GLUCOSE BLDC GLUCOMTR-MCNC: 112 MG/DL (ref 70–105)
GLUCOSE BLDC GLUCOMTR-MCNC: 68 MG/DL (ref 70–105)
HCT VFR BLD AUTO: 24.4 % (ref 37.5–51)
HGB BLD-MCNC: 7.8 G/DL (ref 13–17.7)
LAB AP CASE REPORT: NORMAL
LYMPHOCYTES # BLD AUTO: 1.2 10*3/MM3 (ref 0.7–3.1)
LYMPHOCYTES NFR BLD AUTO: 12.1 % (ref 19.6–45.3)
MAGNESIUM SERPL-MCNC: 2.4 MG/DL (ref 1.6–2.4)
MCH RBC QN AUTO: 24.4 PG (ref 26.6–33)
MCHC RBC AUTO-ENTMCNC: 32.2 G/DL (ref 31.5–35.7)
MCV RBC AUTO: 75.9 FL (ref 79–97)
MONOCYTES # BLD AUTO: 0.6 10*3/MM3 (ref 0.1–0.9)
MONOCYTES NFR BLD AUTO: 5.9 % (ref 5–12)
NEUTROPHILS # BLD AUTO: 6.5 10*3/MM3 (ref 1.7–7)
NEUTROPHILS NFR BLD AUTO: 63.1 % (ref 42.7–76)
NRBC BLD AUTO-RTO: 0 /100 WBC (ref 0–0.2)
PATH REPORT.FINAL DX SPEC: NORMAL
PATH REPORT.GROSS SPEC: NORMAL
PLATELET # BLD AUTO: 708 10*3/MM3 (ref 140–450)
PMV BLD AUTO: 7.6 FL (ref 6–12)
POTASSIUM BLD-SCNC: 4.3 MMOL/L (ref 3.5–5.2)
RBC # BLD AUTO: 3.21 10*6/MM3 (ref 4.14–5.8)
RETICS # AUTO: 0.1 10*6/MM3 (ref 0.02–0.13)
RETICS/RBC NFR AUTO: 3.13 % (ref 0.7–1.9)
SODIUM BLD-SCNC: 141 MMOL/L (ref 136–145)
VANCOMYCIN PEAK SERPL-MCNC: 22.7 MCG/ML (ref 20–40)
WBC NRBC COR # BLD: 10.3 10*3/MM3 (ref 3.4–10.8)

## 2020-03-05 PROCEDURE — 25010000002 CEFTRIAXONE PER 250 MG: Performed by: NURSE PRACTITIONER

## 2020-03-05 PROCEDURE — 94799 UNLISTED PULMONARY SVC/PX: CPT

## 2020-03-05 PROCEDURE — 80048 BASIC METABOLIC PNL TOTAL CA: CPT | Performed by: NURSE PRACTITIONER

## 2020-03-05 PROCEDURE — 83735 ASSAY OF MAGNESIUM: CPT | Performed by: NURSE PRACTITIONER

## 2020-03-05 PROCEDURE — 99233 SBSQ HOSP IP/OBS HIGH 50: CPT | Performed by: INTERNAL MEDICINE

## 2020-03-05 PROCEDURE — 85730 THROMBOPLASTIN TIME PARTIAL: CPT | Performed by: NURSE PRACTITIONER

## 2020-03-05 PROCEDURE — 85045 AUTOMATED RETICULOCYTE COUNT: CPT | Performed by: INTERNAL MEDICINE

## 2020-03-05 PROCEDURE — 99024 POSTOP FOLLOW-UP VISIT: CPT | Performed by: NURSE PRACTITIONER

## 2020-03-05 PROCEDURE — 25010000002 MEROPENEM PER 100 MG: Performed by: NURSE PRACTITIONER

## 2020-03-05 PROCEDURE — 80202 ASSAY OF VANCOMYCIN: CPT | Performed by: INTERNAL MEDICINE

## 2020-03-05 PROCEDURE — 82962 GLUCOSE BLOOD TEST: CPT

## 2020-03-05 PROCEDURE — 25010000002 VANCOMYCIN 750 MG RECONSTITUTED SOLUTION 1 EACH VIAL: Performed by: INTERNAL MEDICINE

## 2020-03-05 PROCEDURE — 85025 COMPLETE CBC W/AUTO DIFF WBC: CPT | Performed by: NURSE PRACTITIONER

## 2020-03-05 PROCEDURE — 97530 THERAPEUTIC ACTIVITIES: CPT

## 2020-03-05 RX ADMIN — CEFTRIAXONE SODIUM 2 G: 2 INJECTION, POWDER, FOR SOLUTION INTRAMUSCULAR; INTRAVENOUS at 20:47

## 2020-03-05 RX ADMIN — MEROPENEM 1 G: 1 INJECTION, POWDER, FOR SOLUTION INTRAVENOUS at 04:13

## 2020-03-05 RX ADMIN — VANCOMYCIN HYDROCHLORIDE 750 MG: 750 INJECTION, POWDER, LYOPHILIZED, FOR SOLUTION INTRAVENOUS at 04:14

## 2020-03-05 RX ADMIN — AMIODARONE HYDROCHLORIDE 200 MG: 200 TABLET ORAL at 08:37

## 2020-03-05 RX ADMIN — CEFTRIAXONE SODIUM 2 G: 2 INJECTION, POWDER, FOR SOLUTION INTRAMUSCULAR; INTRAVENOUS at 08:38

## 2020-03-05 RX ADMIN — APIXABAN 5 MG: 5 TABLET, FILM COATED ORAL at 20:47

## 2020-03-05 RX ADMIN — CETIRIZINE HYDROCHLORIDE 10 MG: 10 TABLET, FILM COATED ORAL at 08:38

## 2020-03-05 RX ADMIN — ASPIRIN 81 MG: 81 TABLET, DELAYED RELEASE ORAL at 08:37

## 2020-03-05 RX ADMIN — FLUOXETINE 40 MG: 20 CAPSULE ORAL at 08:38

## 2020-03-05 RX ADMIN — IPRATROPIUM BROMIDE AND ALBUTEROL SULFATE 3 ML: .5; 3 SOLUTION RESPIRATORY (INHALATION) at 06:53

## 2020-03-05 RX ADMIN — GUAIFENESIN 1200 MG: 600 TABLET, EXTENDED RELEASE ORAL at 20:47

## 2020-03-05 RX ADMIN — IPRATROPIUM BROMIDE AND ALBUTEROL SULFATE 3 ML: .5; 3 SOLUTION RESPIRATORY (INHALATION) at 20:56

## 2020-03-05 RX ADMIN — FINASTERIDE 5 MG: 5 TABLET, FILM COATED ORAL at 08:37

## 2020-03-05 RX ADMIN — VANCOMYCIN HYDROCHLORIDE 750 MG: 750 INJECTION, POWDER, LYOPHILIZED, FOR SOLUTION INTRAVENOUS at 16:45

## 2020-03-05 RX ADMIN — MEROPENEM 1 G: 1 INJECTION, POWDER, FOR SOLUTION INTRAVENOUS at 12:49

## 2020-03-05 RX ADMIN — PANTOPRAZOLE SODIUM 40 MG: 40 TABLET, DELAYED RELEASE ORAL at 08:37

## 2020-03-05 RX ADMIN — APIXABAN 5 MG: 5 TABLET, FILM COATED ORAL at 08:38

## 2020-03-05 RX ADMIN — GABAPENTIN 200 MG: 100 CAPSULE ORAL at 16:45

## 2020-03-05 RX ADMIN — GUAIFENESIN 1200 MG: 600 TABLET, EXTENDED RELEASE ORAL at 08:37

## 2020-03-05 RX ADMIN — Medication: at 20:46

## 2020-03-05 RX ADMIN — IPRATROPIUM BROMIDE AND ALBUTEROL SULFATE 3 ML: .5; 3 SOLUTION RESPIRATORY (INHALATION) at 10:45

## 2020-03-05 RX ADMIN — GABAPENTIN 200 MG: 100 CAPSULE ORAL at 20:47

## 2020-03-05 RX ADMIN — CYANOCOBALAMIN TAB 1000 MCG 1000 MCG: 1000 TAB at 08:37

## 2020-03-05 RX ADMIN — Medication 1 CAPSULE: at 08:38

## 2020-03-05 RX ADMIN — Medication 800 UNITS: at 08:37

## 2020-03-05 RX ADMIN — TAMSULOSIN HYDROCHLORIDE 0.4 MG: 0.4 CAPSULE ORAL at 08:38

## 2020-03-05 RX ADMIN — Medication 1 CAPSULE: at 20:47

## 2020-03-05 RX ADMIN — MEROPENEM 1 G: 1 INJECTION, POWDER, FOR SOLUTION INTRAVENOUS at 20:47

## 2020-03-05 RX ADMIN — FOLIC ACID 1 MG: 1 TABLET ORAL at 08:38

## 2020-03-05 RX ADMIN — GABAPENTIN 200 MG: 100 CAPSULE ORAL at 08:38

## 2020-03-05 NOTE — PROGRESS NOTES
S/P tissue MVR/MICHELLE ligation--[place] on 1/22/20    Pt known to our service d/t Enterococcus (VRE) MV IE.  He underwent tissue MVR on 1/22/2020 by Dr. Cole.    His postop course was lengthy given discharge planning r/t daptomycin.  He also had resp insufficiency issues and recurrent atrial fib.  Eliquis was restarted prior to transfer to [facility].      Patient resting in bed. No complaints. He underwent bronchoscopy yesterday. Preliminary washings showing 2+ gram + cocci.    No events overnight    On ceftriaxone/vanc/Merrem  O2 requirements down to 3L  Remains afebrile      Intake/Output Summary (Last 24 hours) at 3/5/2020 1433  Last data filed at 3/5/2020 0414  Gross per 24 hour   Intake 300 ml   Output --   Net 300 ml     Temp:  [97.6 °F (36.4 °C)-98.7 °F (37.1 °C)] 97.7 °F (36.5 °C)  Heart Rate:  [83-97] 84  Resp:  [18-20] 18  BP: ()/(64-78) 99/64      Results from last 7 days   Lab Units 03/05/20  0247 03/04/20  0328  02/28/20  2209   WBC 10*3/mm3 10.30 11.90*   < > 14.60*   HEMOGLOBIN g/dL 7.8* 7.8*   < > 7.3*   HEMATOCRIT % 24.4* 24.1*   < > 22.8*   PLATELETS 10*3/mm3 708* 664*   < > 581*   INR   --   --   --  1.22*    < > = values in this interval not displayed.     Results from last 7 days   Lab Units 03/05/20  0247   CREATININE mg/dL 0.99   POTASSIUM mmol/L 4.3   SODIUM mmol/L 141   MAGNESIUM mg/dL 2.4       Physical Exam:  Neuro intact, nad, resting in bed  Tele:  SR 90s  Diminished bases, 3L 96%, reports productive cough at times  Sternotomy well healed, sternum stable  Benign abd, tolerating po intake  Trace edema    Assessment/Plan:  Principal Problem:    Pneumonia of left lung due to infectious organism  Active Problems:    Coronary artery disease due to lipid rich plaque    Dyslipidemia    Non-insulin dependent type 2 diabetes mellitus (CMS/HCC)    Diabetic neuropathy (CMS/HCC)    GERD without esophagitis    BPH with obstruction/lower urinary tract symptoms    B12 deficiency    Vitamin  D deficiency    JARRETT (generalized anxiety disorder)    Tobacco abuse    Obesity (BMI 30-39.9)    S/P MVR (mitral valve replacement)    Endocarditis of mitral valve    Hypotension    Shortness of breath    Sepsis associated hypotension (CMS/HCC)    Chest pain    Vitamin E deficiency    Severe sepsis (CMS/HCC)    HCAP (healthcare-associated pneumonia)    Readmit for PNA--per hospitalist  Mitral valve endocarditis s/p tissue MVR--stable  Enterococcus bacteremia/VRE-- antibiotics per ID  CAD with stent 1 year ago  Paroxysmal atrial fibrillation with RVR--in SR currently  HTN--stable  HLD--no statin while on dapto  DM type 2 with neuropathy--SSl  GERD  Multiple sclerosis---mainly wheelchair bound  Current tobacco abuse   BPH with incontinence   Obesity---BMI 38.19  Generalized anxiety disorder     Routine care  Antibiotics per ID  Anticipate back to rehab at discharge    SHADIA DE LA GARZA, YUE  3/5/2020  2:33 PM     He appears to be doing well.  Continue antibiotics.

## 2020-03-05 NOTE — PROGRESS NOTES
Continued Stay Note   [facility]     Patient Name: Jamin Hennessy  MRN: [id number]  Today's Date: 3/5/2020    Admit Date: 2020    Discharge Plan     Row Name 20 1439       Plan    Plan  [place] [facility]. Precert approved 3/4, valid for 48 hrs. Will need restarted if not dc ready on 3/6. No PASRR needed.    Plan Comments  During unit rounds at 1030 this morning, Dr. Coronel reports she has not been consulted as hospitalist for patient. Attending listed as Dr. Cole. RN reports Dr. Flynn rounded on patient this morning.  contacted Dr. Flynn via secure chat to notify him that Dr. Cole is listed as attending so a hospitalist is not following this patient at this time. His precert for [place] [facility] has been approved since yesterday (3/4) and it will  tomorrow (3/6). Patient can discharge when medically stable. If not ready tomorrow, precert will have to be restarted. Dr. Flynn ordered for a hospitalist to be consulted since the patient is still undergoing treatment. Bedside RN was included in chat also and MSW. Notified [place] [facility] Sandoval of update. Patient had bronchscopy yesterday with cultures obtained. Results and final reports pending. Antibiotics per infectious disease.    Addendum: Dr. Flynn notified  that Dr. Cole with cardiothoracic surgery is the attending, not Dr. Cole from [facility]. Sent secure chat to [facility] Dr. Cole and his NP Юлия Banuelos to notified them of dc plan and if a hospitalist was wanted. No reply at this time (1278 on 3/5/2020) and unable to discontinue hospitalist consult order as the order is being processed by another user Virgilio ANDREW.        Olga Norman RN       Office Phone: [phone]  Office Cell: [phone]

## 2020-03-05 NOTE — THERAPY TREATMENT NOTE
Acute Care - Occupational Therapy Treatment Note  [facility]     Patient Name: Jamin Hennessy  : 1955  MRN: [id number]  Today's Date: 3/5/2020             Admit Date: 2020       ICD-10-CM ICD-9-CM   1. Pneumonia of left lung due to infectious organism, unspecified part of lung J18.9 486     Patient Active Problem List   Diagnosis   • Atrial fibrillation with RVR (CMS/LTAC, located within [facility])   • Coronary artery disease due to lipid rich plaque   • CAD S/P percutaneous coronary angioplasty   • Essential hypertension   • Dyslipidemia   • Non-insulin dependent type 2 diabetes mellitus (CMS/[facility], located within [facility])   • Diabetic neuropathy (CMS/[facility]AC, located within [facility])   • GERD without esophagitis   • BPH with obstruction/lower urinary tract symptoms   • B12 deficiency   • Vitamin D deficiency   • JARRETT (generalized anxiety disorder)   • Tobacco abuse   • Obesity (BMI 30-39.9)   • Acute bacterial endocarditis   • S/P MVR (mitral valve replacement)   • Endocarditis of mitral valve   • Non-sustained ventricular tachycardia (CMS/[facility], located within [facility])   • Acute myocardial infarction (CMS/[facility]AC, located within [facility])   • Hypercholesterolemia   • Hypotension   • Shortness of breath   • Sepsis associated hypotension (CMS/[facility]AC, located within [facility])   • Chest pain   • Vitamin E deficiency   • Severe sepsis (CMS/[facility]AC, located within [facility])   • HCAP (healthcare-associated pneumonia)   • Pneumonia of left lung due to infectious organism     Past Medical History:   Diagnosis Date   • A-fib (CMS/[facility]AC, located within [facility])    • CAD (coronary artery disease)    • Elevated cholesterol    • Hypertension    • MI (myocardial infarction) (CMS/[facility]AC, located within [facility])    • Non-insulin dependent type 2 diabetes mellitus (CMS/[facility]AC, located within [facility])      Past Surgical History:   Procedure Laterality Date   • BRONCHOSCOPY N/A 3/4/2020    Procedure: BRONCHOSCOPY with bronchioalveolar lavage;  Surgeon: Melissa Cole MD;  Location: [facility];  Service: Pulmonary;  Laterality: N/A;   pneumonia   • CARDIAC CATHETERIZATION     • CARDIAC CATHETERIZATION N/A 2020    Procedure: Left Heart Cath;  Surgeon: Migdalia Beth,  MD;  Location: [facility] CATH INVASIVE LOCATION;  Service: Cardiovascular   • CARDIAC CATHETERIZATION N/A 1/20/2020    Procedure: Left ventriculography;  Surgeon: Migdalia Beth MD;  Location: [facility] CATH INVASIVE LOCATION;  Service: Cardiovascular   • CARDIAC CATHETERIZATION N/A 1/20/2020    Procedure: Coronary angiography;  Surgeon: Migdalia Beth MD;  Location: [facility] CATH INVASIVE LOCATION;  Service: Cardiovascular   • CORONARY ANGIOPLASTY WITH STENT PLACEMENT     • MITRAL VALVE REPAIR/REPLACEMENT N/A 1/22/2020    Procedure: MITRAL VALVE REPAIR/REPLACEMENT;  Surgeon: Fallon Cole MD;  Location: [facility] CVOR;  Service: Cardiothoracic;  Laterality: N/A;  patient has endocarditis mitral valve replaced with 31mm knox II       Therapy Treatment    Rehabilitation Treatment Summary     Row Name 03/05/20 1500 03/05/20 1400          Treatment Time/Intention    Discipline  occupational therapist  -MP  occupational therapist  -MP     Document Type  progress note/recertification  -MP  therapy note (daily note)  -MP     Subjective Information  --  complains of;fatigue  -MP     Mode of Treatment  individual therapy  -MP  individual therapy  -MP     Recorded by [MP] Delano Negron, OT 03/05/20 1504 [MP] Delano Negron, OT 03/05/20 1509     Row Name 03/05/20 1500 03/05/20 1400          Bed Mobility Assessment/Treatment    Bed Mobility Assessment/Treatment  supine-sit;sit-supine  -MP  --     Supine-Sit [place] (Bed Mobility)  moderate assist (50% patient effort);2 person assist  -MP  --     Sit-Supine [place] (Bed Mobility)  --  moderate assist (50% patient effort);2 person assist  -MP     Recorded by [MP] Delano Negron, OT 03/05/20 1504 [MP] Delano Negron, OT 03/05/20 1509     Row Name 03/05/20 1500 03/05/20 1400          Transfer Assessment/Treatment    Transfer Assessment/Treatment  stand pivot/stand step transfer  -MP  chair-bed transfer  -MP     Recorded by [MP] Migdalia  Delano, OT 03/05/20 1504 [MP] Delano Negron, OT 03/05/20 1509     Row Name 03/05/20 1400             Chair-Bed Transfer    Chair-Bed [place] (Transfers)  moderate assist (50% patient effort);2 person assist  -MP      Assistive Device (Chair-Bed Transfers)  walker, front-wheeled  -MP      Recorded by [MP] Delano Negron, OT 03/05/20 1509      Row Name 03/05/20 1500 03/05/20 1400          Sit-Stand Transfer    Sit-Stand [place] (Transfers)  moderate assist (50% patient effort);2 person assist  -MP  moderate assist (50% patient effort);2 person assist;maximum assist (25% patient effort)  -MP     Recorded by [MP] Delano Negron, OT 03/05/20 1504 [MP] Delano Negron, OT 03/05/20 1509     Row Name 03/05/20 1500 03/05/20 1400          Stand Pivot/Stand Step Transfer    Stand Pivot/Stand Step [place]  maximum assist (25% patient effort);2 person assist  -MP  moderate assist (50% patient effort);2 person assist  -MP     Assistive Device (Stand Pivot Stand Step Transfer)  --  walker, front-wheeled  -MP     Recorded by [MP] Delano Negron, OT 03/05/20 1504 [MP] Delano Negron, OT 03/05/20 1509     Row Name 03/05/20 1500             Dynamic Standing Balance    Level of [place], Reaches Outside Midline (Standing, Dynamic Balance)  2 person assist;maximal assist, 25 to 49% patient effort;moderate assist, 50 to 74% patient effort  -MP      Recorded by [MP] Delano Negron, OT 03/05/20 1504      Row Name 03/05/20 1500 03/05/20 1400          Positioning and Restraints    Pre-Treatment Position  in bed  -MP  sitting in chair/recliner  -MP     Post Treatment Position  chair  -MP  bed  -MP     In Bed  --  call light within reach;encouraged to call for assist;exit alarm on  -MP     In Chair  call light within reach;encouraged to call for assist;exit alarm on  -MP  --     Recorded by [MP] Delano Negron, OT 03/05/20 1504 [MP] Delano Negron, OT 03/05/20 1509     Row Name 03/05/20  1500 03/05/20 1400          Pain Scale: FACES Pre/Post-Treatment    Pain: FACES Scale, Pretreatment  0-->no hurt  -MP  0-->no hurt  -MP     Pain: FACES Scale, Post-Treatment  0-->no hurt  -MP  2-->hurts little bit  -MP     Pre/Post Treatment Pain Comment  --  sternal pain, resolved after session  -MP     Recorded by [MP] Delano Negron, OT 03/05/20 1504 [MP] Delano Negron, OT 03/05/20 1509     Row Name 03/05/20 1500 03/05/20 1400          Plan of Care Review    Plan of Care Reviewed With  patient  -MP  patient  -MP     Progress  improving  -MP  improving  -MP     Recorded by [MP] Delano Negron, OT 03/05/20 1504 [MP] Delano Negron, OT 03/05/20 1509     Row Name 03/05/20 1500 03/05/20 1400          Outcome Summary/Treatment Plan (OT)    Daily Summary of Progress (OT)  --  progress toward functional goals is good  -MP     Anticipated Discharge Disposition (OT)  inpatient rehabilitation facility  -MP  inpatient rehabilitation facility  -MP     Recorded by [MP] Delano Negron, OT 03/05/20 1504 [MP] Delano Negron, OT 03/05/20 1509       User Key  (r) = Recorded By, (t) = Taken By, (c) = Cosigned By    Initials Name Effective Dates Discipline    MP Delano Negron, OT 03/01/19 -  OT                 OT Recommendation and Plan  Outcome Summary/Treatment Plan (OT)  Daily Summary of Progress (OT): progress toward functional goals is good  Anticipated Discharge Disposition (OT): inpatient rehabilitation facility  Daily Summary of Progress (OT): progress toward functional goals is good  Plan of Care Review  Plan of Care Reviewed With: patient  Plan of Care Reviewed With: patient  Outcome Summary: Pt. seens this AM and PM to complete functional transfers to and from armchair w/ moderate-max A x 2 to complete, pt. demonstrates improved functional mobility w/ use of rolling walker this PM albeit requires multiple VC's and assistance to maintain BLE extension. Pt. demonstrates good progress this date,  albeit is far from baseline level of functional independence secondary to global weakness and decline following CABG procedure. Recommend IP rehab at d/c to address aforementioned deficits.  Outcome Measures     Row Name 03/03/20 1400             How much help from another person do you currently need...    Turning from your back to your side while in flat bed without using bedrails?  2  -MH      Moving from lying on back to sitting on the side of a flat bed without bedrails?  2  -MH      Moving to and from a bed to a chair (including a wheelchair)?  1  -MH      Standing up from a chair using your arms (e.g., wheelchair, bedside chair)?  1  -MH      Climbing 3-5 steps with a railing?  1  -MH      To walk in hospital room?  1  -MH      AM-PAC 6 Clicks Score (PT)  8  -MH         Modified Chay Scale    Modified Chay Scale  5 - Severe disability.  Bedridden, incontinent, and requiring constant nursing care and attention.  -        User Key  (r) = Recorded By, (t) = Taken By, (c) = Cosigned By    Initials Name Provider Type     Ailyn Anderson OT Occupational Therapist           Time Calculation:   Time Calculation- OT     Row Name 03/05/20 1511 03/05/20 1504          Time Calculation- OT    OT Start Time  1400  -MP  0905  -MP     OT Stop Time  1420  -MP  0930  -MP     OT Time Calculation (min)  20 min  -MP  25 min  -MP     Total Timed Code Minutes- OT  20 minute(s)  -MP  25 minute(s)  -MP     OT Received On  03/05/20  -MP  03/05/20  -MP     OT - Next Appointment  03/09/20  -  03/09/20  -MP       User Key  (r) = Recorded By, (t) = Taken By, (c) = Cosigned By    Initials Name Provider Type    Delano Montes De Oca OT Occupational Therapist        Therapy Charges for Today     Code Description Service Date Service Provider Modifiers Qty    73095518034 HC OT THERAPEUTIC ACT EA 15 MIN 3/5/2020 Delano Negron OT GO 2    59589612462 HC OT THERAPEUTIC ACT EA 15 MIN 3/5/2020 Delano Negron OT GO 1                Delano Negron, OT  3/5/2020

## 2020-03-05 NOTE — PLAN OF CARE
Problem: Patient Care Overview  Goal: Plan of Care Review  Flowsheets  Taken 3/5/2020 1720  Progress: no change  Taken 3/5/2020 1600  Plan of Care Reviewed With: patient  Goal: Interprofessional Rounds/Family Conf  Flowsheets  Taken 3/5/2020 1720  Summary: no change in plan of care at this time  Taken 3/4/2020 1713  Participants: ;nursing;patient;pharmacy;social work/services     Problem: Pneumonia (Adult)  Goal: Signs and Symptoms of Listed Potential Problems Will be Absent, Minimized or Managed (Pneumonia)  Flowsheets (Taken 3/1/2020 0215 by Nain Sequeira RN)  Problems Assessed (Pneumonia): all  Problems Present (Pneumonia): respiratory compromise     Problem: Fall Risk (Adult)  Goal: Identify Related Risk Factors and Signs and Symptoms  Flowsheets (Taken 3/1/2020 0215 by Nain Sequeira RN)  Related Risk Factors (Fall Risk): age-related changes;environment unfamiliar  Signs and Symptoms (Fall Risk): presence of risk factors  Goal: Absence of Fall  Flowsheets (Taken 3/4/2020 1713)  Absence of Fall: making progress toward outcome     Problem: Skin Injury Risk (Adult)  Goal: Identify Related Risk Factors and Signs and Symptoms  Flowsheets (Taken 3/1/2020 0215 by Nain Sequeira RN)  Related Risk Factors (Skin Injury Risk): advanced age;body weight extremes;hospitalization prolonged;mobility impaired  Goal: Skin Health and Integrity  Flowsheets (Taken 3/4/2020 1713)  Skin Health and Integrity: making progress toward outcome     Problem: Pain, Chronic (Adult)  Goal: Identify Related Risk Factors and Signs and Symptoms  Flowsheets  Taken 3/5/2020 1720 by Kaci Vazquez RN  Related Risk Factors (Chronic Pain): disease process  Taken 3/1/2020 0215 by Nain Sequeira RN  Signs and Symptoms (Chronic Pain): verbalization of pain/discomfort for a prolonged time period  Goal: Acceptable Pain/Comfort Level and Functional Ability  Flowsheets (Taken 3/4/2020 1713)  Acceptable Pain/Comfort Level and Functional  Ability: making progress toward outcome

## 2020-03-05 NOTE — CONSULTS
[facility] Medicine Services Daily Progress Note      Hospitalist Team  LOS 7 days      Patient Care Team:  Kajal Sanchez as PCP - General (Internal Medicine)  Frederick George MD as Consulting Physician (Nephrology)    Patient Location: 2131/1      Subjective   Subjective     Chief Complaint / Subjective  Shortness of breath      Brief Synopsis of Hospital Course/HPI  Note taken from intensivist with additional editing:  Jamin Hennessy is a 64 y.o. 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  Patient reported that earlier that morning, while at [facility], his blood pressure checked and was reportedly low.  It was at that time, that EMS was contacted, and patient was taken to the outlying facility emergency department.  During his stay in the ED, patient was diagnosed with pneumonia.  He had reported shortness of breath, cough with yellow sputum production that started the preceding early morning.  Patient denied nausea, vomiting, constipation, diarrhea, fever, or chills.  Patient did mention some chest soreness, but denies arm pain, neck pain, or jaw pain.  Notably, patient had a recent mitral valve replacement secondary to known endocarditis.  This patient is well-known to this practice, as we were following patient during his recent hospitalization.  At time of discharge, patient was recommended to complete 6 weeks of IV antibiotics with daptomycin and Rocephin per infectious disease.  Patient was discharged to [facility], and the plan was for antibiotic completion on 3/8/2020.  Patient was transferred to [facility]  [facility] and admitted initially to the hospitalist team for further treatment and evaluation of patient condition.  Patient has required no vasopressors.  Infectious disease as well as cardiothoracic surgery have been consulted.  Patient presented with right upper extremity midline that was placed during his previous admission.  Due to patient's pulmonary status, with increased oxygen supplementation needs, cardiothoracic surgery requested patient to be transferred to [facility] for closer monitoring.  Previous work-up included bilateral lower extremity venous Doppler which reported \"chronic left lower extremity superficial thrombophlebitis noted in the small saphenous.\"  It is been reported that patient had a chest x-ray from outlying facility indicating pneumonia, and CT of the chest at this facility revealed \"extensive airspace and interstitial opacity throughout the left lung with smaller patchy air peripheral opacities in the right lung.  Only the right upper lobe multifocal infection/pneumonia is the most likely etiology.  There is mild mediastinal adenopathy which is likely reactive/infectious.  There are small layering bilateral pleural effusions.\"  Per infectious disease, in review of patient's previous medical record, patient had blood cultures that were positive for enterococcus faecium, which screened positive for VRE based on PCR.  Initially organism was susceptible to ampicillin and gentamicin.  Repeat blood cultures were negative.  Following patient's surgery on 1/22/2020 (MVR), the mitral valve tissue culture was also positive for enterococcus faecium, but sensitivities revealed resistance to ampicillin.  ID was consulted as the case has become rather complex, and that first and second line antibiotic therapies are revealing resistance.  At time of transfer, patient initially was on 3 L/min via nasal cannula, but after being transferred in bed, required an increase to 6 L/min via high flow nasal " cannula.  Patient denies chest pain, neck pain, arm pain, jaw pain, nausea, vomiting, constipation, diarrhea, blood in urine or stool.  Patient does admit to some mild shortness of breath, frequent cough with yellow sputum production, but denies fever or chills.     [facility] was consulted for further evaluation and treatment of pneumonia, and to aid improvement in pulmonary status.  Thank you for this consultation, we will follow along on this patient.      Date:  2/29: Patient reports feeling somewhat better.  Tolerating 5 L O2.  No shortness of air at rest. ID consult concerned for hospital-acquired pneumonia and on IV rocephin, Vancomycin and meropenem. Repeat chest xray did not show improvement. Cardiothoracic surgery recommended to restart eliquis and mucomyst nebs.    3/1: Patient reports feeling well today.  He reports shortness of air is improving.  Demonstrates good compliance with I-S.  Tolerating O2 at 4 L by nasal cannula.   ID following for antibiotic management.     3/2:  Doing ok.  Reports some SOA and cough.  On supplemental oxygen, 4 liters. ID ordered repeat chest x-ray. OT consulted.    3/3:  No new issues.  Feeling some better.  Cough improved.  On 2L nasal cannula. Ordered bronchoscopy for AM for dense infiltrate in left lung.    3/4: Breathing some better.  Minimal cough.  No complaints of chest pain.  No nausea or vomiting. ID plan to discontinue all antibiotics on 3/8/2020.    3/5:  Patient states he continues to have cough without much sputum.  Shortness of breath improving but still on 3L NC. Patient is in no pain and denies nausea, vomiting, wheezing, hemoptysis, abdominal pain, diarrhea or constipation.       Review of Systems   Constitution: Negative.   HENT: Negative.    Cardiovascular: Negative.    Respiratory: Positive for cough, shortness of breath and sputum production. Negative for hemoptysis and wheezing.    Skin: Negative.    Musculoskeletal: Negative.    Gastrointestinal: Negative.   "  Genitourinary: Negative.    Neurological: Negative.    Psychiatric/Behavioral: Negative.          Objective   Objective      Vital Signs  Temp:  [97.6 °F (36.4 °C)-98.7 °F (37.1 °C)] 97.6 °F (36.4 °C)  Heart Rate:  [84-97] 87  Resp:  [18-20] 18  BP: ()/(64-78) 116/74  Oxygen Therapy  SpO2: 100 %  Pulse Oximetry Type: Continuous  Device (Oxygen Therapy): nasal cannula  Flow (L/min): 3  Oxygen Concentration (%): 93  Flowsheet Rows      First Filed Value   Admission Height  175.3 cm (69.02\") Documented at 02/29/2020 0027   Admission Weight  116 kg (256 lb 13.4 oz) Documented at 02/29/2020 0027        Intake & Output (last 3 days)       03/02 0701 - 03/03 0700 03/03 0701 - 03/04 0700 03/04 0701 - 03/05 0700 03/05 0701 - 03/06 0700    P.O. 1080 600 840     I.V. (mL/kg) 1076 (9) 522 (4.4)      IV Piggyback 450 450 300     Total Intake(mL/kg) 2606 (21.9) 1572 (13.2) 1140 (9.8)     Net +2606 +1572 +1140             Urine Unmeasured Occurrence 6 x 5 x 2 x 2 x    Stool Unmeasured Occurrence 3 x 2 x          Lines, Drains & Airways    Active LDAs     Name:   Placement date:   Placement time:   Site:   Days:    Midline Catheter - Single Lumen Right   --    --         Peripheral IV 02/28/20 2312 Left;Posterior Forearm   02/28/20    2312    Forearm   5                  Physical Exam:    Physical Exam   Constitutional: He is oriented to person, place, and time. He appears well-developed and well-nourished. No distress.   HENT:   Head: Normocephalic and atraumatic.   Nose: Nose normal.   Mouth/Throat: No oropharyngeal exudate.   Eyes: Pupils are equal, round, and reactive to light. Conjunctivae and EOM are normal.   Neck: Normal range of motion.   Cardiovascular: Normal rate, regular rhythm, normal heart sounds and intact distal pulses.   S1, S2 audible.   Pulmonary/Chest: Effort normal. No respiratory distress. He has no wheezes. He has rales (left).   On 3L NC today.    Abdominal: Soft. Bowel sounds are normal. He " exhibits no distension. There is no tenderness.   Musculoskeletal: Normal range of motion. He exhibits no edema, tenderness or deformity.   Neurological: He is alert and oriented to person, place, and time. No cranial nerve deficit.   Skin: Skin is warm. No rash noted. He is not diaphoretic. No erythema.   Psychiatric: He has a normal mood and affect. His behavior is normal.   Nursing note and vitals reviewed.            Procedures:    Procedure(s):  BRONCHOSCOPY with bronchioalveolar lavage          Results Review:     I reviewed the patient's new clinical results.      Lab Results (last 24 hours)     Procedure Component Value Units Date/Time    POC Glucose Once [[id number]]  (Abnormal) Collected:  03/05/20 1630    Specimen:  Blood Updated:  03/05/20 1633     Glucose 68 mg/dL      Comment: Serial Number: [id number]:  [id number]       Non-gynecologic Cytology [[id number]] Collected:  03/04/20 0743    Specimen:  Lavage from Lung, Lingula Updated:  03/05/20 1508     Case Report --     Medical Cytology Report                           Case: [id number]                                  Authorizing Provider:  Melissa Cole MD    Collected:           03/04/2020 07:43 AM          Ordering Location:     [facility]  Received:            03/04/2020 08:37 AM                                 SUITES                                                                       Pathologist:           Brandon Acevedo MD                                                             Specimen:    Lung, Lingula, pneumonia                                                                    Final Diagnosis --     Lung, lingula, bronchoalveolar lavage, smears and cytospin preparation:    Benign squamous cells and bronchial cells    Pulmonary macrophages present    Marked acute inflammation    No fungal organisms identified    Negative for malignant cells     JPR/prakash        Gross Description --     Received in [place] and  "designated \"Bronchoalveolar lavage\" are 50 mL of clear, green fluid. Particulate matter is present. This specimen is processed as per protocol.        POC Glucose Once [[id number]]  (Abnormal) Collected:  03/05/20 1149    Specimen:  Blood Updated:  03/05/20 1207     Glucose 109 mg/dL      Comment: Serial Number: [id number]:  [id number]       BAL Culture, Quantitative - Lavage, Lung, Lingula [[id number]] Collected:  03/04/20 0743    Specimen:  Lavage from Lung, Lingula Updated:  03/05/20 0720     BAL Culture >100,000 CFU/mL Normal Respiratory Cheryl     Gram Stain Moderate (3+) WBCs per low power field      Moderate (3+) Epithelial cells per low power field      Few (2+) Gram positive cocci in pairs and clusters    POC Glucose Once [[id number]]  (Abnormal) Collected:  03/05/20 0716    Specimen:  Blood Updated:  03/05/20 0717     Glucose 112 mg/dL      Comment: Serial Number: [id number]:  [id number]       Magnesium [[id number]]  (Normal) Collected:  03/05/20 0247    Specimen:  Blood Updated:  03/05/20 0428     Magnesium 2.4 mg/dL     Basic Metabolic Panel [[id number]]  (Abnormal) Collected:  03/05/20 0247    Specimen:  Blood Updated:  03/05/20 0428     Glucose 124 mg/dL      BUN 15 mg/dL      Creatinine 0.99 mg/dL      Sodium 141 mmol/L      Potassium 4.3 mmol/L      Chloride 107 mmol/L      CO2 24.0 mmol/L      Calcium 8.6 mg/dL      eGFR Non African Amer 76 mL/min/1.73      BUN/Creatinine Ratio 15.2     Anion Gap 10.0 mmol/L     Narrative:       GFR Normal >60  Chronic Kidney Disease <60  Kidney Failure <15      CBC & Differential [[id number]] Collected:  03/05/20 0247    Specimen:  Blood Updated:  03/05/20 0408    Narrative:       The following orders were created for panel order CBC & Differential.  Procedure                               Abnormality         Status                     ---------                               -----------         ------                     CBC Auto Differential[[id number]]        " Abnormal            Final result                 Please view results for these tests on the individual orders.    CBC Auto Differential [[id number]]  (Abnormal) Collected:  03/05/20 0247    Specimen:  Blood Updated:  03/05/20 0408     WBC 10.30 10*3/mm3      RBC 3.21 10*6/mm3      Hemoglobin 7.8 g/dL      Hematocrit 24.4 %      MCV 75.9 fL      MCH 24.4 pg      MCHC 32.2 g/dL      RDW 17.9 %      RDW-SD 48.1 fl      MPV 7.6 fL      Platelets 708 10*3/mm3      Neutrophil % 63.1 %      Lymphocyte % 12.1 %      Monocyte % 5.9 %      Eosinophil % 16.8 %      Basophil % 2.1 %      Neutrophils, Absolute 6.50 10*3/mm3      Lymphocytes, Absolute 1.20 10*3/mm3      Monocytes, Absolute 0.60 10*3/mm3      Eosinophils, Absolute 1.70 10*3/mm3      Basophils, Absolute 0.20 10*3/mm3      nRBC 0.0 /100 WBC     aPTT [[id number]]  (Abnormal) Collected:  03/05/20 0247    Specimen:  Blood Updated:  03/05/20 0408     PTT 27.1 seconds     POC Glucose Once [[id number]]  (Normal) Collected:  03/04/20 2048    Specimen:  Blood Updated:  03/04/20 2050     Glucose 101 mg/dL      Comment: Serial Number: [id number]:  [id number]           No results found for: HGBA1C  Results from last 7 days   Lab Units 02/28/20  2209   INR  1.22*       Results from last 7 days   Lab Units 02/28/20  1256   PH, ARTERIAL pH units 7.454*   PO2 ART mm Hg 58.8*   PCO2, ARTERIAL mm Hg 35.2   HCO3 ART mmol/L 24.6     No results found for: LIPASE  Lab Results   Component Value Date    CHOL 90 01/15/2020    TRIG 119 01/15/2020    HDL 29 (L) 01/15/2020    LDL 37 01/15/2020       Lab Results   Lab Value Date/Time    FINALDX  03/04/2020 0743     Lung, lingula, bronchoalveolar lavage, smears and cytospin preparation:    Benign squamous cells and bronchial cells    Pulmonary macrophages present    Marked acute inflammation    No fungal organisms identified    Negative for malignant cells     JPR/tkd       FINALDX  01/22/2020 0910     Heart valve, mitral, excision:     Valvular tissue with endocarditis    AFB, GMS and PAS stains are negative for acid-fast and fungal organisms    JPR/sms       COMDX  01/22/2020 0910     Correlation with microbiology studies is recommended.           Microbiology Results (last 10 days)     Procedure Component Value - Date/Time    AFB Culture - Lavage, Lung, Lingula [[id number]] Collected:  03/04/20 0743    Lab Status:  Preliminary result Specimen:  Lavage from Lung, Lingula Updated:  03/04/20 1021     AFB Stain No acid fast bacilli seen    BAL Culture, Quantitative - Lavage, Lung, Lingula [[id number]] Collected:  03/04/20 0743    Lab Status:  Preliminary result Specimen:  Lavage from Lung, Lingula Updated:  03/05/20 0720     BAL Culture >100,000 CFU/mL Normal Respiratory Cheryl     Gram Stain Moderate (3+) WBCs per low power field      Moderate (3+) Epithelial cells per low power field      Few (2+) Gram positive cocci in pairs and clusters    Respiratory Culture - Sputum, Cough [[id number]] Collected:  02/28/20 1123    Lab Status:  Final result Specimen:  Sputum from Cough Updated:  03/01/20 1203     Respiratory Culture Scant growth (1+) Normal Respiratory Cheryl     Gram Stain Moderate (3+) WBCs per low power field      Rare (1+) Epithelial cells per low power field      Rare (1+) Mixed bacterial morphotypes seen on Gram Stain    Legionella Antigen, Urine - Urine, Urine, Clean Catch [[id number]]  (Normal) Collected:  02/28/20 0402    Lab Status:  Final result Specimen:  Urine, Clean Catch Updated:  02/28/20 0439     LEGIONELLA ANTIGEN, URINE Negative    S. Pneumo Ag Urine or CSF - Urine, Urine, Clean Catch [[id number]]  (Normal) Collected:  02/28/20 0402    Lab Status:  Final result Specimen:  Urine, Clean Catch Updated:  02/28/20 0439     Strep Pneumo Ag Negative    Respiratory Panel, PCR - Swab, Nasopharynx [[id number]]  (Normal) Collected:  02/27/20 2349    Lab Status:  Final result Specimen:  Swab from Nasopharynx Updated:  02/28/20 0131      ADENOVIRUS, PCR Not Detected     Coronavirus 229E Not Detected     Coronavirus HKU1 Not Detected     Coronavirus NL63 Not Detected     Coronavirus OC43 Not Detected     Human Metapneumovirus Not Detected     Human Rhinovirus/Enterovirus Not Detected     Influenza B PCR Not Detected     Parainfluenza Virus 1 Not Detected     Parainfluenza Virus 2 Not Detected     Parainfluenza Virus 3 Not Detected     Parainfluenza Virus 4 Not Detected     Bordetella pertussis pcr Not Detected     Influenza A H1 2009 PCR Not Detected     Chlamydophila pneumoniae PCR Not Detected     Mycoplasma pneumo by PCR Not Detected     Influenza A PCR Not Detected     Influenza A H3 Not Detected     Influenza A H1 Not Detected     RSV, PCR Not Detected    Blood Culture - Blood, Arm, Right [[id number]] Collected:  02/27/20 1157    Lab Status:  Final result Specimen:  Blood from Arm, Right Updated:  03/03/20 1215     Blood Culture No growth at 5 days    Blood Culture - Blood, Arm, Left [[id number]] Collected:  02/27/20 1152    Lab Status:  Final result Specimen:  Blood from Arm, Left Updated:  03/03/20 1215     Blood Culture No growth at 5 days          ECG/EMG Results (most recent)     None          Results for orders placed during the hospital encounter of 02/27/20   Duplex Venous Lower Extremity - Bilateral CAR    Narrative · Chronic left lower extremity superficial thrombophlebitis noted in the   small saphenous.  · All other veins appeared normal bilaterally.          Results for orders placed during the hospital encounter of 01/14/20   Adult Transthoracic Echo Complete W/ Cont if Necessary Per Protocol    Narrative · Estimated EF appears to be in the range of 66 - 70%. Normal left   ventricular cavity size noted. All left ventricular wall segments contract   normally. Left ventricular wall thickness is consistent with moderate   septal asymmetric hypertrophy. Left ventricular diastolic function not   assessed on this study. LVOT gradient not  interrogated.  · Left atrial cavity size is mildly dilated.  · There is a bioprosthetic mitral valve present. Leaflets were not well   visualized. The mean gradient across the valve was 5.2 mmHg. There appears   to be mild mitral insufficiency but color flow interrogation was   inadequate.          Ct Chest Without Contrast    Result Date: 2/27/2020   1. Extensive airspace and interstitial opacity throughout the left lung with smaller patchier peripheral opacities in the right lung. Only the right upper lobe multifocal infection/pneumonia is the most likely etiology. There is mild mediastinal adenopathy which is likely reactive/infectious. There are small layering bilateral pleural effusions. 2. Other chronic ancillary findings are described above including gallstones.  Electronically Signed By-Piter Swan DO. On:2/27/2020 10:44 PM This report was finalized on [id number] by  Piter Swan DO..    Xr Chest 1 View    Result Date: 3/3/2020  Dense airspace disease throughout the left lung, compatible with the provided history of pneumonia. Suspected minimal right basilar infiltrate. No significant change from 03/01/2020, allowing for differences in technique..  Electronically Signed By-Dr. Riana Lowery MD On:3/3/2020 8:31 AM This report was finalized on [id number] by Dr. Riana Lowery MD.    Xr Chest 1 View    Result Date: 3/1/2020  No significant interval change is suspected radiographically in the diffuse airspace disease throughout the left lung, which may represent pneumonia.  Minimal, if any, acute airspace disease is suspected on the right.  Electronically Signed By-Dr. Brandon Morrison MD On:3/1/2020 5:04 AM This report was finalized on [id number] by Dr. Brandon Morrison MD.          Xrays, labs reviewed personally by physician.    Medication Review:   I have reviewed the patient's current medication list      Scheduled Meds    !Vancomycin Level Draw Needed  Does not apply Once   !Vancomycin Level  Draw Needed  Does not apply Once   [START ON 3/6/2020] !Vancomycin Level Draw Needed  Does not apply Once   amiodarone 200 mg Oral Q24H   apixaban 5 mg Oral Q12H   aspirin 81 mg Oral Daily   cefTRIAXone 2 g Intravenous Q12H   cetirizine 10 mg Oral Daily   finasteride 5 mg Oral Daily   FLUoxetine 40 mg Oral Daily   folic acid 1 mg Oral Daily   gabapentin 200 mg Oral TID   guaiFENesin 1,200 mg Oral Q12H   insulin lispro 0-9 Units Subcutaneous 4x Daily With Meals & Nightly   ipratropium-albuterol 3 mL Nebulization Q4H While Awake - RT   lactobacillus acidophilus 1 capsule Oral BID   meropenem 1 g Intravenous Q8H   pantoprazole 40 mg Oral QAM   sodium chloride 10 mL Intravenous Q12H   tamsulosin 0.4 mg Oral Daily   vancomycin 750 mg Intravenous Q12H   vitamin B-12 1,000 mcg Oral Daily   vitamin E 800 Units Oral Daily       Meds Infusions    Pharmacy to dose vancomycin        Meds PRN  acetaminophen **OR** acetaminophen **OR** acetaminophen  •  benzonatate  •  bisacodyl  •  cyclobenzaprine  •  dextrose  •  dextrose  •  glucagon (human recombinant)  •  insulin lispro **AND** insulin lispro  •  ipratropium-albuterol  •  magnesium sulfate **OR** magnesium sulfate in D5W 1g/100mL (PREMIX)  •  ondansetron **OR** ondansetron  •  Pharmacy to dose vancomycin  •  potassium chloride  •  sodium chloride          Assessment/Plan   Assessment/Plan     Active Hospital Problems    Diagnosis  POA   • **Pneumonia of left lung due to infectious organism [J18.9]  Yes     Priority: High   • Severe sepsis (CMS/HCC) [A41.9, R65.20]  Yes   • HCAP (healthcare-associated pneumonia) [J18.9]  Yes   • Hypotension [I95.9]  Yes   • Shortness of breath [R06.02]  Yes   • Sepsis associated hypotension (CMS/HCC) [A41.9, I95.9]  Yes   • Chest pain [R07.9]  Yes   • Vitamin E deficiency [E56.0]  Yes   • S/P MVR (mitral valve replacement) [Z95.2]  Not Applicable   • Endocarditis of mitral valve [I05.8]  Yes   • Coronary artery disease due to lipid rich  plaque [I25.10, I25.83]  Yes   • Dyslipidemia [E78.5]  Yes   • Non-insulin dependent type 2 diabetes mellitus (CMS/HCC) [E11.9]  Yes   • Vitamin D deficiency [E55.9]  Yes   • JRARETT (generalized anxiety disorder) [F41.1]  Yes   • Tobacco abuse [Z72.0]  Yes   • Obesity (BMI 30-39.9) [E66.9]  Yes   • B12 deficiency [E53.8]  Yes   • Diabetic neuropathy (CMS/HCC) [E11.40]  Yes   • GERD without esophagitis [K21.9]  Yes   • BPH with obstruction/lower urinary tract symptoms [N40.1, N13.8]  Yes      Resolved Hospital Problems   No resolved problems to display.       MEDICAL DECISION MAKING COMPLEXITY BY PROBLEM:     Severe sepsis without septic shock, secondary to pneumonia  -IVF bolus given at outlying facility  -Lactic Acid 0.7  -Cultures-pending  -Urine antigens-negative  -RVP-negative  -Procalcitonin-0.10 on 2/28/2020  -Rocephin, Merrem, and vancomycin, ID managing antibiotics  -Imaging with dense infiltrate in left lung, s/p bronchoscopy with BAL Left Lingula.  -WBC trends improving, currently 10.3       Sepsis associated hypotension, resolved  -Did not require vasopressors  -Stable, continue to monitor     Left lung hospital-acquired pneumonia  -Reviewed CT Chest.  Status post bronchoscopy.  BAL was obtained from left lingula.  All cultures negative so far.  Continue empiric IV antibiotics.  Monitor clinical progress.  Infectious disease adjusting the antibiotics.  -Aggressive pulmonary toileting  -Follow sputum cultures  -IS/Flutter Valve encouraged and ordered  -Antibiotics as above per ID  -Continue Mucinex, Zyrtec     Acute respiratory failure with hypoxia, likely secondary to pneumonia; R/O Volume overload  -From past admission, bedside PFT reviewed: Suggestive of restrictive defect. FEV1 46%.  +bronchdilator response.  Diffusion capacity normal when corrected.  -Oxygen supplementation, titrate to maintain oxygen saturation >91%, currently on 2 L  -Duonebs scheduled and PRN  -BNP 1553 on 2/29/2020  -intermittent  diuresis  -sputum 2/28 - normal respiratory nelson      Enterococcus MV Endocarditis; S/P MVR (tissue)/MICHELLE ligation (1/22/2020)  -Previous hospitalization cultures: 1) Blood cultures-Enterococcus faecium, positive for VRE on PCR; initially susciptible to ampicillin and gentamicin.  2) MV tissue culture with Enterococcus faecium, resistant to ampicillin, susceptible for daptomycin.  -Initially placed on IV Rocephin & Daptomycin x 6 weeks by ID with end date of 3/8/2020 (during previous hospitalization).  -ID consulted and managing antibiotics, as above.  -CTS following     CAD  -Continue ASA     Hyperlipidemia  -Statin therapy on hold due to previously being on daptomycin, resume when clinically appropriate     PAF (controlled), on chronic anticoagulation with Eliquis  -Continue amiodarone  -D/C heparin drip and now on Eliquis     Diabetes mellitus, type 2, with neuropathy  -strict glycemic control recommended  -Accuchecks AC/HS with sliding scale insulin  -Hold oral diabetic medications  -Continue gabapentin  -diabetic diet.     GERD  -Continue protonix     BPH  -Continue proscar, flomax     General Anxiety Disorder  -Continue prozac     Multiple sclerosis  -Mainly wheelchair bound, limited mobility  -PT/OT     Obesity  -BMI 37.91  -Counseled on lifestyle modifications to improve overall health     Hypersomnia and probable sleep apnea  -Recommend sleep study as outpatient.     Tobacco Abuse, current  -Counseled on smoking cessation     Vitamin D/B12 deficiency  -Continue folic acid, B12 supplements        VTE Prophylaxis -   Mechanical Order History:      Ordered        02/27/20 1120  Place Sequential Compression Device  Once         02/27/20 1120  Maintain Sequential Compression Device  Continuous                 Pharmalogical Order History:     Ordered     Dose Route Frequency Stop    03/01/20 1020  apixaban (ELIQUIS) tablet 5 mg      5 mg PO Every 12 Hours Scheduled --    02/28/20 2145  heparin 12980 units/250 ml  (100 units/ml) in D5W  14.99 mL/hr,   Status:  Discontinued      12.6 Units/kg/hr IV Titrated 03/01/20 1022    02/28/20 2145  heparin bolus from bag 4,800 Units  Status:  Discontinued      40 Units/kg IV Every 6 Hours PRN 03/01/20 1022    02/28/20 2145  heparin bolus from bag 9,500 Units  Status:  Discontinued      80 Units/kg IV Every 6 Hours PRN 03/01/20 1022    02/27/20 1301  enoxaparin (LOVENOX) syringe 40 mg  Status:  Discontinued      40 mg SC Every 12 Hours 02/27/20 1410    02/27/20 1410  apixaban (ELIQUIS) tablet 5 mg  Status:  Discontinued      5 mg PO Every 12 Hours Scheduled 02/28/20 2135            Code Status -   Code Status and Medical Interventions:   Ordered at: 02/27/20 1120     Level Of Support Discussed With:    Patient     Code Status:    CPR     Medical Interventions (Level of Support Prior to Arrest):    Full       Discharge Planning          Destination      Service Provider Request Status Selected Services Address Phone Number Fax Number    [facility] Accepted N/A [address], [place] [address] [phone]      Durable Medical Equipment      Coordination has not been started for this encounter.      Dialysis/Infusion      Coordination has not been started for this encounter.      Home Medical Care      Coordination has not been started for this encounter.      Therapy      Coordination has not been started for this encounter.      Community Resources      Coordination has not been started for this encounter.            Electronically signed by LORRIE Painter, 03/05/20, 4:36 PM.  [facility] Hospitalist Team

## 2020-03-05 NOTE — THERAPY TREATMENT NOTE
Acute Care - Occupational Therapy Treatment Note  [facility]     Patient Name: Jamin Hennessy  : 1955  MRN: [id number]  Today's Date: 3/5/2020             Admit Date: 2020       ICD-10-CM ICD-9-CM   1. Pneumonia of left lung due to infectious organism, unspecified part of lung J18.9 486     Patient Active Problem List   Diagnosis   • Atrial fibrillation with RVR (CMS/[facility])   • Coronary artery disease due to lipid rich plaque   • CAD S/P percutaneous coronary angioplasty   • Essential hypertension   • Dyslipidemia   • Non-insulin dependent type 2 diabetes mellitus (CMS/[facility])   • Diabetic neuropathy (CMS/[facility])   • GERD without esophagitis   • BPH with obstruction/lower urinary tract symptoms   • B12 deficiency   • Vitamin D deficiency   • JARRETT (generalized anxiety disorder)   • Tobacco abuse   • Obesity (BMI 30-39.9)   • Acute bacterial endocarditis   • S/P MVR (mitral valve replacement)   • Endocarditis of mitral valve   • Non-sustained ventricular tachycardia (CMS/[facility])   • Acute myocardial infarction (CMS/[facility])   • Hypercholesterolemia   • Hypotension   • Shortness of breath   • Sepsis associated hypotension (CMS/[facility])   • Chest pain   • Vitamin E deficiency   • Severe sepsis (CMS/[facility])   • HCAP (healthcare-associated pneumonia)   • Pneumonia of left lung due to infectious organism     Past Medical History:   Diagnosis Date   • A-fib (CMS/[facility])    • CAD (coronary artery disease)    • Elevated cholesterol    • Hypertension    • MI (myocardial infarction) (CMS/[facility])    • Non-insulin dependent type 2 diabetes mellitus (CMS/[facility])      Past Surgical History:   Procedure Laterality Date   • BRONCHOSCOPY N/A 3/4/2020    Procedure: BRONCHOSCOPY with bronchioalveolar lavage;  Surgeon: Melissa Cole MD;  Location: [facility];  Service: Pulmonary;  Laterality: N/A;   pneumonia   • CARDIAC CATHETERIZATION     • CARDIAC CATHETERIZATION N/A 2020    Procedure: Left Heart Cath;  Surgeon: Migdalia Beth,  MD;  Location: [facility] CATH INVASIVE LOCATION;  Service: Cardiovascular   • CARDIAC CATHETERIZATION N/A 1/20/2020    Procedure: Left ventriculography;  Surgeon: Migdalia Beth MD;  Location: [facility] CATH INVASIVE LOCATION;  Service: Cardiovascular   • CARDIAC CATHETERIZATION N/A 1/20/2020    Procedure: Coronary angiography;  Surgeon: Migdalia Beth MD;  Location: [facility] CATH INVASIVE LOCATION;  Service: Cardiovascular   • CORONARY ANGIOPLASTY WITH STENT PLACEMENT     • MITRAL VALVE REPAIR/REPLACEMENT N/A 1/22/2020    Procedure: MITRAL VALVE REPAIR/REPLACEMENT;  Surgeon: Fallon Cole MD;  Location: [facility] CVOR;  Service: Cardiothoracic;  Laterality: N/A;  patient has endocarditis mitral valve replaced with 31mm knox II       Therapy Treatment    Rehabilitation Treatment Summary     Row Name 03/05/20 1500             Treatment Time/Intention    Discipline  occupational therapist  -MP      Document Type  progress note/recertification  -MP      Mode of Treatment  individual therapy  -MP      Recorded by [MP] Delano Negron, OT 03/05/20 1504      Row Name 03/05/20 1500             Bed Mobility Assessment/Treatment    Bed Mobility Assessment/Treatment  supine-sit;sit-supine  -MP      Supine-Sit [place] (Bed Mobility)  moderate assist (50% patient effort);2 person assist  -MP      Recorded by [MP] Delano Negron, OT 03/05/20 1504      Row Name 03/05/20 1500             Transfer Assessment/Treatment    Transfer Assessment/Treatment  stand pivot/stand step transfer  -MP      Recorded by [MP] Delano Negron, OT 03/05/20 1504      Row Name 03/05/20 1500             Sit-Stand Transfer    Sit-Stand [place] (Transfers)  moderate assist (50% patient effort);2 person assist  -MP      Recorded by [MP] Delano Negron, OT 03/05/20 1504      Row Name 03/05/20 1500             Stand Pivot/Stand Step Transfer    Stand Pivot/Stand Step [place]  maximum assist (25% patient effort);2  person assist  -MP      Recorded by [MP] Delano Negron, OT 03/05/20 1504      Row Name 03/05/20 1500             Dynamic Standing Balance    Level of [place], Reaches Outside Midline (Standing, Dynamic Balance)  2 person assist;maximal assist, 25 to 49% patient effort;moderate assist, 50 to 74% patient effort  -MP      Recorded by [MP] Delano Negron, OT 03/05/20 1504      Row Name 03/05/20 1500             Positioning and Restraints    Pre-Treatment Position  in bed  -MP      Post Treatment Position  chair  -MP      In Chair  call light within reach;encouraged to call for assist;exit alarm on  -MP      Recorded by [MP] Delano Negron, OT 03/05/20 1504      Row Name 03/05/20 1500             Pain Scale: FACES Pre/Post-Treatment    Pain: FACES Scale, Pretreatment  0-->no hurt  -MP      Pain: FACES Scale, Post-Treatment  0-->no hurt  -MP      Recorded by [MP] Delano Negron, OT 03/05/20 1504      Row Name 03/05/20 1500             Plan of Care Review    Plan of Care Reviewed With  patient  -MP      Progress  improving  -MP      Recorded by [MP] Delano Negron, OT 03/05/20 1504      Row Name 03/05/20 1500             Outcome Summary/Treatment Plan (OT)    Anticipated Discharge Disposition (OT)  inpatient rehabilitation facility  -MP      Recorded by [MP] Delano Negron, OT 03/05/20 1504        User Key  (r) = Recorded By, (t) = Taken By, (c) = Cosigned By    Initials Name Effective Dates Discipline     Delano Negron, OT 03/01/19 -  OT                 OT Recommendation and Plan  Outcome Summary/Treatment Plan (OT)  Anticipated Discharge Disposition (OT): inpatient rehabilitation facility  Plan of Care Review  Plan of Care Reviewed With: patient  Plan of Care Reviewed With: patient  Outcome Measures     Row Name 03/03/20 1400             How much help from another person do you currently need...    Turning from your back to your side while in flat bed without using bedrails?  2  -       Moving from lying on back to sitting on the side of a flat bed without bedrails?  2  -MH      Moving to and from a bed to a chair (including a wheelchair)?  1  -MH      Standing up from a chair using your arms (e.g., wheelchair, bedside chair)?  1  -MH      Climbing 3-5 steps with a railing?  1  -MH      To walk in hospital room?  1  -MH      AM-PAC 6 Clicks Score (PT)  8  -MH         Modified [place] Scale    Modified [place] Scale  5 - Severe disability.  Bedridden, incontinent, and requiring constant nursing care and attention.  -        User Key  (r) = Recorded By, (t) = Taken By, (c) = Cosigned By    Initials Name Provider Type     Ailyn Anderson OT Occupational Therapist           Time Calculation:   Time Calculation- OT     Row Name 03/05/20 1504             Time Calculation- OT    OT Start Time  0905  -MP      OT Stop Time  0930  -MP      OT Time Calculation (min)  25 min  -MP      Total Timed Code Minutes- OT  25 minute(s)  -MP      OT Received On  03/05/20  -      OT - Next Appointment  03/09/20  -        User Key  (r) = Recorded By, (t) = Taken By, (c) = Cosigned By    Initials Name Provider Type    Delano Montes De Oca OT Occupational Therapist        Therapy Charges for Today     Code Description Service Date Service Provider Modifiers Qty    [id number]  OT THERAPEUTIC ACT EA 15 MIN 3/5/2020 Delano Negron OT GO 2               Delano Negron OT  3/5/2020

## 2020-03-05 NOTE — PROGRESS NOTES
Infectious Diseases Progress Note      LOS: 7 days   Patient Care Team:  Kajal Sanchez as PCP - General (Internal Medicine)  Frederick George MD as Consulting Physician (Nephrology)        Subjective       The patient has been afebrile for the last 24 hours.  The patient is currently on 3 L of oxygen via nasal cannula.  He is awake and alert.  He remained hemodynamically stable requiring no vasopressors.        Review of Systems:   Review of Systems   Constitutional: Positive for fatigue.   HENT: Negative.    Respiratory: Positive for cough and shortness of breath.    Cardiovascular:        Chest soreness around sternal incision    Gastrointestinal: Negative.    Genitourinary: Negative.    Musculoskeletal: Positive for arthralgias.   Skin: Negative.    Neurological: Positive for weakness.   Psychiatric/Behavioral: Negative.         Objective     Vital Signs  Temp:  [97.6 °F (36.4 °C)-98.7 °F (37.1 °C)] 97.7 °F (36.5 °C)  Heart Rate:  [83-97] 84  Resp:  [18-20] 18  BP: ()/(64-78) 99/64    Physical Exam:  Physical Exam   Constitutional: He appears well-developed and well-nourished.   HENT:   Head: Normocephalic and atraumatic.   Eyes: Pupils are equal, round, and reactive to light. Conjunctivae and EOM are normal.   Neck: Neck supple.   Cardiovascular: Normal rate, regular rhythm and normal heart sounds.   Pulmonary/Chest: Effort normal. He has rales.   Some crackles in left lobe    Abdominal: Soft. Bowel sounds are normal.   Musculoskeletal:   Degenerative changes patient appears to have some general weakness due to the multiple sclerosis    Neurological: He is alert.   Alert and oriented x2-patient patient seems more alert today   Skin: Skin is warm and dry.   Chest soreness around sternal incision    Psychiatric: He has a normal mood and affect.   Vitals reviewed.       Results Review:    I have reviewed all clinical data, test, lab, and imaging results.     Radiology  No Radiology Exams Resulted Within Past  24 Hours    Cardiology    Laboratory    Results from last 7 days   Lab Units 03/05/20  0247 03/04/20  0328 03/03/20  0517 03/02/20  0647 03/01/20  0351 02/29/20  0537 02/28/20  2209   WBC 10*3/mm3 10.30 11.90* 12.50* 10.70 13.80* 13.30* 14.60*   HEMOGLOBIN g/dL 7.8* 7.8* 8.4* 8.3* 8.3* 7.3* 7.3*   HEMATOCRIT % 24.4* 24.1* 26.1* 24.5* 25.6* 23.0* 22.8*   PLATELETS 10*3/mm3 708* 664* 753* 682* 684* 581* 581*     Results from last 7 days   Lab Units 03/05/20 0247 03/04/20  0328 03/03/20  0516 03/02/20  0647 03/01/20  0351 02/29/20 0537 02/28/20 0324 02/27/20  1152   SODIUM mmol/L 141 142 140 142 141 142  --  140   POTASSIUM mmol/L 4.3 4.1 4.4 4.2 3.8 3.8 3.9 3.5   CHLORIDE mmol/L 107 108* 103 108* 105 106  --  105   CO2 mmol/L 24.0 25.0 25.0 25.0 24.0 24.0  --  23.0   BUN mg/dL 15 14 14 15 16 16  --  16   CREATININE mg/dL 0.99 0.94 0.89 0.87 0.88 0.88  --  1.03   GLUCOSE mg/dL 124* 122* 135* 119* 140* 154*  --  118*   ALBUMIN g/dL  --  2.50* 2.50* 2.20* 2.60*  --   --  2.30*   BILIRUBIN mg/dL  --  <0.2* <0.2* <0.2* 0.2  --   --  0.2   ALK PHOS U/L  --  89 90 88 99  --   --  90   AST (SGOT) U/L  --  79* 56* 54* 86*  --   --  33   ALT (SGPT) U/L  --  93* 76* 72* 86*  --   --  38   CALCIUM mg/dL 8.6 8.2* 8.4* 8.6 8.2* 8.4*  --  8.4*     Results from last 7 days   Lab Units 02/28/20  0324   CK TOTAL U/L 33             Microbiology   Microbiology Results (last 10 days)     Procedure Component Value - Date/Time    AFB Culture - Lavage, Lung, Lingula [[id number]] Collected:  03/04/20 0743    Lab Status:  Preliminary result Specimen:  Lavage from Lung, [place] Updated:  03/04/20 1021     AFB Stain No acid fast bacilli seen    BAL Culture, Quantitative - Lavage, Lung, [place] [[id number]] Collected:  03/04/20 0743    Lab Status:  Preliminary result Specimen:  Lavage from Lung, [place] Updated:  03/05/20 0720     BAL Culture >100,000 CFU/mL Normal Respiratory Cheryl     Gram Stain Moderate (3+) WBCs per low power field       Moderate (3+) Epithelial cells per low power field      Few (2+) Gram positive cocci in pairs and clusters    Respiratory Culture - Sputum, Cough [[id number]] Collected:  02/28/20 1123    Lab Status:  Final result Specimen:  Sputum from Cough Updated:  03/01/20 1203     Respiratory Culture Scant growth (1+) Normal Respiratory Cheryl     Gram Stain Moderate (3+) WBCs per low power field      Rare (1+) Epithelial cells per low power field      Rare (1+) Mixed bacterial morphotypes seen on Gram Stain    Legionella Antigen, Urine - Urine, Urine, Clean Catch [[id number]]  (Normal) Collected:  02/28/20 0402    Lab Status:  Final result Specimen:  Urine, Clean Catch Updated:  02/28/20 0439     LEGIONELLA ANTIGEN, URINE Negative    S. Pneumo Ag Urine or CSF - Urine, Urine, Clean Catch [[id number]]  (Normal) Collected:  02/28/20 0402    Lab Status:  Final result Specimen:  Urine, Clean Catch Updated:  02/28/20 0439     Strep Pneumo Ag Negative    Respiratory Panel, PCR - Swab, Nasopharynx [[id number]]  (Normal) Collected:  02/27/20 2349    Lab Status:  Final result Specimen:  Swab from Nasopharynx Updated:  02/28/20 0131     ADENOVIRUS, PCR Not Detected     Coronavirus 229E Not Detected     Coronavirus HKU1 Not Detected     Coronavirus NL63 Not Detected     Coronavirus OC43 Not Detected     Human Metapneumovirus Not Detected     Human Rhinovirus/Enterovirus Not Detected     Influenza B PCR Not Detected     Parainfluenza Virus 1 Not Detected     Parainfluenza Virus 2 Not Detected     Parainfluenza Virus 3 Not Detected     Parainfluenza Virus 4 Not Detected     Bordetella pertussis pcr Not Detected     Influenza A H1 2009 PCR Not Detected     Chlamydophila pneumoniae PCR Not Detected     Mycoplasma pneumo by PCR Not Detected     Influenza A PCR Not Detected     Influenza A H3 Not Detected     Influenza A H1 Not Detected     RSV, PCR Not Detected    Blood Culture - Blood, Arm, Right [[id number]] Collected:  02/27/20 1157    Lab  Status:  Final result Specimen:  Blood from Arm, Right Updated:  03/03/20 1215     Blood Culture No growth at 5 days    Blood Culture - Blood, Arm, Left [[id number]] Collected:  02/27/20 1152    Lab Status:  Final result Specimen:  Blood from Arm, Left Updated:  03/03/20 1215     Blood Culture No growth at 5 days          Medication Review:       Schedule Meds    !Vancomycin Level Draw Needed  Does not apply Once   !Vancomycin Level Draw Needed  Does not apply Once   [START ON 3/6/2020] !Vancomycin Level Draw Needed  Does not apply Once   amiodarone 200 mg Oral Q24H   apixaban 5 mg Oral Q12H   aspirin 81 mg Oral Daily   cefTRIAXone 2 g Intravenous Q12H   cetirizine 10 mg Oral Daily   finasteride 5 mg Oral Daily   FLUoxetine 40 mg Oral Daily   folic acid 1 mg Oral Daily   gabapentin 200 mg Oral TID   guaiFENesin 1,200 mg Oral Q12H   insulin lispro 0-9 Units Subcutaneous 4x Daily With Meals & Nightly   ipratropium-albuterol 3 mL Nebulization Q4H While Awake - RT   lactobacillus acidophilus 1 capsule Oral BID   meropenem 1 g Intravenous Q8H   pantoprazole 40 mg Oral QAM   sodium chloride 10 mL Intravenous Q12H   tamsulosin 0.4 mg Oral Daily   vancomycin 750 mg Intravenous Q12H   vitamin B-12 1,000 mcg Oral Daily   vitamin E 800 Units Oral Daily       Infusion Meds    Pharmacy to dose vancomycin        PRN Meds  acetaminophen **OR** acetaminophen **OR** acetaminophen  •  benzonatate  •  bisacodyl  •  cyclobenzaprine  •  dextrose  •  dextrose  •  glucagon (human recombinant)  •  insulin lispro **AND** insulin lispro  •  ipratropium-albuterol  •  magnesium sulfate **OR** magnesium sulfate in D5W 1g/100mL (PREMIX)  •  ondansetron **OR** ondansetron  •  Pharmacy to dose vancomycin  •  potassium chloride  •  sodium chloride        Assessment/Plan       Antimicrobial Therapy   1.        day  2.  IV Rocephin    day  3.  IV vancomycin    day    4.  IV Merrem     Day   5.      Day      Assessment      Extensive left upper lobe  and left lower lobe infiltrate.  The presentation is highly consistent with pneumonia.  Aspiration is less likely since it is involving all lobes of the left lung but I am concerned about hospital-acquired pneumonia.  The patient is currently on 5 L of oxygen via nasal cannula.  Sputum culture did not grow any significant pathogen.  Repeat chest x-ray did not show improvement  -3/4/[id number]-[id number]-klyfimc of tracheobronchomalacia throughout the airway.    Mild reactive leukocytosis     Status post mitral valve replacement for mitral valve endocarditis on January 22, 2020 with enterococcus faecium.  The patient was finishing 6 weeks of IV daptomycin and IV Rocephin at the rehab facility and the last day of the IV antibiotics was supposed to be March 8, 2020     Type 2 diabetes     Tobacco abuse     Plan     Continue IV Rocephin 2 g every 12 hours  Continue IV vancomycin   Continue IV meropenem   Discontinue all antibiotics on 3/8/2020  Continue supportive care  A.mLeoncio Bradford, APRN  03/05/20  11:47 AM     Note is dictated utilizing voice recognition software/Dragon

## 2020-03-05 NOTE — PLAN OF CARE
Problem: Patient Care Overview  Goal: Plan of Care Review  Outcome: Ongoing (interventions implemented as appropriate)  Flowsheets  Taken 3/5/2020 1500  Progress: improving  Plan of Care Reviewed With: patient  Taken 3/5/2020 1510  Outcome Summary: Pt. seens this AM and PM to complete functional transfers to and from armchair w/ moderate-max A x 2 to complete, pt. demonstrates improved functional mobility w/ use of rolling walker this PM albeit requires multiple VC's and assistance to maintain BLE extension. Pt. demonstrates good progress this date, albeit is far from baseline level of functional independence secondary to global weakness and decline following CABG procedure. Recommend IP rehab at d/c to address aforementioned deficits.

## 2020-03-05 NOTE — PROGRESS NOTES
[facility]/PULM/CC PROGRESS NOTE     HISTORY OF PRESENT ILLNESS:  Jaimn Hennessy is a 64 y.o. 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  Patient reported that earlier that morning, while at [facility], his blood pressure checked and was reportedly low.  It was at that time, that EMS was contacted, and patient was taken to the outlying facility emergency department.  During his stay in the ED, patient was diagnosed with pneumonia.  He had reported shortness of breath, cough with yellow sputum production that started the preceding early morning.  Patient denied nausea, vomiting, constipation, diarrhea, fever, or chills.  Patient did mention some chest soreness, but denies arm pain, neck pain, or jaw pain.  Notably, patient had a recent mitral valve replacement secondary to known endocarditis.  This patient is well-known to this practice, as we were following patient during his recent hospitalization.  At time of discharge, patient was recommended to complete 6 weeks of IV antibiotics with daptomycin and Rocephin per infectious disease.  Patient was discharged to [facility], and the plan was for antibiotic completion on 3/8/2020.  Patient was transferred to [facility] and admitted initially to the hospitalist team for further treatment and evaluation of patient condition.  Patient has required no vasopressors.  Infectious disease as well as cardiothoracic surgery have been consulted.  Patient presented with right upper extremity midline that was placed during his previous admission.  Due to patient's pulmonary status, with increased oxygen  "supplementation needs, cardiothoracic surgery requested patient to be transferred to [facility] for closer monitoring.  Previous work-up included bilateral lower extremity venous Doppler which reported \"chronic left lower extremity superficial thrombophlebitis noted in the small saphenous.\"  It is been reported that patient had a chest x-ray from outlying facility indicating pneumonia, and CT of the chest at this facility revealed \"extensive airspace and interstitial opacity throughout the left lung with smaller patchy air peripheral opacities in the right lung.  Only the right upper lobe multifocal infection/pneumonia is the most likely etiology.  There is mild mediastinal adenopathy which is likely reactive/infectious.  There are small layering bilateral pleural effusions.\"  Per infectious disease, in review of patient's previous medical record, patient had blood cultures that were positive for enterococcus faecium, which screened positive for VRE based on PCR.  Initially organism was susceptible to ampicillin and gentamicin.  Repeat blood cultures were negative.  Following patient's surgery on 1/22/2020 (MVR), the mitral valve tissue culture was also positive for enterococcus faecium, but sensitivities revealed resistance to ampicillin.  ID was consulted as the case has become rather complex, and that first and second line antibiotic therapies are revealing resistance.  At time of transfer, patient initially was on 3 L/min via nasal cannula, but after being transferred in bed, required an increase to 6 L/min via high flow nasal cannula.  Patient denies chest pain, neck pain, arm pain, jaw pain, nausea, vomiting, constipation, diarrhea, blood in urine or stool.  Patient does admit to some mild shortness of breath, frequent cough with yellow sputum production, but denies fever or chills.     [facility] was consulted for further evaluation and treatment of pneumonia, and to aid improvement in pulmonary status.  Thank you for " "this consultation, we will follow along on this patient.        SUBJECTIVE    2/29: Patient reports feeling somewhat better today.  Tolerating 5 L O2.  No shortness of air at rest.  3/1: Patient reports feeling well today.  He reports shortness of air is improving.  Demonstrates good compliance with I-S.  Tolerating O2 at 4 L by nasal cannula  3/2:  Doing ok.  Reports some SOA and cough.  On supplemental oxygen, 4 liters  3/3:  No new issues.  Feeling some better.  Cough improved.  On 2L nasal cannula  3/4: Breathing some better.  Minimal cough.  No complaints of chest pain.  No nausea or vomiting.  3/5: Seeing patient for the first time.  Significantly deconditioned and weak.  Continues to have cough without much sputum.  Shortness of breath improving.  Oxygen requirement better.    OBJECTIVE    /72   Pulse 97   Temp 97.6 °F (36.4 °C) (Oral)   Resp 18   Ht 175.3 cm (69.02\")   Wt 116 kg (255 lb 8.2 oz)   SpO2 91%   BMI 37.72 kg/m²   Intake/Output last 3 shifts:  I/O last 3 completed shifts:  In: 1916 [P.O.:960; I.V.:206; IV Piggyback:750]  Out: -   Intake/Output this shift:  No intake/output data recorded.    PHYSICAL EXAM:       Constitutional:  Well developed, well nourished, no acute distress, acutely ill-appearing, chronically ill-appearing  Eyes:  PERRL, conjunctiva normal, EOMI   HENT:  Atraumatic, external ears normal, nose normal. Neck-normal range of motion, no tenderness, supple, trachea midline  Respiratory:  Bilateral air entry present, somewhat diminished on the left side.  No crackles or wheezing. non-labored respirations without accessory muscle use  Cardiovascular:  Normal rate, normal rhythm, no murmurs, no gallops, no rubs   GI:  Soft, nondistended, normal bowel sounds, nontender, no rebound, no guarding   :  deferred   Musculoskeletal: Bilateral lower extremity edema 2+, no tenderness, no deformities  Integument:  Well hydrated, no rash.  Right sided posterior lateral chest with " abrasion, right second and third toe with unstageable but without erythema or warmth, no drainage noted.  Sternal incision healing well  Neurologic:  Alert & oriented x 3, no lateralizing deficits  Psychiatric:  Speech and behavior appropriate    Scheduled Meds:    !Vancomycin Level Draw Needed  Does not apply Once   !Vancomycin Level Draw Needed  Does not apply Once   [START ON 3/6/2020] !Vancomycin Level Draw Needed  Does not apply Once   amiodarone 200 mg Oral Q24H   apixaban 5 mg Oral Q12H   aspirin 81 mg Oral Daily   cefTRIAXone 2 g Intravenous Q12H   cetirizine 10 mg Oral Daily   finasteride 5 mg Oral Daily   FLUoxetine 40 mg Oral Daily   folic acid 1 mg Oral Daily   gabapentin 200 mg Oral TID   guaiFENesin 1,200 mg Oral Q12H   insulin lispro 0-9 Units Subcutaneous 4x Daily With Meals & Nightly   ipratropium-albuterol 3 mL Nebulization Q4H While Awake - RT   lactobacillus acidophilus 1 capsule Oral BID   meropenem 1 g Intravenous Q8H   pantoprazole 40 mg Oral QAM   sodium chloride 10 mL Intravenous Q12H   tamsulosin 0.4 mg Oral Daily   vancomycin 750 mg Intravenous Q12H   vitamin B-12 1,000 mcg Oral Daily   vitamin E 800 Units Oral Daily       Continuous Infusions:    Pharmacy to dose vancomycin        PRN Meds:acetaminophen **OR** acetaminophen **OR** acetaminophen  •  benzonatate  •  bisacodyl  •  cyclobenzaprine  •  dextrose  •  dextrose  •  glucagon (human recombinant)  •  insulin lispro **AND** insulin lispro  •  ipratropium-albuterol  •  magnesium sulfate **OR** magnesium sulfate in D5W 1g/100mL (PREMIX)  •  ondansetron **OR** ondansetron  •  Pharmacy to dose vancomycin  •  potassium chloride  •  sodium chloride     Data Review:  Lab Results (last 24 hours)     Procedure Component Value Units Date/Time    BAL Culture, Quantitative - Lavage, Lung, Lingula [[id number]] Collected:  03/04/20 0743    Specimen:  Lavage from Lung, Lingula Updated:  03/05/20 0720     BAL Culture >100,000 CFU/mL Normal Respiratory  Cheryl     Gram Stain Moderate (3+) WBCs per low power field      Moderate (3+) Epithelial cells per low power field      Few (2+) Gram positive cocci in pairs and clusters    POC Glucose Once [[id number]]  (Abnormal) Collected:  03/05/20 0716    Specimen:  Blood Updated:  03/05/20 0717     Glucose 112 mg/dL      Comment: Serial Number: [id number]:  [id number]       Magnesium [[id number]]  (Normal) Collected:  03/05/20 0247    Specimen:  Blood Updated:  03/05/20 0428     Magnesium 2.4 mg/dL     Basic Metabolic Panel [[id number]]  (Abnormal) Collected:  03/05/20 0247    Specimen:  Blood Updated:  03/05/20 0428     Glucose 124 mg/dL      BUN 15 mg/dL      Creatinine 0.99 mg/dL      Sodium 141 mmol/L      Potassium 4.3 mmol/L      Chloride 107 mmol/L      CO2 24.0 mmol/L      Calcium 8.6 mg/dL      eGFR Non African Amer 76 mL/min/1.73      BUN/Creatinine Ratio 15.2     Anion Gap 10.0 mmol/L     Narrative:       GFR Normal >60  Chronic Kidney Disease <60  Kidney Failure <15      CBC & Differential [[id number]] Collected:  03/05/20 0247    Specimen:  Blood Updated:  03/05/20 0408    Narrative:       The following orders were created for panel order CBC & Differential.  Procedure                               Abnormality         Status                     ---------                               -----------         ------                     CBC Auto Differential[[id number]]        Abnormal            Final result                 Please view results for these tests on the individual orders.    CBC Auto Differential [[id number]]  (Abnormal) Collected:  03/05/20 0247    Specimen:  Blood Updated:  03/05/20 0408     WBC 10.30 10*3/mm3      RBC 3.21 10*6/mm3      Hemoglobin 7.8 g/dL      Hematocrit 24.4 %      MCV 75.9 fL      MCH 24.4 pg      MCHC 32.2 g/dL      RDW 17.9 %      RDW-SD 48.1 fl      MPV 7.6 fL      Platelets 708 10*3/mm3      Neutrophil % 63.1 %      Lymphocyte % 12.1 %      Monocyte % 5.9 %      Eosinophil %  16.8 %      Basophil % 2.1 %      Neutrophils, Absolute 6.50 10*3/mm3      Lymphocytes, Absolute 1.20 10*3/mm3      Monocytes, Absolute 0.60 10*3/mm3      Eosinophils, Absolute 1.70 10*3/mm3      Basophils, Absolute 0.20 10*3/mm3      nRBC 0.0 /100 WBC     aPTT [[id number]]  (Abnormal) Collected:  03/05/20 0247    Specimen:  Blood Updated:  03/05/20 0408     PTT 27.1 seconds     POC Glucose Once [[id number]]  (Normal) Collected:  03/04/20 2048    Specimen:  Blood Updated:  03/04/20 2050     Glucose 101 mg/dL      Comment: Serial Number: [id number]:  [id number]       POC Glucose Once [[id number]]  (Normal) Collected:  03/04/20 1630    Specimen:  Blood Updated:  03/04/20 1634     Glucose 91 mg/dL      Comment: Serial Number: [id number]:  [id number]       Vancomycin, Trough [[id number]]  (Abnormal) Collected:  03/04/20 1336    Specimen:  Blood Updated:  03/04/20 1421     Vancomycin Trough 20.10 mcg/mL     POC Glucose Once [[id number]]  (Abnormal) Collected:  03/04/20 1142    Specimen:  Blood Updated:  03/04/20 1155     Glucose 120 mg/dL      Comment: Serial Number: [id number]:  [id number]       AFB Culture - Lavage, Lung, Lingula [[id number]] Collected:  03/04/20 0743    Specimen:  Lavage from Lung, Lingula Updated:  03/04/20 1021     AFB Stain No acid fast bacilli seen    Non-gynecologic Cytology [[id number]] Collected:  03/04/20 0743    Specimen:  Lavage from Lung, Lingula Updated:  03/04/20 0837    POC Glucose Once [[id number]]  (Abnormal) Collected:  03/04/20 0835    Specimen:  Blood Updated:  03/04/20 0837     Glucose 120 mg/dL      Comment: Serial Number: [id number]:  [id number]       Pneumocystis PCR - Wash, Lung [[id number]] Collected:  03/04/20 0825    Specimen:  Wash from Lung Updated:  03/04/20 0825    Fungus Culture - Lavage, Lung, Lingula [[id number]] Collected:  03/04/20 0743    Specimen:  Lavage from Lung, Lingula Updated:  03/04/20 0822             Imaging:  Imaging Results (Last 72 Hours)      Procedure Component Value Units Date/Time    XR Chest 1 View [[id number]] Collected:  03/03/20 0829     Updated:  03/03/20 0833    Narrative:       DATE OF EXAM:  3/3/2020 7:35 AM     PROCEDURE:  XR CHEST 1 VW-     INDICATIONS:  Pneumonia. Follow-up. Coronary artery disease. Diabetes. HISTORY of  mitral valve replacement. Sepsis.       COMPARISON:  AP portable chest 03/01/2020. CT chest 02/27/2020.     TECHNIQUE:   Single radiographic view of the chest was obtained.     FINDINGS:  Dense consolidative changes with air bronchograms are seen within the  left lung with sparing at the level of the costophrenic angle, not  thought to be significantly changed, allowing for slight differences in  technique, compared to prior. Suspected minimal infiltrate in the right  base, without focal right lung consolidation. Heart size is stable with  signs of prior median sternotomy and valve replacement. No pneumothorax.  Questionable trace bilateral pleural effusions. Degenerative changes of  the shoulders.       Impression:       Dense airspace disease throughout the left lung, compatible with the  provided history of pneumonia. Suspected minimal right basilar  infiltrate. No significant change from 03/01/2020, allowing for  differences in technique..     Electronically Signed By-Dr. Riana Lowery MD On:3/3/2020 8:31 AM  This report was finalized on [id number] by Dr. Riana Lowery MD.            ASSESSMENT/PLAN:     Pneumonia of left lung due to infectious organism    Coronary artery disease due to lipid rich plaque    Dyslipidemia    Non-insulin dependent type 2 diabetes mellitus (CMS/HCC)    Diabetic neuropathy (CMS/HCC)    GERD without esophagitis    BPH with obstruction/lower urinary tract symptoms    B12 deficiency    Vitamin D deficiency    JARRETT (generalized anxiety disorder)    Tobacco abuse    Obesity (BMI 30-39.9)    S/P MVR (mitral valve replacement)    Endocarditis of mitral valve    Hypotension    Shortness of  breath    Sepsis associated hypotension (CMS/HCC)    Chest pain    Vitamin E deficiency    Severe sepsis (CMS/HCC)    HCAP (healthcare-associated pneumonia)         Severe sepsis without septic shock, secondary to pneumonia  -IVF bolus given at outlying facility  -Lactic Acid 1.0  -Cultures-pending  -Urine antigens-negative  -RVP-negative  -Reviewed CT Chest.  -Procalcitonin-0.10  -Rocephin, Merrem, and vancomycin, ID managing antibiotics  -Imaging with dense infiltrate in left lung, s/p bronchoscopy with BAL Left Lingula.  -WBC some better today     Sepsis associated hypotension, improving  -Did not require vasopressors  -Stable, continue to monitor     Left lung hospital-acquired pneumonia  -Reviewed CT Chest.  Status post bronchoscopy.  BAL was obtained from left lingula.  All cultures negative so far.  Continue empiric IV antibiotics.  Monitor clinical progress.  Infectious disease adjusting the antibiotics.  -Aggressive pulmonary toileting  -Follow sputum cultures  -IS/Flutter Valve encouraged and ordered  -Antibiotics as above per ID  -Continue Mucinex, Zyrtec     Acute respiratory failure with hypoxia, likely secondary to pneumonia; R/O Volume overload  -From past admission, bedside PFT reviewed: Suggestive of restrictive defect. FEV1 46%.  +bronchdilator response.  Diffusion capacity normal when corrected.  -Oxygen supplementation, titrate to maintain oxygen saturation >91%, currently on 2 L  -Duonebs scheduled and PRN  -BNP 1553  -intermittent diuresis  -sputum 2/28 - normal respiratory nelson      Enterococcus MV Endocarditis; S/P MVR (tissue)/MICHELLE ligation (1/22/2020)  -Previous hospitalization cultures: 1) Blood cultures-Enterococcus faecium, positive for VRE on PCR; initially susciptible to ampicillin and gentamicin.  2) MV tissue culture with Enterococcus faecium, resistant to ampicillin, susceptible for daptomycin.  -Initially placed on IV Rocephin & Daptomycin x 6 weeks by ID with end date of 3/8/2020  (during previous hospitalization).  -ID consulted and managing antibiotics, as above.  -CTS following     CAD  -Continue ASA     Hyperlipidemia  -Statin therapy on hold due to previously being on daptomycin, resume when clinically appropriate     PAF (controlled), on chronic anticoagulation with Eliquis  -Continue amiodarone  -D/C heparin drip and now on Eliquis     Diabetes mellitus, type 2, with neuropathy  -strict glycemic control recommended  -Accuchecks AC/HS with sliding scale insulin  -Hold oral diabetic medications until out of ICU  -Continue gabapentin     GERD  -Continue protonix     BPH  -Continue proscar, flomax     General Anxiety Disorder  -Continue prozac     Multiple sclerosis  -Mainly wheelchair bound, limited mobility  -PT/OT     Obesity  -BMI 37.91  -Counseled on lifestyle modifications to improve overall health    Hypersomnia and probable sleep apnea  -Recommend sleep study as outpatient.     Tobacco Abuse, current  -Counseled on smoking cessation     Vitamin D/B12 deficiency  -Continue folic acid, B12 supplements       High Risk     Accuchecks with SSI  Code Status: CPR, Full Interventions  VTE Prophylaxis:  Eliquis/SCDs  PUD Prophylaxis: Protonix    Midline  Diet ordered     We will continue to follow.

## 2020-03-05 NOTE — PROGRESS NOTES
Nutrition Services    Patient Name:  Jamin Hennessy  YOB: 1955  MRN: [id number]  Admit Date:  2/27/2020    LOS Progress note:    Diet CONCARB. Meal intake %. No swallowing or chewing problems per pt. Skin intact. Like food. +BM 3/4/20. Follow as needed.     Electronically signed by:  Fay Magaña RD  03/05/20 3:40 PM

## 2020-03-06 LAB
ANION GAP SERPL CALCULATED.3IONS-SCNC: 12 MMOL/L (ref 5–15)
APTT PPP: 26.6 SECONDS (ref 61–76.5)
BACTERIA SPEC AEROBE CULT: NORMAL
BUN BLD-MCNC: 16 MG/DL (ref 8–23)
BUN/CREAT SERPL: 15.7 (ref 7–25)
CALCIUM SPEC-SCNC: 8.6 MG/DL (ref 8.6–10.5)
CHLORIDE SERPL-SCNC: 108 MMOL/L (ref 98–107)
CO2 SERPL-SCNC: 23 MMOL/L (ref 22–29)
CREAT BLD-MCNC: 1.02 MG/DL (ref 0.76–1.27)
DEPRECATED RDW RBC AUTO: 53.4 FL (ref 37–54)
ERYTHROCYTE [DISTWIDTH] IN BLOOD BY AUTOMATED COUNT: 18.8 % (ref 12.3–15.4)
FERRITIN SERPL-MCNC: 237.3 NG/ML (ref 30–400)
GFR SERPL CREATININE-BSD FRML MDRD: 74 ML/MIN/1.73
GLUCOSE BLD-MCNC: 100 MG/DL (ref 65–99)
GLUCOSE BLDC GLUCOMTR-MCNC: 83 MG/DL (ref 70–105)
GLUCOSE BLDC GLUCOMTR-MCNC: 88 MG/DL (ref 70–105)
GLUCOSE BLDC GLUCOMTR-MCNC: 96 MG/DL (ref 70–105)
GLUCOSE BLDC GLUCOMTR-MCNC: 98 MG/DL (ref 70–105)
GRAM STN SPEC: NORMAL
HCT VFR BLD AUTO: 25.9 % (ref 37.5–51)
HGB BLD-MCNC: 8.1 G/DL (ref 13–17.7)
IRON 24H UR-MRATE: 26 MCG/DL (ref 59–158)
IRON SATN MFR SERPL: 9 % (ref 20–50)
MAGNESIUM SERPL-MCNC: 2.6 MG/DL (ref 1.6–2.4)
MCH RBC QN AUTO: 25 PG (ref 26.6–33)
MCHC RBC AUTO-ENTMCNC: 31.4 G/DL (ref 31.5–35.7)
MCV RBC AUTO: 79.6 FL (ref 79–97)
PLATELET # BLD AUTO: 594 10*3/MM3 (ref 140–450)
PMV BLD AUTO: 7.7 FL (ref 6–12)
POTASSIUM BLD-SCNC: 4.1 MMOL/L (ref 3.5–5.2)
POTASSIUM BLD-SCNC: 4.2 MMOL/L (ref 3.5–5.2)
RBC # BLD AUTO: 3.26 10*6/MM3 (ref 4.14–5.8)
SODIUM BLD-SCNC: 143 MMOL/L (ref 136–145)
TIBC SERPL-MCNC: 285 MCG/DL (ref 298–536)
TRANSFERRIN SERPL-MCNC: 191 MG/DL (ref 200–360)
VANCOMYCIN TROUGH SERPL-MCNC: 17.4 MCG/ML (ref 5–20)
WBC NRBC COR # BLD: 9.4 10*3/MM3 (ref 3.4–10.8)

## 2020-03-06 PROCEDURE — 97530 THERAPEUTIC ACTIVITIES: CPT

## 2020-03-06 PROCEDURE — 80048 BASIC METABOLIC PNL TOTAL CA: CPT | Performed by: INTERNAL MEDICINE

## 2020-03-06 PROCEDURE — 25010000002 VANCOMYCIN 750 MG RECONSTITUTED SOLUTION 1 EACH VIAL: Performed by: INTERNAL MEDICINE

## 2020-03-06 PROCEDURE — 25010000002 MEROPENEM PER 100 MG: Performed by: NURSE PRACTITIONER

## 2020-03-06 PROCEDURE — 97535 SELF CARE MNGMENT TRAINING: CPT

## 2020-03-06 PROCEDURE — 94799 UNLISTED PULMONARY SVC/PX: CPT

## 2020-03-06 PROCEDURE — 97112 NEUROMUSCULAR REEDUCATION: CPT

## 2020-03-06 PROCEDURE — 84132 ASSAY OF SERUM POTASSIUM: CPT | Performed by: NURSE PRACTITIONER

## 2020-03-06 PROCEDURE — 83735 ASSAY OF MAGNESIUM: CPT | Performed by: NURSE PRACTITIONER

## 2020-03-06 PROCEDURE — 82962 GLUCOSE BLOOD TEST: CPT

## 2020-03-06 PROCEDURE — 25010000002 CEFTRIAXONE PER 250 MG: Performed by: NURSE PRACTITIONER

## 2020-03-06 PROCEDURE — 84466 ASSAY OF TRANSFERRIN: CPT | Performed by: INTERNAL MEDICINE

## 2020-03-06 PROCEDURE — 85730 THROMBOPLASTIN TIME PARTIAL: CPT | Performed by: NURSE PRACTITIONER

## 2020-03-06 PROCEDURE — 82728 ASSAY OF FERRITIN: CPT | Performed by: INTERNAL MEDICINE

## 2020-03-06 PROCEDURE — 85027 COMPLETE CBC AUTOMATED: CPT | Performed by: NURSE PRACTITIONER

## 2020-03-06 PROCEDURE — 99024 POSTOP FOLLOW-UP VISIT: CPT | Performed by: NURSE PRACTITIONER

## 2020-03-06 PROCEDURE — 83540 ASSAY OF IRON: CPT | Performed by: INTERNAL MEDICINE

## 2020-03-06 PROCEDURE — 80202 ASSAY OF VANCOMYCIN: CPT | Performed by: INTERNAL MEDICINE

## 2020-03-06 RX ADMIN — MEROPENEM 1 G: 1 INJECTION, POWDER, FOR SOLUTION INTRAVENOUS at 12:26

## 2020-03-06 RX ADMIN — CETIRIZINE HYDROCHLORIDE 10 MG: 10 TABLET, FILM COATED ORAL at 08:35

## 2020-03-06 RX ADMIN — FLUOXETINE 40 MG: 20 CAPSULE ORAL at 08:35

## 2020-03-06 RX ADMIN — Medication 1 CAPSULE: at 08:41

## 2020-03-06 RX ADMIN — IPRATROPIUM BROMIDE AND ALBUTEROL SULFATE 3 ML: .5; 3 SOLUTION RESPIRATORY (INHALATION) at 11:16

## 2020-03-06 RX ADMIN — VANCOMYCIN HYDROCHLORIDE 750 MG: 750 INJECTION, POWDER, LYOPHILIZED, FOR SOLUTION INTRAVENOUS at 03:44

## 2020-03-06 RX ADMIN — GABAPENTIN 200 MG: 100 CAPSULE ORAL at 08:35

## 2020-03-06 RX ADMIN — GABAPENTIN 200 MG: 100 CAPSULE ORAL at 17:45

## 2020-03-06 RX ADMIN — CYANOCOBALAMIN TAB 1000 MCG 1000 MCG: 1000 TAB at 08:36

## 2020-03-06 RX ADMIN — IPRATROPIUM BROMIDE AND ALBUTEROL SULFATE 3 ML: .5; 3 SOLUTION RESPIRATORY (INHALATION) at 14:21

## 2020-03-06 RX ADMIN — FOLIC ACID 1 MG: 1 TABLET ORAL at 08:36

## 2020-03-06 RX ADMIN — TAMSULOSIN HYDROCHLORIDE 0.4 MG: 0.4 CAPSULE ORAL at 08:36

## 2020-03-06 RX ADMIN — APIXABAN 5 MG: 5 TABLET, FILM COATED ORAL at 20:45

## 2020-03-06 RX ADMIN — AMIODARONE HYDROCHLORIDE 200 MG: 200 TABLET ORAL at 08:35

## 2020-03-06 RX ADMIN — Medication 10 ML: at 08:42

## 2020-03-06 RX ADMIN — Medication 1 CAPSULE: at 20:44

## 2020-03-06 RX ADMIN — Medication: at 03:09

## 2020-03-06 RX ADMIN — CEFTRIAXONE SODIUM 2 G: 2 INJECTION, POWDER, FOR SOLUTION INTRAMUSCULAR; INTRAVENOUS at 08:35

## 2020-03-06 RX ADMIN — Medication 800 UNITS: at 08:35

## 2020-03-06 RX ADMIN — GUAIFENESIN 1200 MG: 600 TABLET, EXTENDED RELEASE ORAL at 20:43

## 2020-03-06 RX ADMIN — GABAPENTIN 200 MG: 100 CAPSULE ORAL at 20:44

## 2020-03-06 RX ADMIN — MEROPENEM 1 G: 1 INJECTION, POWDER, FOR SOLUTION INTRAVENOUS at 20:43

## 2020-03-06 RX ADMIN — PANTOPRAZOLE SODIUM 40 MG: 40 TABLET, DELAYED RELEASE ORAL at 08:36

## 2020-03-06 RX ADMIN — GUAIFENESIN 1200 MG: 600 TABLET, EXTENDED RELEASE ORAL at 08:35

## 2020-03-06 RX ADMIN — CEFTRIAXONE SODIUM 2 G: 2 INJECTION, POWDER, FOR SOLUTION INTRAMUSCULAR; INTRAVENOUS at 21:38

## 2020-03-06 RX ADMIN — MEROPENEM 1 G: 1 INJECTION, POWDER, FOR SOLUTION INTRAVENOUS at 03:43

## 2020-03-06 RX ADMIN — ASPIRIN 81 MG: 81 TABLET, DELAYED RELEASE ORAL at 08:35

## 2020-03-06 RX ADMIN — IPRATROPIUM BROMIDE AND ALBUTEROL SULFATE 3 ML: .5; 3 SOLUTION RESPIRATORY (INHALATION) at 19:09

## 2020-03-06 RX ADMIN — Medication 10 ML: at 20:44

## 2020-03-06 RX ADMIN — VANCOMYCIN HYDROCHLORIDE 750 MG: 750 INJECTION, POWDER, LYOPHILIZED, FOR SOLUTION INTRAVENOUS at 17:44

## 2020-03-06 RX ADMIN — APIXABAN 5 MG: 5 TABLET, FILM COATED ORAL at 08:36

## 2020-03-06 RX ADMIN — FINASTERIDE 5 MG: 5 TABLET, FILM COATED ORAL at 08:36

## 2020-03-06 NOTE — PROGRESS NOTES
[facility]/PULM/CC PROGRESS NOTE     HISTORY OF PRESENT ILLNESS:  Jamin Hennessy is a 64 y.o. 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  Patient reported that earlier that morning, while at [facility], his blood pressure checked and was reportedly low.  It was at that time, that EMS was contacted, and patient was taken to the outlying facility emergency department.  During his stay in the ED, patient was diagnosed with pneumonia.  He had reported shortness of breath, cough with yellow sputum production that started the preceding early morning.  Patient denied nausea, vomiting, constipation, diarrhea, fever, or chills.  Patient did mention some chest soreness, but denies arm pain, neck pain, or jaw pain.  Notably, patient had a recent mitral valve replacement secondary to known endocarditis.  This patient is well-known to this practice, as we were following patient during his recent hospitalization.  At time of discharge, patient was recommended to complete 6 weeks of IV antibiotics with daptomycin and Rocephin per infectious disease.  Patient was discharged to [facility], and the plan was for antibiotic completion on 3/8/2020.  Patient was transferred to [facility] and admitted initially to the hospitalist team for further treatment and evaluation of patient condition.  Patient has required no vasopressors.  Infectious disease as well as cardiothoracic surgery have been consulted.  Patient presented with right upper extremity midline that was placed during his previous admission.  Due to patient's pulmonary status, with increased oxygen  "supplementation needs, cardiothoracic surgery requested patient to be transferred to [facility] for closer monitoring.  Previous work-up included bilateral lower extremity venous Doppler which reported \"chronic left lower extremity superficial thrombophlebitis noted in the small saphenous.\"  It is been reported that patient had a chest x-ray from outlying facility indicating pneumonia, and CT of the chest at this facility revealed \"extensive airspace and interstitial opacity throughout the left lung with smaller patchy air peripheral opacities in the right lung.  Only the right upper lobe multifocal infection/pneumonia is the most likely etiology.  There is mild mediastinal adenopathy which is likely reactive/infectious.  There are small layering bilateral pleural effusions.\"  Per infectious disease, in review of patient's previous medical record, patient had blood cultures that were positive for enterococcus faecium, which screened positive for VRE based on PCR.  Initially organism was susceptible to ampicillin and gentamicin.  Repeat blood cultures were negative.  Following patient's surgery on 1/22/2020 (MVR), the mitral valve tissue culture was also positive for enterococcus faecium, but sensitivities revealed resistance to ampicillin.  ID was consulted as the case has become rather complex, and that first and second line antibiotic therapies are revealing resistance.  At time of transfer, patient initially was on 3 L/min via nasal cannula, but after being transferred in bed, required an increase to 6 L/min via high flow nasal cannula.  Patient denies chest pain, neck pain, arm pain, jaw pain, nausea, vomiting, constipation, diarrhea, blood in urine or stool.  Patient does admit to some mild shortness of breath, frequent cough with yellow sputum production, but denies fever or chills.     [facility] was consulted for further evaluation and treatment of pneumonia, and to aid improvement in pulmonary status.  Thank you for " "this consultation, we will follow along on this patient.        SUBJECTIVE    2/29: Patient reports feeling somewhat better today.  Tolerating 5 L O2.  No shortness of air at rest.  3/1: Patient reports feeling well today.  He reports shortness of air is improving.  Demonstrates good compliance with I-S.  Tolerating O2 at 4 L by nasal cannula  3/2:  Doing ok.  Reports some SOA and cough.  On supplemental oxygen, 4 liters  3/3:  No new issues.  Feeling some better.  Cough improved.  On 2L nasal cannula  3/4: Breathing some better.  Minimal cough.  No complaints of chest pain.  No nausea or vomiting.  3/5: Seeing patient for the first time.  Significantly deconditioned and weak.  Continues to have cough without much sputum.  Shortness of breath improving.  Oxygen requirement better.  3/6: feels much better.  Shortness of breath stable.  Oxygen requirement is stable.  Remains on 3 L.    OBJECTIVE    /82   Pulse 86   Temp 98.6 °F (37 °C) (Oral)   Resp 20   Ht 175.3 cm (69.02\")   Wt 116 kg (255 lb 15.3 oz)   SpO2 98%   BMI 37.78 kg/m²   Intake/Output last 3 shifts:  I/O last 3 completed shifts:  In: 700 [IV Piggyback:700]  Out: -   Intake/Output this shift:  No intake/output data recorded.    PHYSICAL EXAM:       Constitutional:  Well developed, well nourished, no acute distress, acutely ill-appearing, chronically ill-appearing  Eyes:  PERRL, conjunctiva normal, EOMI   HENT:  Atraumatic, external ears normal, nose normal. Neck-normal range of motion, no tenderness, supple, trachea midline  Respiratory:  Bilateral air entry present, somewhat diminished on the left side.  No crackles or wheezing. non-labored respirations without accessory muscle use  Cardiovascular:  Normal rate, normal rhythm, no murmurs, no gallops, no rubs   GI:  Soft, nondistended, normal bowel sounds, nontender, no rebound, no guarding   :  deferred   Musculoskeletal: Bilateral lower extremity edema 2+, no tenderness, no " deformities  Integument:  Well hydrated, no rash.  Right sided posterior lateral chest with abrasion, right second and third toe with unstageable but without erythema or warmth, no drainage noted.  Sternal incision healing well  Neurologic:  Alert & oriented x 3, no lateralizing deficits  Psychiatric:  Speech and behavior appropriate    Scheduled Meds:    !Vancomycin Level Draw Needed  Does not apply Once   amiodarone 200 mg Oral Q24H   apixaban 5 mg Oral Q12H   aspirin 81 mg Oral Daily   cefTRIAXone 2 g Intravenous Q12H   cetirizine 10 mg Oral Daily   finasteride 5 mg Oral Daily   FLUoxetine 40 mg Oral Daily   folic acid 1 mg Oral Daily   gabapentin 200 mg Oral TID   guaiFENesin 1,200 mg Oral Q12H   insulin lispro 0-9 Units Subcutaneous 4x Daily With Meals & Nightly   ipratropium-albuterol 3 mL Nebulization Q4H While Awake - RT   lactobacillus acidophilus 1 capsule Oral BID   meropenem 1 g Intravenous Q8H   pantoprazole 40 mg Oral QAM   sodium chloride 10 mL Intravenous Q12H   tamsulosin 0.4 mg Oral Daily   vancomycin 750 mg Intravenous Q12H   vitamin B-12 1,000 mcg Oral Daily   vitamin E 800 Units Oral Daily       Continuous Infusions:    Pharmacy to dose vancomycin        PRN Meds:acetaminophen **OR** acetaminophen **OR** acetaminophen  •  benzonatate  •  bisacodyl  •  cyclobenzaprine  •  dextrose  •  dextrose  •  glucagon (human recombinant)  •  insulin lispro **AND** insulin lispro  •  ipratropium-albuterol  •  magnesium sulfate **OR** magnesium sulfate in D5W 1g/100mL (PREMIX)  •  ondansetron **OR** ondansetron  •  Pharmacy to dose vancomycin  •  potassium chloride  •  sodium chloride     Data Review:  Lab Results (last 24 hours)     Procedure Component Value Units Date/Time    POC Glucose Once [[id number]]  (Normal) Collected:  03/06/20 0713    Specimen:  Blood Updated:  03/06/20 0715     Glucose 98 mg/dL      Comment: Serial Number: [id number]:  [id number]       BAL Culture, Quantitative - Lavage, Lung,  Lingula [[id number]] Collected:  03/04/20 0743    Specimen:  Lavage from Lung, Lingula Updated:  03/06/20 0655     BAL Culture >100,000 CFU/mL Normal Respiratory Cheryl     Gram Stain Moderate (3+) WBCs per low power field      Moderate (3+) Epithelial cells per low power field      Few (2+) Gram positive cocci in pairs and clusters    Ferritin [[id number]]  (Normal) Collected:  03/06/20 0248    Specimen:  Blood Updated:  03/06/20 0436     Ferritin 237.30 ng/mL     Narrative:       Results may be falsely decreased if patient taking Biotin.      Vancomycin, Trough [[id number]]  (Normal) Collected:  03/06/20 0248    Specimen:  Blood Updated:  03/06/20 0432     Vancomycin Trough 17.40 mcg/mL     Potassium [[id number]]  (Normal) Collected:  03/06/20 0248    Specimen:  Blood Updated:  03/06/20 0432     Potassium 4.1 mmol/L     Magnesium [[id number]]  (Abnormal) Collected:  03/06/20 0248    Specimen:  Blood Updated:  03/06/20 0432     Magnesium 2.6 mg/dL     Iron Profile [[id number]]  (Abnormal) Collected:  03/06/20 0248    Specimen:  Blood Updated:  03/06/20 0432     Iron 26 mcg/dL      Iron Saturation 9 %      Transferrin 191 mg/dL      TIBC 285 mcg/dL     aPTT [[id number]]  (Abnormal) Collected:  03/06/20 0249    Specimen:  Blood Updated:  03/06/20 0417     PTT 26.6 seconds     Reticulocytes [[id number]]  (Abnormal) Collected:  03/05/20 0247    Specimen:  Blood Updated:  03/05/20 2306     Reticulocyte % 3.13 %      Reticulocyte Absolute 0.0997 10*6/mm3     Vancomycin, Peak [[id number]]  (Normal) Collected:  03/05/20 2034    Specimen:  Blood Updated:  03/05/20 2105     Vancomycin Peak 22.70 mcg/mL     POC Glucose Once [[id number]]  (Abnormal) Collected:  03/05/20 2015    Specimen:  Blood Updated:  03/05/20 2018     Glucose 109 mg/dL      Comment: Serial Number: [id number]:  [id number]       POC Glucose Once [[id number]]  (Abnormal) Collected:  03/05/20 1630    Specimen:  Blood Updated:  03/05/20 1633     Glucose 68 mg/dL       "Comment: Serial Number: [id number]:  [id number]       Non-gynecologic Cytology [[id number]] Collected:  03/04/20 0743    Specimen:  Lavage from Lung, Lingula Updated:  03/05/20 1508     Case Report --     Medical Cytology Report                           Case: [id number]                                  Authorizing Provider:  Melissa Cole MD    Collected:           03/04/2020 07:43 AM          Ordering Location:     [facility]  Received:            03/04/2020 08:37 AM                                 SUITES                                                                       Pathologist:           Brandon Acevedo MD                                                             Specimen:    Lung, Lingula, pneumonia                                                                    Final Diagnosis --     Lung, lingula, bronchoalveolar lavage, smears and cytospin preparation:    Benign squamous cells and bronchial cells    Pulmonary macrophages present    Marked acute inflammation    No fungal organisms identified    Negative for malignant cells     JPR/tkd        Gross Description --     Received in carbowax and designated \"Bronchoalveolar lavage\" are 50 mL of clear, green fluid. Particulate matter is present. This specimen is processed as per protocol.        POC Glucose Once [[id number]]  (Abnormal) Collected:  03/05/20 1149    Specimen:  Blood Updated:  03/05/20 1207     Glucose 109 mg/dL      Comment: Serial Number: [id number]:  [id number]                Imaging:  Imaging Results (Last 72 Hours)     Procedure Component Value Units Date/Time    XR Chest 1 View [[id number]] Collected:  03/03/20 0829     Updated:  03/03/20 0833    Narrative:       DATE OF EXAM:  3/3/2020 7:35 AM     PROCEDURE:  XR CHEST 1 VW-     INDICATIONS:  Pneumonia. Follow-up. Coronary artery disease. Diabetes. HISTORY of  mitral valve replacement. Sepsis.       COMPARISON:  AP portable chest 03/01/2020. CT chest " 02/27/2020.     TECHNIQUE:   Single radiographic view of the chest was obtained.     FINDINGS:  Dense consolidative changes with air bronchograms are seen within the  left lung with sparing at the level of the costophrenic angle, not  thought to be significantly changed, allowing for slight differences in  technique, compared to prior. Suspected minimal infiltrate in the right  base, without focal right lung consolidation. Heart size is stable with  signs of prior median sternotomy and valve replacement. No pneumothorax.  Questionable trace bilateral pleural effusions. Degenerative changes of  the shoulders.       Impression:       Dense airspace disease throughout the left lung, compatible with the  provided history of pneumonia. Suspected minimal right basilar  infiltrate. No significant change from 03/01/2020, allowing for  differences in technique..     Electronically Signed By-Dr. Riana Lowery MD On:3/3/2020 8:31 AM  This report was finalized on [id number] by Dr. Riana Lowery MD.            ASSESSMENT/PLAN:     HCAP (healthcare-associated pneumonia)    Coronary artery disease due to lipid rich plaque    Dyslipidemia    Non-insulin dependent type 2 diabetes mellitus (CMS/[facility])    Diabetic neuropathy (CMS/[facility])    Tobacco abuse    Obesity (BMI 30-39.9)    S/P MVR (mitral valve replacement)    Eosinophilia    Impetigo    Microcytic anemia    Elevated liver enzymes         Severe sepsis without septic shock, secondary to pneumonia  -IVF bolus given at outlying facility  -Lactic Acid 1.0  -Cultures-pending  -Urine antigens-negative  -RVP-negative  -Reviewed CT Chest.  -Procalcitonin-0.10  -Rocephin, Merrem, and vancomycin, ID managing antibiotics  -Imaging with dense infiltrate in left lung, s/p bronchoscopy with BAL Left Lingula.  -WBC some better today     Sepsis associated hypotension, improving  -Did not require vasopressors  -Stable, continue to monitor     Left lung hospital-acquired pneumonia  -Reviewed  CT Chest.  Status post bronchoscopy.  BAL was obtained from left lingula.  All cultures negative so far.  Continue empiric IV antibiotics.  Monitor clinical progress.  Infectious disease adjusting the antibiotics.  -Aggressive pulmonary toileting  -Follow sputum cultures  -IS/Flutter Valve encouraged and ordered  -Antibiotics as above per ID  -Continue Mucinex, Zyrtec     Acute respiratory failure with hypoxia, likely secondary to pneumonia; R/O Volume overload  -From past admission, bedside PFT reviewed: Suggestive of restrictive defect. FEV1 46%.  +bronchdilator response.  Diffusion capacity normal when corrected.  -Oxygen supplementation, titrate to maintain oxygen saturation >91%, currently on 2 L  -Duonebs scheduled and PRN  -BNP 1553  -intermittent diuresis  -sputum 2/28 - normal respiratory nelson      H/o Enterococcus MV Endocarditis; S/P MVR (tissue)/MICHELLE ligation (1/22/2020)  -Previous hospitalization cultures: 1) Blood cultures-Enterococcus faecium, positive for VRE on PCR; initially susciptible to ampicillin and gentamicin.  2) MV tissue culture with Enterococcus faecium, resistant to ampicillin, susceptible for daptomycin.  -Initially placed on IV Rocephin & Daptomycin x 6 weeks by ID with end date of 3/8/2020 (during previous hospitalization).  -ID consulted and managing antibiotics, as above.  -CTS following     CAD  -Continue ASA     Hyperlipidemia  -Statin therapy on hold due to previously being on daptomycin, resume when clinically appropriate     PAF (controlled), on chronic anticoagulation with Eliquis  -Continue amiodarone  -D/C heparin drip and now on Eliquis     Diabetes mellitus, type 2, with neuropathy  -strict glycemic control recommended  -Accuchecks AC/HS with sliding scale insulin  -Hold oral diabetic medications until out of ICU  -Continue gabapentin     GERD  -Continue protonix     BPH  -Continue proscar, flomax     General Anxiety Disorder  -Continue prozac     Multiple  sclerosis  -Mainly wheelchair bound, limited mobility  -PT/OT     Obesity  -BMI 37.91  -Counseled on lifestyle modifications to improve overall health    Hypersomnia and probable sleep apnea  -Recommend sleep study as outpatient.     Tobacco Abuse, current  -Counseled on smoking cessation     Vitamin D/B12 deficiency  -Continue folic acid, B12 supplements       High Risk but clinically stable now.     Accuchecks with SSI  Code Status: CPR, Full Interventions  VTE Prophylaxis:  Eliquis/SCDs  PUD Prophylaxis: Protonix    Midline  Diet ordered     We will continue to follow.

## 2020-03-06 NOTE — PLAN OF CARE
Problem: Patient Care Overview  Goal: Plan of Care Review  Outcome: Ongoing (interventions implemented as appropriate)  Flowsheets  Taken 3/6/2020 1456  Progress: improving  Taken 3/6/2020 1448  Plan of Care Reviewed With: patient  Outcome Summary: Requiring mod/max  A for safe, functional mobility. Limited by weakness, low activity tolerance. Struggling to bring cog over van, able to stand c bed elevated. Side stepped 2' to HOB. High falls risk, poor righting recovery. Recites sternal c vcs. Recliner in pt. room c broken wheel and reported to maintenance. Will progress as able, will need rehab at d/c to address deficits.

## 2020-03-06 NOTE — PROGRESS NOTES
Continued Stay Note   [place]     Patient Name: Jamin Hennessy  MRN: [id number]  Today's Date: 3/6/2020    Admit Date: 2/27/2020    Discharge Plan     Row Name 03/06/20 1341       Plan    Plan  SD Plan: Return to [facility] - accepted. Precert approved 3/4 (valid through end of day 3/6). No PASRR needed for return.     Plan Comments  SW confirmed with facility liaison that precert is still valid through 3/6.      NEGRO Paredes    Phone: [phone]  Cell: [phone]  Fax: [phone]  [email or web address]

## 2020-03-06 NOTE — PROGRESS NOTES
Infectious Diseases Progress Note      LOS: 8 days   Patient Care Team:  Kajal Sanchez as PCP - General (Internal Medicine)  Frederick George MD as Consulting Physician (Nephrology)        Subjective       The patient has been afebrile for the last 24 hours.  The patient is currently on 2 L of oxygen via nasal cannula.  He is awake and alert.  He remained hemodynamically stable requiring no vasopressors.        Review of Systems:   Review of Systems   Constitutional: Positive for fatigue.   HENT: Negative.    Respiratory: Positive for cough and shortness of breath.    Cardiovascular:        Chest soreness around sternal incision    Gastrointestinal: Negative.    Genitourinary: Negative.    Musculoskeletal: Positive for arthralgias.   Skin: Negative.    Neurological: Positive for weakness.   Psychiatric/Behavioral: Negative.         Objective     Vital Signs  Temp:  [97.2 °F (36.2 °C)-98.6 °F (37 °C)] 97.9 °F (36.6 °C)  Heart Rate:  [83-97] 93  Resp:  [14-20] 14  BP: (122-144)/(70-84) 128/83    Physical Exam:  Physical Exam   Constitutional: He appears well-developed and well-nourished.   HENT:   Head: Normocephalic and atraumatic.   Eyes: Pupils are equal, round, and reactive to light. Conjunctivae and EOM are normal.   Neck: Neck supple.   Cardiovascular: Normal rate, regular rhythm and normal heart sounds.   Pulmonary/Chest: Effort normal. He has rales.   Some crackles in left lobe    Abdominal: Soft. Bowel sounds are normal.   Musculoskeletal:   Degenerative changes patient appears to have some general weakness due to the multiple sclerosis    Neurological: He is alert.   Alert and oriented x2-patient patient seems more alert today   Skin: Skin is warm and dry.   Chest soreness around sternal incision    Psychiatric: He has a normal mood and affect.   Vitals reviewed.       Results Review:    I have reviewed all clinical data, test, lab, and imaging results.     Radiology  No Radiology Exams Resulted Within Past  24 Hours    Cardiology    Laboratory    Results from last 7 days   Lab Units 03/06/20  1031 03/05/20  0247 03/04/20  0328 03/03/20  0517 03/02/20  0647 03/01/20  0351 02/29/20  0537   WBC 10*3/mm3 9.40 10.30 11.90* 12.50* 10.70 13.80* 13.30*   HEMOGLOBIN g/dL 8.1* 7.8* 7.8* 8.4* 8.3* 8.3* 7.3*   HEMATOCRIT % 25.9* 24.4* 24.1* 26.1* 24.5* 25.6* 23.0*   PLATELETS 10*3/mm3 594* 708* 664* 753* 682* 684* 581*     Results from last 7 days   Lab Units 03/06/20  0248 03/05/20  0247 03/04/20  0328 03/03/20  0516 03/02/20  0647 03/01/20 0351 02/29/20  0537   SODIUM mmol/L 143 141 142 140 142 141 142   POTASSIUM mmol/L 4.2  4.1 4.3 4.1 4.4 4.2 3.8 3.8   CHLORIDE mmol/L 108* 107 108* 103 108* 105 106   CO2 mmol/L 23.0 24.0 25.0 25.0 25.0 24.0 24.0   BUN mg/dL 16 15 14 14 15 16 16   CREATININE mg/dL 1.02 0.99 0.94 0.89 0.87 0.88 0.88   GLUCOSE mg/dL 100* 124* 122* 135* 119* 140* 154*   ALBUMIN g/dL  --   --  2.50* 2.50* 2.20* 2.60*  --    BILIRUBIN mg/dL  --   --  <0.2* <0.2* <0.2* 0.2  --    ALK PHOS U/L  --   --  89 90 88 99  --    AST (SGOT) U/L  --   --  79* 56* 54* 86*  --    ALT (SGPT) U/L  --   --  93* 76* 72* 86*  --    CALCIUM mg/dL 8.6 8.6 8.2* 8.4* 8.6 8.2* 8.4*                 Microbiology   Microbiology Results (last 10 days)     Procedure Component Value - Date/Time    AFB Culture - Lavage, Lung, Lingula [[id number]] Collected:  03/04/20 0743    Lab Status:  Preliminary result Specimen:  Lavage from Lung, Lingula Updated:  03/04/20 1021     AFB Stain No acid fast bacilli seen    BAL Culture, Quantitative - Lavage, Lung, Lingula [[id number]] Collected:  03/04/20 0743    Lab Status:  Final result Specimen:  Lavage from Lung, Lingula Updated:  03/06/20 0655     BAL Culture >100,000 CFU/mL Normal Respiratory Cheryl     Gram Stain Moderate (3+) WBCs per low power field      Moderate (3+) Epithelial cells per low power field      Few (2+) Gram positive cocci in pairs and clusters    Respiratory Culture - Sputum, Cough  [[id number]] Collected:  02/28/20 1123    Lab Status:  Final result Specimen:  Sputum from Cough Updated:  03/01/20 1203     Respiratory Culture Scant growth (1+) Normal Respiratory Cheryl     Gram Stain Moderate (3+) WBCs per low power field      Rare (1+) Epithelial cells per low power field      Rare (1+) Mixed bacterial morphotypes seen on Gram Stain    Legionella Antigen, Urine - Urine, Urine, Clean Catch [[id number]]  (Normal) Collected:  02/28/20 0402    Lab Status:  Final result Specimen:  Urine, Clean Catch Updated:  02/28/20 0439     LEGIONELLA ANTIGEN, URINE Negative    S. Pneumo Ag Urine or CSF - Urine, Urine, Clean Catch [[id number]]  (Normal) Collected:  02/28/20 0402    Lab Status:  Final result Specimen:  Urine, Clean Catch Updated:  02/28/20 0439     Strep Pneumo Ag Negative    Respiratory Panel, PCR - Swab, Nasopharynx [[id number]]  (Normal) Collected:  02/27/20 2349    Lab Status:  Final result Specimen:  Swab from Nasopharynx Updated:  02/28/20 0131     ADENOVIRUS, PCR Not Detected     Coronavirus 229E Not Detected     Coronavirus HKU1 Not Detected     Coronavirus NL63 Not Detected     Coronavirus OC43 Not Detected     Human Metapneumovirus Not Detected     Human Rhinovirus/Enterovirus Not Detected     Influenza B PCR Not Detected     Parainfluenza Virus 1 Not Detected     Parainfluenza Virus 2 Not Detected     Parainfluenza Virus 3 Not Detected     Parainfluenza Virus 4 Not Detected     Bordetella pertussis pcr Not Detected     Influenza A H1 2009 PCR Not Detected     Chlamydophila pneumoniae PCR Not Detected     Mycoplasma pneumo by PCR Not Detected     Influenza A PCR Not Detected     Influenza A H3 Not Detected     Influenza A H1 Not Detected     RSV, PCR Not Detected    Blood Culture - Blood, Arm, Right [[id number]] Collected:  02/27/20 1157    Lab Status:  Final result Specimen:  Blood from Arm, Right Updated:  03/03/20 1215     Blood Culture No growth at 5 days    Blood Culture - Blood, Arm,  Left [[id number]] Collected:  02/27/20 1152    Lab Status:  Final result Specimen:  Blood from Arm, Left Updated:  03/03/20 1215     Blood Culture No growth at 5 days          Medication Review:       Schedule Meds    !Vancomycin Level Draw Needed  Does not apply Once   amiodarone 200 mg Oral Q24H   apixaban 5 mg Oral Q12H   aspirin 81 mg Oral Daily   cefTRIAXone 2 g Intravenous Q12H   cetirizine 10 mg Oral Daily   finasteride 5 mg Oral Daily   FLUoxetine 40 mg Oral Daily   folic acid 1 mg Oral Daily   gabapentin 200 mg Oral TID   guaiFENesin 1,200 mg Oral Q12H   insulin lispro 0-9 Units Subcutaneous 4x Daily With Meals & Nightly   ipratropium-albuterol 3 mL Nebulization Q4H While Awake - RT   lactobacillus acidophilus 1 capsule Oral BID   meropenem 1 g Intravenous Q8H   pantoprazole 40 mg Oral QAM   sodium chloride 10 mL Intravenous Q12H   tamsulosin 0.4 mg Oral Daily   vancomycin 750 mg Intravenous Q12H   vitamin B-12 1,000 mcg Oral Daily   vitamin E 800 Units Oral Daily       Infusion Meds    Pharmacy to dose vancomycin        PRN Meds  acetaminophen **OR** acetaminophen **OR** acetaminophen  •  benzonatate  •  bisacodyl  •  cyclobenzaprine  •  dextrose  •  dextrose  •  glucagon (human recombinant)  •  insulin lispro **AND** insulin lispro  •  ipratropium-albuterol  •  magnesium sulfate **OR** magnesium sulfate in D5W 1g/100mL (PREMIX)  •  ondansetron **OR** ondansetron  •  Pharmacy to dose vancomycin  •  potassium chloride  •  sodium chloride        Assessment/Plan       Antimicrobial Therapy   1.        day  2.  IV Rocephin    day  3.  IV vancomycin    day    4.  IV Merrem     Day   5.      Day      Assessment      Extensive left upper lobe and left lower lobe infiltrate.  The presentation is highly consistent with pneumonia.  Aspiration is less likely since it is involving all lobes of the left lung but I am concerned about hospital-acquired pneumonia.  The patient is currently on 5 L of oxygen via nasal  cannula.  Sputum culture did not grow any significant pathogen.  Repeat chest x-ray did not show improvement  -3/4/2367-[id number]-[id number] of tracheobronchomalacia throughout the airway.    Mild reactive leukocytosis     Status post mitral valve replacement for mitral valve endocarditis on January 22, 2020 with enterococcus faecium.  The patient was finishing 6 weeks of IV daptomycin and IV Rocephin at the rehab facility and the last day of the IV antibiotics was supposed to be March 8, 2020     Type 2 diabetes     Tobacco abuse     Plan     Continue IV Rocephin 2 g every 12 hours  Continue IV vancomycin   Continue IV meropenem   Discontinue all antibiotics on 3/8/2020  Continue supportive care  A.m. labs          Allison Box MD  03/06/20  3:52 PM     Note is dictated utilizing voice recognition software/Dragon

## 2020-03-06 NOTE — PROGRESS NOTES
S/P tissue MVR/MICHELLE ligation--[place] on 1/22/20    Pt known to our service d/t Enterococcus (VRE) MV IE.  He underwent tissue MVR on 1/22/2020 by Dr. Cole.    His postop course was lengthy given discharge planning r/t daptomycin.  He also had resp insufficiency issues and recurrent atrial fib.  Eliquis was restarted prior to transfer to [facility].      Patient sitting on side of bed preparing to work with PT. No complaints. He underwent bronchoscopy 3/4/2020. Preliminary washings showing 2+ gram + cocci.    No events overnight    On ceftriaxone/vanc/Merrem  Currently on room air  Remains afebrile      Intake/Output Summary (Last 24 hours) at 3/6/2020 1421  Last data filed at 3/6/2020 0344  Gross per 24 hour   Intake 400 ml   Output --   Net 400 ml     Temp:  [97.2 °F (36.2 °C)-98.6 °F (37 °C)] 97.6 °F (36.4 °C)  Heart Rate:  [83-97] 88  Resp:  [16-20] 16  BP: (116-144)/(70-84) 122/71      Results from last 7 days   Lab Units 03/06/20  1031 03/05/20  0247  02/28/20  2209   WBC 10*3/mm3 9.40 10.30   < > 14.60*   HEMOGLOBIN g/dL 8.1* 7.8*   < > 7.3*   HEMATOCRIT % 25.9* 24.4*   < > 22.8*   PLATELETS 10*3/mm3 594* 708*   < > 581*   INR   --   --   --  1.22*    < > = values in this interval not displayed.     Results from last 7 days   Lab Units 03/06/20  0248   CREATININE mg/dL 1.02   POTASSIUM mmol/L 4.2  4.1   SODIUM mmol/L 143   MAGNESIUM mg/dL 2.6*       Physical Exam:  Neuro intact, nad, up to chair  Tele:  afb 90s  Diminished bases, room air  Sternotomy well healed, sternum stable  Benign abd, + BM  Trace edema    Assessment/Plan:  Principal Problem:    HCAP (healthcare-associated pneumonia)  Active Problems:    Coronary artery disease due to lipid rich plaque    Dyslipidemia    Non-insulin dependent type 2 diabetes mellitus (CMS/HCC)    Diabetic neuropathy (CMS/HCC)    Tobacco abuse    Obesity (BMI 30-39.9)    S/P MVR (mitral valve replacement)    Eosinophilia    Impetigo    Microcytic anemia     Elevated liver enzymes    Readmit for PNA--per hospitalist  Mitral valve endocarditis s/p tissue MVR--stable  Enterococcus bacteremia/VRE-- antibiotics per ID  CAD with stent 1 year ago  Paroxysmal atrial fibrillation with RVR--in SR currently  HTN--stable  HLD--no statin while on dapto  DM type 2 with neuropathy--SSl  GERD  Multiple sclerosis---mainly wheelchair bound  Current tobacco abuse   BPH with incontinence   Obesity---BMI 38.19  Generalized anxiety disorder     Routine care  Antibiotics per ID  Dr. Cole prefers patient complete IV antibiotic course inpatient (completed on 3/8/2020) and allow time for more conclusive results from bronchoscopy before discharge.    SHADIA DE LA GARZA, APRN  3/6/2020  2:21 PM

## 2020-03-06 NOTE — PLAN OF CARE
Problem: Patient Care Overview  Goal: Plan of Care Review  Flowsheets  Taken 3/6/2020 1455 by Halina Gregory PTA  Progress: improving  Taken 3/6/2020 1448 by Halina Gregory PTA  Plan of Care Reviewed With: patient  Taken 3/6/2020 1834 by Kaci Vazquez, RN  Outcome Summary: last day of abx will be 3/8

## 2020-03-06 NOTE — PROGRESS NOTES
"Pharmacy Antimicrobial Dosing Service    Subjective:  Jamin Hennessy is a 64 y.o.male with PNA and previous mitral valve endocarditis s/p tissue MVR (Jan 22, 2020). Patient was to receive x6 weeks of IV antibiotics with last day of therapy being March 8, 2020. ID is following.    PMH: VRE, T2DM with neuropathy, Tobacco Abuse      Assessment/Plan    1. Day #8 Vancomycin: Goal -600 mcg*h/mL. Patient was previously receiving 1000 mg (~11 mg/kg DBW) IV q12h. Levels obtained on 3/1 resulted in calculated . Repeat trough level obtained 3/4 resulted at 20.1 mcg/mL, likely due to accumulation given large BMI. Dose was decreased to 750 mg (~8.5 mg/kg DBW) IV q12h. Peak and trough levels obtained today resulted at 22.7 mcg/mL and 17.4 mcg/mL; calculated , K 0.04 hr-1, t1/2 16.25. Will continue current regimen. No further levels warranted unless patient's renal function declines. Last day 3/8/20 per ID.    2. Day #9 Ceftriaxone: 2g IV q12h. Last day 3/8/20 per ID.    3. Day #8 Meropenem: 1000 mg IV q8h for estCrCl > 50 mL/min. Last day 3/8/20 per ID.    Will continue to monitor drug levels, renal function, culture and sensitivities, and patient clinical status.       Objective:  Relevant clinical data and objective history reviewed:  175.3 cm (69.02\")   116 kg (255 lb 15.3 oz)   Ideal body weight: 70.7 kg (155 lb 15.1 oz)  Adjusted ideal body weight: 88.9 kg (195 lb 15.2 oz)  Body mass index is 37.78 kg/m².    Results from last 7 days   Lab Units 03/06/20  0248 03/05/20 2034 03/04/20  1336 03/01/20  1256 03/01/20  0558   VANCOMYCIN PK mcg/mL  --  22.70  --   --  21.80   VANCOMYCIN TR mcg/mL 17.40  --  20.10* 14.00  --    VANCOMYCIN RM mcg/mL  --   --   --   --  20.10     Results from last 7 days   Lab Units 03/06/20  0248 03/05/20  0247 03/04/20  0328   CREATININE mg/dL 1.02 0.99 0.94     Estimated Creatinine Clearance: 91.9 mL/min (by C-G formula based on SCr of 1.02 mg/dL).  I/O last 3 completed " shifts:  In: 700 [IV Piggyback:700]  Out: -     Results from last 7 days   Lab Units 03/06/20  1031 03/05/20  0247 03/04/20  0328   WBC 10*3/mm3 9.40 10.30 11.90*     Temperature    03/06/20 0336 03/06/20 0711 03/06/20 1108   Temp: 98.6 °F (37 °C) 97.2 °F (36.2 °C) 97.6 °F (36.4 °C)     Baseline culture/source/susceptibility:  Microbiology Results (last 10 days)       Procedure Component Value - Date/Time    AFB Culture - Lavage, Lung, Lingula [[id number]] Collected:  03/04/20 0743    Lab Status:  Preliminary result Specimen:  Lavage from Lung, Lingula Updated:  03/04/20 1021     AFB Stain No acid fast bacilli seen    BAL Culture, Quantitative - Lavage, Lung, Lingula [[id number]] Collected:  03/04/20 0743    Lab Status:  Final result Specimen:  Lavage from Lung, Lingula Updated:  03/06/20 0655     BAL Culture >100,000 CFU/mL Normal Respiratory Cheryl     Gram Stain Moderate (3+) WBCs per low power field      Moderate (3+) Epithelial cells per low power field      Few (2+) Gram positive cocci in pairs and clusters    Respiratory Culture - Sputum, Cough [[id number]] Collected:  02/28/20 1123    Lab Status:  Final result Specimen:  Sputum from Cough Updated:  03/01/20 1203     Respiratory Culture Scant growth (1+) Normal Respiratory Cheryl     Gram Stain Moderate (3+) WBCs per low power field      Rare (1+) Epithelial cells per low power field      Rare (1+) Mixed bacterial morphotypes seen on Gram Stain    Legionella Antigen, Urine - Urine, Urine, Clean Catch [[id number]]  (Normal) Collected:  02/28/20 0402    Lab Status:  Final result Specimen:  Urine, Clean Catch Updated:  02/28/20 0439     LEGIONELLA ANTIGEN, URINE Negative    S. Pneumo Ag Urine or CSF - Urine, Urine, Clean Catch [[id number]]  (Normal) Collected:  02/28/20 0402    Lab Status:  Final result Specimen:  Urine, Clean Catch Updated:  02/28/20 0439     Strep Pneumo Ag Negative    Respiratory Panel, PCR - Swab, Nasopharynx [[id number]]  (Normal) Collected:   02/27/20 2349    Lab Status:  Final result Specimen:  Swab from Nasopharynx Updated:  02/28/20 0131     ADENOVIRUS, PCR Not Detected     Coronavirus 229E Not Detected     Coronavirus HKU1 Not Detected     Coronavirus NL63 Not Detected     Coronavirus OC43 Not Detected     Human Metapneumovirus Not Detected     Human Rhinovirus/Enterovirus Not Detected     Influenza B PCR Not Detected     Parainfluenza Virus 1 Not Detected     Parainfluenza Virus 2 Not Detected     Parainfluenza Virus 3 Not Detected     Parainfluenza Virus 4 Not Detected     Bordetella pertussis pcr Not Detected     Influenza A H1 2009 PCR Not Detected     Chlamydophila pneumoniae PCR Not Detected     Mycoplasma pneumo by PCR Not Detected     Influenza A PCR Not Detected     Influenza A H3 Not Detected     Influenza A H1 Not Detected     RSV, PCR Not Detected    Blood Culture - Blood, Arm, Right [[id number]] Collected:  02/27/20 1157    Lab Status:  Final result Specimen:  Blood from Arm, Right Updated:  03/03/20 1215     Blood Culture No growth at 5 days    Blood Culture - Blood, Arm, Left [[id number]] Collected:  02/27/20 1152    Lab Status:  Final result Specimen:  Blood from Arm, Left Updated:  03/03/20 1215     Blood Culture No growth at 5 days             Anti-Infectives (From admission, onward)      Ordered     Dose/Rate Route Frequency Start Stop    03/04/20 1519  !Vancomycin Level Draw Needed     Ordering Provider:  Allison Box MD     Does not apply Once 03/06/20 0300 03/06/20 0309    03/04/20 1519  !Vancomycin Level Draw Needed     Ordering Provider:  Allison Box MD     Does not apply Once 03/05/20 2000 03/05/20 2046    03/04/20 1513  vancomycin 750 mg in sodium chloride 0.9 % 100 mL IVPB  Review   Ordering Provider:  Allison Box MD    750 mg  over 60 Minutes Intravenous Every 12 Hours 03/04/20 1600 03/09/20 0359    03/03/20 1407  !Vancomycin Level Draw Needed     Ordering Provider:  Allison Box MD     Does not apply Once 03/04/20  1300      02/29/20 1108  meropenem (MERREM) 1 g in sodium chloride 0.9 % 100 mL IVPB     Fay Bradford APRN reviewed the order on 03/04/20 1412.   Ordering Provider:  Fya Bradford APRN    1 g  over 30 Minutes Intravenous Every 8 Hours 02/29/20 1200 03/08/20 2359    02/28/20 1341  meropenem (MERREM) 1 g in sodium chloride 0.9 % 100 mL IVPB     Ordering Provider:  Fay Bradford APRN    1 g  over 30 Minutes Intravenous Once 02/28/20 1500 02/28/20 1535    02/28/20 1325  vancomycin IVPB 1500 mg in 0.9% NaCl (Premix) 250 mL     Ordering Provider:  Fay Bradford APRN    20 mg/kg × 70.7 kg (Ideal)  over 90 Minutes Intravenous Once 02/28/20 1400 02/28/20 1716    02/28/20 1241  Pharmacy to dose vancomycin     Fay Bradford APRN reviewed the order on 03/05/20 1713.   Ordering Provider:  Fay Bradford APRN     Does not apply Continuous PRN 02/28/20 1239 03/08/20 2359    02/28/20 0837  cefTRIAXone (ROCEPHIN) 2 g in sodium chloride 0.9 % 100 mL IVPB     Note to Pharmacy:  Patient to be on until 3/8/2020   Fay Bradford APRN reviewed the order on 03/04/20 1412.   Ordering Provider:  Fay Bradford APRN    2 g  200 mL/hr over 30 Minutes Intravenous Every 12 Hours 02/28/20 0845 03/08/20 2359          Yoanna Bose, PharmD  03/06/20 2:48 PM

## 2020-03-06 NOTE — THERAPY TREATMENT NOTE
Patient Name: Jamin Hennessy  : 1955    MRN: [id number]                              Today's Date: 3/6/2020       Admit Date: 2020    Visit Dx:     ICD-10-CM ICD-9-CM   1. Pneumonia of left lung due to infectious organism, unspecified part of lung J18.9 486     Patient Active Problem List   Diagnosis   • Atrial fibrillation with RVR (CMS/[facility])   • Coronary artery disease due to lipid rich plaque   • CAD S/P percutaneous coronary angioplasty   • Essential hypertension   • Dyslipidemia   • Non-insulin dependent type 2 diabetes mellitus (CMS/[facility])   • Diabetic neuropathy (CMS/[facility])   • GERD without esophagitis   • BPH with obstruction/lower urinary tract symptoms   • B12 deficiency   • Vitamin D deficiency   • JARRETT (generalized anxiety disorder)   • Tobacco abuse   • Obesity (BMI 30-39.9)   • Acute bacterial endocarditis   • S/P MVR (mitral valve replacement)   • Endocarditis of mitral valve   • Non-sustained ventricular tachycardia (CMS/[facility])   • Acute myocardial infarction (CMS/[facility])   • Hypercholesterolemia   • Hypotension   • Shortness of breath   • Sepsis associated hypotension (CMS/[facility])   • Chest pain   • Vitamin E deficiency   • HCAP (healthcare-associated pneumonia)   • Pneumonia of left lung due to infectious organism   • Eosinophilia   • Impetigo   • Microcytic anemia   • Elevated liver enzymes     Past Medical History:   Diagnosis Date   • A-fib (CMS/[facility])    • CAD (coronary artery disease)    • Elevated cholesterol    • Hypertension    • MI (myocardial infarction) (CMS/[facility])    • Non-insulin dependent type 2 diabetes mellitus (CMS/[facility])      Past Surgical History:   Procedure Laterality Date   • BRONCHOSCOPY N/A 3/4/2020    Procedure: BRONCHOSCOPY with bronchioalveolar lavage;  Surgeon: Melissa Cole MD;  Location: [facility] ENDOSCOPY;  Service: Pulmonary;  Laterality: N/A;   pneumonia   • CARDIAC CATHETERIZATION     • CARDIAC CATHETERIZATION N/A 2020    Procedure: Left Heart Cath;  Surgeon:  Migdalia Beth MD;  Location: [facility] CATH INVASIVE LOCATION;  Service: Cardiovascular   • CARDIAC CATHETERIZATION N/A 1/20/2020    Procedure: Left ventriculography;  Surgeon: Migdalia Beth MD;  Location: [facility] CATH INVASIVE LOCATION;  Service: Cardiovascular   • CARDIAC CATHETERIZATION N/A 1/20/2020    Procedure: Coronary angiography;  Surgeon: Migdalia Beth MD;  Location: [facility] CATH INVASIVE LOCATION;  Service: Cardiovascular   • CORONARY ANGIOPLASTY WITH STENT PLACEMENT     • MITRAL VALVE REPAIR/REPLACEMENT N/A 1/22/2020    Procedure: MITRAL VALVE REPAIR/REPLACEMENT;  Surgeon: Fallon Cole MD;  Location: [facility] CVOR;  Service: Cardiothoracic;  Laterality: N/A;  patient has endocarditis mitral valve replaced with 31mm knox II     General Information     Row Name 03/06/20 1441          PT Evaluation Time/Intention    Document Type  therapy note (daily note)  -     Mode of Treatment  physical therapy  -     Row Name 03/06/20 1441          Cognitive Assessment/Intervention- PT/OT    Orientation Status (Cognition)  oriented x 3  -     Row Name 03/06/20 1441          Safety Issues, Functional Mobility    Impairments Affecting Function (Mobility)  shortness of breath;endurance/activity tolerance;strength;motor control;postural/trunk control  -       User Key  (r) = Recorded By, (t) = Taken By, (c) = Cosigned By    Initials Name Provider Type     Halina Gregory PTA Physical Therapy Assistant        Mobility     Row Name 03/06/20 1441          Bed Mobility Assessment/Treatment    Bed Mobility Assessment/Treatment  bed mobility (all) activities  -     [place] Level (Bed Mobility)  verbal cues;moderate assist (50% patient effort)  -     Assistive Device (Bed Mobility)  bed rails;head of bed elevated  -     Comment (Bed Mobility)  Struggling to bring BLEs back into bed c sit/supine, pt. fatigued.  -     Row Name 03/06/20 1441          Bed-Chair Transfer     Bed-Chair [place] (Transfers)  other (see comments) Unable to bring cog over van.  -[facility] Name 03/06/20 1441          Sit-Stand Transfer    Sit-Stand [place] (Transfers)  moderate assist (50% patient effort);1 person assist Sit/stand x 4, pt. struggling to bring cog over van.  -     Assistive Device (Sit-Stand Transfers)  walker, front-wheeled Bed elevated.  -[facility] Name 03/06/20 1441          Gait/Stairs Assessment/Training    Gait/Stairs Assessment/Training  gait/ambulation assistive device  -     [place] Level (Gait)  minimum assist (75% patient effort)  -     Assistive Device (Gait)  walker, front-wheeled  -     Distance in Feet (Gait)  2' sidestepping towards hob.  -     Comment (Gait/Stairs)  FF posture, short side steps towards HOB, slow, guarded, fearful of falling.  -[facility] Name 03/06/20 1441          Mobility Assessment/Intervention    Extremity Weight-bearing Status  other (see comments) Sternal precautions.  -       User Key  (r) = Recorded By, (t) = Taken By, (c) = Cosigned By    Initials Name Provider Type     Halina Gregory PTA Physical Therapy Assistant        Obj/Interventions     [facility] Name 03/06/20 1446          Static Sitting Balance    Level of [place] (Unsupported Sitting, Static Balance)  independent  -     Sitting Position (Unsupported Sitting, Static Balance)  sitting on edge of bed  -[facility] Name 03/06/20 1446          Dynamic Sitting Balance    Level of [place], Reaches Outside Midline (Sitting, Dynamic Balance)  contact guard assist  -     Sitting Position, Reaches Outside Midline (Sitting, Dynamic Balance)  sitting on edge of bed  -     Comment, Reaches Outside Midline (Sitting, Dynamic Balance)  Struggling to wt. shift forward to place BLEs on floor.  -[facility] Name 03/06/20 1446          Static Standing Balance    Level of [place] (Supported Standing, Static Balance)  minimal assist, 75% patient effort  -     Assistive  Device Utilized (Supported Standing, Static Balance)  walker, rolling  -     Row Name 03/06/20 1446          Dynamic Standing Balance    Level of [place], Reaches Outside Midline (Standing, Dynamic Balance)  minimal assist, 75% patient effort  -     Assistive Device Utilized (Supported Standing, Dynamic Balance)  walker, rolling  -     Comment, Reaches Outside Midline (Standing, Dynamic Balance)  Guarded, limited away from midline, high falls risk.  -       User Key  (r) = Recorded By, (t) = Taken By, (c) = Cosigned By    Initials Name Provider Type     Halina Gregory PTA Physical Therapy Assistant        Goals/Plan    No documentation.       Clinical Impression     Row Name 03/06/20 1448          Pain Scale: Numbers Pre/Post-Treatment    Pain Scale: Numbers, Pretreatment  2/10  -     Pain Scale: Numbers, Post-Treatment  2/10  -     Pain Location  back  -     Row Name 03/06/20 1448          Plan of Care Review    Plan of Care Reviewed With  patient  -     Progress  improving  -     Outcome Summary  Requiring mod/max  A for safe, functional mobility. Limited by weakness, low activity tolerance. Struggling to bring cog over van, able to stand c bed elevated. Side stepped 2' to HOB. High falls risk, poor righting recovery. Recites sternal c vcs. Recliner in pt. room c broken wheel and reported to maintenance. Will progress as able, will need rehab at d/c to address deficits.  -     Row Name 03/06/20 1448          Vital Signs    Pre SpO2 (%)  93  -     O2 Delivery Pre Treatment  room air  -     Intra SpO2 (%)  88  -     O2 Delivery Intra Treatment  room air  -     Post SpO2 (%)  93  -     O2 Delivery Post Treatment  supplemental O2 PEREZ  -     Pre Patient Position  Supine  -     Intra Patient Position  Standing  -     Post Patient Position  Supine  -     Row Name 03/06/20 1448          Positioning and Restraints    Pre-Treatment Position  in bed  -     Post Treatment Position   bed  -LH     In Bed  notified nsg;fowlers;exit alarm on;call light within reach  -       User Key  (r) = Recorded By, (t) = Taken By, (c) = Cosigned By    Initials Name Provider Type     Halina Gregory PTA Physical Therapy Assistant        Outcome Measures     Row Name 03/06/20 4943          How much help from another person do you currently need...    Turning from your back to your side while in flat bed without using bedrails?  3  -LH     Moving from lying on back to sitting on the side of a flat bed without bedrails?  3  -LH     Moving to and from a bed to a chair (including a wheelchair)?  2  -LH     Standing up from a chair using your arms (e.g., wheelchair, bedside chair)?  2  -LH     Climbing 3-5 steps with a railing?  1  -LH     To walk in hospital room?  1  -     AM-PAC 6 Clicks Score (PT)  12  -     Row Name 03/06/20 9494          Modified [place] Scale    Modified [place] Scale  4 - Moderately severe disability.  Unable to walk without assistance, and unable to attend to own bodily needs without assistance.  -       User Key  (r) = Recorded By, (t) = Taken By, (c) = Cosigned By    Initials Name Provider Type     Halina Gregory PTA Physical Therapy Assistant          PT Recommendation and Plan     Outcome Summary/Treatment Plan (PT)  Anticipated Discharge Disposition (PT): inpatient rehabilitation facility  Plan of Care Reviewed With: patient  Progress: improving  Outcome Summary: Requiring mod/max  A for safe, functional mobility. Limited by weakness, low activity tolerance. Struggling to bring cog over van, able to stand c bed elevated. Side stepped 2' to HOB. High falls risk, poor righting recovery. Recites sternal c vcs. Recliner in pt. room c broken wheel and reported to maintenance. Will progress as able, will need rehab at d/c to address deficits.     Time Calculation:   PT Charges     Row Name 03/06/20 0001             Time Calculation    Start Time  0140  -      Stop Time  0220  -       Time Calculation (min)  40 min  -      PT Received On  03/06/20  -      PT - Next Appointment  03/08/20  -         Time Calculation- PT    Total Timed Code Minutes- PT  40 minute(s)  -         Timed Charges    53592 -  PT Neuromuscular Reeducation Minutes  8  -      33234 - PT Therapeutic Activity Minutes  24  -      69489 - PT Self Care/Mgmt Minutes  8  -        User Key  (r) = Recorded By, (t) = Taken By, (c) = Cosigned By    Initials Name Provider Type     Halina Gregory PTA Physical Therapy Assistant        Therapy Charges for Today     Code Description Service Date Service Provider Modifiers Qty    53727218658 HC PT NEUROMUSC RE EDUCATION EA 15 MIN 3/6/2020 Halina Gregory PTA GP 1    13104578928 HC PT THERAPEUTIC ACT EA 15 MIN 3/6/2020 Halina Gregory PTA GP 2    88019498271 HC PT SELF CARE/MGMT/TRAIN EA 15 MIN 3/6/2020 Halina Gregory PTA GP 1          PT G-Codes  Outcome Measure Options: AM-PAC 6 Clicks Basic Mobility (PT)  AM-PAC 6 Clicks Score (PT): 12  Modified [place] Scale: 4 - Moderately severe disability.  Unable to walk without assistance, and unable to attend to own bodily needs without assistance.    Halina Gregory PTA  3/6/2020

## 2020-03-06 NOTE — PROGRESS NOTES
S/P tissue MVR/MICHELLE ligation--[place] on 1/22/20    Pt known to our service d/t Enterococcus (VRE) MV IE.  He underwent tissue MVR on 1/22/2020 by Dr. Cole.    His postop course was lengthy given discharge planning r/t daptomycin.  He also had resp insufficiency issues and recurrent atrial fib.  Eliquis was restarted prior to transfer to [facility].      Patient up to chair [reparing to work with PT. No complaints. He underwent bronchoscopy 3/4/2020. Preliminary washings showing 2+ gram + cocci.    No events overnight    On ceftriaxone/vanc/Merrem  Currently on room air  Remains afebrile      Intake/Output Summary (Last 24 hours) at 3/6/2020 1026  Last data filed at 3/6/2020 0344  Gross per 24 hour   Intake 400 ml   Output --   Net 400 ml     Temp:  [97.2 °F (36.2 °C)-98.6 °F (37 °C)] 97.2 °F (36.2 °C)  Heart Rate:  [83-97] 85  Resp:  [16-20] 18  BP: ()/(64-84) 136/84      Results from last 7 days   Lab Units 03/05/20  0247 03/04/20  0328  02/28/20  2209   WBC 10*3/mm3 10.30 11.90*   < > 14.60*   HEMOGLOBIN g/dL 7.8* 7.8*   < > 7.3*   HEMATOCRIT % 24.4* 24.1*   < > 22.8*   PLATELETS 10*3/mm3 708* 664*   < > 581*   INR   --   --   --  1.22*    < > = values in this interval not displayed.     Results from last 7 days   Lab Units 03/06/20  0248   CREATININE mg/dL 1.02   POTASSIUM mmol/L 4.2  4.1   SODIUM mmol/L 143   MAGNESIUM mg/dL 2.6*       Physical Exam:  Neuro intact, nad, up to chair  Tele:  afb 90s  Diminished bases, room air  Sternotomy well healed, sternum stable  Benign abd, + BM  Trace edema    Assessment/Plan:  Principal Problem:    HCAP (healthcare-associated pneumonia)  Active Problems:    Coronary artery disease due to lipid rich plaque    Dyslipidemia    Non-insulin dependent type 2 diabetes mellitus (CMS/HCC)    Diabetic neuropathy (CMS/HCC)    Tobacco abuse    Obesity (BMI 30-39.9)    S/P MVR (mitral valve replacement)    Eosinophilia    Impetigo    Microcytic anemia    Elevated liver  enzymes    Readmit for PNA--per hospitalist  Mitral valve endocarditis s/p tissue MVR--stable  Enterococcus bacteremia/VRE-- antibiotics per ID  CAD with stent 1 year ago  Paroxysmal atrial fibrillation with RVR--in SR currently  HTN--stable  HLD--no statin while on dapto  DM type 2 with neuropathy--SSl  GERD  Multiple sclerosis---mainly wheelchair bound  Current tobacco abuse   BPH with incontinence   Obesity---BMI 38.19  Generalized anxiety disorder     Routine care  Antibiotics per ID  Awaiting precert for discharge    SHADIA DE LA GARZA, APRN  3/6/2020  10:26 AM     Continues to make progress.  Tolerated bronchoscopy well yesterday.  Plan is to discontinue antibiotics this Sunday.  If he remains stable following antibiotic discontinuation he will probably discharge to rehab facility.

## 2020-03-06 NOTE — PROGRESS NOTES
I spoke with the nursing staff and they informed me that Dr. Fallon Cole cardiovascular surgery will remain the patient's attending and admitting physician for the remainder of this hospital stay.  The hospitalist service is signing off.

## 2020-03-07 LAB
ALBUMIN SERPL-MCNC: 2.7 G/DL (ref 3.5–5.2)
ALBUMIN/GLOB SERPL: 0.8 G/DL
ALP SERPL-CCNC: 92 U/L (ref 39–117)
ALT SERPL W P-5'-P-CCNC: 176 U/L (ref 1–41)
ANION GAP SERPL CALCULATED.3IONS-SCNC: 9 MMOL/L (ref 5–15)
ANISOCYTOSIS BLD QL: ABNORMAL
APTT PPP: 26.3 SECONDS (ref 61–76.5)
AST SERPL-CCNC: 163 U/L (ref 1–40)
BASOPHILS # BLD MANUAL: 0.09 10*3/MM3 (ref 0–0.2)
BASOPHILS NFR BLD AUTO: 1 % (ref 0–1.5)
BILIRUB SERPL-MCNC: <0.2 MG/DL (ref 0.2–1.2)
BUN BLD-MCNC: 16 MG/DL (ref 8–23)
BUN/CREAT SERPL: 17.8 (ref 7–25)
BURR CELLS BLD QL SMEAR: ABNORMAL
CALCIUM SPEC-SCNC: 8.1 MG/DL (ref 8.6–10.5)
CHLORIDE SERPL-SCNC: 107 MMOL/L (ref 98–107)
CO2 SERPL-SCNC: 24 MMOL/L (ref 22–29)
CREAT BLD-MCNC: 0.9 MG/DL (ref 0.76–1.27)
DEPRECATED RDW RBC AUTO: 49 FL (ref 37–54)
EOSINOPHIL # BLD MANUAL: 1.87 10*3/MM3 (ref 0–0.4)
EOSINOPHIL NFR BLD MANUAL: 21 % (ref 0.3–6.2)
ERYTHROCYTE [DISTWIDTH] IN BLOOD BY AUTOMATED COUNT: 18.2 % (ref 12.3–15.4)
GFR SERPL CREATININE-BSD FRML MDRD: 85 ML/MIN/1.73
GIANT PLATELETS: ABNORMAL
GLOBULIN UR ELPH-MCNC: 3.3 GM/DL
GLUCOSE BLD-MCNC: 117 MG/DL (ref 65–99)
GLUCOSE BLDC GLUCOMTR-MCNC: 121 MG/DL (ref 70–105)
GLUCOSE BLDC GLUCOMTR-MCNC: 92 MG/DL (ref 70–105)
GLUCOSE BLDC GLUCOMTR-MCNC: 96 MG/DL (ref 70–105)
GLUCOSE BLDC GLUCOMTR-MCNC: 99 MG/DL (ref 70–105)
HCT VFR BLD AUTO: 25.6 % (ref 37.5–51)
HGB BLD-MCNC: 8.2 G/DL (ref 13–17.7)
LYMPHOCYTES # BLD MANUAL: 1.42 10*3/MM3 (ref 0.7–3.1)
LYMPHOCYTES NFR BLD MANUAL: 16 % (ref 19.6–45.3)
LYMPHOCYTES NFR BLD MANUAL: 5 % (ref 5–12)
MAGNESIUM SERPL-MCNC: 2.5 MG/DL (ref 1.6–2.4)
MCH RBC QN AUTO: 24.3 PG (ref 26.6–33)
MCHC RBC AUTO-ENTMCNC: 32 G/DL (ref 31.5–35.7)
MCV RBC AUTO: 76.1 FL (ref 79–97)
MONOCYTES # BLD AUTO: 0.45 10*3/MM3 (ref 0.1–0.9)
NEUTROPHILS # BLD AUTO: 5.07 10*3/MM3 (ref 1.7–7)
NEUTROPHILS NFR BLD MANUAL: 55 % (ref 42.7–76)
NEUTS BAND NFR BLD MANUAL: 2 % (ref 0–5)
PLATELET # BLD AUTO: 611 10*3/MM3 (ref 140–450)
PMV BLD AUTO: 7.5 FL (ref 6–12)
POIKILOCYTOSIS BLD QL SMEAR: ABNORMAL
POTASSIUM BLD-SCNC: 4 MMOL/L (ref 3.5–5.2)
PROT SERPL-MCNC: 6 G/DL (ref 6–8.5)
RBC # BLD AUTO: 3.36 10*6/MM3 (ref 4.14–5.8)
SCAN SLIDE: NORMAL
SODIUM BLD-SCNC: 140 MMOL/L (ref 136–145)
WBC MORPH BLD: NORMAL
WBC NRBC COR # BLD: 8.9 10*3/MM3 (ref 3.4–10.8)

## 2020-03-07 PROCEDURE — 80053 COMPREHEN METABOLIC PANEL: CPT | Performed by: INTERNAL MEDICINE

## 2020-03-07 PROCEDURE — 94799 UNLISTED PULMONARY SVC/PX: CPT

## 2020-03-07 PROCEDURE — 25010000002 CEFTRIAXONE PER 250 MG: Performed by: NURSE PRACTITIONER

## 2020-03-07 PROCEDURE — 82962 GLUCOSE BLOOD TEST: CPT

## 2020-03-07 PROCEDURE — 25010000002 MEROPENEM PER 100 MG: Performed by: NURSE PRACTITIONER

## 2020-03-07 PROCEDURE — 85730 THROMBOPLASTIN TIME PARTIAL: CPT | Performed by: NURSE PRACTITIONER

## 2020-03-07 PROCEDURE — 85025 COMPLETE CBC W/AUTO DIFF WBC: CPT | Performed by: NURSE PRACTITIONER

## 2020-03-07 PROCEDURE — 83735 ASSAY OF MAGNESIUM: CPT | Performed by: NURSE PRACTITIONER

## 2020-03-07 PROCEDURE — 85007 BL SMEAR W/DIFF WBC COUNT: CPT | Performed by: NURSE PRACTITIONER

## 2020-03-07 PROCEDURE — 25010000002 VANCOMYCIN 750 MG RECONSTITUTED SOLUTION 1 EACH VIAL: Performed by: INTERNAL MEDICINE

## 2020-03-07 RX ADMIN — GABAPENTIN 200 MG: 100 CAPSULE ORAL at 15:44

## 2020-03-07 RX ADMIN — CETIRIZINE HYDROCHLORIDE 10 MG: 10 TABLET, FILM COATED ORAL at 09:35

## 2020-03-07 RX ADMIN — Medication 1 CAPSULE: at 09:34

## 2020-03-07 RX ADMIN — GABAPENTIN 200 MG: 100 CAPSULE ORAL at 20:30

## 2020-03-07 RX ADMIN — FLUOXETINE 40 MG: 20 CAPSULE ORAL at 09:34

## 2020-03-07 RX ADMIN — MEROPENEM 1 G: 1 INJECTION, POWDER, FOR SOLUTION INTRAVENOUS at 20:29

## 2020-03-07 RX ADMIN — IPRATROPIUM BROMIDE AND ALBUTEROL SULFATE 3 ML: .5; 3 SOLUTION RESPIRATORY (INHALATION) at 23:23

## 2020-03-07 RX ADMIN — CEFTRIAXONE SODIUM 2 G: 2 INJECTION, POWDER, FOR SOLUTION INTRAMUSCULAR; INTRAVENOUS at 20:29

## 2020-03-07 RX ADMIN — IPRATROPIUM BROMIDE AND ALBUTEROL SULFATE 3 ML: .5; 3 SOLUTION RESPIRATORY (INHALATION) at 08:18

## 2020-03-07 RX ADMIN — VANCOMYCIN HYDROCHLORIDE 750 MG: 750 INJECTION, POWDER, LYOPHILIZED, FOR SOLUTION INTRAVENOUS at 03:53

## 2020-03-07 RX ADMIN — GABAPENTIN 200 MG: 100 CAPSULE ORAL at 09:35

## 2020-03-07 RX ADMIN — IPRATROPIUM BROMIDE AND ALBUTEROL SULFATE 3 ML: .5; 3 SOLUTION RESPIRATORY (INHALATION) at 15:21

## 2020-03-07 RX ADMIN — Medication 1 CAPSULE: at 20:30

## 2020-03-07 RX ADMIN — MEROPENEM 1 G: 1 INJECTION, POWDER, FOR SOLUTION INTRAVENOUS at 03:49

## 2020-03-07 RX ADMIN — PANTOPRAZOLE SODIUM 40 MG: 40 TABLET, DELAYED RELEASE ORAL at 09:35

## 2020-03-07 RX ADMIN — MEROPENEM 1 G: 1 INJECTION, POWDER, FOR SOLUTION INTRAVENOUS at 12:33

## 2020-03-07 RX ADMIN — ACETAMINOPHEN 650 MG: 160 SUSPENSION ORAL at 12:43

## 2020-03-07 RX ADMIN — AMIODARONE HYDROCHLORIDE 200 MG: 200 TABLET ORAL at 09:34

## 2020-03-07 RX ADMIN — FOLIC ACID 1 MG: 1 TABLET ORAL at 09:34

## 2020-03-07 RX ADMIN — CYCLOBENZAPRINE HYDROCHLORIDE 5 MG: 10 TABLET, FILM COATED ORAL at 12:43

## 2020-03-07 RX ADMIN — Medication 800 UNITS: at 09:35

## 2020-03-07 RX ADMIN — VANCOMYCIN HYDROCHLORIDE 750 MG: 750 INJECTION, POWDER, LYOPHILIZED, FOR SOLUTION INTRAVENOUS at 15:44

## 2020-03-07 RX ADMIN — GUAIFENESIN 1200 MG: 600 TABLET, EXTENDED RELEASE ORAL at 09:34

## 2020-03-07 RX ADMIN — TAMSULOSIN HYDROCHLORIDE 0.4 MG: 0.4 CAPSULE ORAL at 09:35

## 2020-03-07 RX ADMIN — IPRATROPIUM BROMIDE AND ALBUTEROL SULFATE 3 ML: .5; 3 SOLUTION RESPIRATORY (INHALATION) at 18:32

## 2020-03-07 RX ADMIN — Medication 10 ML: at 20:30

## 2020-03-07 RX ADMIN — APIXABAN 5 MG: 5 TABLET, FILM COATED ORAL at 09:35

## 2020-03-07 RX ADMIN — GUAIFENESIN 1200 MG: 600 TABLET, EXTENDED RELEASE ORAL at 20:30

## 2020-03-07 RX ADMIN — CEFTRIAXONE SODIUM 2 G: 2 INJECTION, POWDER, FOR SOLUTION INTRAMUSCULAR; INTRAVENOUS at 09:35

## 2020-03-07 RX ADMIN — APIXABAN 5 MG: 5 TABLET, FILM COATED ORAL at 20:30

## 2020-03-07 RX ADMIN — CYANOCOBALAMIN TAB 1000 MCG 1000 MCG: 1000 TAB at 09:34

## 2020-03-07 RX ADMIN — FINASTERIDE 5 MG: 5 TABLET, FILM COATED ORAL at 09:37

## 2020-03-07 RX ADMIN — ASPIRIN 81 MG: 81 TABLET, DELAYED RELEASE ORAL at 09:35

## 2020-03-07 RX ADMIN — Medication 10 ML: at 09:34

## 2020-03-07 RX ADMIN — IPRATROPIUM BROMIDE AND ALBUTEROL SULFATE 3 ML: .5; 3 SOLUTION RESPIRATORY (INHALATION) at 12:03

## 2020-03-07 NOTE — PROGRESS NOTES
[facility]/PULM/CC PROGRESS NOTE     HISTORY OF PRESENT ILLNESS:  Jamin Hennessy is a 64 y.o. 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  Patient reported that earlier that morning, while at [facility], his blood pressure checked and was reportedly low.  It was at that time, that EMS was contacted, and patient was taken to the outlying facility emergency department.  During his stay in the ED, patient was diagnosed with pneumonia.  He had reported shortness of breath, cough with yellow sputum production that started the preceding early morning.  Patient denied nausea, vomiting, constipation, diarrhea, fever, or chills.  Patient did mention some chest soreness, but denies arm pain, neck pain, or jaw pain.  Notably, patient had a recent mitral valve replacement secondary to known endocarditis.  This patient is well-known to this practice, as we were following patient during his recent hospitalization.  At time of discharge, patient was recommended to complete 6 weeks of IV antibiotics with daptomycin and Rocephin per infectious disease.  Patient was discharged to [facility], and the plan was for antibiotic completion on 3/8/2020.  Patient was transferred to [facility] and admitted initially to the hospitalist team for further treatment and evaluation of patient condition.  Patient has required no vasopressors.  Infectious disease as well as cardiothoracic surgery have been consulted.  Patient presented with right upper extremity midline that was placed during his previous admission.  Due to patient's pulmonary status, with increased oxygen  "supplementation needs, cardiothoracic surgery requested patient to be transferred to [facility] for closer monitoring.  Previous work-up included bilateral lower extremity venous Doppler which reported \"chronic left lower extremity superficial thrombophlebitis noted in the small saphenous.\"  It is been reported that patient had a chest x-ray from outlying facility indicating pneumonia, and CT of the chest at this facility revealed \"extensive airspace and interstitial opacity throughout the left lung with smaller patchy air peripheral opacities in the right lung.  Only the right upper lobe multifocal infection/pneumonia is the most likely etiology.  There is mild mediastinal adenopathy which is likely reactive/infectious.  There are small layering bilateral pleural effusions.\"  Per infectious disease, in review of patient's previous medical record, patient had blood cultures that were positive for enterococcus faecium, which screened positive for VRE based on PCR.  Initially organism was susceptible to ampicillin and gentamicin.  Repeat blood cultures were negative.  Following patient's surgery on 1/22/2020 (MVR), the mitral valve tissue culture was also positive for enterococcus faecium, but sensitivities revealed resistance to ampicillin.  ID was consulted as the case has become rather complex, and that first and second line antibiotic therapies are revealing resistance.  At time of transfer, patient initially was on 3 L/min via nasal cannula, but after being transferred in bed, required an increase to 6 L/min via high flow nasal cannula.  Patient denies chest pain, neck pain, arm pain, jaw pain, nausea, vomiting, constipation, diarrhea, blood in urine or stool.  Patient does admit to some mild shortness of breath, frequent cough with yellow sputum production, but denies fever or chills.     [facility] was consulted for further evaluation and treatment of pneumonia, and to aid improvement in pulmonary status.  Thank you for " "this consultation, we will follow along on this patient.        SUBJECTIVE    2/29: Patient reports feeling somewhat better today.  Tolerating 5 L O2.  No shortness of air at rest.  3/1: Patient reports feeling well today.  He reports shortness of air is improving.  Demonstrates good compliance with I-S.  Tolerating O2 at 4 L by nasal cannula  3/2:  Doing ok.  Reports some SOA and cough.  On supplemental oxygen, 4 liters  3/3:  No new issues.  Feeling some better.  Cough improved.  On 2L nasal cannula  3/4: Breathing some better.  Minimal cough.  No complaints of chest pain.  No nausea or vomiting.  3/5: Seeing patient for the first time.  Significantly deconditioned and weak.  Continues to have cough without much sputum.  Shortness of breath improving.  Oxygen requirement better.  3/6: feels much better.  Shortness of breath stable.  Oxygen requirement is stable.  Remains on 3 L.  3/7: Patient reports feeling better today.  Tolerating O2 at 2 L with improved shortness of air.  He denies chest pain.  No nausea or vomiting    OBJECTIVE    /76 (BP Location: Left arm, Patient Position: Lying)   Pulse 94   Temp 97.2 °F (36.2 °C) (Oral)   Resp 18   Ht 175.3 cm (69.02\")   Wt 116 kg (255 lb 15.3 oz)   SpO2 97%   BMI 37.78 kg/m²   Intake/Output last 3 shifts:  I/O last 3 completed shifts:  In: 880 [P.O.:480; IV Piggyback:400]  Out: -   Intake/Output this shift:  I/O this shift:  In: 720 [P.O.:720]  Out: -     PHYSICAL EXAM:       Constitutional:  Well developed, well nourished, no acute distress, acutely ill-appearing, chronically ill-appearing  Eyes:  PERRL, conjunctiva normal, EOMI   HENT:  Atraumatic, external ears normal, nose normal. Neck-normal range of motion, no tenderness, supple, trachea midline  Respiratory:  Bilateral air entry present, somewhat diminished on the left side.  No crackles or wheezing. non-labored respirations without accessory muscle use  Cardiovascular:  Normal rate, normal rhythm, " no murmurs, no gallops, no rubs   GI:  Soft, nondistended, normal bowel sounds, nontender, no rebound, no guarding   :  deferred   Musculoskeletal: Bilateral lower extremity edema 2+, no tenderness, no deformities  Integument:  Well hydrated, no rash.  Sternal incision well-healed  Neurologic:  Alert & oriented x 3, no lateralizing deficits  Psychiatric:  Speech and behavior appropriate    Scheduled Meds:    !Vancomycin Level Draw Needed  Does not apply Once   amiodarone 200 mg Oral Q24H   apixaban 5 mg Oral Q12H   aspirin 81 mg Oral Daily   cefTRIAXone 2 g Intravenous Q12H   cetirizine 10 mg Oral Daily   finasteride 5 mg Oral Daily   FLUoxetine 40 mg Oral Daily   folic acid 1 mg Oral Daily   gabapentin 200 mg Oral TID   guaiFENesin 1,200 mg Oral Q12H   insulin lispro 0-9 Units Subcutaneous 4x Daily With Meals & Nightly   ipratropium-albuterol 3 mL Nebulization Q4H While Awake - RT   lactobacillus acidophilus 1 capsule Oral BID   meropenem 1 g Intravenous Q8H   pantoprazole 40 mg Oral QAM   sodium chloride 10 mL Intravenous Q12H   tamsulosin 0.4 mg Oral Daily   vancomycin 750 mg Intravenous Q12H   vitamin B-12 1,000 mcg Oral Daily   vitamin E 800 Units Oral Daily       Continuous Infusions:    Pharmacy to dose vancomycin        PRN Meds:acetaminophen **OR** acetaminophen **OR** acetaminophen  •  benzonatate  •  bisacodyl  •  cyclobenzaprine  •  dextrose  •  dextrose  •  glucagon (human recombinant)  •  insulin lispro **AND** insulin lispro  •  ipratropium-albuterol  •  magnesium sulfate **OR** magnesium sulfate in D5W 1g/100mL (PREMIX)  •  ondansetron **OR** ondansetron  •  Pharmacy to dose vancomycin  •  potassium chloride  •  sodium chloride     Data Review:  Lab Results (last 24 hours)     Procedure Component Value Units Date/Time    POC Glucose Once [[id number]]  (Normal) Collected:  03/07/20 1625    Specimen:  Blood Updated:  03/07/20 1629     Glucose 99 mg/dL      Comment: Serial Number: [id number]:   [id number]       POC Glucose Once [[id number]]  (Abnormal) Collected:  03/07/20 1118    Specimen:  Blood Updated:  03/07/20 1119     Glucose 121 mg/dL      Comment: Serial Number: [id number]:  [id number]       POC Glucose Once [[id number]]  (Normal) Collected:  03/07/20 0749    Specimen:  Blood Updated:  03/07/20 0750     Glucose 96 mg/dL      Comment: Serial Number: [id number]:  [id number]       Scan Slide [[id number]] Collected:  03/07/20 0403    Specimen:  Blood Updated:  03/07/20 0719     Scan Slide --     Comment: See Manual Differential Results       Manual Differential [[id number]]  (Abnormal) Collected:  03/07/20 0403    Specimen:  Blood Updated:  03/07/20 0719     Neutrophil % 55.0 %      Lymphocyte % 16.0 %      Monocyte % 5.0 %      Eosinophil % 21.0 %      Basophil % 1.0 %      Bands %  2.0 %      Neutrophils Absolute 5.07 10*3/mm3      Lymphocytes Absolute 1.42 10*3/mm3      Monocytes Absolute 0.45 10*3/mm3      Eosinophils Absolute 1.87 10*3/mm3      Basophils Absolute 0.09 10*3/mm3      Anisocytosis Slight/1+     Silvestre Cells Slight/1+     Poikilocytes Slight/1+     WBC Morphology Normal     Giant Platelets Slight/1+    CBC & Differential [[id number]] Collected:  03/07/20 0403    Specimen:  Blood Updated:  03/07/20 0719    Narrative:       The following orders were created for panel order CBC & Differential.  Procedure                               Abnormality         Status                     ---------                               -----------         ------                     CBC Auto Differential[[id number]]        Abnormal            Final result                 Please view results for these tests on the individual orders.    CBC Auto Differential [[id number]]  (Abnormal) Collected:  03/07/20 0403    Specimen:  Blood Updated:  03/07/20 0719     WBC 8.90 10*3/mm3      RBC 3.36 10*6/mm3      Hemoglobin 8.2 g/dL      Hematocrit 25.6 %      MCV 76.1 fL      MCH 24.3 pg      MCHC 32.0 g/dL      RDW 18.2  %      RDW-SD 49.0 fl      MPV 7.5 fL      Platelets 611 10*3/mm3     Comprehensive Metabolic Panel [[id number]]  (Abnormal) Collected:  03/07/20 0403    Specimen:  Blood Updated:  03/07/20 0525     Glucose 117 mg/dL      BUN 16 mg/dL      Creatinine 0.90 mg/dL      Sodium 140 mmol/L      Potassium 4.0 mmol/L      Chloride 107 mmol/L      CO2 24.0 mmol/L      Calcium 8.1 mg/dL      Total Protein 6.0 g/dL      Albumin 2.70 g/dL      ALT (SGPT) 176 U/L      AST (SGOT) 163 U/L      Alkaline Phosphatase 92 U/L      Total Bilirubin <0.2 mg/dL      eGFR Non African Amer 85 mL/min/1.73      Globulin 3.3 gm/dL      A/G Ratio 0.8 g/dL      BUN/Creatinine Ratio 17.8     Anion Gap 9.0 mmol/L     Narrative:       GFR Normal >60  Chronic Kidney Disease <60  Kidney Failure <15      Magnesium [[id number]]  (Abnormal) Collected:  03/07/20 0403    Specimen:  Blood Updated:  03/07/20 0507     Magnesium 2.5 mg/dL     aPTT [[id number]]  (Abnormal) Collected:  03/07/20 0404    Specimen:  Blood Updated:  03/07/20 0451     PTT 26.3 seconds     POC Glucose Once [[id number]]  (Normal) Collected:  03/06/20 2107    Specimen:  Blood Updated:  03/06/20 2109     Glucose 96 mg/dL      Comment: Serial Number: [id number]:  [id number]                Imaging:  Imaging Results (Last 72 Hours)     ** No results found for the last 72 hours. **            ASSESSMENT/PLAN:     HCAP (healthcare-associated pneumonia)    Coronary artery disease due to lipid rich plaque    Dyslipidemia    Non-insulin dependent type 2 diabetes mellitus (CMS/HCC)    Diabetic neuropathy (CMS/HCC)    Tobacco abuse    Obesity (BMI 30-39.9)    S/P MVR (mitral valve replacement)    Eosinophilia    Impetigo    Microcytic anemia    Elevated liver enzymes         Severe sepsis without septic shock, secondary to pneumonia  -IVF bolus given at outlying facility  -Lactic Acid 1.0  -Cultures-pending  -Urine antigens-negative  -RVP-negative  -Reviewed CT  Chest.  -Procalcitonin-0.10  -Rocephin, Merrem, and vancomycin, ID managing antibiotics  -Imaging with dense infiltrate in left lung, s/p bronchoscopy with BAL Left Lingula.  -WBC normalized     Sepsis associated hypotension, improving  -Did not require vasopressors  -Stable, continue to monitor     Left lung hospital-acquired pneumonia  -Reviewed CT Chest.  Status post bronchoscopy.  BAL was obtained from left lingula.  All cultures negative so far.  Continue empiric IV antibiotics.  Monitor clinical progress.  Infectious disease adjusting the antibiotics.  -Aggressive pulmonary toileting  -Follow sputum cultures  -IS/Flutter Valve encouraged and ordered  -Antibiotics as above per ID  -Continue Mucinex, Zyrtec     Acute respiratory failure with hypoxia, likely secondary to pneumonia; R/O Volume overload  -From past admission, bedside PFT reviewed: Suggestive of restrictive defect. FEV1 46%.  +bronchdilator response.  Diffusion capacity normal when corrected.  -Oxygen supplementation, titrate to maintain oxygen saturation >91%, currently on 2 L  -Duonebs scheduled and PRN  -BNP 1553  -intermittent diuresis  -sputum 2/28 - normal respiratory nelson      H/o Enterococcus MV Endocarditis; S/P MVR (tissue)/MICHELLE ligation (1/22/2020)  -Previous hospitalization cultures: 1) Blood cultures-Enterococcus faecium, positive for VRE on PCR; initially susciptible to ampicillin and gentamicin.  2) MV tissue culture with Enterococcus faecium, resistant to ampicillin, susceptible for daptomycin.  -Initially placed on IV Rocephin & Daptomycin x 6 weeks by ID with end date of 3/8/2020 (during previous hospitalization).  -ID consulted and managing antibiotics, as above.  -CTS following    Elevated AST/ALT  -Etiology is unclear  -medical management per primary. May need to consider changing amiodarone to alternative antidysrhythmic      CAD  -Continue ASA     Hyperlipidemia  -Statin therapy previously discontinued due to receiving  daptomycin, resume when clinically appropriate     PAF (controlled), on chronic anticoagulation with Eliquis  -Continue amiodarone  -D/C heparin drip and now on Eliquis     Diabetes mellitus, type 2, with neuropathy  -strict glycemic control recommended  -Accuchecks AC/HS with sliding scale insulin  -Continue gabapentin     GERD  -Continue protonix     BPH  -Continue proscar, flomax     General Anxiety Disorder  -Continue prozac     Multiple sclerosis  -Mainly wheelchair bound, limited mobility  -PT/OT     Obesity  -BMI 37.91  -Counseled on lifestyle modifications to improve overall health    Hypersomnia and probable sleep apnea  -Recommend sleep study as outpatient.     Tobacco Abuse, current  -Counseled on smoking cessation     Vitamin D/B12 deficiency  -Continue folic acid, B12 supplements       High Risk but clinically stable now.     Accuchecks with SSI  Code Status: CPR, Full Interventions  VTE Prophylaxis:  Eliquis/SCDs  PUD Prophylaxis: Protonix    Midline  Diet ordered     We will continue to follow.    Attending physician statement:  Above note scribed by nurse practitioner for me and later reviewed for accuracy. I've examined the patient and reviewed all labs and images.   I have directly participated in the evaluation and management of this patient.  Wyatt Flynn MD

## 2020-03-07 NOTE — PLAN OF CARE
Problem: Patient Care Overview  Goal: Plan of Care Review  Outcome: Ongoing (interventions implemented as appropriate)  Flowsheets (Taken 3/7/2020 0400 by Ailyn Strickland, RN)  Plan of Care Reviewed With: patient  Note:   Pt being closely monitored while on IV anitbiotics of Merrem, Rocephin, and Vanc. No distress noted at this time. Will continue to monitor.

## 2020-03-07 NOTE — PROGRESS NOTES
Infectious Diseases Progress Note      LOS: 9 days   Patient Care Team:  Kajal Sanchez as PCP - General (Internal Medicine)  Frederick George MD as Consulting Physician (Nephrology)        Subjective       The patient has been afebrile for the last 24 hours.  The patient is currently on 2 L of oxygen via nasal cannula.  He is awake and alert.  He remained hemodynamically stable requiring no vasopressors.        Review of Systems:   Review of Systems   Constitutional: Positive for fatigue.   HENT: Negative.    Respiratory: Positive for cough and shortness of breath.    Cardiovascular:        Chest soreness around sternal incision    Gastrointestinal: Negative.    Genitourinary: Negative.    Musculoskeletal: Positive for arthralgias.   Skin: Negative.    Neurological: Positive for weakness.   Psychiatric/Behavioral: Negative.         Objective     Vital Signs  Temp:  [97.5 °F (36.4 °C)-98.4 °F (36.9 °C)] 97.5 °F (36.4 °C)  Heart Rate:  [] 95  Resp:  [14-18] 18  BP: (128-168)/(79-90) 139/88    Physical Exam:  Physical Exam   Constitutional: He appears well-developed and well-nourished.   HENT:   Head: Normocephalic and atraumatic.   Eyes: Pupils are equal, round, and reactive to light. Conjunctivae and EOM are normal.   Neck: Neck supple.   Cardiovascular: Normal rate, regular rhythm and normal heart sounds.   Pulmonary/Chest: Effort normal. He has rales.   Some crackles in left lobe    Abdominal: Soft. Bowel sounds are normal.   Musculoskeletal:   Degenerative changes patient appears to have some general weakness due to the multiple sclerosis    Neurological: He is alert.   Alert and oriented x2-patient patient seems more alert today   Skin: Skin is warm and dry.   Chest soreness around sternal incision    Psychiatric: He has a normal mood and affect.   Vitals reviewed.       Results Review:    I have reviewed all clinical data, test, lab, and imaging results.     Radiology  No Radiology Exams Resulted Within  Past 24 Hours    Cardiology    Laboratory    Results from last 7 days   Lab Units 03/07/20  0403 03/06/20  1031 03/05/20  0247 03/04/20  0328 03/03/20  0517 03/02/20  0647 03/01/20  0351   WBC 10*3/mm3 8.90 9.40 10.30 11.90* 12.50* 10.70 13.80*   HEMOGLOBIN g/dL 8.2* 8.1* 7.8* 7.8* 8.4* 8.3* 8.3*   HEMATOCRIT % 25.6* 25.9* 24.4* 24.1* 26.1* 24.5* 25.6*   PLATELETS 10*3/mm3 611* 594* 708* 664* 753* 682* 684*     Results from last 7 days   Lab Units 03/07/20  0403 03/06/20  0248 03/05/20  0247 03/04/20  0328 03/03/20  0516 03/02/20  0647 03/01/20  0351   SODIUM mmol/L 140 143 141 142 140 142 141   POTASSIUM mmol/L 4.0 4.2  4.1 4.3 4.1 4.4 4.2 3.8   CHLORIDE mmol/L 107 108* 107 108* 103 108* 105   CO2 mmol/L 24.0 23.0 24.0 25.0 25.0 25.0 24.0   BUN mg/dL 16 16 15 14 14 15 16   CREATININE mg/dL 0.90 1.02 0.99 0.94 0.89 0.87 0.88   GLUCOSE mg/dL 117* 100* 124* 122* 135* 119* 140*   ALBUMIN g/dL 2.70*  --   --  2.50* 2.50* 2.20* 2.60*   BILIRUBIN mg/dL <0.2*  --   --  <0.2* <0.2* <0.2* 0.2   ALK PHOS U/L 92  --   --  89 90 88 99   AST (SGOT) U/L 163*  --   --  79* 56* 54* 86*   ALT (SGPT) U/L 176*  --   --  93* 76* 72* 86*   CALCIUM mg/dL 8.1* 8.6 8.6 8.2* 8.4* 8.6 8.2*                 Microbiology   Microbiology Results (last 10 days)     Procedure Component Value - Date/Time    AFB Culture - Lavage, Lung, Lingula [[id number]] Collected:  03/04/20 0743    Lab Status:  Preliminary result Specimen:  Lavage from Lung, Lingula Updated:  03/04/20 1021     AFB Stain No acid fast bacilli seen    BAL Culture, Quantitative - Lavage, Lung, Lingula [[id number]] Collected:  03/04/20 0743    Lab Status:  Final result Specimen:  Lavage from Lung, Lingula Updated:  03/06/20 0655     BAL Culture >100,000 CFU/mL Normal Respiratory Cheryl     Gram Stain Moderate (3+) WBCs per low power field      Moderate (3+) Epithelial cells per low power field      Few (2+) Gram positive cocci in pairs and clusters    Respiratory Culture - Sputum, Cough  [[id number]] Collected:  02/28/20 1123    Lab Status:  Final result Specimen:  Sputum from Cough Updated:  03/01/20 1203     Respiratory Culture Scant growth (1+) Normal Respiratory Cheryl     Gram Stain Moderate (3+) WBCs per low power field      Rare (1+) Epithelial cells per low power field      Rare (1+) Mixed bacterial morphotypes seen on Gram Stain    Legionella Antigen, Urine - Urine, Urine, Clean Catch [[id number]]  (Normal) Collected:  02/28/20 0402    Lab Status:  Final result Specimen:  Urine, Clean Catch Updated:  02/28/20 0439     LEGIONELLA ANTIGEN, URINE Negative    S. Pneumo Ag Urine or CSF - Urine, Urine, Clean Catch [[id number]]  (Normal) Collected:  02/28/20 0402    Lab Status:  Final result Specimen:  Urine, Clean Catch Updated:  02/28/20 0439     Strep Pneumo Ag Negative    Respiratory Panel, PCR - Swab, Nasopharynx [[id number]]  (Normal) Collected:  02/27/20 2349    Lab Status:  Final result Specimen:  Swab from Nasopharynx Updated:  02/28/20 0131     ADENOVIRUS, PCR Not Detected     Coronavirus 229E Not Detected     Coronavirus HKU1 Not Detected     Coronavirus NL63 Not Detected     Coronavirus OC43 Not Detected     Human Metapneumovirus Not Detected     Human Rhinovirus/Enterovirus Not Detected     Influenza B PCR Not Detected     Parainfluenza Virus 1 Not Detected     Parainfluenza Virus 2 Not Detected     Parainfluenza Virus 3 Not Detected     Parainfluenza Virus 4 Not Detected     Bordetella pertussis pcr Not Detected     Influenza A H1 2009 PCR Not Detected     Chlamydophila pneumoniae PCR Not Detected     Mycoplasma pneumo by PCR Not Detected     Influenza A PCR Not Detected     Influenza A H3 Not Detected     Influenza A H1 Not Detected     RSV, PCR Not Detected    Blood Culture - Blood, Arm, Right [[id number]] Collected:  02/27/20 1157    Lab Status:  Final result Specimen:  Blood from Arm, Right Updated:  03/03/20 1215     Blood Culture No growth at 5 days    Blood Culture - Blood, Arm,  Left [[id number]] Collected:  02/27/20 1152    Lab Status:  Final result Specimen:  Blood from Arm, Left Updated:  03/03/20 1215     Blood Culture No growth at 5 days          Medication Review:       Schedule Meds    !Vancomycin Level Draw Needed  Does not apply Once   amiodarone 200 mg Oral Q24H   apixaban 5 mg Oral Q12H   aspirin 81 mg Oral Daily   cefTRIAXone 2 g Intravenous Q12H   cetirizine 10 mg Oral Daily   finasteride 5 mg Oral Daily   FLUoxetine 40 mg Oral Daily   folic acid 1 mg Oral Daily   gabapentin 200 mg Oral TID   guaiFENesin 1,200 mg Oral Q12H   insulin lispro 0-9 Units Subcutaneous 4x Daily With Meals & Nightly   ipratropium-albuterol 3 mL Nebulization Q4H While Awake - RT   lactobacillus acidophilus 1 capsule Oral BID   meropenem 1 g Intravenous Q8H   pantoprazole 40 mg Oral QAM   sodium chloride 10 mL Intravenous Q12H   tamsulosin 0.4 mg Oral Daily   vancomycin 750 mg Intravenous Q12H   vitamin B-12 1,000 mcg Oral Daily   vitamin E 800 Units Oral Daily       Infusion Meds    Pharmacy to dose vancomycin        PRN Meds  acetaminophen **OR** acetaminophen **OR** acetaminophen  •  benzonatate  •  bisacodyl  •  cyclobenzaprine  •  dextrose  •  dextrose  •  glucagon (human recombinant)  •  insulin lispro **AND** insulin lispro  •  ipratropium-albuterol  •  magnesium sulfate **OR** magnesium sulfate in D5W 1g/100mL (PREMIX)  •  ondansetron **OR** ondansetron  •  Pharmacy to dose vancomycin  •  potassium chloride  •  sodium chloride        Assessment/Plan       Antimicrobial Therapy   1.        day  2.  IV Rocephin    day  3.  IV vancomycin    day    4.  IV Merrem     Day   5.      Day      Assessment      Extensive left upper lobe and left lower lobe infiltrate.  The presentation is highly consistent with pneumonia.  Aspiration is less likely since it is involving all lobes of the left lung but I am concerned about hospital-acquired pneumonia.  The patient is currently on 5 L of oxygen via nasal  cannula.  Sputum culture did not grow any significant pathogen.  Repeat chest x-ray did not show improvement  -3/4/0716-[id number] of tracheobronchomalacia throughout the airway.    Mild reactive leukocytosis     Status post mitral valve replacement for mitral valve endocarditis on January 22, 2020 with enterococcus faecium.  The patient was finishing 6 weeks of IV daptomycin and IV Rocephin at the rehab facility and the last day of the IV antibiotics was supposed to be March 8, 2020     Type 2 diabetes     Tobacco abuse     Plan     Continue IV Rocephin 2 g every 12 hours  Continue IV vancomycin   Continue IV meropenem   Discontinue all antibiotics on 3/8/2020  Continue supportive care  A.m. labs          Allison Box MD  03/07/20  2:35 PM     Note is dictated utilizing voice recognition software/Dragon

## 2020-03-08 LAB
ALBUMIN SERPL-MCNC: 2.8 G/DL (ref 3.5–5.2)
ALBUMIN/GLOB SERPL: 0.8 G/DL
ALP SERPL-CCNC: 89 U/L (ref 39–117)
ALT SERPL W P-5'-P-CCNC: 167 U/L (ref 1–41)
ANION GAP SERPL CALCULATED.3IONS-SCNC: 9 MMOL/L (ref 5–15)
APTT PPP: 26.5 SECONDS (ref 61–76.5)
AST SERPL-CCNC: 108 U/L (ref 1–40)
BASOPHILS # BLD AUTO: 0.2 10*3/MM3 (ref 0–0.2)
BASOPHILS NFR BLD AUTO: 2.6 % (ref 0–1.5)
BILIRUB SERPL-MCNC: <0.2 MG/DL (ref 0.2–1.2)
BUN BLD-MCNC: 16 MG/DL (ref 8–23)
BUN/CREAT SERPL: 17 (ref 7–25)
CALCIUM SPEC-SCNC: 8.5 MG/DL (ref 8.6–10.5)
CHLORIDE SERPL-SCNC: 107 MMOL/L (ref 98–107)
CO2 SERPL-SCNC: 25 MMOL/L (ref 22–29)
CREAT BLD-MCNC: 0.94 MG/DL (ref 0.76–1.27)
DEPRECATED RDW RBC AUTO: 50.3 FL (ref 37–54)
EOSINOPHIL # BLD AUTO: 1.6 10*3/MM3 (ref 0–0.4)
EOSINOPHIL NFR BLD AUTO: 17.2 % (ref 0.3–6.2)
ERYTHROCYTE [DISTWIDTH] IN BLOOD BY AUTOMATED COUNT: 18.7 % (ref 12.3–15.4)
GFR SERPL CREATININE-BSD FRML MDRD: 81 ML/MIN/1.73
GLOBULIN UR ELPH-MCNC: 3.4 GM/DL
GLUCOSE BLD-MCNC: 101 MG/DL (ref 65–99)
GLUCOSE BLDC GLUCOMTR-MCNC: 157 MG/DL (ref 70–105)
GLUCOSE BLDC GLUCOMTR-MCNC: 230 MG/DL (ref 70–105)
GLUCOSE BLDC GLUCOMTR-MCNC: 88 MG/DL (ref 70–105)
GLUCOSE BLDC GLUCOMTR-MCNC: 94 MG/DL (ref 70–105)
HCT VFR BLD AUTO: 25.8 % (ref 37.5–51)
HGB BLD-MCNC: 8.4 G/DL (ref 13–17.7)
LYMPHOCYTES # BLD AUTO: 1.3 10*3/MM3 (ref 0.7–3.1)
LYMPHOCYTES NFR BLD AUTO: 13.8 % (ref 19.6–45.3)
MAGNESIUM SERPL-MCNC: 2.2 MG/DL (ref 1.6–2.4)
MCH RBC QN AUTO: 24.7 PG (ref 26.6–33)
MCHC RBC AUTO-ENTMCNC: 32.5 G/DL (ref 31.5–35.7)
MCV RBC AUTO: 76 FL (ref 79–97)
MONOCYTES # BLD AUTO: 0.6 10*3/MM3 (ref 0.1–0.9)
MONOCYTES NFR BLD AUTO: 6.3 % (ref 5–12)
NEUTROPHILS # BLD AUTO: 5.5 10*3/MM3 (ref 1.7–7)
NEUTROPHILS NFR BLD AUTO: 60.1 % (ref 42.7–76)
NRBC BLD AUTO-RTO: 0 /100 WBC (ref 0–0.2)
PLATELET # BLD AUTO: 605 10*3/MM3 (ref 140–450)
PMV BLD AUTO: 7.5 FL (ref 6–12)
POTASSIUM BLD-SCNC: 4 MMOL/L (ref 3.5–5.2)
PROT SERPL-MCNC: 6.2 G/DL (ref 6–8.5)
RBC # BLD AUTO: 3.4 10*6/MM3 (ref 4.14–5.8)
SODIUM BLD-SCNC: 141 MMOL/L (ref 136–145)
WBC NRBC COR # BLD: 9.1 10*3/MM3 (ref 3.4–10.8)

## 2020-03-08 PROCEDURE — 25010000002 VANCOMYCIN 750 MG RECONSTITUTED SOLUTION 1 EACH VIAL: Performed by: INTERNAL MEDICINE

## 2020-03-08 PROCEDURE — 94799 UNLISTED PULMONARY SVC/PX: CPT

## 2020-03-08 PROCEDURE — 97530 THERAPEUTIC ACTIVITIES: CPT

## 2020-03-08 PROCEDURE — 97112 NEUROMUSCULAR REEDUCATION: CPT

## 2020-03-08 PROCEDURE — 83735 ASSAY OF MAGNESIUM: CPT | Performed by: NURSE PRACTITIONER

## 2020-03-08 PROCEDURE — 85025 COMPLETE CBC W/AUTO DIFF WBC: CPT | Performed by: INTERNAL MEDICINE

## 2020-03-08 PROCEDURE — 25010000002 CEFTRIAXONE PER 250 MG: Performed by: NURSE PRACTITIONER

## 2020-03-08 PROCEDURE — 82962 GLUCOSE BLOOD TEST: CPT

## 2020-03-08 PROCEDURE — 25010000002 MEROPENEM PER 100 MG: Performed by: NURSE PRACTITIONER

## 2020-03-08 PROCEDURE — 80053 COMPREHEN METABOLIC PANEL: CPT | Performed by: INTERNAL MEDICINE

## 2020-03-08 PROCEDURE — 97110 THERAPEUTIC EXERCISES: CPT

## 2020-03-08 PROCEDURE — 63710000001 INSULIN LISPRO (HUMAN) PER 5 UNITS: Performed by: NURSE PRACTITIONER

## 2020-03-08 PROCEDURE — 85730 THROMBOPLASTIN TIME PARTIAL: CPT | Performed by: NURSE PRACTITIONER

## 2020-03-08 RX ORDER — GABAPENTIN 100 MG/1
200 CAPSULE ORAL EVERY 12 HOURS SCHEDULED
Status: DISCONTINUED | OUTPATIENT
Start: 2020-03-08 | End: 2020-03-10 | Stop reason: HOSPADM

## 2020-03-08 RX ADMIN — CYANOCOBALAMIN TAB 1000 MCG 1000 MCG: 1000 TAB at 09:18

## 2020-03-08 RX ADMIN — GABAPENTIN 200 MG: 100 CAPSULE ORAL at 21:30

## 2020-03-08 RX ADMIN — FOLIC ACID 1 MG: 1 TABLET ORAL at 09:17

## 2020-03-08 RX ADMIN — APIXABAN 5 MG: 5 TABLET, FILM COATED ORAL at 21:30

## 2020-03-08 RX ADMIN — IPRATROPIUM BROMIDE AND ALBUTEROL SULFATE 3 ML: .5; 3 SOLUTION RESPIRATORY (INHALATION) at 16:20

## 2020-03-08 RX ADMIN — TAMSULOSIN HYDROCHLORIDE 0.4 MG: 0.4 CAPSULE ORAL at 09:18

## 2020-03-08 RX ADMIN — ASPIRIN 81 MG: 81 TABLET, DELAYED RELEASE ORAL at 09:16

## 2020-03-08 RX ADMIN — Medication 800 UNITS: at 09:17

## 2020-03-08 RX ADMIN — IPRATROPIUM BROMIDE AND ALBUTEROL SULFATE 3 ML: .5; 3 SOLUTION RESPIRATORY (INHALATION) at 23:20

## 2020-03-08 RX ADMIN — GUAIFENESIN 1200 MG: 600 TABLET, EXTENDED RELEASE ORAL at 09:17

## 2020-03-08 RX ADMIN — IPRATROPIUM BROMIDE AND ALBUTEROL SULFATE 3 ML: .5; 3 SOLUTION RESPIRATORY (INHALATION) at 12:19

## 2020-03-08 RX ADMIN — APIXABAN 5 MG: 5 TABLET, FILM COATED ORAL at 09:17

## 2020-03-08 RX ADMIN — Medication 1 CAPSULE: at 21:30

## 2020-03-08 RX ADMIN — CETIRIZINE HYDROCHLORIDE 10 MG: 10 TABLET, FILM COATED ORAL at 09:16

## 2020-03-08 RX ADMIN — GUAIFENESIN 1200 MG: 600 TABLET, EXTENDED RELEASE ORAL at 21:30

## 2020-03-08 RX ADMIN — CEFTRIAXONE SODIUM 2 G: 2 INJECTION, POWDER, FOR SOLUTION INTRAMUSCULAR; INTRAVENOUS at 09:16

## 2020-03-08 RX ADMIN — VANCOMYCIN HYDROCHLORIDE 750 MG: 750 INJECTION, POWDER, LYOPHILIZED, FOR SOLUTION INTRAVENOUS at 04:17

## 2020-03-08 RX ADMIN — FINASTERIDE 5 MG: 5 TABLET, FILM COATED ORAL at 09:18

## 2020-03-08 RX ADMIN — MEROPENEM 1 G: 1 INJECTION, POWDER, FOR SOLUTION INTRAVENOUS at 04:17

## 2020-03-08 RX ADMIN — BENZONATATE 200 MG: 100 CAPSULE ORAL at 21:30

## 2020-03-08 RX ADMIN — ACETAMINOPHEN 650 MG: 325 TABLET, FILM COATED ORAL at 20:00

## 2020-03-08 RX ADMIN — Medication 1 CAPSULE: at 09:16

## 2020-03-08 RX ADMIN — INSULIN LISPRO 2 UNITS: 100 INJECTION, SOLUTION INTRAVENOUS; SUBCUTANEOUS at 12:36

## 2020-03-08 RX ADMIN — CYCLOBENZAPRINE HYDROCHLORIDE 5 MG: 10 TABLET, FILM COATED ORAL at 09:17

## 2020-03-08 RX ADMIN — CEFTRIAXONE SODIUM 2 G: 2 INJECTION, POWDER, FOR SOLUTION INTRAMUSCULAR; INTRAVENOUS at 21:30

## 2020-03-08 RX ADMIN — Medication 10 ML: at 09:18

## 2020-03-08 RX ADMIN — MEROPENEM 1 G: 1 INJECTION, POWDER, FOR SOLUTION INTRAVENOUS at 12:35

## 2020-03-08 RX ADMIN — PANTOPRAZOLE SODIUM 40 MG: 40 TABLET, DELAYED RELEASE ORAL at 09:16

## 2020-03-08 RX ADMIN — IPRATROPIUM BROMIDE AND ALBUTEROL SULFATE 3 ML: .5; 3 SOLUTION RESPIRATORY (INHALATION) at 19:48

## 2020-03-08 RX ADMIN — AMIODARONE HYDROCHLORIDE 200 MG: 200 TABLET ORAL at 09:17

## 2020-03-08 RX ADMIN — FLUOXETINE 40 MG: 20 CAPSULE ORAL at 09:16

## 2020-03-08 RX ADMIN — Medication 10 ML: at 21:31

## 2020-03-08 RX ADMIN — GABAPENTIN 200 MG: 100 CAPSULE ORAL at 09:17

## 2020-03-08 RX ADMIN — ACETAMINOPHEN 650 MG: 325 TABLET, FILM COATED ORAL at 09:17

## 2020-03-08 RX ADMIN — INSULIN LISPRO 8 UNITS: 100 INJECTION, SOLUTION INTRAVENOUS; SUBCUTANEOUS at 21:30

## 2020-03-08 RX ADMIN — VANCOMYCIN HYDROCHLORIDE 750 MG: 750 INJECTION, POWDER, LYOPHILIZED, FOR SOLUTION INTRAVENOUS at 15:26

## 2020-03-08 NOTE — PLAN OF CARE
Problem: Patient Care Overview  Goal: Plan of Care Review  Outcome: Ongoing (interventions implemented as appropriate)  Flowsheets (Taken 3/8/2020 0400 by Ailyn Strickland, RN)  Plan of Care Reviewed With: patient  Note:   Plan to discontinue all IV antibiotics after 3/8 per ID. Plan to return to [facility]. Will need new precert.

## 2020-03-08 NOTE — THERAPY TREATMENT NOTE
Patient Name: Jamin Hennessy  : 1955    MRN: [id number]                              Today's Date: 3/8/2020       Admit Date: 2020    Visit Dx:     ICD-10-CM ICD-9-CM   1. Pneumonia of left lung due to infectious organism, unspecified part of lung J18.9 486     Patient Active Problem List   Diagnosis   • Atrial fibrillation with RVR (CMS/[facility])   • Coronary artery disease due to lipid rich plaque   • CAD S/P percutaneous coronary angioplasty   • Essential hypertension   • Dyslipidemia   • Non-insulin dependent type 2 diabetes mellitus (CMS/[facility])   • Diabetic neuropathy (CMS/[facility])   • GERD without esophagitis   • BPH with obstruction/lower urinary tract symptoms   • B12 deficiency   • Vitamin D deficiency   • JARRETT (generalized anxiety disorder)   • Tobacco abuse   • Obesity (BMI 30-39.9)   • Acute bacterial endocarditis   • S/P MVR (mitral valve replacement)   • Endocarditis of mitral valve   • Non-sustained ventricular tachycardia (CMS/[facility])   • Acute myocardial infarction (CMS/[facility])   • Hypercholesterolemia   • Hypotension   • Shortness of breath   • Sepsis associated hypotension (CMS/[facility])   • Chest pain   • Vitamin E deficiency   • HCAP (healthcare-associated pneumonia)   • Pneumonia of left lung due to infectious organism   • Eosinophilia   • Impetigo   • Microcytic anemia   • Elevated liver enzymes     Past Medical History:   Diagnosis Date   • A-fib (CMS/[facility])    • CAD (coronary artery disease)    • Elevated cholesterol    • Hypertension    • MI (myocardial infarction) (CMS/[facility])    • Non-insulin dependent type 2 diabetes mellitus (CMS/[facility])      Past Surgical History:   Procedure Laterality Date   • BRONCHOSCOPY N/A 3/4/2020    Procedure: BRONCHOSCOPY with bronchioalveolar lavage;  Surgeon: Melissa Cole MD;  Location: [facility] ENDOSCOPY;  Service: Pulmonary;  Laterality: N/A;   pneumonia   • CARDIAC CATHETERIZATION     • CARDIAC CATHETERIZATION N/A 2020    Procedure: Left Heart Cath;  Surgeon:  Migdalia Beth MD;  Location: [facility] CATH INVASIVE LOCATION;  Service: Cardiovascular   • CARDIAC CATHETERIZATION N/A 1/20/2020    Procedure: Left ventriculography;  Surgeon: Migdalia Beth MD;  Location: [facility] CATH INVASIVE LOCATION;  Service: Cardiovascular   • CARDIAC CATHETERIZATION N/A 1/20/2020    Procedure: Coronary angiography;  Surgeon: Migdalia Beth MD;  Location: [facility] CATH INVASIVE LOCATION;  Service: Cardiovascular   • CORONARY ANGIOPLASTY WITH STENT PLACEMENT     • MITRAL VALVE REPAIR/REPLACEMENT N/A 1/22/2020    Procedure: MITRAL VALVE REPAIR/REPLACEMENT;  Surgeon: Fallon Cole MD;  Location: [facility] CVOR;  Service: Cardiothoracic;  Laterality: N/A;  patient has endocarditis mitral valve replaced with 31mm knox II     General Information     Row Name 03/08/20 1548          PT Evaluation Time/Intention    Document Type  therapy note (daily note)  -     Mode of Treatment  physical therapy  -     Row Name 03/08/20 1548          General Information    Patient Profile Reviewed?  yes  -     Existing Precautions/Restrictions  sternal  -     Row Name 03/08/20 1548          Safety Issues, Functional Mobility    Impairments Affecting Function (Mobility)  balance;strength;endurance/activity tolerance;motor control;postural/trunk control  -       User Key  (r) = Recorded By, (t) = Taken By, (c) = Cosigned By    Initials Name Provider Type     Caro Taylor PTA Physical Therapy Assistant        Mobility     Row Name 03/08/20 1549          Bed Mobility Assessment/Treatment    Bed Mobility Assessment/Treatment  bed mobility (all) activities  -     [place] Level (Bed Mobility)  minimum assist (75% patient effort)  -     Assistive Device (Bed Mobility)  head of bed elevated  -     Comment (Bed Mobility)  extra time  -     Row Name 03/08/20 1549          Bed-Chair Transfer    Bed-Chair [place] (Transfers)  verbal cues;minimum assist (75% patient  effort) performs more of a squat pivot to R, LLE effected more from MS  -     Row Name 03/08/20 1549          Gait/Stairs Assessment/Training    Comment (Gait/Stairs)  pt states he doesn't amb at home, resorted to use electric wc for mobility  -       User Key  (r) = Recorded By, (t) = Taken By, (c) = Cosigned By    Initials Name Provider Type     Caro Taylor PTA Physical Therapy Assistant        Obj/Interventions     Row Name 03/08/20 1552          Static Sitting Balance    Level of [place] (Unsupported Sitting, Static Balance)  independent  -     Sitting Position (Unsupported Sitting, Static Balance)  sitting on edge of bed  -     Row Name 03/08/20 1552          Static Standing Balance    Level of [place] (Supported Standing, Static Balance)  minimal assist, 75% patient effort  -       User Key  (r) = Recorded By, (t) = Taken By, (c) = Cosigned By    Initials Name Provider Type     Caro Taylor PTA Physical Therapy Assistant        Goals/Plan     Row Name 03/08/20 1554          Bed Mobility Goal 1 (PT)    Progress/Outcomes (Bed Mobility Goal 1, PT)  goal ongoing  -     Row Name 03/08/20 1554          Transfer Goal 1 (PT)    Progress/Outcome (Transfer Goal 1, PT)  goal ongoing  -     Row Name 03/08/20 1554          Gait Training Goal 1 (PT)    Progress/Outcome (Gait Training Goal 1, PT)  goal ongoing;unable to make needed progress  -       User Key  (r) = Recorded By, (t) = Taken By, (c) = Cosigned By    Initials Name Provider Type    Caro Maxwell PTA Physical Therapy Assistant        Clinical Impression     Row Name 03/08/20 1552          Pain Scale: Numbers Pre/Post-Treatment    Pre/Post Treatment Pain Comment  no specific c/o pain today  -     Row Name 03/08/20 1552          Plan of Care Review    Plan of Care Reviewed With  patient  -     Progress  improving  -     Outcome Summary  pt req min assist for supine>sit and min assist for squat pivot to R into  chair. Will need rehab to improve overall mobility and endurance  -     Row Name 03/08/20 1552          Physical Therapy Clinical Impression    Criteria for Skilled Interventions Met (PT Clinical Impression)  treatment indicated  -     Rehab Potential (PT Clinical Summary)  good, to achieve stated therapy goals  -     Row Name 03/08/20 1552          Vital Signs    Intratreatment Heart Rate (beats/min)  94  -     O2 Delivery Pre Treatment  room air  -     Intra SpO2 (%)  100  -     Row Name 03/08/20 1552          Positioning and Restraints    Pre-Treatment Position  in bed  -     Post Treatment Position  chair  -     In Chair  call light within reach;exit alarm on;sitting  -       User Key  (r) = Recorded By, (t) = Taken By, (c) = Cosigned By    Initials Name Provider Type    Caro Maxwell PTA Physical Therapy Assistant        Outcome Measures     Row Name 03/08/20 1555          How much help from another person do you currently need...    Turning from your back to your side while in flat bed without using bedrails?  3  -MC     Moving from lying on back to sitting on the side of a flat bed without bedrails?  3  -MC     Moving to and from a bed to a chair (including a wheelchair)?  3  -MC     Standing up from a chair using your arms (e.g., wheelchair, bedside chair)?  2  -MC     Climbing 3-5 steps with a railing?  1  -MC     To walk in hospital room?  1  -MC     AM-PAC 6 Clicks Score (PT)  13  -       User Key  (r) = Recorded By, (t) = Taken By, (c) = Cosigned By    Initials Name Provider Type    Caro Maxwell PTA Physical Therapy Assistant          PT Recommendation and Plan  Planned Therapy Interventions (PT Eval): patient/family education, neuromuscular re-education, gait training, bed mobility training, balance training, postural re-education, transfer training, strengthening  Plan of Care Reviewed With: patient  Progress: improving  Outcome Summary: pt req min assist for  supine>sit and min assist for squat pivot to R into chair. Will need rehab to improve overall mobility and endurance     Time Calculation:   PT Charges     Row Name 03/08/20 1556             Time Calculation    Start Time  0753  -      Stop Time  0823  -      Time Calculation (min)  30 min  -      PT Received On  03/08/20  -      PT - Next Appointment  03/10/20  -         Time Calculation- PT    Total Timed Code Minutes- PT  30 minute(s)  -        User Key  (r) = Recorded By, (t) = Taken By, (c) = Cosigned By    Initials Name Provider Type    Caro Maxwell PTA Physical Therapy Assistant        Therapy Charges for Today     Code Description Service Date Service Provider Modifiers Qty    50701045664 HC PT NEUROMUSC RE EDUCATION EA 15 MIN 3/8/2020 Caro Taylor PTA GP 1    74045540100 HC PT THERAPEUTIC ACT EA 15 MIN 3/8/2020 Caro aTylor PTA GP 1    00875004917 HC PT THER PROC EA 15 MIN 3/8/2020 Caro Taylor PTA GP 1          PT G-Codes  Outcome Measure Options: AM-PAC 6 Clicks Basic Mobility (PT)  AM-PAC 6 Clicks Score (PT): 13  Modified [place] Scale: 4 - Moderately severe disability.  Unable to walk without assistance, and unable to attend to own bodily needs without assistance.    Caro Taylor PTA  3/8/2020

## 2020-03-08 NOTE — PLAN OF CARE
Problem: Patient Care Overview  Goal: Plan of Care Review  Outcome: Ongoing (interventions implemented as appropriate)  Flowsheets (Taken 3/8/2020 7902)  Progress: improving  Outcome Summary: pt req min assist for supine>sit and min assist for squat pivot to R into chair. Will need rehab to improve overall mobility and endurance

## 2020-03-08 NOTE — PROGRESS NOTES
Infectious Diseases Progress Note      LOS: 10 days   Patient Care Team:  Kajal Sanchez as PCP - General (Internal Medicine)  Frederick George MD as Consulting Physician (Nephrology)        Subjective       The patient has been afebrile for the last 24 hours.  The patient is currently on 2 L of oxygen via nasal cannula.  He is awake and alert.  He remained hemodynamically stable requiring no vasopressors.        Review of Systems:   Review of Systems   Constitutional: Positive for fatigue.   HENT: Negative.    Respiratory: Positive for cough and shortness of breath.    Cardiovascular:        Chest soreness around sternal incision    Gastrointestinal: Negative.    Genitourinary: Negative.    Musculoskeletal: Positive for arthralgias.   Skin: Negative.    Neurological: Positive for weakness.   Psychiatric/Behavioral: Negative.         Objective     Vital Signs  Temp:  [97.2 °F (36.2 °C)-98.3 °F (36.8 °C)] 97.9 °F (36.6 °C)  Heart Rate:  [85-98] 88  Resp:  [15-20] 16  BP: (110-137)/(66-84) 110/66    Physical Exam:  Physical Exam   Constitutional: He appears well-developed and well-nourished.   HENT:   Head: Normocephalic and atraumatic.   Eyes: Pupils are equal, round, and reactive to light. Conjunctivae and EOM are normal.   Neck: Neck supple.   Cardiovascular: Normal rate, regular rhythm and normal heart sounds.   Pulmonary/Chest: Effort normal. He has rales.   Some crackles in left lobe    Abdominal: Soft. Bowel sounds are normal.   Musculoskeletal:   Degenerative changes patient appears to have some general weakness due to the multiple sclerosis    Neurological: He is alert.   Alert and oriented x2-patient patient seems more alert today   Skin: Skin is warm and dry.   Chest soreness around sternal incision    Psychiatric: He has a normal mood and affect.   Vitals reviewed.       Results Review:    I have reviewed all clinical data, test, lab, and imaging results.     Radiology  No Radiology Exams Resulted Within  Past 24 Hours    Cardiology    Laboratory    Results from last 7 days   Lab Units 03/08/20  0457 03/07/20  0403 03/06/20  1031 03/05/20  0247 03/04/20  0328 03/03/20  0517 03/02/20  0647   WBC 10*3/mm3 9.10 8.90 9.40 10.30 11.90* 12.50* 10.70   HEMOGLOBIN g/dL 8.4* 8.2* 8.1* 7.8* 7.8* 8.4* 8.3*   HEMATOCRIT % 25.8* 25.6* 25.9* 24.4* 24.1* 26.1* 24.5*   PLATELETS 10*3/mm3 605* 611* 594* 708* 664* 753* 682*     Results from last 7 days   Lab Units 03/08/20  0457 03/07/20  0403 03/06/20  0248 03/05/20  0247 03/04/20  0328 03/03/20  0516 03/02/20  0647   SODIUM mmol/L 141 140 143 141 142 140 142   POTASSIUM mmol/L 4.0 4.0 4.2  4.1 4.3 4.1 4.4 4.2   CHLORIDE mmol/L 107 107 108* 107 108* 103 108*   CO2 mmol/L 25.0 24.0 23.0 24.0 25.0 25.0 25.0   BUN mg/dL 16 16 16 15 14 14 15   CREATININE mg/dL 0.94 0.90 1.02 0.99 0.94 0.89 0.87   GLUCOSE mg/dL 101* 117* 100* 124* 122* 135* 119*   ALBUMIN g/dL 2.80* 2.70*  --   --  2.50* 2.50* 2.20*   BILIRUBIN mg/dL <0.2* <0.2*  --   --  <0.2* <0.2* <0.2*   ALK PHOS U/L 89 92  --   --  89 90 88   AST (SGOT) U/L 108* 163*  --   --  79* 56* 54*   ALT (SGPT) U/L 167* 176*  --   --  93* 76* 72*   CALCIUM mg/dL 8.5* 8.1* 8.6 8.6 8.2* 8.4* 8.6                 Microbiology   Microbiology Results (last 10 days)     Procedure Component Value - Date/Time    AFB Culture - Lavage, Lung, Lingula [[id number]] Collected:  03/04/20 0743    Lab Status:  Preliminary result Specimen:  Lavage from Lung, Lingula Updated:  03/04/20 1021     AFB Stain No acid fast bacilli seen    BAL Culture, Quantitative - Lavage, Lung, Lingula [[id number]] Collected:  03/04/20 0743    Lab Status:  Final result Specimen:  Lavage from Lung, Lingula Updated:  03/06/20 0655     BAL Culture >100,000 CFU/mL Normal Respiratory Cheryl     Gram Stain Moderate (3+) WBCs per low power field      Moderate (3+) Epithelial cells per low power field      Few (2+) Gram positive cocci in pairs and clusters    Respiratory Culture - Sputum,  Cough [[id number]] Collected:  02/28/20 1123    Lab Status:  Final result Specimen:  Sputum from Cough Updated:  03/01/20 1203     Respiratory Culture Scant growth (1+) Normal Respiratory Cheryl     Gram Stain Moderate (3+) WBCs per low power field      Rare (1+) Epithelial cells per low power field      Rare (1+) Mixed bacterial morphotypes seen on Gram Stain    Legionella Antigen, Urine - Urine, Urine, Clean Catch [[id number]]  (Normal) Collected:  02/28/20 0402    Lab Status:  Final result Specimen:  Urine, Clean Catch Updated:  02/28/20 0439     LEGIONELLA ANTIGEN, URINE Negative    S. Pneumo Ag Urine or CSF - Urine, Urine, Clean Catch [[id number]]  (Normal) Collected:  02/28/20 0402    Lab Status:  Final result Specimen:  Urine, Clean Catch Updated:  02/28/20 0439     Strep Pneumo Ag Negative    Respiratory Panel, PCR - Swab, Nasopharynx [[id number]]  (Normal) Collected:  02/27/20 2349    Lab Status:  Final result Specimen:  Swab from Nasopharynx Updated:  02/28/20 0131     ADENOVIRUS, PCR Not Detected     Coronavirus 229E Not Detected     Coronavirus HKU1 Not Detected     Coronavirus NL63 Not Detected     Coronavirus OC43 Not Detected     Human Metapneumovirus Not Detected     Human Rhinovirus/Enterovirus Not Detected     Influenza B PCR Not Detected     Parainfluenza Virus 1 Not Detected     Parainfluenza Virus 2 Not Detected     Parainfluenza Virus 3 Not Detected     Parainfluenza Virus 4 Not Detected     Bordetella pertussis pcr Not Detected     Influenza A H1 2009 PCR Not Detected     Chlamydophila pneumoniae PCR Not Detected     Mycoplasma pneumo by PCR Not Detected     Influenza A PCR Not Detected     Influenza A H3 Not Detected     Influenza A H1 Not Detected     RSV, PCR Not Detected          Medication Review:       Schedule Meds    !Vancomycin Level Draw Needed  Does not apply Once   apixaban 5 mg Oral Q12H   aspirin 81 mg Oral Daily   cefTRIAXone 2 g Intravenous Q12H   cetirizine 10 mg Oral Daily    finasteride 5 mg Oral Daily   FLUoxetine 40 mg Oral Daily   folic acid 1 mg Oral Daily   gabapentin 200 mg Oral Q12H   guaiFENesin 1,200 mg Oral Q12H   insulin lispro 0-9 Units Subcutaneous 4x Daily With Meals & Nightly   ipratropium-albuterol 3 mL Nebulization Q4H While Awake - RT   lactobacillus acidophilus 1 capsule Oral BID   pantoprazole 40 mg Oral QAM   sodium chloride 10 mL Intravenous Q12H   tamsulosin 0.4 mg Oral Daily   vancomycin 750 mg Intravenous Q12H   vitamin B-12 1,000 mcg Oral Daily   vitamin E 800 Units Oral Daily       Infusion Meds    Pharmacy to dose vancomycin        PRN Meds  acetaminophen **OR** acetaminophen **OR** acetaminophen  •  benzonatate  •  bisacodyl  •  cyclobenzaprine  •  dextrose  •  dextrose  •  glucagon (human recombinant)  •  insulin lispro **AND** insulin lispro  •  ipratropium-albuterol  •  magnesium sulfate **OR** magnesium sulfate in D5W 1g/100mL (PREMIX)  •  ondansetron **OR** ondansetron  •  Pharmacy to dose vancomycin  •  potassium chloride  •  sodium chloride        Assessment/Plan       Antimicrobial Therapy   1.        day  2.  IV Rocephin    day  3.  IV vancomycin    day    4.  IV Merrem     Day   5.      Day      Assessment      Extensive left upper lobe and left lower lobe infiltrate.  The presentation is highly consistent with pneumonia.  Aspiration is less likely since it is involving all lobes of the left lung but I am concerned about hospital-acquired pneumonia.  The patient is currently on 5 L of oxygen via nasal cannula.  Sputum culture did not grow any significant pathogen.  Repeat chest x-ray did not show improvement  -3/4/0219-eyqjhqnwrbob-[id number] of tracheobronchomalacia throughout the airway.    Mild reactive leukocytosis     Status post mitral valve replacement for mitral valve endocarditis on January 22, 2020 with enterococcus faecium.  The patient was finishing 6 weeks of IV daptomycin and IV Rocephin at the rehab facility and the last day of the IV  antibiotics was supposed to be March 8, 2020     Type 2 diabetes     Tobacco abuse     Plan     Continue IV Rocephin 2 g every 12 hours  Continue IV vancomycin   Continue IV meropenem   Discontinue all the above antibiotics after tonight's dose  Highly recommend to remove PICC line on discharge  May discharge to rehab facility at any time          Allison Box MD  03/08/20  2:40 PM     Note is dictated utilizing voice recognition software/Dragon

## 2020-03-08 NOTE — PLAN OF CARE
Afebrile this shift. Vital signs stable. Nasal oxygen @ 2l/min during the night. Accucheck 92 at HS. Continues to received IV antibiotics. Merrem, Rocephin, and Vancomycin. Slept at long intervals during the night. No specific voiced complaints. Monitor shows SR/ST. Will continue to monitor closely.

## 2020-03-08 NOTE — PROGRESS NOTES
[facility]/PULM/CC PROGRESS NOTE     HISTORY OF PRESENT ILLNESS:  Jamin Hennessy is a 64 y.o. 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  Patient reported that earlier that morning, while at [facility], his blood pressure checked and was reportedly low.  It was at that time, that EMS was contacted, and patient was taken to the outlying facility emergency department.  During his stay in the ED, patient was diagnosed with pneumonia.  He had reported shortness of breath, cough with yellow sputum production that started the preceding early morning.  Patient denied nausea, vomiting, constipation, diarrhea, fever, or chills.  Patient did mention some chest soreness, but denies arm pain, neck pain, or jaw pain.  Notably, patient had a recent mitral valve replacement secondary to known endocarditis.  This patient is well-known to this practice, as we were following patient during his recent hospitalization.  At time of discharge, patient was recommended to complete 6 weeks of IV antibiotics with daptomycin and Rocephin per infectious disease.  Patient was discharged to [facility], and the plan was for antibiotic completion on 3/8/2020.  Patient was transferred to [facility] and admitted initially to the hospitalist team for further treatment and evaluation of patient condition.  Patient has required no vasopressors.  Infectious disease as well as cardiothoracic surgery have been consulted.  Patient presented with right upper extremity midline that was placed during his previous admission.  Due to patient's pulmonary status, with increased oxygen  "supplementation needs, cardiothoracic surgery requested patient to be transferred to [facility] for closer monitoring.  Previous work-up included bilateral lower extremity venous Doppler which reported \"chronic left lower extremity superficial thrombophlebitis noted in the small saphenous.\"  It is been reported that patient had a chest x-ray from outlying facility indicating pneumonia, and CT of the chest at this facility revealed \"extensive airspace and interstitial opacity throughout the left lung with smaller patchy air peripheral opacities in the right lung.  Only the right upper lobe multifocal infection/pneumonia is the most likely etiology.  There is mild mediastinal adenopathy which is likely reactive/infectious.  There are small layering bilateral pleural effusions.\"  Per infectious disease, in review of patient's previous medical record, patient had blood cultures that were positive for enterococcus faecium, which screened positive for VRE based on PCR.  Initially organism was susceptible to ampicillin and gentamicin.  Repeat blood cultures were negative.  Following patient's surgery on 1/22/2020 (MVR), the mitral valve tissue culture was also positive for enterococcus faecium, but sensitivities revealed resistance to ampicillin.  ID was consulted as the case has become rather complex, and that first and second line antibiotic therapies are revealing resistance.  At time of transfer, patient initially was on 3 L/min via nasal cannula, but after being transferred in bed, required an increase to 6 L/min via high flow nasal cannula.  Patient denies chest pain, neck pain, arm pain, jaw pain, nausea, vomiting, constipation, diarrhea, blood in urine or stool.  Patient does admit to some mild shortness of breath, frequent cough with yellow sputum production, but denies fever or chills.     [facility] was consulted for further evaluation and treatment of pneumonia, and to aid improvement in pulmonary status.  Thank you for " "this consultation, we will follow along on this patient.        SUBJECTIVE    2/29: Patient reports feeling somewhat better today.  Tolerating 5 L O2.  No shortness of air at rest.  3/1: Patient reports feeling well today.  He reports shortness of air is improving.  Demonstrates good compliance with I-S.  Tolerating O2 at 4 L by nasal cannula  3/2:  Doing ok.  Reports some SOA and cough.  On supplemental oxygen, 4 liters  3/3:  No new issues.  Feeling some better.  Cough improved.  On 2L nasal cannula  3/4: Breathing some better.  Minimal cough.  No complaints of chest pain.  No nausea or vomiting.  3/5: Seeing patient for the first time.  Significantly deconditioned and weak.  Continues to have cough without much sputum.  Shortness of breath improving.  Oxygen requirement better.  3/6: feels much better.  Shortness of breath stable.  Oxygen requirement is stable.  Remains on 3 L.  3/7: Patient reports feeling better today.  Tolerating O2 at 2 L with improved shortness of air.  He denies chest pain.  No nausea or vomiting  3/8: Feeling better, SOA better    OBJECTIVE    /66 (BP Location: Left arm, Patient Position: Sitting)   Pulse 88   Temp 97.9 °F (36.6 °C) (Oral)   Resp 16   Ht 175.3 cm (69.02\")   Wt 117 kg (257 lb 0.9 oz)   SpO2 94%   BMI 37.94 kg/m²   Intake/Output last 3 shifts:  I/O last 3 completed shifts:  In: 4955 [P.O.:2160; I.V.:2795]  Out: -   Intake/Output this shift:  I/O this shift:  In: 120 [P.O.:120]  Out: -     PHYSICAL EXAM:       Constitutional:  Well developed, well nourished, no acute distress, acutely ill-appearing, chronically ill-appearing  Eyes:  PERRL, conjunctiva normal, EOMI   HENT:  Atraumatic, external ears normal, nose normal. Neck-normal range of motion, no tenderness, supple, trachea midline  Respiratory:  Bilateral air entry present, somewhat diminished on the left side.  No crackles or wheezing. non-labored respirations without accessory muscle use  Cardiovascular:  " Normal rate, normal rhythm, no murmurs, no gallops, no rubs   GI:  Soft, nondistended, normal bowel sounds, nontender, no rebound, no guarding   :  deferred   Musculoskeletal: Bilateral lower extremity edema 2+, no tenderness, no deformities  Integument:  Well hydrated, no rash.  Sternal incision well-healed  Neurologic:  Alert & oriented x 3, no lateralizing deficits  Psychiatric:  Speech and behavior appropriate    Scheduled Meds:    !Vancomycin Level Draw Needed  Does not apply Once   apixaban 5 mg Oral Q12H   aspirin 81 mg Oral Daily   cefTRIAXone 2 g Intravenous Q12H   cetirizine 10 mg Oral Daily   finasteride 5 mg Oral Daily   FLUoxetine 40 mg Oral Daily   folic acid 1 mg Oral Daily   gabapentin 200 mg Oral Q12H   guaiFENesin 1,200 mg Oral Q12H   insulin lispro 0-9 Units Subcutaneous 4x Daily With Meals & Nightly   ipratropium-albuterol 3 mL Nebulization Q4H While Awake - RT   lactobacillus acidophilus 1 capsule Oral BID   pantoprazole 40 mg Oral QAM   sodium chloride 10 mL Intravenous Q12H   tamsulosin 0.4 mg Oral Daily   vancomycin 750 mg Intravenous Q12H   vitamin B-12 1,000 mcg Oral Daily   vitamin E 800 Units Oral Daily       Continuous Infusions:    Pharmacy to dose vancomycin        PRN Meds:acetaminophen **OR** acetaminophen **OR** acetaminophen  •  benzonatate  •  bisacodyl  •  cyclobenzaprine  •  dextrose  •  dextrose  •  glucagon (human recombinant)  •  insulin lispro **AND** insulin lispro  •  ipratropium-albuterol  •  magnesium sulfate **OR** magnesium sulfate in D5W 1g/100mL (PREMIX)  •  ondansetron **OR** ondansetron  •  Pharmacy to dose vancomycin  •  potassium chloride  •  sodium chloride     Data Review:  Lab Results (last 24 hours)     Procedure Component Value Units Date/Time    POC Glucose Once [[id number]]  (Abnormal) Collected:  03/08/20 1202    Specimen:  Blood Updated:  03/08/20 1208     Glucose 157 mg/dL      Comment: Serial Number: [id number]:  [id number]       POC Glucose Once  [[id number]]  (Normal) Collected:  03/08/20 0737    Specimen:  Blood Updated:  03/08/20 0739     Glucose 94 mg/dL      Comment: Serial Number: [id number]:  [id number]       Comprehensive Metabolic Panel [[id number]]  (Abnormal) Collected:  03/08/20 0457    Specimen:  Blood Updated:  03/08/20 0613     Glucose 101 mg/dL      BUN 16 mg/dL      Creatinine 0.94 mg/dL      Sodium 141 mmol/L      Potassium 4.0 mmol/L      Chloride 107 mmol/L      CO2 25.0 mmol/L      Calcium 8.5 mg/dL      Total Protein 6.2 g/dL      Albumin 2.80 g/dL      ALT (SGPT) 167 U/L      AST (SGOT) 108 U/L      Alkaline Phosphatase 89 U/L      Total Bilirubin <0.2 mg/dL      eGFR Non African Amer 81 mL/min/1.73      Globulin 3.4 gm/dL      A/G Ratio 0.8 g/dL      BUN/Creatinine Ratio 17.0     Anion Gap 9.0 mmol/L     Narrative:       GFR Normal >60  Chronic Kidney Disease <60  Kidney Failure <15      Magnesium [[id number]]  (Normal) Collected:  03/08/20 0457    Specimen:  Blood Updated:  03/08/20 0613     Magnesium 2.2 mg/dL     aPTT [[id number]]  (Abnormal) Collected:  03/08/20 0457    Specimen:  Blood Updated:  03/08/20 0556     PTT 26.5 seconds     CBC & Differential [[id number]] Collected:  03/08/20 0457    Specimen:  Blood Updated:  03/08/20 0552    Narrative:       The following orders were created for panel order CBC & Differential.  Procedure                               Abnormality         Status                     ---------                               -----------         ------                     CBC Auto Differential[[id number]]        Abnormal            Final result                 Please view results for these tests on the individual orders.    CBC Auto Differential [[id number]]  (Abnormal) Collected:  03/08/20 0457    Specimen:  Blood Updated:  03/08/20 0552     WBC 9.10 10*3/mm3      RBC 3.40 10*6/mm3      Hemoglobin 8.4 g/dL      Hematocrit 25.8 %      MCV 76.0 fL      MCH 24.7 pg      MCHC 32.5 g/dL      RDW 18.7 %      RDW-SD  50.3 fl      MPV 7.5 fL      Platelets 605 10*3/mm3      Neutrophil % 60.1 %      Lymphocyte % 13.8 %      Monocyte % 6.3 %      Eosinophil % 17.2 %      Basophil % 2.6 %      Neutrophils, Absolute 5.50 10*3/mm3      Lymphocytes, Absolute 1.30 10*3/mm3      Monocytes, Absolute 0.60 10*3/mm3      Eosinophils, Absolute 1.60 10*3/mm3      Basophils, Absolute 0.20 10*3/mm3      nRBC 0.0 /100 WBC     POC Glucose Once [[id number]]  (Normal) Collected:  03/07/20 2127    Specimen:  Blood Updated:  03/07/20 2129     Glucose 92 mg/dL      Comment: Serial Number: [id number]:  [id number]       POC Glucose Once [[id number]]  (Normal) Collected:  03/07/20 1625    Specimen:  Blood Updated:  03/07/20 1629     Glucose 99 mg/dL      Comment: Serial Number: [id number]:  [id number]                Imaging:  Imaging Results (Last 72 Hours)     ** No results found for the last 72 hours. **            ASSESSMENT/PLAN:     HCAP (healthcare-associated pneumonia)    Coronary artery disease due to lipid rich plaque    Dyslipidemia    Non-insulin dependent type 2 diabetes mellitus (CMS/[facility])    Diabetic neuropathy (CMS/[facility])    Tobacco abuse    Obesity (BMI 30-39.9)    S/P MVR (mitral valve replacement)    Eosinophilia    Impetigo    Microcytic anemia    Elevated liver enzymes         Severe sepsis without septic shock, secondary to pneumonia  -IVF bolus given at outlying facility  -Lactic Acid 1.0  -Cultures-pending  -Urine antigens-negative  -RVP-negative  -Reviewed CT Chest.  -Procalcitonin-0.10  -Rocephin, Merrem, and vancomycin, ID managing antibiotics  -Imaging with dense infiltrate in left lung, s/p bronchoscopy with BAL Left Lingula.  -WBC normalized     Sepsis associated hypotension, improving  -Did not require vasopressors  -Stable, continue to monitor     Left lung hospital-acquired pneumonia  -Reviewed CT Chest.  Status post bronchoscopy.  BAL was obtained from left lingula.  All cultures negative so far.  Continue empiric IV  antibiotics.  Monitor clinical progress.  Infectious disease adjusting the antibiotics.  -Aggressive pulmonary toileting  -Follow sputum cultures  -IS/Flutter Valve encouraged and ordered  -Antibiotics as above per ID  -Continue Mucinex, Zyrtec     Acute respiratory failure with hypoxia, likely secondary to pneumonia; R/O Volume overload  -From past admission, bedside PFT reviewed: Suggestive of restrictive defect. FEV1 46%.  +bronchdilator response.  Diffusion capacity normal when corrected.  -Oxygen supplementation, titrate to maintain oxygen saturation >91%, currently on 2 L  -Duonebs scheduled and PRN  -BNP 1553  -intermittent diuresis  -sputum 2/28 - normal respiratory nelson      H/o Enterococcus MV Endocarditis; S/P MVR (tissue)/MICHELLE ligation (1/22/2020)  -Previous hospitalization cultures: 1) Blood cultures-Enterococcus faecium, positive for VRE on PCR; initially susciptible to ampicillin and gentamicin.  2) MV tissue culture with Enterococcus faecium, resistant to ampicillin, susceptible for daptomycin.  -Initially placed on IV Rocephin & Daptomycin x 6 weeks by ID with end date of 3/8/2020 (during previous hospitalization).  -ID consulted and managing antibiotics, as above.  -CTS following    Elevated AST/ALT  -Etiology is unclear  -medical management per primary. May need to consider changing amiodarone to alternative antidysrhythmic      CAD  -Continue ASA     Hyperlipidemia  -Statin therapy previously discontinued due to receiving daptomycin, resume when clinically appropriate     PAF (controlled), on chronic anticoagulation with Eliquis  -Continue amiodarone  -Continue Eliquis     Diabetes mellitus, type 2, with neuropathy  -strict glycemic control recommended  -Accuchecks AC/HS with sliding scale insulin  -Continue gabapentin     GERD  -Continue protonix     BPH  -Continue proscar, flomax     General Anxiety Disorder  -Continue prozac     Multiple sclerosis  -Mainly wheelchair bound, limited  mobility  -PT/OT     Obesity  -BMI 37.91  -Counseled on lifestyle modifications to improve overall health    Hypersomnia and probable sleep apnea  -Recommend sleep study as outpatient.     Tobacco Abuse, current  -Counseled on smoking cessation     Vitamin D/B12 deficiency  -Continue folic acid, B12 supplements       High Risk but clinically stable now.     Accuchecks with SSI  Code Status: CPR, Full Interventions  VTE Prophylaxis:  Eliquis/SCDs  PUD Prophylaxis: Protonix    Midline  Diet ordered     We will continue to follow. DC planning

## 2020-03-09 LAB
APTT PPP: 26.5 SECONDS (ref 61–76.5)
BASOPHILS # BLD AUTO: 0.2 10*3/MM3 (ref 0–0.2)
BASOPHILS NFR BLD AUTO: 2.5 % (ref 0–1.5)
DEPRECATED RDW RBC AUTO: 50.8 FL (ref 37–54)
EOSINOPHIL # BLD AUTO: 1.4 10*3/MM3 (ref 0–0.4)
EOSINOPHIL NFR BLD AUTO: 16.8 % (ref 0.3–6.2)
ERYTHROCYTE [DISTWIDTH] IN BLOOD BY AUTOMATED COUNT: 18.9 % (ref 12.3–15.4)
GLUCOSE BLDC GLUCOMTR-MCNC: 101 MG/DL (ref 70–105)
GLUCOSE BLDC GLUCOMTR-MCNC: 130 MG/DL (ref 70–105)
GLUCOSE BLDC GLUCOMTR-MCNC: 147 MG/DL (ref 70–105)
GLUCOSE BLDC GLUCOMTR-MCNC: 153 MG/DL (ref 70–105)
HCT VFR BLD AUTO: 25 % (ref 37.5–51)
HGB BLD-MCNC: 8.2 G/DL (ref 13–17.7)
LYMPHOCYTES # BLD AUTO: 1.5 10*3/MM3 (ref 0.7–3.1)
LYMPHOCYTES NFR BLD AUTO: 17.3 % (ref 19.6–45.3)
MAGNESIUM SERPL-MCNC: 2.3 MG/DL (ref 1.6–2.4)
MCH RBC QN AUTO: 24.8 PG (ref 26.6–33)
MCHC RBC AUTO-ENTMCNC: 32.8 G/DL (ref 31.5–35.7)
MCV RBC AUTO: 75.6 FL (ref 79–97)
MONOCYTES # BLD AUTO: 0.6 10*3/MM3 (ref 0.1–0.9)
MONOCYTES NFR BLD AUTO: 6.6 % (ref 5–12)
NEUTROPHILS # BLD AUTO: 4.8 10*3/MM3 (ref 1.7–7)
NEUTROPHILS NFR BLD AUTO: 56.8 % (ref 42.7–76)
NRBC BLD AUTO-RTO: 0 /100 WBC (ref 0–0.2)
PLATELET # BLD AUTO: 569 10*3/MM3 (ref 140–450)
PMV BLD AUTO: 8.1 FL (ref 6–12)
POTASSIUM BLD-SCNC: 3.9 MMOL/L (ref 3.5–5.2)
RBC # BLD AUTO: 3.31 10*6/MM3 (ref 4.14–5.8)
SPECIMEN SOURCE: NEGATIVE
SPECIMEN SOURCE: NORMAL
WBC NRBC COR # BLD: 8.5 10*3/MM3 (ref 3.4–10.8)

## 2020-03-09 PROCEDURE — 84132 ASSAY OF SERUM POTASSIUM: CPT | Performed by: NURSE PRACTITIONER

## 2020-03-09 PROCEDURE — 94799 UNLISTED PULMONARY SVC/PX: CPT

## 2020-03-09 PROCEDURE — 83735 ASSAY OF MAGNESIUM: CPT | Performed by: NURSE PRACTITIONER

## 2020-03-09 PROCEDURE — 82962 GLUCOSE BLOOD TEST: CPT

## 2020-03-09 PROCEDURE — 85730 THROMBOPLASTIN TIME PARTIAL: CPT | Performed by: NURSE PRACTITIONER

## 2020-03-09 PROCEDURE — 63710000001 INSULIN LISPRO (HUMAN) PER 5 UNITS: Performed by: NURSE PRACTITIONER

## 2020-03-09 PROCEDURE — 85025 COMPLETE CBC W/AUTO DIFF WBC: CPT | Performed by: NURSE PRACTITIONER

## 2020-03-09 RX ORDER — AMOXICILLIN 250 MG
2 CAPSULE ORAL NIGHTLY
Status: DISCONTINUED | OUTPATIENT
Start: 2020-03-09 | End: 2020-03-10 | Stop reason: HOSPADM

## 2020-03-09 RX ADMIN — GUAIFENESIN 1200 MG: 600 TABLET, EXTENDED RELEASE ORAL at 20:53

## 2020-03-09 RX ADMIN — APIXABAN 5 MG: 5 TABLET, FILM COATED ORAL at 20:54

## 2020-03-09 RX ADMIN — SENNOSIDES AND DOCUSATE SODIUM 2 TABLET: 8.6; 5 TABLET ORAL at 20:53

## 2020-03-09 RX ADMIN — IPRATROPIUM BROMIDE AND ALBUTEROL SULFATE 3 ML: .5; 3 SOLUTION RESPIRATORY (INHALATION) at 08:14

## 2020-03-09 RX ADMIN — Medication 10 ML: at 08:25

## 2020-03-09 RX ADMIN — APIXABAN 5 MG: 5 TABLET, FILM COATED ORAL at 08:24

## 2020-03-09 RX ADMIN — PANTOPRAZOLE SODIUM 40 MG: 40 TABLET, DELAYED RELEASE ORAL at 08:25

## 2020-03-09 RX ADMIN — POLYETHYLENE GLYCOL 3350 17 G: 17 POWDER, FOR SOLUTION ORAL at 16:34

## 2020-03-09 RX ADMIN — CYANOCOBALAMIN TAB 1000 MCG 1000 MCG: 1000 TAB at 08:25

## 2020-03-09 RX ADMIN — IPRATROPIUM BROMIDE AND ALBUTEROL SULFATE 3 ML: .5; 3 SOLUTION RESPIRATORY (INHALATION) at 11:23

## 2020-03-09 RX ADMIN — Medication 1 CAPSULE: at 08:25

## 2020-03-09 RX ADMIN — Medication 1 CAPSULE: at 20:54

## 2020-03-09 RX ADMIN — GABAPENTIN 200 MG: 100 CAPSULE ORAL at 20:54

## 2020-03-09 RX ADMIN — INSULIN LISPRO 2 UNITS: 100 INJECTION, SOLUTION INTRAVENOUS; SUBCUTANEOUS at 11:45

## 2020-03-09 RX ADMIN — TAMSULOSIN HYDROCHLORIDE 0.4 MG: 0.4 CAPSULE ORAL at 08:25

## 2020-03-09 RX ADMIN — IPRATROPIUM BROMIDE AND ALBUTEROL SULFATE 3 ML: .5; 3 SOLUTION RESPIRATORY (INHALATION) at 14:58

## 2020-03-09 RX ADMIN — CETIRIZINE HYDROCHLORIDE 10 MG: 10 TABLET, FILM COATED ORAL at 08:25

## 2020-03-09 RX ADMIN — Medication 800 UNITS: at 08:24

## 2020-03-09 RX ADMIN — FOLIC ACID 1 MG: 1 TABLET ORAL at 08:25

## 2020-03-09 RX ADMIN — IPRATROPIUM BROMIDE AND ALBUTEROL SULFATE 3 ML: .5; 3 SOLUTION RESPIRATORY (INHALATION) at 19:42

## 2020-03-09 RX ADMIN — ASPIRIN 81 MG: 81 TABLET, DELAYED RELEASE ORAL at 08:25

## 2020-03-09 RX ADMIN — FLUOXETINE 40 MG: 20 CAPSULE ORAL at 08:25

## 2020-03-09 RX ADMIN — Medication 10 ML: at 20:54

## 2020-03-09 RX ADMIN — GUAIFENESIN 1200 MG: 600 TABLET, EXTENDED RELEASE ORAL at 08:25

## 2020-03-09 RX ADMIN — FINASTERIDE 5 MG: 5 TABLET, FILM COATED ORAL at 08:24

## 2020-03-09 RX ADMIN — GABAPENTIN 200 MG: 100 CAPSULE ORAL at 08:24

## 2020-03-09 RX ADMIN — IPRATROPIUM BROMIDE AND ALBUTEROL SULFATE 3 ML: .5; 3 SOLUTION RESPIRATORY (INHALATION) at 23:46

## 2020-03-09 NOTE — PLAN OF CARE
Problem: Patient Care Overview  Goal: Plan of Care Review  Outcome: Ongoing (interventions implemented as appropriate)  Note:   Patient resting comfortably in bed, no complaints or issues noted. Will continue to monitor

## 2020-03-09 NOTE — PLAN OF CARE
Problem: Patient Care Overview  Goal: Plan of Care Review  Outcome: Ongoing (interventions implemented as appropriate)  Flowsheets  Taken 3/9/2020 1714  Progress: improving  Outcome Summary: Pt has finished IV antibiotics, midline removed today, awaiting precert to rehab, given miralax for constipation, will continue to monitor.  Taken 3/9/2020 1618  Plan of Care Reviewed With: patient  Goal: Individualization and Mutuality  Outcome: Ongoing (interventions implemented as appropriate)  Goal: Discharge Needs Assessment  Outcome: Ongoing (interventions implemented as appropriate)  Goal: Interprofessional Rounds/Family Conf  Outcome: Ongoing (interventions implemented as appropriate)  Flowsheets  Taken 3/9/2020 1714 by Iman Ríos RN  Summary: Pt will need precert to go to rehab, finished IV antibiotics, will continue to monitor.  Taken 3/4/2020 1713 by Kaci Vazquez, RN  Participants: ;nursing;patient;pharmacy;social work/services     Problem: Pneumonia (Adult)  Goal: Signs and Symptoms of Listed Potential Problems Will be Absent, Minimized or Managed (Pneumonia)  Outcome: Ongoing (interventions implemented as appropriate)  Flowsheets  Taken 3/1/2020 0215 by Nain Sequeira, RN  Problems Assessed (Pneumonia): all  Taken 3/9/2020 1714 by Iman Ríos RN  Problems Present (Pneumonia): none     Problem: Fall Risk (Adult)  Goal: Identify Related Risk Factors and Signs and Symptoms  Outcome: Ongoing (interventions implemented as appropriate)  Flowsheets (Taken 3/9/2020 1714)  Related Risk Factors (Fall Risk): age-related changes; gait/mobility problems; fatigue/slow reaction; sleep pattern alteration; environment unfamiliar  Signs and Symptoms (Fall Risk): presence of risk factors  Goal: Absence of Fall  Outcome: Ongoing (interventions implemented as appropriate)  Flowsheets (Taken 3/9/2020 1714)  Absence of Fall: making progress toward outcome     Problem: Skin Injury Risk (Adult)  Goal: Identify  Related Risk Factors and Signs and Symptoms  Outcome: Ongoing (interventions implemented as appropriate)  Flowsheets (Taken 3/1/2020 0215 by Nain Sequeira RN)  Related Risk Factors (Skin Injury Risk): advanced age;body weight extremes;hospitalization prolonged;mobility impaired  Goal: Skin Health and Integrity  Outcome: Ongoing (interventions implemented as appropriate)  Flowsheets (Taken 3/9/2020 1714)  Skin Health and Integrity: making progress toward outcome     Problem: Pain, Chronic (Adult)  Goal: Identify Related Risk Factors and Signs and Symptoms  Outcome: Ongoing (interventions implemented as appropriate)  Flowsheets  Taken 3/5/2020 1720 by Kaci Vazquez RN  Related Risk Factors (Chronic Pain): disease process  Taken 3/1/2020 0215 by Nain Sequeira RN  Signs and Symptoms (Chronic Pain): verbalization of pain/discomfort for a prolonged time period  Goal: Acceptable Pain/Comfort Level and Functional Ability  Outcome: Ongoing (interventions implemented as appropriate)  Flowsheets (Taken 3/9/2020 1714)  Acceptable Pain/Comfort Level and Functional Ability: making progress toward outcome

## 2020-03-09 NOTE — PROGRESS NOTES
Infectious Diseases Progress Note      LOS: 11 days   Patient Care Team:  Kajal Sanchez as PCP - General (Internal Medicine)  Frederick George MD as Consulting Physician (Nephrology)        Subjective       The patient has been afebrile for the last 24 hours.  The patient is currently on room air.  The patient denied having any complaints.      Review of Systems:   Review of Systems   Constitutional: Positive for fatigue.   HENT: Negative.    Respiratory: Positive for cough and shortness of breath.    Cardiovascular:        Chest soreness around sternal incision    Gastrointestinal: Negative.    Genitourinary: Negative.    Musculoskeletal: Positive for arthralgias.   Skin: Negative.    Neurological: Positive for weakness.   Psychiatric/Behavioral: Negative.         Objective     Vital Signs  Temp:  [97.4 °F (36.3 °C)-98.3 °F (36.8 °C)] 97.5 °F (36.4 °C)  Heart Rate:  [80-98] 83  Resp:  [16-20] 18  BP: (127-158)/(81-89) 127/82    Physical Exam:  Physical Exam   Constitutional: He appears well-developed and well-nourished.   HENT:   Head: Normocephalic and atraumatic.   Eyes: Pupils are equal, round, and reactive to light. Conjunctivae and EOM are normal.   Neck: Neck supple.   Cardiovascular: Normal rate, regular rhythm and normal heart sounds.   Pulmonary/Chest: Effort normal. He has rales.   Some crackles in left lobe    Abdominal: Soft. Bowel sounds are normal.   Musculoskeletal:   Degenerative changes patient appears to have some general weakness due to the multiple sclerosis    Neurological: He is alert.   Alert and oriented x2-patient patient seems more alert today   Skin: Skin is warm and dry.   Chest soreness around sternal incision    Psychiatric: He has a normal mood and affect.   Vitals reviewed.       Results Review:    I have reviewed all clinical data, test, lab, and imaging results.     Radiology  No Radiology Exams Resulted Within Past 24 Hours    Cardiology    Laboratory    Results from last 7 days    Lab Units 03/09/20  0244 03/08/20  0457 03/07/20  0403 03/06/20  1031 03/05/20  0247 03/04/20  0328 03/03/20  0517   WBC 10*3/mm3 8.50 9.10 8.90 9.40 10.30 11.90* 12.50*   HEMOGLOBIN g/dL 8.2* 8.4* 8.2* 8.1* 7.8* 7.8* 8.4*   HEMATOCRIT % 25.0* 25.8* 25.6* 25.9* 24.4* 24.1* 26.1*   PLATELETS 10*3/mm3 569* 605* 611* 594* 708* 664* 753*     Results from last 7 days   Lab Units 03/09/20  0244 03/08/20  0457 03/07/20  0403 03/06/20  0248 03/05/20  0247 03/04/20  0328 03/03/20  0516   SODIUM mmol/L  --  141 140 143 141 142 140   POTASSIUM mmol/L 3.9 4.0 4.0 4.2  4.1 4.3 4.1 4.4   CHLORIDE mmol/L  --  107 107 108* 107 108* 103   CO2 mmol/L  --  25.0 24.0 23.0 24.0 25.0 25.0   BUN mg/dL  --  16 16 16 15 14 14   CREATININE mg/dL  --  0.94 0.90 1.02 0.99 0.94 0.89   GLUCOSE mg/dL  --  101* 117* 100* 124* 122* 135*   ALBUMIN g/dL  --  2.80* 2.70*  --   --  2.50* 2.50*   BILIRUBIN mg/dL  --  <0.2* <0.2*  --   --  <0.2* <0.2*   ALK PHOS U/L  --  89 92  --   --  89 90   AST (SGOT) U/L  --  108* 163*  --   --  79* 56*   ALT (SGPT) U/L  --  167* 176*  --   --  93* 76*   CALCIUM mg/dL  --  8.5* 8.1* 8.6 8.6 8.2* 8.4*                 Microbiology   Microbiology Results (last 10 days)     Procedure Component Value - Date/Time    Pneumocystis PCR - Wash, Lung [[id number]] Collected:  03/04/20 0825    Lab Status:  Final result Specimen:  Wash from Lung Updated:  03/09/20 0908     Pneumocystis jiroveci DNA Negative     Comment: -------------------ADDITIONAL INFORMATION-------------------  This test was developed and its performance characteristics  determined by [facility] in a manner consistent with CLIA  requirements. This test has not been cleared or approved by  the [place] Food and Drug Administration.  REFERENCE RANGE: Not Applicable        Specimen Source Comment     Comment: LUNG  WASH B       Narrative:       Performed at:  01 - [facility] [address]  [id number]  : Alirio  Alfie , Phone:  [phone]    Fungus Culture - Lavage, Lung, Lingula [[id number]] Collected:  03/04/20 0743    Lab Status:  Preliminary result Specimen:  Lavage from Lung, Lingula Updated:  03/09/20 0930     Fungus Culture No fungus isolated at less than 1 week    AFB Culture - Lavage, Lung, Lingula [[id number]] Collected:  03/04/20 0743    Lab Status:  Preliminary result Specimen:  Lavage from Lung, Lingula Updated:  03/09/20 0930     AFB Culture No AFB isolated at less than 1 week     AFB Stain No acid fast bacilli seen    BAL Culture, Quantitative - Lavage, Lung, Lingula [[id number]] Collected:  03/04/20 0743    Lab Status:  Final result Specimen:  Lavage from Lung, Lingula Updated:  03/06/20 0655     BAL Culture >100,000 CFU/mL Normal Respiratory Cheryl     Gram Stain Moderate (3+) WBCs per low power field      Moderate (3+) Epithelial cells per low power field      Few (2+) Gram positive cocci in pairs and clusters          Medication Review:       Schedule Meds    apixaban 5 mg Oral Q12H   aspirin 81 mg Oral Daily   cetirizine 10 mg Oral Daily   finasteride 5 mg Oral Daily   FLUoxetine 40 mg Oral Daily   folic acid 1 mg Oral Daily   gabapentin 200 mg Oral Q12H   guaiFENesin 1,200 mg Oral Q12H   insulin lispro 0-9 Units Subcutaneous 4x Daily With Meals & Nightly   ipratropium-albuterol 3 mL Nebulization Q4H While Awake - RT   lactobacillus acidophilus 1 capsule Oral BID   pantoprazole 40 mg Oral QAM   polyethylene glycol 17 g Oral BID   sennosides-docusate 2 tablet Oral Nightly   sodium chloride 10 mL Intravenous Q12H   tamsulosin 0.4 mg Oral Daily   vitamin B-12 1,000 mcg Oral Daily   vitamin E 800 Units Oral Daily       Infusion Meds       PRN Meds  •  acetaminophen **OR** acetaminophen **OR** acetaminophen  •  benzonatate  •  bisacodyl  •  cyclobenzaprine  •  dextrose  •  dextrose  •  glucagon (human recombinant)  •  insulin lispro **AND** insulin lispro  •  ipratropium-albuterol  •  magnesium sulfate **OR**  magnesium sulfate in D5W 1g/100mL (PREMIX)  •  ondansetron **OR** ondansetron  •  potassium chloride  •  sodium chloride        Assessment/Plan       Antimicrobial Therapy   1.        day  2.  IV Rocephin    day  3.  IV vancomycin    day    4.  IV Merrem     Day   5.      Day      Assessment      Extensive left upper lobe and left lower lobe infiltrate.  The presentation is highly consistent with pneumonia.  Aspiration is less likely since it is involving all lobes of the left lung but I am concerned about hospital-acquired pneumonia.  The patient is currently on 5 L of oxygen via nasal cannula.  Sputum culture did not grow any significant pathogen.  Repeat chest x-ray did not show improvement  -3/4/4122-[id number] of tracheobronchomalacia throughout the airway.    Mild reactive leukocytosis     Status post mitral valve replacement for mitral valve endocarditis on January 22, 2020 with enterococcus faecium.  The patient was finishing 6 weeks of IV daptomycin and IV Rocephin at the rehab facility and the last day of the IV antibiotics was supposed to be March 8, 2020     Type 2 diabetes     Tobacco abuse     Plan     No need for further antimicrobial therapy.  Patient received the last dose of IV antibiotics on March 8, 2020 and he received Rocephin, vancomycin and meropenem.  He was treated for recently diagnosed pneumonia and he was treated with 6 weeks of IV antibiotics for mitral valve endocarditis  Okay to discharge patient to rehab facility  Remove PICC line on discharge          Allison Box MD  03/09/20  3:48 PM     Note is dictated utilizing voice recognition software/Dragon

## 2020-03-09 NOTE — PROGRESS NOTES
KPA/PULM/CC PROGRESS NOTE     HISTORY OF PRESENT ILLNESS:  Jamin Hennessy is a 64 y.o. 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  Patient reported that earlier that morning, while at [facility], his blood pressure checked and was reportedly low.  It was at that time, that EMS was contacted, and patient was taken to the outlying facility emergency department.  During his stay in the ED, patient was diagnosed with pneumonia.  He had reported shortness of breath, cough with yellow sputum production that started the preceding early morning.  Patient denied nausea, vomiting, constipation, diarrhea, fever, or chills.  Patient did mention some chest soreness, but denies arm pain, neck pain, or jaw pain.  Notably, patient had a recent mitral valve replacement secondary to known endocarditis.  This patient is well-known to this practice, as we were following patient during his recent hospitalization.  At time of discharge, patient was recommended to complete 6 weeks of IV antibiotics with daptomycin and Rocephin per infectious disease.  Patient was discharged to [facility], and the plan was for antibiotic completion on 3/8/2020.  Patient was transferred to [facility] and admitted initially to the hospitalist team for further treatment and evaluation of patient condition.  Patient has required no vasopressors.  Infectious disease as well as cardiothoracic surgery have been consulted.  Patient presented with right upper extremity midline that was placed during his previous admission.  Due to patient's pulmonary status, with increased oxygen  "supplementation needs, cardiothoracic surgery requested patient to be transferred to [facility] for closer monitoring.  Previous work-up included bilateral lower extremity venous Doppler which reported \"chronic left lower extremity superficial thrombophlebitis noted in the small saphenous.\"  It is been reported that patient had a chest x-ray from outlying facility indicating pneumonia, and CT of the chest at this facility revealed \"extensive airspace and interstitial opacity throughout the left lung with smaller patchy air peripheral opacities in the right lung.  Only the right upper lobe multifocal infection/pneumonia is the most likely etiology.  There is mild mediastinal adenopathy which is likely reactive/infectious.  There are small layering bilateral pleural effusions.\"  Per infectious disease, in review of patient's previous medical record, patient had blood cultures that were positive for enterococcus faecium, which screened positive for VRE based on PCR.  Initially organism was susceptible to ampicillin and gentamicin.  Repeat blood cultures were negative.  Following patient's surgery on 1/22/2020 (MVR), the mitral valve tissue culture was also positive for enterococcus faecium, but sensitivities revealed resistance to ampicillin.  ID was consulted as the case has become rather complex, and that first and second line antibiotic therapies are revealing resistance.  At time of transfer, patient initially was on 3 L/min via nasal cannula, but after being transferred in bed, required an increase to 6 L/min via high flow nasal cannula.  Patient denies chest pain, neck pain, arm pain, jaw pain, nausea, vomiting, constipation, diarrhea, blood in urine or stool.  Patient does admit to some mild shortness of breath, frequent cough with yellow sputum production, but denies fever or chills.     KPA was consulted for further evaluation and treatment of pneumonia, and to aid improvement in pulmonary status.  Thank you for " "this consultation, we will follow along on this patient.        SUBJECTIVE    2/29: Patient reports feeling somewhat better today.  Tolerating 5 L O2.  No shortness of air at rest.  3/1: Patient reports feeling well today.  He reports shortness of air is improving.  Demonstrates good compliance with I-S.  Tolerating O2 at 4 L by nasal cannula  3/2:  Doing ok.  Reports some SOA and cough.  On supplemental oxygen, 4 liters  3/3:  No new issues.  Feeling some better.  Cough improved.  On 2L nasal cannula  3/4: Breathing some better.  Minimal cough.  No complaints of chest pain.  No nausea or vomiting.  3/5: Seeing patient for the first time.  Significantly deconditioned and weak.  Continues to have cough without much sputum.  Shortness of breath improving.  Oxygen requirement better.  3/6: feels much better.  Shortness of breath stable.  Oxygen requirement is stable.  Remains on 3 L.  3/7: Patient reports feeling better today.  Tolerating O2 at 2 L with improved shortness of air.  He denies chest pain.  No nausea or vomiting  3/8: Feeling better, SOA better  3/9: Clinically improving.  Shortness of breath stable.  Oxygen requirement stable.    OBJECTIVE    /89   Pulse 84   Temp 97.4 °F (36.3 °C) (Oral)   Resp 18   Ht 175.3 cm (69.02\")   Wt 115 kg (253 lb 12 oz)   SpO2 99%   BMI 37.45 kg/m²   Intake/Output last 3 shifts:  I/O last 3 completed shifts:  In: 1432 [P.O.:840; IV Piggyback:592]  Out: -   Intake/Output this shift:  No intake/output data recorded.    PHYSICAL EXAM:       Constitutional:  Well developed, well nourished, no acute distress, acutely ill-appearing, chronically ill-appearing  Eyes:  PERRL, conjunctiva normal, EOMI   HENT:  Atraumatic, external ears normal, nose normal. Neck-normal range of motion, no tenderness, supple, trachea midline  Respiratory:  Bilateral air entry present, somewhat diminished on the left side.  No crackles or wheezing. non-labored respirations without accessory " muscle use  Cardiovascular:  Normal rate, normal rhythm, no murmurs, no gallops, no rubs   GI:  Soft, nondistended, normal bowel sounds, nontender, no rebound, no guarding   :  deferred   Musculoskeletal: Bilateral lower extremity edema 2+, no tenderness, no deformities  Integument:  Well hydrated, no rash.  Sternal incision well-healed  Neurologic:  Alert & oriented x 3, no lateralizing deficits  Psychiatric:  Speech and behavior appropriate    Scheduled Meds:    apixaban 5 mg Oral Q12H   aspirin 81 mg Oral Daily   cetirizine 10 mg Oral Daily   finasteride 5 mg Oral Daily   FLUoxetine 40 mg Oral Daily   folic acid 1 mg Oral Daily   gabapentin 200 mg Oral Q12H   guaiFENesin 1,200 mg Oral Q12H   insulin lispro 0-9 Units Subcutaneous 4x Daily With Meals & Nightly   ipratropium-albuterol 3 mL Nebulization Q4H While Awake - RT   lactobacillus acidophilus 1 capsule Oral BID   pantoprazole 40 mg Oral QAM   sodium chloride 10 mL Intravenous Q12H   tamsulosin 0.4 mg Oral Daily   vitamin B-12 1,000 mcg Oral Daily   vitamin E 800 Units Oral Daily       Continuous Infusions:       PRN Meds:•  acetaminophen **OR** acetaminophen **OR** acetaminophen  •  benzonatate  •  bisacodyl  •  cyclobenzaprine  •  dextrose  •  dextrose  •  glucagon (human recombinant)  •  insulin lispro **AND** insulin lispro  •  ipratropium-albuterol  •  magnesium sulfate **OR** magnesium sulfate in D5W 1g/100mL (PREMIX)  •  ondansetron **OR** ondansetron  •  potassium chloride  •  sodium chloride     Data Review:  Lab Results (last 24 hours)     Procedure Component Value Units Date/Time    Fungus Culture - Lavage, Lung, Lingula [[id number]] Collected:  03/04/20 0743    Specimen:  Lavage from Lung, Lingula Updated:  03/09/20 0930     Fungus Culture No fungus isolated at less than 1 week    AFB Culture - Lavage, Lung, Lingula [[id number]] Collected:  03/04/20 0743    Specimen:  Lavage from Lung, Lingula Updated:  03/09/20 0930     AFB Culture No AFB  isolated at less than 1 week     AFB Stain No acid fast bacilli seen    Pneumocystis PCR - Wash, Lung [[id number]] Collected:  03/04/20 0825    Specimen:  Wash from Lung Updated:  03/09/20 0908     Pneumocystis jiroveci DNA Negative     Comment: -------------------ADDITIONAL INFORMATION-------------------  This test was developed and its performance characteristics  determined by [facility] in a manner consistent with CLIA  requirements. This test has not been cleared or approved by  the U.S. Food and Drug Administration.  REFERENCE RANGE: Not Applicable        Specimen Source Comment     Comment: LUNG  WASH B       Narrative:       Performed at:  01 - [facility] [address]  [id number]  : Alirio Judge , Phone:  [phone]    POC Glucose Once [[id number]]  (Normal) Collected:  03/09/20 0721    Specimen:  Blood Updated:  03/09/20 0722     Glucose 101 mg/dL      Comment: Serial Number: [id number]:  [id number]       Potassium [[id number]]  (Normal) Collected:  03/09/20 0244    Specimen:  Blood Updated:  03/09/20 0456     Potassium 3.9 mmol/L     Magnesium [[id number]]  (Normal) Collected:  03/09/20 0244    Specimen:  Blood Updated:  03/09/20 0456     Magnesium 2.3 mg/dL     aPTT [[id number]]  (Abnormal) Collected:  03/09/20 0244    Specimen:  Blood Updated:  03/09/20 0427     PTT 26.5 seconds     CBC & Differential [[id number]] Collected:  03/09/20 0244    Specimen:  Blood Updated:  03/09/20 0415    Narrative:       The following orders were created for panel order CBC & Differential.  Procedure                               Abnormality         Status                     ---------                               -----------         ------                     CBC Auto Differential[[id number]]        Abnormal            Final result                 Please view results for these tests on the individual orders.    CBC Auto Differential [[id number]]  (Abnormal)  Collected:  03/09/20 0244    Specimen:  Blood Updated:  03/09/20 0415     WBC 8.50 10*3/mm3      RBC 3.31 10*6/mm3      Hemoglobin 8.2 g/dL      Hematocrit 25.0 %      MCV 75.6 fL      MCH 24.8 pg      MCHC 32.8 g/dL      RDW 18.9 %      RDW-SD 50.8 fl      MPV 8.1 fL      Platelets 569 10*3/mm3      Neutrophil % 56.8 %      Lymphocyte % 17.3 %      Monocyte % 6.6 %      Eosinophil % 16.8 %      Basophil % 2.5 %      Neutrophils, Absolute 4.80 10*3/mm3      Lymphocytes, Absolute 1.50 10*3/mm3      Monocytes, Absolute 0.60 10*3/mm3      Eosinophils, Absolute 1.40 10*3/mm3      Basophils, Absolute 0.20 10*3/mm3      nRBC 0.0 /100 WBC     POC Glucose Once [[id number]]  (Abnormal) Collected:  03/08/20 1935    Specimen:  Blood Updated:  03/08/20 1936     Glucose 230 mg/dL      Comment: Serial Number: [id number]:  [id number]       POC Glucose Once [[id number]]  (Normal) Collected:  03/08/20 1638    Specimen:  Blood Updated:  03/08/20 1642     Glucose 88 mg/dL      Comment: Serial Number: [id number]:  [id number]       POC Glucose Once [[id number]]  (Abnormal) Collected:  03/08/20 1202    Specimen:  Blood Updated:  03/08/20 1208     Glucose 157 mg/dL      Comment: Serial Number: [id number]:  [id number]                Imaging:  Imaging Results (Last 72 Hours)     ** No results found for the last 72 hours. **            ASSESSMENT/PLAN:     HCAP (healthcare-associated pneumonia)    Coronary artery disease due to lipid rich plaque    Dyslipidemia    Non-insulin dependent type 2 diabetes mellitus (CMS/HCC)    Diabetic neuropathy (CMS/HCC)    Tobacco abuse    Obesity (BMI 30-39.9)    S/P MVR (mitral valve replacement)    Eosinophilia    Impetigo    Microcytic anemia    Elevated liver enzymes         Severe sepsis without septic shock, secondary to pneumonia  -IVF bolus given at outlying facility  -Lactic Acid 1.0  -Cultures-negative  -Urine antigens-negative  -RVP-negative  -Reviewed CT  Chest.  -Procalcitonin-0.10  -Rocephin, Merrem, and vancomycin, ID managing antibiotics  -Imaging with dense infiltrate in left lung, s/p bronchoscopy with BAL Left Lingula.  -WBC normalized     Sepsis associated hypotension, improving  -Did not require vasopressors  -Stable, continue to monitor     Left lung hospital-acquired pneumonia  -Reviewed CT Chest.  Status post bronchoscopy.  BAL was obtained from left lingula.  All cultures negative so far.  Continue empiric IV antibiotics.  Monitor clinical progress.  Infectious disease adjusting the antibiotics.  -Aggressive pulmonary toileting  -Bronchoscopy cultures negative including PCP and viral panel.  -IS/Flutter Valve encouraged and ordered  -Antibiotics as above per ID  -Continue Mucinex, Zyrtec     Acute respiratory failure with hypoxia, likely secondary to pneumonia; R/O Volume overload  -From past admission, bedside PFT reviewed: Suggestive of restrictive defect. FEV1 46%.  +bronchdilator response.  Diffusion capacity normal when corrected.  -Oxygen supplementation, titrate to maintain oxygen saturation >91%, currently on 2 L  -Duonebs scheduled and PRN  -BNP 1553  -intermittent diuresis  -sputum 2/28 - normal respiratory nelson      H/o Enterococcus MV Endocarditis; S/P MVR (tissue)/MICHELLE ligation (1/22/2020)  -Previous hospitalization cultures: 1) Blood cultures-Enterococcus faecium, positive for VRE on PCR; initially susciptible to ampicillin and gentamicin.  2) MV tissue culture with Enterococcus faecium, resistant to ampicillin, susceptible for daptomycin.  -Initially placed on IV Rocephin & Daptomycin x 6 weeks by ID with end date of 3/8/2020 (during previous hospitalization).  -ID consulted and managing antibiotics, as above.  -CTS following    Elevated AST/ALT  -Etiology is unclear  -medical management per primary. May need to consider changing amiodarone to alternative antidysrhythmic      CAD  -Continue ASA     Hyperlipidemia  -Statin therapy  previously discontinued due to receiving daptomycin, resume when clinically appropriate     PAF (controlled), on chronic anticoagulation with Eliquis  -Continue amiodarone  -Continue Eliquis     Diabetes mellitus, type 2, with neuropathy  -strict glycemic control recommended  -Accuchecks AC/HS with sliding scale insulin  -Continue gabapentin     GERD  -Continue protonix     BPH  -Continue proscar, flomax     General Anxiety Disorder  -Continue prozac     Multiple sclerosis  -Mainly wheelchair bound, limited mobility  -PT/OT     Obesity  -BMI 37.91  -Counseled on lifestyle modifications to improve overall health    Hypersomnia and probable sleep apnea  -Recommend sleep study as outpatient.     Tobacco Abuse, current  -Counseled on smoking cessation     Vitamin D/B12 deficiency  -Continue folic acid, B12 supplements       High Risk but clinically stable now.     Accuchecks with SSI  Code Status: CPR, Full Interventions  VTE Prophylaxis:  Eliquis/SCDs  PUD Prophylaxis: Protonix    Midline  Diet ordered     We will continue to follow. DC planning

## 2020-03-09 NOTE — PROGRESS NOTES
NORMAL TEST RESULTS EXCEPT LOW VITAMIN D LEVEL.  PLEASE GIVE AMARJIT CHEWABLE VITAMIN D3 AT 2,000 UNITS EVERY DAY FOR AT LEAST 1 YEAR.  DR. JARVIS    Please call and mail results with above comments to the patient   S/P tissue MVR/MICHELLE ligation--[place] on 1/22/20    Pt known to our service d/t Enterococcus (VRE) MV IE.  He underwent tissue MVR on 1/22/2020 by Dr. Cole.    His postop course was lengthy given discharge planning r/t daptomycin.  He also had resp insufficiency issues and recurrent atrial fib.  Eliquis was restarted prior to transfer to [facility].    He was noted to have hypotension and was transferred to [facility] and diagnosed with pna.  He was transferred to [facility] for further care and mmgt.    Subjective:  Reports he finished his antibiotics yesterday. C/o constipation    No events over weekend  O2 requirements down to 2L  afebrile      Intake/Output Summary (Last 24 hours) at 3/9/2020 1459  Last data filed at 3/9/2020 1100  Gross per 24 hour   Intake 1192 ml   Output --   Net 1192 ml     Temp:  [97.3 °F (36.3 °C)-98.3 °F (36.8 °C)] 97.5 °F (36.4 °C)  Heart Rate:  [80-98] 85  Resp:  [16-20] 18  BP: (127-158)/(81-89) 127/82      Results from last 7 days   Lab Units 03/09/20  0244 03/08/20  0457   WBC 10*3/mm3 8.50 9.10   HEMOGLOBIN g/dL 8.2* 8.4*   HEMATOCRIT % 25.0* 25.8*   PLATELETS 10*3/mm3 569* 605*     Results from last 7 days   Lab Units 03/09/20  0244 03/08/20  0457   CREATININE mg/dL  --  0.94   POTASSIUM mmol/L 3.9 4.0   SODIUM mmol/L  --  141   MAGNESIUM mg/dL 2.3 2.2       Physical Exam:  Neuro intact, nad, resting in bed, awake and conversant  Tele:  afib 80s  Diminished bases, 2L 96%  Sternotomy well healed, sternum stable  Benign abd, tolerating po intake, no BM  Trace edema    Assessment/Plan:  Principal Problem:    HCAP (healthcare-associated pneumonia)  Active Problems:    Coronary artery disease due to lipid rich plaque    Dyslipidemia    Non-insulin dependent type 2 diabetes mellitus (CMS/HCC)    Diabetic neuropathy (CMS/[facility])    Tobacco abuse    Obesity (BMI 30-39.9)    S/P MVR (mitral valve replacement)    Eosinophilia    Impetigo    Microcytic anemia    Elevated liver enzymes    Readmit  for PNA--per hospitalist  Mitral valve endocarditis s/p tissue MVR--stable  Enterococcus bacteremia/VRE--cont dapto/Rocephin   CAD with stent 1 year ago  Paroxysmal atrial fibrillation with RVR--in afib currently  HTN--stable  HLD--no statin while on dapto  DM type 2 with neuropathy--SSl  GERD  Multiple sclerosis---mainly wheelchair bound  Current tobacco abuse   BPH with incontinence   Obesity---BMI 38.19  Generalized anxiety disorder     PT/OT following  D/C PICC  Increase bowel regimen  Anticipate back to rehab at discharge when precert obtained    Najma Castaneda, YUE  3/9/2020  2:59 PM

## 2020-03-09 NOTE — PROGRESS NOTES
Continued Stay Note   [place]     Patient Name: Jamin Hennessy  MRN: [id number]  Today's Date: 3/9/2020    Admit Date: 2020    Discharge Plan     Row Name 20 1148       Plan    Plan  [place] Plan: Return to [facility] - accepted pending precert. Precert restarted 3/9 (previous one  on 3/6). No PASRR needed for return.     Plan Comments  SW requested liaison restart precert today (3/9) following discussion with RN. Liaison confirmed restarting.      NEGRO Paredes    Phone: [phone]  Cell: [phone]  Fax: [phone]  [email or web address]

## 2020-03-10 VITALS
BODY MASS INDEX: 37.88 KG/M2 | SYSTOLIC BLOOD PRESSURE: 139 MMHG | HEIGHT: 69 IN | WEIGHT: 255.73 LBS | OXYGEN SATURATION: 95 % | TEMPERATURE: 98.2 F | HEART RATE: 90 BPM | DIASTOLIC BLOOD PRESSURE: 78 MMHG | RESPIRATION RATE: 18 BRPM

## 2020-03-10 LAB
GLUCOSE BLDC GLUCOMTR-MCNC: 105 MG/DL (ref 70–105)
GLUCOSE BLDC GLUCOMTR-MCNC: 126 MG/DL (ref 70–105)
GLUCOSE BLDC GLUCOMTR-MCNC: 92 MG/DL (ref 70–105)
MAGNESIUM SERPL-MCNC: 2.1 MG/DL (ref 1.6–2.4)
POTASSIUM BLD-SCNC: 3.9 MMOL/L (ref 3.5–5.2)

## 2020-03-10 PROCEDURE — 97530 THERAPEUTIC ACTIVITIES: CPT

## 2020-03-10 PROCEDURE — 99024 POSTOP FOLLOW-UP VISIT: CPT | Performed by: NURSE PRACTITIONER

## 2020-03-10 PROCEDURE — 97535 SELF CARE MNGMENT TRAINING: CPT

## 2020-03-10 PROCEDURE — 84132 ASSAY OF SERUM POTASSIUM: CPT | Performed by: NURSE PRACTITIONER

## 2020-03-10 PROCEDURE — 83735 ASSAY OF MAGNESIUM: CPT | Performed by: NURSE PRACTITIONER

## 2020-03-10 PROCEDURE — 82962 GLUCOSE BLOOD TEST: CPT

## 2020-03-10 PROCEDURE — 97112 NEUROMUSCULAR REEDUCATION: CPT

## 2020-03-10 PROCEDURE — 94799 UNLISTED PULMONARY SVC/PX: CPT

## 2020-03-10 RX ADMIN — IPRATROPIUM BROMIDE AND ALBUTEROL SULFATE 3 ML: .5; 3 SOLUTION RESPIRATORY (INHALATION) at 15:05

## 2020-03-10 RX ADMIN — GABAPENTIN 200 MG: 100 CAPSULE ORAL at 08:13

## 2020-03-10 RX ADMIN — BISACODYL 10 MG: 10 SUPPOSITORY RECTAL at 12:43

## 2020-03-10 RX ADMIN — Medication 1 CAPSULE: at 08:13

## 2020-03-10 RX ADMIN — IPRATROPIUM BROMIDE AND ALBUTEROL SULFATE 3 ML: .5; 3 SOLUTION RESPIRATORY (INHALATION) at 07:23

## 2020-03-10 RX ADMIN — Medication 800 UNITS: at 08:13

## 2020-03-10 RX ADMIN — Medication 10 ML: at 08:13

## 2020-03-10 RX ADMIN — FINASTERIDE 5 MG: 5 TABLET, FILM COATED ORAL at 08:13

## 2020-03-10 RX ADMIN — APIXABAN 5 MG: 5 TABLET, FILM COATED ORAL at 08:13

## 2020-03-10 RX ADMIN — PANTOPRAZOLE SODIUM 40 MG: 40 TABLET, DELAYED RELEASE ORAL at 08:13

## 2020-03-10 RX ADMIN — FLUOXETINE 40 MG: 20 CAPSULE ORAL at 08:13

## 2020-03-10 RX ADMIN — GUAIFENESIN 1200 MG: 600 TABLET, EXTENDED RELEASE ORAL at 08:13

## 2020-03-10 RX ADMIN — TAMSULOSIN HYDROCHLORIDE 0.4 MG: 0.4 CAPSULE ORAL at 08:13

## 2020-03-10 RX ADMIN — CETIRIZINE HYDROCHLORIDE 10 MG: 10 TABLET, FILM COATED ORAL at 08:13

## 2020-03-10 RX ADMIN — POLYETHYLENE GLYCOL 3350 17 G: 17 POWDER, FOR SOLUTION ORAL at 08:13

## 2020-03-10 RX ADMIN — FOLIC ACID 1 MG: 1 TABLET ORAL at 08:13

## 2020-03-10 RX ADMIN — CYANOCOBALAMIN TAB 1000 MCG 1000 MCG: 1000 TAB at 08:13

## 2020-03-10 RX ADMIN — ASPIRIN 81 MG: 81 TABLET, DELAYED RELEASE ORAL at 08:13

## 2020-03-10 NOTE — THERAPY TREATMENT NOTE
Acute Care - Occupational Therapy Treatment Note  [facility]     Patient Name: Jamin Hennessy  : 1955  MRN: [id number]  Today's Date: 3/10/2020             Admit Date: 2020       ICD-10-CM ICD-9-CM   1. Pneumonia of left lung due to infectious organism, unspecified part of lung J18.9 486     Patient Active Problem List   Diagnosis   • Atrial fibrillation with RVR (CMS/[facility])   • Coronary artery disease due to lipid rich plaque   • CAD S/P percutaneous coronary angioplasty   • Essential hypertension   • Dyslipidemia   • Non-insulin dependent type 2 diabetes mellitus (CMS/HCC)   • Diabetic neuropathy (CMS/HCC)   • GERD without esophagitis   • BPH with obstruction/lower urinary tract symptoms   • B12 deficiency   • Vitamin D deficiency   • JARRETT (generalized anxiety disorder)   • Tobacco abuse   • Obesity (BMI 30-39.9)   • Acute bacterial endocarditis   • S/P MVR (mitral valve replacement)   • Endocarditis of mitral valve   • Non-sustained ventricular tachycardia (CMS/[facility])   • Acute myocardial infarction (CMS/[facility])   • Hypercholesterolemia   • Hypotension   • Shortness of breath   • Sepsis associated hypotension (CMS/[facility])   • Chest pain   • Vitamin E deficiency   • HCAP (healthcare-associated pneumonia)   • Pneumonia of left lung due to infectious organism   • Eosinophilia   • Impetigo   • Microcytic anemia   • Elevated liver enzymes     Past Medical History:   Diagnosis Date   • A-fib (CMS/[facility])    • CAD (coronary artery disease)    • Elevated cholesterol    • Hypertension    • MI (myocardial infarction) (CMS/[facility])    • Non-insulin dependent type 2 diabetes mellitus (CMS/[facility])      Past Surgical History:   Procedure Laterality Date   • BRONCHOSCOPY N/A 3/4/2020    Procedure: BRONCHOSCOPY with bronchioalveolar lavage;  Surgeon: Melissa Cole MD;  Location: [facility];  Service: Pulmonary;  Laterality: N/A;   pneumonia   • CARDIAC CATHETERIZATION     • CARDIAC CATHETERIZATION N/A 2020    Procedure:  "Left Heart Cath;  Surgeon: Migdalia Beth MD;  Location: [facility] CATH INVASIVE LOCATION;  Service: Cardiovascular   • CARDIAC CATHETERIZATION N/A 1/20/2020    Procedure: Left ventriculography;  Surgeon: Migdalia Beth MD;  Location: [facility] CATH INVASIVE LOCATION;  Service: Cardiovascular   • CARDIAC CATHETERIZATION N/A 1/20/2020    Procedure: Coronary angiography;  Surgeon: Migdalia Beth MD;  Location: [facility] CATH INVASIVE LOCATION;  Service: Cardiovascular   • CORONARY ANGIOPLASTY WITH STENT PLACEMENT     • MITRAL VALVE REPAIR/REPLACEMENT N/A 1/22/2020    Procedure: MITRAL VALVE REPAIR/REPLACEMENT;  Surgeon: Fallon Cole MD;  Location: [facility] CVOR;  Service: Cardiothoracic;  Laterality: N/A;  patient has endocarditis mitral valve replaced with 31mm knox II       Therapy Treatment    Rehabilitation Treatment Summary     Row Name 03/10/20 0900             Treatment Time/Intention    Discipline  occupational therapist  -ES      Document Type  therapy note (daily note)  -ES      Subjective Information  complains of;fatigue  -ES      Comment  Pt supine upon arrival and agreeable to OOB activity. He states \"allergy-like\" symptoms including itchy eyes and headache. NSG made aware.  -ES      Existing Precautions/Restrictions  -- Pt now outside sternal precaution window  -ES      Recorded by [ES] Yoanna Gil OT 03/10/20 0949      Row Name 03/10/20 0900             Vital Signs    Pre Systolic BP Rehab  150 Map 106  -ES      Pre Treatment Diastolic BP  92  -ES      Intra Systolic BP Rehab  133 95 MAP  -ES      Intra Treatment Diastolic BP  88  -ES      Post Systolic BP Rehab  149 100 MAP  -ES      Post Treatment Diastolic BP  84  -ES      Intratreatment Heart Rate (beats/min)  89  -ES      Posttreatment Heart Rate (beats/min)  90  -ES      Pretreatment Resp Rate (breaths/min)  92  -ES      Pre SpO2 (%)  96  -ES      O2 Delivery Pre Treatment  -- 3L NC  -ES      Post SpO2 (%)  96  " -ES      O2 Delivery Post Treatment  supplemental O2  -ES      Pre Patient Position  Supine  -ES      Intra Patient Position  Sitting  -ES      Post Patient Position  Standing  -ES      Recorded by [ES] Yoanna Gil, OT 03/10/20 0949      Row Name 03/10/20 0900             Cognitive Assessment Intervention- SLP    Cognitive Function (Cognition)  mild impairment  -ES      Orientation Status (Cognition)  person;place;situation  -ES      Memory (Cognitive)  moderate impairment  -ES      Attention (Cognitive)  quiet environment;moderate impairment  -ES      Recorded by [ES] Yoanna Gil, OT 03/10/20 0957      Row Name 03/10/20 0900             Safety Issues, Functional Mobility    Safety Issues Affecting Function (Mobility)  ability to follow commands;impulsivity;judgment;problem solving;safety precautions follow-through/compliance;sequencing abilities  -ES      Recorded by [ES] Yoanna Gil, OT 03/10/20 0957      Row Name 03/10/20 0900             Bed Mobility Assessment/Treatment    Bed Mobility Assessment/Treatment  bed mobility (all) activities  -ES      Supine-Sit [place] (Bed Mobility)  supervision  -ES      Assistive Device (Bed Mobility)  head of bed elevated  -ES2      Comment (Bed Mobility)  grab bars   -ES2      Recorded by [ES] Yoanna Gil, OT 03/10/20 0949  [ES2] Yoanna Gil, OT 03/10/20 0957      Row Name 03/10/20 0900             Transfer Assessment/Treatment    Transfer Assessment/Treatment  bed-chair transfer;sit-stand transfer;stand-sit transfer  -ES      Recorded by [ES] Yoanna Gil OT 03/10/20 0957      Row Name 03/10/20 0900             Bed-Chair Transfer    Bed-Chair [place] (Transfers)  verbal cues;moderate assist (50% patient effort);minimum assist (75% patient effort);2 person assist  -ES      Recorded by [ES] Yoanna Gil OT 03/10/20 0957      Row Name 03/10/20 0900             Chair-Bed Transfer    Chair-Bed [place] (Transfers)   moderate assist (50% patient effort);minimum assist (75% patient effort);2 person assist  -ES      Recorded by [ES] Yoanna Gil, OT 03/10/20 0949      Row Name 03/10/20 0900             Sit-Stand Transfer    Sit-Stand [place] (Transfers)  minimum assist (75% patient effort);moderate assist (50% patient effort);2 person assist  -ES      Recorded by [ES] Yoanna Gil, OT 03/10/20 0949      Row Name 03/10/20 0900             Stand-Sit Transfer    Stand-Sit [place] (Transfers)  minimum assist (75% patient effort);moderate assist (50% patient effort);2 person assist  -ES      Recorded by [ES] Yoanna Gil, OT 03/10/20 0957      Row Name 03/10/20 0900             Stand Pivot/Stand Step Transfer    Stand Pivot/Stand Step [place]  minimum assist (75% patient effort);moderate assist (50% patient effort);2 person assist  -ES      Recorded by [ES] Yoanna Gil, OT 03/10/20 0957      Row Name 03/10/20 0900             ADL Assessment/Intervention    BADL Assessment/Intervention  upper body dressing;lower body dressing;grooming;feeding  -ES      Recorded by [ES] Yoanna Gil, OT 03/10/20 0957      Row Name 03/10/20 0900             Upper Body Dressing Assessment/Training    Upper Body Dressing [place] Level  minimum assist (75% patient effort)  -ES      Recorded by [ES] Yoanna Gil, OT 03/10/20 0957      Row Name 03/10/20 0900             Lower Body Dressing Assessment/Training    Lower Body Dressing [place] Level  moderate assist (50% patient effort);two person assist required  -ES      Lower Body Dressing Position  supported sitting;supported standing  -ES      Recorded by [ES] Yoanna Gil, OT 03/10/20 0957      Row Name 03/10/20 0900             Grooming Assessment/Training    [place] Level (Grooming)  minimum assist (75% patient effort);oral care regimen  -ES      Comment (Grooming)  A to open packages/containers  -ES      Recorded by [ES] Jeffery  "Yoanna NUR, KRISTEN 03/10/20 0957      Row Name 03/10/20 0900             Self-Feeding Assessment/Training    [place] Level (Feeding)  set up  -ES      Recorded by [ES] Yoanna Gil, OT 03/10/20 0957      Row Name 03/10/20 0900             Toileting Assessment/Training    [place] Level (Toileting)  dependent (less than 25% patient effort)  -ES      Comment (Toileting)  Incontinent of bladder. States he doesnt undertrsand why staff \"keeps putting wet towels\" between legs. Appears unaware that he is incontinent.   -ES      Recorded by [ES] Yoanna Gil, OT 03/10/20 0949      Row Name 03/10/20 0900             Motor Skills Assessment/Interventions    Additional Documentation  Balance (Group)  -ES      Recorded by [ES] Yoanna Gil, OT 03/10/20 0949      Row Name 03/10/20 0900             Balance    Balance  static sitting balance;static standing balance;dynamic sitting balance;dynamic standing balance  -ES      Recorded by [ES] Yoanna Gil, OT 03/10/20 0949      Row Name 03/10/20 0900             Static Sitting Balance    Level of [place] (Unsupported Sitting, Static Balance)  independent  -ES      Sitting Position (Unsupported Sitting, Static Balance)  sitting on edge of bed  -ES      Time Able to Maintain Position (Unsupported Sitting, Static Balance)  4 to 5 minutes  -ES      Recorded by [ES] Yoanna Gil, OT 03/10/20 0949      Row Name 03/10/20 0900             Dynamic Sitting Balance    Level of [place], Reaches Outside Midline (Sitting, Dynamic Balance)  contact guard assist  -ES      Sitting Position, Reaches Outside Midline (Sitting, Dynamic Balance)  sitting on edge of bed  -ES      Comment, Reaches Outside Midline (Sitting, Dynamic Balance)  demos improved weight shift and hip placement/correction  -ES      Recorded by [ES] Yoanna Gil, OT 03/10/20 0949      Row Name 03/10/20 0900             Static Standing Balance    Level of [place] (Supported " Standing, Static Balance)  minimal assist, 75% patient effort  -ES      Time Able to Maintain Position (Supported Standing, Static Balance)  1 to 2 minutes  -ES      Assistive Device Utilized (Supported Standing, Static Balance)  walker, rolling  -ES      Comment (Supported Standing, Static Balance)  requires cuing for upright standing tolerance  -ES      Recorded by [ES] Yoanna Gil, OT 03/10/20 0949      Row Name 03/10/20 0900             Dynamic Standing Balance    Level of [place], Reaches Outside Midline (Standing, Dynamic Balance)  minimal assist, 75% patient effort;moderate assist, 50 to 74% patient effort  -ES      Recorded by [ES] Yoanna Gil, OT 03/10/20 0949      Row Name 03/10/20 0900             Positioning and Restraints    Pre-Treatment Position  in bed  -ES      Post Treatment Position  chair  -ES      In Chair  notified nsg;call light within reach;exit alarm on;encouraged to call for assist  -ES      Recorded by [ES] Yoanna Gil, OT 03/10/20 0949      Row Name 03/10/20 0900             Health Promotion    Additional Documentation  Coping (Group)  -ES      Recorded by [ES] Yoanna Gil, OT 03/10/20 0957      Row Name 03/10/20 0900             Coping    Observed Emotional State  calm;cooperative  -ES      Verbalized Emotional State  acceptance  -ES      Recorded by [ES] Yoanna Gil, OT 03/10/20 0949      Row Name 03/10/20 0900             Plan of Care Review    Plan of Care Reviewed With  patient  -ES      Progress  improving  -ES      Outcome Summary  Patient now outside 6 week sternal precaution window, and Satterjaja-DANIEL Clark, states ok to utilize BUE. Pt demos improvement with bed mobility and functional transfers with ability to weight bear and pull with BUE.Supervision for supine>sit, Min-Mod Ax2 for sit<>stand and transfer to chair. pt requires max verbal cues for posture and correction.  Pt pleasant, agreeable, and appears motivated to improve. Edu on  importance of OOB activity and completing all tasks at highest level of independence. Cont to recommend IP rehab at discharge.   -ES      Recorded by [ES] Yoanna Gil OT 03/10/20 0949      Row Name 03/10/20 0900             Outcome Summary/Treatment Plan (OT)    Anticipated Discharge Disposition (OT)  inpatient rehabilitation facility  -ES      Recorded by [ES] Yoanna Gil OT 03/10/20 0949        User Key  (r) = Recorded By, (t) = Taken By, (c) = Cosigned By    Initials Name Effective Dates Discipline    Yoanna Adorno OT 03/01/19 -  OT                 OT Recommendation and Plan  Outcome Summary/Treatment Plan (OT)  Anticipated Discharge Disposition (OT): inpatient rehabilitation facility  Plan of Care Review  Plan of Care Reviewed With: patient  Plan of Care Reviewed With: patient  Outcome Summary: Patient now outside 6 week sternal precaution window, and Satterly-DANIEL Clark, states ok to utilize BUE. Pt demos improvement with bed mobility and functional transfers with ability to weight bear and pull with BUE.Supervision for supine>sit, Min-Mod Ax2 for sit<>stand and transfer to chair. pt requires max verbal cues for posture and correction.  Pt pleasant, agreeable, and appears motivated to improve. Edu on importance of OOB activity and completing all tasks at highest level of independence. Cont to recommend IP rehab at discharge.        Time Calculation:   Time Calculation- OT     Row Name 03/10/20 0957             Time Calculation- OT    OT Start Time  0914  -ES      OT Stop Time  0959  -ES      OT Time Calculation (min)  45 min  -ES      Total Timed Code Minutes- OT  45 minute(s)  -ES      OT Received On  03/10/20  -ES      OT - Next Appointment  03/12/20  -ES        User Key  (r) = Recorded By, (t) = Taken By, (c) = Cosigned By    Initials Name Provider Type    Yoanna Adorno OT Occupational Therapist        Therapy Charges for Today     Code Description Service Date Service  Provider Modifiers Qty    34251449817 HC OT SELF CARE/MGMT/TRAIN EA 15 MIN 3/10/2020 Yoanna Gil OT GO 2    04729284144 HC OT THERAPEUTIC ACT EA 15 MIN 3/10/2020 Yoanna Gil OT GO 1    99251889095 HC OT SELF CARE/MGMT/TRAIN EA 15 MIN 3/10/2020 Yoanna Gil OT GO 2    [id number] HC OT THERAPEUTIC ACT EA 15 MIN 3/10/2020 Yoanna Gil OT GO 1               Yoanna Gil OT  3/10/2020

## 2020-03-10 NOTE — THERAPY TREATMENT NOTE
Patient Name: Jamin Hennessy  : 1955    MRN: [id number]                              Today's Date: 3/10/2020       Admit Date: 2020    Visit Dx:     ICD-10-CM ICD-9-CM   1. Pneumonia of left lung due to infectious organism, unspecified part of lung J18.9 486     Patient Active Problem List   Diagnosis   • Atrial fibrillation with RVR (CMS/[facility])   • Coronary artery disease due to lipid rich plaque   • CAD S/P percutaneous coronary angioplasty   • Essential hypertension   • Dyslipidemia   • Non-insulin dependent type 2 diabetes mellitus (CMS/[facility])   • Diabetic neuropathy (CMS/[facility])   • GERD without esophagitis   • BPH with obstruction/lower urinary tract symptoms   • B12 deficiency   • Vitamin D deficiency   • JARRETT (generalized anxiety disorder)   • Tobacco abuse   • Obesity (BMI 30-39.9)   • Acute bacterial endocarditis   • S/P MVR (mitral valve replacement)   • Endocarditis of mitral valve   • Non-sustained ventricular tachycardia (CMS/[facility])   • Acute myocardial infarction (CMS/[facility])   • Hypercholesterolemia   • Hypotension   • Shortness of breath   • Sepsis associated hypotension (CMS/[facility])   • Chest pain   • Vitamin E deficiency   • HCAP (healthcare-associated pneumonia)   • Pneumonia of left lung due to infectious organism   • Eosinophilia   • Impetigo   • Microcytic anemia   • Elevated liver enzymes     Past Medical History:   Diagnosis Date   • A-fib (CMS/[facility])    • CAD (coronary artery disease)    • Elevated cholesterol    • Hypertension    • MI (myocardial infarction) (CMS/[facility])    • Non-insulin dependent type 2 diabetes mellitus (CMS/[facility])      Past Surgical History:   Procedure Laterality Date   • BRONCHOSCOPY N/A 3/4/2020    Procedure: BRONCHOSCOPY with bronchioalveolar lavage;  Surgeon: Melissa Cole MD;  Location: [facility] ENDOSCOPY;  Service: Pulmonary;  Laterality: N/A;   pneumonia   • CARDIAC CATHETERIZATION     • CARDIAC CATHETERIZATION N/A 2020    Procedure: Left Heart Cath;  Surgeon:  Migdalia Beth MD;  Location: [facility] CATH INVASIVE LOCATION;  Service: Cardiovascular   • CARDIAC CATHETERIZATION N/A 1/20/2020    Procedure: Left ventriculography;  Surgeon: Migdalia Beth MD;  Location: [facility] CATH INVASIVE LOCATION;  Service: Cardiovascular   • CARDIAC CATHETERIZATION N/A 1/20/2020    Procedure: Coronary angiography;  Surgeon: Migdalia Beth MD;  Location: [facility] CATH INVASIVE LOCATION;  Service: Cardiovascular   • CORONARY ANGIOPLASTY WITH STENT PLACEMENT     • MITRAL VALVE REPAIR/REPLACEMENT N/A 1/22/2020    Procedure: MITRAL VALVE REPAIR/REPLACEMENT;  Surgeon: Fallon Cole MD;  Location: [facility] CVOR;  Service: Cardiothoracic;  Laterality: N/A;  patient has endocarditis mitral valve replaced with 31mm knox II     General Information     Row Name 03/10/20 1212          PT Evaluation Time/Intention    Document Type  therapy note (daily note)  -     Mode of Treatment  physical therapy  -     Row Name 03/10/20 1212          Safety Issues, Functional Mobility    Impairments Affecting Function (Mobility)  balance;strength;endurance/activity tolerance;motor control;postural/trunk control  -       User Key  (r) = Recorded By, (t) = Taken By, (c) = Cosigned By    Initials Name Provider Type     Monica Simons PTA Physical Therapy Assistant        Mobility     Row Name 03/10/20 1212          Transfer Assessment/Treatment    Comment (Transfers)  Sit<>stand x 2 from bedside chair. Would like to mobilize more, but pt with poor endurance and low tolerance for activity 2* ae MS.   -     Row Name 03/10/20 1212          Sit-Stand Transfer    Sit-Stand [place] (Transfers)  moderate assist (50% patient effort);2 person assist;verbal cues  -     Assistive Device (Sit-Stand Transfers)  walker, front-wheeled  -     Row Name 03/10/20 1212          Gait/Stairs Assessment/Training    [place] Level (Gait)  minimum assist (75% patient effort);2 person assist   -     Assistive Device (Gait)  walker, front-wheeled  -     Distance in Feet (Gait)  3' fwd then back   -     Bilateral Gait Deviations  forward flexed posture;heel strike decreased;leans left  -     Right Sided Gait Deviations  weight shift ability decreased  -     Comment (Gait/Stairs)  Limited ability to WB on RLE (reports increased pain) making it difficult to advance LLE.   -       User Key  (r) = Recorded By, (t) = Taken By, (c) = Cosigned By    Initials Name Provider Type     Monica Simons, PTA Physical Therapy Assistant        Obj/Interventions     Row Name 03/10/20 1216          Static Sitting Balance    Level of [place] (Unsupported Sitting, Static Balance)  independent  -     Sitting Position (Unsupported Sitting, Static Balance)  sitting in chair  -     Time Able to Maintain Position (Unsupported Sitting, Static Balance)  more than 5 minutes  -     Row Name 03/10/20 1216          Dynamic Sitting Balance    Level of [place], Reaches Outside Midline (Sitting, Dynamic Balance)  contact guard assist  -     Sitting Position, Reaches Outside Midline (Sitting, Dynamic Balance)  sitting in chair  -     Row Name 03/10/20 1216          Static Standing Balance    Level of [place] (Supported Standing, Static Balance)  minimal assist, 75% patient effort  -     Time Able to Maintain Position (Supported Standing, Static Balance)  15 to 30 seconds  -     Assistive Device Utilized (Supported Standing, Static Balance)  walker, rolling  -     Comment (Supported Standing, Static Balance)  cues for upright posture, improved following second sit>stand.   -     Row Name 03/10/20 1216          Dynamic Standing Balance    Level of [place], Reaches Outside Midline (Standing, Dynamic Balance)  minimal assist, 75% patient effort;moderate assist, 50 to 74% patient effort  -     Time Able to Maintain Position, Reaches Outside Midline (Standing, Dynamic Balance)  45 to 60  seconds  -SM     Assistive Device Utilized (Supported Standing, Dynamic Balance)  walker, rolling  -       User Key  (r) = Recorded By, (t) = Taken By, (c) = Cosigned By    Initials Name Provider Type    Monica Holliday PTA Physical Therapy Assistant        Goals/Plan     Row Name 03/10/20 1220          Transfer Goal 1 (PT)    Progress/Outcome (Transfer Goal 1, PT)  continuing progress toward goal  -       User Key  (r) = Recorded By, (t) = Taken By, (c) = Cosigned By    Initials Name Provider Type    Monica Holliday PTA Physical Therapy Assistant        Clinical Impression     Row Name 03/10/20 1217          Pain Scale: Numbers Pre/Post-Treatment    Pre/Post Treatment Pain Comment  does not rate pain, but does report pain of RLE makes it difficult to weight shift.   -     Row Name 03/10/20 1217          Plan of Care Review    Plan of Care Reviewed With  patient  -     Progress  improving  -     Outcome Summary  Pt does well with sit<>stand and several steps this session, he is requiring up to Mod A x 2 for safety and Max VC's for postural correction within rwx for short gait distance. Pt eager and motivated to make gains in mobility to regain highest level of function. Will require skilled IP Rehab at d/c to progress.   -     Row Name 03/10/20 1217          Positioning and Restraints    Post Treatment Position  chair  -     In Chair  notified nsg;call light within reach;encouraged to call for assist;exit alarm on  -SM       User Key  (r) = Recorded By, (t) = Taken By, (c) = Cosigned By    Initials Name Provider Type    Monica Holliday PTA Physical Therapy Assistant        Outcome Measures     Row Name 03/10/20 1220          How much help from another person do you currently need...    Turning from your back to your side while in flat bed without using bedrails?  3  -SM     Moving from lying on back to sitting on the side of a flat bed without bedrails?  3  -SM     Moving to and from a  bed to a chair (including a wheelchair)?  3  -SM     Standing up from a chair using your arms (e.g., wheelchair, bedside chair)?  2  -SM     Climbing 3-5 steps with a railing?  1  -SM     To walk in hospital room?  2  -SM     AM-PAC 6 Clicks Score (PT)  14  -SM     Row Name 03/10/20 1220          Modified [place] Scale    Modified Chay Scale  4 - Moderately severe disability.  Unable to walk without assistance, and unable to attend to own bodily needs without assistance.  -       User Key  (r) = Recorded By, (t) = Taken By, (c) = Cosigned By    Initials Name Provider Type    Monica Holliday, DALJIT Physical Therapy Assistant          PT Recommendation and Plan     Outcome Summary/Treatment Plan (PT)  Anticipated Discharge Disposition (PT): inpatient rehabilitation facility  Plan of Care Reviewed With: patient  Progress: improving  Outcome Summary: Pt does well with sit<>stand and several steps this session, he is requiring up to Mod A x 2 for safety and Max VC's for postural correction within rwx for short gait distance. Pt eager and motivated to make gains in mobility to regain highest level of function. Will require skilled IP Rehab at d/c to progress.      Time Calculation:   PT Charges     Row Name 03/10/20 1222             Time Calculation    Start Time  1042  -      Stop Time  1102  -      Time Calculation (min)  20 min  -      PT Received On  03/10/20  -      PT - Next Appointment  03/12/20  -         Time Calculation- PT    Total Timed Code Minutes- PT  20 minute(s)  -         Timed Charges    45637 -  PT Neuromuscular Reeducation Minutes  10  -      95774 - PT Therapeutic Activity Minutes  10  -        User Key  (r) = Recorded By, (t) = Taken By, (c) = Cosigned By    Initials Name Provider Type    Monica Holliday PTA Physical Therapy Assistant        Therapy Charges for Today     Code Description Service Date Service Provider Modifiers Qty    15370861777  PT NEUROMUSC RE EDUCATION  EA 15 MIN 3/10/2020 Monica Simons, PTA GP 1    73100853296 HC PT THERAPEUTIC ACT EA 15 MIN 3/10/2020 Monica Simons, DALJIT GP 1          PT G-Codes  Outcome Measure Options: AM-PAC 6 Clicks Basic Mobility (PT)  AM-PAC 6 Clicks Score (PT): 14  Modified [place] Scale: 4 - Moderately severe disability.  Unable to walk without assistance, and unable to attend to own bodily needs without assistance.    Monica Simons PTA  3/10/2020

## 2020-03-10 NOTE — PLAN OF CARE
Problem: Patient Care Overview  Goal: Plan of Care Review  Outcome: Ongoing (interventions implemented as appropriate)  Flowsheets (Taken 3/10/2020 0900)  Outcome Summary: Patient now outside 6 week sternal precaution window, and DANIEL Castaneda, states ok to utilize BUE. Pt demos improvement with bed mobility and functional transfers with ability to weight bear and pull with BUE.Supervision for supine>sit, Min-Mod Ax2 for sit<>stand and transfer to chair. pt requires max verbal cues for posture and correction.  Pt pleasant, agreeable, and appears motivated to improve. Edu on importance of OOB activity and completing all tasks at highest level of independence. Cont to recommend IP rehab at discharge.

## 2020-03-10 NOTE — PROGRESS NOTES
[facility]/PULM/CC PROGRESS NOTE     HISTORY OF PRESENT ILLNESS:  Jamin Hennessy is a 64 y.o. 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  Patient reported that earlier that morning, while at [facility], his blood pressure checked and was reportedly low.  It was at that time, that EMS was contacted, and patient was taken to the outlying facility emergency department.  During his stay in the ED, patient was diagnosed with pneumonia.  He had reported shortness of breath, cough with yellow sputum production that started the preceding early morning.  Patient denied nausea, vomiting, constipation, diarrhea, fever, or chills.  Patient did mention some chest soreness, but denies arm pain, neck pain, or jaw pain.  Notably, patient had a recent mitral valve replacement secondary to known endocarditis.  This patient is well-known to this practice, as we were following patient during his recent hospitalization.  At time of discharge, patient was recommended to complete 6 weeks of IV antibiotics with daptomycin and Rocephin per infectious disease.  Patient was discharged to [facility], and the plan was for antibiotic completion on 3/8/2020.  Patient was transferred to [facility] and admitted initially to the hospitalist team for further treatment and evaluation of patient condition.  Patient has required no vasopressors.  Infectious disease as well as cardiothoracic surgery have been consulted.  Patient presented with right upper extremity midline that was placed during his previous admission.  Due to patient's pulmonary status, with increased oxygen  "supplementation needs, cardiothoracic surgery requested patient to be transferred to [facility] for closer monitoring.  Previous work-up included bilateral lower extremity venous Doppler which reported \"chronic left lower extremity superficial thrombophlebitis noted in the small saphenous.\"  It is been reported that patient had a chest x-ray from outlying facility indicating pneumonia, and CT of the chest at this facility revealed \"extensive airspace and interstitial opacity throughout the left lung with smaller patchy air peripheral opacities in the right lung.  Only the right upper lobe multifocal infection/pneumonia is the most likely etiology.  There is mild mediastinal adenopathy which is likely reactive/infectious.  There are small layering bilateral pleural effusions.\"  Per infectious disease, in review of patient's previous medical record, patient had blood cultures that were positive for enterococcus faecium, which screened positive for VRE based on PCR.  Initially organism was susceptible to ampicillin and gentamicin.  Repeat blood cultures were negative.  Following patient's surgery on 1/22/2020 (MVR), the mitral valve tissue culture was also positive for enterococcus faecium, but sensitivities revealed resistance to ampicillin.  ID was consulted as the case has become rather complex, and that first and second line antibiotic therapies are revealing resistance.  At time of transfer, patient initially was on 3 L/min via nasal cannula, but after being transferred in bed, required an increase to 6 L/min via high flow nasal cannula.  Patient denies chest pain, neck pain, arm pain, jaw pain, nausea, vomiting, constipation, diarrhea, blood in urine or stool.  Patient does admit to some mild shortness of breath, frequent cough with yellow sputum production, but denies fever or chills.     [facility] was consulted for further evaluation and treatment of pneumonia, and to aid improvement in pulmonary status.  Thank you for " "this consultation, we will follow along on this patient.        SUBJECTIVE    2/29: Patient reports feeling somewhat better today.  Tolerating 5 L O2.  No shortness of air at rest.  3/1: Patient reports feeling well today.  He reports shortness of air is improving.  Demonstrates good compliance with I-S.  Tolerating O2 at 4 L by nasal cannula  3/2:  Doing ok.  Reports some SOA and cough.  On supplemental oxygen, 4 liters  3/3:  No new issues.  Feeling some better.  Cough improved.  On 2L nasal cannula  3/4: Breathing some better.  Minimal cough.  No complaints of chest pain.  No nausea or vomiting.  3/5: Seeing patient for the first time.  Significantly deconditioned and weak.  Continues to have cough without much sputum.  Shortness of breath improving.  Oxygen requirement better.  3/6: feels much better.  Shortness of breath stable.  Oxygen requirement is stable.  Remains on 3 L.  3/7: Patient reports feeling better today.  Tolerating O2 at 2 L with improved shortness of air.  He denies chest pain.  No nausea or vomiting  3/8: Feeling better, SOA better  3/9: Clinically improving.  Shortness of breath stable.  Oxygen requirement stable.  3/10 no SOA/CP   OBJECTIVE    /81   Pulse 85   Temp 98.2 °F (36.8 °C) (Oral)   Resp 18   Ht 175.3 cm (69.02\")   Wt 116 kg (255 lb 11.7 oz)   SpO2 95%   BMI 37.75 kg/m²   Intake/Output last 3 shifts:  I/O last 3 completed shifts:  In: 1320 [P.O.:1320]  Out: -   Intake/Output this shift:  I/O this shift:  In: 500 [P.O.:500]  Out: -     Intake/Output Summary (Last 24 hours) at 3/10/2020 1729  Last data filed at 3/10/2020 1455  Gross per 24 hour   Intake 980 ml   Output --   Net 980 ml       PHYSICAL EXAM:       Constitutional:  Well developed, well nourished, no acute distress, acutely ill-appearing, chronically ill-appearing  Eyes:  PERRL, conjunctiva normal, EOMI   HENT:  Atraumatic, external ears normal, nose normal. Neck-normal range of motion, no tenderness, supple, " trachea midline  Respiratory:  Bilateral air entry present, somewhat diminished on the left side.  No crackles or wheezing. non-labored respirations without accessory muscle use  Cardiovascular:  Normal rate, normal rhythm, no murmurs, no gallops, no rubs   GI:  Soft, nondistended, normal bowel sounds, nontender, no rebound, no guarding   :  deferred   Musculoskeletal: Bilateral lower extremity edema 2+, no tenderness, no deformities  Integument:  Well hydrated, no rash.  Sternal incision well-healed  Neurologic:  Alert & oriented x 3, no lateralizing deficits  Psychiatric:  Speech and behavior appropriate    Scheduled Meds:    apixaban 5 mg Oral Q12H   aspirin 81 mg Oral Daily   cetirizine 10 mg Oral Daily   finasteride 5 mg Oral Daily   FLUoxetine 40 mg Oral Daily   folic acid 1 mg Oral Daily   gabapentin 200 mg Oral Q12H   guaiFENesin 1,200 mg Oral Q12H   insulin lispro 0-9 Units Subcutaneous 4x Daily With Meals & Nightly   ipratropium-albuterol 3 mL Nebulization Q4H While Awake - RT   lactobacillus acidophilus 1 capsule Oral BID   pantoprazole 40 mg Oral QAM   polyethylene glycol 17 g Oral BID   sennosides-docusate 2 tablet Oral Nightly   sodium chloride 10 mL Intravenous Q12H   tamsulosin 0.4 mg Oral Daily   vitamin B-12 1,000 mcg Oral Daily   vitamin E 800 Units Oral Daily       Continuous Infusions:       PRN Meds:•  acetaminophen **OR** acetaminophen **OR** acetaminophen  •  benzonatate  •  bisacodyl  •  cyclobenzaprine  •  dextrose  •  dextrose  •  glucagon (human recombinant)  •  insulin lispro **AND** insulin lispro  •  ipratropium-albuterol  •  magnesium sulfate **OR** magnesium sulfate in D5W 1g/100mL (PREMIX)  •  ondansetron **OR** ondansetron  •  potassium chloride  •  sodium chloride     Data Review:  Lab Results (last 24 hours)     Procedure Component Value Units Date/Time    POC Glucose Once [[id number]]  (Normal) Collected:  03/10/20 1636    Specimen:  Blood Updated:  03/10/20 1643     Glucose  105 mg/dL      Comment: Serial Number: [id number]:  [id number]       POC Glucose Once [[id number]]  (Normal) Collected:  03/10/20 1131    Specimen:  Blood Updated:  03/10/20 1140     Glucose 92 mg/dL      Comment: Serial Number: [id number]:  [id number]       POC Glucose Once [[id number]]  (Abnormal) Collected:  03/10/20 0724    Specimen:  Blood Updated:  03/10/20 0725     Glucose 126 mg/dL      Comment: Serial Number: [id number]:  [id number]       Potassium [[id number]]  (Normal) Collected:  03/10/20 0323    Specimen:  Blood Updated:  03/10/20 0359     Potassium 3.9 mmol/L     Magnesium [[id number]]  (Normal) Collected:  03/10/20 0323    Specimen:  Blood Updated:  03/10/20 0359     Magnesium 2.1 mg/dL     POC Glucose Once [[id number]]  (Abnormal) Collected:  03/09/20 2009    Specimen:  Blood Updated:  03/09/20 2011     Glucose 147 mg/dL      Comment: Serial Number: [id number]:  [id number]                Imaging:  Imaging Results (Last 72 Hours)     ** No results found for the last 72 hours. **            ASSESSMENT/PLAN:     HCAP (healthcare-associated pneumonia)    Coronary artery disease due to lipid rich plaque    Dyslipidemia    Non-insulin dependent type 2 diabetes mellitus (CMS/HCC)    Diabetic neuropathy (CMS/HCC)    Tobacco abuse    Obesity (BMI 30-39.9)    S/P MVR (mitral valve replacement)    Eosinophilia    Impetigo    Microcytic anemia    Elevated liver enzymes         Severe sepsis without septic shock, secondary to pneumonia  -IVF bolus given at outlying facility  -Lactic Acid 1.0  -Cultures-negative  -Urine antigens-negative  -RVP-negative  -Reviewed CT Chest.  -Procalcitonin-0.10  -Rocephin, Merrem, and vancomycin, ID managing antibiotics  -Imaging with dense infiltrate in left lung, s/p bronchoscopy with BAL Left Lingula.  -WBC normalized     Sepsis associated hypotension, improving  -Did not require vasopressors  -Stable, continue to monitor     Left lung hospital-acquired  pneumonia  -Reviewed CT Chest.  Status post bronchoscopy.  BAL was obtained from left lingula.  All cultures negative so far.  Continue empiric IV antibiotics.  Monitor clinical progress.  Infectious disease adjusting the antibiotics.  -Aggressive pulmonary toileting  -Bronchoscopy cultures negative including PCP and viral panel.  -IS/Flutter Valve encouraged and ordered  -Finished Antibiotics per ID     Acute respiratory failure with hypoxia, likely secondary to pneumonia; R/O Volume overload  -From past admission, bedside PFT reviewed: Suggestive of restrictive defect. FEV1 46%.  +bronchdilator response.  Diffusion capacity normal when corrected.  -Oxygen supplementation, titrate to maintain oxygen saturation >91%, currently on 2 L  -Duonebs scheduled and PRN  -BNP 1553  -intermittent diuresis  -sputum 2/28 - normal respiratory nelson      H/o Enterococcus MV Endocarditis; S/P MVR (tissue)/MICHELLE ligation (1/22/2020)  -Previous hospitalization cultures: 1) Blood cultures-Enterococcus faecium, positive for VRE on PCR; initially susciptible to ampicillin and gentamicin.  2) MV tissue culture with Enterococcus faecium, resistant to ampicillin, susceptible for daptomycin.  -Initially placed on IV Rocephin & Daptomycin x 6 weeks by ID with end date of 3/8/2020 (during previous hospitalization).  -ID consulted and managing antibiotics, as above.  -CTS following    Elevated AST/ALT  -Etiology is unclear  -medical management per primary. May need to consider changing amiodarone to alternative antidysrhythmic      CAD  -Continue ASA     Hyperlipidemia  -Statin therapy previously discontinued due to receiving daptomycin, resume when clinically appropriate     PAF (controlled), on chronic anticoagulation with Eliquis  -Continue amiodarone  -Continue Eliquis     Diabetes mellitus, type 2, with neuropathy  -strict glycemic control recommended  -Accuchecks AC/HS with sliding scale insulin  -Continue gabapentin     GERD  -Continue  protonix     BPH  -Continue proscar, flomax     General Anxiety Disorder  -Continue prozac     Multiple sclerosis  -Mainly wheelchair bound, limited mobility  -PT/OT     Obesity  -BMI 37.91  -Counseled on lifestyle modifications to improve overall health    Hypersomnia and probable sleep apnea  -Recommend sleep study as outpatient.     Tobacco Abuse, current  -Counseled on smoking cessation     Vitamin D/B12 deficiency  -Continue folic acid, B12 supplements     Accuchecks with SSI  Code Status: CPR, Full Interventions  VTE Prophylaxis:  Eliquis/SCDs  PUD Prophylaxis: Protonix

## 2020-03-10 NOTE — DISCHARGE SUMMARY
Date of Admission: 2/27/2020  Date of Discharge: 3/10/2020    Discharge Diagnosis:   Readmit for PNA  Mitral valve endocarditis s/p tissue MVR  Enterococcus bacteremia/VRE  CAD with stent 1 year ago  Paroxysmal atrial fibrillation with RVR  HTN  HLD  DM type 2 with neuropathy  GERD  Multiple sclerosis  Current tobacco abuse   BPH with incontinence   Obesity---BMI 38.19  Generalized anxiety disorder    Presenting Problem/History of Present Illness  Hypotension [I95.9]     Hospital Course  Patient is a 64 y.o. male known to our service after undergoing MVR with Dr. Cole d/t Enterococcus MV IE on 1/22/2020. He was discharged to rehab requiring IV antibiotics for a 6 week course. While at rehab he was noted to have hypotension during his stay and transferred to [facility]. He was noted to have decreased Hgb and increased WBC and transferred to [facility] for further care. CT Chest showed left lung infiltrative process. ID was consulted to help manage suspected PNA and antibiotics were adjusted. The patient underwent a bronchoscopy to better identify infectious organism on 3/4/2020. Bronch cultures ultimately resulted with no growth and last dose of antibiotics was 3/8/2020. PICC line was removed. Patient should have CBC drawn on Friday (3/13/2020). Patient is stable to return to [facility].    Procedures Performed  Procedure(s):  3/4/2020-- BRONCHOSCOPY with bronchioalveolar lavage       Consults:   Consults     Date and Time Order Name Status Description    3/5/2020 1438 Inpatient Hospitalist Consult Completed     2/28/2020 0945 Inpatient Pulmonology Consult Completed     2/27/2020 1136 Inpatient Infectious Diseases Consult Completed     2/27/2020 1136 Inpatient Cardiothoracic Surgery Consult      1/17/2020 1457 Inpatient Nephrology Consult Completed     1/16/2020 1446 Inpatient Cardiothoracic Surgery Consult Completed     1/15/2020 0940 Inpatient Infectious Diseases Consult Completed     1/14/2020 1009  Inpatient Hospitalist Consult Completed     1/14/2020 0224 Inpatient Cardiology Consult Completed           Pertinent Test Results:    Lab Results   Component Value Date    WBC 8.50 03/09/2020    HGB 8.2 (L) 03/09/2020    HCT 25.0 (L) 03/09/2020    MCV 75.6 (L) 03/09/2020     (H) 03/09/2020      Lab Results   Component Value Date    GLUCOSE 101 (H) 03/08/2020    CALCIUM 8.5 (L) 03/08/2020     03/08/2020    K 3.9 03/10/2020    CO2 25.0 03/08/2020     03/08/2020    BUN 16 03/08/2020    CREATININE 0.94 03/08/2020    EGFRIFNONA 81 03/08/2020    BCR 17.0 03/08/2020    ANIONGAP 9.0 03/08/2020     Lab Results   Component Value Date    INR 1.22 (H) 02/28/2020    PROTIME 12.4 (H) 02/28/2020         Condition on Discharge: Stable for discharge to [facility]    Vital Signs  Temp:  [97.6 °F (36.4 °C)-98.9 °F (37.2 °C)] 97.6 °F (36.4 °C)  Heart Rate:  [] 94  Resp:  [17-20] 17  BP: (120-166)/(80-98) 120/81      Discharge Disposition  Rehab Facility or Unit (DC - External)    Discharge Medications     Discharge Medications      Continue These Medications      Instructions Start Date   acetaminophen 500 MG tablet  Commonly known as:  TYLENOL   500 mg, Oral, Every 6 Hours PRN      apixaban 5 MG tablet tablet  Commonly known as:  ELIQUIS   5 mg, Oral, Every 12 Hours Scheduled      aspirin 81 MG EC tablet   81 mg, Oral, Daily      bacitracin 500 UNIT/GM ointment   Topical, Daily      cyclobenzaprine 5 MG tablet  Commonly known as:  FLEXERIL   5 mg, Oral, 2 Times Daily PRN      docusate sodium 100 MG capsule  Commonly known as:  COLACE   100 mg, Oral, 2 Times Daily      DULCOLAX 10 MG suppository  Generic drug:  bisacodyl   10 mg, Rectal, Daily      finasteride 5 MG tablet  Commonly known as:  PROSCAR   5 mg, Oral, Daily      FLUoxetine 20 MG capsule  Commonly known as:  PROzac   40 mg, Oral, Daily      folic acid 1 MG tablet  Commonly known as:  FOLVITE   1 mg, Oral, Daily      gabapentin 100  MG capsule  Commonly known as:  NEURONTIN   200 mg, Oral, 3 Times Daily      insulin lispro 100 UNIT/ML injection  Commonly known as:  humaLOG   Subcutaneous, 4 Times Daily PRN      lactobacillus tablet caplet   Oral, 2 Times Daily      Loratadine 10 MG capsule   Oral      omeprazole 20 MG capsule  Commonly known as:  priLOSEC   20 mg, Oral, Daily      tamsulosin 0.4 MG capsule 24 hr capsule  Commonly known as:  FLOMAX   1 capsule, Oral, Daily      vitamin B-12 500 MCG tablet  Commonly known as:  CYANOCOBALAMIN   1,000 mcg, Oral, Daily      vitamin D 1.25 MG (77605 UT) capsule capsule  Commonly known as:  ERGOCALCIFEROL   50,000 Units, Oral, Weekly      vitamin E 400 UNIT capsule   800 Units, Oral, Daily         Stop These Medications    amiodarone 200 MG tablet  Commonly known as:  PACERONE     amLODIPine 5 MG tablet  Commonly known as:  NORVASC     cefTRIAXone 2 g in sodium chloride 0.9 % 100 mL IVPB     cloNIDine 0.2 MG/24HR patch  Commonly known as:  CATAPRES-TTS     DAPTOmycin 900 mg in sodium chloride 0.9 % 50 mL     furosemide 40 MG tablet  Commonly known as:  LASIX     metoprolol tartrate 25 MG tablet  Commonly known as:  LOPRESSOR     potassium chloride 20 MEQ CR tablet  Commonly known as:  K-DUR,KLOR-CON            Discharge Diet:   Healthy Heart Diet    Activity at Discharge:   As tolerated, no lifting > 10 pounds x 6 weeks    Follow-up Appointments  Future Appointments   Date Time Provider Department Center   3/11/2020  2:00 PM [facility] None   3/16/2020 12:00 PM Fallon Cole MD [facility] None     Additional Instructions for the Follow-ups that You Need to Schedule     Discharge Follow-up with PCP   As directed       Currently Documented PCP:    Kajal Sanchez    PCP Phone Number:    [phone]     Follow Up Details:  in one month         Discharge Follow-up with Specified Provider: Cardiac Surgery; 1 Week   As directed      To:  Cardiac Surgery    Follow Up:  1 Week     Follow Up Details:  Follow up with cardiac surgeon Dr. Fallon Cole on 3/16/2020 at 12pm.         Call MD With Problems / Concerns   Mar 11, 2020      Instructions: Call for temp >101 or surgical wound drainage    Order Comments:  Instructions: Call for temp >101 or surgical wound drainage          CBC & Differential    Mar 13, 2020 (Approximate)      Manual Differential:  No               Test Results Pending at Discharge   Order Current Status    AFB Culture - Lavage, Lung, Lingula Preliminary result    Fungus Culture - Lavage, Lung, Lingula Preliminary result             YUE QUAN  03/10/20  14:25

## 2020-03-10 NOTE — PROGRESS NOTES
Infectious Diseases Progress Note      LOS: 12 days   Patient Care Team:  Kajal Sanchez as PCP - General (Internal Medicine)  Frederick George MD as Consulting Physician (Nephrology)        Subjective       The patient has been afebrile for the last 24 hours.  The patient is currently on 2 L of oxygen via nasal cannula.  The patient denied having any complaints.      Review of Systems:   Review of Systems   Constitutional: Positive for fatigue.   HENT: Negative.    Respiratory: Positive for cough and shortness of breath.    Cardiovascular:        Chest soreness around sternal incision    Gastrointestinal: Negative.    Genitourinary: Negative.    Musculoskeletal: Positive for arthralgias.   Skin: Negative.    Neurological: Positive for weakness.   Psychiatric/Behavioral: Negative.         Objective     Vital Signs  Temp:  [97.6 °F (36.4 °C)-98.9 °F (37.2 °C)] 97.6 °F (36.4 °C)  Heart Rate:  [] 94  Resp:  [11-20] 17  BP: (120-166)/(80-98) 120/81    Physical Exam:  Physical Exam   Constitutional: He appears well-developed and well-nourished.   HENT:   Head: Normocephalic and atraumatic.   Eyes: Pupils are equal, round, and reactive to light. Conjunctivae and EOM are normal.   Neck: Neck supple.   Cardiovascular: Normal rate, regular rhythm and normal heart sounds.   Pulmonary/Chest: Effort normal. He has rales.   Some crackles in left lobe    Abdominal: Soft. Bowel sounds are normal.   Musculoskeletal:   Degenerative changes patient appears to have some general weakness due to the multiple sclerosis    Neurological: He is alert.   Alert and oriented x2-patient patient seems more alert today   Skin: Skin is warm and dry.   Chest soreness around sternal incision    Psychiatric: He has a normal mood and affect.   Vitals reviewed.       Results Review:    I have reviewed all clinical data, test, lab, and imaging results.     Radiology  No Radiology Exams Resulted Within Past 24  Hours    Cardiology    Laboratory    Results from last 7 days   Lab Units 03/09/20  0244 03/08/20  0457 03/07/20  0403 03/06/20  1031 03/05/20  0247 03/04/20  0328   WBC 10*3/mm3 8.50 9.10 8.90 9.40 10.30 11.90*   HEMOGLOBIN g/dL 8.2* 8.4* 8.2* 8.1* 7.8* 7.8*   HEMATOCRIT % 25.0* 25.8* 25.6* 25.9* 24.4* 24.1*   PLATELETS 10*3/mm3 569* 605* 611* 594* 708* 664*     Results from last 7 days   Lab Units 03/10/20  0323 03/09/20  0244 03/08/20 0457 03/07/20  0403 03/06/20  0248 03/05/20  0247 03/04/20  0328   SODIUM mmol/L  --   --  141 140 143 141 142   POTASSIUM mmol/L 3.9 3.9 4.0 4.0 4.2  4.1 4.3 4.1   CHLORIDE mmol/L  --   --  107 107 108* 107 108*   CO2 mmol/L  --   --  25.0 24.0 23.0 24.0 25.0   BUN mg/dL  --   --  16 16 16 15 14   CREATININE mg/dL  --   --  0.94 0.90 1.02 0.99 0.94   GLUCOSE mg/dL  --   --  101* 117* 100* 124* 122*   ALBUMIN g/dL  --   --  2.80* 2.70*  --   --  2.50*   BILIRUBIN mg/dL  --   --  <0.2* <0.2*  --   --  <0.2*   ALK PHOS U/L  --   --  89 92  --   --  89   AST (SGOT) U/L  --   --  108* 163*  --   --  79*   ALT (SGPT) U/L  --   --  167* 176*  --   --  93*   CALCIUM mg/dL  --   --  8.5* 8.1* 8.6 8.6 8.2*                 Microbiology   Microbiology Results (last 10 days)     Procedure Component Value - Date/Time    Pneumocystis PCR - Wash, Lung [[id number]] Collected:  03/04/20 0825    Lab Status:  Final result Specimen:  Wash from Lung Updated:  03/09/20 0908     Pneumocystis jiroveci DNA Negative     Comment: -------------------ADDITIONAL INFORMATION-------------------  This test was developed and its performance characteristics  determined by [facility] in a manner consistent with CLIA  requirements. This test has not been cleared or approved by  the U.S. Food and Drug Administration.  REFERENCE RANGE: Not Applicable        Specimen Source Comment     Comment: LUNG  WASH B       Narrative:       Performed at:  01 - [facility] [address]   [id number]  : Alirio Judge , Phone:  [phone]    Fungus Culture - Lavage, Lung, Lingula [[id number]] Collected:  03/04/20 0743    Lab Status:  Preliminary result Specimen:  Lavage from Lung, Lingula Updated:  03/09/20 0930     Fungus Culture No fungus isolated at less than 1 week    AFB Culture - Lavage, Lung, Lingula [[id number]] Collected:  03/04/20 0743    Lab Status:  Preliminary result Specimen:  Lavage from Lung, Lingula Updated:  03/09/20 0930     AFB Culture No AFB isolated at less than 1 week     AFB Stain No acid fast bacilli seen    BAL Culture, Quantitative - Lavage, Lung, Lingula [[id number]] Collected:  03/04/20 0743    Lab Status:  Final result Specimen:  Lavage from Lung, Lingula Updated:  03/06/20 0655     BAL Culture >100,000 CFU/mL Normal Respiratory Cheryl     Gram Stain Moderate (3+) WBCs per low power field      Moderate (3+) Epithelial cells per low power field      Few (2+) Gram positive cocci in pairs and clusters          Medication Review:       Schedule Meds    apixaban 5 mg Oral Q12H   aspirin 81 mg Oral Daily   cetirizine 10 mg Oral Daily   finasteride 5 mg Oral Daily   FLUoxetine 40 mg Oral Daily   folic acid 1 mg Oral Daily   gabapentin 200 mg Oral Q12H   guaiFENesin 1,200 mg Oral Q12H   insulin lispro 0-9 Units Subcutaneous 4x Daily With Meals & Nightly   ipratropium-albuterol 3 mL Nebulization Q4H While Awake - RT   lactobacillus acidophilus 1 capsule Oral BID   pantoprazole 40 mg Oral QAM   polyethylene glycol 17 g Oral BID   sennosides-docusate 2 tablet Oral Nightly   sodium chloride 10 mL Intravenous Q12H   tamsulosin 0.4 mg Oral Daily   vitamin B-12 1,000 mcg Oral Daily   vitamin E 800 Units Oral Daily       Infusion Meds       PRN Meds  •  acetaminophen **OR** acetaminophen **OR** acetaminophen  •  benzonatate  •  bisacodyl  •  cyclobenzaprine  •  dextrose  •  dextrose  •  glucagon (human recombinant)  •  insulin lispro **AND** insulin lispro  •   ipratropium-albuterol  •  magnesium sulfate **OR** magnesium sulfate in D5W 1g/100mL (PREMIX)  •  ondansetron **OR** ondansetron  •  potassium chloride  •  sodium chloride        Assessment/Plan       Antimicrobial Therapy   1.        day  2.     day  3.    day    4.     Day   5.      Day      Assessment      Extensive left upper lobe and left lower lobe infiltrate.  The presentation is highly consistent with pneumonia.  Aspiration is less likely since it is involving all lobes of the left lung but I am concerned about hospital-acquired pneumonia.  The patient is currently on 5 L of oxygen via nasal cannula.  Sputum culture did not grow any significant pathogen.  Repeat chest x-ray did not show improvement  -3/4/1731-[id number]-idrzimt of tracheobronchomalacia throughout the airway.    Mild reactive leukocytosis     Status post mitral valve replacement for mitral valve endocarditis on January 22, 2020 with enterococcus faecium.  The patient was finishing 6 weeks of IV daptomycin and IV Rocephin at the rehab facility and the last day of the IV antibiotics was supposed to be March 8, 2020     Type 2 diabetes     Tobacco abuse     Plan     No need for further antimicrobial therapy.  Patient received the last dose of IV antibiotics on March 8, 2020 and he received Rocephin, vancomycin and meropenem.  He was treated for recently diagnosed pneumonia and he was treated with 6 weeks of IV antibiotics for mitral valve endocarditis  Okay to discharge patient to rehab facility  Remove PICC line on discharge          Allison Box MD  03/10/20  13:10     Note is dictated utilizing voice recognition software/Dragon

## 2020-03-10 NOTE — PROGRESS NOTES
Continued Stay Note   [facility]     Patient Name: Jamin Hennessy  MRN: [id number]  Today's Date: 3/10/2020    Admit Date: 2/27/2020    Discharge Plan     Row Name 03/10/20 1639       Plan    Final Discharge Disposition Code  03 - skilled nursing facility (SNF)    Final Note  [facility]       Plan    Patient/Family in Agreement with Plan  yes    Plan Comments  PCU attempting to arrange ambulance transportation for patient through [facility] (formally yellow) and they [facility] at [phone]. CM and unit secretary called number and it rang with no answer or VM box. [facility]racted [facility]  office at [phone] and left VM. Contacted another [facility] phone number [phone] and they report that they do not arrange for transportation for a patient from one hospital to another facility and only help arrange whelchair vans, however patient cannot go on wheelchair van due to need for O2. Unit secretary scheduled [place] [facility] transportation for 8:30/9pm with [employer] to be run as the insurance. Patient does not have his card with him but [facility] confirmed that ID number is [id number].        Plan    Plan  DC Plan: Return to [facility] - accepted, Medicaid pending. No PASRR needed for return. Confirmed return with pt & facility.      Expected Discharge Date and Time     Expected Discharge Date Expected Discharge Time    Mar 10, 2020         Olga Norman RN       Office Phone: [phone]  Office Cell: [phone]

## 2020-03-10 NOTE — PROGRESS NOTES
Continued Stay Note   [facility]     Patient Name: Jamin Hennessy  MRN: [id number]  Today's Date: 3/10/2020    Admit Date: 2/27/2020    Discharge Plan     Row Name 03/10/20 1154       Plan    Plan  DC Plan: Return to [facility] - accepted. Precert denied, requested peer to peer (P2P) by 3/10 @ 12:00pm. No PASRR needed for return. If P2P denied, then pt can go as Medicaid pending.     Plan Comments  SW informed by facility liaison regarding precert denial & request for P2P. Secure chat sent to MD/NP with information to complete, noting deadline is 12:00pm on 3/10. SW informed by liaison if denied/not completed pt can return as Medicaid pending.      NEGRO Paredes    Phone: [phone]  Cell: [phone]  Fax: [phone]  [email or web address]

## 2020-03-10 NOTE — PLAN OF CARE
Problem: Patient Care Overview  Goal: Plan of Care Review  Flowsheets (Taken 3/10/2020 1217)  Progress: improving  Plan of Care Reviewed With: patient  Outcome Summary: Pt does well with sit<>stand and several steps this session, he is requiring up to Mod A x 2 for safety and Max VC's for postural correction within rwx for short gait distance. Pt eager and motivated to make gains in mobility to regain highest level of function. Will require skilled IP Rehab at d/c to progress.

## 2020-03-10 NOTE — PLAN OF CARE
Problem: Patient Care Overview  Goal: Plan of Care Review  Outcome: Ongoing (interventions implemented as appropriate)  Note:   Pt still constipated, will continue to monitor.

## 2020-03-10 NOTE — PROGRESS NOTES
Continued Stay Note   [facility]     Patient Name: Jamin Hennessy  MRN: [id number]  Today's Date: 3/10/2020    Admit Date: 2/27/2020    Discharge Plan     Row Name 03/10/20 1406       Plan    Plan  DC Plan: Return to [facility] - accepted, Medicaid pending. No PASRR needed for return. Confirmed return with pt & facility.     Plan Comments  Precert denied, pt will return to facility as Medicaid pending. Notified RN via phone/MD via secure chat.      NEGRO Paredes    Phone: [phone]  Cell: [phone]  Fax: [phone]  [email or web address]

## 2020-03-10 NOTE — PROGRESS NOTES
S/P tissue MVR/MICHELLE ligation--Douglas on 1/22/20    Pt known to our service d/t Enterococcus (VRE) MV IE.  He underwent tissue MVR on 1/22/2020 by Dr. Cole.    His postop course was lengthy given discharge planning r/t daptomycin.  He also had resp insufficiency issues and recurrent atrial fib.  Eliquis was restarted prior to transfer to [facility].    He was noted to have hypotension and was transferred to [facility] and diagnosed with pna.  He was transferred to [facility] for further care and mmgt.    Subjective: Antibiotics completed on 3/8/2020. Afebrile overnight. Patient states he is doing well with no complaints.    afebrile      Intake/Output Summary (Last 24 hours) at 3/10/2020 1230  Last data filed at 3/10/2020 1009  Gross per 24 hour   Intake 960 ml   Output --   Net 960 ml     Temp:  [97.6 °F (36.4 °C)-98.9 °F (37.2 °C)] 97.6 °F (36.4 °C)  Heart Rate:  [] 94  Resp:  [11-20] 17  BP: (120-166)/(80-98) 120/81      Results from last 7 days   Lab Units 03/09/20  0244 03/08/20  0457   WBC 10*3/mm3 8.50 9.10   HEMOGLOBIN g/dL 8.2* 8.4*   HEMATOCRIT % 25.0* 25.8*   PLATELETS 10*3/mm3 569* 605*     Results from last 7 days   Lab Units 03/10/20  0323  03/08/20  0457   CREATININE mg/dL  --   --  0.94   POTASSIUM mmol/L 3.9   < > 4.0   SODIUM mmol/L  --   --  141   MAGNESIUM mg/dL 2.1   < > 2.2    < > = values in this interval not displayed.       Physical Exam:  Neuro intact, nad, resting in bed, awake and conversant  Tele:  afib 90s  Diminished bases, room air   Sternotomy well healed, sternum stable  Benign abd, tolerating po intake, no BM  Trace edema    Assessment/Plan:  Principal Problem:    HCAP (healthcare-associated pneumonia)  Active Problems:    Coronary artery disease due to lipid rich plaque    Dyslipidemia    Non-insulin dependent type 2 diabetes mellitus (CMS/HCC)    Diabetic neuropathy (CMS/HCC)    Tobacco abuse    Obesity (BMI 30-39.9)    S/P MVR (mitral valve replacement)    Eosinophilia     Impetigo    Microcytic anemia    Elevated liver enzymes    Readmit for PNA--per hospitalist  Mitral valve endocarditis s/p tissue MVR--stable  Enterococcus bacteremia/VRE--cont dapto/Rocephin   CAD with stent 1 year ago  Paroxysmal atrial fibrillation with RVR--in afib currently  HTN--stable  HLD--no statin while on dapto  DM type 2 with neuropathy--SSl  GERD  Multiple sclerosis---mainly wheelchair bound  Current tobacco abuse   BPH with incontinence   Obesity---BMI 38.19  Generalized anxiety disorder     PT/OT following  Check CBC, CMP, CXR in am  Anticipate back to rehab at discharge when precert obtained    SHADIA DE LA GARZA, YUE  3/10/2020  12:30

## 2020-04-02 LAB — FUNGUS WND CULT: ABNORMAL

## 2020-04-15 LAB
MYCOBACTERIUM SPEC CULT: NORMAL
NIGHT BLUE STAIN TISS: NORMAL

## 2020-05-20 ENCOUNTER — DOCUMENTATION (OUTPATIENT)
Dept: PULMONOLOGY | Facility: HOSPITAL | Age: 65
End: 2020-05-20

## 2020-05-20 DIAGNOSIS — R06.02 SHORTNESS OF BREATH: Primary | ICD-10-CM

## 2020-05-20 DIAGNOSIS — J18.9 PNEUMONIA OF LEFT LUNG DUE TO INFECTIOUS ORGANISM, UNSPECIFIED PART OF LUNG: ICD-10-CM

## 2020-05-20 PROBLEM — I95.9 HYPOTENSION: Status: RESOLVED | Noted: 2020-02-27 | Resolved: 2020-05-20

## 2020-05-20 NOTE — PROGRESS NOTES
5/20/2020     Jamin Hennessy  1955      No chief complaint on file.        Subjective   64 y/o male with hx of PNA, sepsis, resp failure on February 2020. No respiratory symptoms currently. No fever/chills.       Review of System  General: Denies fevers or chills.  Denies any weakness or fatigue.  Denies any weight loss or weight gain.  HEENT: Denies sore throat, rhinorrhea, ear pain.  Denies any change in vision.   LUNGS: Denies any shortness of breath or wheezing.  Denies any cough.  Denies any hemoptysis.  CARDIAC: Denies any chest pain, palpitations. Denies edema  ABD: Denies any abdominal pain.  Denies any N/V/D  PSYCH: Negative for suicidal ideas. Denies anxiety or depression  All other systems reviewed and negative unless stated in HPI       Objective    There were no vitals filed for this visit.             Current Outpatient Medications:   •  acetaminophen (TYLENOL) 500 MG tablet, Take 1 tablet by mouth Every 6 (Six) Hours As Needed for Mild Pain ., Disp: , Rfl:   •  apixaban (ELIQUIS) 5 MG tablet tablet, Take 1 tablet by mouth Every 12 (Twelve) Hours., Disp: 60 tablet, Rfl: 3  •  aspirin 81 MG EC tablet, Take 81 mg by mouth Daily., Disp: , Rfl:   •  bacitracin 500 UNIT/GM ointment, Apply  topically to the appropriate area as directed Daily., Disp: , Rfl:   •  cyclobenzaprine (FLEXERIL) 5 MG tablet, Take 1 tablet by mouth 2 (Two) Times a Day As Needed for Muscle Spasms., Disp: 15 tablet, Rfl: 0  •  docusate sodium (COLACE) 100 MG capsule, Take 100 mg by mouth 2 (Two) Times a Day., Disp: , Rfl:   •  finasteride (PROSCAR) 5 MG tablet, Take 5 mg by mouth Daily., Disp: , Rfl:   •  FLUoxetine (PROzac) 20 MG capsule, Take 40 mg by mouth Daily., Disp: , Rfl:   •  folic acid (FOLVITE) 1 MG tablet, Take 1 mg by mouth Daily., Disp: , Rfl:   •  gabapentin (NEURONTIN) 100 MG capsule, Take 200 mg by mouth 3 (Three) Times a Day., Disp: , Rfl:   •  insulin lispro (humaLOG) 100 UNIT/ML injection, Inject  under the  skin into the appropriate area as directed 4 (Four) Times a Day As Needed for High Blood Sugar (sliding scale, not specific)., Disp: , Rfl:   •  lactobacillus (BACID) tablet caplet, Take  by mouth 2 (Two) Times a Day., Disp: , Rfl:   •  Loratadine 10 MG capsule, Take  by mouth., Disp: , Rfl:   •  omeprazole (priLOSEC) 20 MG capsule, Take 20 mg by mouth Daily., Disp: , Rfl:   •  tamsulosin (FLOMAX) 0.4 MG capsule 24 hr capsule, Take 1 capsule by mouth Daily., Disp: , Rfl:   •  vitamin B-12 (CYANOCOBALAMIN) 500 MCG tablet, Take 1,000 mcg by mouth Daily., Disp: , Rfl:   •  vitamin E 400 UNIT capsule, Take 800 Units by mouth Daily., Disp: , Rfl:     Social History     Socioeconomic History   • Marital status:      Spouse name: Not on file   • Number of children: Not on file   • Years of education: Not on file   • Highest education level: Not on file   Tobacco Use   • Smoking status: Current Some Day Smoker     Types: Cigarettes   • Smokeless tobacco: Current User     Types: Chew   Substance and Sexual Activity   • Alcohol use: Not Currently   • Drug use: Never   • Sexual activity: Defer       Past Medical History:   Diagnosis Date   • A-fib (CMS/[facility])    • CAD (coronary artery disease)    • Elevated cholesterol    • Hypertension    • MI (myocardial infarction) (CMS/[facility])    • Non-insulin dependent type 2 diabetes mellitus (CMS/[facility])                Diagnoses and all orders for this visit:    1. Shortness of breath (Primary)  - Resolved   2. Pneumonia of left lung due to infectious organism, unspecified part of lung  - Recent sep[sis, PNA, acute resp failure on 2/20. Resolved, no current respiratory issues. Not on home O2 per patient.        Discussed COVID 19, social distancing and face mask use.     Due to COVID -19 pandemic, this was a phone conversation in lieu of in-person visit. The patient provided verbal consent for an over the phone visit. I spent 5 min on the call conducting an interview and educating  patient on my assessment and plan. Additional 15 min were spent on documentation and review of data for this visit.     Evangelista Neri MD

## 2020-07-08 ENCOUNTER — OFFICE VISIT (OUTPATIENT)
Dept: CARDIOLOGY | Facility: CLINIC | Age: 65
End: 2020-07-08

## 2020-07-08 VITALS
SYSTOLIC BLOOD PRESSURE: 138 MMHG | WEIGHT: 250 LBS | OXYGEN SATURATION: 96 % | DIASTOLIC BLOOD PRESSURE: 92 MMHG | HEART RATE: 84 BPM | BODY MASS INDEX: 36.9 KG/M2

## 2020-07-08 DIAGNOSIS — I25.83 CORONARY ARTERY DISEASE DUE TO LIPID RICH PLAQUE: Chronic | ICD-10-CM

## 2020-07-08 DIAGNOSIS — E11.9 NON-INSULIN DEPENDENT TYPE 2 DIABETES MELLITUS (HCC): Chronic | ICD-10-CM

## 2020-07-08 DIAGNOSIS — Z98.61 CAD S/P PERCUTANEOUS CORONARY ANGIOPLASTY: ICD-10-CM

## 2020-07-08 DIAGNOSIS — I48.0 PAROXYSMAL ATRIAL FIBRILLATION (HCC): ICD-10-CM

## 2020-07-08 DIAGNOSIS — E66.9 OBESITY (BMI 30-39.9): Chronic | ICD-10-CM

## 2020-07-08 DIAGNOSIS — Z72.0 TOBACCO ABUSE: Chronic | ICD-10-CM

## 2020-07-08 DIAGNOSIS — I25.10 CORONARY ARTERY DISEASE DUE TO LIPID RICH PLAQUE: Chronic | ICD-10-CM

## 2020-07-08 DIAGNOSIS — Z95.2 S/P MVR (MITRAL VALVE REPLACEMENT): Chronic | ICD-10-CM

## 2020-07-08 DIAGNOSIS — I10 ESSENTIAL HYPERTENSION: Chronic | ICD-10-CM

## 2020-07-08 DIAGNOSIS — I25.10 CAD S/P PERCUTANEOUS CORONARY ANGIOPLASTY: ICD-10-CM

## 2020-07-08 DIAGNOSIS — I25.10 CORONARY ARTERY DISEASE INVOLVING NATIVE CORONARY ARTERY OF NATIVE HEART WITHOUT ANGINA PECTORIS: Primary | ICD-10-CM

## 2020-07-08 DIAGNOSIS — Z01.810 PREOPERATIVE CARDIOVASCULAR EXAMINATION: ICD-10-CM

## 2020-07-08 PROCEDURE — 99214 OFFICE O/P EST MOD 30 MIN: CPT | Performed by: INTERNAL MEDICINE

## 2020-07-08 PROCEDURE — 93000 ELECTROCARDIOGRAM COMPLETE: CPT | Performed by: INTERNAL MEDICINE

## 2020-07-08 NOTE — PROGRESS NOTES
Cardiology Office Visit      Encounter Date:  07/08/2020    Patient ID:   Jamin Hennessy is a 65 y.o. male.    Reason For Followup:  CAD  Hx MV Endocarditis  Preoperative cardiovascular risk assessment    Brief Clinical History:  Dear Kajal Kincaid    I had the pleasure of seeing Jamin Hennessy today. As you are well aware, this is a 65 y.o. male with a history of ischemic heart disease.  The patient is undergone PCI and stenting in the past.  He has additional history that includes mitral valve endocarditis and is status post mitral valve replacement with a bioprosthetic device.  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  He presents today for follow-up on the above conditions.    Interval History:  He denies any chest pain pressure heaviness or tightness.  He does report shortness of breath but this is not out of character.  He denies any PND orthopnea.  He denies any syncope or near syncope.    The patient underwent replacement of his mitral valve with a bioprosthetic device in February 2020.  This was done secondary to destructive endocarditis that was noted.  The patient also has chronic kidney stones.  He had a ureteral stent placed prior to his surgery.  The stents have been in place for about 6 months.  [facility]  They now want to take these out and are requesting cardiac clearance to do so.    Assessment & Plan    Impressions:  Coronary artery disease with history of PCI and stenting in the past  Valvular heart disease status post replacement of the mitral valve with a bioprosthetic device secondary to endocarditis  Preoperative risk assessment for noncardiac surgery  Paroxysmal atrial fibrillation  Chronic kidney stones  Diabetes mellitus  Obesity  Hypertension  Hypertensive cardiovascular disease  Coagulopathy  secondary to Eliquis  Acid reflux    Recommendations:  Patient will be low risk for major adverse cardiac events from a cardiac standpoint.  Patient must be treated perioperatively with antimicrobial agents due to the bioprosthetic valve and history of endocarditis.  Will forward information to CT surgery for their input  Continuation of his current cardiovascular regimen at the present time.  Follow-up in 3 months time sooner should there be difficulties.    Objective:    Vitals:  Vitals:    07/08/20 1520   BP: 138/92   Pulse: 84   SpO2: 96%   Weight: 113 kg (250 lb)       Physical Exam:    General: Alert, cooperative, no distress, appears stated age  Head:  Normocephalic, atraumatic, mucous membranes moist  Eyes:  Conjunctiva/corneas clear, EOM's intact     Neck:  Supple,  no bruit  Lungs: Clear to auscultation bilaterally, no wheezes rhonchi rales are noted  Chest wall: No tenderness  Heart::  Regular rate and rhythm, S1 and S2 normal, 1/6 holosystolic murmur.  No rub or gallop  Abdomen: Soft, non-tender, nondistended bowel sounds active.  Obese  Extremities: No cyanosis, clubbing, edema noted.  Pulses: 2+ and symmetric all extremities  Skin:  Abrasion to right great toe.  Black spot on right pinky toe  Neuro/psych: A&O x3. CN II through XII are grossly intact with appropriate affect      Allergies:  No Known Allergies    Medication Review:     Current Outpatient Medications:   •  acetaminophen (TYLENOL) 500 MG tablet, Take 1 tablet by mouth Every 6 (Six) Hours As Needed for Mild Pain ., Disp: , Rfl:   •  apixaban (ELIQUIS) 5 MG tablet tablet, Take 1 tablet by mouth Every 12 (Twelve) Hours., Disp: 60 tablet, Rfl: 3  •  aspirin 81 MG EC tablet, Take 81 mg by mouth Daily., Disp: , Rfl:   •  finasteride (PROSCAR) 5 MG tablet, Take 5 mg by mouth Daily., Disp: , Rfl:   •  FLUoxetine (PROzac) 20 MG capsule, Take 40 mg by mouth Daily., Disp: , Rfl:   •  folic acid (FOLVITE) 1 MG tablet, Take 1 mg by mouth Daily., Disp:  , Rfl:   •  gabapentin (NEURONTIN) 100 MG capsule, Take 200 mg by mouth 3 (Three) Times a Day., Disp: , Rfl:   •  insulin lispro (humaLOG) 100 UNIT/ML injection, Inject  under the skin into the appropriate area as directed 4 (Four) Times a Day As Needed for High Blood Sugar (sliding scale, not specific)., Disp: , Rfl:   •  lactobacillus (BACID) tablet caplet, Take  by mouth 2 (Two) Times a Day., Disp: , Rfl:   •  Loratadine 10 MG capsule, Take  by mouth., Disp: , Rfl:   •  omeprazole (priLOSEC) 20 MG capsule, Take 20 mg by mouth Daily., Disp: , Rfl:   •  tamsulosin (FLOMAX) 0.4 MG capsule 24 hr capsule, Take 1 capsule by mouth Daily., Disp: , Rfl:   •  vitamin B-12 (CYANOCOBALAMIN) 500 MCG tablet, Take 1,000 mcg by mouth Daily., Disp: , Rfl:   •  vitamin E 400 UNIT capsule, Take 800 Units by mouth Daily., Disp: , Rfl:   •  bacitracin 500 UNIT/GM ointment, Apply  topically to the appropriate area as directed Daily., Disp: , Rfl:   •  cyclobenzaprine (FLEXERIL) 5 MG tablet, Take 1 tablet by mouth 2 (Two) Times a Day As Needed for Muscle Spasms., Disp: 15 tablet, Rfl: 0  •  docusate sodium (COLACE) 100 MG capsule, Take 100 mg by mouth 2 (Two) Times a Day., Disp: , Rfl:     Family History:  Family History   Problem Relation Age of Onset   • Hypertension Mother        Past Medical History:  Past Medical History:   Diagnosis Date   • A-fib (CMS/[facility])    • CAD (coronary artery disease)    • Elevated cholesterol    • Hypertension    • MI (myocardial infarction) (CMS/[facility])    • Non-insulin dependent type 2 diabetes mellitus (CMS/[facility])        Past surgical History:  Past Surgical History:   Procedure Laterality Date   • BRONCHOSCOPY N/A 3/4/2020    Procedure: BRONCHOSCOPY with bronchioalveolar lavage;  Surgeon: Melissa Cole MD;  Location: [facility] ENDOSCOPY;  Service: Pulmonary;  Laterality: N/A;   pneumonia   • CARDIAC CATHETERIZATION     • CARDIAC CATHETERIZATION N/A 1/20/2020    Procedure: Left Heart Cath;  Surgeon:  Migdalia Beth MD;  Location: [facility] CATH INVASIVE LOCATION;  Service: Cardiovascular   • CARDIAC CATHETERIZATION N/A 1/20/2020    Procedure: Left ventriculography;  Surgeon: Migdalia Beth MD;  Location: [facility] CATH INVASIVE LOCATION;  Service: Cardiovascular   • CARDIAC CATHETERIZATION N/A 1/20/2020    Procedure: Coronary angiography;  Surgeon: Migdalia Beth MD;  Location: [facility] CATH INVASIVE LOCATION;  Service: Cardiovascular   • CORONARY ANGIOPLASTY WITH STENT PLACEMENT     • MITRAL VALVE REPAIR/REPLACEMENT N/A 1/22/2020    Procedure: MITRAL VALVE REPAIR/REPLACEMENT;  Surgeon: Fallon Cole MD;  Location: [facility] CVOR;  Service: Cardiothoracic;  Laterality: N/A;  patient has endocarditis mitral valve replaced with 31mm knox II       Social History:  Social History     Socioeconomic History   • Marital status:      Spouse name: Not on file   • Number of children: Not on file   • Years of education: Not on file   • Highest education level: Not on file   Tobacco Use   • Smoking status: Current Some Day Smoker     Packs/day: 0.25     Types: Cigarettes   • Smokeless tobacco: Current User     Types: Chew   Substance and Sexual Activity   • Alcohol use: Not Currently   • Drug use: Never   • Sexual activity: Defer       Review of Systems:  The following systems were reviewed as they relate to the cardiovascular system: Constitutional, Eyes, ENT, Cardiovascular, Respiratory, Gastrointestinal, Integumentary, Neurological, Psychiatric, Hematologic, Endocrine, Musculoskeletal, and Genitourinary. The pertinent cardiovascular findings are reported above with all other cardiovascular points within those systems being negative.    Diagnostic Study Review:     Current Electrocardiogram:    ECG 12 Lead  Date/Time: 7/8/2020 4:34 PM  Performed by: Sarmad Littlejohn DO  Authorized by: Sarmad Littlejohn DO   Comparison: not compared with previous ECG   Previous ECG: no  previous ECG available  Comments: Normal sinus rhythm with a ventricular rate of 84 bpm.  Nonspecific repolarization changes.  Normal QT and prolonged QTC intervals of 419 and 496 ms respectively.  Normal QRS axis.              NOTE: The following portions of the patient's history were reviewed and updated this visit as appropriate: allergies, current medications, past family history, past medical history, past social history, past surgical history and problem list.

## 2022-01-26 NOTE — PROGRESS NOTES
Addended by: KAILA ORLANDO on: 1/26/2022 04:05 PM     Modules accepted: Orders     [facility]PA/PULM/CC PROGRESS NOTE     HISTORY OF PRESENT ILLNESS:  Jamin Hennessy is a 64 y.o. 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  Patient reported that earlier that morning, while at [facility], his blood pressure checked and was reportedly low.  It was at that time, that EMS was contacted, and patient was taken to the outlying facility emergency department.  During his stay in the ED, patient was diagnosed with pneumonia.  He had reported shortness of breath, cough with yellow sputum production that started the preceding early morning.  Patient denied nausea, vomiting, constipation, diarrhea, fever, or chills.  Patient did mention some chest soreness, but denies arm pain, neck pain, or jaw pain.  Notably, patient had a recent mitral valve replacement secondary to known endocarditis.  This patient is well-known to this practice, as we were following patient during his recent hospitalization.  At time of discharge, patient was recommended to complete 6 weeks of IV antibiotics with daptomycin and Rocephin per infectious disease.  Patient was discharged to [facility], and the plan was for antibiotic completion on 3/8/2020.  Patient was transferred to [facility] and admitted initially to the hospitalist team for further treatment and evaluation of patient condition.  Patient has required no vasopressors.  Infectious disease as well as cardiothoracic surgery have been consulted.  Patient presented with right upper extremity midline that was placed during his previous admission.  Due to patient's pulmonary status, with increased oxygen  "supplementation needs, cardiothoracic surgery requested patient to be transferred to [facility] for closer monitoring.  Previous work-up included bilateral lower extremity venous Doppler which reported \"chronic left lower extremity superficial thrombophlebitis noted in the small saphenous.\"  It is been reported that patient had a chest x-ray from outlying facility indicating pneumonia, and CT of the chest at this facility revealed \"extensive airspace and interstitial opacity throughout the left lung with smaller patchy air peripheral opacities in the right lung.  Only the right upper lobe multifocal infection/pneumonia is the most likely etiology.  There is mild mediastinal adenopathy which is likely reactive/infectious.  There are small layering bilateral pleural effusions.\"  Per infectious disease, in review of patient's previous medical record, patient had blood cultures that were positive for enterococcus faecium, which screened positive for VRE based on PCR.  Initially organism was susceptible to ampicillin and gentamicin.  Repeat blood cultures were negative.  Following patient's surgery on 1/22/2020 (MVR), the mitral valve tissue culture was also positive for enterococcus faecium, but sensitivities revealed resistance to ampicillin.  ID was consulted as the case has become rather complex, and that first and second line antibiotic therapies are revealing resistance.  At time of transfer, patient initially was on 3 L/min via nasal cannula, but after being transferred in bed, required an increase to 6 L/min via high flow nasal cannula.  Patient denies chest pain, neck pain, arm pain, jaw pain, nausea, vomiting, constipation, diarrhea, blood in urine or stool.  Patient does admit to some mild shortness of breath, frequent cough with yellow sputum production, but denies fever or chills.     KPA was consulted for further evaluation and treatment of pneumonia, and to aid improvement in pulmonary status.  Thank you for " "this consultation, we will follow along on this patient.        SUBJECTIVE    2/29: Patient reports feeling somewhat better today.  Tolerating 5 L O2.  No shortness of air at rest.  3/1: Patient reports feeling well today.  He reports shortness of air is improving.  Demonstrates good compliance with I-S.  Tolerating O2 at 4 L by nasal cannula  3/2:  Doing ok.  Reports some SOA and cough.  On supplemental oxygen, 4 liters    OBJECTIVE    /54   Pulse 94   Temp 96.5 °F (35.8 °C)   Resp 20   Ht 175.3 cm (69.02\")   Wt 119 kg (261 lb 14.5 oz)   SpO2 97%   BMI 38.66 kg/m²   Intake/Output last 3 shifts:  I/O last 3 completed shifts:  In: 3855 [P.O.:800; I.V.:2505; IV Piggyback:550]  Out: -   Intake/Output this shift:  No intake/output data recorded.    PHYSICAL EXAM:       Constitutional:  Well developed, well nourished, no acute distress, acutely ill-appearing, chronically ill-appearing  Eyes:  PERRL, conjunctiva normal, EOMI   HENT:  Atraumatic, external ears normal, nose normal. Neck-normal range of motion, no tenderness, supple, trachea midline  Respiratory:  Clear and diminished.  No crackles or wheezing. non-labored respirations without accessory muscle use  Cardiovascular:  Normal rate, normal rhythm, no murmurs, no gallops, no rubs   GI:  Soft, nondistended, normal bowel sounds, nontender, no rebound, no guarding   :  deferred   Musculoskeletal: Bilateral lower extremity edema 2+, no tenderness, no deformities  Integument:  Well hydrated, no rash.  Right sided posterior lateral chest with abrasion, right second and third toe with unstageable but without erythema or warmth, no drainage noted.  Sternal incision healing well  Neurologic:  Alert & oriented x 3, no lateralizing deficits  Psychiatric:  Speech and behavior appropriate    Scheduled Meds:    acetylcysteine 4 mL Nebulization BID - RT   amiodarone 200 mg Oral Q24H   apixaban 5 mg Oral Q12H   aspirin 81 mg Oral Daily   cefTRIAXone 2 g Intravenous " Q12H   cetirizine 10 mg Oral Daily   finasteride 5 mg Oral Daily   FLUoxetine 40 mg Oral Daily   folic acid 1 mg Oral Daily   gabapentin 200 mg Oral TID   guaiFENesin 1,200 mg Oral Q12H   insulin lispro 0-9 Units Subcutaneous 4x Daily With Meals & Nightly   ipratropium-albuterol 3 mL Nebulization Q4H While Awake - RT   lactobacillus acidophilus 1 capsule Oral BID   meropenem 1 g Intravenous Q8H   pantoprazole 40 mg Oral QAM   sodium chloride 10 mL Intravenous Q12H   tamsulosin 0.4 mg Oral Daily   vancomycin 15 mg/kg (Ideal) Intravenous Q12H   vitamin B-12 1,000 mcg Oral Daily   vitamin E 800 Units Oral Daily       Continuous Infusions:    Pharmacy to dose vancomycin        PRN Meds:•  acetaminophen **OR** acetaminophen **OR** acetaminophen  •  benzonatate  •  bisacodyl  •  cyclobenzaprine  •  dextrose  •  dextrose  •  glucagon (human recombinant)  •  insulin lispro **AND** insulin lispro  •  ipratropium-albuterol  •  magnesium sulfate **OR** magnesium sulfate in D5W 1g/100mL (PREMIX)  •  ondansetron **OR** ondansetron  •  Pharmacy to dose vancomycin  •  potassium chloride  •  sodium chloride     Data Review:  Lab Results (last 24 hours)     Procedure Component Value Units Date/Time    Magnesium [[id number]]  (Normal) Collected:  03/02/20 0647    Specimen:  Blood Updated:  03/02/20 0803     Magnesium 2.1 mg/dL     Comprehensive Metabolic Panel [[id number]]  (Abnormal) Collected:  03/02/20 0647    Specimen:  Blood Updated:  03/02/20 0803     Glucose 119 mg/dL      BUN 15 mg/dL      Creatinine 0.87 mg/dL      Sodium 142 mmol/L      Potassium 4.2 mmol/L      Chloride 108 mmol/L      CO2 25.0 mmol/L      Calcium 8.6 mg/dL      Total Protein 6.0 g/dL      Albumin 2.20 g/dL      ALT (SGPT) 72 U/L      AST (SGOT) 54 U/L      Alkaline Phosphatase 88 U/L      Total Bilirubin <0.2 mg/dL      eGFR Non African Amer 88 mL/min/1.73      Globulin 3.8 gm/dL      A/G Ratio 0.6 g/dL      BUN/Creatinine Ratio 17.2     Anion Gap 9.0  mmol/L     Narrative:       GFR Normal >60  Chronic Kidney Disease <60  Kidney Failure <15      aPTT [[id number]]  (Abnormal) Collected:  03/02/20 0647    Specimen:  Blood Updated:  03/02/20 0755     PTT 26.5 seconds     POC Glucose Once [[id number]]  (Abnormal) Collected:  03/02/20 0718    Specimen:  Blood Updated:  03/02/20 0719     Glucose 120 mg/dL      Comment: Serial Number: [id number]:  [id number]       CBC & Differential [[id number]] Collected:  03/02/20 0647    Specimen:  Blood Updated:  03/02/20 0711    Narrative:       The following orders were created for panel order CBC & Differential.  Procedure                               Abnormality         Status                     ---------                               -----------         ------                     CBC Auto Differential[[id number]]        Abnormal            Final result                 Please view results for these tests on the individual orders.    CBC Auto Differential [[id number]]  (Abnormal) Collected:  03/02/20 0647    Specimen:  Blood Updated:  03/02/20 0711     WBC 10.70 10*3/mm3      RBC 3.26 10*6/mm3      Hemoglobin 8.3 g/dL      Hematocrit 24.5 %      MCV 75.0 fL      MCH 25.5 pg      MCHC 34.0 g/dL      RDW 18.0 %      RDW-SD 48.6 fl      MPV 7.3 fL      Platelets 682 10*3/mm3      Neutrophil % 63.5 %      Lymphocyte % 8.2 %      Monocyte % 7.1 %      Eosinophil % 19.1 %      Basophil % 2.1 %      Neutrophils, Absolute 6.80 10*3/mm3      Lymphocytes, Absolute 0.90 10*3/mm3      Monocytes, Absolute 0.80 10*3/mm3      Eosinophils, Absolute 2.00 10*3/mm3      Basophils, Absolute 0.20 10*3/mm3      nRBC 0.0 /100 WBC     POC Glucose Once [[id number]]  (Abnormal) Collected:  03/01/20 1927    Specimen:  Blood Updated:  03/01/20 1928     Glucose 234 mg/dL      Comment: Serial Number: [id number]:  [id number]       POC Glucose Once [[id number]]  (Abnormal) Collected:  03/01/20 1556    Specimen:  Blood Updated:  03/01/20 1557     Glucose  212 mg/dL      Comment: Serial Number: [id number]:  [id number]       Vancomycin, Trough [[id number]]  (Normal) Collected:  03/01/20 1256    Specimen:  Blood Updated:  03/01/20 1328     Vancomycin Trough 14.00 mcg/mL     Blood Culture - Blood, Arm, Left [[id number]] Collected:  02/27/20 1152    Specimen:  Blood from Arm, Left Updated:  03/01/20 1215     Blood Culture No growth at 3 days    Blood Culture - Blood, Arm, Right [[id number]] Collected:  02/27/20 1157    Specimen:  Blood from Arm, Right Updated:  03/01/20 1215     Blood Culture No growth at 3 days    Respiratory Culture - Sputum, Cough [[id number]] Collected:  02/28/20 1123    Specimen:  Sputum from Cough Updated:  03/01/20 1203     Respiratory Culture Scant growth (1+) Normal Respiratory Cheryl     Gram Stain Moderate (3+) WBCs per low power field      Rare (1+) Epithelial cells per low power field      Rare (1+) Mixed bacterial morphotypes seen on Gram Stain    POC Glucose Once [[id number]]  (Abnormal) Collected:  03/01/20 1137    Specimen:  Blood Updated:  03/01/20 1143     Glucose 127 mg/dL      Comment: Serial Number: [id number]:  [id number]                Imaging:  Imaging Results (Last 72 Hours)     Procedure Component Value Units Date/Time    XR Chest 1 View [[id number]] Collected:  03/01/20 0500     Updated:  03/01/20 0506    Narrative:       DATE OF EXAM:  3/1/2020 4:19 AM     PROCEDURE:  XR CHEST 1 VW-     INDICATIONS:  Left-sided pneumonia.     COMPARISONS:  Chest x-ray, 01/29/2020, 10:09 AM. Chest CT, 02/27/2020, 9:15 PM.     TECHNIQUE:   Single radiographic AP view of the chest was obtained.     FINDINGS:  Diffuse pulmonary consolidation is seen throughout the left lung with  air bronchograms. There may be partial aeration of the left lung base.  Similar findings are seen in the chest CT examination from 02/27/2020.  Minimal if any acute airspace disease is seen on the right. The findings  on the left suggest pneumonia. Aspiration pneumonia  would be in the  differential diagnosis. Probably no cardiac enlargement is present. No  definite pneumothorax is seen. The patient has undergone median  sternotomy and mitral valve replacement surgery. Small bilateral pleural  effusions may be present. There may be pleural-parenchymal scarring in  the right pulmonary apex.          Impression:       No significant interval change is suspected radiographically in the  diffuse airspace disease throughout the left lung, which may represent  pneumonia.      Minimal, if any, acute airspace disease is suspected on the right.     Electronically Signed By-Dr. Brandon Morrison MD On:3/1/2020 5:04 AM  This report was finalized on [id number] by Dr. Brandon Morrison MD.            ASSESSMENT/PLAN:     Coronary artery disease due to lipid rich plaque    Dyslipidemia    Non-insulin dependent type 2 diabetes mellitus (CMS/HCC)    Diabetic neuropathy (CMS/HCC)    GERD without esophagitis    BPH with obstruction/lower urinary tract symptoms    B12 deficiency    Vitamin D deficiency    JARRETT (generalized anxiety disorder)    Tobacco abuse    Obesity (BMI 30-39.9)    S/P MVR (mitral valve replacement)    Endocarditis of mitral valve    Hypotension    Shortness of breath    Sepsis associated hypotension (CMS/HCC)    Chest pain    Vitamin E deficiency    Severe sepsis (CMS/HCC)    HCAP (healthcare-associated pneumonia)         Severe sepsis without septic shock, secondary to pneumonia  -IVF bolus given at outlying facility  -Lactic Acid 1.0  -Cultures-pending  -Urine antigens-negative  -RVP-negative  -Reviewed CT Chest.  -Procalcitonin-0.10  -Rocephin, Merrem, and vancomycin, ID managing antibiotics     Sepsis associated hypotension, improving  -Did not require vasopressors  -Stable, continue to monitor     HCAP  -Reviewed CT Chest.  -Aggressive pulmonary toileting  -Follow sputum cultures  -IS/Flutter Valve encouraged and ordered  -Antibiotics as above per ID  -Continue Mucinex,  Zyrtec     Acute respiratory failure with hypoxia, likely secondary to pneumonia; R/O Volume overload  -From past admission, bedside PFT reviewed: Suggestive of restrictive defect. FEV1 46%.  +bronchdilator response.  Diffusion capacity normal when corrected.  -Oxygen supplementation, titrate to maintain oxygen saturation >91%, currently on 4 L  -Duonebs scheduled and PRN  -BNP 1553  -intermittent diuresis  -sputum 2/28 - normal respiratory nelson      Enterococcus MV Endocarditis; S/P MVR (tissue)/MICHELLE ligation (1/22/2020)  -Previous hospitalization cultures: 1) Blood cultures-Enterococcus faecium, positive for VRE on PCR; initially susciptible to ampicillin and gentamicin.  2) MV tissue culture with Enterococcus faecium, resistant to ampicillin, susceptible for daptomycin.  -Initially placed on IV Rocephin & Daptomycin x 6 weeks by ID with end date of 3/8/2020 (during previous hospitalization).  -ID consulted and managing antibiotics, as above.  -CTS following.     CAD  -Continue ASA     Hyperlipidemia  -Statin therapy on hold due to previously being on daptomycin, resume when clinically appropriate     PAF (controlled), on chronic anticoagulation with Eliquis  -Continue amiodarone  -D/C heparin drip and now on Eliquis     Diabetes mellitus, type 2, with neuropathy  -strict glycemic control recommended  -Accuchecks AC/HS with sliding scale insulin  -Hold oral diabetic medications until out of ICU  -Continue gabapentin     GERD  -Continue protonix     BPH  -Continue proscar, flomax     General Anxiety Disorder  -Continue prozac     Multiple sclerosis  -Mainly wheelchair bound, limited mobility  -PT/OT     Obesity  -BMI 37.91  -Counseled on lifestyle modifications to improve overall health     Tobacco Abuse, current  -Counseled on smoking cessation     Vitamin D/B12 deficiency  -Continue folic acid, B12 supplements       High Risk     Accuchecks with SSI  Code Status: CPR, Full Interventions  VTE Prophylaxis:   Eliquis/SCDs  PUD Prophylaxis: Protonix    Midline  Diet ordered

## 2023-06-29 NOTE — PROGRESS NOTES
Infectious Diseases Progress Note      LOS: 6 days   Patient Care Team:  Kajal Sanchez as PCP - General (Internal Medicine)  Frederick George MD as Consulting Physician (Nephrology)        Subjective       The patient has been afebrile for the last 24 hours.  The patient is currently on 3 L of oxygen via nasal cannula.  He is awake and alert.  He remained hemodynamically stable requiring no vasopressors.  He is back from having a bronchoscopy      Review of Systems:   Review of Systems   Constitutional: Positive for fatigue.   HENT: Negative.    Respiratory: Positive for cough and shortness of breath.    Cardiovascular:        Chest soreness around sternal incision    Gastrointestinal: Negative.    Genitourinary: Negative.    Musculoskeletal: Positive for arthralgias.   Skin: Negative.    Neurological: Positive for weakness.   Psychiatric/Behavioral: Negative.         Objective     Vital Signs  Temp:  [97.5 °F (36.4 °C)-97.6 °F (36.4 °C)] 97.6 °F (36.4 °C)  Heart Rate:  [] 98  Resp:  [18-23] 23  BP: ()/(53-85) 96/69    Physical Exam:  Physical Exam   Constitutional: He appears well-developed and well-nourished.   HENT:   Head: Normocephalic and atraumatic.   Eyes: Pupils are equal, round, and reactive to light. Conjunctivae and EOM are normal.   Neck: Neck supple.   Cardiovascular: Normal rate, regular rhythm and normal heart sounds.   Pulmonary/Chest: Effort normal. He has rales.   Extensive crackles on the left side   Abdominal: Soft. Bowel sounds are normal.   Musculoskeletal:   Degenerative changes patient appears to have some general weakness due to the multiple sclerosis    Neurological: He is alert.   Alert and oriented x2-patient patient seems more alert today   Skin: Skin is warm and dry.   Chest soreness around sternal incision    Psychiatric: He has a normal mood and affect.   Vitals reviewed.       Results Review:    I have reviewed all clinical data, test, lab, and imaging results.  Patient asked about the coloring of like orange yellowish tint on her thumb. I informed her that was the betadine scrub and it will wash off when she is able to get her hand wet.       Radiology  No Radiology Exams Resulted Within Past 24 Hours    Cardiology    Laboratory    Results from last 7 days   Lab Units 03/04/20  0328 03/03/20  0517 03/02/20  0647 03/01/20  0351 02/29/20  0537 02/28/20  2209 02/28/20  0324   WBC 10*3/mm3 11.90* 12.50* 10.70 13.80* 13.30* 14.60* 16.80*   HEMOGLOBIN g/dL 7.8* 8.4* 8.3* 8.3* 7.3* 7.3* 7.2*   HEMATOCRIT % 24.1* 26.1* 24.5* 25.6* 23.0* 22.8* 21.9*   PLATELETS 10*3/mm3 664* 753* 682* 684* 581* 581* 562*     Results from last 7 days   Lab Units 03/04/20 0328 03/03/20  0516 03/02/20  0647 03/01/20  0351 02/29/20 0537 02/28/20 0324 02/27/20  1152   SODIUM mmol/L 142 140 142 141 142  --  140   POTASSIUM mmol/L 4.1 4.4 4.2 3.8 3.8 3.9 3.5   CHLORIDE mmol/L 108* 103 108* 105 106  --  105   CO2 mmol/L 25.0 25.0 25.0 24.0 24.0  --  23.0   BUN mg/dL 14 14 15 16 16  --  16   CREATININE mg/dL 0.94 0.89 0.87 0.88 0.88  --  1.03   GLUCOSE mg/dL 122* 135* 119* 140* 154*  --  118*   ALBUMIN g/dL 2.50* 2.50* 2.20* 2.60*  --   --  2.30*   BILIRUBIN mg/dL <0.2* <0.2* <0.2* 0.2  --   --  0.2   ALK PHOS U/L 89 90 88 99  --   --  90   AST (SGOT) U/L 79* 56* 54* 86*  --   --  33   ALT (SGPT) U/L 93* 76* 72* 86*  --   --  38   CALCIUM mg/dL 8.2* 8.4* 8.6 8.2* 8.4*  --  8.4*     Results from last 7 days   Lab Units 02/28/20  0324   CK TOTAL U/L 33             Microbiology   Microbiology Results (last 10 days)     Procedure Component Value - Date/Time    AFB Culture - Lavage, Lung, Lingula [[id number]] Collected:  03/04/20 0743    Lab Status:  Preliminary result Specimen:  Lavage from Lung, Lingula Updated:  03/04/20 1021     AFB Stain No acid fast bacilli seen    BAL Culture, Quantitative - Lavage, Lung, Lingula [[id number]] Collected:  03/04/20 0743    Lab Status:  Preliminary result Specimen:  Lavage from Lung, Lingula Updated:  03/04/20 1030     Gram Stain Moderate (3+) WBCs per low power field      Moderate (3+) Epithelial cells per low power field      Few (2+) Gram positive  cocci in pairs and clusters    Respiratory Culture - Sputum, Cough [[id number]] Collected:  02/28/20 1123    Lab Status:  Final result Specimen:  Sputum from Cough Updated:  03/01/20 1203     Respiratory Culture Scant growth (1+) Normal Respiratory Cheryl     Gram Stain Moderate (3+) WBCs per low power field      Rare (1+) Epithelial cells per low power field      Rare (1+) Mixed bacterial morphotypes seen on Gram Stain    Legionella Antigen, Urine - Urine, Urine, Clean Catch [[id number]]  (Normal) Collected:  02/28/20 0402    Lab Status:  Final result Specimen:  Urine, Clean Catch Updated:  02/28/20 0439     LEGIONELLA ANTIGEN, URINE Negative    S. Pneumo Ag Urine or CSF - Urine, Urine, Clean Catch [[id number]]  (Normal) Collected:  02/28/20 0402    Lab Status:  Final result Specimen:  Urine, Clean Catch Updated:  02/28/20 0439     Strep Pneumo Ag Negative    Respiratory Panel, PCR - Swab, Nasopharynx [[id number]]  (Normal) Collected:  02/27/20 2349    Lab Status:  Final result Specimen:  Swab from Nasopharynx Updated:  02/28/20 0131     ADENOVIRUS, PCR Not Detected     Coronavirus 229E Not Detected     Coronavirus HKU1 Not Detected     Coronavirus NL63 Not Detected     Coronavirus OC43 Not Detected     Human Metapneumovirus Not Detected     Human Rhinovirus/Enterovirus Not Detected     Influenza B PCR Not Detected     Parainfluenza Virus 1 Not Detected     Parainfluenza Virus 2 Not Detected     Parainfluenza Virus 3 Not Detected     Parainfluenza Virus 4 Not Detected     Bordetella pertussis pcr Not Detected     Influenza A H1 2009 PCR Not Detected     Chlamydophila pneumoniae PCR Not Detected     Mycoplasma pneumo by PCR Not Detected     Influenza A PCR Not Detected     Influenza A H3 Not Detected     Influenza A H1 Not Detected     RSV, PCR Not Detected    Blood Culture - Blood, Arm, Right [[id number]] Collected:  02/27/20 1157    Lab Status:  Final result Specimen:  Blood from Arm, Right Updated:  03/03/20 1215      Blood Culture No growth at 5 days    Blood Culture - Blood, Arm, Left [[id number]] Collected:  02/27/20 1152    Lab Status:  Final result Specimen:  Blood from Arm, Left Updated:  03/03/20 1215     Blood Culture No growth at 5 days          Medication Review:       Schedule Meds    !Vancomycin Level Draw Needed  Does not apply Once   amiodarone 200 mg Oral Q24H   apixaban 5 mg Oral Q12H   aspirin 81 mg Oral Daily   cefTRIAXone 2 g Intravenous Q12H   cetirizine 10 mg Oral Daily   finasteride 5 mg Oral Daily   FLUoxetine 40 mg Oral Daily   folic acid 1 mg Oral Daily   gabapentin 200 mg Oral TID   guaiFENesin 1,200 mg Oral Q12H   insulin lispro 0-9 Units Subcutaneous 4x Daily With Meals & Nightly   ipratropium-albuterol 3 mL Nebulization Q4H While Awake - RT   lactobacillus acidophilus 1 capsule Oral BID   Lidocaine HCl (Cardiac) PF      Lidocaine HCl Urethral/Mucosal/Topical 2%      lidocaine PF 1%      meropenem 1 g Intravenous Q8H   pantoprazole 40 mg Oral QAM   sodium chloride 10 mL Intravenous Q12H   tamsulosin 0.4 mg Oral Daily   vancomycin 15 mg/kg (Ideal) Intravenous Q12H   vitamin B-12 1,000 mcg Oral Daily   vitamin E 800 Units Oral Daily       Infusion Meds    Pharmacy to dose vancomycin        PRN Meds  acetaminophen **OR** acetaminophen **OR** acetaminophen  •  benzonatate  •  bisacodyl  •  cyclobenzaprine  •  dextrose  •  dextrose  •  glucagon (human recombinant)  •  insulin lispro **AND** insulin lispro  •  ipratropium-albuterol  •  magnesium sulfate **OR** magnesium sulfate in D5W 1g/100mL (PREMIX)  •  ondansetron **OR** ondansetron  •  Pharmacy to dose vancomycin  •  potassium chloride  •  sodium chloride        Assessment/Plan       Antimicrobial Therapy   1.      day  2.  IV Rocephin    day  3.  IV vancomycin    day    4.  IV Merrem     Day   5.      Day      Assessment      Extensive left upper lobe and left lower lobe infiltrate.  The presentation is highly consistent with pneumonia.  Aspiration is  less likely since it is involving all lobes of the left lung but I am concerned about hospital-acquired pneumonia.  The patient is currently on 5 L of oxygen via nasal cannula.  Sputum culture did not grow any significant pathogen.  Repeat chest x-ray did not show improvement  -3/4/2710-[id number] of tracheobronchomalacia throughout the airway.    Mild reactive leukocytosis     Status post mitral valve replacement for mitral valve endocarditis on January 22, 2020 with enterococcus faecium.  The patient was finishing 6 weeks of IV daptomycin and IV Rocephin at the rehab facility and the last day of the IV antibiotics was supposed to be March 8, 2020     Type 2 diabetes     Tobacco abuse     Plan     Continue IV Rocephin 2 g every 12 hours  Continue IV vancomycin   Continue IV meropenem   Discontinue all antibiotics on 3/8/2020  Continue supportive care  Suzanna Bradford, APRN  03/04/20  11:31 AM     Note is dictated utilizing voice recognition software/Dragon

## 2024-09-24 ENCOUNTER — APPOINTMENT (OUTPATIENT)
Dept: GENERAL RADIOLOGY | Facility: HOSPITAL | Age: 69
End: 2024-09-24
Payer: OTHER GOVERNMENT

## 2024-09-24 ENCOUNTER — HOSPITAL ENCOUNTER (INPATIENT)
Facility: HOSPITAL | Age: 69
LOS: 6 days | Discharge: HOME HEALTH | End: 2024-09-30
Payer: OTHER GOVERNMENT

## 2024-09-24 ENCOUNTER — APPOINTMENT (OUTPATIENT)
Dept: CARDIOLOGY | Facility: HOSPITAL | Age: 69
End: 2024-09-24
Payer: OTHER GOVERNMENT

## 2024-09-24 DIAGNOSIS — R53.1 GENERALIZED WEAKNESS: Primary | ICD-10-CM

## 2024-09-24 PROBLEM — N28.9 ACUTE ON CHRONIC RENAL INSUFFICIENCY: Status: ACTIVE | Noted: 2024-09-24

## 2024-09-24 PROBLEM — R79.89 ELEVATED TROPONIN: Status: ACTIVE | Noted: 2024-09-24

## 2024-09-24 PROBLEM — N39.0 ACUTE UTI (URINARY TRACT INFECTION): Status: ACTIVE | Noted: 2024-09-24

## 2024-09-24 PROBLEM — N18.9 ACUTE ON CHRONIC RENAL INSUFFICIENCY: Status: ACTIVE | Noted: 2024-09-24

## 2024-09-24 PROBLEM — A41.9 SEPSIS: Status: ACTIVE | Noted: 2024-09-24

## 2024-09-24 PROBLEM — C34.90 LUNG CANCER: Status: ACTIVE | Noted: 2024-09-24

## 2024-09-24 PROBLEM — J18.9 PNEUMONIA: Status: ACTIVE | Noted: 2024-09-24

## 2024-09-24 LAB
ANION GAP SERPL CALCULATED.3IONS-SCNC: 10.4 MMOL/L (ref 5–15)
AORTIC DIMENSIONLESS INDEX: 0.79 (DI)
BACTERIA UR QL AUTO: ABNORMAL /HPF
BASOPHILS # BLD AUTO: 0.06 10*3/MM3 (ref 0–0.2)
BASOPHILS NFR BLD AUTO: 0.7 % (ref 0–1.5)
BH CV ECHO MEAS - AO MAX PG: 12.8 MMHG
BH CV ECHO MEAS - AO MEAN PG: 7 MMHG
BH CV ECHO MEAS - AO V2 MAX: 179 CM/SEC
BH CV ECHO MEAS - AO V2 VTI: 30 CM
BH CV ECHO MEAS - AVA(I,D): 3.4 CM2
BH CV ECHO MEAS - EDV(CUBED): 110.6 ML
BH CV ECHO MEAS - EDV(MOD-SP2): 126 ML
BH CV ECHO MEAS - EDV(MOD-SP4): 149 ML
BH CV ECHO MEAS - EF(MOD-BP): 64.3 %
BH CV ECHO MEAS - EF(MOD-SP2): 65.7 %
BH CV ECHO MEAS - EF(MOD-SP4): 63.6 %
BH CV ECHO MEAS - ESV(CUBED): 29.8 ML
BH CV ECHO MEAS - ESV(MOD-SP2): 43.2 ML
BH CV ECHO MEAS - ESV(MOD-SP4): 54.2 ML
BH CV ECHO MEAS - FS: 35.4 %
BH CV ECHO MEAS - IVS/LVPW: 1.2 CM
BH CV ECHO MEAS - IVSD: 1.2 CM
BH CV ECHO MEAS - LA DIMENSION: 4.8 CM
BH CV ECHO MEAS - LV DIASTOLIC VOL/BSA (35-75): 60.3 CM2
BH CV ECHO MEAS - LV MASS(C)D: 194 GRAMS
BH CV ECHO MEAS - LV MAX PG: 8 MMHG
BH CV ECHO MEAS - LV MEAN PG: 4 MMHG
BH CV ECHO MEAS - LV SYSTOLIC VOL/BSA (12-30): 21.9 CM2
BH CV ECHO MEAS - LV V1 MAX: 141 CM/SEC
BH CV ECHO MEAS - LV V1 VTI: 22.8 CM
BH CV ECHO MEAS - LVIDD: 4.8 CM
BH CV ECHO MEAS - LVIDS: 3.1 CM
BH CV ECHO MEAS - LVOT AREA: 4.5 CM2
BH CV ECHO MEAS - LVOT DIAM: 2.4 CM
BH CV ECHO MEAS - LVPWD: 1 CM
BH CV ECHO MEAS - MV DEC SLOPE: 1097 CM/SEC2
BH CV ECHO MEAS - MV MAX PG: 15.4 MMHG
BH CV ECHO MEAS - MV MEAN PG: 5 MMHG
BH CV ECHO MEAS - MV P1/2T: 49.1 MSEC
BH CV ECHO MEAS - MV V2 VTI: 33.1 CM
BH CV ECHO MEAS - MVA(P1/2T): 4.5 CM2
BH CV ECHO MEAS - MVA(VTI): 3.1 CM2
BH CV ECHO MEAS - PA ACC TIME: 0.11 SEC
BH CV ECHO MEAS - PA V2 MAX: 97.7 CM/SEC
BH CV ECHO MEAS - RAP SYSTOLE: 3 MMHG
BH CV ECHO MEAS - RV MAX PG: 2.45 MMHG
BH CV ECHO MEAS - RV V1 MAX: 78.2 CM/SEC
BH CV ECHO MEAS - RV V1 VTI: 13.1 CM
BH CV ECHO MEAS - RVSP: 14.3 MMHG
BH CV ECHO MEAS - SV(LVOT): 103.1 ML
BH CV ECHO MEAS - SV(MOD-SP2): 82.8 ML
BH CV ECHO MEAS - SV(MOD-SP4): 94.8 ML
BH CV ECHO MEAS - SVI(LVOT): 41.8 ML/M2
BH CV ECHO MEAS - SVI(MOD-SP2): 33.5 ML/M2
BH CV ECHO MEAS - SVI(MOD-SP4): 38.4 ML/M2
BH CV ECHO MEAS - TAPSE (>1.6): 1.58 CM
BH CV ECHO MEAS - TR MAX PG: 11.3 MMHG
BH CV ECHO MEAS - TR MAX VEL: 168 CM/SEC
BH CV XLRA - RV BASE: 3.6 CM
BH CV XLRA - RV MID: 2.7 CM
BILIRUB UR QL STRIP: NEGATIVE
BUN SERPL-MCNC: 26 MG/DL (ref 8–23)
BUN/CREAT SERPL: 22 (ref 7–25)
CALCIUM SPEC-SCNC: 8.5 MG/DL (ref 8.6–10.5)
CHLORIDE SERPL-SCNC: 108 MMOL/L (ref 98–107)
CLARITY UR: ABNORMAL
CO2 SERPL-SCNC: 25.6 MMOL/L (ref 22–29)
COLOR UR: YELLOW
CREAT SERPL-MCNC: 1.18 MG/DL (ref 0.76–1.27)
DEPRECATED RDW RBC AUTO: 54.2 FL (ref 37–54)
EGFRCR SERPLBLD CKD-EPI 2021: 66.8 ML/MIN/1.73
EOSINOPHIL # BLD AUTO: 0.56 10*3/MM3 (ref 0–0.4)
EOSINOPHIL NFR BLD AUTO: 6.1 % (ref 0.3–6.2)
ERYTHROCYTE [DISTWIDTH] IN BLOOD BY AUTOMATED COUNT: 16.7 % (ref 12.3–15.4)
GEN 5 2HR TROPONIN T REFLEX: 37 NG/L
GLUCOSE BLDC GLUCOMTR-MCNC: 150 MG/DL (ref 70–105)
GLUCOSE BLDC GLUCOMTR-MCNC: 155 MG/DL (ref 70–105)
GLUCOSE BLDC GLUCOMTR-MCNC: 162 MG/DL (ref 70–105)
GLUCOSE BLDC GLUCOMTR-MCNC: 174 MG/DL (ref 70–105)
GLUCOSE BLDC GLUCOMTR-MCNC: 200 MG/DL (ref 70–105)
GLUCOSE BLDC GLUCOMTR-MCNC: 209 MG/DL (ref 70–105)
GLUCOSE SERPL-MCNC: 141 MG/DL (ref 65–99)
GLUCOSE UR STRIP-MCNC: NEGATIVE MG/DL
HCT VFR BLD AUTO: 35.2 % (ref 37.5–51)
HGB BLD-MCNC: 10.5 G/DL (ref 13–17.7)
HGB UR QL STRIP.AUTO: ABNORMAL
HYALINE CASTS UR QL AUTO: ABNORMAL /LPF
IMM GRANULOCYTES # BLD AUTO: 0.03 10*3/MM3 (ref 0–0.05)
IMM GRANULOCYTES NFR BLD AUTO: 0.3 % (ref 0–0.5)
KETONES UR QL STRIP: NEGATIVE
LEFT ATRIUM VOLUME INDEX: 32.7 ML/M2
LEUKOCYTE ESTERASE UR QL STRIP.AUTO: ABNORMAL
LYMPHOCYTES # BLD AUTO: 0.85 10*3/MM3 (ref 0.7–3.1)
LYMPHOCYTES NFR BLD AUTO: 9.3 % (ref 19.6–45.3)
MAGNESIUM SERPL-MCNC: 1.8 MG/DL (ref 1.6–2.4)
MCH RBC QN AUTO: 26.3 PG (ref 26.6–33)
MCHC RBC AUTO-ENTMCNC: 29.8 G/DL (ref 31.5–35.7)
MCV RBC AUTO: 88 FL (ref 79–97)
MONOCYTES # BLD AUTO: 0.97 10*3/MM3 (ref 0.1–0.9)
MONOCYTES NFR BLD AUTO: 10.6 % (ref 5–12)
MRSA DNA SPEC QL NAA+PROBE: ABNORMAL
NEUTROPHILS NFR BLD AUTO: 6.7 10*3/MM3 (ref 1.7–7)
NEUTROPHILS NFR BLD AUTO: 73 % (ref 42.7–76)
NITRITE UR QL STRIP: NEGATIVE
NRBC BLD AUTO-RTO: 0 /100 WBC (ref 0–0.2)
NT-PROBNP SERPL-MCNC: 4889 PG/ML (ref 0–900)
PH UR STRIP.AUTO: 6 [PH] (ref 5–8)
PLATELET # BLD AUTO: 215 10*3/MM3 (ref 140–450)
PMV BLD AUTO: 10.8 FL (ref 6–12)
POTASSIUM SERPL-SCNC: 3.8 MMOL/L (ref 3.5–5.2)
PROT UR QL STRIP: ABNORMAL
QT INTERVAL: 351 MS
QT INTERVAL: 373 MS
QTC INTERVAL: 516 MS
QTC INTERVAL: 518 MS
RBC # BLD AUTO: 4 10*6/MM3 (ref 4.14–5.8)
RBC # UR STRIP: ABNORMAL /HPF
REF LAB TEST METHOD: ABNORMAL
SINUS: 3.3 CM
SODIUM SERPL-SCNC: 144 MMOL/L (ref 136–145)
SP GR UR STRIP: 1.01 (ref 1–1.03)
SQUAMOUS #/AREA URNS HPF: ABNORMAL /HPF
STJ: 2.9 CM
TROPONIN T DELTA: -15 NG/L
TROPONIN T SERPL HS-MCNC: 52 NG/L
TSH SERPL DL<=0.05 MIU/L-ACNC: 2.21 UIU/ML (ref 0.27–4.2)
UROBILINOGEN UR QL STRIP: ABNORMAL
WBC # UR STRIP: ABNORMAL /HPF
WBC NRBC COR # BLD AUTO: 9.17 10*3/MM3 (ref 3.4–10.8)
YEAST URNS QL MICRO: ABNORMAL /HPF

## 2024-09-24 PROCEDURE — 71045 X-RAY EXAM CHEST 1 VIEW: CPT

## 2024-09-24 PROCEDURE — 63710000001 INSULIN LISPRO (HUMAN) PER 5 UNITS: Performed by: NURSE PRACTITIONER

## 2024-09-24 PROCEDURE — 25010000002 DIGOXIN PER 500 MCG: Performed by: INTERNAL MEDICINE

## 2024-09-24 PROCEDURE — 87641 MR-STAPH DNA AMP PROBE: CPT

## 2024-09-24 PROCEDURE — 84484 ASSAY OF TROPONIN QUANT: CPT | Performed by: NURSE PRACTITIONER

## 2024-09-24 PROCEDURE — 82948 REAGENT STRIP/BLOOD GLUCOSE: CPT

## 2024-09-24 PROCEDURE — 25010000002 MEROPENEM PER 100 MG: Performed by: INTERNAL MEDICINE

## 2024-09-24 PROCEDURE — 93010 ELECTROCARDIOGRAM REPORT: CPT | Performed by: INTERNAL MEDICINE

## 2024-09-24 PROCEDURE — 93306 TTE W/DOPPLER COMPLETE: CPT | Performed by: INTERNAL MEDICINE

## 2024-09-24 PROCEDURE — 87040 BLOOD CULTURE FOR BACTERIA: CPT

## 2024-09-24 PROCEDURE — 85025 COMPLETE CBC W/AUTO DIFF WBC: CPT | Performed by: NURSE PRACTITIONER

## 2024-09-24 PROCEDURE — 25810000003 SODIUM CHLORIDE 0.9 % SOLUTION: Performed by: NURSE PRACTITIONER

## 2024-09-24 PROCEDURE — 93005 ELECTROCARDIOGRAM TRACING: CPT

## 2024-09-24 PROCEDURE — 99222 1ST HOSP IP/OBS MODERATE 55: CPT | Performed by: INTERNAL MEDICINE

## 2024-09-24 PROCEDURE — 87086 URINE CULTURE/COLONY COUNT: CPT | Performed by: NURSE PRACTITIONER

## 2024-09-24 PROCEDURE — 83880 ASSAY OF NATRIURETIC PEPTIDE: CPT | Performed by: NURSE PRACTITIONER

## 2024-09-24 PROCEDURE — 99254 IP/OBS CNSLTJ NEW/EST MOD 60: CPT | Performed by: INTERNAL MEDICINE

## 2024-09-24 PROCEDURE — 25010000002 LINEZOLID 600 MG/300ML SOLUTION: Performed by: NURSE PRACTITIONER

## 2024-09-24 PROCEDURE — 83735 ASSAY OF MAGNESIUM: CPT | Performed by: NURSE PRACTITIONER

## 2024-09-24 PROCEDURE — 93306 TTE W/DOPPLER COMPLETE: CPT

## 2024-09-24 PROCEDURE — 93005 ELECTROCARDIOGRAM TRACING: CPT | Performed by: NURSE PRACTITIONER

## 2024-09-24 PROCEDURE — 84443 ASSAY THYROID STIM HORMONE: CPT | Performed by: NURSE PRACTITIONER

## 2024-09-24 PROCEDURE — 81001 URINALYSIS AUTO W/SCOPE: CPT | Performed by: NURSE PRACTITIONER

## 2024-09-24 PROCEDURE — 25010000002 MEROPENEM PER 100 MG: Performed by: NURSE PRACTITIONER

## 2024-09-24 PROCEDURE — 25010000002 SULFUR HEXAFLUORIDE MICROSPH 60.7-25 MG RECONSTITUTED SUSPENSION

## 2024-09-24 PROCEDURE — 80048 BASIC METABOLIC PNL TOTAL CA: CPT | Performed by: NURSE PRACTITIONER

## 2024-09-24 RX ORDER — SODIUM CHLORIDE 9 MG/ML
75 INJECTION, SOLUTION INTRAVENOUS CONTINUOUS
Status: DISCONTINUED | OUTPATIENT
Start: 2024-09-24 | End: 2024-09-25

## 2024-09-24 RX ORDER — ASPIRIN 81 MG/1
81 TABLET ORAL DAILY
Status: DISCONTINUED | OUTPATIENT
Start: 2024-09-24 | End: 2024-09-30 | Stop reason: HOSPADM

## 2024-09-24 RX ORDER — ACETAMINOPHEN 325 MG/1
650 TABLET ORAL ONCE AS NEEDED
Status: COMPLETED | OUTPATIENT
Start: 2024-09-24 | End: 2024-09-28

## 2024-09-24 RX ORDER — VITAMIN E 268 MG
800 CAPSULE ORAL DAILY
Status: DISCONTINUED | OUTPATIENT
Start: 2024-09-24 | End: 2024-09-24

## 2024-09-24 RX ORDER — GLATIRAMER ACETATE 20 MG/ML
20 INJECTION, SOLUTION SUBCUTANEOUS DAILY
Status: DISCONTINUED | OUTPATIENT
Start: 2024-09-24 | End: 2024-09-30 | Stop reason: HOSPADM

## 2024-09-24 RX ORDER — LEVOTHYROXINE SODIUM 100 UG/1
100 TABLET ORAL DAILY
COMMUNITY

## 2024-09-24 RX ORDER — GLATIRAMER ACETATE 20 MG/ML
20 INJECTION, SOLUTION SUBCUTANEOUS DAILY
COMMUNITY

## 2024-09-24 RX ORDER — NICOTINE 21 MG/24HR
1 PATCH, TRANSDERMAL 24 HOURS TRANSDERMAL
Status: DISCONTINUED | OUTPATIENT
Start: 2024-09-24 | End: 2024-09-30 | Stop reason: HOSPADM

## 2024-09-24 RX ORDER — DIGOXIN 0.25 MG/ML
250 INJECTION INTRAMUSCULAR; INTRAVENOUS ONCE
Status: COMPLETED | OUTPATIENT
Start: 2024-09-24 | End: 2024-09-24

## 2024-09-24 RX ORDER — NICOTINE POLACRILEX 4 MG
15 LOZENGE BUCCAL
Status: DISCONTINUED | OUTPATIENT
Start: 2024-09-24 | End: 2024-09-30 | Stop reason: HOSPADM

## 2024-09-24 RX ORDER — IPRATROPIUM BROMIDE AND ALBUTEROL SULFATE 2.5; .5 MG/3ML; MG/3ML
3 SOLUTION RESPIRATORY (INHALATION) EVERY 4 HOURS PRN
Status: DISCONTINUED | OUTPATIENT
Start: 2024-09-24 | End: 2024-09-30 | Stop reason: HOSPADM

## 2024-09-24 RX ORDER — BENZONATATE 100 MG/1
100 CAPSULE ORAL 3 TIMES DAILY PRN
Status: DISCONTINUED | OUTPATIENT
Start: 2024-09-24 | End: 2024-09-30 | Stop reason: HOSPADM

## 2024-09-24 RX ORDER — FUROSEMIDE 40 MG
40 TABLET ORAL 2 TIMES DAILY
COMMUNITY

## 2024-09-24 RX ORDER — FLUCONAZOLE 2 MG/ML
400 INJECTION, SOLUTION INTRAVENOUS DAILY
Status: DISCONTINUED | OUTPATIENT
Start: 2024-09-24 | End: 2024-09-24

## 2024-09-24 RX ORDER — ACETAMINOPHEN 500 MG
500 TABLET ORAL EVERY 6 HOURS PRN
Status: DISCONTINUED | OUTPATIENT
Start: 2024-09-24 | End: 2024-09-30 | Stop reason: HOSPADM

## 2024-09-24 RX ORDER — FLUOXETINE 40 MG/1
40 CAPSULE ORAL DAILY
COMMUNITY

## 2024-09-24 RX ORDER — MIDODRINE HYDROCHLORIDE 5 MG/1
10 TABLET ORAL ONCE
Status: COMPLETED | OUTPATIENT
Start: 2024-09-24 | End: 2024-09-24

## 2024-09-24 RX ORDER — GUAIFENESIN 600 MG/1
1200 TABLET, EXTENDED RELEASE ORAL 2 TIMES DAILY
COMMUNITY

## 2024-09-24 RX ORDER — IBUPROFEN 600 MG/1
1 TABLET ORAL
Status: DISCONTINUED | OUTPATIENT
Start: 2024-09-24 | End: 2024-09-30 | Stop reason: HOSPADM

## 2024-09-24 RX ORDER — CYCLOBENZAPRINE HCL 10 MG
5 TABLET ORAL 2 TIMES DAILY PRN
Status: DISCONTINUED | OUTPATIENT
Start: 2024-09-24 | End: 2024-09-30 | Stop reason: HOSPADM

## 2024-09-24 RX ORDER — DIGOXIN 0.25 MG/ML
250 INJECTION INTRAMUSCULAR; INTRAVENOUS ONCE
Status: COMPLETED | OUTPATIENT
Start: 2024-09-25 | End: 2024-09-25

## 2024-09-24 RX ORDER — INSULIN LISPRO 100 [IU]/ML
2-7 INJECTION, SOLUTION INTRAVENOUS; SUBCUTANEOUS
Status: DISCONTINUED | OUTPATIENT
Start: 2024-09-24 | End: 2024-09-30 | Stop reason: HOSPADM

## 2024-09-24 RX ORDER — UREA 10 %
1000 LOTION (ML) TOPICAL DAILY
Status: DISCONTINUED | OUTPATIENT
Start: 2024-09-24 | End: 2024-09-26

## 2024-09-24 RX ORDER — LINEZOLID 2 MG/ML
600 INJECTION, SOLUTION INTRAVENOUS EVERY 12 HOURS
Status: DISCONTINUED | OUTPATIENT
Start: 2024-09-24 | End: 2024-09-25

## 2024-09-24 RX ORDER — FERROUS SULFATE 325(65) MG
325 TABLET ORAL
Status: ON HOLD | COMMUNITY
End: 2024-09-30

## 2024-09-24 RX ORDER — RISPERIDONE 0.25 MG/1
0.25 TABLET, ORALLY DISINTEGRATING ORAL NIGHTLY
COMMUNITY

## 2024-09-24 RX ORDER — MIDODRINE HYDROCHLORIDE 5 MG/1
10 TABLET ORAL
Status: DISCONTINUED | OUTPATIENT
Start: 2024-09-25 | End: 2024-09-26

## 2024-09-24 RX ORDER — DEXTROSE MONOHYDRATE 25 G/50ML
25 INJECTION, SOLUTION INTRAVENOUS
Status: DISCONTINUED | OUTPATIENT
Start: 2024-09-24 | End: 2024-09-30 | Stop reason: HOSPADM

## 2024-09-24 RX ORDER — DILTIAZEM HYDROCHLORIDE 5 MG/ML
25 INJECTION INTRAVENOUS ONCE
Status: DISCONTINUED | OUTPATIENT
Start: 2024-09-24 | End: 2024-09-24

## 2024-09-24 RX ORDER — MAGNESIUM HYDROXIDE 1200 MG/15ML
LIQUID ORAL DAILY
COMMUNITY

## 2024-09-24 RX ORDER — GABAPENTIN 100 MG/1
200 CAPSULE ORAL 3 TIMES DAILY
Status: DISCONTINUED | OUTPATIENT
Start: 2024-09-24 | End: 2024-09-24

## 2024-09-24 RX ORDER — PANTOPRAZOLE SODIUM 40 MG/1
40 TABLET, DELAYED RELEASE ORAL
Status: DISCONTINUED | OUTPATIENT
Start: 2024-09-24 | End: 2024-09-30 | Stop reason: HOSPADM

## 2024-09-24 RX ORDER — GINSENG 100 MG
CAPSULE ORAL DAILY
Status: DISCONTINUED | OUTPATIENT
Start: 2024-09-24 | End: 2024-09-30 | Stop reason: HOSPADM

## 2024-09-24 RX ORDER — METOPROLOL TARTRATE 25 MG/1
25 TABLET, FILM COATED ORAL 2 TIMES DAILY
COMMUNITY

## 2024-09-24 RX ORDER — MIDODRINE HYDROCHLORIDE 5 MG/1
5 TABLET ORAL
Status: DISCONTINUED | OUTPATIENT
Start: 2024-09-24 | End: 2024-09-24

## 2024-09-24 RX ORDER — FINASTERIDE 5 MG/1
5 TABLET, FILM COATED ORAL DAILY
COMMUNITY

## 2024-09-24 RX ORDER — TAMSULOSIN HYDROCHLORIDE 0.4 MG/1
2 CAPSULE ORAL NIGHTLY
COMMUNITY

## 2024-09-24 RX ADMIN — DIGOXIN 250 MCG: 0.25 INJECTION INTRAMUSCULAR; INTRAVENOUS at 19:25

## 2024-09-24 RX ADMIN — SODIUM CHLORIDE 75 ML/HR: 9 INJECTION, SOLUTION INTRAVENOUS at 17:55

## 2024-09-24 RX ADMIN — MIDODRINE HYDROCHLORIDE 10 MG: 5 TABLET ORAL at 01:21

## 2024-09-24 RX ADMIN — INSULIN LISPRO 2 UNITS: 100 INJECTION, SOLUTION INTRAVENOUS; SUBCUTANEOUS at 16:50

## 2024-09-24 RX ADMIN — NICOTINE 1 PATCH: 14 PATCH, EXTENDED RELEASE TRANSDERMAL at 16:36

## 2024-09-24 RX ADMIN — MIDODRINE HYDROCHLORIDE 5 MG: 5 TABLET ORAL at 16:44

## 2024-09-24 RX ADMIN — MEROPENEM 1000 MG: 1 INJECTION INTRAVENOUS at 22:10

## 2024-09-24 RX ADMIN — DIGOXIN 250 MCG: 0.25 INJECTION INTRAMUSCULAR; INTRAVENOUS at 18:54

## 2024-09-24 RX ADMIN — SULFUR HEXAFLUORIDE 2 ML: KIT at 15:04

## 2024-09-24 RX ADMIN — MEROPENEM 1000 MG: 1 INJECTION INTRAVENOUS at 05:12

## 2024-09-24 RX ADMIN — Medication 800 UNITS: at 08:53

## 2024-09-24 RX ADMIN — INSULIN LISPRO 2 UNITS: 100 INJECTION, SOLUTION INTRAVENOUS; SUBCUTANEOUS at 21:14

## 2024-09-24 RX ADMIN — ASPIRIN 81 MG: 81 TABLET, COATED ORAL at 08:53

## 2024-09-24 RX ADMIN — PANTOPRAZOLE SODIUM 40 MG: 40 TABLET, DELAYED RELEASE ORAL at 05:13

## 2024-09-24 RX ADMIN — SODIUM CHLORIDE 500 ML: 9 INJECTION, SOLUTION INTRAVENOUS at 01:22

## 2024-09-24 RX ADMIN — LINEZOLID 600 MG: 600 INJECTION, SOLUTION INTRAVENOUS at 05:12

## 2024-09-24 RX ADMIN — MEROPENEM 1000 MG: 1 INJECTION INTRAVENOUS at 15:29

## 2024-09-24 RX ADMIN — INSULIN LISPRO 2 UNITS: 100 INJECTION, SOLUTION INTRAVENOUS; SUBCUTANEOUS at 08:53

## 2024-09-24 RX ADMIN — MIDODRINE HYDROCHLORIDE 5 MG: 5 TABLET ORAL at 12:04

## 2024-09-24 RX ADMIN — LINEZOLID 600 MG: 600 INJECTION, SOLUTION INTRAVENOUS at 16:36

## 2024-09-24 RX ADMIN — APIXABAN 5 MG: 5 TABLET, FILM COATED ORAL at 21:14

## 2024-09-24 RX ADMIN — INSULIN LISPRO 3 UNITS: 100 INJECTION, SOLUTION INTRAVENOUS; SUBCUTANEOUS at 12:49

## 2024-09-24 RX ADMIN — BACITRACIN 0.9 G: 500 OINTMENT TOPICAL at 08:53

## 2024-09-24 RX ADMIN — GABAPENTIN 200 MG: 100 CAPSULE ORAL at 08:53

## 2024-09-24 RX ADMIN — CYANOCOBALAMIN TAB 500 MCG 1000 MCG: 500 TAB at 08:53

## 2024-09-24 RX ADMIN — APIXABAN 5 MG: 5 TABLET, FILM COATED ORAL at 08:52

## 2024-09-24 RX ADMIN — GLATIRAMER ACETATE 20 MG: 20 INJECTION, SOLUTION SUBCUTANEOUS at 16:36

## 2024-09-24 NOTE — PROGRESS NOTES
Nonbillable note  Patient seen and examined at bedside he was resting comfortably and had just had breakfast, his blood pressure remains low.  Initiated him on midodrine 5 mg 3 times daily and will monitor response.  He is fluid overloaded on exam.  Does not voice any concerns.  Awaiting infectious disease input.

## 2024-09-24 NOTE — SIGNIFICANT NOTE
Chart review completed and spoke briefly to pt.  Pt confirms he is bed-bound at home and rarely transfers to w/c.  George lift used when transfers are completed.  Pt is at baseline for mobility and has no skilled PT needs.  Will d/c from inpt PT services.

## 2024-09-24 NOTE — PLAN OF CARE
Goal Outcome Evaluation:   Pt A&Ox4.Pt transferred from  this shift. HR tachy with hypotension. Hospitalist notified. Cardiology consulted. STAT ECG ordered and completed. Echo completed at bedside. Pt sleeping between care. Pt able to voice concerns. Call light within reach.

## 2024-09-24 NOTE — NURSING NOTE
Pt had cases nicotine patches on bedside table. This nurse informed pt that he was not able to have those while in hospital. Told pt I would put them in closet in room for spouse to take home. Offered to request nicotine patch order and pt agreed. Secure chatted Hospitalist for nicotine patch order. Pt verbalized understanding and went back to sleep.

## 2024-09-24 NOTE — H&P
[facility] Medicine Services  History & Physical    Patient Name: Jamin Hennessy  : 1955  MRN: [id number]  Primary Care Physician:  Jhonny Heller MD  Date of admission: 2024  Date and Time of Service: 2024 at 0100    Subjective      Chief Complaint: shortness of air     History of Present Illness: Jamin Hennessy is a very pleasant  69 y.o. male from home, lives with wife , has home health visits , with a CMH of multiple sclerosis, bedbound, paroxysmal atrial fibrillation on anticoagulation, coronary artery disease status post PCI, bioprosthetic mitral valve replacement status post endocarditis, hypothyroidism ,morbid obesity,diabetes mellitus type 2 not on insulin, chronic kidney disease, GERD, left lower lung cancer on chemotherapy, generalized anxiety disorder, dyslipidemia, hypertension, who presented to [facility] on 2024 upon transfer from [facility] emergency department where he presented with complaints of shortness of air.  He reports cough productive of white phlegm.  He denies any chest pain or dizziness.  Patient usually goes to the [facility] or [place] for care.He is a DNR DNI.  Apparently he was discharged a few days ago from the [facility].  Review of records shows he initially went to [facility] ED on 2024 with atrial fibrillation with RVR and was cardioverted back to sinus rhythm, he also had a UTI presumed to be ESBL and started on meropenem and then was transferred to the [facility] for admission.  He is currently stable on room air.  EKG at outlying facility initially showed sinus tachycardia heart rate 142.  Apparently between the time verbal report was given from outside facility until his arrival here he developed atrial fibrillation with RVR because he arrived on a Cardizem drip.  Stat EKG image below shows atrial flutter heart rate 115.  BP was mildly low so he was given a 500 cc bolus here as well as  10 mg p.o. midodrine with improvement.  He was given 500 mg [facility]  Review of records shows she has a history of VRE. Verbal report as given to me from ED provider and RN here got report he received a dose of meropenum at outside facility as well although not recorded in records sent.  Review of records shows he had a 2D echo here in 2020 with showed an ejection fraction of 70%.    Labs at [facility] facility showed a WBC of 8.8 hemoglobin 11.5 BUN 26 creatinine 1.34 glucose 183, troponin 41  elevated per hospital baseline, lactate 1.5 urinalysis showed negative nitrites moderate leukocytes  WBCs few bacteria, COVID-19 negative influenza A negative influenza B negative, chest x-ray per radiology at [facility] facility showed increased left lower lung airspace disease with probable tiny left pleural effusion.he has a chronic indwelling Perry catheter which she reports home health changes.  He will be admitted for antibiotic therapy for UTI and pneumonia and cardiology consulted for atrial fibrillation RVR elevated troponin and BNP and significant cardiac history.  Blood and urine cultures were reported to be taken at outside facility and pending.  They have been reordered here.  After consult with pharmacy here he was started on IV meropenem and IV Zosyn pharmacy to dose and given recent history of possible ESBL, VRE recent hospitalization pneumonia and UTI and frequent infections infectious diseases been consulted.  Review of Systems   Constitutional: Negative.    HENT: Negative.     Eyes: Negative.    Respiratory:  Positive for cough and shortness of breath.    Cardiovascular: Negative.    Gastrointestinal: Negative.    Endocrine: Negative.    Genitourinary:         Chronic Perry   Musculoskeletal: Negative.    Skin: Negative.    Allergic/Immunologic: Positive for immunocompromised state.   Neurological: Negative.    Hematological: Negative.     Psychiatric/Behavioral: Negative.     All other systems reviewed and are negative.      Personal History     Past Medical History:   Diagnosis Date    A-fib     CAD (coronary artery disease)     Elevated cholesterol     Hypertension     MI (myocardial infarction)     Non-insulin dependent type 2 diabetes mellitus        Past Surgical History:   Procedure Laterality Date    BRONCHOSCOPY N/A 3/4/2020    Procedure: BRONCHOSCOPY with bronchioalveolar lavage;  Surgeon: Melissa Cole MD;  Location: [facility] ENDOSCOPY;  Service: Pulmonary;  Laterality: N/A;   pneumonia    CARDIAC CATHETERIZATION      CARDIAC CATHETERIZATION N/A 1/20/2020    Procedure: Left Heart Cath;  Surgeon: Migdalia Beth MD;  Location: [facility] CATH INVASIVE LOCATION;  Service: Cardiovascular    CARDIAC CATHETERIZATION N/A 1/20/2020    Procedure: Left ventriculography;  Surgeon: Migdalia Beth MD;  Location: [facility] CATH INVASIVE LOCATION;  Service: Cardiovascular    CARDIAC CATHETERIZATION N/A 1/20/2020    Procedure: Coronary angiography;  Surgeon: Migdalia Beth MD;  Location: [facility] CATH INVASIVE LOCATION;  Service: Cardiovascular    CORONARY ANGIOPLASTY WITH STENT PLACEMENT      MITRAL VALVE REPAIR/REPLACEMENT N/A 1/22/2020    Procedure: MITRAL VALVE REPAIR/REPLACEMENT;  Surgeon: Fallon Cole MD;  Location: [facility] CVOR;  Service: Cardiothoracic;  Laterality: N/A;  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 Otherwise pertinent FHx was reviewed and not pertinent to current issue.    Social History:  reports that he has been smoking cigarettes. His smokeless tobacco use includes chew. He reports that he does not currently use alcohol. He reports that he does not use drugs.    Home Medications:  Prior to Admission Medications       Prescriptions Last Dose Informant Patient Reported? Taking?    acetaminophen (TYLENOL)  500 MG tablet   No No    Take 1 tablet by mouth Every 6 (Six) Hours As Needed for Mild Pain .    apixaban (ELIQUIS) 5 MG tablet tablet   No No    Take 1 tablet by mouth Every 12 (Twelve) Hours.    aspirin 81 MG EC tablet  Spouse/Significant Other Yes No    Take 81 mg by mouth Daily.    bacitracin 500 UNIT/GM ointment  Spouse/Significant Other Yes No    Apply  topically to the appropriate area as directed Daily.    cyclobenzaprine (FLEXERIL) 5 MG tablet   No No    Take 1 tablet by mouth 2 (Two) Times a Day As Needed for Muscle Spasms.    docusate sodium (COLACE) 100 MG capsule  Spouse/Significant Other Yes No    Take 100 mg by mouth 2 (Two) Times a Day.    finasteride (PROSCAR) 5 MG tablet  Spouse/Significant Other Yes No    Take 5 mg by mouth Daily.    FLUoxetine (PROzac) 20 MG capsule   Yes No    Take 40 mg by mouth Daily.    folic acid (FOLVITE) 1 MG tablet  Spouse/Significant Other Yes No    Take 1 mg by mouth Daily.    gabapentin (NEURONTIN) 100 MG capsule  Spouse/Significant Other Yes No    Take 200 mg by mouth 3 (Three) Times a Day.    insulin lispro (humaLOG) 100 UNIT/ML injection  Spouse/Significant Other Yes No    Inject  under the skin into the appropriate area as directed 4 (Four) Times a Day As Needed for High Blood Sugar (sliding scale, not specific).    lactobacillus (BACID) tablet caplet  Spouse/Significant Other Yes No    Take  by mouth 2 (Two) Times a Day.    Loratadine 10 MG capsule   Yes No    Take  by mouth.    omeprazole (priLOSEC) 20 MG capsule  Spouse/Significant Other Yes No    Take 20 mg by mouth Daily.    tamsulosin (FLOMAX) 0.4 MG capsule 24 hr capsule  Spouse/Significant Other Yes No    Take 1 capsule by mouth Daily.    vitamin B-12 (CYANOCOBALAMIN) 500 MCG tablet  Spouse/Significant Other Yes No    Take 1,000 mcg by mouth Daily.    vitamin E 400 UNIT capsule  Spouse/Significant Other Yes No    Take 800 Units by mouth Daily.              Allergies:  No Known Allergies    Objective       Vitals:   Temp:  [98.2 °F (36.8 °C)] 98.2 °F (36.8 °C)  Heart Rate:  [101-135] 135  Resp:  [18] 18  BP: (81-98)/(52-76) 93/65  Body mass index is 42.2 kg/m².  Physical Exam  Vitals reviewed.   Constitutional:       Appearance: Normal appearance. He is obese.   HENT:      Head: Normocephalic and atraumatic.      Right Ear: External ear normal.      Left Ear: External ear normal.      Nose: Nose normal.      Mouth/Throat:      Mouth: Mucous membranes are moist.   Eyes:      Extraocular Movements: Extraocular movements intact.   Cardiovascular:      Rate and Rhythm: Tachycardia present. Rhythm irregular.      Pulses: Normal pulses.      Heart sounds: Normal heart sounds.   Pulmonary:      Effort: Pulmonary effort is normal.      Breath sounds: Normal breath sounds.   Abdominal:      Palpations: Abdomen is soft.   Genitourinary:     Comments: deferred  Musculoskeletal:         General: Normal range of motion.      Cervical back: Normal range of motion and neck supple.   Skin:     General: Skin is warm and dry.   Neurological:      General: No focal deficit present.      Mental Status: He is alert and oriented to person, place, and time.   Psychiatric:         Mood and Affect: Mood normal.         Behavior: Behavior normal.         Thought Content: Thought content normal.         Judgment: Judgment normal.         Diagnostic Data:            Lab Results (last 24 hours)       Procedure Component Value Units Date/Time    POC Glucose Once [[id number]]  (Abnormal) Collected: 09/24/24 0113    Specimen: Blood Updated: 09/24/24 0114     Glucose 150 mg/dL      Comment: Serial Number: [id number]:  [id number]                Imaging Results (Last 24 Hours)       ** No results found for the last 24 hours. **              Assessment & Plan        This is a 69 y.o. male with:    Active and Resolved Problems  Active Hospital Problems    Diagnosis  POA    **Sepsis [A41.9]  Yes     Priority: High    Pneumonia [J18.9]  Yes      Priority: High    Acute UTI (urinary tract infection) [N39.0]  Yes     Priority: High    Atrial fibrillation with RVR [I48.91]  Yes     Priority: Medium    Acute on chronic renal insufficiency [N28.9, N18.9]  Yes    Lung cancer [C34.90]  Yes    Elevated troponin [R79.89]  Yes    Paroxysmal atrial fibrillation [I48.0]  Yes    S/P MVR (mitral valve replacement) [Z95.2]  Not Applicable    GERD without esophagitis [K21.9]  Yes    Essential hypertension [I10]  Yes    JARRETT (generalized anxiety disorder) [F41.1]  Yes    Dyslipidemia [E78.5]  Yes    CAD S/P percutaneous coronary angioplasty [I25.10, Z98.61]  Not Applicable    Non-insulin dependent type 2 diabetes mellitus [E11.9]  Yes    BPH with obstruction/lower urinary tract symptoms [N40.1, N13.8]  Yes      Resolved Hospital Problems   No resolved problems to display.       Sepsis, lactate not elevated however triggered with rapid heart rate and fever, likely secondary to both pneumonia and UTI, was given ceftriaxone, azithromycin and meropenem in outlying facility, given recent history of hospitalization discharged several days ago with UTI and history of ESBL and VRE, repeat blood cultures and urine cultures are ordered and pending and infectious diseases been consulted, 500 cc bolus IV fluid in ED, continue normal saline at 5 cc/h    Pneumonia per outside facility chest x-ray, WBC and lactate not elevated, see plan above DuoNebs every 4 hours as needed oxygen as needed to maintain sats 90 to 92%    Acute UTI per outside facility history of ESBL VRE, see plan above under sepsis chronic indwelling Perry catheter    Atrial fibrillation with RVR currently stable on Cardizem drip, on home apixaban cardiology consulted given elevated proBNP, troponin and atrial fibrillation RVR    Acute on chronic renal insufficiency, BUN 26 creatinine 1.34 avoid nephrotoxic meds trend renal function normal saline at 75 cc/h    Lung cancer, followed by hematology oncology at [facility]  Administration on chemotherapy    Elevated troponin, 41, denies chest pain may be secondary to ischemic demand from increased rate, repeat troponin continuous cardiac monitoring see plan above    SP mitral valve replacement due to endocarditis, noted    GERD without esophagitis, on home PPI    Essential hypertension, no home meds monitor BP will add as needed antihypertensives if needed    Dyslipidemia, not currently on statin    Generalized anxiety disorder, no current home meds    Non-insulin-dependent diabetes mellitus, serum glucose 183, no hypoglycemics on current home med list, add SSI as needed with Accu-Cheks ACHS consistent carb heart healthy diet    Coronary artery disease status post angioplasty, troponin mildly elevated denies chest pain, on home aspirin, Eliquis cardiology consulted as above    BPH, no home meds    Chronic back pain, on home Flexeril and gabapentin reordered    Morbid  Obesity BMI 42, lifestyle management education if receptive    VTE Prophylaxis:  Pharmacologic VTE prophylaxis orders are present.        The patient desires to be as follows:    CODE STATUS:    Medical Intervention Limits: No intubation (DNI)  Code Status (Patient has no pulse and is not breathing): No CPR (Do Not Attempt to Resuscitate)  Medical Interventions (Patient has pulse or is breathing): Limited Support          Admission Status:  I believe this patient meets inpatient  status.    Expected Length of Stay: pending clinical course     PDMP and Medication Dispenses via Sidebar reviewed and consistent with patient reported medications.    I discussed the patient's findings and my recommendations with patient and nursing staff.      Signature:     This document has been electronically signed by YUE Fortune on September 24, 2024 03:35 EDT   [facility] Hospitalist Team

## 2024-09-24 NOTE — CONSULTS
[facility] CARDIOLOGY CONSULT  [facility]      Cardiology assessment and plan      Atrial fibrillation with rapid lower response  Hypotension  Sepsis  Urinary tract infection  History of mitral valve endocarditis status post mitral valve replacement surgery of the bioprosthetic valve  Nonobstructive coronary artery disease on cardiac catheterization in 2020  History of hypertension  Hyperlipidemia  Diabetes mellitus  Chronic kidney disease  Hypothyroidism  Lung cancer status post right chemotherapy    Denies any new cardiac symptoms  Poor historian  Tmax is 98.2 pulse is 70-1 38 respirations are 14 blood pressure is 64 / 35 to 126 / 70 sats are 97%  Nonspecific elevation of troponin  Abnormal elevated proBNP  Normal thyroid function  Sodium is 144 potassium is 3.8 creatinine is 1.1 hemoglobin is 10.5  Blood pressure has been soft all day but somewhat better now  Current medications include aspirin 81 mg p.o. once a day  Midodrine 5 mg p.o. 3 times a day which will increase it to 10 mg p.o. 3 times a day  Patient is currently on anticoagulation therapy with apixaban 5 mg p.o. twice daily  Patient will be given a dose of dig for rate control right now  If the blood pressure is better we will start him on low-dose beta-blocker  Diagnosis and treatment options reviewed and discussed with patient  Echocardiogram with normal LV systolic function  Normal functioning of the bioprosthetic valve  Follow-up on the results of the blood cultures  Further recommendation based on patient course    Subjective:     Encounter Date:09/23/2024      Patient ID: Jamin Hennessy is a 69 y.o. male.    Chief Complaint: SOA      HPI:  Jamin Hennessy is a 69 y.o. male known to Dr. Littlejohn with a past cardiac history of CAD status post PCI, PAF (anticoagulated with Eliquis), and mitral valve endocarditis status post tissue MVR in 2020.  However patient was lost to follow-up.  Last 2D echo in 2020 shows  an EF of 66 to 70% with mild MR.  Preop cath in 2020 showed patent LAD stent with mild to moderate residual nonobstructive CAD (30 to 40% RCA, 50% LAD, 40 to 50% LCx).  PMH includes HTN, HLD, DM, CKD, hypothyroidism, lung cancer ([facility]), and multiple sclerosis in which patient is bedbound.  Patient presents to outside hospital in [place] with complaints of shortness of breath and found with sepsis/UTI complicated by rapid atrial flutter in which she was started on a Cardizem drip and transferred here.  Cardiology was consulted for further evaluation and management.    Cardizem drip is currently off as patient is hypotensive at 87/65.  Telemetry shows he remains in atrial flutter which is rate controlled in the 90s currently.  Patient is a difficult historian but seems to indicate progressively worsened shortness of breath over the past week.  He denies any palpitations or dizziness.  He reports chronic lower extremity edema.  He reports some intermittent nonradiating chest discomfort lasting a few minutes and resolving without intervention.  He no longer has nitroglycerin.  He cannot recall his prior angina.  He cannot recall having any intervention for his prior A-fib.  He denies any history of clotting issues.  He does admit to a recent nosebleed.  He is also concerned because he is supposed to have [facility].    Past Medical History:   Diagnosis Date    A-fib     CAD (coronary artery disease)     Elevated cholesterol     Hypertension     MI (myocardial infarction)     Non-insulin dependent type 2 diabetes mellitus          Past Surgical History:   Procedure Laterality Date    BRONCHOSCOPY N/A 3/4/2020    Procedure: BRONCHOSCOPY with bronchioalveolar lavage;  Surgeon: Melissa Cole MD;  Location: [facility] ENDOSCOPY;  Service: Pulmonary;  Laterality: N/A;   pneumonia    CARDIAC CATHETERIZATION      CARDIAC CATHETERIZATION N/A 1/20/2020    Procedure:  Left Heart Cath;  Surgeon: Migdalia Beth MD;  Location: [facility] CATH INVASIVE LOCATION;  Service: Cardiovascular    CARDIAC CATHETERIZATION N/A 1/20/2020    Procedure: Left ventriculography;  Surgeon: Migdalia Beth MD;  Location: [facility] CATH INVASIVE LOCATION;  Service: Cardiovascular    CARDIAC CATHETERIZATION N/A 1/20/2020    Procedure: Coronary angiography;  Surgeon: Migdalia Beth MD;  Location: [facility] CATH INVASIVE LOCATION;  Service: Cardiovascular    CORONARY ANGIOPLASTY WITH STENT PLACEMENT      MITRAL VALVE REPAIR/REPLACEMENT N/A 1/22/2020    Procedure: MITRAL VALVE REPAIR/REPLACEMENT;  Surgeon: Fallon Cole MD;  Location: [facility] CVOR;  Service: Cardiothoracic;  Laterality: N/A;  patient has endocarditis mitral valve replaced with 31mm knox II         Social History     Socioeconomic History    Marital status:    Tobacco Use    Smoking status: Some Days     Current packs/day: 0.25     Types: Cigarettes    Smokeless tobacco: Current     Types: Chew   Vaping Use    Vaping status: Never Used   Substance and Sexual Activity    Alcohol use: Not Currently    Drug use: Never    Sexual activity: Defer     Quit smoking.    Family History   Problem Relation Age of Onset    Hypertension Mother          No Known Allergies    Current Medications:   Scheduled Meds:apixaban, 5 mg, Oral, Q12H  aspirin, 81 mg, Oral, Daily  bacitracin, , Topical, Daily  glatiramer acetate, 20 mg, Subcutaneous, Daily  insulin lispro, 2-7 Units, Subcutaneous, 4x Daily AC & at Bedtime  Linezolid, 600 mg, Intravenous, Q12H  meropenem, 1,000 mg, Intravenous, Q8H  midodrine, 5 mg, Oral, TID AC  pantoprazole, 40 mg, Oral, Q AM  vitamin B-12, 1,000 mcg, Oral, Daily      Continuous Infusions:Pharmacy to Dose meropenem (MERREM),   sodium chloride, 75 mL/hr, Last Rate: 75 mL/hr (09/24/24 0205)  sodium chloride, 75 mL/hr, Last Rate: 75 mL/hr (09/24/24 0344)        Review of Systems   Constitutional: Negative  "for chills, decreased appetite and malaise/fatigue.   HENT:  Negative for congestion and nosebleeds.    Eyes:  Negative for blurred vision and double vision.   Cardiovascular:  Positive for dyspnea on exertion. Negative for near-syncope, orthopnea, palpitations and paroxysmal nocturnal dyspnea.   Respiratory:  Positive for cough and shortness of breath.    Hematologic/Lymphatic: Negative for adenopathy. Does not bruise/bleed easily.   Skin:  Negative for rash.   Musculoskeletal:  Negative for back pain and joint pain.   Gastrointestinal:  Negative for bloating, abdominal pain, hematemesis and hematochezia.   Genitourinary:  Negative for flank pain and hematuria.   Neurological:  Negative for dizziness and focal weakness.   Psychiatric/Behavioral:  Negative for altered mental status. The patient does not have insomnia.      All other systems reviewed and are negative.       Objective:         BP (!) 85/55 (BP Location: Left arm, Patient Position: Lying)   Pulse 82   Temp 98.3 °F (36.8 °C) (Oral)   Resp 16   Ht 177.8 cm (70\")   Wt 133 kg (294 lb 1.5 oz)   SpO2 96%   BMI 42.20 kg/m²       General: Well-developed in NAD.  Neuro: AAOx3. No gross deficits.  HEENT: Sclerae clear, no xanthelasmas.  CV: S1S2, RRR. No murmurs or gallops.  Resp: Breathing is unlabored. Lungs +wheezing throughout.  GI: BS+. Abdomen soft and NTTP.  Ext: Pedal pulses are palpable. Extremities are with 2+ pitting bilateral lower extremity edema.  MS: moves all extremities, generalized weakness.  Skin: warm, dry.  Psych: calm and cooperative.            Lab Review:     Results from last 7 days   Lab Units 09/24/24  0343   SODIUM mmol/L 144   POTASSIUM mmol/L 3.8   CHLORIDE mmol/L 108*   CO2 mmol/L 25.6   BUN mg/dL 26*   CREATININE mg/dL 1.18   GLUCOSE mg/dL 141*   CALCIUM mg/dL 8.5*     Results from last 7 days   Lab Units 09/24/24  0526 09/24/24  0343   HSTROP T ng/L 37* 52*     Results from last 7 days   Lab Units 09/24/24  0343   WBC " "10*3/mm3 9.17   HEMOGLOBIN g/dL 10.5*   HEMATOCRIT % 35.2*   PLATELETS 10*3/mm3 215         Results from last 7 days   Lab Units 09/24/24  0526   MAGNESIUM mg/dL 1.8           Invalid input(s): \"LDLCALC\"      Results from last 7 days   Lab Units 09/24/24  0526   TSH uIU/mL 2.210       Recent Radiology:  Imaging Results (Most Recent)       None              ECHOCARDIOGRAM:    Results for orders placed during the hospital encounter of 01/14/20    Adult Transthoracic Echo Complete W/ Cont if Necessary Per Protocol    Interpretation Summary  · Estimated EF appears to be in the range of 66 - 70%. Normal left ventricular cavity size noted. All left ventricular wall segments contract normally. Left ventricular wall thickness is consistent with moderate septal asymmetric hypertrophy. Left ventricular diastolic function not assessed on this study. LVOT gradient not interrogated.  · Left atrial cavity size is mildly dilated.  · There is a bioprosthetic mitral valve present. Leaflets were not well visualized. The mean gradient across the valve was 5.2 mmHg. There appears to be mild mitral insufficiency but color flow interrogation was inadequate.            Assessment:         Active Hospital Problems    Diagnosis  POA    **Sepsis [A41.9]  Yes    Pneumonia [J18.9]  Yes    Acute UTI (urinary tract infection) [N39.0]  Yes    Acute on chronic renal insufficiency [N28.9, N18.9]  Yes    Lung cancer [C34.90]  Yes    Elevated troponin [R79.89]  Yes    Paroxysmal atrial fibrillation [I48.0]  Yes    S/P MVR (mitral valve replacement) [Z95.2]  Not Applicable    GERD without esophagitis [K21.9]  Yes    Essential hypertension [I10]  Yes    JARRETT (generalized anxiety disorder) [F41.1]  Yes    Dyslipidemia [E78.5]  Yes    CAD S/P percutaneous coronary angioplasty [I25.10, Z98.61]  Not Applicable    Non-insulin dependent type 2 diabetes mellitus [E11.9]  Yes    BPH with obstruction/lower urinary tract symptoms [N40.1, N13.8]  Yes    Atrial " fibrillation with RVR [I48.91]  Yes     1) Atrial Flutter with RVR  -prior hx afib  -High-sensitivity troponin 52, 37  -K 3.8, Mg 1.8  -TSH WNL  -anticoagulated with Eliquis    2) CAD status post PCI  - Preop cath in 2020 showed patent LAD stent with mild to moderate residual nonobstructive CAD (30 to 40% RCA, 50% LAD, 40 to 50% LCx).      3) hx mitral valve endocarditis status post tissue MVR in 2020  - Last 2D echo in 2020 shows an EF of 66 to 70% with mild MR.      4) Sepsis / UTI    5) HTN  - currently hypotensive    6) HLD    7) DM    8) CKD    9) hypothyroidism    10) lung cancer   - [facility]    11) multiple sclerosis     12) renal insufficiency           Plan:   Tele shows rate controlled atrial flutter.  Will discontinue Cardizem due to hypotension.  Patient has been started on midodrine and will plan to resume AV sg blockers as BP allows.  May need to use digoxin if BP remains hypotensive.  He has received IV fluids for sepsis and hypotension.  He appears hypervolemic on exam.  Will check CXR, BNP, and 2D echo.  Further recommendations and rhythm strategy per attending cardiologist.       Electronically signed by YUE Park, 09/24/24, 12:38 PM EDT.

## 2024-09-24 NOTE — LETTER
EMS Transport Request  For use at [facility], [place], [place], [place], and [place] only   Patient Name: Jamin Hennessy : 1955   Weight:(!) 138 kg (304 lb 3.8 oz) Pick-up Location: 2114 BLS/ALS: BLS/ALS: BLS   Insurance: [employer] Auth End Date: N/A   Pre-Cert #: N/A D/C Summary complete: Yes   Destination: Home How many stairs none, Will the patient be on the main level yes, Is there a ramp available n/a, Can the patient stand and pivot no, Address [address], and Name/contact number for who will be present spouse (doc) [phone]   Contact Precautions: Other contact mrsa, vre   Equipment (O2, Fluids, etc.): None   Arrive By Date/Time: 24 1530 Stretcher/WC: Stretcher   CM Requesting: Olga Busby RN     Office Phone: [phone]  Office Cell: [phone]   Ext: [phone]   Notes/Medical Necessity: 304lb, A&Ox4, unstageable gluteal pressure injury, bedbound, [place] lift dependent for transfers.      ______________________________________________________________________    *Only 2 patient bags OR 1 carry-on size bag are permitted.  Wheelchairs and walkers CANNOT transported with the patient. Acknowledge: Yes

## 2024-09-24 NOTE — CONSULTS
LOS: 0 days   Patient Care Team:  Jhonny Heller MD as PCP - General (Family Medicine)  Frederick George MD as Consulting Physician (Nephrology)        Subjective       Attending MD : Reddy Moore MD    Patient Complaints: SOB         History of Present Illness  : 69 y.o. male from home, lives with wife , has home health visits , with a CMH of multiple sclerosis, bedbound, paroxysmal atrial fibrillation on anticoagulation, coronary artery disease status post PCI, bioprosthetic mitral valve replacement status post endocarditis, hypothyroidism ,morbid obesity,diabetes mellitus type 2 not on insulin, chronic kidney disease, GERD, left lower lung cancer on chemotherapy, generalized anxiety disorder, dyslipidemia, hypertension, who presented to [facility] on 9/24/2024 upon transfer from [facility] emergency department where he presented with complaints of shortness of air.  He reports cough productive of white phlegm.  He denies any chest pain or dizziness.  Patient usually goes to the [facility] or [place] for care.He is a DNR DNI.  Apparently he was discharged a few days ago from the [facility].  Review of records shows he initially went to [facility] ED on 9/12/2024 with atrial fibrillation with RVR and was cardioverted back to sinus rhythm, he also had a UTI presumed to be ESBL and started on meropenem and then was transferred to the [facility] for admission.  He is currently stable on room air.  EKG at outlying facility initially showed sinus tachycardia heart rate 142.  Apparently between the time verbal report was given from outside facility until his arrival here he developed atrial fibrillation with RVR because he arrived on a Cardizem drip.  Stat EKG image below shows atrial flutter heart rate 115.  BP was mildly low so he was given a 500 cc bolus here as well as 10 mg p.o. midodrine with improvement.  He was given 500 mg IV azithromycin 2 g IV  ceftriaxone at outlying facility.  Review of records shows she has a history of VRE. Verbal report as given to me from ED provider and RN here got report he received a dose of meropenum at outside facility as well although not recorded in records sent.  Review of records shows he had a 2D echo here in 2020 with showed an ejection fraction of 70%.      Patient Denies:  NV    PMH :   Sepsis    Atrial fibrillation with RVR    CAD S/P percutaneous coronary angioplasty    Essential hypertension    Dyslipidemia    Non-insulin dependent type 2 diabetes mellitus    GERD without esophagitis    BPH with obstruction/lower urinary tract symptoms    JARRETT (generalized anxiety disorder)    S/P MVR (mitral valve replacement)    Paroxysmal atrial fibrillation    Pneumonia    Acute UTI (urinary tract infection)    Acute on chronic renal insufficiency    Lung cancer    Elevated troponin      Review of Systems:    SOB    Objective     Vital Signs  Temp:  [97.6 °F (36.4 °C)-98.3 °F (36.8 °C)] 98.3 °F (36.8 °C)  Heart Rate:  [] 82  Resp:  [14-20] 16  BP: ()/(51-80) 85/55    Physical Exam:     General Appearance:  Alert, cooperative, in no acute distress   Head:  Normocephalic, without obvious abnormality, atraumatic   Eyes:  Lids and lashes normal, conjunctivae and sclerae normal, no icterus, no pallor, corneas clear, PERRLA   Ears:  Ears appear intact with no abnormalities noted   Throat:  No oral lesions, no thrush, oral mucosa moist   Neck:  No adenopathy, supple, trachea midline, no thyromegaly, no carotid bruit, no JVD   Back:  No kyphosis present, no scoliosis present, no skin lesions, erythema or scars, no tenderness to percussion or palpation, range of motion normal   Lungs:  Clear to auscultation, respirations regular, even and unlabored    Heart:  Regular rhythm and normal rate, normal S1 and S2, no murmur, no gallop, no rub, no click   Chest Wall:  No abnormalities observed   Abdomen:  Normal bowel sounds, no  masses, no organomegaly, soft non-tender, non-distended, no guarding, no rebound tenderness   Rectal:  Deferred   Extremities:  Moves all extremities well, no edema, no cyanosis, no redness   Pulses:  Pulses palpable and equal bilaterally   Skin:  No bleeding, bruising or rash   Lymph nodes:  No palpable adenopathy   Neurologic:  Cranial nerves 2 - 12 grossly intact, sensation intact, DTR present and equal bilaterally                                                                                  Results Review:    Lab Results (last 72 hours)       Procedure Component Value Units Date/Time    POC Glucose Once [[id number]]  (Abnormal) Collected: 09/24/24 1156    Specimen: Blood Updated: 09/24/24 1159     Glucose 200 mg/dL      Comment: Serial Number: [id number]:  [id number]       Magnesium [[id number]]  (Normal) Collected: 09/24/24 0526    Specimen: Blood from Arm, Left Updated: 09/24/24 0951     Magnesium 1.8 mg/dL     TSH [[id number]]  (Normal) Collected: 09/24/24 0526    Specimen: Blood from Arm, Left Updated: 09/24/24 0951     TSH 2.210 uIU/mL     POC Glucose Once [[id number]]  (Abnormal) Collected: 09/24/24 0744    Specimen: Blood Updated: 09/24/24 0746     Glucose 174 mg/dL      Comment: Serial Number: [id number]:  [id number]       High Sensitivity Troponin T 2Hr [[id number]]  (Abnormal) Collected: 09/24/24 0526    Specimen: Blood from Arm, Left Updated: 09/24/24 0638     HS Troponin T 37 ng/L      Troponin T Delta -15 ng/L     Narrative:      High Sensitive Troponin T Reference Range:  <14.0 ng/L- Negative Female for AMI  <22.0 ng/L- Negative Male for AMI  >=14 - Abnormal Female indicating possible myocardial injury.  >=22 - Abnormal Male indicating possible myocardial injury.   Clinicians would have to utilize clinical acumen, EKG, Troponin, and serial changes to determine if it is an Acute Myocardial Infarction or myocardial injury due to an underlying chronic condition.         Urinalysis,  Microscopic Only - Indwelling Urethral Catheter [[id number]]  (Abnormal) Collected: 09/24/24 0458    Specimen: Urine from Indwelling Urethral Catheter Updated: 09/24/24 0634     RBC, UA 3-5 /HPF      WBC, UA 21-50 /HPF      Bacteria, UA None Seen /HPF      Squamous Epithelial Cells, UA 0-2 /HPF      Yeast, UA       Moderate/2+ Budding Yeast w/Hyphae     /HPF     Hyaline Casts, UA 0-2 /LPF      Methodology Manual Light Microscopy    Urine Culture - Urine, Indwelling Urethral Catheter [[id number]] Collected: 09/24/24 0458    Specimen: Urine from Indwelling Urethral Catheter Updated: 09/24/24 0634    Urinalysis With Culture If Indicated - Indwelling Urethral Catheter [[id number]]  (Abnormal) Collected: 09/24/24 0458    Specimen: Urine from Indwelling Urethral Catheter Updated: 09/24/24 0620     Color, UA Yellow     Appearance, UA Turbid     Comment: Result checked          pH, UA 6.0     Specific Gravity, UA 1.013     Glucose, UA Negative     Ketones, UA Negative     Bilirubin, UA Negative     Blood, UA Large (3+)     Protein, UA 30 mg/dL (1+)     Leuk Esterase, UA Large (3+)     Nitrite, UA Negative     Urobilinogen, UA 1.0 E.U./dL    Narrative:      In absence of clinical symptoms, the presence of pyuria, bacteria, and/or nitrites on the urinalysis result does not correlate with infection.    MRSA Screen, PCR (Inpatient) - Swab, Nares [[id number]]  (Abnormal) Collected: 09/24/24 0336    Specimen: Swab from Nares Updated: 09/24/24 0613     MRSA PCR MRSA Detected    Narrative:      The negative predictive value of this diagnostic test is high and should only be used to consider de-escalating anti-MRSA therapy. A positive result may indicate colonization with MRSA and must be correlated clinically.    Basic Metabolic Panel [[id number]]  (Abnormal) Collected: 09/24/24 0343    Specimen: Blood from Arm, Left Updated: 09/24/24 0452     Glucose 141 mg/dL      BUN 26 mg/dL      Creatinine 1.18 mg/dL      Sodium 144 mmol/L       Potassium 3.8 mmol/L      Chloride 108 mmol/L      CO2 25.6 mmol/L      Calcium 8.5 mg/dL      BUN/Creatinine Ratio 22.0     Anion Gap 10.4 mmol/L      eGFR 66.8 mL/min/1.73     Narrative:      GFR Normal >60  Chronic Kidney Disease <60  Kidney Failure <15      High Sensitivity Troponin T [[id number]]  (Abnormal) Collected: 09/24/24 0343    Specimen: Blood from Arm, Left Updated: 09/24/24 0452     HS Troponin T 52 ng/L     Narrative:      High Sensitive Troponin T Reference Range:  <14.0 ng/L- Negative Female for AMI  <22.0 ng/L- Negative Male for AMI  >=14 - Abnormal Female indicating possible myocardial injury.  >=22 - Abnormal Male indicating possible myocardial injury.   Clinicians would have to utilize clinical acumen, EKG, Troponin, and serial changes to determine if it is an Acute Myocardial Infarction or myocardial injury due to an underlying chronic condition.         CBC & Differential [[id number]]  (Abnormal) Collected: 09/24/24 0343    Specimen: Blood from Arm, Left Updated: 09/24/24 0427    Narrative:      The following orders were created for panel order CBC & Differential.  Procedure                               Abnormality         Status                     ---------                               -----------         ------                     CBC Auto Differential[[id number]]        Abnormal            Final result                 Please view results for these tests on the individual orders.    CBC Auto Differential [[id number]]  (Abnormal) Collected: 09/24/24 0343    Specimen: Blood from Arm, Left Updated: 09/24/24 0427     WBC 9.17 10*3/mm3      RBC 4.00 10*6/mm3      Hemoglobin 10.5 g/dL      Hematocrit 35.2 %      MCV 88.0 fL      MCH 26.3 pg      MCHC 29.8 g/dL      RDW 16.7 %      RDW-SD 54.2 fl      MPV 10.8 fL      Platelets 215 10*3/mm3      Neutrophil % 73.0 %      Lymphocyte % 9.3 %      Monocyte % 10.6 %      Eosinophil % 6.1 %      Basophil % 0.7 %      Immature Grans % 0.3 %       Neutrophils, Absolute 6.70 10*3/mm3      Lymphocytes, Absolute 0.85 10*3/mm3      Monocytes, Absolute 0.97 10*3/mm3      Eosinophils, Absolute 0.56 10*3/mm3      Basophils, Absolute 0.06 10*3/mm3      Immature Grans, Absolute 0.03 10*3/mm3      nRBC 0.0 /100 WBC     POC Glucose Once [[id number]]  (Abnormal) Collected: 09/24/24 0113    Specimen: Blood Updated: 09/24/24 0114     Glucose 150 mg/dL      Comment: Serial Number: [id number]:  [id number]                   Imaging Results (Last 72 Hours)       ** No results found for the last 72 hours. **              Medication Review:      Hospital Medications (active)         Dose Frequency Start End    acetaminophen (TYLENOL) tablet 500 mg 500 mg Every 6 Hours PRN 9/24/2024 --    Admin Instructions: If given for fever, use fever parameter: fever greater than 100.4 °F  Based on patient request - if ordered for moderate or severe pain, provider allows for administration of a medication prescribed for a lower pain scale.    Do not exceed 4 grams of acetaminophen in a 24 hr period. Max dose of 2gm for AST/ALT greater than 120 units/L.    If given for pain, use the following pain scale:   Mild Pain = Pain Score of 1-3, CPOT 1-2  Moderate Pain = Pain Score of 4-6, CPOT 3-4  Severe Pain = Pain Score of 7-10, CPOT 5-8    Route: Oral    acetaminophen (TYLENOL) tablet 650 mg 650 mg Once As Needed 9/24/2024 --    Admin Instructions: If given for fever, use fever parameter: fever greater than 100.4 °F  Based on patient request - if ordered for moderate or severe pain, provider allows for administration of a medication prescribed for a lower pain scale.    Do not exceed 4 grams of acetaminophen in a 24 hr period. Max dose of 2gm for AST/ALT greater than 120 units/L.    If given for pain, use the following pain scale:   Mild Pain = Pain Score of 1-3, CPOT 1-2  Moderate Pain = Pain Score of 4-6, CPOT 3-4  Severe Pain = Pain Score of 7-10, CPOT 5-8    Route: Oral    apixaban  (ELIQUIS) tablet 5 mg 5 mg Every 12 Hours Scheduled 9/24/2024 --    Admin Instructions: Tablet may be crushed and suspended in 60 mL of water or D5W and immediately delivered via NG tube.    Route: Oral    aspirin EC tablet 81 mg 81 mg Daily 9/24/2024 --    Admin Instructions: Do not crush or chew the capsules or tablets. The drug may not work as designed if the capsule or tablet is crushed or chewed. Swallow whole.  Do not exceed 4 grams of aspirin in a 24 hr period.    If given for pain, use the following pain scale:   Mild Pain = Pain Score of 1-3, CPOT 1-2  Moderate Pain = Pain Score of 4-6, CPOT 3-4  Severe Pain = Pain Score of 7-10, CPOT 5-8    Route: Oral    bacitracin 500 UNIT/GM ointment  Daily 9/24/2024 --    Admin Instructions: Apply to affected area.    Route: Topical    benzonatate (TESSALON) capsule 100 mg 100 mg 3 Times Daily PRN 9/24/2024 --    Admin Instructions: Swallow whole.  Do not crush, chew, or open capsule.    Route: Oral    cyclobenzaprine (FLEXERIL) tablet 5 mg 5 mg 2 Times Daily PRN 9/24/2024 --    Route: Oral    dextrose (D50W) (25 g/50 mL) IV injection 25 g 25 g Every 15 Minutes PRN 9/24/2024 --    Admin Instructions: Blood sugar less than 70; patient has IV access - Unresponsive, NPO or Unable To Safely Swallow    Route: Intravenous    dextrose (GLUTOSE) oral gel 15 g 15 g Every 15 Minutes PRN 9/24/2024 --    Admin Instructions: BS<70, Patient Alert, Is not NPO, Can safely swallow.    Route: Oral    glatiramer acetate (GLATOPA) injection 20 mg 20 mg Daily 9/24/2024 --    Route: Subcutaneous    Non-formulary Exception Code: Patient supplied medication    glucagon (GLUCAGEN) injection 1 mg 1 mg Every 15 Minutes PRN 9/24/2024 --    Admin Instructions: Blood Glucose Less Than 70 - Patient Without IV Access - Unresponsive, NPO or Unable To Safely Swallow  Reconstitute powder for injection by adding 1 mL of -supplied sterile diluent or sterile water for injection to a vial  containing 1 mg of the drug, to provide solutions containing 1 mg/mL. Shake vial gently to dissolve.    Route: Intramuscular    insulin lispro (HUMALOG/ADMELOG) injection 2-7 Units 2-7 Units 4 Times Daily Before Meals & Nightly 9/24/2024 --    Admin Instructions: Correction Insulin - Low Dose - Total Insulin Dose Less Than 40 units/day (Lean, Elderly or Renal Patients)    Blood Glucose 150-199 mg/dL - 2 units  Blood Glucose 200-249 mg/dL - 3 units  Blood Glucose 250-299 mg/dL - 4 units  Blood Glucose 300-349 mg/dL - 5 units  Blood Glucose 350-400 mg/dL - 6 units  Blood Glucose Greater Than 400 mg/dL - 7 units & Call Provider   Caution: Look alike/sound alike drug alert    Route: Subcutaneous    ipratropium-albuterol (DUO-NEB) nebulizer solution 3 mL 3 mL Every 4 Hours PRN 9/24/2024 --    Route: Nebulization    Linezolid (ZYVOX) 600 mg 300 mL 600 mg Every 12 Hours 9/24/2024 9/27/2024    Admin Instructions: Protect from light. Do NOT refrigerate.    Route: Intravenous    meropenem (MERREM) 1,000 mg in sodium chloride 0.9 % 100 mL MBP 1,000 mg Every 8 Hours 9/24/2024 9/26/2024    Route: Intravenous    midodrine (PROAMATINE) tablet 5 mg 5 mg 3 Times Daily Before Meals 9/24/2024 --    Route: Oral    pantoprazole (PROTONIX) EC tablet 40 mg 40 mg Every Early Morning 9/24/2024 --    Admin Instructions: Swallow whole; do not crush, split, or chew.    Route: Oral    Pharmacy to Dose meropenem (MERREM)  Continuous PRN 9/24/2024 9/26/2024    Route: Does not apply    sodium chloride 0.9 % infusion 75 mL/hr Continuous 9/24/2024 --    Route: Intravenous    sodium chloride 0.9 % infusion 75 mL/hr Continuous 9/24/2024 --    Route: Intravenous    vitamin B-12 (CYANOCOBALAMIN) tablet 1,000 mcg 1,000 mcg Daily 9/24/2024 --    Route: Oral            Assessment & Plan         Sepsis    Atrial fibrillation with RVR    CAD S/P percutaneous coronary angioplasty    Essential hypertension    Dyslipidemia    Non-insulin dependent type 2  diabetes mellitus    GERD without esophagitis    BPH with obstruction/lower urinary tract symptoms    JARRETT (generalized anxiety disorder)    S/P MVR (mitral valve replacement)    Paroxysmal atrial fibrillation    Pneumonia    Acute UTI (urinary tract infection)    Acute on chronic renal insufficiency    Lung cancer    Elevated troponin    UTI C&S  yeast  PNA    Plan :    Merem diflucan  C&S  CXR  Will follow  Thank you     Maximilian Dumont MD  09/24/24  12:18 EDT

## 2024-09-24 NOTE — CASE MANAGEMENT/SOCIAL WORK
Discharge Planning Assessment   [place]     Patient Name: Jamin Hennessy  MRN: [id number]  Today's Date: 9/24/2024    Admit Date: 9/24/2024    Plan: From home with spouse who provides total care.   Discharge Needs Assessment       Row Name 09/24/24 1232       Living Environment    People in Home spouse    Name(s) of People in Home Debbi, spouse    Current Living Arrangements home;apartment    Potentially Unsafe Housing Conditions none    In the past 12 months has the electric, gas, oil, or water company threatened to shut off services in your home? No    Primary Care Provided by self    Provides Primary Care For no one    Caregiving Concerns total care from spouse with no assistance    Family Caregiver if Needed spouse    Family Caregiver Names Debbi    Quality of Family Relationships helpful;involved;supportive    Able to Return to Prior Arrangements yes       Resource/Environmental Concerns    Resource/Environmental Concerns none    Transportation Concerns none       Transportation Needs    In the past 12 months, has lack of transportation kept you from medical appointments or from getting medications? no    In the past 12 months, has lack of transportation kept you from meetings, work, or from getting things needed for daily living? No       Food Insecurity    Within the past 12 months, you worried that your food would run out before you got the money to buy more. Never true    Within the past 12 months, the food you bought just didn't last and you didn't have money to get more. Never true       Transition Planning    Patient/Family Anticipates Transition to home with family    Patient/Family Anticipated Services at Transition none    Transportation Anticipated other (see comments)  Ambulance       Discharge Needs Assessment    Readmission Within the Last 30 Days no previous admission in last 30 days    Equipment Currently Used at Home wheelchair, motorized;other (see comments);hospital bed;lift device     Anticipated Changes Related to Illness none    Equipment Needed After Discharge none                   Discharge Plan       Row Name 09/24/24 1235       Plan    Plan From home with spouse who provides total care.    Patient/Family in Agreement with Plan yes    Plan Comments Barriers: IV antibiotics, Oxygen 2 liters with no home oxygen. ID and Cardiology following. Pending CXR and Echo. CM met with Mr. Hennessy who is a/o and reported he lives with his wife who provides total care without any assistance from family. He is bedbound and transports to appointments via ambulance. He has not been in his electric wheelchair or mechanical lift for  over 3 years. He does not have home oxygen but has a hospital  bed, mechanical lift and electric wheelchair. He went to [facility] about 4 years ago for short term rehab.  His PCP is the [facility]. He chooses the [facility] in [place] for his pharmacy and said he usually  doesn't have any problems getting new meds.  CM will continue to follow                  Demographic Summary       Row Name 09/24/24 1231       General Information    Admission Type inpatient    Arrived From emergency department    Required Notices Provided Important Message from Medicare    Referral Source admission list    Reason for Consult discharge planning    Preferred Language English       Contact Information    Permission Granted to Share Info With                    Functional Status       Row Name 09/24/24 1231       Functional Status    Usual Activity Tolerance poor    Current Activity Tolerance poor    Functional Status Comments total care and dependent on spouse. Bedbound       Functional Status, IADL    Medications completely dependent    Meal Preparation completely dependent    Housekeeping completely dependent    Laundry completely dependent    Shopping completely dependent    IADL Comments spouse       Mental Status    General Appearance WDL WDL       Mental  Status Summary    Recent Changes in Mental Status/Cognitive Functioning no changes       Employment/    Employment Status retired;disabled                              Giulia MONTERO,RN Case Manager  [facility]  Phone: desk- [phone] cell- [phone]

## 2024-09-25 ENCOUNTER — APPOINTMENT (OUTPATIENT)
Dept: CT IMAGING | Facility: HOSPITAL | Age: 69
End: 2024-09-25
Payer: OTHER GOVERNMENT

## 2024-09-25 LAB
ANION GAP SERPL CALCULATED.3IONS-SCNC: 9.4 MMOL/L (ref 5–15)
BACTERIA SPEC AEROBE CULT: ABNORMAL
BASOPHILS # BLD AUTO: 0.07 10*3/MM3 (ref 0–0.2)
BASOPHILS NFR BLD AUTO: 1 % (ref 0–1.5)
BUN SERPL-MCNC: 21 MG/DL (ref 8–23)
BUN/CREAT SERPL: 20.4 (ref 7–25)
CALCIUM SPEC-SCNC: 8.4 MG/DL (ref 8.6–10.5)
CHLORIDE SERPL-SCNC: 109 MMOL/L (ref 98–107)
CO2 SERPL-SCNC: 22.6 MMOL/L (ref 22–29)
CREAT SERPL-MCNC: 1.03 MG/DL (ref 0.76–1.27)
DEPRECATED RDW RBC AUTO: 54.1 FL (ref 37–54)
EGFRCR SERPLBLD CKD-EPI 2021: 78.6 ML/MIN/1.73
EOSINOPHIL # BLD AUTO: 0.59 10*3/MM3 (ref 0–0.4)
EOSINOPHIL NFR BLD AUTO: 8.5 % (ref 0.3–6.2)
ERYTHROCYTE [DISTWIDTH] IN BLOOD BY AUTOMATED COUNT: 16.8 % (ref 12.3–15.4)
GLUCOSE BLDC GLUCOMTR-MCNC: 103 MG/DL (ref 70–105)
GLUCOSE BLDC GLUCOMTR-MCNC: 126 MG/DL (ref 70–105)
GLUCOSE BLDC GLUCOMTR-MCNC: 140 MG/DL (ref 70–105)
GLUCOSE BLDC GLUCOMTR-MCNC: 143 MG/DL (ref 70–105)
GLUCOSE SERPL-MCNC: 117 MG/DL (ref 65–99)
HCT VFR BLD AUTO: 33.3 % (ref 37.5–51)
HGB BLD-MCNC: 9.7 G/DL (ref 13–17.7)
IMM GRANULOCYTES # BLD AUTO: 0.03 10*3/MM3 (ref 0–0.05)
IMM GRANULOCYTES NFR BLD AUTO: 0.4 % (ref 0–0.5)
LYMPHOCYTES # BLD AUTO: 0.74 10*3/MM3 (ref 0.7–3.1)
LYMPHOCYTES NFR BLD AUTO: 10.7 % (ref 19.6–45.3)
MCH RBC QN AUTO: 25.8 PG (ref 26.6–33)
MCHC RBC AUTO-ENTMCNC: 29.1 G/DL (ref 31.5–35.7)
MCV RBC AUTO: 88.6 FL (ref 79–97)
MONOCYTES # BLD AUTO: 0.7 10*3/MM3 (ref 0.1–0.9)
MONOCYTES NFR BLD AUTO: 10.1 % (ref 5–12)
NEUTROPHILS NFR BLD AUTO: 4.8 10*3/MM3 (ref 1.7–7)
NEUTROPHILS NFR BLD AUTO: 69.3 % (ref 42.7–76)
NRBC BLD AUTO-RTO: 0 /100 WBC (ref 0–0.2)
PLATELET # BLD AUTO: 205 10*3/MM3 (ref 140–450)
PMV BLD AUTO: 10.6 FL (ref 6–12)
POTASSIUM SERPL-SCNC: 3.6 MMOL/L (ref 3.5–5.2)
RBC # BLD AUTO: 3.76 10*6/MM3 (ref 4.14–5.8)
SODIUM SERPL-SCNC: 141 MMOL/L (ref 136–145)
WBC NRBC COR # BLD AUTO: 6.93 10*3/MM3 (ref 3.4–10.8)

## 2024-09-25 PROCEDURE — 99233 SBSQ HOSP IP/OBS HIGH 50: CPT | Performed by: INTERNAL MEDICINE

## 2024-09-25 PROCEDURE — 82948 REAGENT STRIP/BLOOD GLUCOSE: CPT

## 2024-09-25 PROCEDURE — 93005 ELECTROCARDIOGRAM TRACING: CPT | Performed by: INTERNAL MEDICINE

## 2024-09-25 PROCEDURE — 25810000003 SODIUM CHLORIDE 0.9 % SOLUTION: Performed by: NURSE PRACTITIONER

## 2024-09-25 PROCEDURE — 25010000002 MEROPENEM PER 100 MG: Performed by: INTERNAL MEDICINE

## 2024-09-25 PROCEDURE — 85025 COMPLETE CBC W/AUTO DIFF WBC: CPT | Performed by: NURSE PRACTITIONER

## 2024-09-25 PROCEDURE — 82948 REAGENT STRIP/BLOOD GLUCOSE: CPT | Performed by: NURSE PRACTITIONER

## 2024-09-25 PROCEDURE — 25010000002 FUROSEMIDE PER 20 MG: Performed by: STUDENT IN AN ORGANIZED HEALTH CARE EDUCATION/TRAINING PROGRAM

## 2024-09-25 PROCEDURE — 25010000002 LINEZOLID 600 MG/300ML SOLUTION: Performed by: NURSE PRACTITIONER

## 2024-09-25 PROCEDURE — 25010000002 MICAFUNGIN SODIUM 100 MG RECONSTITUTED SOLUTION 1 EACH VIAL: Performed by: INTERNAL MEDICINE

## 2024-09-25 PROCEDURE — 25010000002 DIGOXIN PER 500 MCG: Performed by: INTERNAL MEDICINE

## 2024-09-25 PROCEDURE — 93010 ELECTROCARDIOGRAM REPORT: CPT | Performed by: INTERNAL MEDICINE

## 2024-09-25 PROCEDURE — 80048 BASIC METABOLIC PNL TOTAL CA: CPT | Performed by: NURSE PRACTITIONER

## 2024-09-25 RX ORDER — METOPROLOL TARTRATE 25 MG/1
25 TABLET, FILM COATED ORAL 2 TIMES DAILY
Status: DISCONTINUED | OUTPATIENT
Start: 2024-09-25 | End: 2024-09-30 | Stop reason: HOSPADM

## 2024-09-25 RX ORDER — AMIODARONE HYDROCHLORIDE 200 MG/1
200 TABLET ORAL 2 TIMES DAILY WITH MEALS
Status: DISCONTINUED | OUTPATIENT
Start: 2024-09-25 | End: 2024-09-30 | Stop reason: HOSPADM

## 2024-09-25 RX ORDER — FUROSEMIDE 10 MG/ML
40 INJECTION INTRAMUSCULAR; INTRAVENOUS
Status: DISCONTINUED | OUTPATIENT
Start: 2024-09-25 | End: 2024-09-26

## 2024-09-25 RX ADMIN — GLATIRAMER ACETATE 20 MG: 20 INJECTION, SOLUTION SUBCUTANEOUS at 08:14

## 2024-09-25 RX ADMIN — BACITRACIN 0.9 G: 500 OINTMENT TOPICAL at 09:46

## 2024-09-25 RX ADMIN — NICOTINE 1 PATCH: 14 PATCH, EXTENDED RELEASE TRANSDERMAL at 08:16

## 2024-09-25 RX ADMIN — ACETAMINOPHEN 500 MG: 500 TABLET, FILM COATED ORAL at 01:57

## 2024-09-25 RX ADMIN — APIXABAN 5 MG: 5 TABLET, FILM COATED ORAL at 21:20

## 2024-09-25 RX ADMIN — APIXABAN 5 MG: 5 TABLET, FILM COATED ORAL at 08:15

## 2024-09-25 RX ADMIN — MEROPENEM 1000 MG: 1 INJECTION INTRAVENOUS at 13:57

## 2024-09-25 RX ADMIN — SODIUM CHLORIDE 75 ML/HR: 9 INJECTION, SOLUTION INTRAVENOUS at 02:52

## 2024-09-25 RX ADMIN — DIGOXIN 250 MCG: 0.25 INJECTION INTRAMUSCULAR; INTRAVENOUS at 01:57

## 2024-09-25 RX ADMIN — AMIODARONE HYDROCHLORIDE 200 MG: 200 TABLET ORAL at 17:24

## 2024-09-25 RX ADMIN — MIDODRINE HYDROCHLORIDE 10 MG: 5 TABLET ORAL at 12:40

## 2024-09-25 RX ADMIN — PANTOPRAZOLE SODIUM 40 MG: 40 TABLET, DELAYED RELEASE ORAL at 06:31

## 2024-09-25 RX ADMIN — MEROPENEM 1000 MG: 1 INJECTION INTRAVENOUS at 21:50

## 2024-09-25 RX ADMIN — FLUOXETINE HYDROCHLORIDE 40 MG: 20 CAPSULE ORAL at 14:47

## 2024-09-25 RX ADMIN — ASPIRIN 81 MG: 81 TABLET, COATED ORAL at 08:15

## 2024-09-25 RX ADMIN — MEROPENEM 1000 MG: 1 INJECTION INTRAVENOUS at 06:31

## 2024-09-25 RX ADMIN — CYANOCOBALAMIN TAB 500 MCG 1000 MCG: 500 TAB at 08:14

## 2024-09-25 RX ADMIN — MICAFUNGIN SODIUM 100 MG: 100 INJECTION, POWDER, LYOPHILIZED, FOR SOLUTION INTRAVENOUS at 18:06

## 2024-09-25 RX ADMIN — FUROSEMIDE 40 MG: 10 INJECTION, SOLUTION INTRAMUSCULAR; INTRAVENOUS at 17:24

## 2024-09-25 RX ADMIN — METOPROLOL TARTRATE 25 MG: 25 TABLET, FILM COATED ORAL at 21:20

## 2024-09-25 RX ADMIN — LINEZOLID 600 MG: 600 INJECTION, SOLUTION INTRAVENOUS at 05:07

## 2024-09-25 NOTE — PROGRESS NOTES
[facility] MEDICINE SERVICE  DAILY PROGRESS NOTE    NAME: Jamin Hennessy  : 1955  MRN: [id number]      LOS: 1 day     PROVIDER OF SERVICE: Lauro Sheffield MD    Chief Complaint: Sepsis    Subjective:     Interval History:  History taken from: patient    No acute overnight events. Patient appears to be slight lethargic, denies chest pain, Currently on 2L LFNC SOB , he is awake, alert slight lethargic    Review of Systems:   Review of Systems Unable to obtain due to patient is slight lethargic    Objective:     Vital Signs  Temp:  [97.6 °F (36.4 °C)-99 °F (37.2 °C)] 97.6 °F (36.4 °C)  Heart Rate:  [] 73  Resp:  [13-17] 13  BP: ()/(35-74) 127/64  Flow (L/min):  [2] 2   Body mass index is 42.61 kg/m².    Physical Exam  General Appearance:  Awake alert   Head:  Atraumatic normocephalic   Eyes:        No sclera icterus   Neck: Non tender   Pulm: Decreased breath sound   Cardio: Normal HR, regular rhythm   Extremities: 2+ edema on BLE   Abdomen: Distended, soft non tender   /Renal: No suprapubic tenderness       Neuro: Disoriented               Scheduled Meds   apixaban, 5 mg, Oral, Q12H  aspirin, 81 mg, Oral, Daily  bacitracin, , Topical, Daily  glatiramer acetate, 20 mg, Subcutaneous, Daily  insulin lispro, 2-7 Units, Subcutaneous, 4x Daily AC & at Bedtime  Linezolid, 600 mg, Intravenous, Q12H  meropenem, 1,000 mg, Intravenous, Q8H  midodrine, 10 mg, Oral, TID AC  nicotine, 1 patch, Transdermal, Q24H  pantoprazole, 40 mg, Oral, Q AM  vitamin B-12, 1,000 mcg, Oral, Daily       PRN Meds     acetaminophen    acetaminophen    benzonatate    cyclobenzaprine    dextrose    dextrose    glucagon (human recombinant)    ipratropium-albuterol    Pharmacy to Dose meropenem (MERREM)   Infusions  Pharmacy to Dose meropenem (MERREM),   sodium chloride, 75 mL/hr, Last Rate: 75 mL/hr (24 0252)  sodium chloride, 75 mL/hr, Last Rate: 75 mL/hr (24 1694)          Diagnostic Data    Results  from last 7 days   Lab Units 09/25/24  0452   WBC 10*3/mm3 6.93   HEMOGLOBIN g/dL 9.7*   HEMATOCRIT % 33.3*   PLATELETS 10*3/mm3 205   GLUCOSE mg/dL 117*   CREATININE mg/dL 1.03   BUN mg/dL 21   SODIUM mmol/L 141   POTASSIUM mmol/L 3.6   ANION GAP mmol/L 9.4       XR Chest 1 View    Result Date: 9/24/2024  Impression: Left basilar airspace opacity suggests pneumonia or aspiration. Correlate clinically. Follow-up evaluation recommended to document resolution. If this persists on follow-up, then chest CT recommended to rule out neoplasm. Electronically Signed: Charbel Laureano MD  9/24/2024 12:54 PM EDT  Workstation ID: [id number]       I reviewed the patient's new clinical results.    Assessment/Plan:    Jamin Hennessy is a very pleasant  69 y.o. male from home, lives with wife , has home health visits , with a CMH of multiple sclerosis, bedbound, paroxysmal atrial fibrillation on anticoagulation, coronary artery disease status post PCI, bioprosthetic mitral valve replacement status post endocarditis, hypothyroidism ,morbid obesity,diabetes mellitus type 2 not on insulin, chronic kidney disease, GERD, left lower lung cancer on chemotherapy, generalized anxiety disorder, dyslipidemia, hypertension, who presented to [facility] on 9/24/2024 upon transfer from [facility] emergency department where he presented with complaints of shortness of air.     Active and Resolved Problems  Active Hospital Problems    Diagnosis  POA    **Sepsis [A41.9]  Yes    Pneumonia [J18.9]  Yes    Acute UTI (urinary tract infection) [N39.0]  Yes    Acute on chronic renal insufficiency [N28.9, N18.9]  Yes    Lung cancer [C34.90]  Yes    Elevated troponin [R79.89]  Yes    Paroxysmal atrial fibrillation [I48.0]  Yes    S/P MVR (mitral valve replacement) [Z95.2]  Not Applicable    GERD without esophagitis [K21.9]  Yes    Essential hypertension [I10]  Yes    JARRETT (generalized anxiety disorder) [F41.1]  Yes    Dyslipidemia [E78.5]  Yes    CAD S/P  percutaneous coronary angioplasty [I25.10, Z98.61]  Not Applicable    Non-insulin dependent type 2 diabetes mellitus [E11.9]  Yes    BPH with obstruction/lower urinary tract symptoms [N40.1, N13.8]  Yes    Atrial fibrillation with RVR [I48.91]  Yes      Resolved Hospital Problems   No resolved problems to display.     Plan:   -d/c iv fluids, start Lasix 40 BID, Midodrine 10 TID, will increase to 15 if needed, low threshold for ICU upgrade if needed pressor support for diuresis if fail on midodrine, will avoid giving fluids given volume overload  -monitor strict I/Os, has molina in place  -On eliquis for Afib, was given Digoxin per Cards, currently HR is controlled, currently not on rate or rhythm controls, Echo: LVEF 60-65 %  LV thickness noted, will monitor his Vitals, will follow cards reccs   -On meropenem and Mycamic per ID, Has hx of ESBL/VRE UTI, will follow  -Start Prozac, levothyroxine, TSH 2.2 on admission  -Check ammonia, B12, folate  -AM Labs will follow         VTE Prophylaxis:  Pharmacologic VTE prophylaxis orders are present.         Code status is   Code Status and Medical Interventions: No CPR (Do Not Attempt to Resuscitate); Limited Support; No intubation (DNI)   Ordered at: 09/24/24 0151     Medical Intervention Limits:    No intubation (DNI)     Code Status (Patient has no pulse and is not breathing):    No CPR (Do Not Attempt to Resuscitate)     Medical Interventions (Patient has pulse or is breathing):    Limited Support         Time: 30 minutes    Part of this note may be an electronic transcription/translation of spoken language to printed text using the Dragon Dictation System.    Signature: Electronically signed by Lauro Sheffield MD, 09/25/24, 06:30 EDT.  [facility] Hospitalist Team

## 2024-09-25 NOTE — NURSING NOTE
WOCN note:    69 yr old male admitted 9/24/24 with complaints of shortness of breath and productive cough. Patient has a hx of MS and is bedbound, afib, CAD, DM, CKD and lung cancer. WOCN consult received for a pressure injury that was noted upon admission.     Patient presents with a linear deep tissue injury to his left buttock measuring approximately 3cm in length. There is a silicone foam dressing to the area.   The patient has a chronic molina catheter and is incontinent of stool. There is also some incontinence associated dermatitis noted to the perineum. There are pressure injury prevention measures in place with TAPS, foam wedges and Ultrasorb pads. Patient also has foam offloading boots on his feet.   Will order a low air loss pump for the bed surface. Recommend to continue current care with frequent turns and skin care for any episodes of incontinence. May also use Calazime zinc barrier paste to the perineum and buttocks in the event of frequent soiling of the silicone dressing. We will follow as needed.

## 2024-09-25 NOTE — PROGRESS NOTES
LOS: 1 day   Patient Care Team:  Jhonny Heller MD as PCP - General (Family Medicine)  Frederick George MD as Consulting Physician (Nephrology)        Subjective     Interval History:     Patient Complaints:   Patient Denies:  NV       Review of Systems:    SOB    Objective     Vital Signs  Temp:  [97.6 °F (36.4 °C)-99 °F (37.2 °C)] 97.8 °F (36.6 °C)  Heart Rate:  [] 95  Resp:  [13-17] 15  BP: ()/(35-82) 122/76    Physical Exam:     General Appearance:  Alert, cooperative, in no acute distress   Head:  Normocephalic, without obvious abnormality, atraumatic   Eyes:  Lids and lashes normal, conjunctivae and sclerae normal, no icterus, no pallor, corneas clear, PERRLA   Ears:  Ears appear intact with no abnormalities noted   Throat:  No oral lesions, no thrush, oral mucosa moist   Neck:  No adenopathy, supple, trachea midline, no thyromegaly, no carotid bruit, no JVD   Back:  No kyphosis present, no scoliosis present, no skin lesions, erythema or scars, no tenderness to percussion or palpation, range of motion normal   Lungs:  Clear to auscultation, respirations regular, even and unlabored    Heart:  Regular rhythm and normal rate, normal S1 and S2, no murmur, no gallop, no rub, no click   Chest Wall:  No abnormalities observed   Abdomen:  Normal bowel sounds, no masses, no organomegaly, soft non-tender, non-distended, no guarding, no rebound tenderness   Rectal:  Deferred   Extremities:  Moves all extremities well, no edema, no cyanosis, no redness   Pulses:  Pulses palpable and equal bilaterally   Skin:  No bleeding, bruising or rash   Lymph nodes:  No palpable adenopathy   Neurologic:  Cranial nerves 2 - 12 grossly intact, sensation intact, DTR present and equal bilaterally                                                                                  Results Review:      Lab Results (last 72 hours)       Procedure Component Value Units Date/Time    POC Glucose 4x Daily Before Meals & at Bedtime  [[id number]]  (Abnormal) Collected: 09/25/24 1122    Specimen: Blood Updated: 09/25/24 1125     Glucose 143 mg/dL      Comment: Serial Number: [id number]:  [id number]       POC Glucose 4x Daily Before Meals & at Bedtime [[id number]]  (Abnormal) Collected: 09/25/24 0728    Specimen: Blood Updated: 09/25/24 0730     Glucose 140 mg/dL      Comment: Serial Number: [id number]:  [id number]       Basic Metabolic Panel [[id number]]  (Abnormal) Collected: 09/25/24 0452    Specimen: Blood from Hand, Left Updated: 09/25/24 0549     Glucose 117 mg/dL      BUN 21 mg/dL      Creatinine 1.03 mg/dL      Sodium 141 mmol/L      Potassium 3.6 mmol/L      Chloride 109 mmol/L      CO2 22.6 mmol/L      Calcium 8.4 mg/dL      BUN/Creatinine Ratio 20.4     Anion Gap 9.4 mmol/L      eGFR 78.6 mL/min/1.73     Narrative:      GFR Normal >60  Chronic Kidney Disease <60  Kidney Failure <15      CBC & Differential [[id number]]  (Abnormal) Collected: 09/25/24 0452    Specimen: Blood from Hand, Left Updated: 09/25/24 0510    Narrative:      The following orders were created for panel order CBC & Differential.  Procedure                               Abnormality         Status                     ---------                               -----------         ------                     CBC Auto Differential[[id number]]        Abnormal            Final result                 Please view results for these tests on the individual orders.    CBC Auto Differential [[id number]]  (Abnormal) Collected: 09/25/24 0452    Specimen: Blood from Hand, Left Updated: 09/25/24 0510     WBC 6.93 10*3/mm3      RBC 3.76 10*6/mm3      Hemoglobin 9.7 g/dL      Hematocrit 33.3 %      MCV 88.6 fL      MCH 25.8 pg      MCHC 29.1 g/dL      RDW 16.8 %      RDW-SD 54.1 fl      MPV 10.6 fL      Platelets 205 10*3/mm3      Neutrophil % 69.3 %      Lymphocyte % 10.7 %      Monocyte % 10.1 %      Eosinophil % 8.5 %      Basophil % 1.0 %      Immature Grans % 0.4 %       Neutrophils, Absolute 4.80 10*3/mm3      Lymphocytes, Absolute 0.74 10*3/mm3      Monocytes, Absolute 0.70 10*3/mm3      Eosinophils, Absolute 0.59 10*3/mm3      Basophils, Absolute 0.07 10*3/mm3      Immature Grans, Absolute 0.03 10*3/mm3      nRBC 0.0 /100 WBC     POC Glucose Once [[id number]]  (Abnormal) Collected: 09/24/24 2041    Specimen: Blood Updated: 09/24/24 2044     Glucose 162 mg/dL      Comment: Serial Number: [id number]:  [id number]       POC Glucose Once [[id number]]  (Abnormal) Collected: 09/24/24 1931    Specimen: Blood Updated: 09/24/24 1933     Glucose 209 mg/dL      Comment: Serial Number: [id number]:  [id number]       POC Glucose Once [[id number]]  (Abnormal) Collected: 09/24/24 1613    Specimen: Blood Updated: 09/24/24 1615     Glucose 155 mg/dL      Comment: Serial Number: [id number]:  [id number]       Blood Culture - Blood, Arm, Left [[id number]] Collected: 09/24/24 1526    Specimen: Blood from Arm, Left Updated: 09/24/24 1537    BNP [[id number]]  (Abnormal) Collected: 09/24/24 1227    Specimen: Blood from Arm, Right Updated: 09/24/24 1258     proBNP 4,889.0 pg/mL     Narrative:      This assay is used as an aid in the diagnosis of individuals suspected of having heart failure. It can be used as an aid in the diagnosis of acute decompensated heart failure (ADHF) in patients presenting with signs and symptoms of ADHF to the emergency department (ED). In addition, NT-proBNP of <300 pg/mL indicates ADHF is not likely.    Age Range Result Interpretation  NT-proBNP Concentration (pg/mL:      <50             Positive            >450                   Gray                 300-450                    Negative             <300    50-75           Positive            >900                  Gray                300-900                  Negative            <300      >75             Positive            >1800                  Gray                300-1800                  Negative            <300     POC Glucose Once [[id number]]  (Abnormal) Collected: 09/24/24 1156    Specimen: Blood Updated: 09/24/24 1159     Glucose 200 mg/dL      Comment: Serial Number: [id number]:  [id number]       Magnesium [[id number]]  (Normal) Collected: 09/24/24 0526    Specimen: Blood from Arm, Left Updated: 09/24/24 0951     Magnesium 1.8 mg/dL     TSH [[id number]]  (Normal) Collected: 09/24/24 0526    Specimen: Blood from Arm, Left Updated: 09/24/24 0951     TSH 2.210 uIU/mL     POC Glucose Once [[id number]]  (Abnormal) Collected: 09/24/24 0744    Specimen: Blood Updated: 09/24/24 0746     Glucose 174 mg/dL      Comment: Serial Number: [id number]:  [id number]       High Sensitivity Troponin T 2Hr [[id number]]  (Abnormal) Collected: 09/24/24 0526    Specimen: Blood from Arm, Left Updated: 09/24/24 0638     HS Troponin T 37 ng/L      Troponin T Delta -15 ng/L     Narrative:      High Sensitive Troponin T Reference Range:  <14.0 ng/L- Negative Female for AMI  <22.0 ng/L- Negative Male for AMI  >=14 - Abnormal Female indicating possible myocardial injury.  >=22 - Abnormal Male indicating possible myocardial injury.   Clinicians would have to utilize clinical acumen, EKG, Troponin, and serial changes to determine if it is an Acute Myocardial Infarction or myocardial injury due to an underlying chronic condition.         Urinalysis, Microscopic Only - Indwelling Urethral Catheter [[id number]]  (Abnormal) Collected: 09/24/24 0458    Specimen: Urine from Indwelling Urethral Catheter Updated: 09/24/24 0634     RBC, UA 3-5 /HPF      WBC, UA 21-50 /HPF      Bacteria, UA None Seen /HPF      Squamous Epithelial Cells, UA 0-2 /HPF      Yeast, UA       Moderate/2+ Budding Yeast w/Hyphae     /HPF     Hyaline Casts, UA 0-2 /LPF      Methodology Manual Light Microscopy    Urine Culture - Urine, Indwelling Urethral Catheter [[id number]] Collected: 09/24/24 0458    Specimen: Urine from Indwelling Urethral Catheter Updated: 09/24/24 0634     Urinalysis With Culture If Indicated - Indwelling Urethral Catheter [[id number]]  (Abnormal) Collected: 09/24/24 0458    Specimen: Urine from Indwelling Urethral Catheter Updated: 09/24/24 0620     Color, UA Yellow     Appearance, UA Turbid     Comment: Result checked          pH, UA 6.0     Specific Gravity, UA 1.013     Glucose, UA Negative     Ketones, UA Negative     Bilirubin, UA Negative     Blood, UA Large (3+)     Protein, UA 30 mg/dL (1+)     Leuk Esterase, UA Large (3+)     Nitrite, UA Negative     Urobilinogen, UA 1.0 E.U./dL    Narrative:      In absence of clinical symptoms, the presence of pyuria, bacteria, and/or nitrites on the urinalysis result does not correlate with infection.    MRSA Screen, PCR (Inpatient) - Swab, Nares [[id number]]  (Abnormal) Collected: 09/24/24 0336    Specimen: Swab from Nares Updated: 09/24/24 0613     MRSA PCR MRSA Detected    Narrative:      The negative predictive value of this diagnostic test is high and should only be used to consider de-escalating anti-MRSA therapy. A positive result may indicate colonization with MRSA and must be correlated clinically.    Basic Metabolic Panel [[id number]]  (Abnormal) Collected: 09/24/24 0343    Specimen: Blood from Arm, Left Updated: 09/24/24 0452     Glucose 141 mg/dL      BUN 26 mg/dL      Creatinine 1.18 mg/dL      Sodium 144 mmol/L      Potassium 3.8 mmol/L      Chloride 108 mmol/L      CO2 25.6 mmol/L      Calcium 8.5 mg/dL      BUN/Creatinine Ratio 22.0     Anion Gap 10.4 mmol/L      eGFR 66.8 mL/min/1.73     Narrative:      GFR Normal >60  Chronic Kidney Disease <60  Kidney Failure <15      High Sensitivity Troponin T [[id number]]  (Abnormal) Collected: 09/24/24 0343    Specimen: Blood from Arm, Left Updated: 09/24/24 0452     HS Troponin T 52 ng/L     Narrative:      High Sensitive Troponin T Reference Range:  <14.0 ng/L- Negative Female for AMI  <22.0 ng/L- Negative Male for AMI  >=14 - Abnormal Female indicating possible  myocardial injury.  >=22 - Abnormal Male indicating possible myocardial injury.   Clinicians would have to utilize clinical acumen, EKG, Troponin, and serial changes to determine if it is an Acute Myocardial Infarction or myocardial injury due to an underlying chronic condition.         CBC & Differential [[id number]]  (Abnormal) Collected: 09/24/24 0343    Specimen: Blood from Arm, Left Updated: 09/24/24 0427    Narrative:      The following orders were created for panel order CBC & Differential.  Procedure                               Abnormality         Status                     ---------                               -----------         ------                     CBC Auto Differential[[id number]]        Abnormal            Final result                 Please view results for these tests on the individual orders.    CBC Auto Differential [[id number]]  (Abnormal) Collected: 09/24/24 0343    Specimen: Blood from Arm, Left Updated: 09/24/24 0427     WBC 9.17 10*3/mm3      RBC 4.00 10*6/mm3      Hemoglobin 10.5 g/dL      Hematocrit 35.2 %      MCV 88.0 fL      MCH 26.3 pg      MCHC 29.8 g/dL      RDW 16.7 %      RDW-SD 54.2 fl      MPV 10.8 fL      Platelets 215 10*3/mm3      Neutrophil % 73.0 %      Lymphocyte % 9.3 %      Monocyte % 10.6 %      Eosinophil % 6.1 %      Basophil % 0.7 %      Immature Grans % 0.3 %      Neutrophils, Absolute 6.70 10*3/mm3      Lymphocytes, Absolute 0.85 10*3/mm3      Monocytes, Absolute 0.97 10*3/mm3      Eosinophils, Absolute 0.56 10*3/mm3      Basophils, Absolute 0.06 10*3/mm3      Immature Grans, Absolute 0.03 10*3/mm3      nRBC 0.0 /100 WBC     POC Glucose Once [[id number]]  (Abnormal) Collected: 09/24/24 0113    Specimen: Blood Updated: 09/24/24 0114     Glucose 150 mg/dL      Comment: Serial Number: [id number]:  [id number]               Imaging Results (Last 72 Hours)       Procedure Component Value Units Date/Time    XR Chest 1 View [[id number]] Collected: 09/24/24 1240      Updated: 09/24/24 1256    Narrative:      XR CHEST 1 VW    Date of Exam: 9/24/2024 12:39 PM EDT    Indication: wheezing, SOA    Comparison: 3/3/2020    Findings:  Sternotomy wires overlie the midline. Prosthetic heart valve is noted. Right lung is clear. There is patchy airspace opacity in the left base which may be due to pneumonia or aspiration. No pneumothorax or large effusion identified. Heart size within   normal limits for technique. Pulmonary vascularity appears within normal limits.      Impression:      Impression:  Left basilar airspace opacity suggests pneumonia or aspiration. Correlate clinically. Follow-up evaluation recommended to document resolution. If this persists on follow-up, then chest CT recommended to rule out neoplasm.      Electronically Signed: Charbel Laureano MD    9/24/2024 12:54 PM EDT    Workstation ID: [id number]              Medication Review:     Hospital Medications (active)         Dose Frequency Start End    acetaminophen (TYLENOL) tablet 500 mg 500 mg Every 6 Hours PRN 9/24/2024 --    Admin Instructions: If given for fever, use fever parameter: fever greater than 100.4 °F  Based on patient request - if ordered for moderate or severe pain, provider allows for administration of a medication prescribed for a lower pain scale.    Do not exceed 4 grams of acetaminophen in a 24 hr period. Max dose of 2gm for AST/ALT greater than 120 units/L.    If given for pain, use the following pain scale:   Mild Pain = Pain Score of 1-3, CPOT 1-2  Moderate Pain = Pain Score of 4-6, CPOT 3-4  Severe Pain = Pain Score of 7-10, CPOT 5-8    Route: Oral    acetaminophen (TYLENOL) tablet 650 mg 650 mg Once As Needed 9/24/2024 --    Admin Instructions: If given for fever, use fever parameter: fever greater than 100.4 °F  Based on patient request - if ordered for moderate or severe pain, provider allows for administration of a medication prescribed for a lower pain scale.    Do not exceed 4 grams of  acetaminophen in a 24 hr period. Max dose of 2gm for AST/ALT greater than 120 units/L.    If given for pain, use the following pain scale:   Mild Pain = Pain Score of 1-3, CPOT 1-2  Moderate Pain = Pain Score of 4-6, CPOT 3-4  Severe Pain = Pain Score of 7-10, CPOT 5-8    Route: Oral    apixaban (ELIQUIS) tablet 5 mg 5 mg Every 12 Hours Scheduled 9/24/2024 --    Admin Instructions: Tablet may be crushed and suspended in 60 mL of water or D5W and immediately delivered via NG tube.    Route: Oral    aspirin EC tablet 81 mg 81 mg Daily 9/24/2024 --    Admin Instructions: Do not crush or chew the capsules or tablets. The drug may not work as designed if the capsule or tablet is crushed or chewed. Swallow whole.  Do not exceed 4 grams of aspirin in a 24 hr period.    If given for pain, use the following pain scale:   Mild Pain = Pain Score of 1-3, CPOT 1-2  Moderate Pain = Pain Score of 4-6, CPOT 3-4  Severe Pain = Pain Score of 7-10, CPOT 5-8    Route: Oral    bacitracin 500 UNIT/GM ointment  Daily 9/24/2024 --    Admin Instructions: Apply to affected area.    Route: Topical    benzonatate (TESSALON) capsule 100 mg 100 mg 3 Times Daily PRN 9/24/2024 --    Admin Instructions: Swallow whole.  Do not crush, chew, or open capsule.    Route: Oral    cyclobenzaprine (FLEXERIL) tablet 5 mg 5 mg 2 Times Daily PRN 9/24/2024 --    Route: Oral    dextrose (D50W) (25 g/50 mL) IV injection 25 g 25 g Every 15 Minutes PRN 9/24/2024 --    Admin Instructions: Blood sugar less than 70; patient has IV access - Unresponsive, NPO or Unable To Safely Swallow    Route: Intravenous    dextrose (GLUTOSE) oral gel 15 g 15 g Every 15 Minutes PRN 9/24/2024 --    Admin Instructions: BS<70, Patient Alert, Is not NPO, Can safely swallow.    Route: Oral    glatiramer acetate (GLATOPA) injection 20 mg 20 mg Daily 9/24/2024 --    Route: Subcutaneous    Non-formulary Exception Code: Patient supplied medication    glucagon (GLUCAGEN) injection 1 mg 1 mg  Every 15 Minutes PRN 9/24/2024 --    Admin Instructions: Blood Glucose Less Than 70 - Patient Without IV Access - Unresponsive, NPO or Unable To Safely Swallow  Reconstitute powder for injection by adding 1 mL of -supplied sterile diluent or sterile water for injection to a vial containing 1 mg of the drug, to provide solutions containing 1 mg/mL. Shake vial gently to dissolve.    Route: Intramuscular    insulin lispro (HUMALOG/ADMELOG) injection 2-7 Units 2-7 Units 4 Times Daily Before Meals & Nightly 9/24/2024 --    Admin Instructions: Correction Insulin - Low Dose - Total Insulin Dose Less Than 40 units/day (Lean, Elderly or Renal Patients)    Blood Glucose 150-199 mg/dL - 2 units  Blood Glucose 200-249 mg/dL - 3 units  Blood Glucose 250-299 mg/dL - 4 units  Blood Glucose 300-349 mg/dL - 5 units  Blood Glucose 350-400 mg/dL - 6 units  Blood Glucose Greater Than 400 mg/dL - 7 units & Call Provider   Caution: Look alike/sound alike drug alert    Route: Subcutaneous    ipratropium-albuterol (DUO-NEB) nebulizer solution 3 mL 3 mL Every 4 Hours PRN 9/24/2024 --    Route: Nebulization    Linezolid (ZYVOX) 600 mg 300 mL 600 mg Every 12 Hours 9/24/2024 9/27/2024    Admin Instructions: Protect from light. Do NOT refrigerate.    Route: Intravenous    meropenem (MERREM) 1,000 mg in sodium chloride 0.9 % 100 mL MBP 1,000 mg Every 8 Hours 9/24/2024 9/26/2024    Route: Intravenous    midodrine (PROAMATINE) tablet 10 mg 10 mg 3 Times Daily Before Meals 9/25/2024 --    Route: Oral    nicotine (NICODERM CQ) 14 MG/24HR patch 1 patch 1 patch Every 24 Hours Scheduled 9/24/2024 --    Admin Instructions: Apply to clean, dry, nonhairy area of skin (typically upper arm or shoulder)  Dispose of nicotine replacement therapies and their wrappers in non-hazardous pharmaceutical waste or in regular trash.    Route: Transdermal    pantoprazole (PROTONIX) EC tablet 40 mg 40 mg Every Early Morning 9/24/2024 --    Admin Instructions:  Swallow whole; do not crush, split, or chew.    Route: Oral    Pharmacy to Dose meropenem (MERREM)  Continuous PRN 9/24/2024 9/26/2024    Route: Does not apply    sodium chloride 0.9 % infusion 75 mL/hr Continuous 9/24/2024 --    Route: Intravenous    sodium chloride 0.9 % infusion 75 mL/hr Continuous 9/24/2024 --    Route: Intravenous    vitamin B-12 (CYANOCOBALAMIN) tablet 1,000 mcg 1,000 mcg Daily 9/24/2024 --    Route: Oral          apixaban, 5 mg, Oral, Q12H  aspirin, 81 mg, Oral, Daily  bacitracin, , Topical, Daily  glatiramer acetate, 20 mg, Subcutaneous, Daily  insulin lispro, 2-7 Units, Subcutaneous, 4x Daily AC & at Bedtime  Linezolid, 600 mg, Intravenous, Q12H  meropenem, 1,000 mg, Intravenous, Q8H  midodrine, 10 mg, Oral, TID AC  nicotine, 1 patch, Transdermal, Q24H  pantoprazole, 40 mg, Oral, Q AM  vitamin B-12, 1,000 mcg, Oral, Daily        Assessment & Plan         Sepsis    Atrial fibrillation with RVR    CAD S/P percutaneous coronary angioplasty    Essential hypertension    Dyslipidemia    Non-insulin dependent type 2 diabetes mellitus    GERD without esophagitis    BPH with obstruction/lower urinary tract symptoms    JARRETT (generalized anxiety disorder)    S/P MVR (mitral valve replacement)    Paroxysmal atrial fibrillation    Pneumonia    Acute UTI (urinary tract infection)    Acute on chronic renal insufficiency    Lung cancer    Elevated troponin  UTI C&S  yeast  PNA     MRSA screen +    Plan :     Rabia mycamin      NELSON Zyvox    C&S  CXR noted  Will follow  Thank you                 Maximilian Dumont MD  09/25/24  12:04 EDT

## 2024-09-25 NOTE — PROGRESS NOTES
[facility] CARDIOLOGY  [facility]        LOS:  LOS: 1 day   Patient Name: Jamin Hennessy  Age/Sex: 69 y.o. male  : 1955  MRN: [id number]    Day of Service: 24   Length of Stay: 1  Encounter Provider: YUE Park  Place of Service: [facility] CARDIOLOGY  Patient Care Team:  Jhonny Heller MD as PCP - General (Family Medicine)  Frederick George MD as Consulting Physician (Nephrology)    Cardiology assessment and plan        Atrial fibrillation/  Hypotension  Sepsis  Urinary tract infection  History of mitral valve endocarditis status post mitral valve replacement surgery of the bioprosthetic valve  Nonobstructive coronary artery disease on cardiac catheterization in   History of hypertension  Hyperlipidemia  Diabetes mellitus  Chronic kidney disease  Hypothyroidism  Lung cancer status post right chemotherapy  Left ventricular systolic function is normal. Calculated left ventricular EF = 64.3% Left ventricular ejection fraction appears to be 61 - 65%.       Denies any new cardiac symptoms  Poor historian  Tmax is 97.9 pulse is 63-1 47 respirations are 12 blood pressure is 146/59 sats are 100%  Sodium is 141 potassium is 3.6 creatinine is 1.0 hemoglobin is 9.7  Nonspecific elevation of troponin  Abnormal elevated proBNP  Normal thyroid function  Current medications include aspirin 81 mg p.o. once a day  Amiodarone 200 mg p.o. twice daily patient is on metoprolol 25 mg p.o. twice daily Lasix 40 mg p.o. twice a day patient is on apixaban for anticoagulation therapy  Patient is currently on midodrine with improvement in the blood pressure  Patient will be given a dose of dig for rate control right now  If the blood pressure is better we will start him on low-dose beta-blocker  Diagnosis and treatment options reviewed and discussed with patient  Echocardiogram with normal LV systolic function  Normal functioning of the  bioprosthetic valve  Follow-up on the results of the blood cultures  Further recommendation based on patient course              Subjective:     Chief Complaint:  F/U AF    Subjective:   Patient denies SOA but still feels fatigued.    Current Medications:   Scheduled Meds:apixaban, 5 mg, Oral, Q12H  aspirin, 81 mg, Oral, Daily  bacitracin, , Topical, Daily  FLUoxetine, 40 mg, Oral, Daily  furosemide, 40 mg, Intravenous, BID  glatiramer acetate, 20 mg, Subcutaneous, Daily  insulin lispro, 2-7 Units, Subcutaneous, 4x Daily AC & at Bedtime  meropenem, 1,000 mg, Intravenous, Q8H  metoprolol tartrate, 25 mg, Oral, BID  micafungin (MYCAMINE) IV, 100 mg, Intravenous, Q24H  midodrine, 10 mg, Oral, TID AC  nicotine, 1 patch, Transdermal, Q24H  pantoprazole, 40 mg, Oral, Q AM  vitamin B-12, 1,000 mcg, Oral, Daily      Continuous Infusions:Pharmacy to Dose meropenem (MERREM),         Allergies:  No Known Allergies    Review of Systems   Constitutional: Negative for chills, decreased appetite and malaise/fatigue.   HENT:  Negative for congestion and nosebleeds.    Eyes:  Negative for blurred vision and double vision.   Cardiovascular:  Positive for dyspnea on exertion. Negative for chest pain, irregular heartbeat, leg swelling, near-syncope, orthopnea and palpitations.   Respiratory:  Positive for shortness of breath. Negative for cough.    Hematologic/Lymphatic: Negative for adenopathy. Does not bruise/bleed easily.   Skin:  Negative for rash.   Musculoskeletal:  Negative for back pain and joint pain.   Gastrointestinal:  Negative for bloating, abdominal pain, hematemesis and hematochezia.   Genitourinary:  Negative for flank pain and hematuria.   Neurological:  Negative for dizziness and focal weakness.   Psychiatric/Behavioral:  Negative for altered mental status. The patient does not have insomnia.        Objective:     Temp:  [97.6 °F (36.4 °C)-99 °F (37.2 °C)] 97.9 °F (36.6 °C)  Heart Rate:  [] 73  Resp:  [13-17]  "14  BP: ()/(54-82) 130/66     Intake/Output Summary (Last 24 hours) at 9/25/2024 1447  Last data filed at 9/25/2024 0534  Gross per 24 hour   Intake 240 ml   Output 1550 ml   Net -1310 ml     Body mass index is 42.61 kg/m².      09/24/24  0100 09/24/24  1316 09/25/24  0534   Weight: 133 kg (294 lb 1.5 oz) 135 kg (297 lb 13.5 oz) 135 kg (296 lb 15.4 oz)         Physical Exam:  Neuro:  CV:  Resp:  GI:  Ext:  Tele: AAOx3, no gross deficits  S1S2 RRR, no murmur  Nonlabored, +wheezing   BS+, abd soft  Pedal pulses palp, 2+ pitting BLE edema  SR                                                   Lab Review:   Results from last 7 days   Lab Units 09/25/24  0452 09/24/24  0343   SODIUM mmol/L 141 144   POTASSIUM mmol/L 3.6 3.8   CHLORIDE mmol/L 109* 108*   CO2 mmol/L 22.6 25.6   BUN mg/dL 21 26*   CREATININE mg/dL 1.03 1.18   GLUCOSE mg/dL 117* 141*   CALCIUM mg/dL 8.4* 8.5*     Results from last 7 days   Lab Units 09/24/24  0526 09/24/24  0343   HSTROP T ng/L 37* 52*     Results from last 7 days   Lab Units 09/25/24  0452 09/24/24  0343   WBC 10*3/mm3 6.93 9.17   HEMOGLOBIN g/dL 9.7* 10.5*   HEMATOCRIT % 33.3* 35.2*   PLATELETS 10*3/mm3 205 215         Results from last 7 days   Lab Units 09/24/24  0526   MAGNESIUM mg/dL 1.8           Invalid input(s): \"LDLCALC\"  Results from last 7 days   Lab Units 09/24/24  1227   PROBNP pg/mL 4,889.0*     Results from last 7 days   Lab Units 09/24/24  0526   TSH uIU/mL 2.210       Recent Radiology:  Imaging Results (Most Recent)       Procedure Component Value Units Date/Time    XR Chest 1 View [[id number]] Collected: 09/24/24 1249     Updated: 09/24/24 1256    Narrative:      XR CHEST 1 VW    Date of Exam: 9/24/2024 12:39 PM EDT    Indication: wheezing, SOA    Comparison: 3/3/2020    Findings:  Sternotomy wires overlie the midline. Prosthetic heart valve is noted. Right lung is clear. There is patchy airspace opacity in the left base which may be due to pneumonia or aspiration. No " pneumothorax or large effusion identified. Heart size within   normal limits for technique. Pulmonary vascularity appears within normal limits.      Impression:      Impression:  Left basilar airspace opacity suggests pneumonia or aspiration. Correlate clinically. Follow-up evaluation recommended to document resolution. If this persists on follow-up, then chest CT recommended to rule out neoplasm.      Electronically Signed: Charbel Laureano MD    9/24/2024 12:54 PM EDT    Workstation ID: [id number]            ECHOCARDIOGRAM:    Results for orders placed during the hospital encounter of 09/24/24    Adult Transthoracic Echo Complete W/ Cont if Necessary Per Protocol    Interpretation Summary    Left ventricular systolic function is normal. Calculated left ventricular EF = 64.3% Left ventricular ejection fraction appears to be 61 - 65%.    Left ventricular wall thickness is consistent with mild concentric hypertrophy.    The left atrial cavity is moderately dilated.    There is a bioprosthetic mitral valve present.    Estimated right ventricular systolic pressure from tricuspid regurgitation is normal (<35 mmHg).        I reviewed the patient's new clinical results.    EKG:      Assessment:       Sepsis    Atrial fibrillation with RVR    CAD S/P percutaneous coronary angioplasty    Essential hypertension    Dyslipidemia    Non-insulin dependent type 2 diabetes mellitus    GERD without esophagitis    BPH with obstruction/lower urinary tract symptoms    JARRETT (generalized anxiety disorder)    S/P MVR (mitral valve replacement)    Paroxysmal atrial fibrillation    Pneumonia    Acute UTI (urinary tract infection)    Acute on chronic renal insufficiency    Lung cancer    Elevated troponin    1) Atrial Flutter with RVR --> SR  -prior hx afib  -High-sensitivity troponin 52, 37  -K 3.8, Mg 1.8  -TSH WNL  - anticoagulated with Eliquis  - received IV digoxin  - started on PO beta blocker; BP supported with midodrine     2) CAD status  post PCI  - Preop cath in 2020 showed patent LAD stent with mild to moderate residual nonobstructive CAD (30 to 40% RCA, 50% LAD, 40 to 50% LCx).       3) hx mitral valve endocarditis status post tissue MVR in 2020  - Last 2D echo in 2020 shows an EF of 66 to 70% with mild MR.       4) Sepsis / UTI  - CXR shows opacity left lobe     5) HTN  - currently hypotensive     6) HLD     7) DM     8) CKD     9) hypothyroidism     10) lung cancer   [facility]     11) multiple sclerosis      12) renal insufficiency    Plan:   2D echo showed an EF = 61-65% with normal MV gradients and normal IVC collapse.  Tele shows SR.  BP improved and starting beta blocker.  Will start PO amiodarone as d/w Dr. Beth.          Electronically signed by YUE Park, 09/25/24, 3:17 PM EDT.

## 2024-09-25 NOTE — CASE MANAGEMENT/SOCIAL WORK
Continued Stay Note   [facility]     Patient Name: Jamin Hennessy  MRN: [id number]  Today's Date: 9/25/2024    Admit Date: 9/24/2024    Plan: Anticipate return home with spouse. Watch O2 needs.   Discharge Plan       Row Name 09/25/24 1526       Plan    Plan Anticipate return home with spouse. Watch O2 needs.    Plan Comments DC Barriers: IV Lasix, cardiology and ID following, 2L O2 with no home O2. May require walking oximetry to be performed 24-48 hrs prior to d/c.                  Olga Busby RN     Office Phone: [phone]  Office Cell: [phone]

## 2024-09-26 ENCOUNTER — APPOINTMENT (OUTPATIENT)
Dept: CT IMAGING | Facility: HOSPITAL | Age: 69
End: 2024-09-26
Payer: OTHER GOVERNMENT

## 2024-09-26 LAB
ALBUMIN SERPL-MCNC: 3.2 G/DL (ref 3.5–5.2)
ALBUMIN/GLOB SERPL: 1.1 G/DL
ALP SERPL-CCNC: 60 U/L (ref 39–117)
ALT SERPL W P-5'-P-CCNC: 20 U/L (ref 1–41)
AMMONIA BLD-SCNC: 26 UMOL/L (ref 16–60)
ANION GAP SERPL CALCULATED.3IONS-SCNC: 8 MMOL/L (ref 5–15)
AST SERPL-CCNC: 23 U/L (ref 1–40)
BASOPHILS # BLD AUTO: 0.06 10*3/MM3 (ref 0–0.2)
BASOPHILS NFR BLD AUTO: 0.8 % (ref 0–1.5)
BILIRUB SERPL-MCNC: 0.2 MG/DL (ref 0–1.2)
BUN SERPL-MCNC: 21 MG/DL (ref 8–23)
BUN/CREAT SERPL: 17.1 (ref 7–25)
CALCIUM SPEC-SCNC: 8.5 MG/DL (ref 8.6–10.5)
CHLORIDE SERPL-SCNC: 107 MMOL/L (ref 98–107)
CO2 SERPL-SCNC: 25 MMOL/L (ref 22–29)
CREAT SERPL-MCNC: 1.23 MG/DL (ref 0.76–1.27)
DEPRECATED RDW RBC AUTO: 54 FL (ref 37–54)
EGFRCR SERPLBLD CKD-EPI 2021: 63.6 ML/MIN/1.73
EOSINOPHIL # BLD AUTO: 0.72 10*3/MM3 (ref 0–0.4)
EOSINOPHIL NFR BLD AUTO: 9.3 % (ref 0.3–6.2)
ERYTHROCYTE [DISTWIDTH] IN BLOOD BY AUTOMATED COUNT: 16.7 % (ref 12.3–15.4)
FOLATE SERPL-MCNC: 15 NG/ML (ref 4.78–24.2)
GLOBULIN UR ELPH-MCNC: 2.8 GM/DL
GLUCOSE BLDC GLUCOMTR-MCNC: 108 MG/DL (ref 70–105)
GLUCOSE BLDC GLUCOMTR-MCNC: 109 MG/DL (ref 70–105)
GLUCOSE BLDC GLUCOMTR-MCNC: 135 MG/DL (ref 70–105)
GLUCOSE BLDC GLUCOMTR-MCNC: 151 MG/DL (ref 70–105)
GLUCOSE SERPL-MCNC: 103 MG/DL (ref 65–99)
HCT VFR BLD AUTO: 30.9 % (ref 37.5–51)
HGB BLD-MCNC: 9 G/DL (ref 13–17.7)
IMM GRANULOCYTES # BLD AUTO: 0.03 10*3/MM3 (ref 0–0.05)
IMM GRANULOCYTES NFR BLD AUTO: 0.4 % (ref 0–0.5)
LYMPHOCYTES # BLD AUTO: 0.82 10*3/MM3 (ref 0.7–3.1)
LYMPHOCYTES NFR BLD AUTO: 10.6 % (ref 19.6–45.3)
MAGNESIUM SERPL-MCNC: 1.9 MG/DL (ref 1.6–2.4)
MCH RBC QN AUTO: 25.8 PG (ref 26.6–33)
MCHC RBC AUTO-ENTMCNC: 29.1 G/DL (ref 31.5–35.7)
MCV RBC AUTO: 88.5 FL (ref 79–97)
MONOCYTES # BLD AUTO: 0.67 10*3/MM3 (ref 0.1–0.9)
MONOCYTES NFR BLD AUTO: 8.7 % (ref 5–12)
NEUTROPHILS NFR BLD AUTO: 5.42 10*3/MM3 (ref 1.7–7)
NEUTROPHILS NFR BLD AUTO: 70.2 % (ref 42.7–76)
NRBC BLD AUTO-RTO: 0 /100 WBC (ref 0–0.2)
PHOSPHATE SERPL-MCNC: 3.9 MG/DL (ref 2.5–4.5)
PLATELET # BLD AUTO: 216 10*3/MM3 (ref 140–450)
PMV BLD AUTO: 10.4 FL (ref 6–12)
POTASSIUM SERPL-SCNC: 3.5 MMOL/L (ref 3.5–5.2)
PROT SERPL-MCNC: 6 G/DL (ref 6–8.5)
QT INTERVAL: 449 MS
QT INTERVAL: 480 MS
QTC INTERVAL: 474 MS
QTC INTERVAL: 480 MS
RBC # BLD AUTO: 3.49 10*6/MM3 (ref 4.14–5.8)
SODIUM SERPL-SCNC: 140 MMOL/L (ref 136–145)
VIT B12 BLD-MCNC: 1225 PG/ML (ref 211–946)
WBC NRBC COR # BLD AUTO: 7.72 10*3/MM3 (ref 3.4–10.8)

## 2024-09-26 PROCEDURE — 71250 CT THORAX DX C-: CPT

## 2024-09-26 PROCEDURE — 25010000002 FUROSEMIDE PER 20 MG: Performed by: STUDENT IN AN ORGANIZED HEALTH CARE EDUCATION/TRAINING PROGRAM

## 2024-09-26 PROCEDURE — 84100 ASSAY OF PHOSPHORUS: CPT | Performed by: STUDENT IN AN ORGANIZED HEALTH CARE EDUCATION/TRAINING PROGRAM

## 2024-09-26 PROCEDURE — 83735 ASSAY OF MAGNESIUM: CPT | Performed by: STUDENT IN AN ORGANIZED HEALTH CARE EDUCATION/TRAINING PROGRAM

## 2024-09-26 PROCEDURE — 25010000002 MEROPENEM PER 100 MG: Performed by: INTERNAL MEDICINE

## 2024-09-26 PROCEDURE — 80053 COMPREHEN METABOLIC PANEL: CPT | Performed by: STUDENT IN AN ORGANIZED HEALTH CARE EDUCATION/TRAINING PROGRAM

## 2024-09-26 PROCEDURE — 93005 ELECTROCARDIOGRAM TRACING: CPT | Performed by: INTERNAL MEDICINE

## 2024-09-26 PROCEDURE — 82746 ASSAY OF FOLIC ACID SERUM: CPT | Performed by: STUDENT IN AN ORGANIZED HEALTH CARE EDUCATION/TRAINING PROGRAM

## 2024-09-26 PROCEDURE — 82948 REAGENT STRIP/BLOOD GLUCOSE: CPT

## 2024-09-26 PROCEDURE — 85025 COMPLETE CBC W/AUTO DIFF WBC: CPT | Performed by: NURSE PRACTITIONER

## 2024-09-26 PROCEDURE — 63710000001 INSULIN LISPRO (HUMAN) PER 5 UNITS: Performed by: NURSE PRACTITIONER

## 2024-09-26 PROCEDURE — 82607 VITAMIN B-12: CPT | Performed by: STUDENT IN AN ORGANIZED HEALTH CARE EDUCATION/TRAINING PROGRAM

## 2024-09-26 PROCEDURE — 25010000002 MICAFUNGIN SODIUM 100 MG RECONSTITUTED SOLUTION 1 EACH VIAL: Performed by: INTERNAL MEDICINE

## 2024-09-26 PROCEDURE — 99233 SBSQ HOSP IP/OBS HIGH 50: CPT | Performed by: INTERNAL MEDICINE

## 2024-09-26 PROCEDURE — 93010 ELECTROCARDIOGRAM REPORT: CPT | Performed by: INTERNAL MEDICINE

## 2024-09-26 PROCEDURE — 82140 ASSAY OF AMMONIA: CPT | Performed by: STUDENT IN AN ORGANIZED HEALTH CARE EDUCATION/TRAINING PROGRAM

## 2024-09-26 PROCEDURE — 82948 REAGENT STRIP/BLOOD GLUCOSE: CPT | Performed by: NURSE PRACTITIONER

## 2024-09-26 RX ORDER — MIDODRINE HYDROCHLORIDE 5 MG/1
15 TABLET ORAL
Status: DISCONTINUED | OUTPATIENT
Start: 2024-09-26 | End: 2024-09-27

## 2024-09-26 RX ORDER — FUROSEMIDE 10 MG/ML
20 INJECTION INTRAMUSCULAR; INTRAVENOUS EVERY 12 HOURS
Status: DISCONTINUED | OUTPATIENT
Start: 2024-09-26 | End: 2024-09-27

## 2024-09-26 RX ADMIN — CYANOCOBALAMIN TAB 500 MCG 1000 MCG: 500 TAB at 08:43

## 2024-09-26 RX ADMIN — AMIODARONE HYDROCHLORIDE 200 MG: 200 TABLET ORAL at 08:45

## 2024-09-26 RX ADMIN — APIXABAN 5 MG: 5 TABLET, FILM COATED ORAL at 21:21

## 2024-09-26 RX ADMIN — PANTOPRAZOLE SODIUM 40 MG: 40 TABLET, DELAYED RELEASE ORAL at 05:42

## 2024-09-26 RX ADMIN — FUROSEMIDE 20 MG: 10 INJECTION, SOLUTION INTRAMUSCULAR; INTRAVENOUS at 21:21

## 2024-09-26 RX ADMIN — BACITRACIN 0.9 G: 500 OINTMENT TOPICAL at 10:51

## 2024-09-26 RX ADMIN — NICOTINE 1 PATCH: 14 PATCH, EXTENDED RELEASE TRANSDERMAL at 08:46

## 2024-09-26 RX ADMIN — MIDODRINE HYDROCHLORIDE 15 MG: 5 TABLET ORAL at 17:51

## 2024-09-26 RX ADMIN — MEROPENEM 1000 MG: 1 INJECTION INTRAVENOUS at 22:07

## 2024-09-26 RX ADMIN — FLUOXETINE HYDROCHLORIDE 40 MG: 20 CAPSULE ORAL at 08:45

## 2024-09-26 RX ADMIN — ASPIRIN 81 MG: 81 TABLET, COATED ORAL at 08:44

## 2024-09-26 RX ADMIN — METOPROLOL TARTRATE 25 MG: 25 TABLET, FILM COATED ORAL at 21:21

## 2024-09-26 RX ADMIN — METOPROLOL TARTRATE 25 MG: 25 TABLET, FILM COATED ORAL at 08:44

## 2024-09-26 RX ADMIN — MEROPENEM 1000 MG: 1 INJECTION INTRAVENOUS at 05:42

## 2024-09-26 RX ADMIN — MIDODRINE HYDROCHLORIDE 10 MG: 5 TABLET ORAL at 08:43

## 2024-09-26 RX ADMIN — AMIODARONE HYDROCHLORIDE 200 MG: 200 TABLET ORAL at 17:51

## 2024-09-26 RX ADMIN — FUROSEMIDE 20 MG: 10 INJECTION, SOLUTION INTRAMUSCULAR; INTRAVENOUS at 08:42

## 2024-09-26 RX ADMIN — MEROPENEM 1000 MG: 1 INJECTION INTRAVENOUS at 15:58

## 2024-09-26 RX ADMIN — INSULIN LISPRO 2 UNITS: 100 INJECTION, SOLUTION INTRAVENOUS; SUBCUTANEOUS at 12:26

## 2024-09-26 RX ADMIN — MICAFUNGIN SODIUM 100 MG: 100 INJECTION, POWDER, LYOPHILIZED, FOR SOLUTION INTRAVENOUS at 17:51

## 2024-09-26 RX ADMIN — GLATIRAMER ACETATE 20 MG: 20 INJECTION, SOLUTION SUBCUTANEOUS at 08:42

## 2024-09-26 RX ADMIN — APIXABAN 5 MG: 5 TABLET, FILM COATED ORAL at 08:45

## 2024-09-26 NOTE — CASE MANAGEMENT/SOCIAL WORK
Continued Stay Note  [facility]     Patient Name: Jamin Hennessy  MRN: [id number]  Today's Date: 9/26/2024    Admit Date: 9/24/2024    Plan: Anticipate routine home with spouse. Watch O2 needs.   Discharge Plan       Row Name 09/26/24 1145       Plan    Plan Anticipate routine home with spouse. Watch O2 needs.    Plan Comments Barrier to D/C: IV lasix, IV abx, 2L O2, cardiology and ID following.                      Expected Discharge Date and Time       Expected Discharge Date Expected Discharge Time    Sep 28, 2024           Phone communication or documentation only - no physical contact with patient or family.     Shania Bustos, ARELIN, RN    [address] [address]    Office: [phone]  Fax: [phone]

## 2024-09-26 NOTE — PROGRESS NOTES
[facility] MEDICINE SERVICE  DAILY PROGRESS NOTE    NAME: Jamin Hennessy  : 1955  MRN: [id number]      LOS: 2 days     PROVIDER OF SERVICE: Lauro Sheffield MD    Chief Complaint: Sepsis    Subjective:     Interval History:  History taken from: patient    No acute overnight events. Patient denies chest pain, fever or chills, Reported SOB has improved significantly.     Review of Systems:   Review of Systems Neg unless mentioned above    Objective:     Vital Signs  Temp:  [97.8 °F (36.6 °C)-98.4 °F (36.9 °C)] 98 °F (36.7 °C)  Heart Rate:  [62-95] 64  Resp:  [12-15] 15  BP: (106-147)/(51-82) 106/51  Flow (L/min):  [2] 2   Body mass index is 42.7 kg/m².    Physical Exam  General Appearance:  Awake alert, conversational   Head:  Atraumatic normocephalic   Eyes:        No sclera icterus   Neck: Non tender, normal ROM   Pulm: Decreased breath sound, some crackles   Cardio: Normal HR, irregular rhythm   Extremities: 2+ edema on BLE   Abdomen: Distended, soft non tender   /Renal: No suprapubic tenderness         Neuro: Aaox3, Normal speech                 Scheduled Meds   amiodarone, 200 mg, Oral, BID With Meals  apixaban, 5 mg, Oral, Q12H  aspirin, 81 mg, Oral, Daily  bacitracin, , Topical, Daily  FLUoxetine, 40 mg, Oral, Daily  furosemide, 40 mg, Intravenous, BID  glatiramer acetate, 20 mg, Subcutaneous, Daily  insulin lispro, 2-7 Units, Subcutaneous, 4x Daily AC & at Bedtime  meropenem, 1,000 mg, Intravenous, Q8H  metoprolol tartrate, 25 mg, Oral, BID  micafungin (MYCAMINE) IV, 100 mg, Intravenous, Q24H  midodrine, 10 mg, Oral, TID AC  nicotine, 1 patch, Transdermal, Q24H  pantoprazole, 40 mg, Oral, Q AM  vitamin B-12, 1,000 mcg, Oral, Daily       PRN Meds     acetaminophen    acetaminophen    benzonatate    cyclobenzaprine    dextrose    dextrose    glucagon (human recombinant)    ipratropium-albuterol   Infusions         Diagnostic Data    Results from last 7 days   Lab Units 24  8344    WBC 10*3/mm3 7.72   HEMOGLOBIN g/dL 9.0*   HEMATOCRIT % 30.9*   PLATELETS 10*3/mm3 216   GLUCOSE mg/dL 103*   CREATININE mg/dL 1.23   BUN mg/dL 21   SODIUM mmol/L 140   POTASSIUM mmol/L 3.5   AST (SGOT) U/L 23   ALT (SGPT) U/L 20   ALK PHOS U/L 60   BILIRUBIN mg/dL 0.2   ANION GAP mmol/L 8.0       XR Chest 1 View    Result Date: 9/24/2024  Impression: Left basilar airspace opacity suggests pneumonia or aspiration. Correlate clinically. Follow-up evaluation recommended to document resolution. If this persists on follow-up, then chest CT recommended to rule out neoplasm. Electronically Signed: Charbel Laureano MD  9/24/2024 12:54 PM EDT  Workstation ID: [id number]       I reviewed the patient's new clinical results.    Assessment/Plan:     Active and Resolved Problems  Active Hospital Problems    Diagnosis  POA    **Sepsis [A41.9]  Yes    Pneumonia [J18.9]  Yes    Acute UTI (urinary tract infection) [N39.0]  Yes    Acute on chronic renal insufficiency [N28.9, N18.9]  Yes    Lung cancer [C34.90]  Yes    Elevated troponin [R79.89]  Yes    Paroxysmal atrial fibrillation [I48.0]  Yes    S/P MVR (mitral valve replacement) [Z95.2]  Not Applicable    GERD without esophagitis [K21.9]  Yes    Essential hypertension [I10]  Yes    JARRETT (generalized anxiety disorder) [F41.1]  Yes    Dyslipidemia [E78.5]  Yes    CAD S/P percutaneous coronary angioplasty [I25.10, Z98.61]  Not Applicable    Non-insulin dependent type 2 diabetes mellitus [E11.9]  Yes    BPH with obstruction/lower urinary tract symptoms [N40.1, N13.8]  Yes    Atrial fibrillation with RVR [I48.91]  Yes      Resolved Hospital Problems   No resolved problems to display.       Plan:   -Bp soft, Decrease Lasix to 20 BID, increase Midodrine 15 TID, low threshold for ICU upgrade if needed pressor support for diuresis if fail on midodrine, will avoid giving fluids given volume overload  -monitor strict I/Os, has molina in place, had good urinary output   -On eliquis for Afib, was  given Digoxin per Cards, currently HR is controlled, currently not on rate or rhythm controls, Echo: LVEF 60-65 %  LV thickness noted, will monitor his Vitals, will follow cards reccs   -On meropenem and Mycamic per ID, Has hx of ESBL/VRE UTI, will follow  -Start Prozac, levothyroxine, TSH 2.2 on admission  -Check ammonia nromal 26, B12 normal, folate normal  -AM Labs will follow     VTE Prophylaxis:  Pharmacologic VTE prophylaxis orders are present.         Code status is   Code Status and Medical Interventions: No CPR (Do Not Attempt to Resuscitate); Limited Support; No intubation (DNI)   Ordered at: 09/24/24 0151     Medical Intervention Limits:    No intubation (DNI)     Code Status (Patient has no pulse and is not breathing):    No CPR (Do Not Attempt to Resuscitate)     Medical Interventions (Patient has pulse or is breathing):    Limited Support       Plan for disposition:Ongoing diuresis likely 2 days    Time: 30 minutes    Part of this note may be an electronic transcription/translation of spoken language to printed text using the Dragon Dictation System.    Signature: Electronically signed by Lauro Sheffield MD, 09/26/24, 06:18 EDT.  [facility] Hospitalist Team

## 2024-09-26 NOTE — CASE MANAGEMENT/SOCIAL WORK
Continued Stay Note  [facility]     Patient Name: Jamin Hennessy  MRN: [id number]  Today's Date: 9/26/2024    Admit Date: 9/24/2024    Plan: Anticipate routine home with spouse. Watch O2 needs.   Discharge Plan       Row Name 09/26/24 1629       Plan    Plan Comments CM spoke to patient at bedside to review [facility], [facility], [facility] for [facility]  to [facility]. CM updated RN/MD.                      Expected Discharge Date and Time       Expected Discharge Date Expected Discharge Time    Sep 28, 2024           Met with patient in room.  Maintained distance greater than six feet and spent less than 15 minutes in the room.     ARELI DonisN, RN    [place] [id number]    Office: [phone]  Fax: [phone]

## 2024-09-26 NOTE — PLAN OF CARE
Problem: Adult Inpatient Plan of Care  Goal: Absence of Hospital-Acquired Illness or Injury  Intervention: Prevent Skin Injury  Recent Flowsheet Documentation  Taken 9/26/2024 1600 by Vandana Orozco RN  Body Position:   turned   right  Skin Protection: adhesive use limited  Taken 9/26/2024 1400 by Vandana Orozco RN  Body Position:   turned   supine  Taken 9/26/2024 1200 by Vanadna Orozco RN  Body Position:   turned   left  Skin Protection: adhesive use limited  Taken 9/26/2024 1000 by Vandana Orozco RN  Body Position:   turned   right     Problem: Adult Inpatient Plan of Care  Goal: Absence of Hospital-Acquired Illness or Injury  Intervention: Prevent and Manage VTE (Venous Thromboembolism) Risk  Recent Flowsheet Documentation  Taken 9/26/2024 1600 by Vandana Orozco RN  Activity Management: bedrest  VTE Prevention/Management: (boots on feet) other (see comments)  Range of Motion: active ROM (range of motion) encouraged  Taken 9/26/2024 1200 by Vandana Orozco RN  Activity Management: bedrest  Range of Motion: active ROM (range of motion) encouraged  Taken 9/26/2024 0800 by Vandana Orozco RN  VTE Prevention/Management: (boots on feet) other (see comments)     Problem: Adult Inpatient Plan of Care  Goal: Absence of Hospital-Acquired Illness or Injury  Intervention: Prevent Infection  Recent Flowsheet Documentation  Taken 9/26/2024 1600 by Vandana Orozco RN  Infection Prevention:   environmental surveillance performed   single patient room provided  Taken 9/26/2024 1400 by Vandana Orozco RN  Infection Prevention:   single patient room provided   environmental surveillance performed  Taken 9/26/2024 1200 by Vandana Orozco RN  Infection Prevention: environmental surveillance performed  Taken 9/26/2024 1000 by Vandana Orozco RN  Infection Prevention: environmental surveillance performed     Problem: Adult Inpatient Plan of Care  Goal: Optimal Comfort and Wellbeing  Intervention: Provide Person-Centered  Care  Recent Flowsheet Documentation  Taken 9/26/2024 1600 by Vandana Orozco RN  Trust Relationship/Rapport:   care explained   questions answered  Taken 9/26/2024 1200 by Vandana Orozco RN  Trust Relationship/Rapport:   care explained   questions answered  Taken 9/26/2024 0800 by Vandana Orozco RN  Trust Relationship/Rapport:   care explained   questions answered     Problem: Adult Inpatient Plan of Care  Goal: Absence of Hospital-Acquired Illness or Injury  Intervention: Prevent Skin Injury  Recent Flowsheet Documentation  Taken 9/26/2024 1600 by Vandana Orozco RN  Body Position:   turned   right  Skin Protection: adhesive use limited  Taken 9/26/2024 1400 by Vandana Orozco RN  Body Position:   turned   supine  Taken 9/26/2024 1200 by Vandana Orozco RN  Body Position:   turned   left  Skin Protection: adhesive use limited  Taken 9/26/2024 1000 by Vandana Orozco RN  Body Position:   turned   right   Goal Outcome Evaluation: patient alert and oriented, turned q2, heel protection boots on, specialty bed utilized, dressing changed, no complaints of pain, possible transfer to [facility] tomorrow, bed alarm set, call light with in reach

## 2024-09-26 NOTE — PROGRESS NOTES
[facility] CARDIOLOGY  [facility]        LOS:  LOS: 2 days   Patient Name: Jamin Hennessy  Age/Sex: 69 y.o. male  : 1955  MRN: [id number]    Day of Service: 24   Length of Stay: 2  Encounter Provider: Migdalia Beth MD  Place of Service: [facility] CARDIOLOGY  Patient Care Team:  Jhonny Heller MD as PCP - General (Family Medicine)  Frederick George MD as Consulting Physician (Nephrology)    Cardiology assessment and plan        Atrial fibrillation/paroxysmal currently in sinus rhythm  Hypotension  Sepsis  Urinary tract infection  History of mitral valve endocarditis status post mitral valve replacement surgery of the bioprosthetic valve  Nonobstructive coronary artery disease on cardiac catheterization in   History of hypertension  Hyperlipidemia  Diabetes mellitus  Chronic kidney disease  Hypothyroidism  Lung cancer status post right chemotherapy  Left ventricular systolic function is normal. Calculated left ventricular EF = 64.3% Left ventricular ejection fraction appears to be 61 - 65%.       Denies any new cardiac symptoms  Poor historian  Tmax is 97.8 pulse is 56 respirations are 12 blood pressure is 108/56 sats are 95%  Sodium is 140 potassium is 3.5 creatinine is 1.2 hemoglobin is 9.0  Nonspecific elevation of troponin  Abnormal elevated proBNP  Normal thyroid function  Current medications include aspirin 81 mg p.o. once a day  Amiodarone 200 mg p.o. twice daily patient is on metoprolol 25 mg p.o. twice daily Lasix 40 mg p.o. twice a day patient is on apixaban for anticoagulation therapy  Patient is currently on midodrine with improvement in the blood pressure  Patient will be given a dose of dig for rate control right now  If the blood pressure is better we will start him on low-dose beta-blocker  Diagnosis and treatment options reviewed and discussed with patient  Echocardiogram with normal LV systolic  function  Normal functioning of the bioprosthetic valve  Follow-up on the results of the blood cultures  Further recommendation based on patient course              Subjective:     Chief Complaint:  F/U AF    Subjective:   Patient denies SOA but still feels fatigued.    Current Medications:   Scheduled Meds:amiodarone, 200 mg, Oral, BID With Meals  apixaban, 5 mg, Oral, Q12H  aspirin, 81 mg, Oral, Daily  bacitracin, , Topical, Daily  FLUoxetine, 40 mg, Oral, Daily  furosemide, 20 mg, Intravenous, Q12H  glatiramer acetate, 20 mg, Subcutaneous, Daily  insulin lispro, 2-7 Units, Subcutaneous, 4x Daily AC & at Bedtime  meropenem, 1,000 mg, Intravenous, Q8H  metoprolol tartrate, 25 mg, Oral, BID  micafungin (MYCAMINE) IV, 100 mg, Intravenous, Q24H  midodrine, 10 mg, Oral, TID AC  nicotine, 1 patch, Transdermal, Q24H  pantoprazole, 40 mg, Oral, Q AM  vitamin B-12, 1,000 mcg, Oral, Daily      Continuous Infusions:       Allergies:  No Known Allergies    Review of Systems   Constitutional: Negative for chills, decreased appetite and malaise/fatigue.   HENT:  Negative for congestion and nosebleeds.    Eyes:  Negative for blurred vision and double vision.   Cardiovascular:  Positive for dyspnea on exertion. Negative for chest pain, irregular heartbeat, leg swelling, near-syncope, orthopnea and palpitations.   Respiratory:  Positive for shortness of breath. Negative for cough.    Hematologic/Lymphatic: Negative for adenopathy. Does not bruise/bleed easily.   Skin:  Negative for rash.   Musculoskeletal:  Negative for back pain and joint pain.   Gastrointestinal:  Negative for bloating, abdominal pain, hematemesis and hematochezia.   Genitourinary:  Negative for flank pain and hematuria.   Neurological:  Negative for dizziness and focal weakness.   Psychiatric/Behavioral:  Negative for altered mental status. The patient does not have insomnia.        Objective:     Temp:  [97.8 °F (36.6 °C)-98.4 °F (36.9 °C)] 98 °F (36.7  "°C)  Heart Rate:  [62-95] 64  Resp:  [12-15] 15  BP: (106-147)/(51-80) 106/51     Intake/Output Summary (Last 24 hours) at 9/26/2024 0836  Last data filed at 9/26/2024 0300  Gross per 24 hour   Intake --   Output 3600 ml   Net -3600 ml     Body mass index is 42.7 kg/m².      09/24/24  1316 09/25/24  0534 09/26/24  0500   Weight: 135 kg (297 lb 13.5 oz) 135 kg (296 lb 15.4 oz) 135 kg (297 lb 9.9 oz)         Physical Exam:  Neuro:  CV:  Resp:  GI:  Ext:  Tele: AAOx3, no gross deficits  S1S2 RRR, no murmur  Nonlabored, +wheezing   BS+, abd soft  Pedal pulses palp, 2+ pitting BLE edema  SR                                                   Lab Review:   Results from last 7 days   Lab Units 09/26/24 0317 09/25/24  0452   SODIUM mmol/L 140 141   POTASSIUM mmol/L 3.5 3.6   CHLORIDE mmol/L 107 109*   CO2 mmol/L 25.0 22.6   BUN mg/dL 21 21   CREATININE mg/dL 1.23 1.03   GLUCOSE mg/dL 103* 117*   CALCIUM mg/dL 8.5* 8.4*   AST (SGOT) U/L 23  --    ALT (SGPT) U/L 20  --      Results from last 7 days   Lab Units 09/24/24  0526 09/24/24  0343   HSTROP T ng/L 37* 52*     Results from last 7 days   Lab Units 09/26/24  0317 09/25/24  0452   WBC 10*3/mm3 7.72 6.93   HEMOGLOBIN g/dL 9.0* 9.7*   HEMATOCRIT % 30.9* 33.3*   PLATELETS 10*3/mm3 216 205         Results from last 7 days   Lab Units 09/26/24  0317 09/24/24  0526   MAGNESIUM mg/dL 1.9 1.8           Invalid input(s): \"LDLCALC\"  Results from last 7 days   Lab Units 09/24/24  1227   PROBNP pg/mL 4,889.0*     Results from last 7 days   Lab Units 09/24/24  0526   TSH uIU/mL 2.210       Recent Radiology:  Imaging Results (Most Recent)       Procedure Component Value Units Date/Time    CT Chest Without Contrast Diagnostic [[id number]] Collected: 09/26/24 0802     Updated: 09/26/24 0810    Narrative:      CT CHEST WO CONTRAST DIAGNOSTIC    Date of Exam: 9/26/2024 7:51 AM EDT    Indication: eval further, volume overload, on diuresis , hx of lung cancer.    Comparison: Chest CT " 9/27/2020, chest radiograph 9/24/2024    Technique: Axial CT images were obtained of the chest without contrast administration.  Sagittal and coronal reconstructions were performed.  Automated exposure control and iterative reconstruction methods were used.      Findings:  There is a moderate-sized dependent left pleural effusion. There is consolidation in the left lower lobe and airspace disease with some chronic scarring along the pleura in the lingula. The left apex is clear. The right lung is clear. There is fullness   in the left hilum but there is no definite lymphadenopathy. There are granulomatous calcifications in the right hilum and a densely calcified granuloma in the right lung anteriorly. There are extensive coronary artery calcifications. There are   postoperative changes of prior sternotomy and mitral valve replacement. There is no significant pericardial or right pleural fluid. The upper abdomen is remarkable for bilateral renal stones. There is a double-J ureteral stent on the right. There are   degenerative changes in the thoracic spine with no destructive bone lesions seen.      Impression:      Impression:    1. Left lower lobe airspace disease as well as lingular airspace disease which may be chronic with evidence of pleural thickening peripherally.  2. Moderate sized dependent left pleural effusion.  3. Fullness in the left hilum likely reflecting airspace disease. The patient does have a history of lung cancer and this may represent treatment change. Previous CT showed diffuse infiltrate in the left lung and this has cleared in the left upper lobe.  4. Old healed granulomatous disease.  5. Bilateral renal stones with a right-sided double-J ureteral stent..      Electronically Signed: Ray Saldana MD    9/26/2024 8:08 AM EDT    Workstation ID: [id number]    XR Chest 1 View [[id number]] Collected: 09/24/24 1249     Updated: 09/24/24 1256    Narrative:      XR CHEST 1 VW    Date of Exam: 9/24/2024  12:39 PM EDT    Indication: wheezing, SOA    Comparison: 3/3/2020    Findings:  Sternotomy wires overlie the midline. Prosthetic heart valve is noted. Right lung is clear. There is patchy airspace opacity in the left base which may be due to pneumonia or aspiration. No pneumothorax or large effusion identified. Heart size within   normal limits for technique. Pulmonary vascularity appears within normal limits.      Impression:      Impression:  Left basilar airspace opacity suggests pneumonia or aspiration. Correlate clinically. Follow-up evaluation recommended to document resolution. If this persists on follow-up, then chest CT recommended to rule out neoplasm.      Electronically Signed: Charbel Laureano MD    9/24/2024 12:54 PM EDT    Workstation ID: [id number]            ECHOCARDIOGRAM:    Results for orders placed during the hospital encounter of 09/24/24    Adult Transthoracic Echo Complete W/ Cont if Necessary Per Protocol    Interpretation Summary    Left ventricular systolic function is normal. Calculated left ventricular EF = 64.3% Left ventricular ejection fraction appears to be 61 - 65%.    Left ventricular wall thickness is consistent with mild concentric hypertrophy.    The left atrial cavity is moderately dilated.    There is a bioprosthetic mitral valve present.    Estimated right ventricular systolic pressure from tricuspid regurgitation is normal (<35 mmHg).        I reviewed the patient's new clinical results.    EKG:      Assessment:       Sepsis    Atrial fibrillation with RVR    CAD S/P percutaneous coronary angioplasty    Essential hypertension    Dyslipidemia    Non-insulin dependent type 2 diabetes mellitus    GERD without esophagitis    BPH with obstruction/lower urinary tract symptoms    JARRETT (generalized anxiety disorder)    S/P MVR (mitral valve replacement)    Paroxysmal atrial fibrillation    Pneumonia    Acute UTI (urinary tract infection)    Acute on chronic renal insufficiency    Lung  cancer    Elevated troponin    1) Atrial Flutter with RVR --> SR  -prior hx afib  -High-sensitivity troponin 52, 37  -K 3.8, Mg 1.8  -TSH WNL  - anticoagulated with Eliquis  - received IV digoxin  - started on PO beta blocker; BP supported with midodrine     2) CAD status post PCI  - Preop cath in 2020 showed patent LAD stent with mild to moderate residual nonobstructive CAD (30 to 40% RCA, 50% LAD, 40 to 50% LCx).       3) hx mitral valve endocarditis status post tissue MVR in 2020  - Last 2D echo in 2020 shows an EF of 66 to 70% with mild MR.       4) Sepsis / UTI  - CXR shows opacity left lobe     5) HTN  - currently hypotensive     6) HLD     7) DM     8) CKD     9) hypothyroidism     10) lung cancer   - [facility]     11) multiple sclerosis      12) renal insufficiency

## 2024-09-27 LAB
BASOPHILS # BLD AUTO: 0.08 10*3/MM3 (ref 0–0.2)
BASOPHILS NFR BLD AUTO: 1 % (ref 0–1.5)
DEPRECATED RDW RBC AUTO: 51.5 FL (ref 37–54)
EOSINOPHIL # BLD AUTO: 0.63 10*3/MM3 (ref 0–0.4)
EOSINOPHIL NFR BLD AUTO: 7.6 % (ref 0.3–6.2)
ERYTHROCYTE [DISTWIDTH] IN BLOOD BY AUTOMATED COUNT: 16.6 % (ref 12.3–15.4)
GLUCOSE BLDC GLUCOMTR-MCNC: 127 MG/DL (ref 70–105)
GLUCOSE BLDC GLUCOMTR-MCNC: 151 MG/DL (ref 70–105)
GLUCOSE BLDC GLUCOMTR-MCNC: 155 MG/DL (ref 70–105)
GLUCOSE BLDC GLUCOMTR-MCNC: 215 MG/DL (ref 70–105)
HCT VFR BLD AUTO: 30.9 % (ref 37.5–51)
HGB BLD-MCNC: 9.4 G/DL (ref 13–17.7)
IMM GRANULOCYTES # BLD AUTO: 0.04 10*3/MM3 (ref 0–0.05)
IMM GRANULOCYTES NFR BLD AUTO: 0.5 % (ref 0–0.5)
LYMPHOCYTES # BLD AUTO: 0.98 10*3/MM3 (ref 0.7–3.1)
LYMPHOCYTES NFR BLD AUTO: 11.8 % (ref 19.6–45.3)
MCH RBC QN AUTO: 26.3 PG (ref 26.6–33)
MCHC RBC AUTO-ENTMCNC: 30.4 G/DL (ref 31.5–35.7)
MCV RBC AUTO: 86.3 FL (ref 79–97)
MONOCYTES # BLD AUTO: 0.72 10*3/MM3 (ref 0.1–0.9)
MONOCYTES NFR BLD AUTO: 8.7 % (ref 5–12)
NEUTROPHILS NFR BLD AUTO: 5.87 10*3/MM3 (ref 1.7–7)
NEUTROPHILS NFR BLD AUTO: 70.4 % (ref 42.7–76)
NRBC BLD AUTO-RTO: 0 /100 WBC (ref 0–0.2)
PLATELET # BLD AUTO: 218 10*3/MM3 (ref 140–450)
PMV BLD AUTO: 9.9 FL (ref 6–12)
QT INTERVAL: 485 MS
QTC INTERVAL: 486 MS
RBC # BLD AUTO: 3.58 10*6/MM3 (ref 4.14–5.8)
WBC NRBC COR # BLD AUTO: 8.32 10*3/MM3 (ref 3.4–10.8)

## 2024-09-27 PROCEDURE — 93005 ELECTROCARDIOGRAM TRACING: CPT | Performed by: INTERNAL MEDICINE

## 2024-09-27 PROCEDURE — 82948 REAGENT STRIP/BLOOD GLUCOSE: CPT

## 2024-09-27 PROCEDURE — 25010000002 MICAFUNGIN SODIUM 100 MG RECONSTITUTED SOLUTION 1 EACH VIAL: Performed by: INTERNAL MEDICINE

## 2024-09-27 PROCEDURE — 99233 SBSQ HOSP IP/OBS HIGH 50: CPT | Performed by: INTERNAL MEDICINE

## 2024-09-27 PROCEDURE — 82948 REAGENT STRIP/BLOOD GLUCOSE: CPT | Performed by: NURSE PRACTITIONER

## 2024-09-27 PROCEDURE — 25010000002 MEROPENEM PER 100 MG: Performed by: INTERNAL MEDICINE

## 2024-09-27 PROCEDURE — 63710000001 INSULIN LISPRO (HUMAN) PER 5 UNITS: Performed by: NURSE PRACTITIONER

## 2024-09-27 PROCEDURE — 85025 COMPLETE CBC W/AUTO DIFF WBC: CPT | Performed by: NURSE PRACTITIONER

## 2024-09-27 PROCEDURE — 93010 ELECTROCARDIOGRAM REPORT: CPT | Performed by: INTERNAL MEDICINE

## 2024-09-27 PROCEDURE — 25010000002 FUROSEMIDE PER 20 MG: Performed by: STUDENT IN AN ORGANIZED HEALTH CARE EDUCATION/TRAINING PROGRAM

## 2024-09-27 RX ORDER — FUROSEMIDE 10 MG/ML
20 INJECTION INTRAMUSCULAR; INTRAVENOUS ONCE
Status: COMPLETED | OUTPATIENT
Start: 2024-09-27 | End: 2024-09-27

## 2024-09-27 RX ORDER — ARGININE/GLUTAMINE/CALCIUM BMB 7G-7G-1.5G
1 POWDER IN PACKET (EA) ORAL 2 TIMES DAILY
Status: DISCONTINUED | OUTPATIENT
Start: 2024-09-27 | End: 2024-09-30 | Stop reason: HOSPADM

## 2024-09-27 RX ORDER — FUROSEMIDE 10 MG/ML
40 INJECTION INTRAMUSCULAR; INTRAVENOUS EVERY 12 HOURS
Status: DISCONTINUED | OUTPATIENT
Start: 2024-09-27 | End: 2024-09-30

## 2024-09-27 RX ORDER — MIDODRINE HYDROCHLORIDE 5 MG/1
10 TABLET ORAL
Status: DISCONTINUED | OUTPATIENT
Start: 2024-09-27 | End: 2024-09-30

## 2024-09-27 RX ADMIN — INSULIN LISPRO 2 UNITS: 100 INJECTION, SOLUTION INTRAVENOUS; SUBCUTANEOUS at 12:23

## 2024-09-27 RX ADMIN — MIDODRINE HYDROCHLORIDE 15 MG: 5 TABLET ORAL at 08:20

## 2024-09-27 RX ADMIN — INSULIN LISPRO 3 UNITS: 100 INJECTION, SOLUTION INTRAVENOUS; SUBCUTANEOUS at 20:27

## 2024-09-27 RX ADMIN — FUROSEMIDE 40 MG: 10 INJECTION, SOLUTION INTRAMUSCULAR; INTRAVENOUS at 20:27

## 2024-09-27 RX ADMIN — GLATIRAMER ACETATE 20 MG: 20 INJECTION, SOLUTION SUBCUTANEOUS at 08:14

## 2024-09-27 RX ADMIN — NICOTINE 1 PATCH: 14 PATCH, EXTENDED RELEASE TRANSDERMAL at 08:18

## 2024-09-27 RX ADMIN — FUROSEMIDE 20 MG: 10 INJECTION, SOLUTION INTRAMUSCULAR; INTRAVENOUS at 12:23

## 2024-09-27 RX ADMIN — METOPROLOL TARTRATE 25 MG: 25 TABLET, FILM COATED ORAL at 08:12

## 2024-09-27 RX ADMIN — AMIODARONE HYDROCHLORIDE 200 MG: 200 TABLET ORAL at 18:25

## 2024-09-27 RX ADMIN — PANTOPRAZOLE SODIUM 40 MG: 40 TABLET, DELAYED RELEASE ORAL at 06:10

## 2024-09-27 RX ADMIN — INSULIN LISPRO 2 UNITS: 100 INJECTION, SOLUTION INTRAVENOUS; SUBCUTANEOUS at 08:07

## 2024-09-27 RX ADMIN — FLUOXETINE HYDROCHLORIDE 40 MG: 20 CAPSULE ORAL at 08:13

## 2024-09-27 RX ADMIN — BACITRACIN 0.9 G: 500 OINTMENT TOPICAL at 18:06

## 2024-09-27 RX ADMIN — MIDODRINE HYDROCHLORIDE 10 MG: 5 TABLET ORAL at 12:23

## 2024-09-27 RX ADMIN — MICAFUNGIN SODIUM 100 MG: 100 INJECTION, POWDER, LYOPHILIZED, FOR SOLUTION INTRAVENOUS at 18:04

## 2024-09-27 RX ADMIN — AMIODARONE HYDROCHLORIDE 200 MG: 200 TABLET ORAL at 08:13

## 2024-09-27 RX ADMIN — ASPIRIN 81 MG: 81 TABLET, COATED ORAL at 08:12

## 2024-09-27 RX ADMIN — METOPROLOL TARTRATE 25 MG: 25 TABLET, FILM COATED ORAL at 20:27

## 2024-09-27 RX ADMIN — MEROPENEM 1000 MG: 1 INJECTION INTRAVENOUS at 06:10

## 2024-09-27 RX ADMIN — APIXABAN 5 MG: 5 TABLET, FILM COATED ORAL at 20:27

## 2024-09-27 RX ADMIN — FUROSEMIDE 20 MG: 10 INJECTION, SOLUTION INTRAMUSCULAR; INTRAVENOUS at 08:13

## 2024-09-27 RX ADMIN — CYCLOBENZAPRINE 5 MG: 10 TABLET, FILM COATED ORAL at 00:03

## 2024-09-27 RX ADMIN — APIXABAN 5 MG: 5 TABLET, FILM COATED ORAL at 08:13

## 2024-09-27 NOTE — PROGRESS NOTES
LOS: 3 days   Patient Care Team:  Jhonny Heller MD as PCP - General (Family Medicine)  Frederick George MD as Consulting Physician (Nephrology)        Subjective     Interval History:     Patient Complaints:   Patient Denies:  NV       Review of Systems:    SOB    Objective     Vital Signs  Temp:  [97.8 °F (36.6 °C)-99.3 °F (37.4 °C)] 98 °F (36.7 °C)  Heart Rate:  [56-65] 65  Resp:  [12-22] 22  BP: ()/(50-75) 92/65    Physical Exam:     General Appearance:  Alert, cooperative, in no acute distress   Head:  Normocephalic, without obvious abnormality, atraumatic   Eyes:  Lids and lashes normal, conjunctivae and sclerae normal, no icterus, no pallor, corneas clear, PERRLA   Ears:  Ears appear intact with no abnormalities noted   Throat:  No oral lesions, no thrush, oral mucosa moist   Neck:  No adenopathy, supple, trachea midline, no thyromegaly, no carotid bruit, no JVD   Back:  No kyphosis present, no scoliosis present, no skin lesions, erythema or scars, no tenderness to percussion or palpation, range of motion normal   Lungs:  Clear to auscultation, respirations regular, even and unlabored    Heart:  Regular rhythm and normal rate, normal S1 and S2, no murmur, no gallop, no rub, no click   Chest Wall:  No abnormalities observed   Abdomen:  Normal bowel sounds, no masses, no organomegaly, soft non-tender, non-distended, no guarding, no rebound tenderness   Rectal:  Deferred   Extremities:  Moves all extremities well, no edema, no cyanosis, no redness   Pulses:  Pulses palpable and equal bilaterally   Skin:  No bleeding, bruising or rash   Lymph nodes:  No palpable adenopathy   Neurologic:  Cranial nerves 2 - 12 grossly intact, sensation intact, DTR present and equal bilaterally                                                                                  Results Review:      Lab Results (last 72 hours)       Procedure Component Value Units Date/Time    POC Glucose 4x Daily Before Meals & at Bedtime  [[id number]]  (Abnormal) Collected: 09/25/24 1122    Specimen: Blood Updated: 09/25/24 1125     Glucose 143 mg/dL      Comment: Serial Number: [id number]:  [id number]       POC Glucose 4x Daily Before Meals & at Bedtime [[id number]]  (Abnormal) Collected: 09/25/24 0728    Specimen: Blood Updated: 09/25/24 0730     Glucose 140 mg/dL      Comment: Serial Number: [id number]:  [id number]       Basic Metabolic Panel [[id number]]  (Abnormal) Collected: 09/25/24 0452    Specimen: Blood from Hand, Left Updated: 09/25/24 0549     Glucose 117 mg/dL      BUN 21 mg/dL      Creatinine 1.03 mg/dL      Sodium 141 mmol/L      Potassium 3.6 mmol/L      Chloride 109 mmol/L      CO2 22.6 mmol/L      Calcium 8.4 mg/dL      BUN/Creatinine Ratio 20.4     Anion Gap 9.4 mmol/L      eGFR 78.6 mL/min/1.73     Narrative:      GFR Normal >60  Chronic Kidney Disease <60  Kidney Failure <15      CBC & Differential [[id number]]  (Abnormal) Collected: 09/25/24 0452    Specimen: Blood from Hand, Left Updated: 09/25/24 0510    Narrative:      The following orders were created for panel order CBC & Differential.  Procedure                               Abnormality         Status                     ---------                               -----------         ------                     CBC Auto Differential[[id number]]        Abnormal            Final result                 Please view results for these tests on the individual orders.    CBC Auto Differential [[id number]]  (Abnormal) Collected: 09/25/24 0452    Specimen: Blood from Hand, Left Updated: 09/25/24 0510     WBC 6.93 10*3/mm3      RBC 3.76 10*6/mm3      Hemoglobin 9.7 g/dL      Hematocrit 33.3 %      MCV 88.6 fL      MCH 25.8 pg      MCHC 29.1 g/dL      RDW 16.8 %      RDW-SD 54.1 fl      MPV 10.6 fL      Platelets 205 10*3/mm3      Neutrophil % 69.3 %      Lymphocyte % 10.7 %      Monocyte % 10.1 %      Eosinophil % 8.5 %      Basophil % 1.0 %      Immature Grans % 0.4 %       Neutrophils, Absolute 4.80 10*3/mm3      Lymphocytes, Absolute 0.74 10*3/mm3      Monocytes, Absolute 0.70 10*3/mm3      Eosinophils, Absolute 0.59 10*3/mm3      Basophils, Absolute 0.07 10*3/mm3      Immature Grans, Absolute 0.03 10*3/mm3      nRBC 0.0 /100 WBC     POC Glucose Once [[id number]]  (Abnormal) Collected: 09/24/24 2041    Specimen: Blood Updated: 09/24/24 2044     Glucose 162 mg/dL      Comment: Serial Number: [id number]:  [id number]       POC Glucose Once [[id number]]  (Abnormal) Collected: 09/24/24 1931    Specimen: Blood Updated: 09/24/24 1933     Glucose 209 mg/dL      Comment: Serial Number: [id number]:  [id number]       POC Glucose Once [[id number]]  (Abnormal) Collected: 09/24/24 1613    Specimen: Blood Updated: 09/24/24 1615     Glucose 155 mg/dL      Comment: Serial Number: [id number]:  [id number]       Blood Culture - Blood, Arm, Left [[id number]] Collected: 09/24/24 1526    Specimen: Blood from Arm, Left Updated: 09/24/24 1537    BNP [[id number]]  (Abnormal) Collected: 09/24/24 1227    Specimen: Blood from Arm, Right Updated: 09/24/24 1258     proBNP 4,889.0 pg/mL     Narrative:      This assay is used as an aid in the diagnosis of individuals suspected of having heart failure. It can be used as an aid in the diagnosis of acute decompensated heart failure (ADHF) in patients presenting with signs and symptoms of ADHF to the emergency department (ED). In addition, NT-proBNP of <300 pg/mL indicates ADHF is not likely.    Age Range Result Interpretation  NT-proBNP Concentration (pg/mL:      <50             Positive            >450                   Gray                 300-450                    Negative             <300    50-75           Positive            >900                  Gray                300-900                  Negative            <300      >75             Positive            >1800                  Gray                300-1800                  Negative            <300     POC Glucose Once [[id number]]  (Abnormal) Collected: 09/24/24 1156    Specimen: Blood Updated: 09/24/24 1159     Glucose 200 mg/dL      Comment: Serial Number: [id number]:  [id number]       Magnesium [[id number]]  (Normal) Collected: 09/24/24 0526    Specimen: Blood from Arm, Left Updated: 09/24/24 0951     Magnesium 1.8 mg/dL     TSH [[id number]]  (Normal) Collected: 09/24/24 0526    Specimen: Blood from Arm, Left Updated: 09/24/24 0951     TSH 2.210 uIU/mL     POC Glucose Once [[id number]]  (Abnormal) Collected: 09/24/24 0744    Specimen: Blood Updated: 09/24/24 0746     Glucose 174 mg/dL      Comment: Serial Number: [id number]:  [id number]       High Sensitivity Troponin T 2Hr [[id number]]  (Abnormal) Collected: 09/24/24 0526    Specimen: Blood from Arm, Left Updated: 09/24/24 0638     HS Troponin T 37 ng/L      Troponin T Delta -15 ng/L     Narrative:      High Sensitive Troponin T Reference Range:  <14.0 ng/L- Negative Female for AMI  <22.0 ng/L- Negative Male for AMI  >=14 - Abnormal Female indicating possible myocardial injury.  >=22 - Abnormal Male indicating possible myocardial injury.   Clinicians would have to utilize clinical acumen, EKG, Troponin, and serial changes to determine if it is an Acute Myocardial Infarction or myocardial injury due to an underlying chronic condition.         Urinalysis, Microscopic Only - Indwelling Urethral Catheter [[id number]]  (Abnormal) Collected: 09/24/24 0458    Specimen: Urine from Indwelling Urethral Catheter Updated: 09/24/24 0634     RBC, UA 3-5 /HPF      WBC, UA 21-50 /HPF      Bacteria, UA None Seen /HPF      Squamous Epithelial Cells, UA 0-2 /HPF      Yeast, UA       Moderate/2+ Budding Yeast w/Hyphae     /HPF     Hyaline Casts, UA 0-2 /LPF      Methodology Manual Light Microscopy    Urine Culture - Urine, Indwelling Urethral Catheter [[id number]] Collected: 09/24/24 0458    Specimen: Urine from Indwelling Urethral Catheter Updated: 09/24/24 0634     Urinalysis With Culture If Indicated - Indwelling Urethral Catheter [[id number]]  (Abnormal) Collected: 09/24/24 0458    Specimen: Urine from Indwelling Urethral Catheter Updated: 09/24/24 0620     Color, UA Yellow     Appearance, UA Turbid     Comment: Result checked          pH, UA 6.0     Specific Gravity, UA 1.013     Glucose, UA Negative     Ketones, UA Negative     Bilirubin, UA Negative     Blood, UA Large (3+)     Protein, UA 30 mg/dL (1+)     Leuk Esterase, UA Large (3+)     Nitrite, UA Negative     Urobilinogen, UA 1.0 E.U./dL    Narrative:      In absence of clinical symptoms, the presence of pyuria, bacteria, and/or nitrites on the urinalysis result does not correlate with infection.    MRSA Screen, PCR (Inpatient) - Swab, Nares [[id number]]  (Abnormal) Collected: 09/24/24 0336    Specimen: Swab from Nares Updated: 09/24/24 0613     MRSA PCR MRSA Detected    Narrative:      The negative predictive value of this diagnostic test is high and should only be used to consider de-escalating anti-MRSA therapy. A positive result may indicate colonization with MRSA and must be correlated clinically.    Basic Metabolic Panel [[id number]]  (Abnormal) Collected: 09/24/24 0343    Specimen: Blood from Arm, Left Updated: 09/24/24 0452     Glucose 141 mg/dL      BUN 26 mg/dL      Creatinine 1.18 mg/dL      Sodium 144 mmol/L      Potassium 3.8 mmol/L      Chloride 108 mmol/L      CO2 25.6 mmol/L      Calcium 8.5 mg/dL      BUN/Creatinine Ratio 22.0     Anion Gap 10.4 mmol/L      eGFR 66.8 mL/min/1.73     Narrative:      GFR Normal >60  Chronic Kidney Disease <60  Kidney Failure <15      High Sensitivity Troponin T [[id number]]  (Abnormal) Collected: 09/24/24 0343    Specimen: Blood from Arm, Left Updated: 09/24/24 0452     HS Troponin T 52 ng/L     Narrative:      High Sensitive Troponin T Reference Range:  <14.0 ng/L- Negative Female for AMI  <22.0 ng/L- Negative Male for AMI  >=14 - Abnormal Female indicating possible  myocardial injury.  >=22 - Abnormal Male indicating possible myocardial injury.   Clinicians would have to utilize clinical acumen, EKG, Troponin, and serial changes to determine if it is an Acute Myocardial Infarction or myocardial injury due to an underlying chronic condition.         CBC & Differential [[id number]]  (Abnormal) Collected: 09/24/24 0343    Specimen: Blood from Arm, Left Updated: 09/24/24 0427    Narrative:      The following orders were created for panel order CBC & Differential.  Procedure                               Abnormality         Status                     ---------                               -----------         ------                     CBC Auto Differential[[id number]]        Abnormal            Final result                 Please view results for these tests on the individual orders.    CBC Auto Differential [[id number]]  (Abnormal) Collected: 09/24/24 0343    Specimen: Blood from Arm, Left Updated: 09/24/24 0427     WBC 9.17 10*3/mm3      RBC 4.00 10*6/mm3      Hemoglobin 10.5 g/dL      Hematocrit 35.2 %      MCV 88.0 fL      MCH 26.3 pg      MCHC 29.8 g/dL      RDW 16.7 %      RDW-SD 54.2 fl      MPV 10.8 fL      Platelets 215 10*3/mm3      Neutrophil % 73.0 %      Lymphocyte % 9.3 %      Monocyte % 10.6 %      Eosinophil % 6.1 %      Basophil % 0.7 %      Immature Grans % 0.3 %      Neutrophils, Absolute 6.70 10*3/mm3      Lymphocytes, Absolute 0.85 10*3/mm3      Monocytes, Absolute 0.97 10*3/mm3      Eosinophils, Absolute 0.56 10*3/mm3      Basophils, Absolute 0.06 10*3/mm3      Immature Grans, Absolute 0.03 10*3/mm3      nRBC 0.0 /100 WBC     POC Glucose Once [[id number]]  (Abnormal) Collected: 09/24/24 0113    Specimen: Blood Updated: 09/24/24 0114     Glucose 150 mg/dL      Comment: Serial Number: [id number]:  [id number]               Imaging Results (Last 72 Hours)       Procedure Component Value Units Date/Time    CT Chest Without Contrast Diagnostic [[id number]]  Collected: 09/26/24 0802     Updated: 09/26/24 0810    Narrative:      CT CHEST WO CONTRAST DIAGNOSTIC    Date of Exam: 9/26/2024 7:51 AM EDT    Indication: eval further, volume overload, on diuresis , hx of lung cancer.    Comparison: Chest CT 9/27/2020, chest radiograph 9/24/2024    Technique: Axial CT images were obtained of the chest without contrast administration.  Sagittal and coronal reconstructions were performed.  Automated exposure control and iterative reconstruction methods were used.      Findings:  There is a moderate-sized dependent left pleural effusion. There is consolidation in the left lower lobe and airspace disease with some chronic scarring along the pleura in the lingula. The left apex is clear. The right lung is clear. There is fullness   in the left hilum but there is no definite lymphadenopathy. There are granulomatous calcifications in the right hilum and a densely calcified granuloma in the right lung anteriorly. There are extensive coronary artery calcifications. There are   postoperative changes of prior sternotomy and mitral valve replacement. There is no significant pericardial or right pleural fluid. The upper abdomen is remarkable for bilateral renal stones. There is a double-J ureteral stent on the right. There are   degenerative changes in the thoracic spine with no destructive bone lesions seen.      Impression:      Impression:    1. Left lower lobe airspace disease as well as lingular airspace disease which may be chronic with evidence of pleural thickening peripherally.  2. Moderate sized dependent left pleural effusion.  3. Fullness in the left hilum likely reflecting airspace disease. The patient does have a history of lung cancer and this may represent treatment change. Previous CT showed diffuse infiltrate in the left lung and this has cleared in the left upper lobe.  4. Old healed granulomatous disease.  5. Bilateral renal stones with a right-sided double-J ureteral  stent..      Electronically Signed: Ray Saldana MD    9/26/2024 8:08 AM EDT    Workstation ID: [id number]    XR Chest 1 View [[id number]] Collected: 09/24/24 1249     Updated: 09/24/24 1256    Narrative:      XR CHEST 1 VW    Date of Exam: 9/24/2024 12:39 PM EDT    Indication: wheezing, SOA    Comparison: 3/3/2020    Findings:  Sternotomy wires overlie the midline. Prosthetic heart valve is noted. Right lung is clear. There is patchy airspace opacity in the left base which may be due to pneumonia or aspiration. No pneumothorax or large effusion identified. Heart size within   normal limits for technique. Pulmonary vascularity appears within normal limits.      Impression:      Impression:  Left basilar airspace opacity suggests pneumonia or aspiration. Correlate clinically. Follow-up evaluation recommended to document resolution. If this persists on follow-up, then chest CT recommended to rule out neoplasm.      Electronically Signed: Charbel Laureano MD    9/24/2024 12:54 PM EDT    Workstation ID: [id number]              Medication Review:     Hospital Medications (active)         Dose Frequency Start End    acetaminophen (TYLENOL) tablet 500 mg 500 mg Every 6 Hours PRN 9/24/2024 --    Admin Instructions: If given for fever, use fever parameter: fever greater than 100.4 °F  Based on patient request - if ordered for moderate or severe pain, provider allows for administration of a medication prescribed for a lower pain scale.    Do not exceed 4 grams of acetaminophen in a 24 hr period. Max dose of 2gm for AST/ALT greater than 120 units/L.    If given for pain, use the following pain scale:   Mild Pain = Pain Score of 1-3, CPOT 1-2  Moderate Pain = Pain Score of 4-6, CPOT 3-4  Severe Pain = Pain Score of 7-10, CPOT 5-8    Route: Oral    acetaminophen (TYLENOL) tablet 650 mg 650 mg Once As Needed 9/24/2024 --    Admin Instructions: If given for fever, use fever parameter: fever greater than 100.4 °F  Based on patient request  - if ordered for moderate or severe pain, provider allows for administration of a medication prescribed for a lower pain scale.    Do not exceed 4 grams of acetaminophen in a 24 hr period. Max dose of 2gm for AST/ALT greater than 120 units/L.    If given for pain, use the following pain scale:   Mild Pain = Pain Score of 1-3, CPOT 1-2  Moderate Pain = Pain Score of 4-6, CPOT 3-4  Severe Pain = Pain Score of 7-10, CPOT 5-8    Route: Oral    apixaban (ELIQUIS) tablet 5 mg 5 mg Every 12 Hours Scheduled 9/24/2024 --    Admin Instructions: Tablet may be crushed and suspended in 60 mL of water or D5W and immediately delivered via NG tube.    Route: Oral    aspirin EC tablet 81 mg 81 mg Daily 9/24/2024 --    Admin Instructions: Do not crush or chew the capsules or tablets. The drug may not work as designed if the capsule or tablet is crushed or chewed. Swallow whole.  Do not exceed 4 grams of aspirin in a 24 hr period.    If given for pain, use the following pain scale:   Mild Pain = Pain Score of 1-3, CPOT 1-2  Moderate Pain = Pain Score of 4-6, CPOT 3-4  Severe Pain = Pain Score of 7-10, CPOT 5-8    Route: Oral    bacitracin 500 UNIT/GM ointment  Daily 9/24/2024 --    Admin Instructions: Apply to affected area.    Route: Topical    benzonatate (TESSALON) capsule 100 mg 100 mg 3 Times Daily PRN 9/24/2024 --    Admin Instructions: Swallow whole.  Do not crush, chew, or open capsule.    Route: Oral    cyclobenzaprine (FLEXERIL) tablet 5 mg 5 mg 2 Times Daily PRN 9/24/2024 --    Route: Oral    dextrose (D50W) (25 g/50 mL) IV injection 25 g 25 g Every 15 Minutes PRN 9/24/2024 --    Admin Instructions: Blood sugar less than 70; patient has IV access - Unresponsive, NPO or Unable To Safely Swallow    Route: Intravenous    dextrose (GLUTOSE) oral gel 15 g 15 g Every 15 Minutes PRN 9/24/2024 --    Admin Instructions: BS<70, Patient Alert, Is not NPO, Can safely swallow.    Route: Oral    glatiramer acetate (GLATOPA) injection 20  mg 20 mg Daily 9/24/2024 --    Route: Subcutaneous    Non-formulary Exception Code: Patient supplied medication    glucagon (GLUCAGEN) injection 1 mg 1 mg Every 15 Minutes PRN 9/24/2024 --    Admin Instructions: Blood Glucose Less Than 70 - Patient Without IV Access - Unresponsive, NPO or Unable To Safely Swallow  Reconstitute powder for injection by adding 1 mL of -supplied sterile diluent or sterile water for injection to a vial containing 1 mg of the drug, to provide solutions containing 1 mg/mL. Shake vial gently to dissolve.    Route: Intramuscular    insulin lispro (HUMALOG/ADMELOG) injection 2-7 Units 2-7 Units 4 Times Daily Before Meals & Nightly 9/24/2024 --    Admin Instructions: Correction Insulin - Low Dose - Total Insulin Dose Less Than 40 units/day (Lean, Elderly or Renal Patients)    Blood Glucose 150-199 mg/dL - 2 units  Blood Glucose 200-249 mg/dL - 3 units  Blood Glucose 250-299 mg/dL - 4 units  Blood Glucose 300-349 mg/dL - 5 units  Blood Glucose 350-400 mg/dL - 6 units  Blood Glucose Greater Than 400 mg/dL - 7 units & Call Provider   Caution: Look alike/sound alike drug alert    Route: Subcutaneous    ipratropium-albuterol (DUO-NEB) nebulizer solution 3 mL 3 mL Every 4 Hours PRN 9/24/2024 --    Route: Nebulization    Linezolid (ZYVOX) 600 mg 300 mL 600 mg Every 12 Hours 9/24/2024 9/27/2024    Admin Instructions: Protect from light. Do NOT refrigerate.    Route: Intravenous    meropenem (MERREM) 1,000 mg in sodium chloride 0.9 % 100 mL MBP 1,000 mg Every 8 Hours 9/24/2024 9/26/2024    Route: Intravenous    midodrine (PROAMATINE) tablet 10 mg 10 mg 3 Times Daily Before Meals 9/25/2024 --    Route: Oral    nicotine (NICODERM CQ) 14 MG/24HR patch 1 patch 1 patch Every 24 Hours Scheduled 9/24/2024 --    Admin Instructions: Apply to clean, dry, nonhairy area of skin (typically upper arm or shoulder)  Dispose of nicotine replacement therapies and their wrappers in non-hazardous  pharmaceutical waste or in regular trash.    Route: Transdermal    pantoprazole (PROTONIX) EC tablet 40 mg 40 mg Every Early Morning 9/24/2024 --    Admin Instructions: Swallow whole; do not crush, split, or chew.    Route: Oral    Pharmacy to Dose meropenem (MERREM)  Continuous PRN 9/24/2024 9/26/2024    Route: Does not apply    sodium chloride 0.9 % infusion 75 mL/hr Continuous 9/24/2024 --    Route: Intravenous    sodium chloride 0.9 % infusion 75 mL/hr Continuous 9/24/2024 --    Route: Intravenous    vitamin B-12 (CYANOCOBALAMIN) tablet 1,000 mcg 1,000 mcg Daily 9/24/2024 --    Route: Oral          amiodarone, 200 mg, Oral, BID With Meals  apixaban, 5 mg, Oral, Q12H  aspirin, 81 mg, Oral, Daily  bacitracin, , Topical, Daily  FLUoxetine, 40 mg, Oral, Daily  furosemide, 20 mg, Intravenous, Once  furosemide, 40 mg, Intravenous, Q12H  glatiramer acetate, 20 mg, Subcutaneous, Daily  insulin lispro, 2-7 Units, Subcutaneous, 4x Daily AC & at Bedtime  metoprolol tartrate, 25 mg, Oral, BID  micafungin (MYCAMINE) IV, 100 mg, Intravenous, Q24H  midodrine, 10 mg, Oral, TID AC  nicotine, 1 patch, Transdermal, Q24H  pantoprazole, 40 mg, Oral, Q AM        Assessment & Plan         Sepsis    Atrial fibrillation with RVR    CAD S/P percutaneous coronary angioplasty    Essential hypertension    Dyslipidemia    Non-insulin dependent type 2 diabetes mellitus    GERD without esophagitis    BPH with obstruction/lower urinary tract symptoms    JARRETT (generalized anxiety disorder)    S/P MVR (mitral valve replacement)    Paroxysmal atrial fibrillation    Pneumonia    Acute UTI (urinary tract infection)    Acute on chronic renal insufficiency    Lung cancer    Elevated troponin  UTI C&S  yeast  PNA     MRSA screen +    Plan :     Merem mycamin          C&S  CXR noted  Will follow  Thank you                 Maximilian Dumont MD  09/27/24  10:58 EDT

## 2024-09-27 NOTE — CONSULTS
Nutrition Services    Patient Name: Jamin Hennessy  YOB: 1955  MRN: [id number]  Admission date: 9/24/2024    RD to add Guillermo BID to provide 90 calories, 7 grams L-Arginine, 7 grams L-Glutamine and 2.5 grams Protein (Collagen)      NUTRITION SCREENING      Trending Narrative: 9/27: Nutrition screening r/t deep tissue pressure injury. Pt presented to [facility] ED on 9/24 with reports of productive cough and shortness of air. Pt admitted to this facility with diagnosis of sepsis. Pt presented to ED on 9/12 with A-fib and was cardioverted back to sinus rhythm as well. Pt has a history including bedbound with MS, CAD, DM, CKD, and lung cancer. Pt is tolerating po diet without noted issues at this time.        PO Diet: Diet: Cardiac, Diabetic; Healthy Heart (2-3 Na+); Consistent Carbohydrate; Fluid Consistency: Thin (IDDSI 0)   PO Supplements: None    Trending PO Intake:  9/27:  100% intake x last 4 documented meals       Nutritionally-Pertinent Medications RDN Reviewed, C/W clinical course         Labs (reviewed below): Reviewed. Management per attending.     Results from last 7 days   Lab Units 09/26/24 0317 09/25/24 0452 09/24/24  0343   SODIUM mmol/L 140 141 144   POTASSIUM mmol/L 3.5 3.6 3.8   CHLORIDE mmol/L 107 109* 108*   CO2 mmol/L 25.0 22.6 25.6   BUN mg/dL 21 21 26*   CREATININE mg/dL 1.23 1.03 1.18   CALCIUM mg/dL 8.5* 8.4* 8.5*   BILIRUBIN mg/dL 0.2  --   --    ALK PHOS U/L 60  --   --    ALT (SGPT) U/L 20  --   --    AST (SGOT) U/L 23  --   --    GLUCOSE mg/dL 103* 117* 141*     Results from last 7 days   Lab Units 09/27/24  0103 09/26/24 0317 09/25/24  0452 09/24/24  0526   MAGNESIUM mg/dL  --  1.9  --  1.8   PHOSPHORUS mg/dL  --  3.9  --   --    HEMOGLOBIN g/dL 9.4* 9.0*   < >  --    HEMATOCRIT % 30.9* 30.9*   < >  --     < > = values in this interval not displayed.     Lab Results   Component Value Date    HGBA1C 6.6 (H) 02/27/2020          GI Function:  Last documented BM 9/26      "  Skin: Deep tissues pressure injury to L buttocks       Weight Review: Estimated body mass index is 42.74 kg/m² as calculated from the following:    Height as of this encounter: 177.8 cm (70\").    Weight as of this encounter: 135 kg (297 lb 13.5 oz).    Comment:   9/27: 297#  No recent weight history  Wt Readings from Last 30 Encounters:   09/27/24 0500 135 kg (297 lb 13.5 oz)   09/26/24 0500 135 kg (297 lb 9.9 oz)   09/25/24 0534 135 kg (296 lb 15.4 oz)   09/24/24 1316 135 kg (297 lb 13.5 oz)   09/24/24 0100 133 kg (294 lb 1.5 oz)   07/08/20 1520 113 kg (250 lb)   03/10/20 0500 116 kg (255 lb 11.7 oz)   03/09/20 0600 115 kg (253 lb 12 oz)   03/08/20 0530 117 kg (257 lb 0.9 oz)   03/06/20 0500 116 kg (255 lb 15.3 oz)   03/05/20 0457 116 kg (255 lb 8.2 oz)   03/04/20 0455 119 kg (262 lb 5.6 oz)   03/02/20 0400 119 kg (261 lb 14.5 oz)   03/01/20 0400 115 kg (254 lb 6.6 oz)   02/29/20 0400 116 kg (256 lb 13.4 oz)   02/29/20 0027 116 kg (256 lb 13.4 oz)   02/03/20 0600 119 kg (263 lb 3.7 oz)   02/02/20 0500 121 kg (266 lb 12.1 oz)   02/01/20 0537 121 kg (267 lb 3.2 oz)   01/31/20 0500 121 kg (267 lb 10.2 oz)   01/30/20 0600 122 kg (268 lb 4.8 oz)   01/28/20 0558 119 kg (262 lb 5.6 oz)   01/28/20 0500 119 kg (262 lb 5.6 oz)   01/27/20 0537 119 kg (261 lb 14.5 oz)   01/27/20 0400 119 kg (261 lb 14.5 oz)   01/26/20 0700 124 kg (273 lb 9.5 oz)   01/25/20 0600 118 kg (260 lb 5.8 oz)   01/24/20 0400 121 kg (265 lb 14 oz)   01/23/20 0525 123 kg (270 lb 1 oz)   01/22/20 0600 115 kg (254 lb 10.1 oz)   01/22/20 0500 115 kg (254 lb 10.1 oz)   01/22/20 0430 115 kg (254 lb 10.1 oz)   01/21/20 0555 117 kg (257 lb 0.9 oz)   01/21/20 0400 117 kg (257 lb 0.9 oz)   01/20/20 2100 118 kg (260 lb 5.8 oz)   01/20/20 0600 118 kg (260 lb 5.8 oz)   01/20/20 0407 118 kg (260 lb 5.8 oz)   01/19/20 0600 117 kg (258 lb 9.6 oz)   01/18/20 0600 118 kg (260 lb 5.8 oz)   01/17/20 0626 121 kg (267 lb 12.8 oz)   01/16/20 0630 120 kg (264 lb 5.3 oz) "   01/15/20 0400 121 kg (266 lb 1.5 oz)   01/14/20 0907 122 kg (269 lb 10 oz)   01/14/20 0209 124 kg (272 lb 7.8 oz)   01/14/20 0035 124 kg (272 lb 7.8 oz)          --  Protein Requirements    EST Needs, Method, Wt used  g PRO (1.2-1.6 g/kg IBW 73 kg)            Nutrition Problem Statement: Increased protein and arginine needs related to healing as evidenced by documented skin breakdown.         Nutrition Intervention: Add Guillermo BID to provide 90 calories, 7 grams L-Arginine, 7 grams L-Glutamine and 2.5 grams Protein (Collagen)    Continue to encourage good po intake.           Monitoring/Evaluation Per protocol, I&O, PO intake, Supplement intake, Pertinent labs, Weight, Skin status, GI status            RD to follow up per protocol.    Electronically signed by:  Rupal Melvin RD  09/27/24 14:07 EDT

## 2024-09-27 NOTE — CASE MANAGEMENT/SOCIAL WORK
Continued Stay Note   [facility]     Patient Name: Jamin Hennessy  MRN: [id number]  Today's Date: 9/27/2024    Admit Date: 9/24/2024    Plan: Anticipate routine home with spouse pending PT/OT recommendations vs. pending transfer to [facility] (on diversion 9/27).   Discharge Plan       Row Name 09/27/24 1335       Plan    Plan Anticipate routine home with spouse pending PT/OT recommendations vs. pending [facility] ([facility] diversion 9/27).    Patient/Family in Agreement with Plan yes    Plan Comments CM rec'd call from [facility] , who states [facility] is on diversion at this time. CM updated patient at bedside. CM rec'd call from Laura with [facility] ([phone]) who states they are current with patient but due to concerns that he needs higher level of care, they will be unable to accept him at d/c. CM discussed with MD/RN, requested PT/OT orders. Barrier to D/C: IV abx, IV lasix.                      Expected Discharge Date and Time       Expected Discharge Date Expected Discharge Time    Sep 30, 2024           Met with patient in room.  Maintained distance greater than six feet and spent less than 15 minutes in the room.       ARELI DonisN, [place] [address]    Office: [phone]  Fax: [phone]

## 2024-09-27 NOTE — PROGRESS NOTES
[facility] CARDIOLOGY  [facility]        LOS:  LOS: 3 days   Patient Name: Jamin Hennessy  Age/Sex: 69 y.o. male  : 1955  MRN: [id number]    Day of Service: 24   Length of Stay: 3  Encounter Provider: Migdalia Beth MD  Place of Service: [facility] CARDIOLOGY  Patient Care Team:  Jhonny Heller MD as PCP - General (Family Medicine)  Frederick George MD as Consulting Physician (Nephrology)    Cardiology assessment and plan        Atrial fibrillation/paroxysmal currently in sinus rhythm  Hypotension  Sepsis  Urinary tract infection  History of mitral valve endocarditis status post mitral valve replacement surgery of the bioprosthetic valve  Nonobstructive coronary artery disease on cardiac catheterization in   History of hypertension  Hyperlipidemia  Diabetes mellitus  Chronic kidney disease  Hypothyroidism  Lung cancer status post right chemotherapy  Left ventricular systolic function is normal. Calculated left ventricular EF = 64.3% Left ventricular ejection fraction appears to be 61 - 65%.       Denies any new cardiac symptoms  Poor historian  Tmax is 98 pulse is 65 respirations are 22 blood pressure is 132/70 sats are 80 to 98%  Hemoglobin is 9.4  Nonspecific elevation of troponin  Abnormal elevated proBNP  Normal thyroid function  Current medications include aspirin 81 mg p.o. once a day  Amiodarone 200 mg p.o. twice daily patient is on metoprolol 25 mg p.o. twice daily Lasix 40 mg p.o. twice a day patient is on apixaban for anticoagulation therapy  Patient is currently on midodrine with improvement in the blood pressure  Patient will be given a dose of dig for rate control right now  If the blood pressure is better we will start him on low-dose beta-blocker  Diagnosis and treatment options reviewed and discussed with patient  Echocardiogram with normal LV systolic function  Normal functioning of the bioprosthetic  valve  Follow-up on the results of the blood cultures  Further recommendation based on patient course              Subjective:     Chief Complaint:  F/U AF    Subjective:   Patient denies SOA but still feels fatigued.    Current Medications:   Scheduled Meds:amiodarone, 200 mg, Oral, BID With Meals  apixaban, 5 mg, Oral, Q12H  aspirin, 81 mg, Oral, Daily  bacitracin, , Topical, Daily  FLUoxetine, 40 mg, Oral, Daily  furosemide, 40 mg, Intravenous, Q12H  glatiramer acetate, 20 mg, Subcutaneous, Daily  insulin lispro, 2-7 Units, Subcutaneous, 4x Daily AC & at Bedtime  metoprolol tartrate, 25 mg, Oral, BID  micafungin (MYCAMINE) IV, 100 mg, Intravenous, Q24H  midodrine, 10 mg, Oral, TID AC  nicotine, 1 patch, Transdermal, Q24H  pantoprazole, 40 mg, Oral, Q AM      Continuous Infusions:       Allergies:  No Known Allergies    Review of Systems   Constitutional: Negative for chills, decreased appetite and malaise/fatigue.   HENT:  Negative for congestion and nosebleeds.    Eyes:  Negative for blurred vision and double vision.   Cardiovascular:  Positive for dyspnea on exertion. Negative for chest pain, irregular heartbeat, leg swelling, near-syncope, orthopnea and palpitations.   Respiratory:  Positive for shortness of breath. Negative for cough.    Hematologic/Lymphatic: Negative for adenopathy. Does not bruise/bleed easily.   Skin:  Negative for rash.   Musculoskeletal:  Negative for back pain and joint pain.   Gastrointestinal:  Negative for bloating, abdominal pain, hematemesis and hematochezia.   Genitourinary:  Negative for flank pain and hematuria.   Neurological:  Negative for dizziness and focal weakness.   Psychiatric/Behavioral:  Negative for altered mental status. The patient does not have insomnia.        Objective:     Temp:  [98 °F (36.7 °C)-99.3 °F (37.4 °C)] 98 °F (36.7 °C)  Heart Rate:  [56-65] 65  Resp:  [14-22] 22  BP: ()/(50-75) 132/70     Intake/Output Summary (Last 24 hours) at 9/27/2024  "1314  Last data filed at 9/27/2024 1051  Gross per 24 hour   Intake 540 ml   Output 4075 ml   Net -3535 ml     Body mass index is 42.74 kg/m².      09/25/24  0534 09/26/24  0500 09/27/24  0500   Weight: 135 kg (296 lb 15.4 oz) 135 kg (297 lb 9.9 oz) 135 kg (297 lb 13.5 oz)         Physical Exam:  Neuro:  CV:  Resp:  GI:  Ext:  Tele: AAOx3, no gross deficits  S1S2 RRR, no murmur  Nonlabored, +wheezing   BS+, abd soft  Pedal pulses palp, 2+ pitting BLE edema  SR                                                   Lab Review:   Results from last 7 days   Lab Units 09/26/24  0317 09/25/24  0452   SODIUM mmol/L 140 141   POTASSIUM mmol/L 3.5 3.6   CHLORIDE mmol/L 107 109*   CO2 mmol/L 25.0 22.6   BUN mg/dL 21 21   CREATININE mg/dL 1.23 1.03   GLUCOSE mg/dL 103* 117*   CALCIUM mg/dL 8.5* 8.4*   AST (SGOT) U/L 23  --    ALT (SGPT) U/L 20  --      Results from last 7 days   Lab Units 09/24/24  0526 09/24/24  0343   HSTROP T ng/L 37* 52*     Results from last 7 days   Lab Units 09/27/24  0103 09/26/24  0317   WBC 10*3/mm3 8.32 7.72   HEMOGLOBIN g/dL 9.4* 9.0*   HEMATOCRIT % 30.9* 30.9*   PLATELETS 10*3/mm3 218 216         Results from last 7 days   Lab Units 09/26/24  0317 09/24/24  0526   MAGNESIUM mg/dL 1.9 1.8           Invalid input(s): \"LDLCALC\"  Results from last 7 days   Lab Units 09/24/24  1227   PROBNP pg/mL 4,889.0*     Results from last 7 days   Lab Units 09/24/24  0526   TSH uIU/mL 2.210       Recent Radiology:  Imaging Results (Most Recent)       Procedure Component Value Units Date/Time    CT Chest Without Contrast Diagnostic [[id number]] Collected: 09/26/24 0802     Updated: 09/26/24 0810    Narrative:      CT CHEST WO CONTRAST DIAGNOSTIC    Date of Exam: 9/26/2024 7:51 AM EDT    Indication: eval further, volume overload, on diuresis , hx of lung cancer.    Comparison: Chest CT 9/27/2020, chest radiograph 9/24/2024    Technique: Axial CT images were obtained of the chest without contrast administration.  Sagittal " and coronal reconstructions were performed.  Automated exposure control and iterative reconstruction methods were used.      Findings:  There is a moderate-sized dependent left pleural effusion. There is consolidation in the left lower lobe and airspace disease with some chronic scarring along the pleura in the lingula. The left apex is clear. The right lung is clear. There is fullness   in the left hilum but there is no definite lymphadenopathy. There are granulomatous calcifications in the right hilum and a densely calcified granuloma in the right lung anteriorly. There are extensive coronary artery calcifications. There are   postoperative changes of prior sternotomy and mitral valve replacement. There is no significant pericardial or right pleural fluid. The upper abdomen is remarkable for bilateral renal stones. There is a double-J ureteral stent on the right. There are   degenerative changes in the thoracic spine with no destructive bone lesions seen.      Impression:      Impression:    1. Left lower lobe airspace disease as well as lingular airspace disease which may be chronic with evidence of pleural thickening peripherally.  2. Moderate sized dependent left pleural effusion.  3. Fullness in the left hilum likely reflecting airspace disease. The patient does have a history of lung cancer and this may represent treatment change. Previous CT showed diffuse infiltrate in the left lung and this has cleared in the left upper lobe.  4. Old healed granulomatous disease.  5. Bilateral renal stones with a right-sided double-J ureteral stent..      Electronically Signed: Ray Saldana MD    9/26/2024 8:08 AM EDT    Workstation ID: [id number]    XR Chest 1 View [[id number]] Collected: 09/24/24 1249     Updated: 09/24/24 1256    Narrative:      XR CHEST 1 VW    Date of Exam: 9/24/2024 12:39 PM EDT    Indication: wheezing, SOA    Comparison: 3/3/2020    Findings:  Sternotomy wires overlie the midline. Prosthetic heart  valve is noted. Right lung is clear. There is patchy airspace opacity in the left base which may be due to pneumonia or aspiration. No pneumothorax or large effusion identified. Heart size within   normal limits for technique. Pulmonary vascularity appears within normal limits.      Impression:      Impression:  Left basilar airspace opacity suggests pneumonia or aspiration. Correlate clinically. Follow-up evaluation recommended to document resolution. If this persists on follow-up, then chest CT recommended to rule out neoplasm.      Electronically Signed: Charbel Laureano MD    9/24/2024 12:54 PM EDT    Workstation ID: [id number]            ECHOCARDIOGRAM:    Results for orders placed during the hospital encounter of 09/24/24    Adult Transthoracic Echo Complete W/ Cont if Necessary Per Protocol    Interpretation Summary    Left ventricular systolic function is normal. Calculated left ventricular EF = 64.3% Left ventricular ejection fraction appears to be 61 - 65%.    Left ventricular wall thickness is consistent with mild concentric hypertrophy.    The left atrial cavity is moderately dilated.    There is a bioprosthetic mitral valve present.    Estimated right ventricular systolic pressure from tricuspid regurgitation is normal (<35 mmHg).        I reviewed the patient's new clinical results.    EKG:      Assessment:       Sepsis    Atrial fibrillation with RVR    CAD S/P percutaneous coronary angioplasty    Essential hypertension    Dyslipidemia    Non-insulin dependent type 2 diabetes mellitus    GERD without esophagitis    BPH with obstruction/lower urinary tract symptoms    JARRETT (generalized anxiety disorder)    S/P MVR (mitral valve replacement)    Paroxysmal atrial fibrillation    Pneumonia    Acute UTI (urinary tract infection)    Acute on chronic renal insufficiency    Lung cancer    Elevated troponin    1) Atrial Flutter with RVR --> SR  -prior hx afib  -High-sensitivity troponin 52, 37  -K 3.8, Mg 1.8  -TSH  WNL  - anticoagulated with Eliquis  - received IV digoxin  - started on PO beta blocker; BP supported with midodrine     2) CAD status post PCI  - Preop cath in 2020 showed patent LAD stent with mild to moderate residual nonobstructive CAD (30 to 40% RCA, 50% LAD, 40 to 50% LCx).       3) hx mitral valve endocarditis status post tissue MVR in 2020  - Last 2D echo in 2020 shows an EF of 66 to 70% with mild MR.       4) Sepsis / UTI  - CXR shows opacity left lobe     5) HTN  - currently hypotensive     6) HLD     7) DM     8) CKD     9) hypothyroidism     10) lung cancer   [facility]     11) multiple sclerosis      12) renal insufficiency

## 2024-09-27 NOTE — PROGRESS NOTES
[facility] MEDICINE SERVICE  DAILY PROGRESS NOTE    NAME: Jamin Hennessy  : 1955  MRN: [id number]      LOS: 3 days     PROVIDER OF SERVICE: Lauro Sheffield MD    Chief Complaint: Sepsis    Subjective:     Interval History:  History taken from: patient  No acute overnight events, SOB better, no chest pain fever or chills.     Review of Systems:   Review of Systems Neg unless mentioned above    Objective:     Vital Signs  Temp:  [97.8 °F (36.6 °C)-99.3 °F (37.4 °C)] 99.3 °F (37.4 °C)  Heart Rate:  [56-72] 61  Resp:  [12-17] 17  BP: (104-138)/(50-75) 134/62   Body mass index is 42.74 kg/m².    Physical Exam  General Appearance:  Awake alert, conversational   Head:  Atraumatic normocephalic   Eyes:        No sclera icterus   Neck: Non tender, normal ROM   Pulm: Decreased breath sound, some crackles   Cardio: Normal HR, irregular rhythm   Extremities: 2+ edema on BLE   Abdomen: Distended, soft non tender   /Renal: No suprapubic tenderness         Neuro: Aaox3, Normal Speech                 Scheduled Meds   amiodarone, 200 mg, Oral, BID With Meals  apixaban, 5 mg, Oral, Q12H  aspirin, 81 mg, Oral, Daily  bacitracin, , Topical, Daily  FLUoxetine, 40 mg, Oral, Daily  furosemide, 20 mg, Intravenous, Q12H  glatiramer acetate, 20 mg, Subcutaneous, Daily  insulin lispro, 2-7 Units, Subcutaneous, 4x Daily AC & at Bedtime  meropenem, 1,000 mg, Intravenous, Q8H  metoprolol tartrate, 25 mg, Oral, BID  micafungin (MYCAMINE) IV, 100 mg, Intravenous, Q24H  midodrine, 15 mg, Oral, TID AC  nicotine, 1 patch, Transdermal, Q24H  pantoprazole, 40 mg, Oral, Q AM       PRN Meds     acetaminophen    acetaminophen    benzonatate    cyclobenzaprine    dextrose    dextrose    glucagon (human recombinant)    ipratropium-albuterol   Infusions         Diagnostic Data    Results from last 7 days   Lab Units 24  0103 24  0317   WBC 10*3/mm3 8.32 7.72   HEMOGLOBIN g/dL 9.4* 9.0*   HEMATOCRIT % 30.9* 30.9*    PLATELETS 10*3/mm3 218 216   GLUCOSE mg/dL  --  103*   CREATININE mg/dL  --  1.23   BUN mg/dL  --  21   SODIUM mmol/L  --  140   POTASSIUM mmol/L  --  3.5   AST (SGOT) U/L  --  23   ALT (SGPT) U/L  --  20   ALK PHOS U/L  --  60   BILIRUBIN mg/dL  --  0.2   ANION GAP mmol/L  --  8.0       CT Chest Without Contrast Diagnostic    Result Date: 9/26/2024  Impression: 1. Left lower lobe airspace disease as well as lingular airspace disease which may be chronic with evidence of pleural thickening peripherally. 2. Moderate sized dependent left pleural effusion. 3. Fullness in the left hilum likely reflecting airspace disease. The patient does have a history of lung cancer and this may represent treatment change. Previous CT showed diffuse infiltrate in the left lung and this has cleared in the left upper lobe. 4. Old healed granulomatous disease. 5. Bilateral renal stones with a right-sided double-J ureteral stent.. Electronically Signed: Ray Saldana MD  9/26/2024 8:08 AM EDT  Workstation ID: [id number]       I reviewed the patient's new clinical results.    Assessment/Plan:     Active and Resolved Problems  Active Hospital Problems    Diagnosis  POA    **Sepsis [A41.9]  Yes    Pneumonia [J18.9]  Yes    Acute UTI (urinary tract infection) [N39.0]  Yes    Acute on chronic renal insufficiency [N28.9, N18.9]  Yes    Lung cancer [C34.90]  Yes    Elevated troponin [R79.89]  Yes    Paroxysmal atrial fibrillation [I48.0]  Yes    S/P MVR (mitral valve replacement) [Z95.2]  Not Applicable    GERD without esophagitis [K21.9]  Yes    Essential hypertension [I10]  Yes    JARRETT (generalized anxiety disorder) [F41.1]  Yes    Dyslipidemia [E78.5]  Yes    CAD S/P percutaneous coronary angioplasty [I25.10, Z98.61]  Not Applicable    Non-insulin dependent type 2 diabetes mellitus [E11.9]  Yes    BPH with obstruction/lower urinary tract symptoms [N40.1, N13.8]  Yes    Atrial fibrillation with RVR [I48.91]  Yes      Resolved Hospital Problems    No resolved problems to display.     Plan:   -Increase lasix back to 40 BID -volume better but still significant, Midodrine 10 TID, low threshold for ICU upgrade if needed pressor support for diuresis if fail on midodrine, will avoid giving fluids given volume overload  -monitor strict I/Os, has molina in place, had good urinary output   -On eliquis for Afib, was given Digoxin per Cards, currently HR is controlled, currently metoprolol 25 bid , Echo: LVEF 60-65 %  LV thickness noted, will monitor his Vitals, will follow cards reccs   -Continue home med glatiramer for MS  -On meropenem and Mycamic per ID, Has hx of ESBL/VRE UTI, change molina bag, will follow  -Start Prozac, levothyroxine, TSH 2.2 on admission  -ammonia nromal 26, B12 normal, folate normal  -AM Labs will follow       VTE Prophylaxis:  Pharmacologic VTE prophylaxis orders are present.         Code status is   Code Status and Medical Interventions: No CPR (Do Not Attempt to Resuscitate); Limited Support; No intubation (DNI)   Ordered at: 09/24/24 0151     Medical Intervention Limits:    No intubation (DNI)     Code Status (Patient has no pulse and is not breathing):    No CPR (Do Not Attempt to Resuscitate)     Medical Interventions (Patient has pulse or is breathing):    Limited Support         Time: 30 minutes    Part of this note may be an electronic transcription/translation of spoken language to printed text using the Dragon Dictation System.    Signature: Electronically signed by Lauro Sheffield MD, 09/27/24, 07:09 EDT.  [facility] Hospitalist Team

## 2024-09-28 LAB
ALBUMIN SERPL-MCNC: 3.6 G/DL (ref 3.5–5.2)
ALBUMIN/GLOB SERPL: 1.1 G/DL
ALP SERPL-CCNC: 71 U/L (ref 39–117)
ALT SERPL W P-5'-P-CCNC: 37 U/L (ref 1–41)
ANION GAP SERPL CALCULATED.3IONS-SCNC: 10.3 MMOL/L (ref 5–15)
AST SERPL-CCNC: 40 U/L (ref 1–40)
BASOPHILS # BLD AUTO: 0.08 10*3/MM3 (ref 0–0.2)
BASOPHILS NFR BLD AUTO: 1.1 % (ref 0–1.5)
BILIRUB SERPL-MCNC: 0.4 MG/DL (ref 0–1.2)
BUN SERPL-MCNC: 19 MG/DL (ref 8–23)
BUN/CREAT SERPL: 18.4 (ref 7–25)
CALCIUM SPEC-SCNC: 8.8 MG/DL (ref 8.6–10.5)
CHLORIDE SERPL-SCNC: 103 MMOL/L (ref 98–107)
CO2 SERPL-SCNC: 29.7 MMOL/L (ref 22–29)
CREAT SERPL-MCNC: 1.03 MG/DL (ref 0.76–1.27)
DEPRECATED RDW RBC AUTO: 51.5 FL (ref 37–54)
EGFRCR SERPLBLD CKD-EPI 2021: 78.6 ML/MIN/1.73
EOSINOPHIL # BLD AUTO: 0.47 10*3/MM3 (ref 0–0.4)
EOSINOPHIL NFR BLD AUTO: 6.2 % (ref 0.3–6.2)
ERYTHROCYTE [DISTWIDTH] IN BLOOD BY AUTOMATED COUNT: 16.9 % (ref 12.3–15.4)
GLOBULIN UR ELPH-MCNC: 3.3 GM/DL
GLUCOSE BLDC GLUCOMTR-MCNC: 110 MG/DL (ref 70–105)
GLUCOSE BLDC GLUCOMTR-MCNC: 128 MG/DL (ref 70–105)
GLUCOSE BLDC GLUCOMTR-MCNC: 166 MG/DL (ref 70–105)
GLUCOSE BLDC GLUCOMTR-MCNC: 193 MG/DL (ref 70–105)
GLUCOSE SERPL-MCNC: 113 MG/DL (ref 65–99)
HCT VFR BLD AUTO: 34.1 % (ref 37.5–51)
HGB BLD-MCNC: 10.4 G/DL (ref 13–17.7)
IMM GRANULOCYTES # BLD AUTO: 0.03 10*3/MM3 (ref 0–0.05)
IMM GRANULOCYTES NFR BLD AUTO: 0.4 % (ref 0–0.5)
LYMPHOCYTES # BLD AUTO: 1.12 10*3/MM3 (ref 0.7–3.1)
LYMPHOCYTES NFR BLD AUTO: 14.8 % (ref 19.6–45.3)
MAGNESIUM SERPL-MCNC: 2.1 MG/DL (ref 1.6–2.4)
MCH RBC QN AUTO: 25.9 PG (ref 26.6–33)
MCHC RBC AUTO-ENTMCNC: 30.5 G/DL (ref 31.5–35.7)
MCV RBC AUTO: 84.8 FL (ref 79–97)
MONOCYTES # BLD AUTO: 0.63 10*3/MM3 (ref 0.1–0.9)
MONOCYTES NFR BLD AUTO: 8.3 % (ref 5–12)
NEUTROPHILS NFR BLD AUTO: 5.23 10*3/MM3 (ref 1.7–7)
NEUTROPHILS NFR BLD AUTO: 69.2 % (ref 42.7–76)
NRBC BLD AUTO-RTO: 0 /100 WBC (ref 0–0.2)
PHOSPHATE SERPL-MCNC: 3.7 MG/DL (ref 2.5–4.5)
PLATELET # BLD AUTO: 259 10*3/MM3 (ref 140–450)
PMV BLD AUTO: 10 FL (ref 6–12)
POTASSIUM SERPL-SCNC: 3.3 MMOL/L (ref 3.5–5.2)
PROT SERPL-MCNC: 6.9 G/DL (ref 6–8.5)
QT INTERVAL: 501 MS
QTC INTERVAL: 487 MS
RBC # BLD AUTO: 4.02 10*6/MM3 (ref 4.14–5.8)
SODIUM SERPL-SCNC: 143 MMOL/L (ref 136–145)
WBC NRBC COR # BLD AUTO: 7.56 10*3/MM3 (ref 3.4–10.8)

## 2024-09-28 PROCEDURE — 63710000001 INSULIN LISPRO (HUMAN) PER 5 UNITS: Performed by: NURSE PRACTITIONER

## 2024-09-28 PROCEDURE — 80053 COMPREHEN METABOLIC PANEL: CPT | Performed by: STUDENT IN AN ORGANIZED HEALTH CARE EDUCATION/TRAINING PROGRAM

## 2024-09-28 PROCEDURE — 83735 ASSAY OF MAGNESIUM: CPT | Performed by: STUDENT IN AN ORGANIZED HEALTH CARE EDUCATION/TRAINING PROGRAM

## 2024-09-28 PROCEDURE — 25010000002 FUROSEMIDE PER 20 MG: Performed by: STUDENT IN AN ORGANIZED HEALTH CARE EDUCATION/TRAINING PROGRAM

## 2024-09-28 PROCEDURE — 82948 REAGENT STRIP/BLOOD GLUCOSE: CPT

## 2024-09-28 PROCEDURE — 85025 COMPLETE CBC W/AUTO DIFF WBC: CPT | Performed by: NURSE PRACTITIONER

## 2024-09-28 PROCEDURE — 82948 REAGENT STRIP/BLOOD GLUCOSE: CPT | Performed by: NURSE PRACTITIONER

## 2024-09-28 PROCEDURE — 93010 ELECTROCARDIOGRAM REPORT: CPT | Performed by: INTERNAL MEDICINE

## 2024-09-28 PROCEDURE — 84100 ASSAY OF PHOSPHORUS: CPT | Performed by: STUDENT IN AN ORGANIZED HEALTH CARE EDUCATION/TRAINING PROGRAM

## 2024-09-28 PROCEDURE — 99233 SBSQ HOSP IP/OBS HIGH 50: CPT | Performed by: INTERNAL MEDICINE

## 2024-09-28 PROCEDURE — 93005 ELECTROCARDIOGRAM TRACING: CPT | Performed by: INTERNAL MEDICINE

## 2024-09-28 RX ORDER — POTASSIUM CHLORIDE 750 MG/1
10 TABLET, FILM COATED, EXTENDED RELEASE ORAL DAILY
Status: DISCONTINUED | OUTPATIENT
Start: 2024-09-28 | End: 2024-09-28

## 2024-09-28 RX ORDER — POTASSIUM CHLORIDE 1500 MG/1
40 TABLET, EXTENDED RELEASE ORAL EVERY 4 HOURS
Status: COMPLETED | OUTPATIENT
Start: 2024-09-28 | End: 2024-09-28

## 2024-09-28 RX ADMIN — FLUOXETINE HYDROCHLORIDE 40 MG: 20 CAPSULE ORAL at 09:20

## 2024-09-28 RX ADMIN — Medication 1 PACKET: at 20:51

## 2024-09-28 RX ADMIN — ACETAMINOPHEN 650 MG: 325 TABLET, FILM COATED ORAL at 15:02

## 2024-09-28 RX ADMIN — INSULIN LISPRO 2 UNITS: 100 INJECTION, SOLUTION INTRAVENOUS; SUBCUTANEOUS at 16:53

## 2024-09-28 RX ADMIN — AMIODARONE HYDROCHLORIDE 200 MG: 200 TABLET ORAL at 17:00

## 2024-09-28 RX ADMIN — NICOTINE 1 PATCH: 14 PATCH, EXTENDED RELEASE TRANSDERMAL at 09:21

## 2024-09-28 RX ADMIN — APIXABAN 5 MG: 5 TABLET, FILM COATED ORAL at 20:51

## 2024-09-28 RX ADMIN — AMIODARONE HYDROCHLORIDE 200 MG: 200 TABLET ORAL at 09:20

## 2024-09-28 RX ADMIN — FUROSEMIDE 40 MG: 10 INJECTION, SOLUTION INTRAMUSCULAR; INTRAVENOUS at 09:19

## 2024-09-28 RX ADMIN — BACITRACIN 1.8 G: 500 OINTMENT TOPICAL at 09:31

## 2024-09-28 RX ADMIN — METOPROLOL TARTRATE 25 MG: 25 TABLET, FILM COATED ORAL at 20:51

## 2024-09-28 RX ADMIN — POTASSIUM CHLORIDE 40 MEQ: 1500 TABLET, EXTENDED RELEASE ORAL at 15:02

## 2024-09-28 RX ADMIN — GLATIRAMER ACETATE 20 MG: 20 INJECTION, SOLUTION SUBCUTANEOUS at 09:20

## 2024-09-28 RX ADMIN — INSULIN LISPRO 2 UNITS: 100 INJECTION, SOLUTION INTRAVENOUS; SUBCUTANEOUS at 11:26

## 2024-09-28 RX ADMIN — MIDODRINE HYDROCHLORIDE 10 MG: 5 TABLET ORAL at 16:47

## 2024-09-28 RX ADMIN — ASPIRIN 81 MG: 81 TABLET, COATED ORAL at 09:20

## 2024-09-28 RX ADMIN — Medication 1 PACKET: at 09:31

## 2024-09-28 RX ADMIN — CYCLOBENZAPRINE 5 MG: 10 TABLET, FILM COATED ORAL at 16:53

## 2024-09-28 RX ADMIN — PANTOPRAZOLE SODIUM 40 MG: 40 TABLET, DELAYED RELEASE ORAL at 05:31

## 2024-09-28 RX ADMIN — POTASSIUM CHLORIDE 40 MEQ: 1500 TABLET, EXTENDED RELEASE ORAL at 11:26

## 2024-09-28 RX ADMIN — METOPROLOL TARTRATE 25 MG: 25 TABLET, FILM COATED ORAL at 09:20

## 2024-09-28 RX ADMIN — MIDODRINE HYDROCHLORIDE 10 MG: 5 TABLET ORAL at 11:26

## 2024-09-28 RX ADMIN — FUROSEMIDE 40 MG: 10 INJECTION, SOLUTION INTRAMUSCULAR; INTRAVENOUS at 20:51

## 2024-09-28 RX ADMIN — APIXABAN 5 MG: 5 TABLET, FILM COATED ORAL at 09:20

## 2024-09-28 RX ADMIN — MIDODRINE HYDROCHLORIDE 10 MG: 5 TABLET ORAL at 09:20

## 2024-09-28 NOTE — PLAN OF CARE
Problem: Adult Inpatient Plan of Care  Goal: Plan of Care Review  Outcome: Progressing  Goal: Patient-Specific Goal (Individualized)  Outcome: Progressing  Goal: Absence of Hospital-Acquired Illness or Injury  Outcome: Progressing  Intervention: Identify and Manage Fall Risk  Recent Flowsheet Documentation  Taken 9/28/2024 0400 by Noa Craft RN  Safety Promotion/Fall Prevention:   safety round/check completed   nonskid shoes/slippers when out of bed   fall prevention program maintained   assistive device/personal items within reach  Taken 9/28/2024 0200 by Noa Craft RN  Safety Promotion/Fall Prevention:   safety round/check completed   nonskid shoes/slippers when out of bed   fall prevention program maintained   assistive device/personal items within reach  Taken 9/28/2024 0000 by Noa Craft RN  Safety Promotion/Fall Prevention:   safety round/check completed   nonskid shoes/slippers when out of bed   fall prevention program maintained   assistive device/personal items within reach  Taken 9/27/2024 2200 by Noa Craft RN  Safety Promotion/Fall Prevention:   safety round/check completed   nonskid shoes/slippers when out of bed   fall prevention program maintained   assistive device/personal items within reach  Taken 9/27/2024 2100 by Noa Craft RN  Safety Promotion/Fall Prevention:   safety round/check completed   nonskid shoes/slippers when out of bed   fall prevention program maintained   assistive device/personal items within reach  Taken 9/27/2024 2000 by Noa Craft, RN  Safety Promotion/Fall Prevention:   safety round/check completed   nonskid shoes/slippers when out of bed   fall prevention program maintained   assistive device/personal items within reach  Taken 9/27/2024 1900 by Noa Craft, RN  Safety Promotion/Fall Prevention:   safety round/check completed   nonskid shoes/slippers when out of bed   fall prevention program maintained   assistive device/personal items within  reach  Intervention: Prevent Skin Injury  Recent Flowsheet Documentation  Taken 9/28/2024 0400 by Noa Craft RN  Body Position:   turned   left  Skin Protection:   adhesive use limited   tubing/devices free from skin contact  Taken 9/28/2024 0000 by Noa Craft RN  Body Position:   supine, legs elevated   weight shifting  Skin Protection:   adhesive use limited   tubing/devices free from skin contact  Taken 9/27/2024 2000 by Noa Craft RN  Skin Protection:   adhesive use limited   tubing/devices free from skin contact  Intervention: Prevent and Manage VTE (Venous Thromboembolism) Risk  Recent Flowsheet Documentation  Taken 9/28/2024 0400 by Noa Craft RN  Activity Management: bedrest  VTE Prevention/Management:   sequential compression devices off   patient refused intervention  Range of Motion: active ROM (range of motion) encouraged  Taken 9/28/2024 0000 by Noa Craft RN  Activity Management: bedrest  VTE Prevention/Management:   sequential compression devices off   patient refused intervention  Range of Motion: active ROM (range of motion) encouraged  Taken 9/27/2024 2000 by Noa Craft RN  Activity Management: bedrest  VTE Prevention/Management:   sequential compression devices off   patient refused intervention  Range of Motion: active ROM (range of motion) encouraged  Intervention: Prevent Infection  Recent Flowsheet Documentation  Taken 9/28/2024 0400 by Noa Craft RN  Infection Prevention:   single patient room provided   rest/sleep promoted   personal protective equipment utilized   hand hygiene promoted  Taken 9/28/2024 0200 by Noa Craft RN  Infection Prevention:   single patient room provided   rest/sleep promoted   personal protective equipment utilized   hand hygiene promoted  Taken 9/28/2024 0000 by Noa Craft RN  Infection Prevention:   single patient room provided   rest/sleep promoted   personal protective equipment utilized   hand hygiene promoted  Taken  9/27/2024 2200 by Noa Craft RN  Infection Prevention:   single patient room provided   rest/sleep promoted   personal protective equipment utilized   hand hygiene promoted  Taken 9/27/2024 2100 by Noa Craft RN  Infection Prevention:   single patient room provided   rest/sleep promoted   personal protective equipment utilized   hand hygiene promoted  Taken 9/27/2024 2000 by Noa Craft RN  Infection Prevention:   single patient room provided   rest/sleep promoted   personal protective equipment utilized   hand hygiene promoted  Taken 9/27/2024 1900 by Noa Craft RN  Infection Prevention:   single patient room provided   rest/sleep promoted   personal protective equipment utilized   hand hygiene promoted  Goal: Optimal Comfort and Wellbeing  Outcome: Progressing  Intervention: Provide Person-Centered Care  Recent Flowsheet Documentation  Taken 9/28/2024 0400 by Noa Craft RN  Trust Relationship/Rapport:   care explained   choices provided  Taken 9/28/2024 0000 by Noa Craft RN  Trust Relationship/Rapport:   care explained   choices provided  Taken 9/27/2024 2000 by Noa Craft RN  Trust Relationship/Rapport:   care explained   choices provided  Goal: Readiness for Transition of Care  Outcome: Progressing  Goal: Plan of Care Review  Outcome: Progressing  Goal: Patient-Specific Goal (Individualized)  Outcome: Progressing  Goal: Absence of Hospital-Acquired Illness or Injury  Outcome: Progressing  Intervention: Identify and Manage Fall Risk  Recent Flowsheet Documentation  Taken 9/28/2024 0400 by Noa Craft RN  Safety Promotion/Fall Prevention:   safety round/check completed   nonskid shoes/slippers when out of bed   fall prevention program maintained   assistive device/personal items within reach  Taken 9/28/2024 0200 by Noa Craft RN  Safety Promotion/Fall Prevention:   safety round/check completed   nonskid shoes/slippers when out of bed   fall prevention program  maintained   assistive device/personal items within reach  Taken 9/28/2024 0000 by Noa Craft RN  Safety Promotion/Fall Prevention:   safety round/check completed   nonskid shoes/slippers when out of bed   fall prevention program maintained   assistive device/personal items within reach  Taken 9/27/2024 2200 by Noa Craft RN  Safety Promotion/Fall Prevention:   safety round/check completed   nonskid shoes/slippers when out of bed   fall prevention program maintained   assistive device/personal items within reach  Taken 9/27/2024 2100 by Noa Craft RN  Safety Promotion/Fall Prevention:   safety round/check completed   nonskid shoes/slippers when out of bed   fall prevention program maintained   assistive device/personal items within reach  Taken 9/27/2024 2000 by Noa Craft RN  Safety Promotion/Fall Prevention:   safety round/check completed   nonskid shoes/slippers when out of bed   fall prevention program maintained   assistive device/personal items within reach  Taken 9/27/2024 1900 by Noa Craft RN  Safety Promotion/Fall Prevention:   safety round/check completed   nonskid shoes/slippers when out of bed   fall prevention program maintained   assistive device/personal items within reach  Intervention: Prevent Skin Injury  Recent Flowsheet Documentation  Taken 9/28/2024 0400 by Noa Craft RN  Body Position:   turned   left  Skin Protection:   adhesive use limited   tubing/devices free from skin contact  Taken 9/28/2024 0000 by Noa Craft RN  Body Position:   supine, legs elevated   weight shifting  Skin Protection:   adhesive use limited   tubing/devices free from skin contact  Taken 9/27/2024 2000 by Noa Craft RN  Skin Protection:   adhesive use limited   tubing/devices free from skin contact  Intervention: Prevent and Manage VTE (Venous Thromboembolism) Risk  Recent Flowsheet Documentation  Taken 9/28/2024 0400 by Noa Craft RN  Activity Management: bedrest  VTE  Prevention/Management:   sequential compression devices off   patient refused intervention  Range of Motion: active ROM (range of motion) encouraged  Taken 9/28/2024 0000 by Noa Craft RN  Activity Management: bedrest  VTE Prevention/Management:   sequential compression devices off   patient refused intervention  Range of Motion: active ROM (range of motion) encouraged  Taken 9/27/2024 2000 by Noa Craft RN  Activity Management: bedrest  VTE Prevention/Management:   sequential compression devices off   patient refused intervention  Range of Motion: active ROM (range of motion) encouraged  Intervention: Prevent Infection  Recent Flowsheet Documentation  Taken 9/28/2024 0400 by Noa Craft RN  Infection Prevention:   single patient room provided   rest/sleep promoted   personal protective equipment utilized   hand hygiene promoted  Taken 9/28/2024 0200 by Noa Craft RN  Infection Prevention:   single patient room provided   rest/sleep promoted   personal protective equipment utilized   hand hygiene promoted  Taken 9/28/2024 0000 by Noa Craft RN  Infection Prevention:   single patient room provided   rest/sleep promoted   personal protective equipment utilized   hand hygiene promoted  Taken 9/27/2024 2200 by Noa Craft RN  Infection Prevention:   single patient room provided   rest/sleep promoted   personal protective equipment utilized   hand hygiene promoted  Taken 9/27/2024 2100 by Noa Craft RN  Infection Prevention:   single patient room provided   rest/sleep promoted   personal protective equipment utilized   hand hygiene promoted  Taken 9/27/2024 2000 by Noa Craft RN  Infection Prevention:   single patient room provided   rest/sleep promoted   personal protective equipment utilized   hand hygiene promoted  Taken 9/27/2024 1900 by Noa Craft RN  Infection Prevention:   single patient room provided   rest/sleep promoted   personal protective equipment utilized   hand  hygiene promoted  Goal: Optimal Comfort and Wellbeing  Outcome: Progressing  Intervention: Provide Person-Centered Care  Recent Flowsheet Documentation  Taken 9/28/2024 0400 by Noa Craft RN  Trust Relationship/Rapport:   care explained   choices provided  Taken 9/28/2024 0000 by Noa Craft RN  Trust Relationship/Rapport:   care explained   choices provided  Taken 9/27/2024 2000 by Noa Craft RN  Trust Relationship/Rapport:   care explained   choices provided  Goal: Readiness for Transition of Care  Outcome: Progressing     Problem: Acute Confusion (Hospitalized Older Adult)  Goal: Optimal Cognitive Function  Outcome: Progressing     Problem: Bowel Elimination Impaired (Hospitalized Older Adult)  Goal: Effective Bowel Elimination  Outcome: Progressing     Problem: Depression (Hospitalized Older Adult)  Goal: Depressive Symptoms Identified and Managed  Outcome: Progressing  Intervention: Monitor and Manage Depressive Symptoms  Recent Flowsheet Documentation  Taken 9/28/2024 0400 by Noa Craft RN  Supportive Measures: active listening utilized  Family/Support System Care: presence promoted  Taken 9/28/2024 0000 by Noa Craft RN  Supportive Measures: active listening utilized  Family/Support System Care: presence promoted  Taken 9/27/2024 2000 by Noa Craft RN  Supportive Measures: active listening utilized  Family/Support System Care: presence promoted     Problem: Fluid Imbalance (Hospitalized Older Adult)  Goal: Fluid Balance  Outcome: Progressing  Intervention: Monitor and Manage Fluid Balance  Recent Flowsheet Documentation  Taken 9/28/2024 0400 by Noa Craft RN  Fluid/Electrolyte Management: fluids provided  Taken 9/28/2024 0000 by Noa Craft RN  Fluid/Electrolyte Management: fluids provided  Taken 9/27/2024 2000 by Noa Craft RN  Fluid/Electrolyte Management: fluids provided     Problem: Functional Ability Impaired (Hospitalized Older Adult)  Goal: Optimal Functional  Ability  Outcome: Progressing  Intervention: Promote Functional Ability  Recent Flowsheet Documentation  Taken 9/28/2024 0400 by Noa Craft RN  Activity Assistance Provided: assistance, 2 people  Self-Care Promotion: independence encouraged  Taken 9/28/2024 0000 by Noa Craft RN  Activity Assistance Provided: assistance, 2 people  Self-Care Promotion: independence encouraged  Taken 9/27/2024 2000 by Noa Craft RN  Activity Assistance Provided: assistance, 2 people  Self-Care Promotion: independence encouraged     Problem: Oral Intake Inadequate (Hospitalized Older Adult)  Goal: Optimal Nutrition Intake  Outcome: Progressing     Problem: Sleep Disturbance (Hospitalized Older Adult)  Goal: Adequate Sleep/Rest  Outcome: Progressing  Intervention: Promote Sleep/Rest  Recent Flowsheet Documentation  Taken 9/28/2024 0400 by Noa Craft RN  Sleep/Rest Enhancement: regular sleep/rest pattern promoted  Taken 9/28/2024 0000 by Noa Craft RN  Sleep/Rest Enhancement: regular sleep/rest pattern promoted  Taken 9/27/2024 2000 by Noa Craft RN  Sleep/Rest Enhancement: regular sleep/rest pattern promoted     Problem: Urinary Incontinence (Hospitalized Older Adult)  Goal: Urinary Incontinence Managed  Outcome: Progressing  Intervention: Promote Urinary Continence  Recent Flowsheet Documentation  Taken 9/27/2024 2000 by Noa Craft RN  Urinary Elimination Promotion: catheter patency maintained     Problem: Skin Injury Risk Increased  Goal: Skin Health and Integrity  Outcome: Progressing  Intervention: Optimize Skin Protection  Recent Flowsheet Documentation  Taken 9/28/2024 0400 by Noa Craft RN  Pressure Reduction Techniques:   frequent weight shift encouraged   weight shift assistance provided  Head of Bed (HOB) Positioning: HOB elevated  Pressure Reduction Devices: specialty bed utilized  Skin Protection:   adhesive use limited   tubing/devices free from skin contact  Taken 9/28/2024 0000 by  Venancio, Noa, RN  Pressure Reduction Techniques:   frequent weight shift encouraged   weight shift assistance provided  Head of Bed (HOB) Positioning: HOB elevated  Pressure Reduction Devices: specialty bed utilized  Skin Protection:   adhesive use limited   tubing/devices free from skin contact  Taken 9/27/2024 2000 by Noa Craft RN  Pressure Reduction Techniques:   frequent weight shift encouraged   weight shift assistance provided  Head of Bed (HOB) Positioning: HOB elevated  Pressure Reduction Devices: specialty bed utilized  Skin Protection:   adhesive use limited   tubing/devices free from skin contact     Problem: Fall Injury Risk  Goal: Absence of Fall and Fall-Related Injury  Outcome: Progressing  Intervention: Identify and Manage Contributors  Recent Flowsheet Documentation  Taken 9/28/2024 0400 by Noa Craft RN  Medication Review/Management: medications reviewed  Self-Care Promotion: independence encouraged  Taken 9/28/2024 0200 by Noa Craft RN  Medication Review/Management: medications reviewed  Taken 9/28/2024 0000 by Noa Craft RN  Medication Review/Management: medications reviewed  Self-Care Promotion: independence encouraged  Taken 9/27/2024 2200 by Noa Craft RN  Medication Review/Management: medications reviewed  Taken 9/27/2024 2100 by Noa Craft RN  Medication Review/Management: medications reviewed  Taken 9/27/2024 2000 by Noa Craft RN  Medication Review/Management: medications reviewed  Self-Care Promotion: independence encouraged  Taken 9/27/2024 1900 by Noa Craft RN  Medication Review/Management: medications reviewed  Intervention: Promote Injury-Free Environment  Recent Flowsheet Documentation  Taken 9/28/2024 0400 by Noa Craft RN  Safety Promotion/Fall Prevention:   safety round/check completed   nonskid shoes/slippers when out of bed   fall prevention program maintained   assistive device/personal items within reach  Taken 9/28/2024 0200  by Noa Craft, RN  Safety Promotion/Fall Prevention:   safety round/check completed   nonskid shoes/slippers when out of bed   fall prevention program maintained   assistive device/personal items within reach  Taken 9/28/2024 0000 by Noa Craft RN  Safety Promotion/Fall Prevention:   safety round/check completed   nonskid shoes/slippers when out of bed   fall prevention program maintained   assistive device/personal items within reach  Taken 9/27/2024 2200 by Noa Craft, RN  Safety Promotion/Fall Prevention:   safety round/check completed   nonskid shoes/slippers when out of bed   fall prevention program maintained   assistive device/personal items within reach  Taken 9/27/2024 2100 by Noa Craft, RN  Safety Promotion/Fall Prevention:   safety round/check completed   nonskid shoes/slippers when out of bed   fall prevention program maintained   assistive device/personal items within reach  Taken 9/27/2024 2000 by Noa Craft, RN  Safety Promotion/Fall Prevention:   safety round/check completed   nonskid shoes/slippers when out of bed   fall prevention program maintained   assistive device/personal items within reach  Taken 9/27/2024 1900 by Noa Craft, RN  Safety Promotion/Fall Prevention:   safety round/check completed   nonskid shoes/slippers when out of bed   fall prevention program maintained   assistive device/personal items within reach   Goal Outcome Evaluation:

## 2024-09-28 NOTE — PROGRESS NOTES
[facility] MEDICINE SERVICE  DAILY PROGRESS NOTE    NAME: Jamin Hennessy  : 1955  MRN: [id number]      LOS: 4 days     PROVIDER OF SERVICE: Lauro Sheffield MD    Chief Complaint: Sepsis    Subjective:     Interval History:  History taken from: patient    No acute overnight events. Patient resting comfortably, SOB better now.     Review of Systems:   Review of Systems sob improving Denies chest pain    Objective:     Vital Signs  Temp:  [98 °F (36.7 °C)-98.4 °F (36.9 °C)] 98.3 °F (36.8 °C)  Heart Rate:  [48-65] 55  Resp:  [14-22] 16  BP: ()/(65-88) 133/80  Flow (L/min):  [3] 3   Body mass index is 42.74 kg/m².    Physical Exam  General Appearance:  Awake alert, conversational   Head:  Atraumatic normocephalic   Eyes:        No sclera icterus   Neck: Non tender, normal ROM   Pulm: Decreased breath sound, some crackles   Cardio: Normal HR, irregular rhythm   Extremities: 2+ edema on BLE   Abdomen: Distended, soft non tender   /Renal: No suprapubic tenderness         Neuro: Aaox3, normal speech                 Scheduled Meds   amiodarone, 200 mg, Oral, BID With Meals  apixaban, 5 mg, Oral, Q12H  aspirin, 81 mg, Oral, Daily  bacitracin, , Topical, Daily  FLUoxetine, 40 mg, Oral, Daily  furosemide, 40 mg, Intravenous, Q12H  glatiramer acetate, 20 mg, Subcutaneous, Daily  insulin lispro, 2-7 Units, Subcutaneous, 4x Daily AC & at Bedtime  Guillermo, 1 packet, Oral, BID  metoprolol tartrate, 25 mg, Oral, BID  micafungin (MYCAMINE) IV, 100 mg, Intravenous, Q24H  midodrine, 10 mg, Oral, TID AC  nicotine, 1 patch, Transdermal, Q24H  pantoprazole, 40 mg, Oral, Q AM       PRN Meds     acetaminophen    acetaminophen    benzonatate    cyclobenzaprine    dextrose    dextrose    glucagon (human recombinant)    ipratropium-albuterol   Infusions         Diagnostic Data    Results from last 7 days   Lab Units 24  0226   WBC 10*3/mm3 7.56   HEMOGLOBIN g/dL 10.4*   HEMATOCRIT % 34.1*   PLATELETS 10*3/mm3  259   GLUCOSE mg/dL 113*   CREATININE mg/dL 1.03   BUN mg/dL 19   SODIUM mmol/L 143   POTASSIUM mmol/L 3.3*   AST (SGOT) U/L 40   ALT (SGPT) U/L 37   ALK PHOS U/L 71   BILIRUBIN mg/dL 0.4   ANION GAP mmol/L 10.3       CT Chest Without Contrast Diagnostic    Result Date: 9/26/2024  Impression: 1. Left lower lobe airspace disease as well as lingular airspace disease which may be chronic with evidence of pleural thickening peripherally. 2. Moderate sized dependent left pleural effusion. 3. Fullness in the left hilum likely reflecting airspace disease. The patient does have a history of lung cancer and this may represent treatment change. Previous CT showed diffuse infiltrate in the left lung and this has cleared in the left upper lobe. 4. Old healed granulomatous disease. 5. Bilateral renal stones with a right-sided double-J ureteral stent.. Electronically Signed: Ray Saldana MD  9/26/2024 8:08 AM EDT  Workstation ID: [id number]       I reviewed the patient's new clinical results.    Assessment/Plan:     Active and Resolved Problems  Active Hospital Problems    Diagnosis  POA    **Sepsis [A41.9]  Yes    Pneumonia [J18.9]  Yes    Acute UTI (urinary tract infection) [N39.0]  Yes    Acute on chronic renal insufficiency [N28.9, N18.9]  Yes    Lung cancer [C34.90]  Yes    Elevated troponin [R79.89]  Yes    Paroxysmal atrial fibrillation [I48.0]  Yes    S/P MVR (mitral valve replacement) [Z95.2]  Not Applicable    GERD without esophagitis [K21.9]  Yes    Essential hypertension [I10]  Yes    JARRETT (generalized anxiety disorder) [F41.1]  Yes    Dyslipidemia [E78.5]  Yes    CAD S/P percutaneous coronary angioplasty [I25.10, Z98.61]  Not Applicable    Non-insulin dependent type 2 diabetes mellitus [E11.9]  Yes    BPH with obstruction/lower urinary tract symptoms [N40.1, N13.8]  Yes    Atrial fibrillation with RVR [I48.91]  Yes      Resolved Hospital Problems   No resolved problems to display.     Plan:   -Continue Lasix 40 BID,  Midodrine 10 TID, Has a great output, clinically looks a lot better now,  low threshold for ICU upgrade if needed pressor support for diuresis if fail on midodrine, will avoid giving fluids given volume overload  -monitor strict I/Os, has molina in place, had good urinary output   -On eliquis for Afib, was given Digoxin per Cards, currently HR is controlled, currently metoprolol 25 bid , Echo: LVEF 60-65 %  LV thickness noted, will monitor his Vitals, will follow cards reccs   -Continue home med glatiramer for MS  -On meropenem and Mycamic per ID, Has hx of ESBL/VRE UTI, change molina bag, will follow  -Start Prozac, levothyroxine, TSH 2.2 on admission  -ammonia nromal 26, B12 normal, folate normal  -AM Labs will follow        VTE Prophylaxis:  Pharmacologic VTE prophylaxis orders are present.         Code status is   Code Status and Medical Interventions: No CPR (Do Not Attempt to Resuscitate); Limited Support; No intubation (DNI)   Ordered at: 09/24/24 0151     Medical Intervention Limits:    No intubation (DNI)     Code Status (Patient has no pulse and is not breathing):    No CPR (Do Not Attempt to Resuscitate)     Medical Interventions (Patient has pulse or is breathing):    Limited Support       Plan for disposition:Further ID Reccs, [facility], still need IV Diuresis     Time: 30 minutes    Part of this note may be an electronic transcription/translation of spoken language to printed text using the Dragon Dictation System.    Signature: Electronically signed by Lauro Sheffield MD, 09/28/24, 05:56 EDT.  [facility] Hospitalist Team

## 2024-09-28 NOTE — PROGRESS NOTES
LOS: 4 days   Patient Care Team:  Jhonny Heller MD as PCP - General (Family Medicine)  Frederick George MD as Consulting Physician (Nephrology)        Subjective     Interval History:     Patient Complaints:   Patient Denies:  NV       Review of Systems:    SOB    Objective     Vital Signs  Temp:  [97.8 °F (36.6 °C)-98.4 °F (36.9 °C)] 97.9 °F (36.6 °C)  Heart Rate:  [48-63] 58  Resp:  [14-16] 16  BP: (103-160)/(62-88) 103/62    Physical Exam:     General Appearance:  Alert, cooperative, in no acute distress   Head:  Normocephalic, without obvious abnormality, atraumatic   Eyes:  Lids and lashes normal, conjunctivae and sclerae normal, no icterus, no pallor, corneas clear, PERRLA   Ears:  Ears appear intact with no abnormalities noted   Throat:  No oral lesions, no thrush, oral mucosa moist   Neck:  No adenopathy, supple, trachea midline, no thyromegaly, no carotid bruit, no JVD   Back:  No kyphosis present, no scoliosis present, no skin lesions, erythema or scars, no tenderness to percussion or palpation, range of motion normal   Lungs:  Clear to auscultation, respirations regular, even and unlabored    Heart:  Regular rhythm and normal rate, normal S1 and S2, no murmur, no gallop, no rub, no click   Chest Wall:  No abnormalities observed   Abdomen:  Normal bowel sounds, no masses, no organomegaly, soft non-tender, non-distended, no guarding, no rebound tenderness   Rectal:  Deferred   Extremities:  Moves all extremities well, no edema, no cyanosis, no redness   Pulses:  Pulses palpable and equal bilaterally   Skin:  No bleeding, bruising or rash   Lymph nodes:  No palpable adenopathy   Neurologic:  Cranial nerves 2 - 12 grossly intact, sensation intact, DTR present and equal bilaterally                                                                                  Results Review:      Lab Results (last 72 hours)       Procedure Component Value Units Date/Time    POC Glucose 4x Daily Before Meals & at Bedtime  [[id number]]  (Abnormal) Collected: 09/25/24 1122    Specimen: Blood Updated: 09/25/24 1125     Glucose 143 mg/dL      Comment: Serial Number: [id number]:  [id number]       POC Glucose 4x Daily Before Meals & at Bedtime [[id number]]  (Abnormal) Collected: 09/25/24 0728    Specimen: Blood Updated: 09/25/24 0730     Glucose 140 mg/dL      Comment: Serial Number: [id number]:  [id number]       Basic Metabolic Panel [[id number]]  (Abnormal) Collected: 09/25/24 0452    Specimen: Blood from Hand, Left Updated: 09/25/24 0549     Glucose 117 mg/dL      BUN 21 mg/dL      Creatinine 1.03 mg/dL      Sodium 141 mmol/L      Potassium 3.6 mmol/L      Chloride 109 mmol/L      CO2 22.6 mmol/L      Calcium 8.4 mg/dL      BUN/Creatinine Ratio 20.4     Anion Gap 9.4 mmol/L      eGFR 78.6 mL/min/1.73     Narrative:      GFR Normal >60  Chronic Kidney Disease <60  Kidney Failure <15      CBC & Differential [[id number]]  (Abnormal) Collected: 09/25/24 0452    Specimen: Blood from Hand, Left Updated: 09/25/24 0510    Narrative:      The following orders were created for panel order CBC & Differential.  Procedure                               Abnormality         Status                     ---------                               -----------         ------                     CBC Auto Differential[[id number]]        Abnormal            Final result                 Please view results for these tests on the individual orders.    CBC Auto Differential [[id number]]  (Abnormal) Collected: 09/25/24 0452    Specimen: Blood from Hand, Left Updated: 09/25/24 0510     WBC 6.93 10*3/mm3      RBC 3.76 10*6/mm3      Hemoglobin 9.7 g/dL      Hematocrit 33.3 %      MCV 88.6 fL      MCH 25.8 pg      MCHC 29.1 g/dL      RDW 16.8 %      RDW-SD 54.1 fl      MPV 10.6 fL      Platelets 205 10*3/mm3      Neutrophil % 69.3 %      Lymphocyte % 10.7 %      Monocyte % 10.1 %      Eosinophil % 8.5 %      Basophil % 1.0 %      Immature Grans % 0.4 %       Neutrophils, Absolute 4.80 10*3/mm3      Lymphocytes, Absolute 0.74 10*3/mm3      Monocytes, Absolute 0.70 10*3/mm3      Eosinophils, Absolute 0.59 10*3/mm3      Basophils, Absolute 0.07 10*3/mm3      Immature Grans, Absolute 0.03 10*3/mm3      nRBC 0.0 /100 WBC     POC Glucose Once [[id number]]  (Abnormal) Collected: 09/24/24 2041    Specimen: Blood Updated: 09/24/24 2044     Glucose 162 mg/dL      Comment: Serial Number: [id number]:  [id number]       POC Glucose Once [[id number]]  (Abnormal) Collected: 09/24/24 1931    Specimen: Blood Updated: 09/24/24 1933     Glucose 209 mg/dL      Comment: Serial Number: [id number]:  [id number]       POC Glucose Once [[id number]]  (Abnormal) Collected: 09/24/24 1613    Specimen: Blood Updated: 09/24/24 1615     Glucose 155 mg/dL      Comment: Serial Number: [id number]:  [id number]       Blood Culture - Blood, Arm, Left [[id number]] Collected: 09/24/24 1526    Specimen: Blood from Arm, Left Updated: 09/24/24 1537    BNP [[id number]]  (Abnormal) Collected: 09/24/24 1227    Specimen: Blood from Arm, Right Updated: 09/24/24 1258     proBNP 4,889.0 pg/mL     Narrative:      This assay is used as an aid in the diagnosis of individuals suspected of having heart failure. It can be used as an aid in the diagnosis of acute decompensated heart failure (ADHF) in patients presenting with signs and symptoms of ADHF to the emergency department (ED). In addition, NT-proBNP of <300 pg/mL indicates ADHF is not likely.    Age Range Result Interpretation  NT-proBNP Concentration (pg/mL:      <50             Positive            >450                   Gray                 300-450                    Negative             <300    50-75           Positive            >900                  Gray                300-900                  Negative            <300      >75             Positive            >1800                  Gray                300-1800                  Negative            <300     POC Glucose Once [[id number]]  (Abnormal) Collected: 09/24/24 1156    Specimen: Blood Updated: 09/24/24 1159     Glucose 200 mg/dL      Comment: Serial Number: [id number]:  [id number]       Magnesium [[id number]]  (Normal) Collected: 09/24/24 0526    Specimen: Blood from Arm, Left Updated: 09/24/24 0951     Magnesium 1.8 mg/dL     TSH [[id number]]  (Normal) Collected: 09/24/24 0526    Specimen: Blood from Arm, Left Updated: 09/24/24 0951     TSH 2.210 uIU/mL     POC Glucose Once [[id number]]  (Abnormal) Collected: 09/24/24 0744    Specimen: Blood Updated: 09/24/24 0746     Glucose 174 mg/dL      Comment: Serial Number: [id number]:  [id number]       High Sensitivity Troponin T 2Hr [[id number]]  (Abnormal) Collected: 09/24/24 0526    Specimen: Blood from Arm, Left Updated: 09/24/24 0638     HS Troponin T 37 ng/L      Troponin T Delta -15 ng/L     Narrative:      High Sensitive Troponin T Reference Range:  <14.0 ng/L- Negative Female for AMI  <22.0 ng/L- Negative Male for AMI  >=14 - Abnormal Female indicating possible myocardial injury.  >=22 - Abnormal Male indicating possible myocardial injury.   Clinicians would have to utilize clinical acumen, EKG, Troponin, and serial changes to determine if it is an Acute Myocardial Infarction or myocardial injury due to an underlying chronic condition.         Urinalysis, Microscopic Only - Indwelling Urethral Catheter [[id number]]  (Abnormal) Collected: 09/24/24 0458    Specimen: Urine from Indwelling Urethral Catheter Updated: 09/24/24 0634     RBC, UA 3-5 /HPF      WBC, UA 21-50 /HPF      Bacteria, UA None Seen /HPF      Squamous Epithelial Cells, UA 0-2 /HPF      Yeast, UA       Moderate/2+ Budding Yeast w/Hyphae     /HPF     Hyaline Casts, UA 0-2 /LPF      Methodology Manual Light Microscopy    Urine Culture - Urine, Indwelling Urethral Catheter [[id number]] Collected: 09/24/24 0458    Specimen: Urine from Indwelling Urethral Catheter Updated: 09/24/24 0634     Urinalysis With Culture If Indicated - Indwelling Urethral Catheter [[id number]]  (Abnormal) Collected: 09/24/24 0458    Specimen: Urine from Indwelling Urethral Catheter Updated: 09/24/24 0620     Color, UA Yellow     Appearance, UA Turbid     Comment: Result checked          pH, UA 6.0     Specific Gravity, UA 1.013     Glucose, UA Negative     Ketones, UA Negative     Bilirubin, UA Negative     Blood, UA Large (3+)     Protein, UA 30 mg/dL (1+)     Leuk Esterase, UA Large (3+)     Nitrite, UA Negative     Urobilinogen, UA 1.0 E.U./dL    Narrative:      In absence of clinical symptoms, the presence of pyuria, bacteria, and/or nitrites on the urinalysis result does not correlate with infection.    MRSA Screen, PCR (Inpatient) - Swab, Nares [[id number]]  (Abnormal) Collected: 09/24/24 0336    Specimen: Swab from Nares Updated: 09/24/24 0613     MRSA PCR MRSA Detected    Narrative:      The negative predictive value of this diagnostic test is high and should only be used to consider de-escalating anti-MRSA therapy. A positive result may indicate colonization with MRSA and must be correlated clinically.    Basic Metabolic Panel [[id number]]  (Abnormal) Collected: 09/24/24 0343    Specimen: Blood from Arm, Left Updated: 09/24/24 0452     Glucose 141 mg/dL      BUN 26 mg/dL      Creatinine 1.18 mg/dL      Sodium 144 mmol/L      Potassium 3.8 mmol/L      Chloride 108 mmol/L      CO2 25.6 mmol/L      Calcium 8.5 mg/dL      BUN/Creatinine Ratio 22.0     Anion Gap 10.4 mmol/L      eGFR 66.8 mL/min/1.73     Narrative:      GFR Normal >60  Chronic Kidney Disease <60  Kidney Failure <15      High Sensitivity Troponin T [[id number]]  (Abnormal) Collected: 09/24/24 0343    Specimen: Blood from Arm, Left Updated: 09/24/24 0452     HS Troponin T 52 ng/L     Narrative:      High Sensitive Troponin T Reference Range:  <14.0 ng/L- Negative Female for AMI  <22.0 ng/L- Negative Male for AMI  >=14 - Abnormal Female indicating possible  myocardial injury.  >=22 - Abnormal Male indicating possible myocardial injury.   Clinicians would have to utilize clinical acumen, EKG, Troponin, and serial changes to determine if it is an Acute Myocardial Infarction or myocardial injury due to an underlying chronic condition.         CBC & Differential [[id number]]  (Abnormal) Collected: 09/24/24 0343    Specimen: Blood from Arm, Left Updated: 09/24/24 0427    Narrative:      The following orders were created for panel order CBC & Differential.  Procedure                               Abnormality         Status                     ---------                               -----------         ------                     CBC Auto Differential[[id number]]        Abnormal            Final result                 Please view results for these tests on the individual orders.    CBC Auto Differential [[id number]]  (Abnormal) Collected: 09/24/24 0343    Specimen: Blood from Arm, Left Updated: 09/24/24 0427     WBC 9.17 10*3/mm3      RBC 4.00 10*6/mm3      Hemoglobin 10.5 g/dL      Hematocrit 35.2 %      MCV 88.0 fL      MCH 26.3 pg      MCHC 29.8 g/dL      RDW 16.7 %      RDW-SD 54.2 fl      MPV 10.8 fL      Platelets 215 10*3/mm3      Neutrophil % 73.0 %      Lymphocyte % 9.3 %      Monocyte % 10.6 %      Eosinophil % 6.1 %      Basophil % 0.7 %      Immature Grans % 0.3 %      Neutrophils, Absolute 6.70 10*3/mm3      Lymphocytes, Absolute 0.85 10*3/mm3      Monocytes, Absolute 0.97 10*3/mm3      Eosinophils, Absolute 0.56 10*3/mm3      Basophils, Absolute 0.06 10*3/mm3      Immature Grans, Absolute 0.03 10*3/mm3      nRBC 0.0 /100 WBC     POC Glucose Once [[id number]]  (Abnormal) Collected: 09/24/24 0113    Specimen: Blood Updated: 09/24/24 0114     Glucose 150 mg/dL      Comment: Serial Number: [id number]:  [id number]               Imaging Results (Last 72 Hours)       Procedure Component Value Units Date/Time    CT Chest Without Contrast Diagnostic [[id number]]  Collected: 09/26/24 0802     Updated: 09/26/24 0810    Narrative:      CT CHEST WO CONTRAST DIAGNOSTIC    Date of Exam: 9/26/2024 7:51 AM EDT    Indication: eval further, volume overload, on diuresis , hx of lung cancer.    Comparison: Chest CT 9/27/2020, chest radiograph 9/24/2024    Technique: Axial CT images were obtained of the chest without contrast administration.  Sagittal and coronal reconstructions were performed.  Automated exposure control and iterative reconstruction methods were used.      Findings:  There is a moderate-sized dependent left pleural effusion. There is consolidation in the left lower lobe and airspace disease with some chronic scarring along the pleura in the lingula. The left apex is clear. The right lung is clear. There is fullness   in the left hilum but there is no definite lymphadenopathy. There are granulomatous calcifications in the right hilum and a densely calcified granuloma in the right lung anteriorly. There are extensive coronary artery calcifications. There are   postoperative changes of prior sternotomy and mitral valve replacement. There is no significant pericardial or right pleural fluid. The upper abdomen is remarkable for bilateral renal stones. There is a double-J ureteral stent on the right. There are   degenerative changes in the thoracic spine with no destructive bone lesions seen.      Impression:      Impression:    1. Left lower lobe airspace disease as well as lingular airspace disease which may be chronic with evidence of pleural thickening peripherally.  2. Moderate sized dependent left pleural effusion.  3. Fullness in the left hilum likely reflecting airspace disease. The patient does have a history of lung cancer and this may represent treatment change. Previous CT showed diffuse infiltrate in the left lung and this has cleared in the left upper lobe.  4. Old healed granulomatous disease.  5. Bilateral renal stones with a right-sided double-J ureteral  stent..      Electronically Signed: Ray Saldana MD    9/26/2024 8:08 AM EDT    Workstation ID: [id number]              Medication Review:     Hospital Medications (active)         Dose Frequency Start End    acetaminophen (TYLENOL) tablet 500 mg 500 mg Every 6 Hours PRN 9/24/2024 --    Admin Instructions: If given for fever, use fever parameter: fever greater than 100.4 °F  Based on patient request - if ordered for moderate or severe pain, provider allows for administration of a medication prescribed for a lower pain scale.    Do not exceed 4 grams of acetaminophen in a 24 hr period. Max dose of 2gm for AST/ALT greater than 120 units/L.    If given for pain, use the following pain scale:   Mild Pain = Pain Score of 1-3, CPOT 1-2  Moderate Pain = Pain Score of 4-6, CPOT 3-4  Severe Pain = Pain Score of 7-10, CPOT 5-8    Route: Oral    acetaminophen (TYLENOL) tablet 650 mg 650 mg Once As Needed 9/24/2024 --    Admin Instructions: If given for fever, use fever parameter: fever greater than 100.4 °F  Based on patient request - if ordered for moderate or severe pain, provider allows for administration of a medication prescribed for a lower pain scale.    Do not exceed 4 grams of acetaminophen in a 24 hr period. Max dose of 2gm for AST/ALT greater than 120 units/L.    If given for pain, use the following pain scale:   Mild Pain = Pain Score of 1-3, CPOT 1-2  Moderate Pain = Pain Score of 4-6, CPOT 3-4  Severe Pain = Pain Score of 7-10, CPOT 5-8    Route: Oral    apixaban (ELIQUIS) tablet 5 mg 5 mg Every 12 Hours Scheduled 9/24/2024 --    Admin Instructions: Tablet may be crushed and suspended in 60 mL of water or D5W and immediately delivered via NG tube.    Route: Oral    aspirin EC tablet 81 mg 81 mg Daily 9/24/2024 --    Admin Instructions: Do not crush or chew the capsules or tablets. The drug may not work as designed if the capsule or tablet is crushed or chewed. Swallow whole.  Do not exceed 4 grams of aspirin in a  24 hr period.    If given for pain, use the following pain scale:   Mild Pain = Pain Score of 1-3, CPOT 1-2  Moderate Pain = Pain Score of 4-6, CPOT 3-4  Severe Pain = Pain Score of 7-10, CPOT 5-8    Route: Oral    bacitracin 500 UNIT/GM ointment  Daily 9/24/2024 --    Admin Instructions: Apply to affected area.    Route: Topical    benzonatate (TESSALON) capsule 100 mg 100 mg 3 Times Daily PRN 9/24/2024 --    Admin Instructions: Swallow whole.  Do not crush, chew, or open capsule.    Route: Oral    cyclobenzaprine (FLEXERIL) tablet 5 mg 5 mg 2 Times Daily PRN 9/24/2024 --    Route: Oral    dextrose (D50W) (25 g/50 mL) IV injection 25 g 25 g Every 15 Minutes PRN 9/24/2024 --    Admin Instructions: Blood sugar less than 70; patient has IV access - Unresponsive, NPO or Unable To Safely Swallow    Route: Intravenous    dextrose (GLUTOSE) oral gel 15 g 15 g Every 15 Minutes PRN 9/24/2024 --    Admin Instructions: BS<70, Patient Alert, Is not NPO, Can safely swallow.    Route: Oral    glatiramer acetate (GLATOPA) injection 20 mg 20 mg Daily 9/24/2024 --    Route: Subcutaneous    Non-formulary Exception Code: Patient supplied medication    glucagon (GLUCAGEN) injection 1 mg 1 mg Every 15 Minutes PRN 9/24/2024 --    Admin Instructions: Blood Glucose Less Than 70 - Patient Without IV Access - Unresponsive, NPO or Unable To Safely Swallow  Reconstitute powder for injection by adding 1 mL of -supplied sterile diluent or sterile water for injection to a vial containing 1 mg of the drug, to provide solutions containing 1 mg/mL. Shake vial gently to dissolve.    Route: Intramuscular    insulin lispro (HUMALOG/ADMELOG) injection 2-7 Units 2-7 Units 4 Times Daily Before Meals & Nightly 9/24/2024 --    Admin Instructions: Correction Insulin - Low Dose - Total Insulin Dose Less Than 40 units/day (Lean, Elderly or Renal Patients)    Blood Glucose 150-199 mg/dL - 2 units  Blood Glucose 200-249 mg/dL - 3 units  Blood  Glucose 250-299 mg/dL - 4 units  Blood Glucose 300-349 mg/dL - 5 units  Blood Glucose 350-400 mg/dL - 6 units  Blood Glucose Greater Than 400 mg/dL - 7 units & Call Provider   Caution: Look alike/sound alike drug alert    Route: Subcutaneous    ipratropium-albuterol (DUO-NEB) nebulizer solution 3 mL 3 mL Every 4 Hours PRN 9/24/2024 --    Route: Nebulization    Linezolid (ZYVOX) 600 mg 300 mL 600 mg Every 12 Hours 9/24/2024 9/27/2024    Admin Instructions: Protect from light. Do NOT refrigerate.    Route: Intravenous    meropenem (MERREM) 1,000 mg in sodium chloride 0.9 % 100 mL MBP 1,000 mg Every 8 Hours 9/24/2024 9/26/2024    Route: Intravenous    midodrine (PROAMATINE) tablet 10 mg 10 mg 3 Times Daily Before Meals 9/25/2024 --    Route: Oral    nicotine (NICODERM CQ) 14 MG/24HR patch 1 patch 1 patch Every 24 Hours Scheduled 9/24/2024 --    Admin Instructions: Apply to clean, dry, nonhairy area of skin (typically upper arm or shoulder)  Dispose of nicotine replacement therapies and their wrappers in non-hazardous pharmaceutical waste or in regular trash.    Route: Transdermal    pantoprazole (PROTONIX) EC tablet 40 mg 40 mg Every Early Morning 9/24/2024 --    Admin Instructions: Swallow whole; do not crush, split, or chew.    Route: Oral    Pharmacy to Dose meropenem (MERREM)  Continuous PRN 9/24/2024 9/26/2024    Route: Does not apply    sodium chloride 0.9 % infusion 75 mL/hr Continuous 9/24/2024 --    Route: Intravenous    sodium chloride 0.9 % infusion 75 mL/hr Continuous 9/24/2024 --    Route: Intravenous    vitamin B-12 (CYANOCOBALAMIN) tablet 1,000 mcg 1,000 mcg Daily 9/24/2024 --    Route: Oral          amiodarone, 200 mg, Oral, BID With Meals  apixaban, 5 mg, Oral, Q12H  aspirin, 81 mg, Oral, Daily  bacitracin, , Topical, Daily  FLUoxetine, 40 mg, Oral, Daily  furosemide, 40 mg, Intravenous, Q12H  glatiramer acetate, 20 mg, Subcutaneous, Daily  insulin lispro, 2-7 Units, Subcutaneous, 4x Daily AC & at  Bedtime  Guillermo, 1 packet, Oral, BID  metoprolol tartrate, 25 mg, Oral, BID  micafungin (MYCAMINE) IV, 100 mg, Intravenous, Q24H  midodrine, 10 mg, Oral, TID AC  nicotine, 1 patch, Transdermal, Q24H  pantoprazole, 40 mg, Oral, Q AM  potassium chloride, 40 mEq, Oral, Q4H        Assessment & Plan         Sepsis    Atrial fibrillation with RVR    CAD S/P percutaneous coronary angioplasty    Essential hypertension    Dyslipidemia    Non-insulin dependent type 2 diabetes mellitus    GERD without esophagitis    BPH with obstruction/lower urinary tract symptoms    JARRETT (generalized anxiety disorder)    S/P MVR (mitral valve replacement)    Paroxysmal atrial fibrillation    Pneumonia    Acute UTI (urinary tract infection)    Acute on chronic renal insufficiency    Lung cancer    Elevated troponin  UTI C&S  yeast  PNA     MRSA screen +    Plan :     [facility] [place] [facility]          C&S noted  CXR noted  Will follow  Thank you                 Maximilian Dumont MD  09/28/24  11:18 EDT

## 2024-09-28 NOTE — PROGRESS NOTES
[facility] CARDIOLOGY  [facility]        LOS:  LOS: 4 days   Patient Name: Jamin Hennessy  Age/Sex: 69 y.o. male  : 1955  MRN: [id number]    Day of Service: 24   Length of Stay: 4  Encounter Provider: Migdalia Beth MD  Place of Service: [facility] CARDIOLOGY  Patient Care Team:  Jhonny Heller MD as PCP - General (Family Medicine)  Frederick George MD as Consulting Physician (Nephrology)    Cardiology assessment and plan        Atrial fibrillation/paroxysmal currently in sinus rhythm  Hypotension  Sepsis  Urinary tract infection  History of mitral valve endocarditis status post mitral valve replacement surgery of the bioprosthetic valve  Nonobstructive coronary artery disease on cardiac catheterization in   History of hypertension  Hyperlipidemia  Diabetes mellitus  Chronic kidney disease  Hypothyroidism  Lung cancer status post right chemotherapy  Left ventricular systolic function is normal. Calculated left ventricular EF = 64.3% Left ventricular ejection fraction appears to be 61 - 65%.       Denies any new cardiac symptoms  Poor historian  Tmax is 97.8 pulse is 56 respirations are 12 blood pressure is 122/69 sats are 94%  Sodium is 143 potassium is 3.3 creatinine is 1.0 LFTs are normal hemoglobin is 10.4  Nonspecific elevation of troponin  Abnormal elevated proBNP  Normal thyroid function  Current medications include aspirin 81 mg p.o. once a day  Amiodarone 200 mg p.o. twice daily patient is on metoprolol 25 mg p.o. twice daily Lasix 40 mg p.o. twice a day patient is on apixaban for anticoagulation therapy midodrine 10 mg p.o. 3 times a day  Diagnosis and treatment options reviewed and discussed with patient  Echocardiogram with normal LV systolic function  Normal functioning of the bioprosthetic valve  Follow-up on the results of the blood cultures  Further recommendation based on patient course              Subjective:      Chief Complaint:  F/U AF    Subjective:   Patient denies SOA but still feels fatigued.    Current Medications:   Scheduled Meds:amiodarone, 200 mg, Oral, BID With Meals  apixaban, 5 mg, Oral, Q12H  aspirin, 81 mg, Oral, Daily  bacitracin, , Topical, Daily  FLUoxetine, 40 mg, Oral, Daily  furosemide, 40 mg, Intravenous, Q12H  glatiramer acetate, 20 mg, Subcutaneous, Daily  insulin lispro, 2-7 Units, Subcutaneous, 4x Daily AC & at Bedtime  Guillermo, 1 packet, Oral, BID  metoprolol tartrate, 25 mg, Oral, BID  micafungin (MYCAMINE) IV, 100 mg, Intravenous, Q24H  midodrine, 10 mg, Oral, TID AC  nicotine, 1 patch, Transdermal, Q24H  pantoprazole, 40 mg, Oral, Q AM      Continuous Infusions:       Allergies:  No Known Allergies    Review of Systems   Constitutional: Negative for chills, decreased appetite and malaise/fatigue.   HENT:  Negative for congestion and nosebleeds.    Eyes:  Negative for blurred vision and double vision.   Cardiovascular:  Positive for dyspnea on exertion. Negative for chest pain, irregular heartbeat, leg swelling, near-syncope, orthopnea and palpitations.   Respiratory:  Positive for shortness of breath. Negative for cough.    Hematologic/Lymphatic: Negative for adenopathy. Does not bruise/bleed easily.   Skin:  Negative for rash.   Musculoskeletal:  Negative for back pain and joint pain.   Gastrointestinal:  Negative for bloating, abdominal pain, hematemesis and hematochezia.   Genitourinary:  Negative for flank pain and hematuria.   Neurological:  Negative for dizziness and focal weakness.   Psychiatric/Behavioral:  Negative for altered mental status. The patient does not have insomnia.        Objective:     Temp:  [97.8 °F (36.6 °C)-98.4 °F (36.9 °C)] 97.9 °F (36.6 °C)  Heart Rate:  [48-65] 58  Resp:  [14-22] 16  BP: (103-160)/(62-88) 103/62     Intake/Output Summary (Last 24 hours) at 9/28/2024 1026  Last data filed at 9/28/2024 0000  Gross per 24 hour   Intake 360 ml   Output 4150 ml   Net  "-3790 ml     Body mass index is 43.62 kg/m².      09/26/24  0500 09/27/24  0500 09/28/24  0500   Weight: 135 kg (297 lb 9.9 oz) 135 kg (297 lb 13.5 oz) (!) 138 kg (304 lb 0.2 oz)         Physical Exam:  Neuro:  CV:  Resp:  GI:  Ext:  Tele: AAOx3, no gross deficits  S1S2 RRR, no murmur  Nonlabored, +wheezing   BS+, abd soft  Pedal pulses palp, 2+ pitting BLE edema  SR                                                   Lab Review:   Results from last 7 days   Lab Units 09/28/24  0226 09/26/24  0317   SODIUM mmol/L 143 140   POTASSIUM mmol/L 3.3* 3.5   CHLORIDE mmol/L 103 107   CO2 mmol/L 29.7* 25.0   BUN mg/dL 19 21   CREATININE mg/dL 1.03 1.23   GLUCOSE mg/dL 113* 103*   CALCIUM mg/dL 8.8 8.5*   AST (SGOT) U/L 40 23   ALT (SGPT) U/L 37 20     Results from last 7 days   Lab Units 09/24/24  0526 09/24/24  0343   HSTROP T ng/L 37* 52*     Results from last 7 days   Lab Units 09/28/24  0226 09/27/24  0103   WBC 10*3/mm3 7.56 8.32   HEMOGLOBIN g/dL 10.4* 9.4*   HEMATOCRIT % 34.1* 30.9*   PLATELETS 10*3/mm3 259 218         Results from last 7 days   Lab Units 09/28/24  0226 09/26/24  0317   MAGNESIUM mg/dL 2.1 1.9           Invalid input(s): \"LDLCALC\"  Results from last 7 days   Lab Units 09/24/24  1227   PROBNP pg/mL 4,889.0*     Results from last 7 days   Lab Units 09/24/24  0526   TSH uIU/mL 2.210       Recent Radiology:  Imaging Results (Most Recent)       Procedure Component Value Units Date/Time    CT Chest Without Contrast Diagnostic [[id number]] Collected: 09/26/24 0802     Updated: 09/26/24 0810    Narrative:      CT CHEST WO CONTRAST DIAGNOSTIC    Date of Exam: 9/26/2024 7:51 AM EDT    Indication: eval further, volume overload, on diuresis , hx of lung cancer.    Comparison: Chest CT 9/27/2020, chest radiograph 9/24/2024    Technique: Axial CT images were obtained of the chest without contrast administration.  Sagittal and coronal reconstructions were performed.  Automated exposure control and iterative " reconstruction methods were used.      Findings:  There is a moderate-sized dependent left pleural effusion. There is consolidation in the left lower lobe and airspace disease with some chronic scarring along the pleura in the lingula. The left apex is clear. The right lung is clear. There is fullness   in the left hilum but there is no definite lymphadenopathy. There are granulomatous calcifications in the right hilum and a densely calcified granuloma in the right lung anteriorly. There are extensive coronary artery calcifications. There are   postoperative changes of prior sternotomy and mitral valve replacement. There is no significant pericardial or right pleural fluid. The upper abdomen is remarkable for bilateral renal stones. There is a double-J ureteral stent on the right. There are   degenerative changes in the thoracic spine with no destructive bone lesions seen.      Impression:      Impression:    1. Left lower lobe airspace disease as well as lingular airspace disease which may be chronic with evidence of pleural thickening peripherally.  2. Moderate sized dependent left pleural effusion.  3. Fullness in the left hilum likely reflecting airspace disease. The patient does have a history of lung cancer and this may represent treatment change. Previous CT showed diffuse infiltrate in the left lung and this has cleared in the left upper lobe.  4. Old healed granulomatous disease.  5. Bilateral renal stones with a right-sided double-J ureteral stent..      Electronically Signed: Ray Saldana MD    9/26/2024 8:08 AM EDT    Workstation ID: [id number]    XR Chest 1 View [[id number]] Collected: 09/24/24 1249     Updated: 09/24/24 1256    Narrative:      XR CHEST 1 VW    Date of Exam: 9/24/2024 12:39 PM EDT    Indication: wheezing, SOA    Comparison: 3/3/2020    Findings:  Sternotomy wires overlie the midline. Prosthetic heart valve is noted. Right lung is clear. There is patchy airspace opacity in the left base  which may be due to pneumonia or aspiration. No pneumothorax or large effusion identified. Heart size within   normal limits for technique. Pulmonary vascularity appears within normal limits.      Impression:      Impression:  Left basilar airspace opacity suggests pneumonia or aspiration. Correlate clinically. Follow-up evaluation recommended to document resolution. If this persists on follow-up, then chest CT recommended to rule out neoplasm.      Electronically Signed: Charbel Laureano MD    9/24/2024 12:54 PM EDT    Workstation ID: [id number]            ECHOCARDIOGRAM:    Results for orders placed during the hospital encounter of 09/24/24    Adult Transthoracic Echo Complete W/ Cont if Necessary Per Protocol    Interpretation Summary    Left ventricular systolic function is normal. Calculated left ventricular EF = 64.3% Left ventricular ejection fraction appears to be 61 - 65%.    Left ventricular wall thickness is consistent with mild concentric hypertrophy.    The left atrial cavity is moderately dilated.    There is a bioprosthetic mitral valve present.    Estimated right ventricular systolic pressure from tricuspid regurgitation is normal (<35 mmHg).        I reviewed the patient's new clinical results.    EKG:      Assessment:       Sepsis    Atrial fibrillation with RVR    CAD S/P percutaneous coronary angioplasty    Essential hypertension    Dyslipidemia    Non-insulin dependent type 2 diabetes mellitus    GERD without esophagitis    BPH with obstruction/lower urinary tract symptoms    JARRETT (generalized anxiety disorder)    S/P MVR (mitral valve replacement)    Paroxysmal atrial fibrillation    Pneumonia    Acute UTI (urinary tract infection)    Acute on chronic renal insufficiency    Lung cancer    Elevated troponin    1) Atrial Flutter with RVR --> SR     2) CAD status post PCI  - Preop cath in 2020 showed patent LAD stent with mild to moderate residual nonobstructive CAD (30 to 40% RCA, 50% LAD, 40 to 50%  LCx).       3) hx mitral valve endocarditis status post tissue MVR in 2020  - Last 2D echo in 2020 shows an EF of 66 to 70% with mild MR.       4) Sepsis / UTI  - CXR shows opacity left lobe     5) HTN  - currently hypotensive     6) HLD     7) DM     8) CKD     9) hypothyroidism     10) lung cancer   - [facility]     11) multiple sclerosis      12) renal insufficiency

## 2024-09-28 NOTE — CASE MANAGEMENT/SOCIAL WORK
Continued Stay Note  MINDI Sawyer     Patient Name: Jamin Hennessy  MRN: [id number]  Today's Date: 9/28/2024    Admit Date: 9/24/2024    Plan: Anticipate routine home with spouse pending PT/OT recommendations vs. pending transfer to [facility] (on diversion 9/27).   Discharge Plan       Row Name 09/28/24 1726       Plan    Plan Comments CM spoke with attending. PT/OT orders placed by attending. Per nursing, patient is requesting HH. PT/OT recommendations pending.           Ana Lilia Ortiz RN     Office: [phone]  Fax: [phone]

## 2024-09-28 NOTE — PLAN OF CARE
Goal Outcome Evaluation:            Pt spouse visited at bedside. Pt antibiotic completed. Iv lasix given every 12 hours. Pt and spouse wants pt to go home when discharged with home health. Pt's spouse state has everything at home that is needed. Continue to monitor

## 2024-09-29 LAB
ALBUMIN SERPL-MCNC: 3.8 G/DL (ref 3.5–5.2)
ALBUMIN/GLOB SERPL: 1.1 G/DL
ALP SERPL-CCNC: 71 U/L (ref 39–117)
ALT SERPL W P-5'-P-CCNC: 41 U/L (ref 1–41)
ANION GAP SERPL CALCULATED.3IONS-SCNC: 10.7 MMOL/L (ref 5–15)
AST SERPL-CCNC: 41 U/L (ref 1–40)
BACTERIA SPEC AEROBE CULT: NORMAL
BASOPHILS # BLD AUTO: 0.12 10*3/MM3 (ref 0–0.2)
BASOPHILS NFR BLD AUTO: 1.5 % (ref 0–1.5)
BILIRUB SERPL-MCNC: 0.4 MG/DL (ref 0–1.2)
BUN SERPL-MCNC: 31 MG/DL (ref 8–23)
BUN/CREAT SERPL: 30.1 (ref 7–25)
CA-I SERPL ISE-MCNC: 1.12 MMOL/L (ref 1.15–1.3)
CALCIUM SPEC-SCNC: 9.6 MG/DL (ref 8.6–10.5)
CHLORIDE SERPL-SCNC: 100 MMOL/L (ref 98–107)
CO2 SERPL-SCNC: 28.3 MMOL/L (ref 22–29)
CREAT SERPL-MCNC: 1.03 MG/DL (ref 0.76–1.27)
DEPRECATED RDW RBC AUTO: 51.9 FL (ref 37–54)
EGFRCR SERPLBLD CKD-EPI 2021: 78.6 ML/MIN/1.73
EOSINOPHIL # BLD AUTO: 0.47 10*3/MM3 (ref 0–0.4)
EOSINOPHIL NFR BLD AUTO: 6 % (ref 0.3–6.2)
ERYTHROCYTE [DISTWIDTH] IN BLOOD BY AUTOMATED COUNT: 16.8 % (ref 12.3–15.4)
GLOBULIN UR ELPH-MCNC: 3.4 GM/DL
GLUCOSE BLDC GLUCOMTR-MCNC: 122 MG/DL (ref 70–105)
GLUCOSE BLDC GLUCOMTR-MCNC: 140 MG/DL (ref 70–105)
GLUCOSE BLDC GLUCOMTR-MCNC: 155 MG/DL (ref 70–105)
GLUCOSE BLDC GLUCOMTR-MCNC: 163 MG/DL (ref 70–105)
GLUCOSE SERPL-MCNC: 128 MG/DL (ref 65–99)
HCT VFR BLD AUTO: 36.5 % (ref 37.5–51)
HGB BLD-MCNC: 11.2 G/DL (ref 13–17.7)
IMM GRANULOCYTES # BLD AUTO: 0.05 10*3/MM3 (ref 0–0.05)
IMM GRANULOCYTES NFR BLD AUTO: 0.6 % (ref 0–0.5)
LYMPHOCYTES # BLD AUTO: 1.25 10*3/MM3 (ref 0.7–3.1)
LYMPHOCYTES NFR BLD AUTO: 16 % (ref 19.6–45.3)
MAGNESIUM SERPL-MCNC: 2.1 MG/DL (ref 1.6–2.4)
MCH RBC QN AUTO: 26.5 PG (ref 26.6–33)
MCHC RBC AUTO-ENTMCNC: 30.7 G/DL (ref 31.5–35.7)
MCV RBC AUTO: 86.5 FL (ref 79–97)
MONOCYTES # BLD AUTO: 0.62 10*3/MM3 (ref 0.1–0.9)
MONOCYTES NFR BLD AUTO: 7.9 % (ref 5–12)
NEUTROPHILS NFR BLD AUTO: 5.32 10*3/MM3 (ref 1.7–7)
NEUTROPHILS NFR BLD AUTO: 68 % (ref 42.7–76)
NRBC BLD AUTO-RTO: 0 /100 WBC (ref 0–0.2)
PHOSPHATE SERPL-MCNC: 3.6 MG/DL (ref 2.5–4.5)
PLATELET # BLD AUTO: 254 10*3/MM3 (ref 140–450)
PMV BLD AUTO: 9.9 FL (ref 6–12)
POTASSIUM SERPL-SCNC: 3.6 MMOL/L (ref 3.5–5.2)
POTASSIUM SERPL-SCNC: 4.1 MMOL/L (ref 3.5–5.2)
PROT SERPL-MCNC: 7.2 G/DL (ref 6–8.5)
QT INTERVAL: 492 MS
QTC INTERVAL: 486 MS
RBC # BLD AUTO: 4.22 10*6/MM3 (ref 4.14–5.8)
SODIUM SERPL-SCNC: 139 MMOL/L (ref 136–145)
WBC NRBC COR # BLD AUTO: 7.83 10*3/MM3 (ref 3.4–10.8)

## 2024-09-29 PROCEDURE — 63710000001 INSULIN LISPRO (HUMAN) PER 5 UNITS: Performed by: NURSE PRACTITIONER

## 2024-09-29 PROCEDURE — 25010000002 FUROSEMIDE PER 20 MG: Performed by: STUDENT IN AN ORGANIZED HEALTH CARE EDUCATION/TRAINING PROGRAM

## 2024-09-29 PROCEDURE — 99233 SBSQ HOSP IP/OBS HIGH 50: CPT | Performed by: INTERNAL MEDICINE

## 2024-09-29 PROCEDURE — 82948 REAGENT STRIP/BLOOD GLUCOSE: CPT | Performed by: NURSE PRACTITIONER

## 2024-09-29 PROCEDURE — 82948 REAGENT STRIP/BLOOD GLUCOSE: CPT

## 2024-09-29 PROCEDURE — 84132 ASSAY OF SERUM POTASSIUM: CPT | Performed by: STUDENT IN AN ORGANIZED HEALTH CARE EDUCATION/TRAINING PROGRAM

## 2024-09-29 PROCEDURE — 93010 ELECTROCARDIOGRAM REPORT: CPT | Performed by: INTERNAL MEDICINE

## 2024-09-29 PROCEDURE — 93005 ELECTROCARDIOGRAM TRACING: CPT | Performed by: INTERNAL MEDICINE

## 2024-09-29 PROCEDURE — 83735 ASSAY OF MAGNESIUM: CPT | Performed by: STUDENT IN AN ORGANIZED HEALTH CARE EDUCATION/TRAINING PROGRAM

## 2024-09-29 PROCEDURE — 82330 ASSAY OF CALCIUM: CPT | Performed by: STUDENT IN AN ORGANIZED HEALTH CARE EDUCATION/TRAINING PROGRAM

## 2024-09-29 PROCEDURE — 85025 COMPLETE CBC W/AUTO DIFF WBC: CPT | Performed by: NURSE PRACTITIONER

## 2024-09-29 PROCEDURE — 84100 ASSAY OF PHOSPHORUS: CPT | Performed by: STUDENT IN AN ORGANIZED HEALTH CARE EDUCATION/TRAINING PROGRAM

## 2024-09-29 PROCEDURE — 80053 COMPREHEN METABOLIC PANEL: CPT | Performed by: STUDENT IN AN ORGANIZED HEALTH CARE EDUCATION/TRAINING PROGRAM

## 2024-09-29 RX ORDER — POTASSIUM CHLORIDE 1500 MG/1
40 TABLET, EXTENDED RELEASE ORAL EVERY 4 HOURS
Status: COMPLETED | OUTPATIENT
Start: 2024-09-29 | End: 2024-09-29

## 2024-09-29 RX ADMIN — FUROSEMIDE 40 MG: 10 INJECTION, SOLUTION INTRAMUSCULAR; INTRAVENOUS at 08:23

## 2024-09-29 RX ADMIN — AMIODARONE HYDROCHLORIDE 200 MG: 200 TABLET ORAL at 18:15

## 2024-09-29 RX ADMIN — AMIODARONE HYDROCHLORIDE 200 MG: 200 TABLET ORAL at 08:22

## 2024-09-29 RX ADMIN — PANTOPRAZOLE SODIUM 40 MG: 40 TABLET, DELAYED RELEASE ORAL at 05:37

## 2024-09-29 RX ADMIN — BACITRACIN 0.9 G: 500 OINTMENT TOPICAL at 08:42

## 2024-09-29 RX ADMIN — MIDODRINE HYDROCHLORIDE 10 MG: 5 TABLET ORAL at 13:00

## 2024-09-29 RX ADMIN — APIXABAN 5 MG: 5 TABLET, FILM COATED ORAL at 08:23

## 2024-09-29 RX ADMIN — POTASSIUM CHLORIDE 40 MEQ: 1500 TABLET, EXTENDED RELEASE ORAL at 08:22

## 2024-09-29 RX ADMIN — APIXABAN 5 MG: 5 TABLET, FILM COATED ORAL at 20:32

## 2024-09-29 RX ADMIN — METOPROLOL TARTRATE 25 MG: 25 TABLET, FILM COATED ORAL at 08:23

## 2024-09-29 RX ADMIN — MIDODRINE HYDROCHLORIDE 10 MG: 5 TABLET ORAL at 18:15

## 2024-09-29 RX ADMIN — Medication 1 PACKET: at 08:22

## 2024-09-29 RX ADMIN — POTASSIUM CHLORIDE 40 MEQ: 1500 TABLET, EXTENDED RELEASE ORAL at 05:37

## 2024-09-29 RX ADMIN — FUROSEMIDE 40 MG: 10 INJECTION, SOLUTION INTRAMUSCULAR; INTRAVENOUS at 20:32

## 2024-09-29 RX ADMIN — MIDODRINE HYDROCHLORIDE 10 MG: 5 TABLET ORAL at 08:23

## 2024-09-29 RX ADMIN — INSULIN LISPRO 2 UNITS: 100 INJECTION, SOLUTION INTRAVENOUS; SUBCUTANEOUS at 18:15

## 2024-09-29 RX ADMIN — Medication 1 PACKET: at 20:32

## 2024-09-29 RX ADMIN — CYCLOBENZAPRINE 5 MG: 10 TABLET, FILM COATED ORAL at 18:16

## 2024-09-29 RX ADMIN — METOPROLOL TARTRATE 25 MG: 25 TABLET, FILM COATED ORAL at 20:32

## 2024-09-29 RX ADMIN — NICOTINE 1 PATCH: 14 PATCH, EXTENDED RELEASE TRANSDERMAL at 08:23

## 2024-09-29 RX ADMIN — GLATIRAMER ACETATE 20 MG: 20 INJECTION, SOLUTION SUBCUTANEOUS at 08:23

## 2024-09-29 RX ADMIN — ASPIRIN 81 MG: 81 TABLET, COATED ORAL at 08:23

## 2024-09-29 RX ADMIN — FLUOXETINE HYDROCHLORIDE 40 MG: 20 CAPSULE ORAL at 08:23

## 2024-09-29 NOTE — PLAN OF CARE
Goal Outcome Evaluation:      Pt family visited at bedside. Pt feeling better today, still diuresing. Potassium given for replacement. Continuing to monitor

## 2024-09-29 NOTE — PROGRESS NOTES
LOS: 5 days   Patient Care Team:  Jhonny Heller MD as PCP - General (Family Medicine)  Frederick George MD as Consulting Physician (Nephrology)        Subjective     Interval History:     Patient Complaints:   Patient Denies:  NV       Review of Systems:    SOB    Objective     Vital Signs  Temp:  [97.7 °F (36.5 °C)-98.3 °F (36.8 °C)] 98.1 °F (36.7 °C)  Heart Rate:  [56-61] 59  Resp:  [12-16] 15  BP: (103-128)/(64-73) 128/73    Physical Exam:     General Appearance:  Alert, cooperative, in no acute distress   Head:  Normocephalic, without obvious abnormality, atraumatic   Eyes:  Lids and lashes normal, conjunctivae and sclerae normal, no icterus, no pallor, corneas clear, PERRLA   Ears:  Ears appear intact with no abnormalities noted   Throat:  No oral lesions, no thrush, oral mucosa moist   Neck:  No adenopathy, supple, trachea midline, no thyromegaly, no carotid bruit, no JVD   Back:  No kyphosis present, no scoliosis present, no skin lesions, erythema or scars, no tenderness to percussion or palpation, range of motion normal   Lungs:  Clear to auscultation, respirations regular, even and unlabored    Heart:  Regular rhythm and normal rate, normal S1 and S2, no murmur, no gallop, no rub, no click   Chest Wall:  No abnormalities observed   Abdomen:  Normal bowel sounds, no masses, no organomegaly, soft non-tender, non-distended, no guarding, no rebound tenderness   Rectal:  Deferred   Extremities:  Moves all extremities well, no edema, no cyanosis, no redness   Pulses:  Pulses palpable and equal bilaterally   Skin:  No bleeding, bruising or rash   Lymph nodes:  No palpable adenopathy   Neurologic:  Cranial nerves 2 - 12 grossly intact, sensation intact, DTR present and equal bilaterally                                                                                  Results Review:      Lab Results (last 72 hours)       Procedure Component Value Units Date/Time    POC Glucose 4x Daily Before Meals & at Bedtime  [[id number]]  (Abnormal) Collected: 09/25/24 1122    Specimen: Blood Updated: 09/25/24 1125     Glucose 143 mg/dL      Comment: Serial Number: [id number]:  [id number]       POC Glucose 4x Daily Before Meals & at Bedtime [[id number]]  (Abnormal) Collected: 09/25/24 0728    Specimen: Blood Updated: 09/25/24 0730     Glucose 140 mg/dL      Comment: Serial Number: [id number]:  [id number]       Basic Metabolic Panel [[id number]]  (Abnormal) Collected: 09/25/24 0452    Specimen: Blood from Hand, Left Updated: 09/25/24 0549     Glucose 117 mg/dL      BUN 21 mg/dL      Creatinine 1.03 mg/dL      Sodium 141 mmol/L      Potassium 3.6 mmol/L      Chloride 109 mmol/L      CO2 22.6 mmol/L      Calcium 8.4 mg/dL      BUN/Creatinine Ratio 20.4     Anion Gap 9.4 mmol/L      eGFR 78.6 mL/min/1.73     Narrative:      GFR Normal >60  Chronic Kidney Disease <60  Kidney Failure <15      CBC & Differential [[id number]]  (Abnormal) Collected: 09/25/24 0452    Specimen: Blood from Hand, Left Updated: 09/25/24 0510    Narrative:      The following orders were created for panel order CBC & Differential.  Procedure                               Abnormality         Status                     ---------                               -----------         ------                     CBC Auto Differential[[id number]]        Abnormal            Final result                 Please view results for these tests on the individual orders.    CBC Auto Differential [[id number]]  (Abnormal) Collected: 09/25/24 0452    Specimen: Blood from Hand, Left Updated: 09/25/24 0510     WBC 6.93 10*3/mm3      RBC 3.76 10*6/mm3      Hemoglobin 9.7 g/dL      Hematocrit 33.3 %      MCV 88.6 fL      MCH 25.8 pg      MCHC 29.1 g/dL      RDW 16.8 %      RDW-SD 54.1 fl      MPV 10.6 fL      Platelets 205 10*3/mm3      Neutrophil % 69.3 %      Lymphocyte % 10.7 %      Monocyte % 10.1 %      Eosinophil % 8.5 %      Basophil % 1.0 %      Immature Grans % 0.4 %       Neutrophils, Absolute 4.80 10*3/mm3      Lymphocytes, Absolute 0.74 10*3/mm3      Monocytes, Absolute 0.70 10*3/mm3      Eosinophils, Absolute 0.59 10*3/mm3      Basophils, Absolute 0.07 10*3/mm3      Immature Grans, Absolute 0.03 10*3/mm3      nRBC 0.0 /100 WBC     POC Glucose Once [[id number]]  (Abnormal) Collected: 09/24/24 2041    Specimen: Blood Updated: 09/24/24 2044     Glucose 162 mg/dL      Comment: Serial Number: [id number]:  [id number]       POC Glucose Once [[id number]]  (Abnormal) Collected: 09/24/24 1931    Specimen: Blood Updated: 09/24/24 1933     Glucose 209 mg/dL      Comment: Serial Number: [id number]:  [id number]       POC Glucose Once [[id number]]  (Abnormal) Collected: 09/24/24 1613    Specimen: Blood Updated: 09/24/24 1615     Glucose 155 mg/dL      Comment: Serial Number: [id number]:  [id number]       Blood Culture - Blood, Arm, Left [[id number]] Collected: 09/24/24 1526    Specimen: Blood from Arm, Left Updated: 09/24/24 1537    BNP [[id number]]  (Abnormal) Collected: 09/24/24 1227    Specimen: Blood from Arm, Right Updated: 09/24/24 1258     proBNP 4,889.0 pg/mL     Narrative:      This assay is used as an aid in the diagnosis of individuals suspected of having heart failure. It can be used as an aid in the diagnosis of acute decompensated heart failure (ADHF) in patients presenting with signs and symptoms of ADHF to the emergency department (ED). In addition, NT-proBNP of <300 pg/mL indicates ADHF is not likely.    Age Range Result Interpretation  NT-proBNP Concentration (pg/mL:      <50             Positive            >450                   Gray                 300-450                    Negative             <300    50-75           Positive            >900                  Gray                300-900                  Negative            <300      >75             Positive            >1800                  Gray                300-1800                  Negative            <300     POC Glucose Once [[id number]]  (Abnormal) Collected: 09/24/24 1156    Specimen: Blood Updated: 09/24/24 1159     Glucose 200 mg/dL      Comment: Serial Number: [id number]:  [id number]       Magnesium [[id number]]  (Normal) Collected: 09/24/24 0526    Specimen: Blood from Arm, Left Updated: 09/24/24 0951     Magnesium 1.8 mg/dL     TSH [[id number]]  (Normal) Collected: 09/24/24 0526    Specimen: Blood from Arm, Left Updated: 09/24/24 0951     TSH 2.210 uIU/mL     POC Glucose Once [[id number]]  (Abnormal) Collected: 09/24/24 0744    Specimen: Blood Updated: 09/24/24 0746     Glucose 174 mg/dL      Comment: Serial Number: [id number]:  [id number]       High Sensitivity Troponin T 2Hr [[id number]]  (Abnormal) Collected: 09/24/24 0526    Specimen: Blood from Arm, Left Updated: 09/24/24 0638     HS Troponin T 37 ng/L      Troponin T Delta -15 ng/L     Narrative:      High Sensitive Troponin T Reference Range:  <14.0 ng/L- Negative Female for AMI  <22.0 ng/L- Negative Male for AMI  >=14 - Abnormal Female indicating possible myocardial injury.  >=22 - Abnormal Male indicating possible myocardial injury.   Clinicians would have to utilize clinical acumen, EKG, Troponin, and serial changes to determine if it is an Acute Myocardial Infarction or myocardial injury due to an underlying chronic condition.         Urinalysis, Microscopic Only - Indwelling Urethral Catheter [[id number]]  (Abnormal) Collected: 09/24/24 0458    Specimen: Urine from Indwelling Urethral Catheter Updated: 09/24/24 0634     RBC, UA 3-5 /HPF      WBC, UA 21-50 /HPF      Bacteria, UA None Seen /HPF      Squamous Epithelial Cells, UA 0-2 /HPF      Yeast, UA       Moderate/2+ Budding Yeast w/Hyphae     /HPF     Hyaline Casts, UA 0-2 /LPF      Methodology Manual Light Microscopy    Urine Culture - Urine, Indwelling Urethral Catheter [[id number]] Collected: 09/24/24 0458    Specimen: Urine from Indwelling Urethral Catheter Updated: 09/24/24 0634     Urinalysis With Culture If Indicated - Indwelling Urethral Catheter [[id number]]  (Abnormal) Collected: 09/24/24 0458    Specimen: Urine from Indwelling Urethral Catheter Updated: 09/24/24 0620     Color, UA Yellow     Appearance, UA Turbid     Comment: Result checked          pH, UA 6.0     Specific Gravity, UA 1.013     Glucose, UA Negative     Ketones, UA Negative     Bilirubin, UA Negative     Blood, UA Large (3+)     Protein, UA 30 mg/dL (1+)     Leuk Esterase, UA Large (3+)     Nitrite, UA Negative     Urobilinogen, UA 1.0 E.U./dL    Narrative:      In absence of clinical symptoms, the presence of pyuria, bacteria, and/or nitrites on the urinalysis result does not correlate with infection.    MRSA Screen, PCR (Inpatient) - Swab, Nares [[id number]]  (Abnormal) Collected: 09/24/24 0336    Specimen: Swab from Nares Updated: 09/24/24 0613     MRSA PCR MRSA Detected    Narrative:      The negative predictive value of this diagnostic test is high and should only be used to consider de-escalating anti-MRSA therapy. A positive result may indicate colonization with MRSA and must be correlated clinically.    Basic Metabolic Panel [[id number]]  (Abnormal) Collected: 09/24/24 0343    Specimen: Blood from Arm, Left Updated: 09/24/24 0452     Glucose 141 mg/dL      BUN 26 mg/dL      Creatinine 1.18 mg/dL      Sodium 144 mmol/L      Potassium 3.8 mmol/L      Chloride 108 mmol/L      CO2 25.6 mmol/L      Calcium 8.5 mg/dL      BUN/Creatinine Ratio 22.0     Anion Gap 10.4 mmol/L      eGFR 66.8 mL/min/1.73     Narrative:      GFR Normal >60  Chronic Kidney Disease <60  Kidney Failure <15      High Sensitivity Troponin T [[id number]]  (Abnormal) Collected: 09/24/24 0343    Specimen: Blood from Arm, Left Updated: 09/24/24 0452     HS Troponin T 52 ng/L     Narrative:      High Sensitive Troponin T Reference Range:  <14.0 ng/L- Negative Female for AMI  <22.0 ng/L- Negative Male for AMI  >=14 - Abnormal Female indicating possible  myocardial injury.  >=22 - Abnormal Male indicating possible myocardial injury.   Clinicians would have to utilize clinical acumen, EKG, Troponin, and serial changes to determine if it is an Acute Myocardial Infarction or myocardial injury due to an underlying chronic condition.         CBC & Differential [[id number]]  (Abnormal) Collected: 09/24/24 0343    Specimen: Blood from Arm, Left Updated: 09/24/24 0427    Narrative:      The following orders were created for panel order CBC & Differential.  Procedure                               Abnormality         Status                     ---------                               -----------         ------                     CBC Auto Differential[[id number]]        Abnormal            Final result                 Please view results for these tests on the individual orders.    CBC Auto Differential [[id number]]  (Abnormal) Collected: 09/24/24 0343    Specimen: Blood from Arm, Left Updated: 09/24/24 0427     WBC 9.17 10*3/mm3      RBC 4.00 10*6/mm3      Hemoglobin 10.5 g/dL      Hematocrit 35.2 %      MCV 88.0 fL      MCH 26.3 pg      MCHC 29.8 g/dL      RDW 16.7 %      RDW-SD 54.2 fl      MPV 10.8 fL      Platelets 215 10*3/mm3      Neutrophil % 73.0 %      Lymphocyte % 9.3 %      Monocyte % 10.6 %      Eosinophil % 6.1 %      Basophil % 0.7 %      Immature Grans % 0.3 %      Neutrophils, Absolute 6.70 10*3/mm3      Lymphocytes, Absolute 0.85 10*3/mm3      Monocytes, Absolute 0.97 10*3/mm3      Eosinophils, Absolute 0.56 10*3/mm3      Basophils, Absolute 0.06 10*3/mm3      Immature Grans, Absolute 0.03 10*3/mm3      nRBC 0.0 /100 WBC     POC Glucose Once [[id number]]  (Abnormal) Collected: 09/24/24 0113    Specimen: Blood Updated: 09/24/24 0114     Glucose 150 mg/dL      Comment: Serial Number: [id number]:  [id number]               Imaging Results (Last 72 Hours)       ** No results found for the last 72 hours. **              Medication Review:     Hospital Medications  (active)         Dose Frequency Start End    acetaminophen (TYLENOL) tablet 500 mg 500 mg Every 6 Hours PRN 9/24/2024 --    Admin Instructions: If given for fever, use fever parameter: fever greater than 100.4 °F  Based on patient request - if ordered for moderate or severe pain, provider allows for administration of a medication prescribed for a lower pain scale.    Do not exceed 4 grams of acetaminophen in a 24 hr period. Max dose of 2gm for AST/ALT greater than 120 units/L.    If given for pain, use the following pain scale:   Mild Pain = Pain Score of 1-3, CPOT 1-2  Moderate Pain = Pain Score of 4-6, CPOT 3-4  Severe Pain = Pain Score of 7-10, CPOT 5-8    Route: Oral    acetaminophen (TYLENOL) tablet 650 mg 650 mg Once As Needed 9/24/2024 --    Admin Instructions: If given for fever, use fever parameter: fever greater than 100.4 °F  Based on patient request - if ordered for moderate or severe pain, provider allows for administration of a medication prescribed for a lower pain scale.    Do not exceed 4 grams of acetaminophen in a 24 hr period. Max dose of 2gm for AST/ALT greater than 120 units/L.    If given for pain, use the following pain scale:   Mild Pain = Pain Score of 1-3, CPOT 1-2  Moderate Pain = Pain Score of 4-6, CPOT 3-4  Severe Pain = Pain Score of 7-10, CPOT 5-8    Route: Oral    apixaban (ELIQUIS) tablet 5 mg 5 mg Every 12 Hours Scheduled 9/24/2024 --    Admin Instructions: Tablet may be crushed and suspended in 60 mL of water or D5W and immediately delivered via NG tube.    Route: Oral    aspirin EC tablet 81 mg 81 mg Daily 9/24/2024 --    Admin Instructions: Do not crush or chew the capsules or tablets. The drug may not work as designed if the capsule or tablet is crushed or chewed. Swallow whole.  Do not exceed 4 grams of aspirin in a 24 hr period.    If given for pain, use the following pain scale:   Mild Pain = Pain Score of 1-3, CPOT 1-2  Moderate Pain = Pain Score of 4-6, CPOT 3-4  Severe  Pain = Pain Score of 7-10, CPOT 5-8    Route: Oral    bacitracin 500 UNIT/GM ointment  Daily 9/24/2024 --    Admin Instructions: Apply to affected area.    Route: Topical    benzonatate (TESSALON) capsule 100 mg 100 mg 3 Times Daily PRN 9/24/2024 --    Admin Instructions: Swallow whole.  Do not crush, chew, or open capsule.    Route: Oral    cyclobenzaprine (FLEXERIL) tablet 5 mg 5 mg 2 Times Daily PRN 9/24/2024 --    Route: Oral    dextrose (D50W) (25 g/50 mL) IV injection 25 g 25 g Every 15 Minutes PRN 9/24/2024 --    Admin Instructions: Blood sugar less than 70; patient has IV access - Unresponsive, NPO or Unable To Safely Swallow    Route: Intravenous    dextrose (GLUTOSE) oral gel 15 g 15 g Every 15 Minutes PRN 9/24/2024 --    Admin Instructions: BS<70, Patient Alert, Is not NPO, Can safely swallow.    Route: Oral    glatiramer acetate (GLATOPA) injection 20 mg 20 mg Daily 9/24/2024 --    Route: Subcutaneous    Non-formulary Exception Code: Patient supplied medication    glucagon (GLUCAGEN) injection 1 mg 1 mg Every 15 Minutes PRN 9/24/2024 --    Admin Instructions: Blood Glucose Less Than 70 - Patient Without IV Access - Unresponsive, NPO or Unable To Safely Swallow  Reconstitute powder for injection by adding 1 mL of -supplied sterile diluent or sterile water for injection to a vial containing 1 mg of the drug, to provide solutions containing 1 mg/mL. Shake vial gently to dissolve.    Route: Intramuscular    insulin lispro (HUMALOG/ADMELOG) injection 2-7 Units 2-7 Units 4 Times Daily Before Meals & Nightly 9/24/2024 --    Admin Instructions: Correction Insulin - Low Dose - Total Insulin Dose Less Than 40 units/day (Lean, Elderly or Renal Patients)    Blood Glucose 150-199 mg/dL - 2 units  Blood Glucose 200-249 mg/dL - 3 units  Blood Glucose 250-299 mg/dL - 4 units  Blood Glucose 300-349 mg/dL - 5 units  Blood Glucose 350-400 mg/dL - 6 units  Blood Glucose Greater Than 400 mg/dL - 7 units & Call  Provider   Caution: Look alike/sound alike drug alert    Route: Subcutaneous    ipratropium-albuterol (DUO-NEB) nebulizer solution 3 mL 3 mL Every 4 Hours PRN 9/24/2024 --    Route: Nebulization    Linezolid (ZYVOX) 600 mg 300 mL 600 mg Every 12 Hours 9/24/2024 9/27/2024    Admin Instructions: Protect from light. Do NOT refrigerate.    Route: Intravenous    meropenem (MERREM) 1,000 mg in sodium chloride 0.9 % 100 mL MBP 1,000 mg Every 8 Hours 9/24/2024 9/26/2024    Route: Intravenous    midodrine (PROAMATINE) tablet 10 mg 10 mg 3 Times Daily Before Meals 9/25/2024 --    Route: Oral    nicotine (NICODERM CQ) 14 MG/24HR patch 1 patch 1 patch Every 24 Hours Scheduled 9/24/2024 --    Admin Instructions: Apply to clean, dry, nonhairy area of skin (typically upper arm or shoulder)  Dispose of nicotine replacement therapies and their wrappers in non-hazardous pharmaceutical waste or in regular trash.    Route: Transdermal    pantoprazole (PROTONIX) EC tablet 40 mg 40 mg Every Early Morning 9/24/2024 --    Admin Instructions: Swallow whole; do not crush, split, or chew.    Route: Oral    Pharmacy to Dose meropenem (MERREM)  Continuous PRN 9/24/2024 9/26/2024    Route: Does not apply    sodium chloride 0.9 % infusion 75 mL/hr Continuous 9/24/2024 --    Route: Intravenous    sodium chloride 0.9 % infusion 75 mL/hr Continuous 9/24/2024 --    Route: Intravenous    vitamin B-12 (CYANOCOBALAMIN) tablet 1,000 mcg 1,000 mcg Daily 9/24/2024 --    Route: Oral          amiodarone, 200 mg, Oral, BID With Meals  apixaban, 5 mg, Oral, Q12H  aspirin, 81 mg, Oral, Daily  bacitracin, , Topical, Daily  FLUoxetine, 40 mg, Oral, Daily  furosemide, 40 mg, Intravenous, Q12H  glatiramer acetate, 20 mg, Subcutaneous, Daily  insulin lispro, 2-7 Units, Subcutaneous, 4x Daily AC & at Bedtime  Guillermo, 1 packet, Oral, BID  metoprolol tartrate, 25 mg, Oral, BID  midodrine, 10 mg, Oral, TID AC  nicotine, 1 patch, Transdermal, Q24H  pantoprazole, 40 mg,  Oral, Q AM        Assessment & Plan         Sepsis    Atrial fibrillation with RVR    CAD S/P percutaneous coronary angioplasty    Essential hypertension    Dyslipidemia    Non-insulin dependent type 2 diabetes mellitus    GERD without esophagitis    BPH with obstruction/lower urinary tract symptoms    JARRETT (generalized anxiety disorder)    S/P MVR (mitral valve replacement)    Paroxysmal atrial fibrillation    Pneumonia    Acute UTI (urinary tract infection)    Acute on chronic renal insufficiency    Lung cancer    Elevated troponin  UTI C&S  yeast  PNA     MRSA screen +    Plan :           Off AB  Observe    C&S noted  CXR noted  Will follow  Thank you                 Maximilian Dumont MD  09/29/24  13:00 EDT

## 2024-09-29 NOTE — PROGRESS NOTES
[facility] CARDIOLOGY  [facility]        LOS:  LOS: 5 days   Patient Name: Jamin Hennessy  Age/Sex: 69 y.o. male  : 1955  MRN: [id number]    Day of Service: 24   Length of Stay: 5  Encounter Provider: Migdalia Beth MD  Place of Service: [facility] CARDIOLOGY  Patient Care Team:  Jhonny Heller MD as PCP - General (Family Medicine)  Frederick George MD as Consulting Physician (Nephrology)    Cardiology assessment and plan        Atrial fibrillation/paroxysmal currently in sinus rhythm  Hypotension  Sepsis  Urinary tract infection  History of mitral valve endocarditis status post mitral valve replacement surgery of the bioprosthetic valve  Nonobstructive coronary artery disease on cardiac catheterization in   History of hypertension  Hyperlipidemia  Diabetes mellitus  Chronic kidney disease  Hypothyroidism  Lung cancer status post right chemotherapy  Left ventricular systolic function is normal. Calculated left ventricular EF = 64.3% Left ventricular ejection fraction appears to be 61 - 65%.       Denies any new cardiac symptoms  Denies any chest pain  Tmax is 98.2 pulse is 59 respirations are 15 blood pressure is 128/72 sats are 92%  Sodium is 139 potassium is 3.6 creatinine is 1.0 LFTs are normal hemoglobin is 11.2  Nonspecific elevation of troponin  Abnormal elevated proBNP  Normal thyroid function  Current medications include aspirin 81 mg p.o. once a day  Amiodarone 200 mg p.o. twice daily patient is on metoprolol 25 mg p.o. twice daily Lasix 40 mg p.o. twice a day patient is on apixaban for anticoagulation therapy midodrine 10 mg p.o. 3 times a day  Diagnosis and treatment options reviewed and discussed with patient  Echocardiogram with normal LV systolic function  Normal functioning of the bioprosthetic valve  Follow-up on the results of the blood cultures  Further recommendation based on patient  course              Subjective:     Chief Complaint:  F/U AF    Subjective:   Patient denies SOA but still feels fatigued.    Current Medications:   Scheduled Meds:amiodarone, 200 mg, Oral, BID With Meals  apixaban, 5 mg, Oral, Q12H  aspirin, 81 mg, Oral, Daily  bacitracin, , Topical, Daily  FLUoxetine, 40 mg, Oral, Daily  furosemide, 40 mg, Intravenous, Q12H  glatiramer acetate, 20 mg, Subcutaneous, Daily  insulin lispro, 2-7 Units, Subcutaneous, 4x Daily AC & at Bedtime  Guillermo, 1 packet, Oral, BID  metoprolol tartrate, 25 mg, Oral, BID  midodrine, 10 mg, Oral, TID AC  nicotine, 1 patch, Transdermal, Q24H  pantoprazole, 40 mg, Oral, Q AM      Continuous Infusions:       Allergies:  No Known Allergies    Review of Systems   Constitutional: Negative for chills, decreased appetite and malaise/fatigue.   HENT:  Negative for congestion and nosebleeds.    Eyes:  Negative for blurred vision and double vision.   Cardiovascular:  Positive for dyspnea on exertion. Negative for chest pain, irregular heartbeat, leg swelling, near-syncope, orthopnea and palpitations.   Respiratory:  Positive for shortness of breath. Negative for cough.    Hematologic/Lymphatic: Negative for adenopathy. Does not bruise/bleed easily.   Skin:  Negative for rash.   Musculoskeletal:  Negative for back pain and joint pain.   Gastrointestinal:  Negative for bloating, abdominal pain, hematemesis and hematochezia.   Genitourinary:  Negative for flank pain and hematuria.   Neurological:  Negative for dizziness and focal weakness.   Psychiatric/Behavioral:  Negative for altered mental status. The patient does not have insomnia.        Objective:     Temp:  [97.7 °F (36.5 °C)-98.3 °F (36.8 °C)] 98.1 °F (36.7 °C)  Heart Rate:  [56-61] 59  Resp:  [12-16] 15  BP: (103-128)/(64-73) 128/73     Intake/Output Summary (Last 24 hours) at 9/29/2024 1049  Last data filed at 9/29/2024 0800  Gross per 24 hour   Intake 720 ml   Output 4350 ml   Net -3630 ml     Body  "mass index is 43.62 kg/m².      09/26/24  0500 09/27/24  0500 09/28/24  0500   Weight: 135 kg (297 lb 9.9 oz) 135 kg (297 lb 13.5 oz) (!) 138 kg (304 lb 0.2 oz)         Physical Exam:  Neuro:  CV:  Resp:  GI:  Ext:  Tele: AAOx3, no gross deficits  S1S2 RRR, no murmur  Nonlabored, +wheezing   BS+, abd soft  Pedal pulses palp, 2+ pitting BLE edema  SR                                                   Lab Review:   Results from last 7 days   Lab Units 09/29/24  0248 09/28/24  0226   SODIUM mmol/L 139 143   POTASSIUM mmol/L 3.6 3.3*   CHLORIDE mmol/L 100 103   CO2 mmol/L 28.3 29.7*   BUN mg/dL 31* 19   CREATININE mg/dL 1.03 1.03   GLUCOSE mg/dL 128* 113*   CALCIUM mg/dL 9.6 8.8   AST (SGOT) U/L 41* 40   ALT (SGPT) U/L 41 37     Results from last 7 days   Lab Units 09/24/24  0526 09/24/24  0343   HSTROP T ng/L 37* 52*     Results from last 7 days   Lab Units 09/29/24  0248 09/28/24  0226   WBC 10*3/mm3 7.83 7.56   HEMOGLOBIN g/dL 11.2* 10.4*   HEMATOCRIT % 36.5* 34.1*   PLATELETS 10*3/mm3 254 259         Results from last 7 days   Lab Units 09/29/24  0248 09/28/24  0226   MAGNESIUM mg/dL 2.1 2.1           Invalid input(s): \"LDLCALC\"  Results from last 7 days   Lab Units 09/24/24  1227   PROBNP pg/mL 4,889.0*     Results from last 7 days   Lab Units 09/24/24  0526   TSH uIU/mL 2.210       Recent Radiology:  Imaging Results (Most Recent)       Procedure Component Value Units Date/Time    CT Chest Without Contrast Diagnostic [[id number]] Collected: 09/26/24 0802     Updated: 09/26/24 0810    Narrative:      CT CHEST WO CONTRAST DIAGNOSTIC    Date of Exam: 9/26/2024 7:51 AM EDT    Indication: eval further, volume overload, on diuresis , hx of lung cancer.    Comparison: Chest CT 9/27/2020, chest radiograph 9/24/2024    Technique: Axial CT images were obtained of the chest without contrast administration.  Sagittal and coronal reconstructions were performed.  Automated exposure control and iterative reconstruction methods were " used.      Findings:  There is a moderate-sized dependent left pleural effusion. There is consolidation in the left lower lobe and airspace disease with some chronic scarring along the pleura in the lingula. The left apex is clear. The right lung is clear. There is fullness   in the left hilum but there is no definite lymphadenopathy. There are granulomatous calcifications in the right hilum and a densely calcified granuloma in the right lung anteriorly. There are extensive coronary artery calcifications. There are   postoperative changes of prior sternotomy and mitral valve replacement. There is no significant pericardial or right pleural fluid. The upper abdomen is remarkable for bilateral renal stones. There is a double-J ureteral stent on the right. There are   degenerative changes in the thoracic spine with no destructive bone lesions seen.      Impression:      Impression:    1. Left lower lobe airspace disease as well as lingular airspace disease which may be chronic with evidence of pleural thickening peripherally.  2. Moderate sized dependent left pleural effusion.  3. Fullness in the left hilum likely reflecting airspace disease. The patient does have a history of lung cancer and this may represent treatment change. Previous CT showed diffuse infiltrate in the left lung and this has cleared in the left upper lobe.  4. Old healed granulomatous disease.  5. Bilateral renal stones with a right-sided double-J ureteral stent..      Electronically Signed: Ray Saldana MD    9/26/2024 8:08 AM EDT    Workstation ID: [id number]    XR Chest 1 View [[id number]] Collected: 09/24/24 1249     Updated: 09/24/24 1256    Narrative:      XR CHEST 1 VW    Date of Exam: 9/24/2024 12:39 PM EDT    Indication: wheezing, SOA    Comparison: 3/3/2020    Findings:  Sternotomy wires overlie the midline. Prosthetic heart valve is noted. Right lung is clear. There is patchy airspace opacity in the left base which may be due to pneumonia  or aspiration. No pneumothorax or large effusion identified. Heart size within   normal limits for technique. Pulmonary vascularity appears within normal limits.      Impression:      Impression:  Left basilar airspace opacity suggests pneumonia or aspiration. Correlate clinically. Follow-up evaluation recommended to document resolution. If this persists on follow-up, then chest CT recommended to rule out neoplasm.      Electronically Signed: Charbel Laureano MD    9/24/2024 12:54 PM EDT    Workstation ID: [id number]            ECHOCARDIOGRAM:    Results for orders placed during the hospital encounter of 09/24/24    Adult Transthoracic Echo Complete W/ Cont if Necessary Per Protocol    Interpretation Summary    Left ventricular systolic function is normal. Calculated left ventricular EF = 64.3% Left ventricular ejection fraction appears to be 61 - 65%.    Left ventricular wall thickness is consistent with mild concentric hypertrophy.    The left atrial cavity is moderately dilated.    There is a bioprosthetic mitral valve present.    Estimated right ventricular systolic pressure from tricuspid regurgitation is normal (<35 mmHg).        I reviewed the patient's new clinical results.    EKG:      Assessment:       Sepsis    Atrial fibrillation with RVR    CAD S/P percutaneous coronary angioplasty    Essential hypertension    Dyslipidemia    Non-insulin dependent type 2 diabetes mellitus    GERD without esophagitis    BPH with obstruction/lower urinary tract symptoms    JARRETT (generalized anxiety disorder)    S/P MVR (mitral valve replacement)    Paroxysmal atrial fibrillation    Pneumonia    Acute UTI (urinary tract infection)    Acute on chronic renal insufficiency    Lung cancer    Elevated troponin    1) Atrial Flutter with RVR --> SR     2) CAD status post PCI  - Preop cath in 2020 showed patent LAD stent with mild to moderate residual nonobstructive CAD (30 to 40% RCA, 50% LAD, 40 to 50% LCx).       3) hx mitral valve  endocarditis status post tissue MVR in 2020  - Last 2D echo in 2020 shows an EF of 66 to 70% with mild MR.       4) Sepsis / UTI  - CXR shows opacity left lobe     5) HTN  - currently hypotensive     6) HLD     7) DM     8) CKD     9) hypothyroidism     10) lung cancer   [facility]     11) multiple sclerosis      12) renal insufficiency

## 2024-09-29 NOTE — CASE MANAGEMENT/SOCIAL WORK
Social Work Assessment  [facility]     Patient Name: Jamin Hennessy  MRN: [id number]  Today's Date: 9/29/2024    Admit Date: 9/24/2024         Discharge Plan       Row Name 09/29/24 1701       Plan    Plan Comments LSW attempted to contact Spouse for LACE screen. Per chart review, Pt is bed bound and Spouse is full time caregiver. SW following.           ARMANDO Amanda, MSW    Phone: [phone]  Fax: [phone]  Email: [email or web address]

## 2024-09-29 NOTE — CASE MANAGEMENT/SOCIAL WORK
LACE Assessment      Patient Name: Jamin Hennessy  : 1955  MRN: [id number]  Address: [address] [place] [address] [place] [facility]    Risk for Readmission (LACE) Score: 13 (2024  6:00 AM)      Personal History     Past Medical History:   Diagnosis Date    A-fib     CAD (coronary artery disease)     Elevated cholesterol     Hypertension     MI (myocardial infarction)     Non-insulin dependent type 2 diabetes mellitus        Past Surgical History:   Procedure Laterality Date    BRONCHOSCOPY N/A 3/4/2020    Procedure: BRONCHOSCOPY with bronchioalveolar lavage;  Surgeon: Melissa Cole MD;  Location: [facility] ENDOSCOPY;  Service: Pulmonary;  Laterality: N/A;   pneumonia    CARDIAC CATHETERIZATION      CARDIAC CATHETERIZATION N/A 2020    Procedure: Left Heart Cath;  Surgeon: Migdalia Beth MD;  Location: [facility] CATH INVASIVE LOCATION;  Service: Cardiovascular    CARDIAC CATHETERIZATION N/A 2020    Procedure: Left ventriculography;  Surgeon: Migdalia Beth MD;  Location: [facility] CATH INVASIVE LOCATION;  Service: Cardiovascular    CARDIAC CATHETERIZATION N/A 2020    Procedure: Coronary angiography;  Surgeon: Migdalia Beth MD;  Location: [facility] CATH INVASIVE LOCATION;  Service: Cardiovascular    CORONARY ANGIOPLASTY WITH STENT PLACEMENT      MITRAL VALVE REPAIR/REPLACEMENT N/A 2020    Procedure: MITRAL VALVE REPAIR/REPLACEMENT;  Surgeon: Fallon Cole MD;  Location: [facility] CVOR;  Service: Cardiothoracic;  Laterality: N/A;  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 Otherwise pertinent FHx was reviewed and not pertinent to current issue.    Social History:  reports that he has been smoking cigarettes. His smokeless tobacco use includes chew. He reports that he does not currently use alcohol. He reports that he does not use drugs.      Social Determinants of  Health     Social Determinants of Health     Tobacco Use: High Risk (9/24/2024)    Patient History     Smoking Tobacco Use: Some Days     Smokeless Tobacco Use: Current     Passive Exposure: Not on file   Alcohol Use: Not At Risk (9/29/2024)    AUDIT-C     Frequency of Alcohol Consumption: Never     Average Number of Drinks: Patient does not drink     Frequency of Binge Drinking: Never   Financial Resource Strain: Low Risk  (9/29/2024)    Overall Financial Resource Strain (CARDIA)     Difficulty of Paying Living Expenses: Not hard at all   Food Insecurity: No Food Insecurity (9/24/2024)    Hunger Vital Sign     Worried About Running Out of Food in the Last Year: Never true     Ran Out of Food in the Last Year: Never true   Transportation Needs: No Transportation Needs (9/24/2024)    PRAPARE - Transportation     Lack of Transportation (Medical): No     Lack of Transportation (Non-Medical): No   Physical Activity: Not on file   Stress: No Stress Concern Present (9/29/2024)    [employer] - Occupational Stress Questionnaire     Feeling of Stress : Only a little   Social Connections: Unknown (10/12/2023)    Family and Community Support     Help with Day-to-Day Activities: Not on file     Lonely or Isolated: Not on file   Interpersonal Safety: Not At Risk (9/29/2024)    Abuse Screen     Unsafe at Home or Work/School: no     Feels Threatened by Someone?: no     Does Anyone Keep You from Contacting Others or Doint Things Outside the Home?: no     Physical Sign of Abuse Present: no   Depression: At risk (9/29/2024)    PHQ-2     PHQ-2 Score: 3   Housing Stability: Not At Risk (9/24/2024)    Housing Stability     Current Living Arrangements: home;apartment     Potentially Unsafe Housing Conditions: none   Utilities: Not At Risk (9/24/2024)    [facility] Utilities     Threatened with loss of utilities: No   Health Literacy: Unknown (9/24/2024)    Education     Help with school or training?: Not on file      Preferred Language: English   Employment: Unknown (10/12/2023)    Employment     Do you want help finding or keeping work or a job?: Not on file   Disabilities: At Risk (9/24/2024)    Disabilities     Concentrating, Remembering, or Making Decisions Difficulty: no     Doing Errands Independently Difficulty: yes       Impressions     SW consulted regarding LACE needs.  SW met with patient by the bedside. SW introduced self and explained role to patient. Patient said they understood.    Home Environment: Patient lives at home with spouse. Pt has [facility] caregivers, [facility] at home for all assistance with ADL's.    Income: [facility]    Employment: n/a    Mental Health: Anxiety/Depression that the [facility] manages.     Dishcarge Plan: Home.    Substance Use: N/a    Hardships: n/a  Assessment      Psychosocial       Row Name 09/29/24 3984       Values/Beliefs    Spiritual, Cultural Beliefs, Taoist Practices, Values that Affect Care no    Values/Beliefs Comment [facility]       Mental Health    Little Interest or Pleasure in Doing Things 1-->several days    Feeling Down, Depressed or Hopeless 2-->more than half the days  [facility] to [facility]       Stress    Do you feel stress - tense, restless, nervous, or anxious, or unable to sleep at night because your mind is troubled all the time - these days? Only a littl       Coping/Stress    Major Change/Loss/Stressor medical condition/diagnosis;caregiver strain    Patient Personal Strengths expressive of needs;expressive of emotions;motivated    Sources of Support spouse;community support    Techniques to [place] with Loss/Stress/Change diversional activities;medication    Reaction to Health Status accepting    Understanding of Condition and Treatment adequate understanding of medical condition;adequate understanding of treatment       Developmental Stage (Eriksson's)    Developmental Stage Stage 8 (65 years-death/Late Adulthood) Integrity vs. Despair       C-SSRS (Recent)    Q1 Wished to be Dead (Past  Month) no    Q2 Suicidal Thoughts (Past Month) no    Q6 Suicide Behavior (Lifetime) no       Violence Risk    Feels Like Hurting Others no    Previous Attempt to Harm Others no                   Abuse/Neglect       Row Name 09/29/24 1852       Personal Safety    Feels Unsafe at Home or Work/School no    Feels Threatened by Someone no    Does Anyone Try to Keep You From Having Contact with Others or Doing Things Outside Your Home? no    Physical Signs of Abuse Present no                   Legal       Row Name 09/29/24 1853       Financial Resource Strain    How hard is it for you to pay for the very basics like food, housing, medical care, and heating? Not hard       Financial/Legal    Source of Income [facility] pension       Legal    Criminal Activity/Legal Involvement none                   Substance Abuse       Row Name 09/29/24 1853       Substance Use    Substance Use Status never used       AUDIT-C (Alcohol Use Disorders ID Test)    Q1: How often do you have a drink containing alcohol? Never    Q2: How many drinks containing alcohol do you have on a typical day when you are drinking? None    Q3: How often do you have six or more drinks on one occasion? Never    Audit-C Score 0       Family Member Substance Use (#4)    Previous Substance Use Treatment none                    Education     ARMANDO Amanda, MSW    Phone: [phone]  Fax: [phone]  Email: [email or web address]

## 2024-09-29 NOTE — PLAN OF CARE
Goal Outcome Evaluation:      VSS, 3L NC while sleeping this shift. Perry replaced on dayshift 9/28/24, urine in collection bag red-tinged until after midnight, urine is now yellow with minimal sediment.                                          3

## 2024-09-29 NOTE — PROGRESS NOTES
[facility] MEDICINE SERVICE  DAILY PROGRESS NOTE    NAME: Jamin Hennessy  : 1955  MRN: [id number]      LOS: 5 days     PROVIDER OF SERVICE: Lauro Sheffield MD    Chief Complaint: Sepsis    Subjective:     Interval History:  History taken from: patient     No acute overnight events, patient reports shortness of breath is improving, denies chest pain, denies fever chills, denies abdominal pain at this time    Review of Systems:   Review of Systems shortness of breath improving    Objective:     Vital Signs  Temp:  [97.7 °F (36.5 °C)-98.3 °F (36.8 °C)] 98.1 °F (36.7 °C)  Heart Rate:  [56-62] 59  Resp:  [12-16] 15  BP: (103-128)/(62-71) 128/71  Flow (L/min):  [3-33] 33   Body mass index is 43.62 kg/m².    Physical Exam  General Appearance:  Awake alert, conversational   Head:  Atraumatic normocephalic   Eyes:        No sclera icterus   Neck: Non tender, normal ROM   Pulm: Decreased breath sound, some crackles   Cardio: Normal HR, irregular rhythm   Extremities: Edema on bilateral lower extremities has improved from 4+ on admission to 1+ today   Abdomen: Distended, soft non tender   /Renal: No suprapubic tenderness         Neuro: Aaox3, normal speech                   Scheduled Meds   amiodarone, 200 mg, Oral, BID With Meals  apixaban, 5 mg, Oral, Q12H  aspirin, 81 mg, Oral, Daily  bacitracin, , Topical, Daily  FLUoxetine, 40 mg, Oral, Daily  furosemide, 40 mg, Intravenous, Q12H  glatiramer acetate, 20 mg, Subcutaneous, Daily  insulin lispro, 2-7 Units, Subcutaneous, 4x Daily AC & at Bedtime  Guillermo, 1 packet, Oral, BID  metoprolol tartrate, 25 mg, Oral, BID  midodrine, 10 mg, Oral, TID AC  nicotine, 1 patch, Transdermal, Q24H  pantoprazole, 40 mg, Oral, Q AM  potassium chloride ER, 40 mEq, Oral, Q4H       PRN Meds     acetaminophen    benzonatate    Calcium Replacement - Follow Nurse / BPA Driven Protocol    cyclobenzaprine    dextrose    dextrose    glucagon (human recombinant)     ipratropium-albuterol    Magnesium Standard Dose Replacement - Follow Nurse / BPA Driven Protocol    Phosphorus Replacement - Follow Nurse / BPA Driven Protocol    Potassium Replacement - Follow Nurse / BPA Driven Protocol   Infusions         Diagnostic Data    Results from last 7 days   Lab Units 09/29/24  0248   WBC 10*3/mm3 7.83   HEMOGLOBIN g/dL 11.2*   HEMATOCRIT % 36.5*   PLATELETS 10*3/mm3 254   GLUCOSE mg/dL 128*   CREATININE mg/dL 1.03   BUN mg/dL 31*   SODIUM mmol/L 139   POTASSIUM mmol/L 3.6   AST (SGOT) U/L 41*   ALT (SGPT) U/L 41   ALK PHOS U/L 71   BILIRUBIN mg/dL 0.4   ANION GAP mmol/L 10.7       No radiology results for the last day      I reviewed the patient's new clinical results.    Assessment/Plan:     Active and Resolved Problems  Active Hospital Problems    Diagnosis  POA    **Sepsis [A41.9]  Yes    Pneumonia [J18.9]  Yes    Acute UTI (urinary tract infection) [N39.0]  Yes    Acute on chronic renal insufficiency [N28.9, N18.9]  Yes    Lung cancer [C34.90]  Yes    Elevated troponin [R79.89]  Yes    Paroxysmal atrial fibrillation [I48.0]  Yes    S/P MVR (mitral valve replacement) [Z95.2]  Not Applicable    GERD without esophagitis [K21.9]  Yes    Essential hypertension [I10]  Yes    JARRETT (generalized anxiety disorder) [F41.1]  Yes    Dyslipidemia [E78.5]  Yes    CAD S/P percutaneous coronary angioplasty [I25.10, Z98.61]  Not Applicable    Non-insulin dependent type 2 diabetes mellitus [E11.9]  Yes    BPH with obstruction/lower urinary tract symptoms [N40.1, N13.8]  Yes    Atrial fibrillation with RVR [I48.91]  Yes      Resolved Hospital Problems   No resolved problems to display.       Plan:   -Continue Lasix 40 BID, Midodrine 10 TID, Has a great output, clinically looks a lot better now,  low threshold for ICU upgrade if needed pressor support for diuresis if fail on midodrine, will avoid giving fluids given volume overload  -monitor strict I/Os, has molina in place, had good urinary output   -On  eliquis for Afib, was given Digoxin per Cards, currently HR is controlled, currently metoprolol 25 bid , Echo: LVEF 60-65 %  LV thickness noted, will monitor his Vitals, will follow cards reccs   -Continue home med glatiramer For MS  -Continue meropenem and Mycamic per ID, Perry bag has been changed  -Continue Prozac, levothyroxine, TSH 2.2 on admission  -ammonia nromal 26, B12 normal, folate normal  -AM Labs will follow     VTE Prophylaxis:  Pharmacologic VTE prophylaxis orders are present.         Code status is   Code Status and Medical Interventions: No CPR (Do Not Attempt to Resuscitate); Limited Support; No intubation (DNI)   Ordered at: 09/24/24 0151     Medical Intervention Limits:    No intubation (DNI)     Code Status (Patient has no pulse and is not breathing):    No CPR (Do Not Attempt to Resuscitate)     Medical Interventions (Patient has pulse or is breathing):    Limited Support           Time: 30 minutes    Part of this note may be an electronic transcription/translation of spoken language to printed text using the Dragon Dictation System.    Signature: Electronically signed by Lauro Sheffield MD, 09/29/24, 07:05 EDT.  [facility] Hospitalist Team

## 2024-09-29 NOTE — CASE MANAGEMENT/SOCIAL WORK
Continued Stay Note  MINDI Sawyer     Patient Name: Jamin Hennessy  MRN: [id number]  Today's Date: 9/29/2024    Admit Date: 9/24/2024    Plan: Anticipate routine home with spouse pending PT/OT recommendations vs. [facility] ([facility]).   Discharge Plan       Row Name 09/29/24 1938       Plan    Plan Comments PT/OT assessment pending. Patient & spouse request to go home with HH when discharged.      Row Name 09/29/24 5942       Plan    Plan Comments LSW attempted to contact Spouse for LACE screen. Per chart review, Pt is bed bound and Spouse is full time caregiver. SW following.                  Ana Lilia Ortiz RN    Office: [phone]  Fax: [phone]

## 2024-09-30 ENCOUNTER — READMISSION MANAGEMENT (OUTPATIENT)
Dept: CALL CENTER | Facility: HOSPITAL | Age: 69
End: 2024-09-30
Payer: MEDICARE

## 2024-09-30 VITALS
TEMPERATURE: 97.5 F | RESPIRATION RATE: 15 BRPM | OXYGEN SATURATION: 96 % | DIASTOLIC BLOOD PRESSURE: 75 MMHG | SYSTOLIC BLOOD PRESSURE: 128 MMHG | HEART RATE: 66 BPM | WEIGHT: 304.24 LBS | HEIGHT: 70 IN | BODY MASS INDEX: 43.56 KG/M2

## 2024-09-30 LAB
ALBUMIN SERPL-MCNC: 3.8 G/DL (ref 3.5–5.2)
ALBUMIN/GLOB SERPL: 1.1 G/DL
ALP SERPL-CCNC: 73 U/L (ref 39–117)
ALT SERPL W P-5'-P-CCNC: 57 U/L (ref 1–41)
ANION GAP SERPL CALCULATED.3IONS-SCNC: 13.2 MMOL/L (ref 5–15)
AST SERPL-CCNC: 54 U/L (ref 1–40)
BASOPHILS # BLD AUTO: 0.13 10*3/MM3 (ref 0–0.2)
BASOPHILS NFR BLD AUTO: 1.5 % (ref 0–1.5)
BILIRUB SERPL-MCNC: 0.4 MG/DL (ref 0–1.2)
BUN SERPL-MCNC: 32 MG/DL (ref 8–23)
BUN/CREAT SERPL: 29.4 (ref 7–25)
CA-I SERPL ISE-MCNC: 1.15 MMOL/L (ref 1.15–1.3)
CALCIUM SPEC-SCNC: 9.6 MG/DL (ref 8.6–10.5)
CHLORIDE SERPL-SCNC: 100 MMOL/L (ref 98–107)
CO2 SERPL-SCNC: 27.8 MMOL/L (ref 22–29)
CREAT SERPL-MCNC: 1.09 MG/DL (ref 0.76–1.27)
DEPRECATED RDW RBC AUTO: 52.6 FL (ref 37–54)
EGFRCR SERPLBLD CKD-EPI 2021: 73.5 ML/MIN/1.73
EOSINOPHIL # BLD AUTO: 0.5 10*3/MM3 (ref 0–0.4)
EOSINOPHIL NFR BLD AUTO: 5.9 % (ref 0.3–6.2)
ERYTHROCYTE [DISTWIDTH] IN BLOOD BY AUTOMATED COUNT: 17.2 % (ref 12.3–15.4)
GLOBULIN UR ELPH-MCNC: 3.5 GM/DL
GLUCOSE BLDC GLUCOMTR-MCNC: 129 MG/DL (ref 70–105)
GLUCOSE BLDC GLUCOMTR-MCNC: 223 MG/DL (ref 70–105)
GLUCOSE SERPL-MCNC: 127 MG/DL (ref 65–99)
HCT VFR BLD AUTO: 35.5 % (ref 37.5–51)
HGB BLD-MCNC: 10.8 G/DL (ref 13–17.7)
IMM GRANULOCYTES # BLD AUTO: 0.03 10*3/MM3 (ref 0–0.05)
IMM GRANULOCYTES NFR BLD AUTO: 0.4 % (ref 0–0.5)
LYMPHOCYTES # BLD AUTO: 1.32 10*3/MM3 (ref 0.7–3.1)
LYMPHOCYTES NFR BLD AUTO: 15.6 % (ref 19.6–45.3)
MAGNESIUM SERPL-MCNC: 2.2 MG/DL (ref 1.6–2.4)
MCH RBC QN AUTO: 26 PG (ref 26.6–33)
MCHC RBC AUTO-ENTMCNC: 30.4 G/DL (ref 31.5–35.7)
MCV RBC AUTO: 85.3 FL (ref 79–97)
MONOCYTES # BLD AUTO: 0.87 10*3/MM3 (ref 0.1–0.9)
MONOCYTES NFR BLD AUTO: 10.3 % (ref 5–12)
NEUTROPHILS NFR BLD AUTO: 5.63 10*3/MM3 (ref 1.7–7)
NEUTROPHILS NFR BLD AUTO: 66.3 % (ref 42.7–76)
NRBC BLD AUTO-RTO: 0 /100 WBC (ref 0–0.2)
PHOSPHATE SERPL-MCNC: 3.7 MG/DL (ref 2.5–4.5)
PLATELET # BLD AUTO: 274 10*3/MM3 (ref 140–450)
PMV BLD AUTO: 10.2 FL (ref 6–12)
POTASSIUM SERPL-SCNC: 3.6 MMOL/L (ref 3.5–5.2)
POTASSIUM SERPL-SCNC: 4.1 MMOL/L (ref 3.5–5.2)
PROT SERPL-MCNC: 7.3 G/DL (ref 6–8.5)
RBC # BLD AUTO: 4.16 10*6/MM3 (ref 4.14–5.8)
SODIUM SERPL-SCNC: 141 MMOL/L (ref 136–145)
WBC NRBC COR # BLD AUTO: 8.48 10*3/MM3 (ref 3.4–10.8)

## 2024-09-30 PROCEDURE — 82948 REAGENT STRIP/BLOOD GLUCOSE: CPT | Performed by: NURSE PRACTITIONER

## 2024-09-30 PROCEDURE — 84132 ASSAY OF SERUM POTASSIUM: CPT | Performed by: STUDENT IN AN ORGANIZED HEALTH CARE EDUCATION/TRAINING PROGRAM

## 2024-09-30 PROCEDURE — 63710000001 INSULIN LISPRO (HUMAN) PER 5 UNITS: Performed by: NURSE PRACTITIONER

## 2024-09-30 PROCEDURE — 84100 ASSAY OF PHOSPHORUS: CPT | Performed by: STUDENT IN AN ORGANIZED HEALTH CARE EDUCATION/TRAINING PROGRAM

## 2024-09-30 PROCEDURE — 80053 COMPREHEN METABOLIC PANEL: CPT | Performed by: STUDENT IN AN ORGANIZED HEALTH CARE EDUCATION/TRAINING PROGRAM

## 2024-09-30 PROCEDURE — 82330 ASSAY OF CALCIUM: CPT | Performed by: STUDENT IN AN ORGANIZED HEALTH CARE EDUCATION/TRAINING PROGRAM

## 2024-09-30 PROCEDURE — 83735 ASSAY OF MAGNESIUM: CPT | Performed by: STUDENT IN AN ORGANIZED HEALTH CARE EDUCATION/TRAINING PROGRAM

## 2024-09-30 PROCEDURE — 85025 COMPLETE CBC W/AUTO DIFF WBC: CPT | Performed by: NURSE PRACTITIONER

## 2024-09-30 PROCEDURE — 25010000002 FUROSEMIDE PER 20 MG: Performed by: STUDENT IN AN ORGANIZED HEALTH CARE EDUCATION/TRAINING PROGRAM

## 2024-09-30 PROCEDURE — 99232 SBSQ HOSP IP/OBS MODERATE 35: CPT | Performed by: NURSE PRACTITIONER

## 2024-09-30 RX ORDER — AMIODARONE HYDROCHLORIDE 200 MG/1
200 TABLET ORAL 2 TIMES DAILY WITH MEALS
Qty: 60 TABLET | Refills: 0 | Status: SHIPPED | OUTPATIENT
Start: 2024-09-30 | End: 2024-10-30

## 2024-09-30 RX ORDER — MIDODRINE HYDROCHLORIDE 5 MG/1
5 TABLET ORAL
Status: DISCONTINUED | OUTPATIENT
Start: 2024-09-30 | End: 2024-09-30 | Stop reason: HOSPADM

## 2024-09-30 RX ORDER — FUROSEMIDE 40 MG
40 TABLET ORAL
Status: DISCONTINUED | OUTPATIENT
Start: 2024-09-30 | End: 2024-09-30 | Stop reason: HOSPADM

## 2024-09-30 RX ORDER — POTASSIUM CHLORIDE 1500 MG/1
40 TABLET, EXTENDED RELEASE ORAL EVERY 4 HOURS
Status: COMPLETED | OUTPATIENT
Start: 2024-09-30 | End: 2024-09-30

## 2024-09-30 RX ORDER — MIDODRINE HYDROCHLORIDE 5 MG/1
5 TABLET ORAL
Qty: 90 TABLET | Refills: 0 | Status: SHIPPED | OUTPATIENT
Start: 2024-09-30 | End: 2024-10-30

## 2024-09-30 RX ORDER — FERROUS SULFATE 325(65) MG
325 TABLET ORAL
Qty: 15 TABLET | Refills: 0 | Status: SHIPPED | OUTPATIENT
Start: 2024-09-30 | End: 2024-10-30

## 2024-09-30 RX ADMIN — PANTOPRAZOLE SODIUM 40 MG: 40 TABLET, DELAYED RELEASE ORAL at 05:26

## 2024-09-30 RX ADMIN — GLATIRAMER ACETATE 20 MG: 20 INJECTION, SOLUTION SUBCUTANEOUS at 08:22

## 2024-09-30 RX ADMIN — POTASSIUM CHLORIDE 40 MEQ: 1500 TABLET, EXTENDED RELEASE ORAL at 08:32

## 2024-09-30 RX ADMIN — BACITRACIN 0.9 G: 500 OINTMENT TOPICAL at 08:23

## 2024-09-30 RX ADMIN — Medication 1 PACKET: at 08:23

## 2024-09-30 RX ADMIN — POTASSIUM CHLORIDE 40 MEQ: 1500 TABLET, EXTENDED RELEASE ORAL at 05:26

## 2024-09-30 RX ADMIN — METOPROLOL TARTRATE 25 MG: 25 TABLET, FILM COATED ORAL at 08:22

## 2024-09-30 RX ADMIN — APIXABAN 5 MG: 5 TABLET, FILM COATED ORAL at 08:22

## 2024-09-30 RX ADMIN — MIDODRINE HYDROCHLORIDE 10 MG: 5 TABLET ORAL at 08:22

## 2024-09-30 RX ADMIN — INSULIN LISPRO 3 UNITS: 100 INJECTION, SOLUTION INTRAVENOUS; SUBCUTANEOUS at 12:24

## 2024-09-30 RX ADMIN — NICOTINE 1 PATCH: 14 PATCH, EXTENDED RELEASE TRANSDERMAL at 08:26

## 2024-09-30 RX ADMIN — FUROSEMIDE 40 MG: 10 INJECTION, SOLUTION INTRAMUSCULAR; INTRAVENOUS at 08:22

## 2024-09-30 RX ADMIN — FLUOXETINE HYDROCHLORIDE 40 MG: 20 CAPSULE ORAL at 08:22

## 2024-09-30 RX ADMIN — ASPIRIN 81 MG: 81 TABLET, COATED ORAL at 08:22

## 2024-09-30 RX ADMIN — AMIODARONE HYDROCHLORIDE 200 MG: 200 TABLET ORAL at 08:22

## 2024-09-30 NOTE — PLAN OF CARE
OT screened pt, pt reporting functioning at baseline level. Pt resides with spouse who provides dependent assist with ADLs. Pt (I) with feeding. Pt sleeps in hospital bed, encouraged positioning for pressure relief, verbalizes spouse assist with this at home. Pt requires [place] lift for transfers and requires ambulance for transportation. Pt functioning at baseline level and expresses no concerns to dc home with spouse assist. No skilled OT warranted within acute setting, will complete orders.

## 2024-09-30 NOTE — CASE MANAGEMENT/SOCIAL WORK
Continued Stay Note  MINDI Sawyer     Patient Name: Jamin Hennessy  MRN: [id number]  Today's Date: 9/30/2024    Admit Date: 9/24/2024    Plan: Anticipate routine home with spouse and [facility] (pending acceptance, order per MD).   Discharge Plan       Row Name 09/30/24 1514       Plan    Plan Anticipate routine home with spouse and [facility] (pending acceptance, order per MD).    Patient/Family in Agreement with Plan yes    Provided Post Acute Provider List? N/A    Plan Comments THERESA met with patient at bedside to discuss dc plan and IMM letter. He reported that he had a nurse from [facility] coming around twice per week. THERESA also contacted patient’s spouse, Debbi ([phone]). She requested for THERESA to discuss with Caitlin at [facility] who requested new HH orders. HH order requested from MD. THERESA contacted Caitlin ([phone]),  left with  name and phone number. THERESA also contacted Laura with [facility] ([phone]). No answer so  left with  name and callback number. CM confirmed with spouse that she would be present to let EMS into home and confirmed no steps in home. EMS medical necessity form completed and information packet provided to RN for EMS. THERESA scheduled [facility] transportation via ad hoc.                  Expected Discharge Date and Time       Expected Discharge Date Expected Discharge Time    Sep 30, 2024           Olga Busby RN     Office Phone: [phone]  Office Cell: [phone]

## 2024-09-30 NOTE — CASE MANAGEMENT/SOCIAL WORK
"Physicians Statement of Medical Necessity for  Ambulance Transportation    GENERAL INFORMATION     Name: Jamin Hennessy  YOB: 1955  Medicare #: [id number]   Transport Date: 9/30/24 (Valid for round trips this date, or for scheduled repetitive trips for 60 days from the date signed below.)  Origin: [facility]  Destination: [address] [place] [address]  Is the Patient's stay covered under Medicare Part A (PPS/DRG?)Yes  Closest appropriate facility? Yes  If this a hosp-hosp transfer? No  Is this a hospice patient? No    MEDICAL NECESSITY QUESTIONAIRE    Ambulance Transportation is medically necessary only if other means of transportation are contraindicated or would be potentially harmful to the patient.  To meet this requirement, the patient must be either \"bed confined\" or suffer from a condition such that transport by means other than an ambulance is contraindicated by the patient's condition.  The following questions must be answered by the healthcare professional signing below for this form to be valid:     1) Describe the MEDICAL CONDITION (physical and/or mental) of this patient AT THE TIME OF AMBULANCE TRANSPORT that requires the patient to be transported in an ambulance, and why transport by other means is contraindicated by the patient's condition: Unstageable gluteal pressure injury, bedbound, [place] lift dependent for transfers.   Past Medical History:   Diagnosis Date    A-fib     CAD (coronary artery disease)     Elevated cholesterol     Hypertension     MI (myocardial infarction)     Non-insulin dependent type 2 diabetes mellitus       Past Surgical History:   Procedure Laterality Date    BRONCHOSCOPY N/A 3/4/2020    Procedure: BRONCHOSCOPY with bronchioalveolar lavage;  Surgeon: Melissa Cole MD;  Location: [facility];  Service: Pulmonary;  Laterality: N/A;   pneumonia    CARDIAC CATHETERIZATION      CARDIAC CATHETERIZATION N/A 1/20/2020    Procedure: " "Left Heart Cath;  Surgeon: Migdalia Beth MD;  Location: [facility] CATH INVASIVE LOCATION;  Service: Cardiovascular    CARDIAC CATHETERIZATION N/A 1/20/2020    Procedure: Left ventriculography;  Surgeon: Migdalia Beth MD;  Location: [facility] CATH INVASIVE LOCATION;  Service: Cardiovascular    CARDIAC CATHETERIZATION N/A 1/20/2020    Procedure: Coronary angiography;  Surgeon: Migdalia Beth MD;  Location: [facility] CATH INVASIVE LOCATION;  Service: Cardiovascular    CORONARY ANGIOPLASTY WITH STENT PLACEMENT      MITRAL VALVE REPAIR/REPLACEMENT N/A 1/22/2020    Procedure: MITRAL VALVE REPAIR/REPLACEMENT;  Surgeon: Fallon Cole MD;  Location: [facility] CVOR;  Service: Cardiothoracic;  Laterality: N/A;  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  3) Can this patient safely be transported by car or wheelchair van (I.e., may safely sit during transport, without an attendant or monitoring?)No   4. In addition to completing questions 1-3 above, please check any of the following conditions that apply*:          *Note: supporting documentation for any boxes checked must be maintained in the patient's medical records Unable to tolerate seated position for time needed to transport and Unable to sit in a chair or wheelchair due to decubitus ulcers or other wounds      SIGNATURE OF PHYSICIAN OR OTHER AUTHORIZED HEALTHCARE PROFESSIONAL    I certify that the above information is true and correct based on my evaluation of this patient, and represent that the patient requires transport by ambulance and that other forms of transport are contraindicated.  I understand that this information will be used by the Centers for " Medicare and Medicaid Services (CMS) to support the determiniation of medical necessity for ambulance services, and I represent that I have personal knowledge of the patient's condition at the time of transport.    JOE Busby RN   If this box is checked, I also certify that the patient is physically or mentally incapable of signing the ambulance service's claim form and that the institution with which I am affiliated has furnished care, services or assistance to the patient.  My signature below is made on behalf of the patient pursuant to 42 .36(b)(4). In accordance with 42 .37, the specific reason(s) that the patient is physically or mentally incapable of signing the claim for is as follows:     Signature of Physician or Healthcare Professional   Dr. Sheffield/Olga MIR RN Date/Time:   09/30/2024 9571     (For Scheduled repetitive transport, this form is not valid for transports performed more than 60 days after this date).                                                                                                                                            --------------------------------------------------------------------------------------------  Printed Name and Credentials of Physician or Authorized Healthcare Professional     *Form must be signed by patient's attending physician for scheduled, repetitive transports,.  For non-repetitive ambulance transports, if unable to obtain the signature of the attending physician, any of the following may sign (please select below):     Physician  Clinical Nurse Specialist  Registered Nurse     Physician Assistant  Discharge Planner  Licensed Practical Nurse     Nurse Practitioner X

## 2024-09-30 NOTE — DISCHARGE PLACEMENT REQUEST
"Perri Eller (69 y.o. Male)       Date of Birth   1955    Social Security Number       Address   [address] [address]    Home Phone   [phone]    MRN   [id number]       [employer]   None    Marital Status                               Admission Date   9/24/24    Admission Type   Urgent    Admitting Provider   Karlos Winchester MD    Attending Provider   Lauro Sheffield MD    Department, Room/Bed   [facility], 2114/1       Discharge Date       Discharge Disposition   Home-Health Care [facility]    Discharge Destination                                 Attending Provider: Lauro Sheffield MD    Allergies: No Known Allergies    Isolation: Contact   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), 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)   Code Status: No CPR    Ht: 177.8 cm (70\")   Wt: 138 kg (304 lb 3.8 oz)    Admission Cmt: None   Principal Problem: Sepsis [A41.9]                   Active Insurance as of 9/24/2024       Primary Coverage       Payor Plan Insurance Group Employer/Plan Group    [facility] CC[facility] OPTUM        Payor Plan Address Payor Plan Phone Number Payor Plan Fax Number Effective Dates    [address] [phone]  1/1/2024 - None Entered    [facility] [id number]         Subscriber Name Subscriber Birth Date Member ID       PERRI ELLER 1955 [id number]               Secondary Coverage       Payor Plan Insurance Group Employer/Plan Group    [employer] MEDICARE REPLACEMENT [employer] MED ADV PPO [id number]       Payor Plan Address Payor Plan Phone Number Payor Plan Fax Number Effective Dates    [address] [address] [phone]  2/1/2020 - None Entered    [facility] [address]         Subscriber Name Subscriber Birth Date Member ID       PERRI ELLER 1955 [id number]                     Emergency Contacts        (Rel.) Home Phone Work Phone Mobile Phone    RONIT ELLER (Spouse) [phone] -- [phone]    nursing,facility [phone] -- --      "         Referral Orders (last 24 hours) (24h ago, onward)       Start     Ordered    09/30/24 0000  Ambulatory Referral to Home Health (Hospital)        Question Answer Comment   Face to Face Visit Date: 9/30/2024    Follow-up provider for Plan of Care? I will be treating the patient on an ongoing basis.  Please send me the Plan of Care for signature.    Follow-up provider: REBECCA STEVEN    Reason/Clinical Findings multiple scclerosis    Describe mobility limitations that make leaving home difficult: gen weakness    Nursing/Therapeutic Services Requested Physical Therapy    Nursing/Therapeutic Services Requested Skilled Nursing    Nursing/Therapeutic Services Requested Occupational Therapy    Skilled nursing orders: CHF management    PT orders: Home safety assessment    PT orders: Strengthening    Frequency: 1 Week 1        09/30/24 3861

## 2024-09-30 NOTE — PLAN OF CARE
Goal Outcome Evaluation:  Plan of Care Reviewed With: patient        Progress: no change  Outcome Evaluation: Patient alert and oriented, continues IV lasix, FC patent- yellow urine noted patient turned q 2 hrs, patient has rested well this shift. VSS.

## 2024-09-30 NOTE — THERAPY DISCHARGE NOTE
Please refer to note from 9/24 regarding therapy services.  EVER Khan, PT chart reviewed, spoke with patient at bedside and discontinued consult.    Pt confirms he is bed-bound at home and rarely transfers to w/c.  [place] lift used when transfers are completed.  Pt is at baseline for mobility and has no skilled PT needs.  Will d/c from inpt PT services.

## 2024-09-30 NOTE — OUTREACH NOTE
Prep Survey      Flowsheet Row Responses   Congregational facility patient discharged from? [facility]   Is LACE score < 7 ? No   Eligibility Readm Mgmt   Discharge diagnosis Sepsis   Does the patient have one of the following disease processes/diagnoses(primary or secondary)? Sepsis   Does the patient have Home health ordered? Yes   What is the Home health agency?  [facility]   Prep survey completed? Yes            Heather ARROYO - Registered Nurse

## 2024-09-30 NOTE — PROGRESS NOTES
LOS: 6 days   Patient Care Team:  Jhonny Heller MD as PCP - General (Family Medicine)  Frederick George MD as Consulting Physician (Nephrology)        Subjective     Interval History:     Patient Complaints:   Patient Denies:  NV       Review of Systems:    SOB    Objective     Vital Signs  Temp:  [98 °F (36.7 °C)-98.4 °F (36.9 °C)] 98.3 °F (36.8 °C)  Heart Rate:  [58-65] 59  Resp:  [11-17] 11  BP: (104-135)/(59-79) 128/79    Physical Exam:     General Appearance:  Alert, cooperative, in no acute distress   Head:  Normocephalic, without obvious abnormality, atraumatic   Eyes:  Lids and lashes normal, conjunctivae and sclerae normal, no icterus, no pallor, corneas clear, PERRLA   Ears:  Ears appear intact with no abnormalities noted   Throat:  No oral lesions, no thrush, oral mucosa moist   Neck:  No adenopathy, supple, trachea midline, no thyromegaly, no carotid bruit, no JVD   Back:  No kyphosis present, no scoliosis present, no skin lesions, erythema or scars, no tenderness to percussion or palpation, range of motion normal   Lungs:  Clear to auscultation, respirations regular, even and unlabored    Heart:  Regular rhythm and normal rate, normal S1 and S2, no murmur, no gallop, no rub, no click   Chest Wall:  No abnormalities observed   Abdomen:  Normal bowel sounds, no masses, no organomegaly, soft non-tender, non-distended, no guarding, no rebound tenderness   Rectal:  Deferred   Extremities:  Moves all extremities well, no edema, no cyanosis, no redness   Pulses:  Pulses palpable and equal bilaterally   Skin:  No bleeding, bruising or rash   Lymph nodes:  No palpable adenopathy   Neurologic:  Cranial nerves 2 - 12 grossly intact, sensation intact, DTR present and equal bilaterally                                                                                  Results Review:      Lab Results (last 72 hours)       Procedure Component Value Units Date/Time    POC Glucose 4x Daily Before Meals & at Bedtime  [[id number]]  (Abnormal) Collected: 09/25/24 1122    Specimen: Blood Updated: 09/25/24 1125     Glucose 143 mg/dL      Comment: Serial Number: [id number]:  [id number]       POC Glucose 4x Daily Before Meals & at Bedtime [[id number]]  (Abnormal) Collected: 09/25/24 0728    Specimen: Blood Updated: 09/25/24 0730     Glucose 140 mg/dL      Comment: Serial Number: [id number]:  [id number]       Basic Metabolic Panel [[id number]]  (Abnormal) Collected: 09/25/24 0452    Specimen: Blood from Hand, Left Updated: 09/25/24 0549     Glucose 117 mg/dL      BUN 21 mg/dL      Creatinine 1.03 mg/dL      Sodium 141 mmol/L      Potassium 3.6 mmol/L      Chloride 109 mmol/L      CO2 22.6 mmol/L      Calcium 8.4 mg/dL      BUN/Creatinine Ratio 20.4     Anion Gap 9.4 mmol/L      eGFR 78.6 mL/min/1.73     Narrative:      GFR Normal >60  Chronic Kidney Disease <60  Kidney Failure <15      CBC & Differential [[id number]]  (Abnormal) Collected: 09/25/24 0452    Specimen: Blood from Hand, Left Updated: 09/25/24 0510    Narrative:      The following orders were created for panel order CBC & Differential.  Procedure                               Abnormality         Status                     ---------                               -----------         ------                     CBC Auto Differential[[id number]]        Abnormal            Final result                 Please view results for these tests on the individual orders.    CBC Auto Differential [[id number]]  (Abnormal) Collected: 09/25/24 0452    Specimen: Blood from Hand, Left Updated: 09/25/24 0510     WBC 6.93 10*3/mm3      RBC 3.76 10*6/mm3      Hemoglobin 9.7 g/dL      Hematocrit 33.3 %      MCV 88.6 fL      MCH 25.8 pg      MCHC 29.1 g/dL      RDW 16.8 %      RDW-SD 54.1 fl      MPV 10.6 fL      Platelets 205 10*3/mm3      Neutrophil % 69.3 %      Lymphocyte % 10.7 %      Monocyte % 10.1 %      Eosinophil % 8.5 %      Basophil % 1.0 %      Immature Grans % 0.4 %       Neutrophils, Absolute 4.80 10*3/mm3      Lymphocytes, Absolute 0.74 10*3/mm3      Monocytes, Absolute 0.70 10*3/mm3      Eosinophils, Absolute 0.59 10*3/mm3      Basophils, Absolute 0.07 10*3/mm3      Immature Grans, Absolute 0.03 10*3/mm3      nRBC 0.0 /100 WBC     POC Glucose Once [[id number]]  (Abnormal) Collected: 09/24/24 2041    Specimen: Blood Updated: 09/24/24 2044     Glucose 162 mg/dL      Comment: Serial Number: [id number]:  [id number]       POC Glucose Once [[id number]]  (Abnormal) Collected: 09/24/24 1931    Specimen: Blood Updated: 09/24/24 1933     Glucose 209 mg/dL      Comment: Serial Number: [id number]:  [id number]       POC Glucose Once [[id number]]  (Abnormal) Collected: 09/24/24 1613    Specimen: Blood Updated: 09/24/24 1615     Glucose 155 mg/dL      Comment: Serial Number: [id number]:  [id number]       Blood Culture - Blood, Arm, Left [[id number]] Collected: 09/24/24 1526    Specimen: Blood from Arm, Left Updated: 09/24/24 1537    BNP [[id number]]  (Abnormal) Collected: 09/24/24 1227    Specimen: Blood from Arm, Right Updated: 09/24/24 1258     proBNP 4,889.0 pg/mL     Narrative:      This assay is used as an aid in the diagnosis of individuals suspected of having heart failure. It can be used as an aid in the diagnosis of acute decompensated heart failure (ADHF) in patients presenting with signs and symptoms of ADHF to the emergency department (ED). In addition, NT-proBNP of <300 pg/mL indicates ADHF is not likely.    Age Range Result Interpretation  NT-proBNP Concentration (pg/mL:      <50             Positive            >450                   Gray                 300-450                    Negative             <300    50-75           Positive            >900                  Gray                300-900                  Negative            <300      >75             Positive            >1800                  Gray                300-1800                  Negative            <300     POC Glucose Once [[id number]]  (Abnormal) Collected: 09/24/24 1156    Specimen: Blood Updated: 09/24/24 1159     Glucose 200 mg/dL      Comment: Serial Number: [id number]:  [id number]       Magnesium [[id number]]  (Normal) Collected: 09/24/24 0526    Specimen: Blood from Arm, Left Updated: 09/24/24 0951     Magnesium 1.8 mg/dL     TSH [[id number]]  (Normal) Collected: 09/24/24 0526    Specimen: Blood from Arm, Left Updated: 09/24/24 0951     TSH 2.210 uIU/mL     POC Glucose Once [[id number]]  (Abnormal) Collected: 09/24/24 0744    Specimen: Blood Updated: 09/24/24 0746     Glucose 174 mg/dL      Comment: Serial Number: [id number]:  [id number]       High Sensitivity Troponin T 2Hr [[id number]]  (Abnormal) Collected: 09/24/24 0526    Specimen: Blood from Arm, Left Updated: 09/24/24 0638     HS Troponin T 37 ng/L      Troponin T Delta -15 ng/L     Narrative:      High Sensitive Troponin T Reference Range:  <14.0 ng/L- Negative Female for AMI  <22.0 ng/L- Negative Male for AMI  >=14 - Abnormal Female indicating possible myocardial injury.  >=22 - Abnormal Male indicating possible myocardial injury.   Clinicians would have to utilize clinical acumen, EKG, Troponin, and serial changes to determine if it is an Acute Myocardial Infarction or myocardial injury due to an underlying chronic condition.         Urinalysis, Microscopic Only - Indwelling Urethral Catheter [[id number]]  (Abnormal) Collected: 09/24/24 0458    Specimen: Urine from Indwelling Urethral Catheter Updated: 09/24/24 0634     RBC, UA 3-5 /HPF      WBC, UA 21-50 /HPF      Bacteria, UA None Seen /HPF      Squamous Epithelial Cells, UA 0-2 /HPF      Yeast, UA       Moderate/2+ Budding Yeast w/Hyphae     /HPF     Hyaline Casts, UA 0-2 /LPF      Methodology Manual Light Microscopy    Urine Culture - Urine, Indwelling Urethral Catheter [[id number]] Collected: 09/24/24 0458    Specimen: Urine from Indwelling Urethral Catheter Updated: 09/24/24 0634     Urinalysis With Culture If Indicated - Indwelling Urethral Catheter [[id number]]  (Abnormal) Collected: 09/24/24 0458    Specimen: Urine from Indwelling Urethral Catheter Updated: 09/24/24 0620     Color, UA Yellow     Appearance, UA Turbid     Comment: Result checked          pH, UA 6.0     Specific Gravity, UA 1.013     Glucose, UA Negative     Ketones, UA Negative     Bilirubin, UA Negative     Blood, UA Large (3+)     Protein, UA 30 mg/dL (1+)     Leuk Esterase, UA Large (3+)     Nitrite, UA Negative     Urobilinogen, UA 1.0 E.U./dL    Narrative:      In absence of clinical symptoms, the presence of pyuria, bacteria, and/or nitrites on the urinalysis result does not correlate with infection.    MRSA Screen, PCR (Inpatient) - Swab, Nares [[id number]]  (Abnormal) Collected: 09/24/24 0336    Specimen: Swab from Nares Updated: 09/24/24 0613     MRSA PCR MRSA Detected    Narrative:      The negative predictive value of this diagnostic test is high and should only be used to consider de-escalating anti-MRSA therapy. A positive result may indicate colonization with MRSA and must be correlated clinically.    Basic Metabolic Panel [[id number]]  (Abnormal) Collected: 09/24/24 0343    Specimen: Blood from Arm, Left Updated: 09/24/24 0452     Glucose 141 mg/dL      BUN 26 mg/dL      Creatinine 1.18 mg/dL      Sodium 144 mmol/L      Potassium 3.8 mmol/L      Chloride 108 mmol/L      CO2 25.6 mmol/L      Calcium 8.5 mg/dL      BUN/Creatinine Ratio 22.0     Anion Gap 10.4 mmol/L      eGFR 66.8 mL/min/1.73     Narrative:      GFR Normal >60  Chronic Kidney Disease <60  Kidney Failure <15      High Sensitivity Troponin T [[id number]]  (Abnormal) Collected: 09/24/24 0343    Specimen: Blood from Arm, Left Updated: 09/24/24 0452     HS Troponin T 52 ng/L     Narrative:      High Sensitive Troponin T Reference Range:  <14.0 ng/L- Negative Female for AMI  <22.0 ng/L- Negative Male for AMI  >=14 - Abnormal Female indicating possible  myocardial injury.  >=22 - Abnormal Male indicating possible myocardial injury.   Clinicians would have to utilize clinical acumen, EKG, Troponin, and serial changes to determine if it is an Acute Myocardial Infarction or myocardial injury due to an underlying chronic condition.         CBC & Differential [[id number]]  (Abnormal) Collected: 09/24/24 0343    Specimen: Blood from Arm, Left Updated: 09/24/24 0427    Narrative:      The following orders were created for panel order CBC & Differential.  Procedure                               Abnormality         Status                     ---------                               -----------         ------                     CBC Auto Differential[[id number]]        Abnormal            Final result                 Please view results for these tests on the individual orders.    CBC Auto Differential [[id number]]  (Abnormal) Collected: 09/24/24 0343    Specimen: Blood from Arm, Left Updated: 09/24/24 0427     WBC 9.17 10*3/mm3      RBC 4.00 10*6/mm3      Hemoglobin 10.5 g/dL      Hematocrit 35.2 %      MCV 88.0 fL      MCH 26.3 pg      MCHC 29.8 g/dL      RDW 16.7 %      RDW-SD 54.2 fl      MPV 10.8 fL      Platelets 215 10*3/mm3      Neutrophil % 73.0 %      Lymphocyte % 9.3 %      Monocyte % 10.6 %      Eosinophil % 6.1 %      Basophil % 0.7 %      Immature Grans % 0.3 %      Neutrophils, Absolute 6.70 10*3/mm3      Lymphocytes, Absolute 0.85 10*3/mm3      Monocytes, Absolute 0.97 10*3/mm3      Eosinophils, Absolute 0.56 10*3/mm3      Basophils, Absolute 0.06 10*3/mm3      Immature Grans, Absolute 0.03 10*3/mm3      nRBC 0.0 /100 WBC     POC Glucose Once [[id number]]  (Abnormal) Collected: 09/24/24 0113    Specimen: Blood Updated: 09/24/24 0114     Glucose 150 mg/dL      Comment: Serial Number: [id number]:  [id number]               Imaging Results (Last 72 Hours)       ** No results found for the last 72 hours. **              Medication Review:     Hospital Medications  (active)         Dose Frequency Start End    acetaminophen (TYLENOL) tablet 500 mg 500 mg Every 6 Hours PRN 9/24/2024 --    Admin Instructions: If given for fever, use fever parameter: fever greater than 100.4 °F  Based on patient request - if ordered for moderate or severe pain, provider allows for administration of a medication prescribed for a lower pain scale.    Do not exceed 4 grams of acetaminophen in a 24 hr period. Max dose of 2gm for AST/ALT greater than 120 units/L.    If given for pain, use the following pain scale:   Mild Pain = Pain Score of 1-3, CPOT 1-2  Moderate Pain = Pain Score of 4-6, CPOT 3-4  Severe Pain = Pain Score of 7-10, CPOT 5-8    Route: Oral    acetaminophen (TYLENOL) tablet 650 mg 650 mg Once As Needed 9/24/2024 --    Admin Instructions: If given for fever, use fever parameter: fever greater than 100.4 °F  Based on patient request - if ordered for moderate or severe pain, provider allows for administration of a medication prescribed for a lower pain scale.    Do not exceed 4 grams of acetaminophen in a 24 hr period. Max dose of 2gm for AST/ALT greater than 120 units/L.    If given for pain, use the following pain scale:   Mild Pain = Pain Score of 1-3, CPOT 1-2  Moderate Pain = Pain Score of 4-6, CPOT 3-4  Severe Pain = Pain Score of 7-10, CPOT 5-8    Route: Oral    apixaban (ELIQUIS) tablet 5 mg 5 mg Every 12 Hours Scheduled 9/24/2024 --    Admin Instructions: Tablet may be crushed and suspended in 60 mL of water or D5W and immediately delivered via NG tube.    Route: Oral    aspirin EC tablet 81 mg 81 mg Daily 9/24/2024 --    Admin Instructions: Do not crush or chew the capsules or tablets. The drug may not work as designed if the capsule or tablet is crushed or chewed. Swallow whole.  Do not exceed 4 grams of aspirin in a 24 hr period.    If given for pain, use the following pain scale:   Mild Pain = Pain Score of 1-3, CPOT 1-2  Moderate Pain = Pain Score of 4-6, CPOT 3-4  Severe  Pain = Pain Score of 7-10, CPOT 5-8    Route: Oral    bacitracin 500 UNIT/GM ointment  Daily 9/24/2024 --    Admin Instructions: Apply to affected area.    Route: Topical    benzonatate (TESSALON) capsule 100 mg 100 mg 3 Times Daily PRN 9/24/2024 --    Admin Instructions: Swallow whole.  Do not crush, chew, or open capsule.    Route: Oral    cyclobenzaprine (FLEXERIL) tablet 5 mg 5 mg 2 Times Daily PRN 9/24/2024 --    Route: Oral    dextrose (D50W) (25 g/50 mL) IV injection 25 g 25 g Every 15 Minutes PRN 9/24/2024 --    Admin Instructions: Blood sugar less than 70; patient has IV access - Unresponsive, NPO or Unable To Safely Swallow    Route: Intravenous    dextrose (GLUTOSE) oral gel 15 g 15 g Every 15 Minutes PRN 9/24/2024 --    Admin Instructions: BS<70, Patient Alert, Is not NPO, Can safely swallow.    Route: Oral    glatiramer acetate (GLATOPA) injection 20 mg 20 mg Daily 9/24/2024 --    Route: Subcutaneous    Non-formulary Exception Code: Patient supplied medication    glucagon (GLUCAGEN) injection 1 mg 1 mg Every 15 Minutes PRN 9/24/2024 --    Admin Instructions: Blood Glucose Less Than 70 - Patient Without IV Access - Unresponsive, NPO or Unable To Safely Swallow  Reconstitute powder for injection by adding 1 mL of -supplied sterile diluent or sterile water for injection to a vial containing 1 mg of the drug, to provide solutions containing 1 mg/mL. Shake vial gently to dissolve.    Route: Intramuscular    insulin lispro (HUMALOG/ADMELOG) injection 2-7 Units 2-7 Units 4 Times Daily Before Meals & Nightly 9/24/2024 --    Admin Instructions: Correction Insulin - Low Dose - Total Insulin Dose Less Than 40 units/day (Lean, Elderly or Renal Patients)    Blood Glucose 150-199 mg/dL - 2 units  Blood Glucose 200-249 mg/dL - 3 units  Blood Glucose 250-299 mg/dL - 4 units  Blood Glucose 300-349 mg/dL - 5 units  Blood Glucose 350-400 mg/dL - 6 units  Blood Glucose Greater Than 400 mg/dL - 7 units & Call  Provider   Caution: Look alike/sound alike drug alert    Route: Subcutaneous    ipratropium-albuterol (DUO-NEB) nebulizer solution 3 mL 3 mL Every 4 Hours PRN 9/24/2024 --    Route: Nebulization    Linezolid (ZYVOX) 600 mg 300 mL 600 mg Every 12 Hours 9/24/2024 9/27/2024    Admin Instructions: Protect from light. Do NOT refrigerate.    Route: Intravenous    meropenem (MERREM) 1,000 mg in sodium chloride 0.9 % 100 mL MBP 1,000 mg Every 8 Hours 9/24/2024 9/26/2024    Route: Intravenous    midodrine (PROAMATINE) tablet 10 mg 10 mg 3 Times Daily Before Meals 9/25/2024 --    Route: Oral    nicotine (NICODERM CQ) 14 MG/24HR patch 1 patch 1 patch Every 24 Hours Scheduled 9/24/2024 --    Admin Instructions: Apply to clean, dry, nonhairy area of skin (typically upper arm or shoulder)  Dispose of nicotine replacement therapies and their wrappers in non-hazardous pharmaceutical waste or in regular trash.    Route: Transdermal    pantoprazole (PROTONIX) EC tablet 40 mg 40 mg Every Early Morning 9/24/2024 --    Admin Instructions: Swallow whole; do not crush, split, or chew.    Route: Oral    Pharmacy to Dose meropenem (MERREM)  Continuous PRN 9/24/2024 9/26/2024    Route: Does not apply    sodium chloride 0.9 % infusion 75 mL/hr Continuous 9/24/2024 --    Route: Intravenous    sodium chloride 0.9 % infusion 75 mL/hr Continuous 9/24/2024 --    Route: Intravenous    vitamin B-12 (CYANOCOBALAMIN) tablet 1,000 mcg 1,000 mcg Daily 9/24/2024 --    Route: Oral          amiodarone, 200 mg, Oral, BID With Meals  apixaban, 5 mg, Oral, Q12H  aspirin, 81 mg, Oral, Daily  bacitracin, , Topical, Daily  FLUoxetine, 40 mg, Oral, Daily  furosemide, 40 mg, Oral, BID  glatiramer acetate, 20 mg, Subcutaneous, Daily  insulin lispro, 2-7 Units, Subcutaneous, 4x Daily AC & at Bedtime  Guillermo, 1 packet, Oral, BID  metoprolol tartrate, 25 mg, Oral, BID  midodrine, 10 mg, Oral, TID AC  nicotine, 1 patch, Transdermal, Q24H  pantoprazole, 40 mg, Oral, Q  AM        Assessment & Plan         Sepsis    Atrial fibrillation with RVR    CAD S/P percutaneous coronary angioplasty    Essential hypertension    Dyslipidemia    Non-insulin dependent type 2 diabetes mellitus    GERD without esophagitis    BPH with obstruction/lower urinary tract symptoms    JARRETT (generalized anxiety disorder)    S/P MVR (mitral valve replacement)    Paroxysmal atrial fibrillation    Pneumonia    Acute UTI (urinary tract infection)    Acute on chronic renal insufficiency    Lung cancer    Elevated troponin  UTI C&S  yeast  PNA     MRSA screen +    Plan :           Off AB  Observe    C&S noted  CXR noted  Will follow  Thank you                 Maximilian Dumont MD  09/30/24  11:22 EDT

## 2024-09-30 NOTE — PROGRESS NOTES
Cardiology Progress  Note      Patient Care Team:  Jhonny Heller MD as PCP - General (Family Medicine)  Frederick George MD as Consulting Physician (Nephrology)    PATIENT IDENTIFICATION  Name: Jamin Hennessy  Age: 69 y.o.  Sex: male  :  1955  MRN: [id number]      Length of stay:    LOS: 6 days           REASON FOR FOLLOW-UP:  Paroxysmal atrial fibrillation  Sepsis with history of mitral valve endocarditis status post bioprosthetic mitral valve replacement  Nonobstructive coronary artery disease      INTERVAL HISTORY  Patient seen and examined, chart and labs reviewed.  He is sitting up in bed eating breakfast and appears quite comfortable.  He reports no chest discomfort or shortness of breath.  Rhythm sinus at 60 with occasional PACs, blood pressure 104/59.      SUBJECTIVE    Denies chest discomfort  Denies shortness of breath  Denies GI complaints  Denies palpitations      REVIEW OF SYSTEMS:  Pertinent items are noted in HPI, all other systems reviewed and negative    OBJECTIVE       ASSESSMENT  Paroxysmal atrial fibrillation-currently maintaining sinus rhythm  Sepsis with hypotension  UTI  History of mitral valve endocarditis status post bioprosthetic mitral valve replacement  Nonobstructive coronary artery disease  Morbid exogenous obesity with BMI 43.65  Immobility status  CHADS-VASc Risk Assessment               3 Total Score    1 Hypertension    1 DM    1 Age 65-74    Criteria that do not apply:    CHF    Age >/= 75    PRIOR STROKE/TIA/THROMBO    Vascular Disease    Sex: Female              RECOMMENDATIONS  Transition to p.o. Lasix-40 mg twice daily  Maintaining sinus rhythm-continue BB/amiodarone for rhythm suppression  Continue apixaban 5 mg every 12 hours for stroke prevention from elevated chads vascular score  Continue aspirin for CAD  Patient requiring midodrine in order to tolerate BB and antiarrhythmic therapy  TTE this admission with preserved LV systolic function, moderate LAE,  "bioprosthetic mitral valve with normal function and peak/mean gradients within defined limits.  Blood cultures negative-AARON not indicated.  Okay to discharge to next site of care.  Nothing further per cardiology, we will sign off.  Please call if needed.          CHF Guideline Directed Medical Therapy  Beta Blocker:   ARNI/ACE/ARB:   SGLT 2 inhibitors:   Diuretics:   Aldosterone Antagonist:   Vasodilators & Nitrates:       Vital Signs  Visit Vitals  /79 (BP Location: Left arm, Patient Position: Lying)   Pulse 59   Temp 98.3 °F (36.8 °C) (Oral)   Resp 11   Ht 177.8 cm (70\")   Wt (!) 138 kg (304 lb 3.8 oz)   SpO2 91%   BMI 43.65 kg/m²     Oxygen Therapy  SpO2: 91 %  Pulse Oximetry Type: Continuous  Device (Oxygen Therapy): room air  Flow (L/min): 33  Flowsheet Rows      Flowsheet Row First Filed Value   Admission Height 177.8 cm (70\") Documented at 09/24/2024 0100   Admission Weight 133 kg (294 lb 1.5 oz) Documented at 09/24/2024 0100          Intake & Output (last 3 days)         09/27 0701  09/28 0700 09/28 0701  09/29 0700 09/29 0701  09/30 0700 09/30 0701  10/01 0700    P.O.      Total Intake(mL/kg) 720 (5.2) 720 (5.2) 1080 (7.8)     Urine (mL/kg/hr) 4150 (1.3) 4350 (1.3) 2400 (0.7)     Stool  0      Total Output 4150 4350 2400     Net -3430 -3630 -1320             Stool Unmeasured Occurrence  1 x 1 x           Lines, Drains & Airways       Active LDAs       Name Placement date Placement time Site Days    Peripheral IV 09/24/24 0100 Anterior;Right Forearm 09/24/24 0100  Forearm  6    Peripheral IV 09/24/24 0100 Anterior;Distal;Right;Upper Arm 09/24/24 0100  Arm  6    Urethral Catheter 16 Fr. 09/28/24 0930  -- 2                           /79 (BP Location: Left arm, Patient Position: Lying)   Pulse 59   Temp 98.3 °F (36.8 °C) (Oral)   Resp 11   Ht 177.8 cm (70\")   Wt (!) 138 kg (304 lb 3.8 oz)   SpO2 91%   BMI 43.65 kg/m²   Intake/Output last 3 shifts:  I/O last 3 completed " shifts:  In: 1320 [P.O.:1320]  Out: 5000 [Urine:5000]  Intake/Output this shift:  No intake/output data recorded.    PHYSICAL EXAM:    General: Alert, cooperative, no distress, appears stated age  Head:  Normocephalic, atraumatic, mucous membranes moist  Eyes:  Conjunctivae/corneas clear, EOM's intact     Neck:  Supple,  no adenopathy; no JVD or bruit  Lungs: Slight expiratory wheeze left lower lobe  Chest wall: No tenderness  Heart::  Regular rate and rhythm, S1 and S2 normal, no murmur, rub or gallop  Abdomen: Soft, nontender, nondistended, bowel sounds active  Extremities: No cyanosis, clubbing, or edema   Pulses: 2+ and symmetric all extremities  Skin:  No rashes or lesions  Neuro/psych: A&O x3. CN II through XII are grossly intact with appropriate affect        Scheduled Meds:      amiodarone, 200 mg, Oral, BID With Meals  apixaban, 5 mg, Oral, Q12H  aspirin, 81 mg, Oral, Daily  bacitracin, , Topical, Daily  FLUoxetine, 40 mg, Oral, Daily  furosemide, 40 mg, Oral, BID  glatiramer acetate, 20 mg, Subcutaneous, Daily  insulin lispro, 2-7 Units, Subcutaneous, 4x Daily AC & at Bedtime  Guillermo, 1 packet, Oral, BID  metoprolol tartrate, 25 mg, Oral, BID  midodrine, 10 mg, Oral, TID AC  nicotine, 1 patch, Transdermal, Q24H  pantoprazole, 40 mg, Oral, Q AM        Continuous Infusions:         PRN Meds:      acetaminophen    benzonatate    Calcium Replacement - Follow Nurse / BPA Driven Protocol    cyclobenzaprine    dextrose    dextrose    glucagon (human recombinant)    ipratropium-albuterol    Magnesium Standard Dose Replacement - Follow Nurse / BPA Driven Protocol    Phosphorus Replacement - Follow Nurse / BPA Driven Protocol    Potassium Replacement - Follow Nurse / BPA Driven Protocol        Results Review:     I reviewed the patient's new clinical results.    CBC    Results from last 7 days   Lab Units 09/30/24  0228 09/29/24  0248 09/28/24  0226 09/27/24  0103 09/26/24  0317 09/25/24  0452 09/24/24  0343   WBC  10*3/mm3 8.48 7.83 7.56 8.32 7.72 6.93 9.17   HEMOGLOBIN g/dL 10.8* 11.2* 10.4* 9.4* 9.0* 9.7* 10.5*   PLATELETS 10*3/mm3 274 254 259 218 216 205 215     Cr Clearance Estimated Creatinine Clearance: 89.6 mL/min (by C-G formula based on SCr of 1.09 mg/dL).  Coag     HbA1C   Lab Results   Component Value Date    HGBA1C 6.6 (H) 02/27/2020    HGBA1C 5.7 (H) 01/15/2020     Blood Glucose   Glucose   Date/Time Value Ref Range Status   09/30/2024 0723 129 (H) 70 - 105 mg/dL Final     Comment:     Serial Number: [id number]:  [id number]   09/29/2024 2042 155 (H) 70 - 105 mg/dL Final     Comment:     Serial Number: [id number]:  [id number]   09/29/2024 1749 163 (H) 70 - 105 mg/dL Final     Comment:     Serial Number: [id number]:  [id number]   09/29/2024 1211 122 (H) 70 - 105 mg/dL Final     Comment:     Serial Number: [id number]:  [id number]   09/29/2024 0712 140 (H) 70 - 105 mg/dL Final     Comment:     Serial Number: [id number]:  [id number]   09/28/2024 2031 128 (H) 70 - 105 mg/dL Final     Comment:     Serial Number: [id number]:  [id number]   09/28/2024 1649 193 (H) 70 - 105 mg/dL Final     Comment:     Serial Number: [id number]:  [id number]   09/28/2024 1114 166 (H) 70 - 105 mg/dL Final     Comment:     Serial Number: [id number]:  [id number]     Infection   Results from last 7 days   Lab Units 09/24/24  1526 09/24/24  0458   BLOODCX  No growth at 5 days  --    URINECX   --  Yeast isolated*     CMP   Results from last 7 days   Lab Units 09/30/24  0228 09/29/24  1257 09/29/24  0248 09/28/24  0226 09/26/24  0317 09/25/24  0452 09/24/24  0343   SODIUM mmol/L 141  --  139 143 140 141 144   POTASSIUM mmol/L 3.6 4.1 3.6 3.3* 3.5 3.6 3.8   CHLORIDE mmol/L 100  --  100 103 107 109* 108*   CO2 mmol/L 27.8  --  28.3 29.7* 25.0 22.6 25.6   BUN mg/dL 32*  --  31* 19 21 21 26*   CREATININE mg/dL 1.09  --  1.03 1.03 1.23 1.03 1.18   GLUCOSE mg/dL 127*  --  128* 113* 103* 117* 141*   ALBUMIN  "g/dL 3.8  --  3.8 3.6 3.2*  --   --    BILIRUBIN mg/dL 0.4  --  0.4 0.4 0.2  --   --    ALK PHOS U/L 73  --  71 71 60  --   --    AST (SGOT) U/L 54*  --  41* 40 23  --   --    ALT (SGPT) U/L 57*  --  41 37 20  --   --    AMMONIA umol/L  --   --   --   --  26  --   --      ABG      UA    Results from last 7 days   Lab Units 09/24/24  0458   NITRITE UA  Negative   WBC UA /HPF 21-50*   BACTERIA UA /HPF None Seen   SQUAM EPITHEL UA /HPF 0-2   URINECX  Yeast isolated*     TERESA  No results found for: \"POCMETH\", \"POCAMPHET\", \"POCBARBITUR\", \"POCBENZO\", \"POCCOCAINE\", \"POCOPIATES\", \"POCOXYCODO\", \"POCPHENCYC\", \"POCPROPOXY\", \"POCTHC\", \"POCTRICYC\"  Lysis Labs   Results from last 7 days   Lab Units 09/30/24  0228 09/29/24  0248 09/28/24  0226 09/27/24  0103 09/26/24  0317 09/25/24  0452 09/24/24  0343   HEMOGLOBIN g/dL 10.8* 11.2* 10.4* 9.4* 9.0* 9.7* 10.5*   PLATELETS 10*3/mm3 274 254 259 218 216 205 215   CREATININE mg/dL 1.09 1.03 1.03  --  1.23 1.03 1.18     Radiology(recent) No radiology results for the last day      Results from last 7 days   Lab Units 09/24/24  0526   HSTROP T ng/L 37*       X-rays, labs reviewed personally by physician.    ECG/EMG Results (most recent)       Procedure Component Value Units Date/Time    Telemetry Scan [[id number]] Resulted: 09/24/24     Updated: 09/24/24 0128    Telemetry Scan [[id number]] Resulted: 09/24/24     Updated: 09/24/24 0510    Telemetry Scan [[id number]] Resulted: 09/24/24     Updated: 09/24/24 0847    Telemetry Scan [[id number]] Resulted: 09/24/24     Updated: 09/24/24 1213    Adult Transthoracic Echo Complete W/ Cont if Necessary Per Protocol [[id number]] Resulted: 09/24/24 1657     Updated: 09/24/24 1700     EF(MOD-bp) 64.3 %      LVIDd 4.8 cm      LVIDs 3.1 cm      IVSd 1.20 cm      LVPWd 1.00 cm      FS 35.4 %      IVS/LVPW 1.20 cm      ESV(cubed) 29.8 ml      LV Sys Vol (BSA corrected) 21.9 cm2      EDV(cubed) 110.6 ml      LV [place] Vol (BSA corrected) 60.3 cm2      LV mass(C)d " 194.0 grams      LVOT area 4.5 cm2      LVOT diam 2.40 cm      EDV(MOD-sp2) 126.0 ml      EDV(MOD-sp4) 149.0 ml      ESV(MOD-sp2) 43.2 ml      ESV(MOD-sp4) 54.2 ml      SV(MOD-sp2) 82.8 ml      SV(MOD-sp4) 94.8 ml      SVi(MOD-SP2) 33.5 ml/m2      SVi(MOD-SP4) 38.4 ml/m2      SVi (LVOT) 41.8 ml/m2      EF(MOD-sp2) 65.7 %      EF(MOD-sp4) 63.6 %      LA ESV Index (BP) 32.7 ml/m2      TR max kely 168.0 cm/sec      SV(LVOT) 103.1 ml      RV Base 3.6 cm      RV Mid 2.7 cm      TAPSE (>1.6) 1.58 cm      LA dimension (2D)  4.8 cm      LV V1 max 141.0 cm/sec      LV V1 max PG 8.0 mmHg      LV V1 mean PG 4.0 mmHg      LV V1 VTI 22.8 cm      Ao pk kely 179.0 cm/sec      Ao max PG 12.8 mmHg      Ao mean PG 7.0 mmHg      Ao V2 VTI 30.0 cm      KIANNA(I,D) 3.4 cm2      MV max PG 15.4 mmHg      MV mean PG 5.0 mmHg      MV V2 VTI 33.1 cm      MV P1/2t 49.1 msec      MVA(P1/2t) 4.5 cm2      MVA(VTI) 3.1 cm2      MV dec slope 1,097 cm/sec2      TR max PG 11.3 mmHg      RVSP(TR) 14.3 mmHg      RAP systole 3.0 mmHg      RV V1 max PG 2.45 mmHg      RV V1 max 78.2 cm/sec      RV V1 VTI 13.1 cm      PA V2 max 97.7 cm/sec      PA acc time 0.11 sec      Sinus 3.3 cm      STJ 2.9 cm      Dimensionless Index 0.79 (DI)     Narrative:        Left ventricular systolic function is normal. Calculated left   ventricular EF = 64.3% Left ventricular ejection fraction appears to be 61   - 65%.    Left ventricular wall thickness is consistent with mild concentric   hypertrophy.    The left atrial cavity is moderately dilated.    There is a bioprosthetic mitral valve present.    Estimated right ventricular systolic pressure from tricuspid   regurgitation is normal (<35 mmHg).      Telemetry Scan [[id number]] Resulted: 09/24/24     Updated: 09/24/24 1741    ECG 12 Lead Rhythm Change [[id number]] Collected: 09/24/24 0130     Updated: 09/24/24 1940     QT Interval 373 ms      QTC Interval 516 ms     Narrative:      HEART MEEI=100  bpm  RR Hfkvynje=108  ms  FL  Interval=  ms  P Horizontal Axis=  deg  P Front Axis=  deg  QRSD Interval=99  ms  QT Vdcrdohz=956  ms  ARgH=807  ms  QRS Axis=-21  deg  T Wave Axis=107  deg  - ABNORMAL ECG -  Atrial flutter  Inferior  infarct, old  Nonspecific  T abnormalities, lateral leads  Prolonged QT interval  When compared with ECG of 29-Jan-2020 10:08:41,  Significant change in rhythm: previously sinus  Significant repolarization change  Electronically Signed By: Migdalia Beth ([facility]) 2024-09-24 19:33:58  Date and Time of Study:2024-09-24 01:30:50    ECG 12 Lead Rhythm Change [[id number]] Collected: 09/24/24 1547     Updated: 09/24/24 1941     QT Interval 351 ms      QTC Interval 518 ms     Narrative:      HEART JYBJ=576  bpm  RR Odqolhgm=544  ms  NM Interval=  ms  P Horizontal Axis=  deg  P Front Axis=  deg  QRSD Interval=96  ms  QT Dfqloysg=038  ms  BNjE=132  ms  QRS Axis=-42  deg  T Wave Axis=41  deg  - ABNORMAL ECG -  Atrial flutter with varied AV block,  Inferior  infarct, old  Borderline  ST elevation, lateral leads  Prolonged QT interval  Electronically Signed By: Migdalia Beth ([facility]) 2024-09-24 19:34:03  Date and Time of Study:2024-09-24 15:47:39    Telemetry Scan [[id number]] Resulted: 09/24/24     Updated: 09/24/24 2218    Telemetry Scan [[id number]] Resulted: 09/24/24     Updated: 09/25/24 0145    Telemetry Scan [[id number]] Resulted: 09/24/24     Updated: 09/25/24 0603    Telemetry Scan [[id number]] Resulted: 09/24/24     Updated: 09/25/24 1020    Telemetry Scan [[id number]] Resulted: 09/24/24     Updated: 09/25/24 1320    Telemetry Scan [[id number]] Resulted: 09/24/24     Updated: 09/25/24 1814    Telemetry Scan [[id number]] Resulted: 09/24/24     Updated: 09/25/24 2013    Telemetry Scan [[id number]] Resulted: 09/24/24     Updated: 09/26/24 0430    Telemetry Scan [[id number]] Resulted: 09/24/24     Updated: 09/26/24 0430    Telemetry Scan [[id number]] Resulted: 09/24/24     Updated: 09/26/24 0749    ECG 12 Lead Drug  Monitoring; Amiodarone [[id number]] Collected: 09/26/24 0620     Updated: 09/26/24 1646     QT Interval 480 ms      QTC Interval 474 ms     Narrative:      HEART RATE=59  bpm  RR Fdscyecp=2232  ms  MI Nrhtzqtl=362  ms  P Horizontal Axis=-52  deg  P Front Axis=-38  deg  QRSD Gbckxvda=049  ms  QT Gzrdymsm=923  ms  YElH=834  ms  QRS Axis=-5  deg  T Wave Axis=68  deg  - OTHERWISE NORMAL ECG -  Sinus bradycardia  Low voltage, extremity leads  When compared with ECG of 25-Sep-2024 05:50:04,  Significant axis, voltage or hypertrophy change  Electronically Signed By: Mau De La Fuente ([facility]) 2024-09-26 16:38:34  Date and Time of Study:2024-09-26 06:20:21    ECG 12 Lead Drug Monitoring; Amiodarone [[id number]] Collected: 09/25/24 0550     Updated: 09/26/24 1646     QT Interval 449 ms      QTC Interval 480 ms     Narrative:      HEART RATE=68  bpm  RR Yroowiem=012  ms  MI Kpflwvza=612  ms  P Horizontal Axis=-10  deg  P Front Axis=-20  deg  QRSD Wsuwafmr=092  ms  QT Feorutvm=712  ms  ZAeQ=428  ms  QRS Axis=4  deg  T Wave Axis=42  deg  - NORMAL ECG -  Sinus rhythm  When compared with ECG of 24-Sep-2024 15:47:39,  Significant repolarization change  Electronically Signed By: Mau De La Fuente ([facility]) 2024-09-26 16:38:43  Date and Time of Study:2024-09-25 05:50:04    Telemetry Scan [[id number]] Resulted: 09/24/24     Updated: 09/26/24 2204    Telemetry Scan [[id number]] Resulted: 09/24/24     Updated: 09/26/24 2231    Telemetry Scan [[id number]] Resulted: 09/24/24     Updated: 09/26/24 2257    Telemetry Scan [[id number]] Resulted: 09/24/24     Updated: 09/26/24 2357    Telemetry Scan [[id number]] Resulted: 09/24/24     Updated: 09/27/24 0434    Telemetry Scan [[id number]] Resulted: 09/24/24     Updated: 09/27/24 0955    Telemetry Scan [[id number]] Resulted: 09/24/24     Updated: 09/27/24 1232    ECG 12 Lead Drug Monitoring; Amiodarone [[id number]] Collected: 09/27/24 0605     Updated: 09/27/24 1255     QT Interval 485 ms      QTC Interval 486 ms      Narrative:      HEART RATE=60  bpm  RR Xitkktdo=242  ms  WI Xwkujxeb=124  ms  P Horizontal Axis=-84  deg  P Front Axis=-25  deg  QRSD Xpeegbxe=016  ms  QT Sxzboqzx=675  ms  QMrL=452  ms  QRS Axis=58  deg  T Wave Axis=12  deg  - NORMAL ECG -  Sinus rhythm  When compared with ECG of 26-Sep-2024 06:20:21,  Significant axis, voltage or hypertrophy change  Electronically Signed By: Migdalia Beth ([facility]) 2024-09-27 12:54:59  Date and Time of Study:2024-09-27 06:05:39    Telemetry Scan [[id number]] Resulted: 09/24/24     Updated: 09/27/24 1707    Telemetry Scan [[id number]] Resulted: 09/24/24     Updated: 09/27/24 2208    Telemetry Scan [[id number]] Resulted: 09/24/24     Updated: 09/28/24 0021    Telemetry Scan [[id number]] Resulted: 09/24/24     Updated: 09/28/24 0952    ECG 12 Lead QT Measurement [[id number]] Collected: 09/28/24 0820     Updated: 09/28/24 1619     QT Interval 501 ms      QTC Interval 487 ms     Narrative:      HEART RATE=57  bpm  RR Tqdidqqx=6214  ms  WI Dtffwape=507  ms  P Horizontal Axis=-59  deg  P Front Axis=-26  deg  QRSD Smtgeeto=736  ms  QT Eeqkecyb=437  ms  ZLrO=031  ms  QRS Axis=-25  deg  T Wave Axis=34  deg  - ABNORMAL ECG -  Sinus bradycardia  Nonspecific  intraventricular conduction delay  When compared with ECG of 27-Sep-2024 06:05:39,  No significant change  Electronically Signed By: Mau De La Fuente ([facility]) 2024-09-28 16:18:25  Date and Time of Study:2024-09-28 08:20:47    Telemetry Scan [[id number]] Resulted: 09/24/24     Updated: 09/28/24 2305    Telemetry Scan [[id number]] Resulted: 09/24/24     Updated: 09/29/24 0032    Telemetry Scan [[id number]] Resulted: 09/24/24     Updated: 09/29/24 0106    Telemetry Scan [[id number]] Resulted: 09/24/24     Updated: 09/29/24 0210    Telemetry Scan [[id number]] Resulted: 09/24/24     Updated: 09/29/24 0829    ECG 12 Lead QT Measurement [[id number]] Collected: 09/29/24 0613     Updated: 09/29/24 0906     QT Interval 492 ms      QTC Interval 486 ms      Narrative:      HEART RATE=59  bpm  RR Qkmzfggd=8925  ms  CO Dhedjiyg=322  ms  P Horizontal Axis=-38  deg  P Front Axis=-20  deg  QRSD Xsbwgbxd=869  ms  QT Srxukknh=450  ms  JMbF=675  ms  QRS Axis=2  deg  T Wave Axis=76  deg  - OTHERWISE NORMAL ECG -  Sinus bradycardia  When compared with ECG of 28-Sep-2024 08:20:47,  Nonspecific significant change  Electronically Signed By: Mau De La Fuente ([facility]) 2024-09-29 09:04:46  Date and Time of Study:2024-09-29 06:13:57    Telemetry Scan [[id number]] Resulted: 09/24/24     Updated: 09/30/24 0505    Telemetry Scan [[id number]] Resulted: 09/24/24     Updated: 09/30/24 0539    Telemetry Scan [[id number]] Resulted: 09/24/24     Updated: 09/30/24 0627    Telemetry Scan [[id number]] Resulted: 09/24/24     Updated: 09/30/24 0721    Telemetry Scan [[id number]] Resulted: 09/24/24     Updated: 09/30/24 0737    Telemetry Scan [[id number]] Resulted: 09/24/24     Updated: 09/30/24 0802    Telemetry Scan [[id number]] Resulted: 09/24/24     Updated: 09/30/24 0959              Medication Review:   I have reviewed the patient's current medication list  Scheduled Meds:amiodarone, 200 mg, Oral, BID With Meals  apixaban, 5 mg, Oral, Q12H  aspirin, 81 mg, Oral, Daily  bacitracin, , Topical, Daily  FLUoxetine, 40 mg, Oral, Daily  furosemide, 40 mg, Oral, BID  glatiramer acetate, 20 mg, Subcutaneous, Daily  insulin lispro, 2-7 Units, Subcutaneous, 4x Daily AC & at Bedtime  Guillermo, 1 packet, Oral, BID  metoprolol tartrate, 25 mg, Oral, BID  midodrine, 10 mg, Oral, TID AC  nicotine, 1 patch, Transdermal, Q24H  pantoprazole, 40 mg, Oral, Q AM      Continuous Infusions:   PRN Meds:.  acetaminophen    benzonatate    Calcium Replacement - Follow Nurse / BPA Driven Protocol    cyclobenzaprine    dextrose    dextrose    glucagon (human recombinant)    ipratropium-albuterol    Magnesium Standard Dose Replacement - Follow Nurse / BPA Driven Protocol    Phosphorus Replacement - Follow Nurse / BPA Driven Protocol     "Potassium Replacement - Follow Nurse / BPA Driven Protocol    Imaging:  Imaging Results (Last 72 Hours)       ** No results found for the last 72 hours. **              YUE Kerns  09/30/24  10:36 EDT       EMR Dragon/Transcription:   \"Dictated utilizing Dragon dictation\".       Electronically signed by YUE Kerns, 09/30/24, 10:36 AM EDT.  Copied text in this note has been reviewed by me and is accurate as of 09/30/24.    "

## 2024-09-30 NOTE — PROGRESS NOTES
[facility] MEDICINE SERVICE  DAILY PROGRESS NOTE    NAME: Jamin Hennessy  : 1955  MRN: [id number]      LOS: 6 days     PROVIDER OF SERVICE: Lauro Sheffield MD    Chief Complaint: Sepsis    Subjective:     Interval History:  History taken from: patient    No acute     Review of Systems:   Review of Systems    Objective:     Vital Signs  Temp:  [98.1 °F (36.7 °C)-98.4 °F (36.9 °C)] 98.1 °F (36.7 °C)  Heart Rate:  [58-64] 58  Resp:  [15-17] 17  BP: (113-135)/(70-79) 132/70   Body mass index is 43.62 kg/m².    Physical Exam  General Appearance:  Awake alert, conversational   Head:  Atraumatic normocephalic   Eyes:        No sclera icterus   Neck: Non tender, normal ROM   Pulm: Decreased breath sound, some crackles   Cardio: Normal HR, irregular rhythm   Extremities: Edema on bilateral lower extremities has improved from 4+ on admission to 1+ today   Abdomen: Distended, soft non tender   /Renal: No suprapubic tenderness         Neuro: Aaox3, normal speech, BUE 4+ strength, BLE 1+ , Hx of MS on maintenance med                 Scheduled Meds   amiodarone, 200 mg, Oral, BID With Meals  apixaban, 5 mg, Oral, Q12H  aspirin, 81 mg, Oral, Daily  bacitracin, , Topical, Daily  FLUoxetine, 40 mg, Oral, Daily  furosemide, 40 mg, Intravenous, Q12H  glatiramer acetate, 20 mg, Subcutaneous, Daily  insulin lispro, 2-7 Units, Subcutaneous, 4x Daily AC & at Bedtime  Guillermo, 1 packet, Oral, BID  metoprolol tartrate, 25 mg, Oral, BID  midodrine, 10 mg, Oral, TID AC  nicotine, 1 patch, Transdermal, Q24H  pantoprazole, 40 mg, Oral, Q AM  potassium chloride ER, 40 mEq, Oral, Q4H       PRN Meds     acetaminophen    benzonatate    Calcium Replacement - Follow Nurse / BPA Driven Protocol    cyclobenzaprine    dextrose    dextrose    glucagon (human recombinant)    ipratropium-albuterol    Magnesium Standard Dose Replacement - Follow Nurse / BPA Driven Protocol    Phosphorus Replacement - Follow Nurse / BPA Driven  Protocol    Potassium Replacement - Follow Nurse / BPA Driven Protocol   Infusions         Diagnostic Data    Results from last 7 days   Lab Units 09/30/24  0228   WBC 10*3/mm3 8.48   HEMOGLOBIN g/dL 10.8*   HEMATOCRIT % 35.5*   PLATELETS 10*3/mm3 274   GLUCOSE mg/dL 127*   CREATININE mg/dL 1.09   BUN mg/dL 32*   SODIUM mmol/L 141   POTASSIUM mmol/L 3.6   AST (SGOT) U/L 54*   ALT (SGPT) U/L 57*   ALK PHOS U/L 73   BILIRUBIN mg/dL 0.4   ANION GAP mmol/L 13.2       No radiology results for the last day      I reviewed the patient's new clinical results.    Assessment/Plan:     Active and Resolved Problems  Active Hospital Problems    Diagnosis  POA    **Sepsis [A41.9]  Yes    Pneumonia [J18.9]  Yes    Acute UTI (urinary tract infection) [N39.0]  Yes    Acute on chronic renal insufficiency [N28.9, N18.9]  Yes    Lung cancer [C34.90]  Yes    Elevated troponin [R79.89]  Yes    Paroxysmal atrial fibrillation [I48.0]  Yes    S/P MVR (mitral valve replacement) [Z95.2]  Not Applicable    GERD without esophagitis [K21.9]  Yes    Essential hypertension [I10]  Yes    JARRETT (generalized anxiety disorder) [F41.1]  Yes    Dyslipidemia [E78.5]  Yes    CAD S/P percutaneous coronary angioplasty [I25.10, Z98.61]  Not Applicable    Non-insulin dependent type 2 diabetes mellitus [E11.9]  Yes    BPH with obstruction/lower urinary tract symptoms [N40.1, N13.8]  Yes    Atrial fibrillation with RVR [I48.91]  Yes      Resolved Hospital Problems   No resolved problems to display.       Plan:   -transition to PO lasix 40 BID, Midodrine 10 TID, overall close to 15-20 L output during admission, clinically looks better now, edema has almost resolved on BLE and BUE  -monitor strict I/Os, has molina in place, had good urinary output   -On eliquis for Afib, was given Digoxin per Cards, currently HR is controlled, currently metoprolol 25 bid , Echo: LVEF 60-65 %  LV thickness noted, will monitor his Vitals, will follow cards reccs   -Continue home med  glatiramer For MS  -Continue meropenem and Mycamic per ID, Perry bag has been changed  -Continue Prozac, levothyroxine, TSH 2.2 on admission  -ammonia nromal 26, B12 normal, folate normal  -AM Labs will follow     Disposition: PT reccs (has Hx of MS - will discuss with Spouse if ok to resume home health vs [facility]), clinically stable    VTE Prophylaxis:  Pharmacologic VTE prophylaxis orders are present.         Code status is   Code Status and Medical Interventions: No CPR (Do Not Attempt to Resuscitate); Limited Support; No intubation (DNI)   Ordered at: 09/24/24 0151     Medical Intervention Limits:    No intubation (DNI)     Code Status (Patient has no pulse and is not breathing):    No CPR (Do Not Attempt to Resuscitate)     Medical Interventions (Patient has pulse or is breathing):    Limited Support           Time: 30 minutes    Part of this note may be an electronic transcription/translation of spoken language to printed text using the Dragon Dictation System.    Signature: Electronically signed by Lauro Sheffield MD, 09/30/24, 06:17 EDT.  [facility] Hospitalist Team

## 2024-10-01 NOTE — CASE MANAGEMENT/SOCIAL WORK
Continued Stay Note  [facility]     Patient Name: Jamin Hennessy  MRN: [id number]  Today's Date: 10/1/2024    Admit Date: 9/24/2024    Plan: Home with spouse. [facility] accepted.   Discharge Plan       Row Name 10/01/24 1511       Plan    Plan Home with spouse. [facility] accepted.    Plan Comments CM recieved a VM from patient's spouse asking for a call back. CM spoke with spouse and she stated they no longer want HH and have decided to go with hospice services. CM asked if this CM needed to help coordinate hospice, spouse declined and said she has been in contact with [facility] and that they will set services up. CM informed spouse to call this CM back if she has any issues. CM informed liaison Cathi with [facility].          KYLAH Barone, RN, CCM    [address] [address]  Phone: [phone]  Fax: [phone]

## 2024-10-01 NOTE — CASE MANAGEMENT/SOCIAL WORK
Continued Stay Note  [facility]     Patient Name: Jamin Hennessy  MRN: [id number]  Today's Date: 10/1/2024    Admit Date: 9/24/2024    Plan: Home with spouse and [facility] (pending acceptance, order in place).   Discharge Plan       Row Name 10/01/24 1345       Plan    Plan Comments Received update from [facility]  liaison Cathi LINDSEY that [facility] services and awaiting to hear back on which services to arrange.    Final Discharge Disposition Code 01 - home or self-care    Final Note Home                    Case Management Discharge Note      Final Note: Home      Selected Continued Care - Discharged on 9/30/2024 Admission date: 9/24/2024 - [place] [facility] [place]  [place]ulance: [facility]    Final Discharge Disposition Code: 01 - home or self-care

## 2024-10-01 NOTE — CASE MANAGEMENT/SOCIAL WORK
Case Management Discharge Note      Final Note: Home with home health    Provided Post Acute Provider List?: N/A    Selected Continued Care - Discharged on 9/30/2024 Admission date: 9/24/2024 - [place] [facility] [place]  [facility]ulance: [facility]    Final Discharge Disposition Code: 06 - home with home health care

## 2024-10-01 NOTE — CASE MANAGEMENT/SOCIAL WORK
Continued Stay Note   [facility]     Patient Name: Jamin Hennessy  MRN: [id number]  Today's Date: 10/1/2024    Admit Date: 9/24/2024    Plan: Home with spouse and [facility] (pending acceptance, order in place).   Discharge Plan       Row Name 10/01/24 1256       Plan    Plan Home with spouse and [facility] (pending acceptance, order in place).    Plan Comments CM contacted Laura from [facility] who reported that they are not able to continue their services for patient because they can no longer meet his needs. Reported that patient needed higher level of care such as skilled nursing or hospice. CM provided update that PT and OT noted no skilled needs at this time. Laura reported that if family still wanted home care services, CM could try other agencies such as [facility] , [facility] , [facility], or [facility]. CM added new referral in [facility]  basket and sent to liaison Cathi LINDSEY to review.                  Olga Busby RN     Office Phone: [phone]  Office Cell: [phone]

## 2024-10-02 NOTE — DISCHARGE SUMMARY
"[facility] Medicine Services  Discharge Summary    Date of Service: 24  Patient Name: Jamin Hennessy  : 1955  MRN: [id number]    Date of Admission: 2024  Discharge Diagnosis:   CHF exacerbation     Date of Discharge:  24  Primary Care Physician: Jhonny Heller MD      Presenting Problem:   Sepsis [A41.9]    Active and Resolved Hospital Problems:  Active Hospital Problems    Diagnosis POA    **Sepsis [A41.9] Yes    Pneumonia [J18.9] Yes    Acute UTI (urinary tract infection) [N39.0] Yes    Acute on chronic renal insufficiency [N28.9, N18.9] Yes    Lung cancer [C34.90] Yes    Elevated troponin [R79.89] Yes    Paroxysmal atrial fibrillation [I48.0] Yes    S/P MVR (mitral valve replacement) [Z95.2] Not Applicable    GERD without esophagitis [K21.9] Yes    Essential hypertension [I10] Yes    JARRETT (generalized anxiety disorder) [F41.1] Yes    Dyslipidemia [E78.5] Yes    CAD S/P percutaneous coronary angioplasty [I25.10, Z98.61] Not Applicable    Non-insulin dependent type 2 diabetes mellitus [E11.9] Yes    BPH with obstruction/lower urinary tract symptoms [N40.1, N13.8] Yes    Atrial fibrillation with RVR [I48.91] Yes      Resolved Hospital Problems   No resolved problems to display.         Hospital Course     HPI:  Per the H&P written by Kamini Del Valle APRN , dated 24:  \"Shortness of air\"    Hospital Course:  Jamin Hennessy is a very pleasant  69 y.o. male from home, lives with wife , has home health visits , with a CMH of multiple sclerosis, bedbound, paroxysmal atrial fibrillation on anticoagulation, coronary artery disease status post PCI, bioprosthetic mitral valve replacement status post endocarditis, hypothyroidism ,morbid obesity,diabetes mellitus type 2 not on insulin, chronic kidney disease, GERD, left lower lung cancer on chemotherapy, generalized anxiety disorder, dyslipidemia, hypertension, who presented to [facility] on 2024 upon transfer " from [facility] emergency department where he presented with complaints of shortness of air.      Cardiology was consulted, patient started on amiodarone, his RVR resolved, was started on IV lasix and diuresed close to 15-20 L over the course of hospitalization and he was started on midodrine for soft, his edema significantly resolved, and patient SOB improved. Cardiology cleared the patient for discharge. Patient was also seen and evaluated per Infectious disease given his significant history of drug resistant infection, he was on Micafungin as well as meropenem, com pelted the course and was discontinued per ID prior to discharge, his molina catheter was changed. Patient recommended to follow up with his urology outpatient.     Patient is currently clinically stable, he does not ambulate given his history of MS and is on medication for it which was continued during hospitalization. Patient evaluated per PT and is recommended to Home with home health, he was also at home with home health prior to admission and his wife takes care of him at home . Plan for discharge discussed with the Patient and his spouse prior to discharge, patient agreed with the plan. Patient advised to return to hospital or go near by hospital or call 911, should patient's symptoms worsen or recur. Patient also advised to follow up with PCP within 1-5 days for recent hospitalization, monitoring and further management of patient's health problems. Patient acknowledged understanding and agreed with the discharge plan.     DISCHARGE Follow Up Recommendations for labs and diagnostics:     -Follow up cardiology outpatient for further adjustment of your medications and have your labs monitored by them    -Follow up with your urologist    -Follow up with your PCP for monitoring of your labs and recent hospitlization      Reasons For Change In Medications and Indications for New Medications:      Day of Discharge     Vital Signs:       Physical  Exam  General Appearance:  Awake alert, conversational   Head:  Atraumatic normocephalic   Eyes:        No sclera icterus   Neck: Non tender, normal ROM   Pulm: Decreased breath sound, some crackles   Cardio: Normal HR, irregular rhythm   Extremities: Edema on bilateral lower extremities has improved from 4+ on admission to 1+ today   Abdomen: Distended, soft non tender   /Renal: No suprapubic tenderness         Neuro: Aaox3, normal speech, BUE 4+ strength, BLE 1+ , Hx of MS on maintenance med         Pertinent  and/or Most Recent Results     LAB RESULTS:      Lab 09/30/24  0228 09/29/24  0248 09/28/24  0226 09/27/24  0103 09/26/24  0317   WBC 8.48 7.83 7.56 8.32 7.72   HEMOGLOBIN 10.8* 11.2* 10.4* 9.4* 9.0*   HEMATOCRIT 35.5* 36.5* 34.1* 30.9* 30.9*   PLATELETS 274 254 259 218 216   NEUTROS ABS 5.63 5.32 5.23 5.87 5.42   IMMATURE GRANS (ABS) 0.03 0.05 0.03 0.04 0.03   LYMPHS ABS 1.32 1.25 1.12 0.98 0.82   MONOS ABS 0.87 0.62 0.63 0.72 0.67   EOS ABS 0.50* 0.47* 0.47* 0.63* 0.72*   MCV 85.3 86.5 84.8 86.3 88.5         Lab 09/30/24  1249 09/30/24 0228 09/29/24  1257 09/29/24  0248 09/28/24  0226 09/26/24  0317   SODIUM  --  141  --  139 143 140   POTASSIUM 4.1 3.6 4.1 3.6 3.3* 3.5   CHLORIDE  --  100  --  100 103 107   CO2  --  27.8  --  28.3 29.7* 25.0   ANION GAP  --  13.2  --  10.7 10.3 8.0   BUN  --  32*  --  31* 19 21   CREATININE  --  1.09  --  1.03 1.03 1.23   EGFR  --  73.5  --  78.6 78.6 63.6   GLUCOSE  --  127*  --  128* 113* 103*   CALCIUM  --  9.6  --  9.6 8.8 8.5*   IONIZED CALCIUM  --  1.15  --  1.12*  --   --    MAGNESIUM  --  2.2  --  2.1 2.1 1.9   PHOSPHORUS  --  3.7  --  3.6 3.7 3.9         Lab 09/30/24 0228 09/29/24 0248 09/28/24 0226 09/26/24 0317   TOTAL PROTEIN 7.3 7.2 6.9 6.0   ALBUMIN 3.8 3.8 3.6 3.2*   GLOBULIN 3.5 3.4 3.3 2.8   ALT (SGPT) 57* 41 37 20   AST (SGOT) 54* 41* 40 23   BILIRUBIN 0.4 0.4 0.4 0.2   ALK PHOS 73 71 71 60                 Lab 09/26/24 0317   FOLATE 15.00    VITAMIN B 12 1,225*         Brief Urine Lab Results  (Last result in the past 365 days)        Color   Clarity   Blood   Leuk Est   Nitrite   Protein   CREAT   Urine HCG        09/24/24 0458 Yellow   Turbid  Comment: Result checked     Large (3+)   Large (3+)   Negative   30 mg/dL (1+)                 Microbiology Results (last 10 days)       Procedure Component Value - Date/Time    Blood Culture - Blood, Arm, Left [[id number]]  (Normal) Collected: 09/24/24 1526    Lab Status: Final result Specimen: Blood from Arm, Left Updated: 09/29/24 1545     Blood Culture No growth at 5 days    Narrative:      Less than seven (7) mL's of blood was collected.  Insufficient quantity may yield false negative results.    Urine Culture - Urine, Indwelling Urethral Catheter [[id number]]  (Abnormal) Collected: 09/24/24 0458    Lab Status: Final result Specimen: Urine from Indwelling Urethral Catheter Updated: 09/25/24 1225     Urine Culture Yeast isolated    Narrative:      No further workup  Colonization of the urinary tract without infection is common. Treatment is discouraged unless the patient is symptomatic, pregnant, or undergoing an invasive urologic procedure.    MRSA Screen, PCR (Inpatient) - Swab, Nares [[id number]]  (Abnormal) Collected: 09/24/24 0336    Lab Status: Final result Specimen: Swab from Nares Updated: 09/24/24 0613     MRSA PCR MRSA Detected    Narrative:      The negative predictive value of this diagnostic test is high and should only be used to consider de-escalating anti-MRSA therapy. A positive result may indicate colonization with MRSA and must be correlated clinically.            CT Chest Without Contrast Diagnostic    Result Date: 9/26/2024  Impression: Impression: 1. Left lower lobe airspace disease as well as lingular airspace disease which may be chronic with evidence of pleural thickening peripherally. 2. Moderate sized dependent left pleural effusion. 3. Fullness in the left hilum likely reflecting  airspace disease. The patient does have a history of lung cancer and this may represent treatment change. Previous CT showed diffuse infiltrate in the left lung and this has cleared in the left upper lobe. 4. Old healed granulomatous disease. 5. Bilateral renal stones with a right-sided double-J ureteral stent.. Electronically Signed: Ray Saldana MD  9/26/2024 8:08 AM EDT  Workstation ID: [id number]    XR Chest 1 View    Result Date: 9/24/2024  Impression: Impression: Left basilar airspace opacity suggests pneumonia or aspiration. Correlate clinically. Follow-up evaluation recommended to document resolution. If this persists on follow-up, then chest CT recommended to rule out neoplasm. Electronically Signed: Charbel Laureano MD  9/24/2024 12:54 PM EDT  Workstation ID: [id number]     Results for orders placed during the hospital encounter of 02/27/20    Duplex Venous Lower Extremity - Bilateral CAR    Interpretation Summary  · Chronic left lower extremity superficial thrombophlebitis noted in the small saphenous.  · All other veins appeared normal bilaterally.      Results for orders placed during the hospital encounter of 02/27/20    Duplex Venous Lower Extremity - Bilateral CAR    Interpretation Summary  · Chronic left lower extremity superficial thrombophlebitis noted in the small saphenous.  · All other veins appeared normal bilaterally.      Results for orders placed during the hospital encounter of 09/24/24    Adult Transthoracic Echo Complete W/ Cont if Necessary Per Protocol    Interpretation Summary    Left ventricular systolic function is normal. Calculated left ventricular EF = 64.3% Left ventricular ejection fraction appears to be 61 - 65%.    Left ventricular wall thickness is consistent with mild concentric hypertrophy.    The left atrial cavity is moderately dilated.    There is a bioprosthetic mitral valve present.    Estimated right ventricular systolic pressure from tricuspid regurgitation is normal (<35  mmHg).      Labs Pending at Discharge:      Procedures Performed           Consults:   Consults       Date and Time Order Name Status Description    9/24/2024  3:09 AM Inpatient Infectious Diseases Consult Completed     9/24/2024  1:06 AM Inpatient Cardiology Consult Completed               Discharge Details        Discharge Medications        New Medications        Instructions Start Date   amiodarone 200 MG tablet  Commonly known as: PACERONE   200 mg, Oral, 2 Times Daily With Meals      midodrine 5 MG tablet  Commonly known as: PROAMATINE   5 mg, Oral, 3 Times Daily Before Meals             Changes to Medications        Instructions Start Date   ferrous sulfate 325 (65 FE) MG tablet  What changed: when to take this   325 mg, Oral, Every 48 Hours             Continue These Medications        Instructions Start Date   acetaminophen 500 MG tablet  Commonly known as: TYLENOL   500 mg, Oral, Every 6 Hours PRN      apixaban 5 MG tablet tablet  Commonly known as: ELIQUIS   5 mg, Oral, Every 12 Hours Scheduled      aspirin 81 MG EC tablet   81 mg, Oral, Daily      bacitracin 500 UNIT/GM ointment   Topical, Daily      cyclobenzaprine 5 MG tablet  Commonly known as: FLEXERIL   5 mg, Oral, 2 Times Daily PRN      finasteride 5 MG tablet  Commonly known as: PROSCAR   5 mg, Oral, Daily      FLUoxetine 40 MG capsule  Commonly known as: PROzac   40 mg, Oral, Daily      furosemide 40 MG tablet  Commonly known as: LASIX   40 mg, Oral, 2 Times Daily      glatiramer acetate 20 MG/ML injection  Commonly known as: GLATOPA   20 mg, Subcutaneous, Daily      guaiFENesin 600 MG 12 hr tablet  Commonly known as: MUCINEX   1,200 mg, Oral, 2 Times Daily      levothyroxine 100 MCG tablet  Commonly known as: SYNTHROID, LEVOTHROID   100 mcg, Oral, Daily      metoprolol tartrate 25 MG tablet  Commonly known as: LOPRESSOR   25 mg, Oral, 2 Times Daily      omeprazole 20 MG capsule  Commonly known as: priLOSEC   20 mg, Oral, Daily      risperiDONE  0.25 MG tablet dispersible disintegrating tablet  Commonly known as: risperDAL M-TABS   0.25 mg, Translingual, Nightly      sodium chloride 0.9 % irrigation  Commonly known as: NS   Irrigation, Daily, For molina catheter       tamsulosin 0.4 MG capsule 24 hr capsule  Commonly known as: FLOMAX   2 capsules, Oral, Nightly      vitamin B-12 500 MCG tablet  Commonly known as: CYANOCOBALAMIN   1,000 mcg, Oral, Daily               No Known Allergies      [place] [facility] [place]iet:  Hospital:No active diet order        Discharge Activity:         CODE STATUS:  Code Status and Medical Interventions: No CPR (Do Not Attempt to Resuscitate); Limited Support; No intubation (DNI)   Ordered at: 09/24/24 0151     Medical Intervention Limits:    No intubation (DNI)     Code Status (Patient has no pulse and is not breathing):    No CPR (Do Not Attempt to Resuscitate)     Medical Interventions (Patient has pulse or is breathing):    Limited Support         No future appointments.    Additional Instructions for the Follow-ups that You Need to Schedule       Ambulatory Referral to Home Health ([facility])   As directed      Face to Face Visit Date: 9/30/2024   Follow-up provider for Plan of Care?: I will be treating the patient on an ongoing basis.  Please send me the Plan of Care for signature.   Follow-up provider: REBECCA STEVEN [[id number]]   Reason/Clinical Findings: multiple scclerosis   Describe mobility limitations that make leaving home difficult: gen weakness   Nursing/Therapeutic Services Requested: Physical Therapy Skilled Nursing Occupational Therapy   Skilled nursing orders: CHF management   PT orders: Home safety assessment Strengthening   Frequency: 1 Week 1                Time spent on Discharge including face to face service:  >30 minutes    Part of this note may be an electronic transcription/translation of spoken language to printed text using the Dragon Dictation System.    Signature: Electronically  signed by Lauro Sheffield MD, 10/02/24, 14:57 EDT.  [facility] Hospitalist Team

## 2024-10-04 ENCOUNTER — READMISSION MANAGEMENT (OUTPATIENT)
Dept: CALL CENTER | Facility: HOSPITAL | Age: 69
End: 2024-10-04
Payer: MEDICARE

## 2024-10-04 NOTE — OUTREACH NOTE
Sepsis Week 1 Survey      Flowsheet Row Responses   [facility] facility patient discharged from? [facility]   Does the patient have one of the following disease processes/diagnoses(primary or secondary)? Sepsis   Week 1 attempt successful? No   Unsuccessful attempts Attempt 1            Kylah Sam Registered Nurse

## 2024-10-08 ENCOUNTER — READMISSION MANAGEMENT (OUTPATIENT)
Dept: CALL CENTER | Facility: HOSPITAL | Age: 69
End: 2024-10-08
Payer: MEDICARE

## 2024-10-08 NOTE — OUTREACH NOTE
Sepsis Week 1 Survey      Flowsheet Row Responses   [facility] patient discharged from? [facility]   Does the patient have one of the following disease processes/diagnoses(primary or secondary)? Sepsis   Week 1 attempt successful? Yes   Call start time 0945   Call end time 0949   Discharge diagnosis Sepsis   Person spoke with today (if not patient) and relationship Pt and wife   Meds reviewed with patient/caregiver? Yes   Is the patient having any side effects they believe may be caused by any medication additions or changes? No   Does the patient have all medications related to this admission filled (includes all antibiotics, inhalers, nebulizers,steroids,etc.) Yes   Is the patient taking all medications as directed (includes completed medication regime)? Yes   Does the patient have a primary care provider?  Yes   Does the patient have an appointment with their PCP within 7 days of discharge? No   What is preventing the patient from scheduling follow up appointments within 7 days of discharge? Haven't had time   Has the patient kept scheduled appointments due by today? N/A   Comments Pt said is bedridden. Advised to contact PCP to see if can do a video or phone visit. Pt is going to call today.   Psychosocial issues? No   Did the patient receive a copy of their discharge instructions? Yes   Nursing interventions Reviewed instructions with patient   What is the patient's perception of their health status since discharge? Improving   Is patient/caregiver able to teach back steps to recovery at home? Set small, achievable goals for return to baseline health, Rest and regain strength, Eat a balanced diet   Is the patient/caregiver able to teach back signs and symptoms of worsening condition: Fever, Rapid heart rate (>90), Altered mental status(confusion/coma), Edema, Shortness of breath/rapid respiratory rate   Week 1 call completed? Yes   Wrap up additional comments Pt is going to call  to [facility] n status. Pts wife  had been dealing with them already to get it set up. Pt states feels well. Pt said is bedridden. Advised to contact PCP to see if can do a video or phone visit. Pt is going to call today.   Call end time 8545            Bere TELLEZ - Registered Nurse

## 2024-10-17 ENCOUNTER — READMISSION MANAGEMENT (OUTPATIENT)
Dept: CALL CENTER | Facility: HOSPITAL | Age: 69
End: 2024-10-17
Payer: MEDICARE

## 2024-10-17 NOTE — OUTREACH NOTE
Sepsis Week 2 Survey      Flowsheet Row Responses   [facility] facility patient discharged from? [facility]   Does the patient have one of the following disease processes/diagnoses(primary or secondary)? Sepsis   Week 2 attempt successful? Yes   Call start time 0853   Revoke Readmitted  [Spouse states patient has been readmitted to [facility].]   Call end time 0853   Call end time 0853            Ruby ARROYO - Registered Nurse

## 2024-12-17 ENCOUNTER — HOSPITAL ENCOUNTER (INPATIENT)
Facility: HOSPITAL | Age: 69
LOS: 5 days | Discharge: HOME HEALTH | End: 2024-12-23
Attending: FAMILY MEDICINE | Admitting: STUDENT IN AN ORGANIZED HEALTH CARE EDUCATION/TRAINING PROGRAM
Payer: MEDICARE

## 2024-12-17 ENCOUNTER — ANESTHESIA EVENT (OUTPATIENT)
Dept: PERIOP | Facility: HOSPITAL | Age: 69
End: 2024-12-17
Payer: MEDICARE

## 2024-12-17 ENCOUNTER — APPOINTMENT (OUTPATIENT)
Dept: GENERAL RADIOLOGY | Facility: HOSPITAL | Age: 69
End: 2024-12-17
Payer: MEDICARE

## 2024-12-17 ENCOUNTER — ANESTHESIA (OUTPATIENT)
Dept: PERIOP | Facility: HOSPITAL | Age: 69
End: 2024-12-17
Payer: MEDICARE

## 2024-12-17 ENCOUNTER — APPOINTMENT (OUTPATIENT)
Dept: OTHER | Facility: HOSPITAL | Age: 69
End: 2024-12-17
Payer: MEDICARE

## 2024-12-17 DIAGNOSIS — N39.0 URINARY TRACT INFECTION WITH HEMATURIA, SITE UNSPECIFIED: Primary | ICD-10-CM

## 2024-12-17 DIAGNOSIS — R31.9 URINARY TRACT INFECTION WITH HEMATURIA, SITE UNSPECIFIED: Primary | ICD-10-CM

## 2024-12-17 DIAGNOSIS — N20.0 STONE, KIDNEY: ICD-10-CM

## 2024-12-17 LAB
ANION GAP SERPL CALCULATED.3IONS-SCNC: 14.1 MMOL/L (ref 5–15)
BASE DEFICIT: ABNORMAL
BASE EXCESS BLDV CALC-SCNC: 1 MMOL/L (ref 0–3)
BUN SERPL-MCNC: 36 MG/DL (ref 8–23)
BUN/CREAT SERPL: 19.7 (ref 7–25)
CA-I BLDA-SCNC: 1.17 MMOL/L (ref 1.12–1.32)
CALCIUM SPEC-SCNC: 8.3 MG/DL (ref 8.6–10.5)
CHLORIDE SERPL-SCNC: 99 MMOL/L (ref 98–107)
CO2 CONTENT VENOUS: 29 MMOL/L (ref 24–29)
CO2 SERPL-SCNC: 19.9 MMOL/L (ref 22–29)
CREAT SERPL-MCNC: 1.83 MG/DL (ref 0.76–1.27)
EGFRCR SERPLBLD CKD-EPI 2021: 39.5 ML/MIN/1.73
GLUCOSE BLDC GLUCOMTR-MCNC: 289 MG/DL (ref 70–105)
GLUCOSE BLDC GLUCOMTR-MCNC: 304 MG/DL (ref 70–105)
GLUCOSE BLDC GLUCOMTR-MCNC: 306 MG/DL (ref 70–105)
GLUCOSE BLDC GLUCOMTR-MCNC: 324 MG/DL (ref 70–105)
GLUCOSE BLDC GLUCOMTR-MCNC: 360 MG/DL (ref 70–105)
GLUCOSE SERPL-MCNC: 342 MG/DL (ref 65–99)
HCO3 BLDV-SCNC: 27.5 MMOL/L (ref 23–28)
HCT VFR BLDA CALC: 40 % (ref 38–51)
HGB BLDA-MCNC: 13.6 G/DL (ref 12–17)
PCO2 BLDV: 54.3 MM HG (ref 41–51)
PH BLDV: 7.31 PH UNITS (ref 7.31–7.41)
PO2 BLDV: 31 MM HG (ref 35–42)
POTASSIUM BLDA-SCNC: 3.2 MMOL/L (ref 3.5–4.9)
POTASSIUM SERPL-SCNC: 3.8 MMOL/L (ref 3.5–5.2)
SAO2 % BLDCOV: ABNORMAL %
SODIUM BLD-SCNC: 140 MMOL/L (ref 138–146)
SODIUM SERPL-SCNC: 133 MMOL/L (ref 136–145)

## 2024-12-17 PROCEDURE — 25810000003 SODIUM CHLORIDE 0.9 % SOLUTION

## 2024-12-17 PROCEDURE — 85025 COMPLETE CBC W/AUTO DIFF WBC: CPT | Performed by: UROLOGY

## 2024-12-17 PROCEDURE — 84132 ASSAY OF SERUM POTASSIUM: CPT

## 2024-12-17 PROCEDURE — 0TC68ZZ EXTIRPATION OF MATTER FROM RIGHT URETER, VIA NATURAL OR ARTIFICIAL OPENING ENDOSCOPIC: ICD-10-PCS | Performed by: UROLOGY

## 2024-12-17 PROCEDURE — 82947 ASSAY GLUCOSE BLOOD QUANT: CPT

## 2024-12-17 PROCEDURE — 63710000001 INSULIN LISPRO (HUMAN) PER 5 UNITS: Performed by: UROLOGY

## 2024-12-17 PROCEDURE — 82803 BLOOD GASES ANY COMBINATION: CPT

## 2024-12-17 PROCEDURE — C1769 GUIDE WIRE: HCPCS | Performed by: UROLOGY

## 2024-12-17 PROCEDURE — 25010000002 PROPOFOL 200 MG/20ML EMULSION

## 2024-12-17 PROCEDURE — 93010 ELECTROCARDIOGRAM REPORT: CPT | Performed by: INTERNAL MEDICINE

## 2024-12-17 PROCEDURE — BT1D1ZZ FLUOROSCOPY OF RIGHT KIDNEY, URETER AND BLADDER USING LOW OSMOLAR CONTRAST: ICD-10-PCS | Performed by: UROLOGY

## 2024-12-17 PROCEDURE — 80048 BASIC METABOLIC PNL TOTAL CA: CPT | Performed by: UROLOGY

## 2024-12-17 PROCEDURE — G0378 HOSPITAL OBSERVATION PER HR: HCPCS

## 2024-12-17 PROCEDURE — 93005 ELECTROCARDIOGRAM TRACING: CPT

## 2024-12-17 PROCEDURE — 82330 ASSAY OF CALCIUM: CPT

## 2024-12-17 PROCEDURE — 25010000002 HYDROMORPHONE 1 MG/ML SOLUTION: Performed by: ANESTHESIOLOGY

## 2024-12-17 PROCEDURE — 25010000002 VASOPRESSIN 20 UNIT/ML SOLUTION

## 2024-12-17 PROCEDURE — 76000 FLUOROSCOPY <1 HR PHYS/QHP: CPT

## 2024-12-17 PROCEDURE — 25010000002 LIDOCAINE PF 2% 2 % SOLUTION

## 2024-12-17 PROCEDURE — 25010000002 PHENYLEPHRINE 10 MG/ML SOLUTION

## 2024-12-17 PROCEDURE — 25010000002 ONDANSETRON PER 1 MG

## 2024-12-17 PROCEDURE — 63710000001 INSULIN LISPRO (HUMAN) PER 5 UNITS

## 2024-12-17 PROCEDURE — 85014 HEMATOCRIT: CPT

## 2024-12-17 PROCEDURE — 25510000002 IOHEXOL 300 MG/ML SOLUTION: Performed by: UROLOGY

## 2024-12-17 PROCEDURE — C2617 STENT, NON-COR, TEM W/O DEL: HCPCS | Performed by: UROLOGY

## 2024-12-17 PROCEDURE — 82365 CALCULUS SPECTROSCOPY: CPT | Performed by: UROLOGY

## 2024-12-17 PROCEDURE — 0T768DZ DILATION OF RIGHT URETER WITH INTRALUMINAL DEVICE, VIA NATURAL OR ARTIFICIAL OPENING ENDOSCOPIC: ICD-10-PCS | Performed by: UROLOGY

## 2024-12-17 PROCEDURE — 82948 REAGENT STRIP/BLOOD GLUCOSE: CPT

## 2024-12-17 PROCEDURE — 84295 ASSAY OF SERUM SODIUM: CPT

## 2024-12-17 PROCEDURE — 25010000002 DEXAMETHASONE PER 1 MG

## 2024-12-17 DEVICE — URETERAL STENT
Type: IMPLANTABLE DEVICE | Site: URETER | Status: FUNCTIONAL
Brand: PERCUFLEX™ PLUS

## 2024-12-17 RX ORDER — FUROSEMIDE 40 MG/1
40 TABLET ORAL 2 TIMES DAILY
Status: DISCONTINUED | OUTPATIENT
Start: 2024-12-17 | End: 2024-12-23 | Stop reason: HOSPADM

## 2024-12-17 RX ORDER — SODIUM CHLORIDE 0.9 % (FLUSH) 0.9 %
10 SYRINGE (ML) INJECTION EVERY 12 HOURS SCHEDULED
Status: DISCONTINUED | OUTPATIENT
Start: 2024-12-17 | End: 2024-12-23 | Stop reason: HOSPADM

## 2024-12-17 RX ORDER — ONDANSETRON 2 MG/ML
4 INJECTION INTRAMUSCULAR; INTRAVENOUS EVERY 6 HOURS PRN
Status: DISCONTINUED | OUTPATIENT
Start: 2024-12-17 | End: 2024-12-23 | Stop reason: HOSPADM

## 2024-12-17 RX ORDER — FINASTERIDE 5 MG/1
5 TABLET, FILM COATED ORAL DAILY
Status: DISCONTINUED | OUTPATIENT
Start: 2024-12-17 | End: 2024-12-23 | Stop reason: HOSPADM

## 2024-12-17 RX ORDER — LEVOTHYROXINE SODIUM 100 UG/1
100 TABLET ORAL DAILY
Status: DISCONTINUED | OUTPATIENT
Start: 2024-12-17 | End: 2024-12-23 | Stop reason: HOSPADM

## 2024-12-17 RX ORDER — MORPHINE SULFATE 2 MG/ML
2 INJECTION, SOLUTION INTRAMUSCULAR; INTRAVENOUS EVERY 4 HOURS PRN
Status: ACTIVE | OUTPATIENT
Start: 2024-12-17 | End: 2024-12-22

## 2024-12-17 RX ORDER — DIPHENHYDRAMINE HYDROCHLORIDE 50 MG/ML
12.5 INJECTION INTRAMUSCULAR; INTRAVENOUS
Status: DISCONTINUED | OUTPATIENT
Start: 2024-12-17 | End: 2024-12-17 | Stop reason: HOSPADM

## 2024-12-17 RX ORDER — ACETIC ACID 0.25 G/100ML
15 IRRIGANT IRRIGATION DAILY
COMMUNITY

## 2024-12-17 RX ORDER — SODIUM CHLORIDE 0.9 % (FLUSH) 0.9 %
10 SYRINGE (ML) INJECTION AS NEEDED
Status: DISCONTINUED | OUTPATIENT
Start: 2024-12-17 | End: 2024-12-23 | Stop reason: HOSPADM

## 2024-12-17 RX ORDER — POTASSIUM CHLORIDE 1500 MG/1
20 TABLET, EXTENDED RELEASE ORAL ONCE
Status: COMPLETED | OUTPATIENT
Start: 2024-12-17 | End: 2024-12-17

## 2024-12-17 RX ORDER — AMIODARONE HYDROCHLORIDE 200 MG/1
200 TABLET ORAL DAILY
COMMUNITY

## 2024-12-17 RX ORDER — MIRTAZAPINE 15 MG/1
7.5 TABLET, FILM COATED ORAL NIGHTLY
COMMUNITY

## 2024-12-17 RX ORDER — ACETAMINOPHEN 325 MG/1
650 TABLET ORAL EVERY 8 HOURS PRN
COMMUNITY

## 2024-12-17 RX ORDER — SODIUM CHLORIDE 9 MG/ML
40 INJECTION, SOLUTION INTRAVENOUS AS NEEDED
Status: DISCONTINUED | OUTPATIENT
Start: 2024-12-17 | End: 2024-12-23 | Stop reason: HOSPADM

## 2024-12-17 RX ORDER — SODIUM CHLORIDE 9 MG/ML
100 INJECTION, SOLUTION INTRAVENOUS CONTINUOUS
Status: DISPENSED | OUTPATIENT
Start: 2024-12-17 | End: 2024-12-18

## 2024-12-17 RX ORDER — MIDODRINE HYDROCHLORIDE 5 MG/1
5 TABLET ORAL
COMMUNITY

## 2024-12-17 RX ORDER — PROMETHAZINE HYDROCHLORIDE 25 MG/1
12.5 TABLET ORAL EVERY 6 HOURS PRN
COMMUNITY

## 2024-12-17 RX ORDER — BISACODYL 10 MG
10 SUPPOSITORY, RECTAL RECTAL DAILY PRN
Status: DISCONTINUED | OUTPATIENT
Start: 2024-12-17 | End: 2024-12-23 | Stop reason: HOSPADM

## 2024-12-17 RX ORDER — NITROGLYCERIN 0.4 MG/1
0.4 TABLET SUBLINGUAL
Status: DISCONTINUED | OUTPATIENT
Start: 2024-12-17 | End: 2024-12-23 | Stop reason: HOSPADM

## 2024-12-17 RX ORDER — EPHEDRINE SULFATE 5 MG/ML
5 INJECTION INTRAVENOUS ONCE AS NEEDED
Status: DISCONTINUED | OUTPATIENT
Start: 2024-12-17 | End: 2024-12-17 | Stop reason: HOSPADM

## 2024-12-17 RX ORDER — OXYCODONE HYDROCHLORIDE 5 MG/1
5 TABLET ORAL ONCE AS NEEDED
Status: DISCONTINUED | OUTPATIENT
Start: 2024-12-17 | End: 2024-12-17 | Stop reason: HOSPADM

## 2024-12-17 RX ORDER — CHOLECALCIFEROL (VITAMIN D3) 25 MCG
1000 TABLET ORAL DAILY
COMMUNITY

## 2024-12-17 RX ORDER — ACETAMINOPHEN 325 MG/1
650 TABLET ORAL EVERY 4 HOURS PRN
Status: DISCONTINUED | OUTPATIENT
Start: 2024-12-17 | End: 2024-12-23 | Stop reason: HOSPADM

## 2024-12-17 RX ORDER — MULTIVITAMIN WITH IRON
1000 TABLET ORAL DAILY
Status: DISCONTINUED | OUTPATIENT
Start: 2024-12-17 | End: 2024-12-23 | Stop reason: HOSPADM

## 2024-12-17 RX ORDER — TAMSULOSIN HYDROCHLORIDE 0.4 MG/1
0.8 CAPSULE ORAL NIGHTLY
Status: DISCONTINUED | OUTPATIENT
Start: 2024-12-17 | End: 2024-12-23 | Stop reason: HOSPADM

## 2024-12-17 RX ORDER — VASOPRESSIN 20 U/ML
INJECTION PARENTERAL AS NEEDED
Status: DISCONTINUED | OUTPATIENT
Start: 2024-12-17 | End: 2024-12-17 | Stop reason: SURG

## 2024-12-17 RX ORDER — PROPOFOL 10 MG/ML
INJECTION, EMULSION INTRAVENOUS AS NEEDED
Status: DISCONTINUED | OUTPATIENT
Start: 2024-12-17 | End: 2024-12-17 | Stop reason: SURG

## 2024-12-17 RX ORDER — FERROUS SULFATE 325(65) MG
325 TABLET ORAL 3 TIMES WEEKLY
COMMUNITY

## 2024-12-17 RX ORDER — DEXTROSE MONOHYDRATE 25 G/50ML
25 INJECTION, SOLUTION INTRAVENOUS
Status: DISCONTINUED | OUTPATIENT
Start: 2024-12-17 | End: 2024-12-23 | Stop reason: HOSPADM

## 2024-12-17 RX ORDER — NALOXONE HCL 0.4 MG/ML
0.4 VIAL (ML) INJECTION
Status: DISCONTINUED | OUTPATIENT
Start: 2024-12-17 | End: 2024-12-23 | Stop reason: HOSPADM

## 2024-12-17 RX ORDER — ONDANSETRON 4 MG/1
4 TABLET, ORALLY DISINTEGRATING ORAL EVERY 6 HOURS PRN
Status: DISCONTINUED | OUTPATIENT
Start: 2024-12-17 | End: 2024-12-23 | Stop reason: HOSPADM

## 2024-12-17 RX ORDER — IPRATROPIUM BROMIDE AND ALBUTEROL SULFATE 2.5; .5 MG/3ML; MG/3ML
3 SOLUTION RESPIRATORY (INHALATION) ONCE AS NEEDED
Status: DISCONTINUED | OUTPATIENT
Start: 2024-12-17 | End: 2024-12-17 | Stop reason: HOSPADM

## 2024-12-17 RX ORDER — NALOXONE HCL 0.4 MG/ML
0.1 VIAL (ML) INJECTION
Status: DISCONTINUED | OUTPATIENT
Start: 2024-12-17 | End: 2024-12-23 | Stop reason: HOSPADM

## 2024-12-17 RX ORDER — ACETAMINOPHEN 500 MG
500 TABLET ORAL EVERY 6 HOURS PRN
Status: DISCONTINUED | OUTPATIENT
Start: 2024-12-17 | End: 2024-12-17

## 2024-12-17 RX ORDER — PHENYLEPHRINE HYDROCHLORIDE 10 MG/ML
INJECTION INTRAVENOUS AS NEEDED
Status: DISCONTINUED | OUTPATIENT
Start: 2024-12-17 | End: 2024-12-17 | Stop reason: SURG

## 2024-12-17 RX ORDER — HYDROCODONE BITARTRATE AND ACETAMINOPHEN 7.5; 325 MG/1; MG/1
2 TABLET ORAL EVERY 4 HOURS PRN
Status: DISCONTINUED | OUTPATIENT
Start: 2024-12-17 | End: 2024-12-23 | Stop reason: HOSPADM

## 2024-12-17 RX ORDER — AMOXICILLIN 250 MG
2 CAPSULE ORAL 2 TIMES DAILY PRN
Status: DISCONTINUED | OUTPATIENT
Start: 2024-12-17 | End: 2024-12-23 | Stop reason: HOSPADM

## 2024-12-17 RX ORDER — ASPIRIN 81 MG/1
81 TABLET ORAL DAILY
Status: DISCONTINUED | OUTPATIENT
Start: 2024-12-17 | End: 2024-12-23 | Stop reason: HOSPADM

## 2024-12-17 RX ORDER — PANTOPRAZOLE SODIUM 40 MG/1
40 TABLET, DELAYED RELEASE ORAL
Status: DISCONTINUED | OUTPATIENT
Start: 2024-12-18 | End: 2024-12-23 | Stop reason: HOSPADM

## 2024-12-17 RX ORDER — CARBOXYMETHYLCELLULOSE SODIUM 5 MG/ML
1 SOLUTION/ DROPS OPHTHALMIC 4 TIMES DAILY
COMMUNITY

## 2024-12-17 RX ORDER — FLUMAZENIL 0.1 MG/ML
0.2 INJECTION INTRAVENOUS AS NEEDED
Status: DISCONTINUED | OUTPATIENT
Start: 2024-12-17 | End: 2024-12-17 | Stop reason: HOSPADM

## 2024-12-17 RX ORDER — BISACODYL 5 MG/1
5 TABLET, DELAYED RELEASE ORAL DAILY PRN
Status: DISCONTINUED | OUTPATIENT
Start: 2024-12-17 | End: 2024-12-23 | Stop reason: HOSPADM

## 2024-12-17 RX ORDER — MULTIPLE VITAMINS W/ MINERALS TAB 9MG-400MCG
1 TAB ORAL DAILY
COMMUNITY

## 2024-12-17 RX ORDER — HYDROMORPHONE HYDROCHLORIDE 1 MG/ML
0.5 INJECTION, SOLUTION INTRAMUSCULAR; INTRAVENOUS; SUBCUTANEOUS
Status: ACTIVE | OUTPATIENT
Start: 2024-12-17 | End: 2024-12-22

## 2024-12-17 RX ORDER — GUAIFENESIN 600 MG/1
1200 TABLET, EXTENDED RELEASE ORAL 2 TIMES DAILY
Status: DISCONTINUED | OUTPATIENT
Start: 2024-12-17 | End: 2024-12-23 | Stop reason: HOSPADM

## 2024-12-17 RX ORDER — NICOTINE POLACRILEX 4 MG
15 LOZENGE BUCCAL
Status: DISCONTINUED | OUTPATIENT
Start: 2024-12-17 | End: 2024-12-23 | Stop reason: HOSPADM

## 2024-12-17 RX ORDER — OXYCODONE HYDROCHLORIDE 5 MG/1
10 TABLET ORAL EVERY 4 HOURS PRN
Status: DISCONTINUED | OUTPATIENT
Start: 2024-12-17 | End: 2024-12-17 | Stop reason: HOSPADM

## 2024-12-17 RX ORDER — ONDANSETRON 8 MG/1
8 TABLET, FILM COATED ORAL EVERY 8 HOURS PRN
COMMUNITY

## 2024-12-17 RX ORDER — POLYETHYLENE GLYCOL 3350 17 G/17G
17 POWDER, FOR SOLUTION ORAL DAILY PRN
Status: DISCONTINUED | OUTPATIENT
Start: 2024-12-17 | End: 2024-12-23 | Stop reason: HOSPADM

## 2024-12-17 RX ORDER — IBUPROFEN 600 MG/1
1 TABLET ORAL
Status: DISCONTINUED | OUTPATIENT
Start: 2024-12-17 | End: 2024-12-23 | Stop reason: HOSPADM

## 2024-12-17 RX ORDER — NALOXONE HCL 0.4 MG/ML
0.4 VIAL (ML) INJECTION AS NEEDED
Status: DISCONTINUED | OUTPATIENT
Start: 2024-12-17 | End: 2024-12-17 | Stop reason: HOSPADM

## 2024-12-17 RX ORDER — ONDANSETRON 2 MG/ML
INJECTION INTRAMUSCULAR; INTRAVENOUS AS NEEDED
Status: DISCONTINUED | OUTPATIENT
Start: 2024-12-17 | End: 2024-12-17 | Stop reason: SURG

## 2024-12-17 RX ORDER — ACETAMINOPHEN 650 MG/1
650 SUPPOSITORY RECTAL EVERY 4 HOURS PRN
Status: DISCONTINUED | OUTPATIENT
Start: 2024-12-17 | End: 2024-12-23 | Stop reason: HOSPADM

## 2024-12-17 RX ORDER — DEXAMETHASONE SODIUM PHOSPHATE 4 MG/ML
INJECTION, SOLUTION INTRA-ARTICULAR; INTRALESIONAL; INTRAMUSCULAR; INTRAVENOUS; SOFT TISSUE AS NEEDED
Status: DISCONTINUED | OUTPATIENT
Start: 2024-12-17 | End: 2024-12-17 | Stop reason: SURG

## 2024-12-17 RX ORDER — ONDANSETRON 2 MG/ML
4 INJECTION INTRAMUSCULAR; INTRAVENOUS ONCE AS NEEDED
Status: DISCONTINUED | OUTPATIENT
Start: 2024-12-17 | End: 2024-12-17 | Stop reason: HOSPADM

## 2024-12-17 RX ORDER — LABETALOL HYDROCHLORIDE 5 MG/ML
5 INJECTION, SOLUTION INTRAVENOUS
Status: DISCONTINUED | OUTPATIENT
Start: 2024-12-17 | End: 2024-12-17 | Stop reason: HOSPADM

## 2024-12-17 RX ORDER — DIPHENHYDRAMINE HYDROCHLORIDE 50 MG/ML
12.5 INJECTION INTRAMUSCULAR; INTRAVENOUS ONCE AS NEEDED
Status: DISCONTINUED | OUTPATIENT
Start: 2024-12-17 | End: 2024-12-17 | Stop reason: HOSPADM

## 2024-12-17 RX ORDER — CYCLOBENZAPRINE HCL 10 MG
5 TABLET ORAL 2 TIMES DAILY PRN
Status: DISCONTINUED | OUTPATIENT
Start: 2024-12-17 | End: 2024-12-23 | Stop reason: HOSPADM

## 2024-12-17 RX ORDER — ATORVASTATIN CALCIUM 40 MG/1
40 TABLET, FILM COATED ORAL NIGHTLY
COMMUNITY

## 2024-12-17 RX ORDER — METOPROLOL TARTRATE 25 MG/1
25 TABLET, FILM COATED ORAL 2 TIMES DAILY
Status: DISCONTINUED | OUTPATIENT
Start: 2024-12-17 | End: 2024-12-23

## 2024-12-17 RX ORDER — LOPERAMIDE HYDROCHLORIDE 2 MG/1
2 CAPSULE ORAL 2 TIMES DAILY PRN
COMMUNITY

## 2024-12-17 RX ORDER — LIDOCAINE HYDROCHLORIDE 20 MG/ML
INJECTION, SOLUTION EPIDURAL; INFILTRATION; INTRACAUDAL; PERINEURAL AS NEEDED
Status: DISCONTINUED | OUTPATIENT
Start: 2024-12-17 | End: 2024-12-17 | Stop reason: SURG

## 2024-12-17 RX ORDER — SODIUM CHLORIDE 9 MG/ML
INJECTION, SOLUTION INTRAVENOUS CONTINUOUS PRN
Status: DISCONTINUED | OUTPATIENT
Start: 2024-12-17 | End: 2024-12-17 | Stop reason: SURG

## 2024-12-17 RX ORDER — INSULIN LISPRO 100 [IU]/ML
2-9 INJECTION, SOLUTION INTRAVENOUS; SUBCUTANEOUS EVERY 6 HOURS SCHEDULED
Status: DISCONTINUED | OUTPATIENT
Start: 2024-12-17 | End: 2024-12-22

## 2024-12-17 RX ADMIN — TAMSULOSIN HYDROCHLORIDE 0.8 MG: 0.4 CAPSULE ORAL at 20:00

## 2024-12-17 RX ADMIN — VASOPRESSIN 2 UNITS: 20 INJECTION INTRAVENOUS at 14:02

## 2024-12-17 RX ADMIN — Medication 10 ML: at 20:01

## 2024-12-17 RX ADMIN — FUROSEMIDE 40 MG: 40 TABLET ORAL at 20:00

## 2024-12-17 RX ADMIN — FLUOXETINE 40 MG: 20 CAPSULE ORAL at 20:00

## 2024-12-17 RX ADMIN — PROPOFOL 200 MG: 10 INJECTION, EMULSION INTRAVENOUS at 13:46

## 2024-12-17 RX ADMIN — FINASTERIDE 5 MG: 5 TABLET, FILM COATED ORAL at 20:01

## 2024-12-17 RX ADMIN — DEXAMETHASONE SODIUM PHOSPHATE 8 MG: 4 INJECTION, SOLUTION INTRAMUSCULAR; INTRAVENOUS at 13:57

## 2024-12-17 RX ADMIN — LIDOCAINE HYDROCHLORIDE 80 MG: 20 INJECTION, SOLUTION EPIDURAL; INFILTRATION; INTRACAUDAL; PERINEURAL at 13:46

## 2024-12-17 RX ADMIN — METOPROLOL TARTRATE 25 MG: 25 TABLET, FILM COATED ORAL at 20:01

## 2024-12-17 RX ADMIN — POTASSIUM CHLORIDE 20 MEQ: 1500 TABLET, EXTENDED RELEASE ORAL at 18:27

## 2024-12-17 RX ADMIN — Medication 10 ML: at 10:54

## 2024-12-17 RX ADMIN — HYDROMORPHONE HYDROCHLORIDE 0.5 MG: 1 INJECTION, SOLUTION INTRAMUSCULAR; INTRAVENOUS; SUBCUTANEOUS at 13:30

## 2024-12-17 RX ADMIN — PHENYLEPHRINE HYDROCHLORIDE 200 MCG: 10 INJECTION INTRAVENOUS at 13:57

## 2024-12-17 RX ADMIN — ONDANSETRON 4 MG: 2 INJECTION INTRAMUSCULAR; INTRAVENOUS at 13:57

## 2024-12-17 RX ADMIN — Medication 1000 MCG: at 20:00

## 2024-12-17 RX ADMIN — PHENYLEPHRINE HYDROCHLORIDE 200 MCG: 10 INJECTION INTRAVENOUS at 13:54

## 2024-12-17 RX ADMIN — SODIUM CHLORIDE 100 ML/HR: 9 INJECTION, SOLUTION INTRAVENOUS at 10:54

## 2024-12-17 RX ADMIN — ASPIRIN 81 MG: 81 TABLET, COATED ORAL at 20:00

## 2024-12-17 RX ADMIN — GUAIFENESIN 1200 MG: 600 TABLET, MULTILAYER, EXTENDED RELEASE ORAL at 20:00

## 2024-12-17 RX ADMIN — SODIUM CHLORIDE 500 ML: 9 INJECTION, SOLUTION INTRAVENOUS at 16:47

## 2024-12-17 RX ADMIN — INSULIN LISPRO 8 UNITS: 100 INJECTION, SOLUTION INTRAVENOUS; SUBCUTANEOUS at 11:10

## 2024-12-17 RX ADMIN — HYDROCODONE BITARTRATE AND ACETAMINOPHEN 2 TABLET: 7.5; 325 TABLET ORAL at 16:46

## 2024-12-17 RX ADMIN — SODIUM CHLORIDE: 9 INJECTION, SOLUTION INTRAVENOUS at 13:39

## 2024-12-17 RX ADMIN — INSULIN LISPRO 7 UNITS: 100 INJECTION, SOLUTION INTRAVENOUS; SUBCUTANEOUS at 17:17

## 2024-12-17 RX ADMIN — LEVOTHYROXINE SODIUM 100 MCG: 100 TABLET ORAL at 20:00

## 2024-12-17 RX ADMIN — INSULIN LISPRO 7 UNITS: 100 INJECTION, SOLUTION INTRAVENOUS; SUBCUTANEOUS at 23:59

## 2024-12-17 NOTE — OP NOTE
CYSTOSCOPY URETEROSCOPY RETROGRADE PYELOGRAM HOLMIUM LASER STENT INSERTION  Procedure Report    Patient Name:  Jamin Hennessy  YOB: 1955    Date of Surgery:  12/17/2024     Indications: Right ureteral calculi    Pre-op Diagnosis:   Right ureteral calculi    Bilateral renal calculi    Post-Op Diagnosis:     Post-Op Diagnosis Codes:  Same    Procedure/CPT® Codes:      Procedure(s):  CYSTOSCOPY RIGHT URETEROSCOPY  HOLMIUM LASER STONE EXTRACTION AND STENT INSERTION    Staff:  Surgeon(s):  Jamin Estrella MD            was responsible for performing the following activities: Irrigation and their skilled assistance was necessary for the success of this case.    Anesthesia: General    Estimated Blood Loss: none    Implants:    Nothing was implanted during the procedure    Specimen:          ID Type Source Tests Collected by Time   1 (Not marked as sent) :  Calculus Ureter, Right STONE ANALYSIS Jamin Esterlla MD 12/17/2024 1404         Findings: Stone sent for stone chemical analysis.  Primary team can send patient home on appropriate antibiotics and pain medicine once he recovers today or tomorrow.  Will see him in January clear cystoscopy right stent removal.    Complications: None    Description of Procedure: Patient was taken the operating placed spine position where general trach anesthesia was induced.  Then he is placed in a comfortable dorsal thigh position was groin areas prepped draped usual sterile fashion.  Cystoscopy is Caplinger normal urethra normal prostate and normal bladder.  A guidewire was placed up the right ureter to the level of the kidney under fluoroscopic guidance.  Then a dual-lumen catheter is placed along the second guidewire as a safety guidewire.  The dual-lumen catheter was removed and rigid ureteroscopy is care of the right ureter finding a right distal ureteral stone which was broken up with the holmium laser 325 µm fiber at a setting of 10 and 1.  Several passes of  the basket used to remove all the stone fragments which were sent to pathology for stone chemical analysis.  A 7 x 24 Belarusian double-J stent was placed a good curl in the kidney good curl in the bladder.  Patient follow-up in January for a cystoscopy right stent removal.      Jamin Estrella MD     Date: 12/17/2024  Time: 14:19 EST

## 2024-12-17 NOTE — LETTER
EMS Transport Request  For use at [facility], [place], [facility], [place], and [place] only   Patient Name: Jamin Hennessy : 1955   Weight:135 kg (297 lb) Pick-up Location: [facility] BLS/ALS: BLS/ALS: BLS   Insurance: [employer] MEDICARE REPLACEMENT Auth End Date: 2024   Pre-Cert #: D/C Summary complete:    Destination: Home How many stairs 0, Will the patient be on the main level yes, Is there a ramp available no, Can the patient stand and pivot no, Address [address] [address], and Name/contact number for who will be present aston Werner [phone]   Contact Precautions: None   Equipment (O2, Fluids, etc.): Urinary Catheter   Arrive By Date/Time: 24 Stretcher/WC: Stretcher   CM Requesting: Gal Bryan RN Ext: [phone]   Notes/Medical Necessity: Patient is alert and oriented. Bedbound at baseline.      ______________________________________________________________________    *Only 2 patient bags OR 1 carry-on size bag are permitted.  Wheelchairs and walkers CANNOT transported with the patient. Acknowledge: Yes

## 2024-12-17 NOTE — LETTER
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  Wheelchairs and walkers CANNOT transported with the patient. Acknowledge: Yes

## 2024-12-17 NOTE — H&P
"[facility] Medicine Services  History & Physical    Patient Name: Jamin Hennessy  : 1955  MRN: [id number]  Primary Care Physician:  Jhonny Heller MD  Date of admission: 2024  Date and Time of Service: 2024 at 1030    Subjective      Chief Complaint: Transfer from outside facility, 6 mm kidney stone as well as urinary tract infection    History of Present Illness: Jamin Hennessy is a 69 y.o. male with a CMH of A-fib, CAD, hyperlipidemia, hypertension, type 2 diabetes, GERD, multiple sclerosis, tobacco use who presented to [facility] on 2024 as a transfer from [facility].  Patient is a poor historian, reports that he went to [facility] due to kidney stone.  Patient states he just \"felt like he had one\".  Of note, patient has a chronic indwelling catheter.  When asked reasoning, patient states \"I broke my hip several years ago and I also have MS so I am unable to walk to the bathroom\".  Patient asked last time catheter was changed prior to in the ER yesterday, patient states \"I am not sure\".  Patient states \"no one manages my diabetes\".  Patient reports he has a history of atrial fibrillation however states he does not see a cardiologist outpatient and is unsure if he is on a blood thinner.  It is noted that last appointment with Dr. Littlejohn was in .  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  Patient reports he was getting treatment but has not had treatment in a few months.  Patient is a poor historian and unable to provide much detail in his medical conditions.  No family at bedside during encounter.    Upon arrival to this facility, vital signs stable.  Patient initial glucose 360.  Noted tachycardia on bedside monitor therefore EKG was ordered which showed \"sinus or ectopic atrial tachycardia\". Hospitalist service to admit for further management.      Review of Systems   Respiratory:  Negative for shortness " of breath.    Cardiovascular:  Negative for chest pain.   Genitourinary:  Positive for difficulty urinating, flank pain and hematuria.   Musculoskeletal:  Positive for back pain.   Neurological:  Negative for dizziness and headaches.       Personal History     Past Medical History:   Diagnosis Date    A-fib     CAD (coronary artery disease)     Elevated cholesterol     Hypertension     MI (myocardial infarction)     Non-insulin dependent type 2 diabetes mellitus        Past Surgical History:   Procedure Laterality Date    BRONCHOSCOPY N/A 3/4/2020    Procedure: BRONCHOSCOPY with bronchioalveolar lavage;  Surgeon: Melissa Cole MD;  Location: [facility] ENDOSCOPY;  Service: Pulmonary;  Laterality: N/A;   pneumonia    CARDIAC CATHETERIZATION      CARDIAC CATHETERIZATION N/A 1/20/2020    Procedure: Left Heart Cath;  Surgeon: Migdalia Beth MD;  Location: [facility] CATH INVASIVE LOCATION;  Service: Cardiovascular    CARDIAC CATHETERIZATION N/A 1/20/2020    Procedure: Left ventriculography;  Surgeon: Migdalia Beth MD;  Location: [facility] CATH INVASIVE LOCATION;  Service: Cardiovascular    CARDIAC CATHETERIZATION N/A 1/20/2020    Procedure: Coronary angiography;  Surgeon: Migdalia Beth MD;  Location: [facility] CATH INVASIVE LOCATION;  Service: Cardiovascular    CORONARY ANGIOPLASTY WITH STENT PLACEMENT      MITRAL VALVE REPAIR/REPLACEMENT N/A 1/22/2020    Procedure: MITRAL VALVE REPAIR/REPLACEMENT;  Surgeon: Fallon Cole MD;  Location: [facility] CVOR;  Service: Cardiothoracic;  Laterality: N/A;  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 Otherwise pertinent FHx was reviewed and not pertinent to current issue.    Social History:  reports that he has been smoking cigarettes. His smokeless tobacco use includes chew. He reports that he does not currently use alcohol. He reports that he does not use  drugs.    Home Medications:  Prior to Admission Medications       Prescriptions Last Dose Informant Patient Reported? Taking?    acetaminophen (TYLENOL) 500 MG tablet   No No    Take 1 tablet by mouth Every 6 (Six) Hours As Needed for Mild Pain .    apixaban (ELIQUIS) 5 MG tablet tablet   No No    Take 1 tablet by mouth Every 12 (Twelve) Hours.    aspirin 81 MG EC tablet  Spouse/Significant Other Yes No    Take 81 mg by mouth Daily.    bacitracin 500 UNIT/GM ointment  Spouse/Significant Other Yes No    Apply  topically to the appropriate area as directed Daily.    cyclobenzaprine (FLEXERIL) 5 MG tablet   No No    Take 1 tablet by mouth 2 (Two) Times a Day As Needed for Muscle Spasms.    finasteride (PROSCAR) 5 MG tablet   Yes No    Take 1 tablet by mouth Daily.    FLUoxetine (PROzac) 40 MG capsule   Yes No    Take 1 capsule by mouth Daily.    furosemide (LASIX) 40 MG tablet   Yes No    Take 1 tablet by mouth 2 (Two) Times a Day.    glatiramer acetate (GLATOPA) 20 MG/ML injection   Yes No    Inject 1 mL under the skin into the appropriate area as directed Daily.    guaiFENesin (MUCINEX) 600 MG 12 hr tablet   Yes No    Take 2 tablets by mouth 2 (Two) Times a Day.    levothyroxine (SYNTHROID, LEVOTHROID) 100 MCG tablet   Yes No    Take 1 tablet by mouth Daily.    metoprolol tartrate (LOPRESSOR) 25 MG tablet   Yes No    Take 1 tablet by mouth 2 (Two) Times a Day.    midodrine (PROAMATINE) 5 MG tablet   No No    Take 1 tablet by mouth 3 (Three) Times a Day Before Meals for 30 days.    omeprazole (priLOSEC) 20 MG capsule  Spouse/Significant Other Yes No    Take 20 mg by mouth Daily.    risperiDONE (risperDAL M-TABS) 0.25 MG tablet dispersible disintegrating tablet   Yes No    Place 1 tablet on the tongue Every Night.    sodium chloride (NS) 0.9 % irrigation   Yes No    Irrigate with  to the affected area as directed by provider Daily. For molina catheter    tamsulosin (FLOMAX) 0.4 MG capsule 24 hr capsule   Yes No    Take  2 capsules by mouth Every Night.    vitamin B-12 (CYANOCOBALAMIN) 500 MCG tablet  Spouse/Significant Other Yes No    Take 1,000 mcg by mouth Daily.              Allergies:  No Known Allergies    Objective      Vitals:   Temp:  [98.1 °F (36.7 °C)] 98.1 °F (36.7 °C)  Heart Rate:  [122-125] 122  BP: (123)/(82) 123/82  There is no height or weight on file to calculate BMI.  Physical Exam  General: 68 yo male, Alert and oriented, obese, no acute distress.  HENT: Normocephalic, normal hearing, moist oral mucosa, no scleral icterus.  Neck: Supple, non-tender, no carotid bruits, no JVD, no LAD.  Lungs: Clear to auscultation, non-labored respiration.  Heart: RRR, no murmur, gallop or edema.  Abdomen: Soft, nontender, non-distended, + bowel sounds.  Musculoskeletal: Normal range of motion and strength, no tenderness or swelling.  Skin: Skin is warm, dry and pink, no rashes or lesions.  Psychiatric: Cooperative, appropriate mood and affect.      Diagnostic Data:  Lab Results (last 24 hours)       Procedure Component Value Units Date/Time    POC Glucose Once [[id number]]  (Abnormal) Collected: 12/17/24 1021    Specimen: Blood Updated: 12/17/24 1022     Glucose 360 mg/dL      Comment: Serial Number: [id number]:  [id number]                Imaging Results (Last 24 Hours)       Procedure Component Value Units Date/Time    CT Outside Films [[id number]] Resulted: 12/17/24 1016     Updated: 12/17/24 1016    Narrative:      This procedure was auto-finalized with no dictation required.              Assessment & Plan        This is a 69 y.o. male with:    Active and Resolved Problems  Active Hospital Problems    Diagnosis  POA    **UTI (urinary tract infection) [N39.0]  Yes      Resolved Hospital Problems   No resolved problems to display.       UTI  6 mm stone with upstream mild to moderate hydronephrosis  UA done at outside facility showing positive for protein, nitrite, leuk esterase, WBC, red blood cell, bacteria  WBC at outside  "facility 19.4  Patient was given 1 g Rocephin IV at outside facility  Outside facility perform CT abdomen pelvis earlier this morning showing \"6 mm stone within the right distal ureter with upstream mild to moderate hydronephrosis and perinephric stranding\"  Urology consulted, appreciate recommendations  As needed antiemetics  pain management  Continue IV Rocephin  UA obtained at outside facility, culture pending at outside facility    Tachycardia  Hx paroxysmal atrial fibrillation  Questionable on if related to pain  EKG ordered and performed  Continuous telemetry  Treat pain PRN  One time 500cc IVF bolus pending  Home meds unreviewed- Patient is possibly on eliquis outpatient; hold at this time    Diabetes Mellitus    on arrival  Moderate SSI   Consistent carb diet once appropriate  Hypoglycemia protocol   Accu-check Q6H while NPO  Resume home medications once reconciled    Chronic indwelling f/c  Patient reports due to hx of multiple sclerosis as well as \"broken hip\" for reasoning of chronic f/c  Resume home medications once reconciled  F/c changed at outside facility    Hypertension   BP stable  Resume home medications once reconciled       VTE Prophylaxis:  Mechanical VTE prophylaxis orders are present.        The patient desires to be as follows:    CODE STATUS:    Code Status (Patient has no pulse and is not breathing): CPR (Attempt to Resuscitate)  Medical Interventions (Patient has pulse or is breathing): Full Support        Debbi Hennessy, who can be contacted at [phone], is the designated person to make medical decisions on the patient's behalf if He is incapable of doing so. This was clarified with patient and/or next of kin on 12/17/2024 during the course of this H&P.    Admission Status:  I believe this patient meets observation status.    Expected Length of Stay: Less than 2 midnights    PDMP and Medication Dispenses via Sidebar reviewed and consistent with patient reported medications.    I " discussed the patient's findings and my recommendations with patient.      Signature:     This document has been electronically signed by YUE Atwood on December 17, 2024 11:25 [facility]

## 2024-12-17 NOTE — ANESTHESIA POSTPROCEDURE EVALUATION
Patient: Jamin Hennessy    Procedure Summary       Date: 12/17/24 Room / Location: [facility] OR 06 / [facility] MAIN OR    Anesthesia Start: 1339 Anesthesia Stop: 1433    Procedure: CYSTOSCOPY URETEROSCOPY RETROGRADE PYELOGRAM HOLMIUM LASER STENT INSERTION (Right) Diagnosis:     Surgeons: Jamin Estrella MD Provider: Jorje Pastrana MD    Anesthesia Type: MAC ASA Status: 4            Anesthesia Type: MAC    Vitals  Vitals Value Taken Time   /82 12/17/24 1513   Temp 99.4 °F (37.4 °C) 12/17/24 1420   Pulse 124 12/17/24 1515   Resp 15 12/17/24 1505   SpO2 92 % 12/17/24 1515   Vitals shown include unfiled device data.        Post Anesthesia Care and Evaluation    Patient location during evaluation: PACU  Patient participation: complete - patient participated  Level of consciousness: awake  Pain management: adequate    Airway patency: patent  Anesthetic complications: No anesthetic complications  PONV Status: none  Cardiovascular status: acceptable  Respiratory status: acceptable  Hydration status: acceptable    Comments: Patient seen and examined postoperatively; vital signs stable; SpO2 greater than or equal to 90%; cardiopulmonary status stable; nausea/vomiting adequately controlled; pain adequately controlled; no apparent anesthesia complications; patient discharged from anesthesia care when discharge criteria were met

## 2024-12-17 NOTE — DISCHARGE PLACEMENT REQUEST
"Jamin Eller (69 y.o. Male)       Date of Birth   1955    Social Security Number       Address   [address] [place] [address]    Home Phone   [phone]    MRN   [id number]       Judaism   None    Marital Status                               Admission Date   12/17/24    Admission Type   Urgent    Admitting Provider   Maurice Luna MD    Attending Provider   Maurice Luna MD    Department, Room/Bed   [facility] MEDICAL INPATIENT, 380/1       Discharge Date       Discharge Disposition       Discharge Destination                                 Attending Provider: Maurice Luna MD    Allergies: No Known Allergies    Isolation: Contact   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), 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)   Code Status: CPR    Ht: 177.8 cm (70\")   Wt: --    Admission Cmt: None   Principal Problem: UTI (urinary tract infection) [N39.0]                   Active Insurance as of 12/17/2024       Primary Coverage       Payor Plan Insurance Group Employer/Plan Group    HUMANA MEDICARE REPLACEMENT HUMANA MED ADV PPO [id number]       Payor Plan Address Payor Plan Phone Number Payor Plan Fax Number Effective Dates    [address] [address] [phone]  2/1/2020 - None Entered    [facility] [id number]         Subscriber Name Subscriber Birth Date Member ID       JAMIN ELLER 1955 [id number]               Secondary Coverage       Payor Plan Insurance Group Employer/Plan Group    [facility] CCN OPTUM        Payor Plan Address Payor Plan Phone Number Payor Plan Fax Number Effective Dates    [address] [phone]  1/1/2024 - None Entered    [facility] [id number]         Subscriber Name Subscriber Birth Date Member ID       JAMIN ELLER 1955 [id number]                     Emergency Contacts        (Rel.) Home Phone Work Phone Mobile Phone    RONIT ELLER (Spouse) [phone] -- [phone]    nursing,facility [phone] -- --                "

## 2024-12-17 NOTE — ANESTHESIA PROCEDURE NOTES
Airway  Urgency: elective    Date/Time: 12/17/2024 1:53 PM  Airway not difficult    General Information and Staff    Patient location during procedure: OR  Anesthesiologist: Jorje Pastrana MD  CRNA/CAA: Anna Nascimento CRNA    Indications and Patient Condition  Indications for airway management: airway protection    Preoxygenated: yes  MILS maintained throughout  Mask difficulty assessment: 0 - not attempted    Final Airway Details  Final airway type: supraglottic airway      Successful airway: I-gel  Size 5     Number of attempts at approach: 1  Assessment: lips, teeth, and gum same as pre-op and atraumatic intubation

## 2024-12-17 NOTE — PLAN OF CARE
Problem: Adult Inpatient Plan of Care  Goal: Plan of Care Review  12/17/2024 1436 by Lindsey Suarez RN  Outcome: Progressing  12/17/2024 1421 by Lindsey Suarez RN  Outcome: Progressing  Goal: Patient-Specific Goal (Individualized)  12/17/2024 1436 by Lindsey Suarez RN  Outcome: Progressing  12/17/2024 1421 by Lindsey Suarez RN  Outcome: Progressing  Goal: Absence of Hospital-Acquired Illness or Injury  12/17/2024 1436 by Lindsey Suarez RN  Outcome: Progressing  12/17/2024 1421 by Lindsey Suarez RN  Outcome: Progressing  Goal: Optimal Comfort and Wellbeing  12/17/2024 1436 by Lindsey Suarez RN  Outcome: Progressing  12/17/2024 1421 by Lindsey Suarez RN  Outcome: Progressing  Goal: Readiness for Transition of Care  12/17/2024 1436 by Lindsey Suarez RN  Outcome: Progressing  12/17/2024 1421 by Lindsey Suarez RN  Outcome: Progressing  Intervention: Mutually Develop Transition Plan  Recent Flowsheet Documentation  Taken 12/17/2024 1433 by Lindsey Suarez RN  Equipment Currently Used at Home: sling   Goal Outcome Evaluation:

## 2024-12-17 NOTE — CASE MANAGEMENT/SOCIAL WORK
Discharge Planning Assessment   [facility]     Patient Name: Jamin Hennessy  MRN: [id number]  Today's Date: 12/17/2024    Admit Date: 12/17/2024    Plan: From home w. wife. Current with [facility]. Will need EMS   Discharge Needs Assessment       Row Name 12/17/24 1155       Living Environment    People in Home spouse    Name(s) of People in Home [facility]    Current Living Arrangements home    Potentially Unsafe Housing Conditions none    In the past 12 months has the electric, gas, oil, or water company threatened to shut off services in your home? No    Primary Care Provided by spouse/significant other;homecare agency  [facility] and [facility]    Provides Primary Care For no one, unable/limited ability to care for self    Family Caregiver if Needed spouse    Family Caregiver Names Debbi    Quality of Family Relationships helpful;involved;supportive    Able to Return to Prior Arrangements yes       Resource/Environmental Concerns    Resource/Environmental Concerns none    Transportation Concerns none       Transportation Needs    In the past 12 months, has lack of transportation kept you from medical appointments or from getting medications? no    In the past 12 months, has lack of transportation kept you from meetings, work, or from getting things needed for daily living? No       Food Insecurity    Within the past 12 months, you worried that your food would run out before you got the money to buy more. Never true    Within the past 12 months, the food you bought just didn't last and you didn't have money to get more. Never true       Transition Planning    Patient/Family Anticipates Transition to home with family;home with help/services    Patient/Family Anticipated Services at Transition none    Transportation Anticipated health plan transportation  will need EMS       Discharge Needs Assessment    Readmission Within the Last 30 Days no previous admission in last 30 days    Current  Outpatient/Agency/Support Group homecare agency  [facility]    Equipment Currently Used at Home wheelchair;other (see comments)  molina catheter supplies    Concerns to be Addressed discharge planning    Anticipated Changes Related to Illness none    Equipment Needed After Discharge none    Outpatient/Agency/Support Group Needs homecare agency    Discharge Facility/Level of Care Needs home with home health                   Discharge Plan       Row Name 12/17/24 1158       Plan    Plan From home w. wife. Current with [facility]. Will need EMS    Plan Comments CM met with patient at the bedside. Confirmed PCP, insurance, and pharmacy. [facility] n[facility], [facility] [place] Patient lives at home with his wife Debbi and is dependent with ADLS/IADLS and does not drive. Patient will need EMS transport for discharge. Patient's wife Debbi assists with ADLS/IADLS and patient is [facility] who assist patient with nursing care pertaining to molina catheter. CM placed referral in Epic basket and spoke to liaison Mehreen on the phone who confirmed patient is current. If patient not discharged by Friday, [facility] will need to admit patient as a new patient per insurance guidelines. DC Barriers: Urology following, NPO, IVF.                  Continued Care and Services - Admitted Since 12/17/2024       Home Medical Care       Service Provider Request Status Services Address Phone Fax Patient Preferred    [facility] Pending - Request Sent -- [address] [place] [address] [phone] --                  Expected Discharge Date and Time       Expected Discharge Date Expected Discharge Time    Dec 18, 2024            Demographic Summary       Row Name 12/17/24 5193       General Information    Admission Type observation    Arrived From emergency department    Required Notices Provided Observation Status Notice    Referral Source admission list     Reason for Consult discharge planning    Preferred Language English       Contact Information    Permission Granted to Share Info With     Contact Information Obtained for                    Functional Status       Row Name 12/17/24 1154       Functional Status    Usual Activity Tolerance fair    Current Activity Tolerance poor       Functional Status, IADL    Medications completely dependent    Meal Preparation completely dependent    Housekeeping completely dependent    Laundry completely dependent    Shopping completely dependent       Mental Status    General Appearance WDL WDL       Mental Status Summary    Recent Changes in Mental Status/Cognitive Functioning no changes             Gal Bryan RN     Cell number [phone]  Office number [phone]

## 2024-12-17 NOTE — ANESTHESIA PREPROCEDURE EVALUATION
Anesthesia Evaluation     Patient summary reviewed and Nursing notes reviewed   no history of anesthetic complications:   NPO Solid Status: > 8 hours  NPO Liquid Status: > 8 hours           Airway   Mallampati: II  TM distance: >3 FB  Neck ROM: full  No difficulty expected  Dental - normal exam   (+) edentulous and poor dentition        Pulmonary - normal exam   (+) a smoker, lung cancer,shortness of breath, sleep apnea  Cardiovascular   Exercise tolerance: poor (<4 METS)    ECG reviewed  Patient on routine beta blocker and Beta blocker given within 24 hours of surgery  Rhythm: irregular  Rate: abnormal    (+) hypertension, valvular problems/murmurs (s/p MVR) MR, past MI  >12 months, CAD, cardiac stents , dysrhythmias Atrial Fib, hyperlipidemia      Neuro/Psych  (+) psychiatric history Anxiety  GI/Hepatic/Renal/Endo    (+) obesity, morbid obesity, GERD, renal disease- ARF and stones, diabetes mellitus type 2 poorly controlled using insulin    Musculoskeletal     Abdominal   (+) obese    Bowel sounds: normal.   Substance History - negative use     OB/GYN          Other - negative ROS           Phys Exam Other: Missing  Teeth extremely poor dentiton with caries, no loose per pt               Anesthesia Plan    ASA 4     MAC   total IV anesthesia  intravenous induction   Postoperative Plan: Expected vent after surgery  Anesthetic plan, risks, benefits, and alternatives have been provided, discussed and informed consent has been obtained with: patient.    Use of blood products discussed with  Consented to blood products.    Plan discussed with CRNA.

## 2024-12-17 NOTE — CONSULTS
Urology Consult Note    Patient:Jamin Hennessy :1955  Room:[facility] MAIN OR/MAIN OR  Admit Date2024  Age:69 y.o.     SEX:male     DOS:2024     MR:[id number]     Visit:[id number]       Attending: Maurice Luna MD  Referring Provider: Dr Luna  Reason for Consultation: Ureteral calculi    Patient Care Team:  Jhonny Heller MD as PCP - General (Family Medicine)  Frederick George MD as Consulting Physician (Nephrology)    Chief complaint right flank pain    Subjective .     History of present illness: Patient is a 69-year-old white male who was transferred from [place] with right flank pain secondary to a right distal ureteral stone seen on outside CT scan.  He also has bilateral nonobstructing renal calculi.  Patient needs a stone removed today if possible    Review of Systems  12 point review of systems were reviewed and are negative except for what is in HPI.    History  Past Medical History:   Diagnosis Date    A-fib     CAD (coronary artery disease)     Elevated cholesterol     Hypertension     MI (myocardial infarction)     Non-insulin dependent type 2 diabetes mellitus      Past Surgical History:   Procedure Laterality Date    BRONCHOSCOPY N/A 3/4/2020    Procedure: BRONCHOSCOPY with bronchioalveolar lavage;  Surgeon: Melissa Cole MD;  Location: [facility] ENDOSCOPY;  Service: Pulmonary;  Laterality: N/A;   pneumonia    CARDIAC CATHETERIZATION      CARDIAC CATHETERIZATION N/A 2020    Procedure: Left Heart Cath;  Surgeon: Migdalia Beth MD;  Location: [facility] CATH INVASIVE LOCATION;  Service: Cardiovascular    CARDIAC CATHETERIZATION N/A 2020    Procedure: Left ventriculography;  Surgeon: Migdalia Beth MD;  Location: [facility] CATH INVASIVE LOCATION;  Service: Cardiovascular    CARDIAC CATHETERIZATION N/A 2020    Procedure: Coronary angiography;  Surgeon: Migdalia Beth MD;  Location: [facility] CATH INVASIVE LOCATION;  Service: Cardiovascular    CORONARY  ANGIOPLASTY WITH STENT PLACEMENT      MITRAL VALVE REPAIR/REPLACEMENT N/A 1/22/2020    Procedure: MITRAL VALVE REPAIR/REPLACEMENT;  Surgeon: Fallon Cole MD;  Location: [facility];  Service: Cardiothoracic;  Laterality: N/A;  patient has endocarditis mitral valve replaced with 31mm knox II     Social History     Socioeconomic History    Marital status:    Tobacco Use    Smoking status: Some Days     Current packs/day: 0.25     Types: Cigarettes    Smokeless tobacco: Current     Types: Chew   Vaping Use    Vaping status: Never Used   Substance and Sexual Activity    Alcohol use: Not Currently    Drug use: Never    Sexual activity: Defer     Family History   Problem Relation Age of Onset    Hypertension Mother      No Known Allergies  Prior to Admission medications    Medication Sig Start Date End Date Taking? Authorizing Provider   acetaminophen (TYLENOL) 500 MG tablet Take 1 tablet by mouth Every 6 (Six) Hours As Needed for Mild Pain . 1/28/20   Юлия Banuelos APRN   apixaban (ELIQUIS) 5 MG tablet tablet Take 1 tablet by mouth Every 12 (Twelve) Hours. 1/28/20   Юлия Banuelos APRN   aspirin 81 MG EC tablet Take 81 mg by mouth Daily.    Gisele Lauren MD   bacitracin 500 UNIT/GM ointment Apply  topically to the appropriate area as directed Daily.    Gisele Lauren MD   cyclobenzaprine (FLEXERIL) 5 MG tablet Take 1 tablet by mouth 2 (Two) Times a Day As Needed for Muscle Spasms. 2/3/20   Юлия Banuelos APRN   finasteride (PROSCAR) 5 MG tablet Take 1 tablet by mouth Daily.    Gisele Lauren MD   FLUoxetine (PROzac) 40 MG capsule Take 1 capsule by mouth Daily.    Gisele Lauren MD   furosemide (LASIX) 40 MG tablet Take 1 tablet by mouth 2 (Two) Times a Day.    Gisele Lauren MD   glatiramer acetate (GLATOPA) 20 MG/ML injection Inject 1 mL under the skin into the appropriate area as directed Daily.    Gisele Lauren MD   guaiFENesin (MUCINEX) 600 MG 12  hr tablet Take 2 tablets by mouth 2 (Two) Times a Day.    Gisele Lauren MD   levothyroxine (SYNTHROID, LEVOTHROID) 100 MCG tablet Take 1 tablet by mouth Daily.    Gisele Lauren MD   metoprolol tartrate (LOPRESSOR) 25 MG tablet Take 1 tablet by mouth 2 (Two) Times a Day.    Gisele Lauren MD   midodrine (PROAMATINE) 5 MG tablet Take 1 tablet by mouth 3 (Three) Times a Day Before Meals for 30 days. 9/30/24 10/30/24  Lauro Sheffield MD   omeprazole (priLOSEC) 20 MG capsule Take 20 mg by mouth Daily.    Gisele Lauren MD   risperiDONE (risperDAL M-TABS) 0.25 MG tablet dispersible disintegrating tablet Place 1 tablet on the tongue Every Night.    Gisele Lauren MD   sodium chloride (NS) 0.9 % irrigation Irrigate with  to the affected area as directed by provider Daily. For molina catheter    Gisele Lauren MD   tamsulosin (FLOMAX) 0.4 MG capsule 24 hr capsule Take 2 capsules by mouth Every Night.    Gisele Lauren MD   vitamin B-12 (CYANOCOBALAMIN) 500 MCG tablet Take 1,000 mcg by mouth Daily.    Gisele Lauren MD         Objective     tMax 24 hours:  Temp (24hrs), Av.1 °F (36.7 °C), Min:98.1 °F (36.7 °C), Max:98.1 °F (36.7 °C)    Vital Sign Ranges:  Temp:  [98.1 °F (36.7 °C)] 98.1 °F (36.7 °C)  Heart Rate:  [122-127] 127  Resp:  [17] 17  BP: (123-140)/(82-90) 140/90  Intake and Output Last 3 Shifts:  No intake/output data recorded.      Physical Exam:     General Appearance:  Alert, cooperative, in no acute distress   Head:  Normocephalic, without obvious abnormality, atraumatic   Eyes:  Lids and lashes normal, conjunctivae and sclerae normal, no icterus, no pallor, corneas clear, PERRLA   Ears:  Ears appear intact with no abnormalities noted   Throat:  No oral lesions, no thrush, oral mucosa moist   Neck:  No adenopathy, supple, trachea midline, no thyromegaly, no carotid bruit, no JVD   Back:  No kyphosis present, no scoliosis present, no skin  "lesions, erythema or scars, no tenderness to percussion or palpation, range of motion normal   Lungs:  Clear to auscultation, respirations regular, even and unlabored    Heart:  Regular rhythm and normal rate, normal S1 and S2, no murmur, no gallop, no rub, no click   Chest Wall:  No abnormalities observed   Abdomen:  Normal bowel sounds, no masses, no organomegaly, soft non-tender, non-distended, no guarding, no rebound tenderness   Rectal:  Deferred   Extremities:  Moves all extremities well, no edema, no cyanosis, no redness   Pulses:  Pulses palpable and equal bilaterally   Skin:  No bleeding, bruising or rash   Lymph nodes:  No palpable adenopathy   Neurologic:  Cranial nerves 2 - 12 grossly intact, sensation intact, DTR present and equal bilaterally                                                                               Results Review:     Lab Results (last 24 hours)       Procedure Component Value Units Date/Time    POC Chem Panel [[id number]]  (Abnormal) Collected: 12/17/24 1232    Specimen: Venous Blood Updated: 12/17/24 1237     Glucose 306 mg/dL      Comment: Serial Number: [id number]:  [id number]        Sodium 140 mmol/L      POC Potassium 3.2 mmol/L      Ionized Calcium 1.17 mmol/L      Hematocrit 40 %      Hemoglobin 13.6 g/dL      pH, Venous 7.310 pH Units      pCO2, Venous 54.3 mm Hg      CO2 CONTENT  29 mmol/L      HCO3, Venous 27.5 mmol/L      Base Excess, Venous 1.0 mmol/L      Base Deficit --     O2 Saturation, Venous --     pO2, Venous 31.0 mm Hg     POC Glucose Once [[id number]]  (Abnormal) Collected: 12/17/24 1021    Specimen: Blood Updated: 12/17/24 1022     Glucose 360 mg/dL      Comment: Serial Number: [id number]:  [id number]              No results found for: \"URINECX\"     Imaging Results (Last 7 Days)       Procedure Component Value Units Date/Time    CT Outside Films [[id number]] Resulted: 12/17/24 1016     Updated: 12/17/24 1016    Narrative:      This procedure was " auto-finalized with no dictation required.            Inpatient Meds:   Scheduled Meds:[Transfer Hold] insulin lispro, 2-9 Units, Subcutaneous, Q6H  [Transfer Hold] sodium chloride, 10 mL, Intravenous, Q12H       Continuous Infusions:sodium chloride, 100 mL/hr, Last Rate: 100 mL/hr (12/17/24 1054)       PRN Meds:.  atropine    [Transfer Hold] senna-docusate sodium **AND** [Transfer Hold] polyethylene glycol **AND** [Transfer Hold] bisacodyl **AND** [Transfer Hold] bisacodyl    [Transfer Hold] Calcium Replacement - Follow Nurse / BPA Driven Protocol    [Transfer Hold] dextrose    [Transfer Hold] dextrose    diphenhydrAMINE    diphenhydrAMINE    ePHEDrine Sulfate (Pressors)    flumazenil    [Transfer Hold] glucagon (human recombinant)    HYDROmorphone    HYDROmorphone    iohexol    ipratropium-albuterol    labetalol    [Transfer Hold] Magnesium Standard Dose Replacement - Follow Nurse / BPA Driven Protocol    [Transfer Hold] Morphine    naloxone    ondansetron    oxyCODONE    oxyCODONE    [Transfer Hold] Phosphorus Replacement - Follow Nurse / BPA Driven Protocol    Potassium Replacement - Follow Nurse / BPA Driven Protocol    [Transfer Hold] sodium chloride    [Transfer Hold] sodium chloride      Assessment & Plan     Right ureteral calculi    Bilateral renal calculi    Plan cystoscopy, right ureteroscopy laser lithotripsy stone extraction and stent placement.  Discussed with patient all risk benefits and options and he is willing to proceed.    I discussed the patient's findings and my recommendations with patient.    Jamin Estrella MD  12/17/24  14:17 EST

## 2024-12-18 LAB
BASOPHILS # BLD AUTO: 0.04 10*3/MM3 (ref 0–0.2)
BASOPHILS NFR BLD AUTO: 0.2 % (ref 0–1.5)
DEPRECATED RDW RBC AUTO: 57 FL (ref 37–54)
EOSINOPHIL # BLD AUTO: 0 10*3/MM3 (ref 0–0.4)
EOSINOPHIL NFR BLD AUTO: 0 % (ref 0.3–6.2)
ERYTHROCYTE [DISTWIDTH] IN BLOOD BY AUTOMATED COUNT: 18.3 % (ref 12.3–15.4)
GLUCOSE BLDC GLUCOMTR-MCNC: 281 MG/DL (ref 70–105)
GLUCOSE BLDC GLUCOMTR-MCNC: 287 MG/DL (ref 70–105)
GLUCOSE BLDC GLUCOMTR-MCNC: 290 MG/DL (ref 70–105)
HCT VFR BLD AUTO: 36.5 % (ref 37.5–51)
HGB BLD-MCNC: 11 G/DL (ref 13–17.7)
IMM GRANULOCYTES # BLD AUTO: 0.2 10*3/MM3 (ref 0–0.05)
IMM GRANULOCYTES NFR BLD AUTO: 0.9 % (ref 0–0.5)
LYMPHOCYTES # BLD AUTO: 0.36 10*3/MM3 (ref 0.7–3.1)
LYMPHOCYTES NFR BLD AUTO: 1.6 % (ref 19.6–45.3)
MCH RBC QN AUTO: 25.6 PG (ref 26.6–33)
MCHC RBC AUTO-ENTMCNC: 30.1 G/DL (ref 31.5–35.7)
MCV RBC AUTO: 85.1 FL (ref 79–97)
MONOCYTES # BLD AUTO: 0.65 10*3/MM3 (ref 0.1–0.9)
MONOCYTES NFR BLD AUTO: 2.8 % (ref 5–12)
NEUTROPHILS NFR BLD AUTO: 21.94 10*3/MM3 (ref 1.7–7)
NEUTROPHILS NFR BLD AUTO: 94.5 % (ref 42.7–76)
NRBC BLD AUTO-RTO: 0 /100 WBC (ref 0–0.2)
PLATELET # BLD AUTO: 173 10*3/MM3 (ref 140–450)
PMV BLD AUTO: 10.4 FL (ref 6–12)
RBC # BLD AUTO: 4.29 10*6/MM3 (ref 4.14–5.8)
WBC NRBC COR # BLD AUTO: 23.19 10*3/MM3 (ref 3.4–10.8)

## 2024-12-18 PROCEDURE — 25810000003 LACTATED RINGERS PER 1000 ML: Performed by: STUDENT IN AN ORGANIZED HEALTH CARE EDUCATION/TRAINING PROGRAM

## 2024-12-18 PROCEDURE — 82948 REAGENT STRIP/BLOOD GLUCOSE: CPT | Performed by: UROLOGY

## 2024-12-18 PROCEDURE — 97162 PT EVAL MOD COMPLEX 30 MIN: CPT

## 2024-12-18 PROCEDURE — 25010000002 CEFTRIAXONE PER 250 MG

## 2024-12-18 PROCEDURE — 82948 REAGENT STRIP/BLOOD GLUCOSE: CPT

## 2024-12-18 PROCEDURE — 63710000001 INSULIN GLARGINE PER 5 UNITS: Performed by: STUDENT IN AN ORGANIZED HEALTH CARE EDUCATION/TRAINING PROGRAM

## 2024-12-18 PROCEDURE — 63710000001 INSULIN LISPRO (HUMAN) PER 5 UNITS: Performed by: UROLOGY

## 2024-12-18 RX ORDER — SODIUM CHLORIDE, SODIUM LACTATE, POTASSIUM CHLORIDE, CALCIUM CHLORIDE 600; 310; 30; 20 MG/100ML; MG/100ML; MG/100ML; MG/100ML
150 INJECTION, SOLUTION INTRAVENOUS CONTINUOUS
Status: DISPENSED | OUTPATIENT
Start: 2024-12-18 | End: 2024-12-18

## 2024-12-18 RX ADMIN — GUAIFENESIN 1200 MG: 600 TABLET, MULTILAYER, EXTENDED RELEASE ORAL at 08:45

## 2024-12-18 RX ADMIN — INSULIN LISPRO 6 UNITS: 100 INJECTION, SOLUTION INTRAVENOUS; SUBCUTANEOUS at 12:39

## 2024-12-18 RX ADMIN — INSULIN LISPRO 6 UNITS: 100 INJECTION, SOLUTION INTRAVENOUS; SUBCUTANEOUS at 17:57

## 2024-12-18 RX ADMIN — Medication 10 ML: at 08:46

## 2024-12-18 RX ADMIN — FLUOXETINE 40 MG: 20 CAPSULE ORAL at 08:45

## 2024-12-18 RX ADMIN — SODIUM CHLORIDE, POTASSIUM CHLORIDE, SODIUM LACTATE AND CALCIUM CHLORIDE 150 ML/HR: 600; 310; 30; 20 INJECTION, SOLUTION INTRAVENOUS at 12:57

## 2024-12-18 RX ADMIN — INSULIN LISPRO 6 UNITS: 100 INJECTION, SOLUTION INTRAVENOUS; SUBCUTANEOUS at 06:14

## 2024-12-18 RX ADMIN — CEFTRIAXONE 2000 MG: 2 INJECTION, POWDER, FOR SOLUTION INTRAMUSCULAR; INTRAVENOUS at 08:46

## 2024-12-18 RX ADMIN — INSULIN GLARGINE-YFGN 10 UNITS: 100 INJECTION, SOLUTION SUBCUTANEOUS at 12:39

## 2024-12-18 RX ADMIN — Medication 1000 MCG: at 08:45

## 2024-12-18 RX ADMIN — PANTOPRAZOLE SODIUM 40 MG: 40 TABLET, DELAYED RELEASE ORAL at 06:14

## 2024-12-18 RX ADMIN — METOPROLOL TARTRATE 25 MG: 25 TABLET, FILM COATED ORAL at 20:06

## 2024-12-18 RX ADMIN — Medication 10 ML: at 21:10

## 2024-12-18 RX ADMIN — FINASTERIDE 5 MG: 5 TABLET, FILM COATED ORAL at 08:46

## 2024-12-18 RX ADMIN — GUAIFENESIN 1200 MG: 600 TABLET, MULTILAYER, EXTENDED RELEASE ORAL at 20:06

## 2024-12-18 RX ADMIN — TAMSULOSIN HYDROCHLORIDE 0.8 MG: 0.4 CAPSULE ORAL at 20:06

## 2024-12-18 RX ADMIN — ASPIRIN 81 MG: 81 TABLET, COATED ORAL at 08:46

## 2024-12-18 RX ADMIN — LEVOTHYROXINE SODIUM 100 MCG: 100 TABLET ORAL at 08:46

## 2024-12-18 NOTE — NURSING NOTE
WOCN note:    69 yr old male admitted 12/17/24 with UTI. Patient has a hx of afib, CAD, HLD, HTN, DM and MS. WOCN consult received for left gluteal wound. Chart and photos reviewed. Patient noted to have a partial thickness wound to his left lower sacrum/buttock. Appears to be moisture and pressure related. Recommend implementation of pressure injury prevention measures with frequent turns but avoiding pressure to his left sacrum. Due to the wound location, would also recommend to treat with Calazime zinc barrier paste. Will continue to follow as needed.

## 2024-12-18 NOTE — PROGRESS NOTES
[facility] MEDICINE SERVICE  DAILY PROGRESS NOTE    NAME: Jamin Hennessy  : 1955  MRN: [id number]      LOS: 0 days     PROVIDER OF SERVICE: Bo Moore MD    Chief Complaint: UTI (urinary tract infection)    Subjective:     Interval History:  History taken from: patient    Medically improving.        Review of Systems:   Review of Systems    Objective:     Vital Signs  Temp:  [97.4 °F (36.3 °C)-100.4 °F (38 °C)] 98 °F (36.7 °C)  Heart Rate:  [] 105  Resp:  [10-17] 12  BP: ()/() 96/66  Flow (L/min) (Oxygen Therapy):  [1-8] 1   Body mass index is 42.62 kg/m².    Physical Exam  Physical Exam  Cardiovascular:      Rate and Rhythm: Regular rhythm. Tachycardia present.      Pulses: Normal pulses.      Heart sounds: Normal heart sounds.   Pulmonary:      Effort: Pulmonary effort is normal.      Breath sounds: Normal breath sounds.   Abdominal:      General: Abdomen is flat.      Palpations: Abdomen is soft.   Neurological:      Mental Status: He is alert.            Diagnostic Data    Results from last 7 days   Lab Units 24  2356 24  2304   WBC 10*3/mm3 23.19*  --    HEMOGLOBIN g/dL 11.0*  --    HEMATOCRIT % 36.5*  --    PLATELETS 10*3/mm3 173  --    GLUCOSE mg/dL  --  342*   CREATININE mg/dL  --  1.83*   BUN mg/dL  --  36*   SODIUM mmol/L  --  133*   POTASSIUM mmol/L  --  3.8   ANION GAP mmol/L  --  14.1       No radiology results for the last day      I reviewed the patient's new clinical results.    Assessment/Plan:     Active and Resolved Problems  Active Hospital Problems    Diagnosis  POA    **UTI (urinary tract infection) [N39.0]  Yes      Resolved Hospital Problems   No resolved problems to display.       UTI  6 mm stone with upstream mild to moderate hydronephrosis  UA done at outside facility showing positive for protein, nitrite, leuk esterase, WBC, red blood cell, bacteria  WBC at outside facility 19.4  Patient was given 1 g Rocephin IV at outside  "facility  Patient had obstructing stone and stent placed 12/18/2024.  Neurology following  As needed antiemetics  pain management  Continue IV Rocephin  UA obtained at outside facility, culture pending at outside facility     Tachycardia secondary to infection and volume depletion  Hx paroxysmal atrial fibrillation  Watch for postobstructive diuresis  Does not seem pain related  Continuous telemetry  Continue IV fluids at this time, pause the Lasix while we are giving IV fluids.     Diabetes Mellitus    on arrival  Moderate SSI +10 units of glargine daily  Consistent carb diet  Hypoglycemia protocol   Accu-check Q6H while NPO     Chronic indwelling f/c  Patient reports due to hx of multiple sclerosis as well as \"broken hip\" for reasoning of chronic f/c  F/c changed at outside facility     Hypertension   BP stable  Resume home medications once reconciled       VTE Prophylaxis:  Mechanical VTE prophylaxis orders are present.             Disposition Planning:     Barriers to Discharge: Treatment of infection  Anticipated Date of Discharge: 12/20/2024  Place of Discharge: Facility?      Time: 40 minutes     Code Status and Medical Interventions: No CPR (Do Not Attempt to Resuscitate); Limited Support; No intubation (DNI)   Ordered at: 12/17/24 1711     Medical Intervention Limits:    No intubation (DNI)     Code Status (Patient has no pulse and is not breathing):    No CPR (Do Not Attempt to Resuscitate)     Medical Interventions (Patient has pulse or is breathing):    Limited Support       Signature: Electronically signed by Bo Moore MD, 12/18/24, 11:20 EST.  [facility] Hospitalist Team    "

## 2024-12-18 NOTE — PLAN OF CARE
Goal Outcome Evaluation:         No s/s discomfort from recent stent placement. Chronic molina continues. Call light in reach. Confusion at times. Pulled out 1 IV site.

## 2024-12-18 NOTE — PLAN OF CARE
Goal Outcome Evaluation:  Plan of Care Reviewed With: patient           Outcome Evaluation: Jamin Hennessy is a 69 y.o. male with medical hx of Afib, CAD, HLD, HTN, DM, GERD, MS with chronic indwelling catheter, Afib. Who presents with c/o pain related to kidney stone. Pt underwent cystoscopy 12/17 for removal of stone and stent insertion by Dr. Estrella.   Pt lives with wife and is bed bound (hospital bed) at baseline.  His wife provides bed baths, skin care and other ADL assist. He has Home health for molina management but is unsure of frequency. At time of PT evaluation, he is A&O x 4. He requires Max A for rolling Left and max-dependent to roll R due to LUE and LLE weakness.   He is able to self feed with incidental assist for opening some but not all containers. He is tachycardic throughout. He appears at his baseline function.  No further PT needs at this time.    Anticipated Discharge Disposition (PT): home with 24/7 care

## 2024-12-18 NOTE — SIGNIFICANT NOTE
PT screened pt for OT needs. He is at his functional baseline at this time - bed bound and dependent for self-care with exception of self-feeding. OT will sign off, pt safe to return home with 24/7 care at [place].

## 2024-12-18 NOTE — THERAPY EVALUATION
Patient Name: Jamin Hennessy  : 1955    MRN: [id number]                              Today's Date: 2024       Admit Date: 2024    Visit Dx:     ICD-10-CM ICD-9-CM   1. Stone, kidney  N20.0 592.0     Patient Active Problem List   Diagnosis    Atrial fibrillation with RVR    Coronary artery disease due to lipid rich plaque    CAD S/P percutaneous coronary angioplasty    Essential hypertension    Dyslipidemia    Non-insulin dependent type 2 diabetes mellitus    Diabetic neuropathy    GERD without esophagitis    BPH with obstruction/lower urinary tract symptoms    B12 deficiency    Vitamin D deficiency    JARRETT (generalized anxiety disorder)    Tobacco abuse    Obesity (BMI 30-39.9)    Acute bacterial endocarditis    S/P MVR (mitral valve replacement)    Endocarditis of mitral valve    Non-sustained ventricular tachycardia    Acute myocardial infarction    Hypercholesterolemia    Shortness of breath    Sepsis associated hypotension    Chest pain    Vitamin E deficiency    HCAP (healthcare-associated pneumonia)    Pneumonia of left lung due to infectious organism    Eosinophilia    Impetigo    Microcytic anemia    Elevated liver enzymes    Paroxysmal atrial fibrillation    Preoperative cardiovascular examination    Sepsis    Pneumonia    Acute UTI (urinary tract infection)    Acute on chronic renal insufficiency    Lung cancer    Elevated troponin    UTI (urinary tract infection)     Past Medical History:   Diagnosis Date    A-fib     CAD (coronary artery disease)     Elevated cholesterol     Hypertension     MI (myocardial infarction)     Non-insulin dependent type 2 diabetes mellitus      Past Surgical History:   Procedure Laterality Date    BRONCHOSCOPY N/A 3/4/2020    Procedure: BRONCHOSCOPY with bronchioalveolar lavage;  Surgeon: Melissa Cole MD;  Location: [facility] ENDOSCOPY;  Service: Pulmonary;  Laterality: N/A;   pneumonia    CARDIAC CATHETERIZATION      CARDIAC CATHETERIZATION N/A  1/20/2020    Procedure: Left Heart Cath;  Surgeon: Migdalia Beth MD;  Location: [facility] CATH INVASIVE LOCATION;  Service: Cardiovascular    CARDIAC CATHETERIZATION N/A 1/20/2020    Procedure: Left ventriculography;  Surgeon: Migdalia Beth MD;  Location: [facility] CATH INVASIVE LOCATION;  Service: Cardiovascular    CARDIAC CATHETERIZATION N/A 1/20/2020    Procedure: Coronary angiography;  Surgeon: Migdalia Beth MD;  Location: [facility] CATH INVASIVE LOCATION;  Service: Cardiovascular    CORONARY ANGIOPLASTY WITH STENT PLACEMENT      MITRAL VALVE REPAIR/REPLACEMENT N/A 1/22/2020    Procedure: MITRAL VALVE REPAIR/REPLACEMENT;  Surgeon: Fallon Cole MD;  Location: [facility] CVOR;  Service: Cardiothoracic;  Laterality: N/A;  patient has endocarditis mitral valve replaced with 31mm knox II      General Information       Row Name 12/18/24 0942          Physical Therapy Time and Intention    Document Type evaluation  -CL     Mode of Treatment individual therapy;physical therapy  -CL       Row Name 12/18/24 0942          General Information    Patient Profile Reviewed yes  -CL     Prior Level of Function dependent:;grooming;bathing;dressing  Pt has been bed bound for 2 years; dependent for ADLs except feeding  -CL     Barriers to Rehab previous functional deficit  -CL       Row Name 12/18/24 0942          Living Environment    People in Home spouse  -CL     Name(s) of People in Home Wife: Debbi  -CL       Row Name 12/18/24 0942          Cognition    Orientation Status (Cognition) oriented x 4  -CL       Row Name 12/18/24 0942          Safety Issues/Impairments Affecting Functional Mobility    Impairments Affecting Function (Mobility) coordination;strength;motor control  -CL               User Key  (r) = Recorded By, (t) = Taken By, (c) = Cosigned By      Initials Name Provider Type    CL Irene Andersen PT Physical Therapist                   Mobility       Row Name 12/18/24 0941          Bed  Mobility    Bed Mobility rolling left;rolling right  -CL     Rolling Left [place] (Bed Mobility) maximum assist (25% patient effort)  -CL     Rolling Right [place] (Bed Mobility) dependent (less than 25% patient effort)  -CL       Row Name 12/18/24 0943          Bed-Chair Transfer    Bed-Chair [place] (Transfers) not tested  -CL       Row Name 12/18/24 0943          Sit-Stand Transfer    Sit-Stand [place] (Transfers) not tested  -CL       Row Name 12/18/24 0943          Gait/Stairs (Locomotion)    [place] Level (Gait) not tested  -CL     Patient was able to Ambulate no, other medical factors prevent ambulation  -CL     Reason Patient was unable to Ambulate Non-Ambulatory at Baseline  -CL               User Key  (r) = Recorded By, (t) = Taken By, (c) = Cosigned By      Initials Name Provider Type    Irene Lawrence, PT Physical Therapist                   Obj/Interventions       Row Name 12/18/24 0944          Range of Motion Comprehensive    Comment, General Range of Motion Pt with markedly limited active movement LUE and LLE; pt with moderately limited active movement RLE  -CL       Row Name 12/18/24 0944          Strength Comprehensive (MMT)    General Manual Muscle Testing (MMT) Assessment lower extremity strength deficits identified;upper extremity strength deficits identified  -CL       Row Name 12/18/24 0944          Sensory Assessment (Somatosensory)    Sensory Assessment (Somatosensory) sensation intact  -CL               User Key  (r) = Recorded By, (t) = Taken By, (c) = Cosigned By      Initials Name Provider Type    Irene Lawrence, PT Physical Therapist                   Goals/Plan    No documentation.                  Clinical Impression       Row Name 12/18/24 0945          Pain    Pretreatment Pain Rating 0/10 - no pain  -CL     Posttreatment Pain Rating 0/10 - no pain  -CL       Row Name 12/18/24 0945          Plan of Care Review    Plan of Care Reviewed With  patient  -CL     Outcome Evaluation Jamin Hennessy is a 69 y.o. male with medical hx of Afib, CAD, HLD, HTN, DM, GERD, MS with chronic indwelling catheter, Afib. Who presents with c/o pain related to kidney stone. Pt underwent cystoscopy 12/17 for removal of stone and stent insertion by Dr. Estrella.   Pt lives with wife and is bed bound (hospital bed) at baseline.  His wife provides bed baths, skin care and other ADL assist. He has Home health for molina management but is unsure of frequency. At time of PT evaluation, he is A&O x 4. He requires Max A for rolling Left and max-dependent to roll R due to LUE and LLE weakness.   He is able to self feed with incidental assist for opening some but not all containers. He is tachycardic throughout. He appears at his baseline function.  No further PT needs at this time.  -CL       Row Name 12/18/24 0931          Therapy Assessment/Plan (PT)    Criteria for Skilled Interventions Met (PT) does not meet criteria for skilled intervention  -CL     Therapy Frequency (PT) evaluation only  -CL       Row Name 12/18/24 0991          Vital Signs    Pretreatment Heart Rate (beats/min) 109  -CL     Intratreatment Heart Rate (beats/min) 115  -CL     Posttreatment Heart Rate (beats/min) 104  -CL     Pre SpO2 (%) 95  -CL     O2 Delivery Pre Treatment supplemental O2  1L  -CL     Intra SpO2 (%) 94  -CL     O2 Delivery Intra Treatment supplemental O2  -CL     Post SpO2 (%) 95  -CL     O2 Delivery Post Treatment supplemental O2  -CL       Row Name 12/18/24 0986          Positioning and Restraints    Pre-Treatment Position in bed  -CL     Post Treatment Position bed  -CL     In Bed supine;call light within reach;exit alarm on  -CL               User Key  (r) = Recorded By, (t) = Taken By, (c) = Cosigned By      Initials Name Provider Type    CL Irene Andersen, PT Physical Therapist                   Outcome Measures       Row Name 12/18/24 6391          How much help from another person do  you currently need...    Turning from your back to your side while in flat bed without using bedrails? 2  -CL     Moving from lying on back to sitting on the side of a flat bed without bedrails? 1  -CL     Moving to and from a bed to a chair (including a wheelchair)? 1  -CL     Standing up from a chair using your arms (e.g., wheelchair, bedside chair)? 1  -CL     Climbing 3-5 steps with a railing? 1  -CL     To walk in hospital room? 1  -CL     AM-PAC 6 Clicks Score (PT) 7  -CL     Highest Level of Mobility Goal 2 --> Bed activities/dependent transfer  -CL       Row Name 12/18/24 0950          Functional Assessment    Outcome Measure Options AM-PAC 6 Clicks Basic Mobility (PT)  -CL               User Key  (r) = Recorded By, (t) = Taken By, (c) = Cosigned By      Initials Name Provider Type    CL Irene Andersen, PT Physical Therapist                                 Physical Therapy Education       Title: PT OT SLP Therapies (Done)       Topic: Physical Therapy (Done)       Point: Mobility training (Done)       Learning Progress Summary            Patient Acceptance, E,TB, VU by CL at 12/18/2024 0950                      Point: Body mechanics (Done)       Learning Progress Summary            Patient Acceptance, E,TB, VU by CL at 12/18/2024 0950                                      User Key       Initials Effective Dates Name Provider Type Discipline    CL 03/02/22 -  Irene Andersen, PT Physical Therapist PT                  PT Recommendation and Plan     Outcome Evaluation: Jamin Hennessy is a 69 y.o. male with medical hx of Afib, CAD, HLD, HTN, DM, GERD, MS with chronic indwelling catheter, Afib. Who presents with c/o pain related to kidney stone. Pt underwent cystoscopy 12/17 for removal of stone and stent insertion by Dr. Estrella.   Pt lives with wife and is bed bound (hospital bed) at baseline.  His wife provides bed baths, skin care and other ADL assist. He has Home health for molina management but is  unsure of frequency. At time of PT evaluation, he is A&O x 4. He requires Max A for rolling Left and max-dependent to roll R due to LUE and LLE weakness.   He is able to self feed with incidental assist for opening some but not all containers. He is tachycardic throughout. He appears at his baseline function.  No further PT needs at this time.     Time Calculation:   PT Evaluation Complexity  History, PT Evaluation Complexity: 3 or more personal factors and/or comorbidities  Examination of Body Systems (PT Eval Complexity): total of 3 or more elements  Clinical Presentation (PT Evaluation Complexity): evolving  Clinical Decision Making (PT Evaluation Complexity): moderate complexity  Overall Complexity (PT Evaluation Complexity): moderate complexity     PT Charges       Row Name 12/18/24 0951             Time Calculation    Start Time 0847  -CL      Stop Time 0906  -CL      Time Calculation (min) 19 min  -CL      PT Received On 12/18/24  -CL         Time Calculation- PT    Total Timed Code Minutes- PT 0 minute(s)  -CL                User Key  (r) = Recorded By, (t) = Taken By, (c) = Cosigned By      Initials Name Provider Type    Irene Lawrence, PT Physical Therapist                  Therapy Charges for Today       Code Description Service Date Service Provider Modifiers Qty    [id number]  PT EVAL MOD COMPLEXITY 4 12/18/2024 Irene Andersen, PT GP 1            PT G-Codes  Outcome Measure Options: AM-PAC 6 Clicks Basic Mobility (PT)  AM-PAC 6 Clicks Score (PT): 7  PT Discharge Summary  Anticipated Discharge Disposition (PT): home with 24/7 care  Reason for Discharge: At baseline function    Irene Andersen PT  12/18/2024

## 2024-12-18 NOTE — CASE MANAGEMENT/SOCIAL WORK
Continued Stay Note  MINDI Sawyer     Patient Name: Jamin Hennessy  MRN: [id number]  Today's Date: 12/18/2024    Admit Date: 12/17/2024    Plan: From home w. wife. Current with [facility]. Will need EMS   Discharge Plan       Row Name 12/18/24 0929       Plan    Plan Comments DC barriers: Urology following, patient had cysto yesterday, molina cath remains, possible DC later today. Will update chart after MDR's if unable to DC.           Gal Bryan RN     Cell number [phone]  Office number [phone]

## 2024-12-19 LAB
ANION GAP SERPL CALCULATED.3IONS-SCNC: 12.6 MMOL/L (ref 5–15)
BASOPHILS # BLD AUTO: 0.03 10*3/MM3 (ref 0–0.2)
BASOPHILS # BLD AUTO: 0.04 10*3/MM3 (ref 0–0.2)
BASOPHILS NFR BLD AUTO: 0.2 % (ref 0–1.5)
BASOPHILS NFR BLD AUTO: 0.4 % (ref 0–1.5)
BUN SERPL-MCNC: 38 MG/DL (ref 8–23)
BUN/CREAT SERPL: 25 (ref 7–25)
CALCIUM SPEC-SCNC: 8.3 MG/DL (ref 8.6–10.5)
CHLORIDE SERPL-SCNC: 101 MMOL/L (ref 98–107)
CO2 SERPL-SCNC: 25.4 MMOL/L (ref 22–29)
CREAT SERPL-MCNC: 1.52 MG/DL (ref 0.76–1.27)
DEPRECATED RDW RBC AUTO: 56.4 FL (ref 37–54)
DEPRECATED RDW RBC AUTO: 57.1 FL (ref 37–54)
EGFRCR SERPLBLD CKD-EPI 2021: 49.3 ML/MIN/1.73
EOSINOPHIL # BLD AUTO: 0.06 10*3/MM3 (ref 0–0.4)
EOSINOPHIL # BLD AUTO: 0.23 10*3/MM3 (ref 0–0.4)
EOSINOPHIL NFR BLD AUTO: 0.5 % (ref 0.3–6.2)
EOSINOPHIL NFR BLD AUTO: 2.4 % (ref 0.3–6.2)
ERYTHROCYTE [DISTWIDTH] IN BLOOD BY AUTOMATED COUNT: 17.9 % (ref 12.3–15.4)
ERYTHROCYTE [DISTWIDTH] IN BLOOD BY AUTOMATED COUNT: 18.1 % (ref 12.3–15.4)
GLUCOSE BLDC GLUCOMTR-MCNC: 173 MG/DL (ref 70–105)
GLUCOSE BLDC GLUCOMTR-MCNC: 202 MG/DL (ref 70–105)
GLUCOSE BLDC GLUCOMTR-MCNC: 213 MG/DL (ref 70–105)
GLUCOSE BLDC GLUCOMTR-MCNC: 229 MG/DL (ref 70–105)
GLUCOSE BLDC GLUCOMTR-MCNC: 236 MG/DL (ref 70–105)
GLUCOSE SERPL-MCNC: 185 MG/DL (ref 65–99)
HCT VFR BLD AUTO: 34.2 % (ref 37.5–51)
HCT VFR BLD AUTO: 35.1 % (ref 37.5–51)
HGB BLD-MCNC: 10 G/DL (ref 13–17.7)
HGB BLD-MCNC: 10.6 G/DL (ref 13–17.7)
IMM GRANULOCYTES # BLD AUTO: 0.08 10*3/MM3 (ref 0–0.05)
IMM GRANULOCYTES # BLD AUTO: 0.1 10*3/MM3 (ref 0–0.05)
IMM GRANULOCYTES NFR BLD AUTO: 0.8 % (ref 0–0.5)
IMM GRANULOCYTES NFR BLD AUTO: 0.8 % (ref 0–0.5)
LYMPHOCYTES # BLD AUTO: 0.55 10*3/MM3 (ref 0.7–3.1)
LYMPHOCYTES # BLD AUTO: 0.59 10*3/MM3 (ref 0.7–3.1)
LYMPHOCYTES NFR BLD AUTO: 4.6 % (ref 19.6–45.3)
LYMPHOCYTES NFR BLD AUTO: 5.8 % (ref 19.6–45.3)
MAGNESIUM SERPL-MCNC: 1.9 MG/DL (ref 1.6–2.4)
MCH RBC QN AUTO: 25 PG (ref 26.6–33)
MCH RBC QN AUTO: 25.9 PG (ref 26.6–33)
MCHC RBC AUTO-ENTMCNC: 29.2 G/DL (ref 31.5–35.7)
MCHC RBC AUTO-ENTMCNC: 30.2 G/DL (ref 31.5–35.7)
MCV RBC AUTO: 85.5 FL (ref 79–97)
MCV RBC AUTO: 85.8 FL (ref 79–97)
MONOCYTES # BLD AUTO: 0.74 10*3/MM3 (ref 0.1–0.9)
MONOCYTES # BLD AUTO: 0.9 10*3/MM3 (ref 0.1–0.9)
MONOCYTES NFR BLD AUTO: 7 % (ref 5–12)
MONOCYTES NFR BLD AUTO: 7.9 % (ref 5–12)
NEUTROPHILS NFR BLD AUTO: 11.18 10*3/MM3 (ref 1.7–7)
NEUTROPHILS NFR BLD AUTO: 7.78 10*3/MM3 (ref 1.7–7)
NEUTROPHILS NFR BLD AUTO: 82.7 % (ref 42.7–76)
NEUTROPHILS NFR BLD AUTO: 86.9 % (ref 42.7–76)
NRBC BLD AUTO-RTO: 0 /100 WBC (ref 0–0.2)
NRBC BLD AUTO-RTO: 0 /100 WBC (ref 0–0.2)
PLATELET # BLD AUTO: 169 10*3/MM3 (ref 140–450)
PLATELET # BLD AUTO: 176 10*3/MM3 (ref 140–450)
PMV BLD AUTO: 10.6 FL (ref 6–12)
PMV BLD AUTO: 11 FL (ref 6–12)
POTASSIUM SERPL-SCNC: 3 MMOL/L (ref 3.5–5.2)
RBC # BLD AUTO: 4 10*6/MM3 (ref 4.14–5.8)
RBC # BLD AUTO: 4.09 10*6/MM3 (ref 4.14–5.8)
SODIUM SERPL-SCNC: 139 MMOL/L (ref 136–145)
WBC NRBC COR # BLD AUTO: 12.86 10*3/MM3 (ref 3.4–10.8)
WBC NRBC COR # BLD AUTO: 9.42 10*3/MM3 (ref 3.4–10.8)

## 2024-12-19 PROCEDURE — 85025 COMPLETE CBC W/AUTO DIFF WBC: CPT | Performed by: HOSPITALIST

## 2024-12-19 PROCEDURE — 63710000001 INSULIN LISPRO (HUMAN) PER 5 UNITS: Performed by: UROLOGY

## 2024-12-19 PROCEDURE — 25010000002 CEFTRIAXONE PER 250 MG

## 2024-12-19 PROCEDURE — 80048 BASIC METABOLIC PNL TOTAL CA: CPT | Performed by: HOSPITALIST

## 2024-12-19 PROCEDURE — 82948 REAGENT STRIP/BLOOD GLUCOSE: CPT

## 2024-12-19 PROCEDURE — 80048 BASIC METABOLIC PNL TOTAL CA: CPT | Performed by: UROLOGY

## 2024-12-19 PROCEDURE — 83036 HEMOGLOBIN GLYCOSYLATED A1C: CPT | Performed by: NURSE PRACTITIONER

## 2024-12-19 PROCEDURE — 85025 COMPLETE CBC W/AUTO DIFF WBC: CPT | Performed by: UROLOGY

## 2024-12-19 PROCEDURE — 82948 REAGENT STRIP/BLOOD GLUCOSE: CPT | Performed by: HOSPITALIST

## 2024-12-19 PROCEDURE — 83735 ASSAY OF MAGNESIUM: CPT | Performed by: HOSPITALIST

## 2024-12-19 PROCEDURE — 82948 REAGENT STRIP/BLOOD GLUCOSE: CPT | Performed by: UROLOGY

## 2024-12-19 RX ORDER — MIDODRINE HYDROCHLORIDE 5 MG/1
5 TABLET ORAL
Status: DISCONTINUED | OUTPATIENT
Start: 2024-12-19 | End: 2024-12-23 | Stop reason: HOSPADM

## 2024-12-19 RX ORDER — POTASSIUM CHLORIDE 1500 MG/1
40 TABLET, EXTENDED RELEASE ORAL EVERY 4 HOURS
Status: COMPLETED | OUTPATIENT
Start: 2024-12-19 | End: 2024-12-19

## 2024-12-19 RX ADMIN — ASPIRIN 81 MG: 81 TABLET, COATED ORAL at 09:17

## 2024-12-19 RX ADMIN — LEVOTHYROXINE SODIUM 100 MCG: 100 TABLET ORAL at 09:16

## 2024-12-19 RX ADMIN — FLUOXETINE 40 MG: 20 CAPSULE ORAL at 09:17

## 2024-12-19 RX ADMIN — GUAIFENESIN 1200 MG: 600 TABLET, MULTILAYER, EXTENDED RELEASE ORAL at 22:00

## 2024-12-19 RX ADMIN — Medication 1000 MCG: at 09:16

## 2024-12-19 RX ADMIN — POTASSIUM CHLORIDE 40 MEQ: 1500 TABLET, EXTENDED RELEASE ORAL at 15:39

## 2024-12-19 RX ADMIN — Medication 10 ML: at 22:00

## 2024-12-19 RX ADMIN — PANTOPRAZOLE SODIUM 40 MG: 40 TABLET, DELAYED RELEASE ORAL at 05:18

## 2024-12-19 RX ADMIN — FINASTERIDE 5 MG: 5 TABLET, FILM COATED ORAL at 09:16

## 2024-12-19 RX ADMIN — INSULIN LISPRO 4 UNITS: 100 INJECTION, SOLUTION INTRAVENOUS; SUBCUTANEOUS at 18:24

## 2024-12-19 RX ADMIN — TAMSULOSIN HYDROCHLORIDE 0.8 MG: 0.4 CAPSULE ORAL at 22:00

## 2024-12-19 RX ADMIN — MIDODRINE HYDROCHLORIDE 5 MG: 5 TABLET ORAL at 18:24

## 2024-12-19 RX ADMIN — INSULIN LISPRO 4 UNITS: 100 INJECTION, SOLUTION INTRAVENOUS; SUBCUTANEOUS at 23:16

## 2024-12-19 RX ADMIN — CEFTRIAXONE 2000 MG: 2 INJECTION, POWDER, FOR SOLUTION INTRAMUSCULAR; INTRAVENOUS at 09:15

## 2024-12-19 RX ADMIN — POTASSIUM CHLORIDE 40 MEQ: 1500 TABLET, EXTENDED RELEASE ORAL at 05:18

## 2024-12-19 RX ADMIN — MIDODRINE HYDROCHLORIDE 5 MG: 5 TABLET ORAL at 13:00

## 2024-12-19 RX ADMIN — INSULIN LISPRO 4 UNITS: 100 INJECTION, SOLUTION INTRAVENOUS; SUBCUTANEOUS at 00:30

## 2024-12-19 RX ADMIN — INSULIN LISPRO 4 UNITS: 100 INJECTION, SOLUTION INTRAVENOUS; SUBCUTANEOUS at 13:00

## 2024-12-19 RX ADMIN — Medication 10 ML: at 09:18

## 2024-12-19 RX ADMIN — GUAIFENESIN 1200 MG: 600 TABLET, MULTILAYER, EXTENDED RELEASE ORAL at 09:17

## 2024-12-19 RX ADMIN — APIXABAN 5 MG: 5 TABLET, FILM COATED ORAL at 22:00

## 2024-12-19 RX ADMIN — POTASSIUM CHLORIDE 40 MEQ: 1500 TABLET, EXTENDED RELEASE ORAL at 09:16

## 2024-12-19 RX ADMIN — INSULIN GLARGINE-YFGN 10 UNITS: 100 INJECTION, SOLUTION SUBCUTANEOUS at 09:16

## 2024-12-19 NOTE — CONSULTS
Nutrition Services    Patient Name: Jamin Hennessy  YOB: 1955  MRN: [id number]  Admission date: 12/17/2024    Continue consistent carbohydrate diet as tolerated     At this point, is not a candidate for Guillermo due to JONNIE and current renal/urinary status. Will monitor for potential ability to utilize this supplement in future.    NUTRITION SCREENING      Trending Narrative: 12/19: Pt assessed due to concern for skin breakdown. Pt with area of breakdown to buttock/gluteal area. Pt is actually eating well, with good appetite, and 100% intake at majority of recent meals. This is positive, as pt is not currently a good candidate for wound-healing supplement, Guillermo. Will continue oral diet at this time and continue to screen for possible future need for ONS.       PO Diet: Diet: Diabetic; Consistent Carbohydrate; Fluid Consistency: Thin (IDDSI 0)   PO Supplements: None ordered   Trending PO Intake:  100% at recent meals        Nutritionally-Pertinent Medications RDN Reviewed, C/W clinical course         Labs (reviewed below): Reviewed      Results from last 7 days   Lab Units 12/19/24  0203 12/17/24  2304   SODIUM mmol/L 139 133*   POTASSIUM mmol/L 3.0* 3.8   CHLORIDE mmol/L 101 99   CO2 mmol/L 25.4 19.9*   BUN mg/dL 38* 36*   CREATININE mg/dL 1.52* 1.83*   CALCIUM mg/dL 8.3* 8.3*   GLUCOSE mg/dL 185* 342*     Results from last 7 days   Lab Units 12/19/24  0203   MAGNESIUM mg/dL 1.9   HEMOGLOBIN g/dL 10.0*   HEMATOCRIT % 34.2*     Lab Results   Component Value Date    HGBA1C 6.6 (H) 02/27/2020          GI Function:  Stool Output  Stool Unmeasured Occurrence: 1 (12/19/24 0922)  Bowel Incontinence: Yes (12/19/24 0922)  Stool Amount: Large (12/19/24 0922)  Stool Consistency: formed (12/19/24 0922)  Perineal Care: absorbent brief/pad changed, catheter care provided, perineum cleansed (12/19/24 0922)          Skin: Skin breakdown to buttock area       Weight Review: Estimated body mass index is 42.62 kg/m² as  "calculated from the following:    Height as of this encounter: 177.8 cm (70\").    Weight as of this encounter: 135 kg (297 lb).    Comment:   12/19: Scale weight 135 kg; this is stable compared to other weights from this calendar-year; upward trend from weights recorded four years ago    Wt Readings from Last 30 Encounters:   12/17/24 1533 135 kg (297 lb)   09/30/24 0523 (!) 138 kg (304 lb 3.8 oz)   09/28/24 0500 (!) 138 kg (304 lb 0.2 oz)   09/27/24 0500 135 kg (297 lb 13.5 oz)   09/26/24 0500 135 kg (297 lb 9.9 oz)   09/25/24 0534 135 kg (296 lb 15.4 oz)   09/24/24 1316 135 kg (297 lb 13.5 oz)   09/24/24 0100 133 kg (294 lb 1.5 oz)   07/08/20 1520 113 kg (250 lb)   03/10/20 0500 116 kg (255 lb 11.7 oz)   03/09/20 0600 115 kg (253 lb 12 oz)   03/08/20 0530 117 kg (257 lb 0.9 oz)   03/06/20 0500 116 kg (255 lb 15.3 oz)   03/05/20 0457 116 kg (255 lb 8.2 oz)   03/04/20 0455 119 kg (262 lb 5.6 oz)   03/02/20 0400 119 kg (261 lb 14.5 oz)   03/01/20 0400 115 kg (254 lb 6.6 oz)   02/29/20 0400 116 kg (256 lb 13.4 oz)   02/29/20 0027 116 kg (256 lb 13.4 oz)   02/03/20 0600 119 kg (263 lb 3.7 oz)   02/02/20 0500 121 kg (266 lb 12.1 oz)   02/01/20 0537 121 kg (267 lb 3.2 oz)   01/31/20 0500 121 kg (267 lb 10.2 oz)   01/30/20 0600 122 kg (268 lb 4.8 oz)   01/28/20 0558 119 kg (262 lb 5.6 oz)   01/28/20 0500 119 kg (262 lb 5.6 oz)   01/27/20 0537 119 kg (261 lb 14.5 oz)   01/27/20 0400 119 kg (261 lb 14.5 oz)   01/26/20 0700 124 kg (273 lb 9.5 oz)   01/25/20 0600 118 kg (260 lb 5.8 oz)   01/24/20 0400 121 kg (265 lb 14 oz)   01/23/20 0525 123 kg (270 lb 1 oz)   01/22/20 0600 115 kg (254 lb 10.1 oz)   01/22/20 0500 115 kg (254 lb 10.1 oz)   01/22/20 0430 115 kg (254 lb 10.1 oz)   01/21/20 0555 117 kg (257 lb 0.9 oz)   01/21/20 0400 117 kg (257 lb 0.9 oz)   01/20/20 2100 118 kg (260 lb 5.8 oz)   01/20/20 0600 118 kg (260 lb 5.8 oz)   01/20/20 0407 118 kg (260 lb 5.8 oz)   01/19/20 0600 117 kg (258 lb 9.6 oz)   01/18/20 0600 118 " kg (260 lb 5.8 oz)   01/17/20 0626 121 kg (267 lb 12.8 oz)   01/16/20 0630 120 kg (264 lb 5.3 oz)   01/15/20 0400 121 kg (266 lb 1.5 oz)   01/14/20 0907 122 kg (269 lb 10 oz)   01/14/20 0209 124 kg (272 lb 7.8 oz)   01/14/20 0035 124 kg (272 lb 7.8 oz)              Trending Physical   Appearance, NFPE 12/19: NFPE completed and not consistent with nutrition diagnosis of malnutrition at this time using AND/ASPEN criteria        Protein Requirements    EST Needs, Method, Wt used 98 g/day (1.3 g/kg IBW)          Nutrition Problem Statement: Increased nutrient needs (protein) R/t skin healing; as evidenced by stage 2 area of breakdown.        Nutrition Intervention: Continue consistent carbohydrate diet as tolerated     At this point, is not a candidate for Guillermo due to JONNIE and current renal/urinary status. Will monitor for potential ability to utilize this supplement in future.          Monitoring/Evaluation PO intake, Pertinent labs, Weight, Skin status, GI status, POC/GOC        RD to follow up per protocol.    Electronically signed by:  Kristen Sullivan RD  12/19/24 18:48 EST

## 2024-12-19 NOTE — PLAN OF CARE
Goal Outcome Evaluation:                 Patient able to make needs known. Aox4. Patient has not had any outbursts today. Patient allowing us to turn him as needed.

## 2024-12-19 NOTE — PROGRESS NOTES
[facility] MEDICINE SERVICE  DAILY PROGRESS NOTE    NAME: Jamin Hennessy  : 1955  MRN: [id number]      LOS: 1 day     PROVIDER OF SERVICE: Oscar De MD    Chief Complaint: UTI (urinary tract infection)    Subjective:     Interval History:  History taken from: patient chart RN    Afebrile. Pain is controlled  No nausea         Review of Systems:   Review of Systems  All negative except as above  Objective:     Vital Signs  Temp:  [97.4 °F (36.3 °C)-98.2 °F (36.8 °C)] 97.6 °F (36.4 °C)  Heart Rate:  [] 124  Resp:  [12-23] 23  BP: ()/(61-80) 108/80  Flow (L/min) (Oxygen Therapy):  [1-3] 3   Body mass index is 42.62 kg/m².    Physical Exam  Physical Exam  NAD morbidly obese  RRR S1 and S2 audible  Lungs with fair air entry  Abdomen soft nontender nondistended     Diagnostic Data    Results from last 7 days   Lab Units 24  0203   WBC 10*3/mm3 12.86*   HEMOGLOBIN g/dL 10.0*   HEMATOCRIT % 34.2*   PLATELETS 10*3/mm3 176   GLUCOSE mg/dL 185*   CREATININE mg/dL 1.52*   BUN mg/dL 38*   SODIUM mmol/L 139   POTASSIUM mmol/L 3.0*   ANION GAP mmol/L 12.6       No radiology results for the last day      I reviewed the patient's new clinical results.  I reviewed the patient's new imaging results and agree with the interpretation.    Assessment/Plan:     Active and Resolved Problems  Active Hospital Problems    Diagnosis  POA    **UTI (urinary tract infection) [N39.0]  Yes      Resolved Hospital Problems   No resolved problems to display.       #Obstructive uropathy with catheter associated UTI  Per HPI OSH CT showed 6 mm stone with upstream mild to moderate hydronephrosis  Seen by urology underwent cystoscopy with right ureteroscopy stone extraction and stent placement on     Continue ceftriaxone  Urine studies not sent during this hospitalization.  Will benefit from obtaining urine cultures from OSH prior to initiation of antibiotics  Follow infectious workup  Reportedly urinary  catheter change OSH  Continue finasteride  Continue Flomax    #JONNIE  continue to hold diuretics for now  Status post IV fluids  BMP daily  Avoid NSAIDs        #History of MS  Stable    #DM2  Continue Lantus 10 units daily along with ISS lispro  Diabetic diet    #Paroxysmal A-fib  Continue metoprolol  Restart home dose of Eliquis 5 mg twice daily  Restart home dose of midodrine and amiodarone    #Hypothyroidism  Continue home dose of levothyroxine    VTE Prophylaxis:  Mechanical VTE prophylaxis orders are present.             Disposition Planning:     Barriers to Discharge: Medical workup  Anticipated Date of Discharge: 12/20  Place of Discharge: Home      Time: 45 minutes     Code Status and Medical Interventions: No CPR (Do Not Attempt to Resuscitate); Limited Support; No intubation (DNI)   Ordered at: 12/17/24 1711     Medical Intervention Limits:    No intubation (DNI)     Code Status (Patient has no pulse and is not breathing):    No CPR (Do Not Attempt to Resuscitate)     Medical Interventions (Patient has pulse or is breathing):    Limited Support       Signature: Electronically signed by Oscar De MD, 12/19/24, 14:09 EST.  [facility] Hospitalist Team

## 2024-12-19 NOTE — PLAN OF CARE
Problem: Adult Inpatient Plan of Care  Goal: Plan of Care Review  Outcome: Progressing  Goal: Patient-Specific Goal (Individualized)  Outcome: Progressing  Goal: Absence of Hospital-Acquired Illness or Injury  Outcome: Progressing  Intervention: Identify and Manage Fall Risk  Recent Flowsheet Documentation  Taken 12/19/2024 0206 by Mathieu Hendrickson RN  Safety Promotion/Fall Prevention: safety round/check completed  Intervention: Prevent Skin Injury  Recent Flowsheet Documentation  Taken 12/19/2024 0206 by Mathieu Hendrickson RN  Body Position: position changed independently  Skin Protection: incontinence pads utilized  Goal: Optimal Comfort and Wellbeing  Outcome: Progressing  Intervention: Provide Person-Centered Care  Recent Flowsheet Documentation  Taken 12/19/2024 0206 by Mathieu Hendrickson RN  Trust Relationship/Rapport:   care explained   choices provided  Goal: Readiness for Transition of Care  Outcome: Progressing     Problem: Violence Risk or Actual  Goal: Anger and Impulse Control  Outcome: Progressing  Intervention: Minimize Safety Risk  Recent Flowsheet Documentation  Taken 12/19/2024 0206 by Mathieu Hendrickson RN  Sensory Stimulation Regulation: care clustered  Enhanced Safety Measures: bed alarm set  Intervention: Promote Self-Control  Recent Flowsheet Documentation  Taken 12/19/2024 0206 by Mathieu Hendrickson RN  Supportive Measures: active listening utilized     Problem: Skin Injury Risk Increased  Goal: Skin Health and Integrity  Outcome: Progressing  Intervention: Optimize Skin Protection  Recent Flowsheet Documentation  Taken 12/19/2024 0206 by Mathieu Hendrickson RN  Activity Management: bedrest  Pressure Reduction Techniques: frequent weight shift encouraged  Head of Bed (HOB) Positioning: HOB elevated  Pressure Reduction Devices: pressure-redistributing mattress utilized  Skin Protection: incontinence pads utilized     Problem: Fall Injury Risk  Goal: Absence of Fall and Fall-Related Injury  Outcome:  Progressing  Intervention: Identify and Manage Contributors  Recent Flowsheet Documentation  Taken 12/19/2024 0206 by Mathieu Hendrickson, RN  Medication Review/Management: medications reviewed  Intervention: Promote Injury-Free Environment  Recent Flowsheet Documentation  Taken 12/19/2024 0206 by Mathieu Hendrickson, RN  Safety Promotion/Fall Prevention: safety round/check completed   Goal Outcome Evaluation:

## 2024-12-20 LAB
ANION GAP SERPL CALCULATED.3IONS-SCNC: 10.5 MMOL/L (ref 5–15)
BUN SERPL-MCNC: 39 MG/DL (ref 8–23)
BUN/CREAT SERPL: 28.9 (ref 7–25)
CALCIUM SPEC-SCNC: 8.1 MG/DL (ref 8.6–10.5)
CHLORIDE SERPL-SCNC: 104 MMOL/L (ref 98–107)
CO2 SERPL-SCNC: 24.5 MMOL/L (ref 22–29)
CREAT SERPL-MCNC: 1.35 MG/DL (ref 0.76–1.27)
EGFRCR SERPLBLD CKD-EPI 2021: 56.8 ML/MIN/1.73
GLUCOSE BLDC GLUCOMTR-MCNC: 156 MG/DL (ref 70–105)
GLUCOSE BLDC GLUCOMTR-MCNC: 169 MG/DL (ref 70–105)
GLUCOSE BLDC GLUCOMTR-MCNC: 203 MG/DL (ref 70–105)
GLUCOSE BLDC GLUCOMTR-MCNC: 204 MG/DL (ref 70–105)
GLUCOSE SERPL-MCNC: 215 MG/DL (ref 65–99)
HBA1C MFR BLD: 9.45 % (ref 4.8–5.6)
POTASSIUM SERPL-SCNC: 3.5 MMOL/L (ref 3.5–5.2)
QT INTERVAL: 381 MS
QTC INTERVAL: 545 MS
SODIUM SERPL-SCNC: 139 MMOL/L (ref 136–145)

## 2024-12-20 PROCEDURE — 82948 REAGENT STRIP/BLOOD GLUCOSE: CPT

## 2024-12-20 PROCEDURE — 63710000001 INSULIN GLARGINE PER 5 UNITS: Performed by: STUDENT IN AN ORGANIZED HEALTH CARE EDUCATION/TRAINING PROGRAM

## 2024-12-20 PROCEDURE — 82948 REAGENT STRIP/BLOOD GLUCOSE: CPT | Performed by: HOSPITALIST

## 2024-12-20 PROCEDURE — C1751 CATH, INF, PER/CENT/MIDLINE: HCPCS

## 2024-12-20 PROCEDURE — 25010000002 ERTAPENEM PER 500 MG: Performed by: HOSPITALIST

## 2024-12-20 PROCEDURE — 25010000002 CEFTRIAXONE PER 250 MG

## 2024-12-20 PROCEDURE — 36410 VNPNXR 3YR/> PHY/QHP DX/THER: CPT

## 2024-12-20 PROCEDURE — 63710000001 INSULIN LISPRO (HUMAN) PER 5 UNITS: Performed by: UROLOGY

## 2024-12-20 PROCEDURE — 05HF33Z INSERTION OF INFUSION DEVICE INTO LEFT CEPHALIC VEIN, PERCUTANEOUS APPROACH: ICD-10-PCS | Performed by: INTERNAL MEDICINE

## 2024-12-20 RX ORDER — LIDOCAINE HYDROCHLORIDE 10 MG/ML
20 INJECTION, SOLUTION INFILTRATION; PERINEURAL ONCE
Status: DISCONTINUED | OUTPATIENT
Start: 2024-12-20 | End: 2024-12-23 | Stop reason: HOSPADM

## 2024-12-20 RX ORDER — SODIUM CHLORIDE 9 MG/ML
40 INJECTION, SOLUTION INTRAVENOUS AS NEEDED
Status: DISCONTINUED | OUTPATIENT
Start: 2024-12-20 | End: 2024-12-23 | Stop reason: HOSPADM

## 2024-12-20 RX ORDER — SODIUM CHLORIDE 0.9 % (FLUSH) 0.9 %
10 SYRINGE (ML) INJECTION EVERY 12 HOURS SCHEDULED
Status: DISCONTINUED | OUTPATIENT
Start: 2024-12-20 | End: 2024-12-23 | Stop reason: HOSPADM

## 2024-12-20 RX ORDER — SODIUM CHLORIDE 0.9 % (FLUSH) 0.9 %
10 SYRINGE (ML) INJECTION AS NEEDED
Status: DISCONTINUED | OUTPATIENT
Start: 2024-12-20 | End: 2024-12-23 | Stop reason: HOSPADM

## 2024-12-20 RX ADMIN — MIDODRINE HYDROCHLORIDE 5 MG: 5 TABLET ORAL at 08:23

## 2024-12-20 RX ADMIN — APIXABAN 5 MG: 5 TABLET, FILM COATED ORAL at 08:23

## 2024-12-20 RX ADMIN — CYCLOBENZAPRINE 5 MG: 10 TABLET, FILM COATED ORAL at 21:44

## 2024-12-20 RX ADMIN — INSULIN GLARGINE-YFGN 10 UNITS: 100 INJECTION, SOLUTION SUBCUTANEOUS at 08:22

## 2024-12-20 RX ADMIN — TAMSULOSIN HYDROCHLORIDE 0.8 MG: 0.4 CAPSULE ORAL at 21:18

## 2024-12-20 RX ADMIN — GUAIFENESIN 1200 MG: 600 TABLET, MULTILAYER, EXTENDED RELEASE ORAL at 08:23

## 2024-12-20 RX ADMIN — FLUOXETINE 40 MG: 20 CAPSULE ORAL at 08:23

## 2024-12-20 RX ADMIN — FINASTERIDE 5 MG: 5 TABLET, FILM COATED ORAL at 08:24

## 2024-12-20 RX ADMIN — PANTOPRAZOLE SODIUM 40 MG: 40 TABLET, DELAYED RELEASE ORAL at 05:53

## 2024-12-20 RX ADMIN — GUAIFENESIN 1200 MG: 600 TABLET, MULTILAYER, EXTENDED RELEASE ORAL at 21:18

## 2024-12-20 RX ADMIN — Medication 10 ML: at 08:24

## 2024-12-20 RX ADMIN — Medication 10 ML: at 21:19

## 2024-12-20 RX ADMIN — MIDODRINE HYDROCHLORIDE 5 MG: 5 TABLET ORAL at 12:10

## 2024-12-20 RX ADMIN — CEFTRIAXONE 2000 MG: 2 INJECTION, POWDER, FOR SOLUTION INTRAMUSCULAR; INTRAVENOUS at 08:24

## 2024-12-20 RX ADMIN — LEVOTHYROXINE SODIUM 100 MCG: 100 TABLET ORAL at 08:23

## 2024-12-20 RX ADMIN — INSULIN LISPRO 4 UNITS: 100 INJECTION, SOLUTION INTRAVENOUS; SUBCUTANEOUS at 12:10

## 2024-12-20 RX ADMIN — MIDODRINE HYDROCHLORIDE 5 MG: 5 TABLET ORAL at 17:39

## 2024-12-20 RX ADMIN — Medication 10 ML: at 21:20

## 2024-12-20 RX ADMIN — INSULIN LISPRO 2 UNITS: 100 INJECTION, SOLUTION INTRAVENOUS; SUBCUTANEOUS at 17:39

## 2024-12-20 RX ADMIN — Medication 1000 MCG: at 08:23

## 2024-12-20 RX ADMIN — ASPIRIN 81 MG: 81 TABLET, COATED ORAL at 08:23

## 2024-12-20 RX ADMIN — METOPROLOL TARTRATE 25 MG: 25 TABLET, FILM COATED ORAL at 21:18

## 2024-12-20 RX ADMIN — APIXABAN 5 MG: 5 TABLET, FILM COATED ORAL at 21:19

## 2024-12-20 RX ADMIN — ERTAPENEM SODIUM 1000 MG: 1 INJECTION, POWDER, LYOPHILIZED, FOR SOLUTION INTRAMUSCULAR; INTRAVENOUS at 12:10

## 2024-12-20 RX ADMIN — METOPROLOL TARTRATE 25 MG: 25 TABLET, FILM COATED ORAL at 08:23

## 2024-12-20 NOTE — CONSULTS
Infectious Diseases Consult Note    Referring Provider: Oscar De MD    Reason for Consultation: ESBL UTI    Patient Care Team:  Jhonny Heller MD as PCP - General (Family Medicine)  Frederick George MD as Consulting Physician (Nephrology)    Chief complaint abdominal and flank pain at admission    Subjective     History of present illness:      This is a 69-year-old male presents to the hospital from [facility] on 12/17/2024 due to worsening abdominal pain and flank pain with patient being concerned that he had another kidney stone which she has had several in the past.  Patient was found to have obstructive uropathy and had a cystoscopy with stent placement and stone removal on 12/17/2024.  No blood or urine cultures were taken our facility but urine cultures from [facility] shows ESBL  Proteus mirabilis in the urine.  Patient denies any current fever and chills, shortness of breath, cough abdominal pain and flank pain is improved and he does have a chronic Perry catheter.    Review of Systems   Review of Systems   Constitutional:  Positive for fatigue.   HENT: Negative.     Eyes: Negative.    Respiratory: Negative.     Cardiovascular: Negative.    Gastrointestinal:  Positive for abdominal pain.   Endocrine: Negative.    Genitourinary:  Positive for flank pain.   Skin: Negative.    Neurological: Negative.    Psychiatric/Behavioral: Negative.     All other systems reviewed and are negative.      Medications  Medications Prior to Admission   Medication Sig Dispense Refill Last Dose/Taking    acetaminophen (TYLENOL) 325 MG tablet Take 2 tablets by mouth Every 8 (Eight) Hours As Needed for Mild Pain.   Taking As Needed    acetic acid 0.25 % irrigation Irrigate with 15 mL to the affected area as directed by provider Daily.   Taking    amiodarone (PACERONE) 200 MG tablet Take 1 tablet by mouth Daily.   Taking    apixaban (ELIQUIS) 5 MG tablet tablet Take 1 tablet by mouth Every 12 (Twelve) Hours. 60 tablet  3 Taking    aspirin 81 MG EC tablet Take 1 tablet by mouth Daily.   Taking    atorvastatin (LIPITOR) 40 MG tablet Take 1 tablet by mouth Every Night.   Taking    carboxymethylcellulose (REFRESH PLUS) 0.5 % solution Administer 1 drop to both eyes 4 (Four) Times a Day.   Taking    Cholecalciferol 25 MCG (1000 UT) tablet Take 1 tablet by mouth Daily.   Taking    cyclobenzaprine (FLEXERIL) 5 MG tablet Take 1 tablet by mouth 2 (Two) Times a Day As Needed for Muscle Spasms. 15 tablet 0 Taking As Needed    ferrous sulfate 325 (65 FE) MG tablet Take 1 tablet by mouth 3 (Three) Times a Week. Monday, Wednesday, Friday.   Taking    finasteride (PROSCAR) 5 MG tablet Take 1 tablet by mouth Daily.   Taking    FLUoxetine (PROzac) 20 MG capsule Take 2 capsules by mouth Daily.   Taking    furosemide (LASIX) 40 MG tablet Take 1 tablet by mouth Daily.   Taking    glatiramer acetate (GLATOPA) 20 MG/ML injection Inject 1 mL under the skin into the appropriate area as directed Daily.   Taking    guaiFENesin (MUCINEX) 600 MG 12 hr tablet Take 2 tablets by mouth 2 (Two) Times a Day.   Taking    levothyroxine (SYNTHROID, LEVOTHROID) 100 MCG tablet Take 1 tablet by mouth Daily.   Taking    loperamide (IMODIUM) 2 MG capsule Take 1 capsule by mouth 2 (Two) Times a Day As Needed for Diarrhea.   Taking As Needed    metoprolol tartrate (LOPRESSOR) 50 MG tablet Take 0.5 tablets by mouth 2 (Two) Times a Day.   Taking    midodrine (PROAMATINE) 5 MG tablet Take 1 tablet by mouth 3 (Three) Times a Day Before Meals.   Taking    mirtazapine (REMERON) 15 MG tablet Take 0.5 tablets by mouth Every Night.   Taking    multivitamin with minerals tablet tablet Take 1 tablet by mouth Daily.   Taking    omeprazole (priLOSEC) 20 MG capsule Take 1 capsule by mouth Daily.   Taking    ondansetron (ZOFRAN) 8 MG tablet Take 1 tablet by mouth Every 8 (Eight) Hours As Needed for Nausea.   Taking As Needed    promethazine (PHENERGAN) 25 MG tablet Take 0.5 tablets by  mouth Every 6 (Six) Hours As Needed for Nausea or Vomiting.   Taking As Needed    risperiDONE (risperDAL M-TABS) 0.25 MG tablet dispersible disintegrating tablet Place 1 tablet on the tongue Every Night.   Taking    sodium chloride (NS) 0.9 % irrigation Irrigate with  to the affected area as directed by provider Daily. For molina catheter   Taking    tamsulosin (FLOMAX) 0.4 MG capsule 24 hr capsule Take 2 capsules by mouth Every Night.   Taking       History  Past Medical History:   Diagnosis Date    A-fib     CAD (coronary artery disease)     Elevated cholesterol     Hypertension     MI (myocardial infarction)     Non-insulin dependent type 2 diabetes mellitus      Past Surgical History:   Procedure Laterality Date    BRONCHOSCOPY N/A 3/4/2020    Procedure: BRONCHOSCOPY with bronchioalveolar lavage;  Surgeon: Melissa Cole MD;  Location: [facility] ENDOSCOPY;  Service: Pulmonary;  Laterality: N/A;   pneumonia    CARDIAC CATHETERIZATION      CARDIAC CATHETERIZATION N/A 1/20/2020    Procedure: Left Heart Cath;  Surgeon: Migdalia Beth MD;  Location: [facility] CATH INVASIVE LOCATION;  Service: Cardiovascular    CARDIAC CATHETERIZATION N/A 1/20/2020    Procedure: Left ventriculography;  Surgeon: Migdalia Beth MD;  Location: [facility] CATH INVASIVE LOCATION;  Service: Cardiovascular    CARDIAC CATHETERIZATION N/A 1/20/2020    Procedure: Coronary angiography;  Surgeon: Migdalia Beth MD;  Location: [facility] CATH INVASIVE LOCATION;  Service: Cardiovascular    CORONARY ANGIOPLASTY WITH STENT PLACEMENT      CYSTOSCOPY, URETEROSCOPY, RETROGRADE PYELOGRAM, STENT INSERTION Right 12/17/2024    Procedure: CYSTOSCOPY URETEROSCOPY RETROGRADE PYELOGRAM HOLMIUM LASER STENT INSERTION;  Surgeon: Jamin Estrella MD;  Location: [facility] MAIN OR;  Service: Urology;  Laterality: Right;    MITRAL VALVE REPAIR/REPLACEMENT N/A 1/22/2020    Procedure: MITRAL VALVE REPAIR/REPLACEMENT;  Surgeon: Fallon Cole MD;   Location: [facility];  Service: Cardiothoracic;  Laterality: N/A;  patient has endocarditis mitral valve replaced with 31mm knox II       Family History  Family History   Problem Relation Age of Onset    Hypertension Mother        Social History   reports that he has been smoking cigarettes. His smokeless tobacco use includes chew. He reports that he does not currently use alcohol. He reports that he does not use drugs.    Allergies  Patient has no known allergies.    Objective     Vital Signs   Vital Signs (last 24 hours)         12/19 0700  12/20 0659 12/20 0700  12/20 1630   Most Recent      Temp (°F) 97.4 -  98.1    97.9 -  98     98 (36.7) 12/20 1123    Heart Rate 101 -  124    98 -  113     98 12/20 1123    Resp 14 -  23    12 -  15     12 12/20 1123    BP 84/52 -  113/90    108/69 -  113/80     108/69 12/20 1123    SpO2 (%) 96 -  99    95 -  98     95 12/20 1123    Flow (L/min) (Oxygen Therapy) 2 -  3      3     3 12/20 1123            Physical Exam:  Physical Exam  Vitals and nursing note reviewed.   Constitutional:       General: He is not in acute distress.     Appearance: He is well-developed. He is obese. He is ill-appearing. He is not diaphoretic.   HENT:      Head: Normocephalic and atraumatic.   Eyes:      Conjunctiva/sclera: Conjunctivae normal.      Pupils: Pupils are equal, round, and reactive to light.   Cardiovascular:      Rate and Rhythm: Normal rate and regular rhythm.      Heart sounds: Normal heart sounds, S1 normal and S2 normal.   Pulmonary:      Effort: Pulmonary effort is normal. No respiratory distress.      Breath sounds: Normal breath sounds. No stridor. No wheezing or rales.   Abdominal:      General: Bowel sounds are normal. There is no distension.      Palpations: Abdomen is soft. There is no mass.      Tenderness: There is abdominal tenderness. There is no guarding.   Genitourinary:     Comments: Perry catheter  Musculoskeletal:         General: No deformity.      Cervical  back: Neck supple.   Skin:     General: Skin is warm and dry.      Coloration: Skin is not pale.      Findings: No erythema or rash.   Neurological:      Mental Status: He is alert and oriented to person, place, and time.      Cranial Nerves: No cranial nerve deficit.   Psychiatric:         Mood and Affect: Mood normal.         Microbiology  Microbiology Results (last 10 days)       ** No results found for the last 240 hours. **            Laboratory  Results from last 7 days   Lab Units 12/19/24  2227   WBC 10*3/mm3 9.42   HEMOGLOBIN g/dL 10.6*   HEMATOCRIT % 35.1*   PLATELETS 10*3/mm3 169     Results from last 7 days   Lab Units 12/19/24  2227   SODIUM mmol/L 139   POTASSIUM mmol/L 3.5   CHLORIDE mmol/L 104   CO2 mmol/L 24.5   BUN mg/dL 39*   CREATININE mg/dL 1.35*   GLUCOSE mg/dL 215*   CALCIUM mg/dL 8.1*     Results from last 7 days   Lab Units 12/19/24  2227   SODIUM mmol/L 139   POTASSIUM mmol/L 3.5   CHLORIDE mmol/L 104   CO2 mmol/L 24.5   BUN mg/dL 39*   CREATININE mg/dL 1.35*   GLUCOSE mg/dL 215*   CALCIUM mg/dL 8.1*                   Radiology  Imaging Results (Last 72 Hours)       ** No results found for the last 72 hours. **            Cardiology      Results Review:  I have reviewed all clinical data, test, lab, and imaging results.       Schedule Meds  apixaban, 5 mg, Oral, Q12H  aspirin, 81 mg, Oral, Daily  ertapenem, 1,000 mg, Intravenous, Q24H  finasteride, 5 mg, Oral, Daily  FLUoxetine, 40 mg, Oral, Daily  [Held by provider] furosemide, 40 mg, Oral, BID  guaiFENesin, 1,200 mg, Oral, BID  insulin glargine, 10 Units, Subcutaneous, Daily  insulin lispro, 2-9 Units, Subcutaneous, Q6H  levothyroxine, 100 mcg, Oral, Daily  metoprolol tartrate, 25 mg, Oral, BID  midodrine, 5 mg, Oral, TID AC  pantoprazole, 40 mg, Oral, Q AM  sodium chloride, 10 mL, Intravenous, Q12H  tamsulosin, 0.8 mg, Oral, Nightly  vitamin B-12, 1,000 mcg, Oral, Daily        Infusion Meds       PRN Meds    acetaminophen **OR**  acetaminophen    senna-docusate sodium **AND** polyethylene glycol **AND** bisacodyl **AND** bisacodyl    Calcium Replacement - Follow Nurse / BPA Driven Protocol    cyclobenzaprine    dextrose    dextrose    glucagon (human recombinant)    HYDROcodone-acetaminophen    HYDROmorphone **AND** naloxone    Magnesium Standard Dose Replacement - Follow Nurse / BPA Driven Protocol    Morphine    Morphine **AND** naloxone    nitroglycerin    ondansetron ODT **OR** ondansetron    Phosphorus Replacement - Follow Nurse / BPA Driven Protocol    Potassium Replacement - Follow Nurse / BPA Driven Protocol    sodium chloride    sodium chloride      Assessment & Plan       Assessment    Obstructive uropathy with cultures from [facility] growing ESBL  Proteus mirabilis.  Patient is status post cystostomy with right stent placement and stone removal.  No urine or blood cultures were taken at our facility.    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  Appears that the Perry catheter was changed out at admission.    A-fib, CAD, mitral valve replacement    Type 2 diabetes    Morbid obesity    Plan    Continue IV ertapenem 1 g every 24 hours-will plan on 10 days of treatment.-Last day on 12/29/2024  Have asked nursing staff to contact [facility] to see if blood cultures were taken and if there were any positive cultures  Patient will need a midline before discharge-please remove when IV antibiotics are completed  Continue supportive care  A.mLeoncio Bradford, APRN  12/20/24  16:30 EST    Note is dictated utilizing voice recognition software/Dragon

## 2024-12-20 NOTE — NURSING NOTE
"Patient's wife approached this nurse and stated that she did not want to send patient to a SNF because she couldn't afford it. However, patient's wife also stated that she did not feel comfortable giving patient IV antibiotics. Advised wife that home health would be giving her education on it. Wife stated that she did not care, that she was not going to alf for patient if she didn't give it to him correctly. When I attempted to say anything wife interrupted to state that \"those are the laws in this state\" and that if she didn't feel comfortable that she \"just wouldn't give them\" in reference to the antibiotics. Patient's wife then walked off. Advised Cassie GAGE of situation. She went into patient's room to try to discuss with her, but wife was already gone. Will pass along to night shift nurse so case management can follow up tomorrow.    "

## 2024-12-20 NOTE — PROGRESS NOTES
[facility] MEDICINE SERVICE  DAILY PROGRESS NOTE    NAME: Jamin Hennessy  : 1955  MRN: [id number]      LOS: 2 days     PROVIDER OF SERVICE: Oscar De MD    Chief Complaint: UTI (urinary tract infection)    Subjective:     Interval History:  History taken from: patient chart RN    Afebrile BP soft but stable  Pain controlled        Review of Systems:   Review of Systems  All negative except as above  Objective:     Vital Signs  Temp:  [97.4 °F (36.3 °C)-98.1 °F (36.7 °C)] 98 °F (36.7 °C)  Heart Rate:  [] 98  Resp:  [12-19] 12  BP: ()/(52-90) 108/69  Flow (L/min) (Oxygen Therapy):  [3] 3   Body mass index is 42.62 kg/m².    Physical Exam  Physical Exam  NAD morbidly obese  RRR S1 and S2 audible  Lungs with fair air entry  Abdomen soft nontender nondistended       Diagnostic Data    Results from last 7 days   Lab Units 24  2227   WBC 10*3/mm3 9.42   HEMOGLOBIN g/dL 10.6*   HEMATOCRIT % 35.1*   PLATELETS 10*3/mm3 169   GLUCOSE mg/dL 215*   CREATININE mg/dL 1.35*   BUN mg/dL 39*   SODIUM mmol/L 139   POTASSIUM mmol/L 3.5   ANION GAP mmol/L 10.5       No radiology results for the last day      I reviewed the patient's new clinical results.  I reviewed the patient's new imaging results and agree with the interpretation.    Assessment/Plan:     Active and Resolved Problems  Active Hospital Problems    Diagnosis  POA    **UTI (urinary tract infection) [N39.0]  Yes      Resolved Hospital Problems   No resolved problems to display.       #Obstructive uropathy with catheter associated UTI  Per [facility] O[facility] CT showed 6 mm stone with upstream mild to moderate hydronephrosis  Seen by urology underwent cystoscopy with right ureteroscopy stone extraction and stent placement on      OSH urine culture came back growing Proteus mirabilis resistant to Cipro, gentamicin, nitrofurantoin, tobramycin and Bactrim.  Sensitive to ertapenem  Change ceftriaxone to ertapenem  Consult infectious  disease  Monitor blood pressure closely       #JONNIE  continue to hold diuretics for now  Status post IV fluids  BMP daily  Avoid NSAIDs           #History of MS  Stable     #DM2  Continue Lantus 10 units daily along with ISS lispro  Diabetic diet     #Paroxysmal A-fib  Continue metoprolol with holding parameters  Continue liquis 5 mg twice daily  Continue home dose of midodrine and amiodarone     #Hypothyroidism  Continue home dose of levothyroxine    VTE Prophylaxis:  Pharmacologic & mechanical VTE prophylaxis orders are present.             Disposition Planning:     Barriers to Discharge: Medical workup  Anticipated Date of Discharge: 12/21  Place of Discharge: Rehab      Time: 45 minutes     Code Status and Medical Interventions: No CPR (Do Not Attempt to Resuscitate); Limited Support; No intubation (DNI)   Ordered at: 12/17/24 1711     Medical Intervention Limits:    No intubation (DNI)     Code Status (Patient has no pulse and is not breathing):    No CPR (Do Not Attempt to Resuscitate)     Medical Interventions (Patient has pulse or is breathing):    Limited Support       Signature: Electronically signed by Oscar De MD, 12/20/24, 12:23 [facility]  [facility] Hospitalist Team

## 2024-12-20 NOTE — CASE MANAGEMENT/SOCIAL WORK
Continued Stay Note   [facility]     Patient Name: Jamin Hennessy  MRN: [id number]  Today's Date: 12/20/2024    Admit Date: 12/17/2024    Plan: From home w. wife. Current with [facility] . Will need EMS   Discharge Plan       Row Name 12/20/24 1258       Plan    Plan Comments DC barriers: ID consult for possible IV abx at discharge due to urine culture results. CM placed referral in [facility] basket to [facility] in the case patient will need to DC on home IV abx (referral pending). CM called patient's wife x5, unable to leave VM as wife does not have VM set up. CM met with patient at the bedside and discussed possible need for IV abx. If wife unable to administer at home then patient will have to DC to SNF. Patient aware and agreeable. CM spoke to Mehreen with [facility]  who confirmed they can provide central line care, draw labs, and assist with IV abx teaching PRN but cannot supply nurse to admin abx daily for patient. Careteam updated during MDR's.           Gal Bryan RN      Cell number [phone]  Office number [phone]

## 2024-12-20 NOTE — PLAN OF CARE
Goal Outcome Evaluation:            Patient to go home on IV antibiotics. Wife concerned about her ability to administer them and has stated she won't give them if she doesn't feel comfortable doing so. SNF refused. Line for home use to be placed tomorrow.

## 2024-12-20 NOTE — DISCHARGE PLACEMENT REQUEST
"Perri Eller (69 y.o. Male)       Date of Birth   1955    Social Security Number       Address   [address] [place] [facility]    Home Phone   [phone]    MRN   [id number]       Protestant   None    Marital Status                               Admission Date   12/17/24    Admission Type   Urgent    Admitting Provider   Bo Moore MD    Attending Provider   Oscar De MD    Department, Room/Bed   [facility] INPATIENT, 380/1       Discharge Date       Discharge Disposition       Discharge Destination                                 Attending Provider: Oscar De MD    Allergies: No Known Allergies    Isolation: Contact   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)   Code Status: No CPR    Ht: 177.8 cm (70\")   Wt: 135 kg (297 lb)    Admission Cmt: None   Principal Problem: UTI (urinary tract infection) [N39.0]                   Active Insurance as of 12/17/2024       Primary Coverage       Payor Plan Insurance Group Employer/Plan Group    HUMANA MEDICARE REPLACEMENT HUMANA MED ADV PPO [id number]       Payor Plan Address Payor Plan Phone Number Payor Plan Fax Number Effective Dates    [address] [address] [phone]  2/1/2020 - None Entered    [facility] [id number]         Subscriber Name Subscriber Birth Date Member ID       PERRI ELLER 1955 [id number]               Secondary Coverage       Payor Plan Insurance Group Employer/Plan Group    [facility] CCN OPTUM        Payor Plan Address Payor Plan Phone Number Payor Plan Fax Number Effective Dates    [address] [phone]  1/1/2024 - None Entered    [facility] [id number]         Subscriber Name Subscriber Birth Date Member ID       PERRI ELLER 1955 [id number]                     Emergency Contacts        (Rel.) Home Phone Work Phone Mobile Phone    RONIT ELLER (Spouse) [phone] -- [phone]    nursing,facility [phone] -- --                "

## 2024-12-20 NOTE — PLAN OF CARE
Goal Outcome Evaluation:  Plan of Care Reviewed With: patient        Progress: no change  Outcome Evaluation: pt has slept on and off throughout the night, no new complaints at this time, f/c remains in place-no bloody output at this time, HR remains elevated in low 110s, -metoprolol held d/t low BP- currently 113/90, no new complaints at this time pt remains A&Ox3- forgetful of year at times, pt turned/reminded to turn throughout the night, mepilex/cream to (R) gluteal wound, pt to d/c home with caretakers when appropriate

## 2024-12-21 LAB
ANION GAP SERPL CALCULATED.3IONS-SCNC: 8.5 MMOL/L (ref 5–15)
BASOPHILS # BLD AUTO: 0.09 10*3/MM3 (ref 0–0.2)
BASOPHILS NFR BLD AUTO: 1.1 % (ref 0–1.5)
BUN SERPL-MCNC: 31 MG/DL (ref 8–23)
BUN/CREAT SERPL: 27.9 (ref 7–25)
CALCIUM SPEC-SCNC: 8.3 MG/DL (ref 8.6–10.5)
CHLORIDE SERPL-SCNC: 109 MMOL/L (ref 98–107)
CO2 SERPL-SCNC: 25.5 MMOL/L (ref 22–29)
CREAT SERPL-MCNC: 1.11 MG/DL (ref 0.76–1.27)
DEPRECATED RDW RBC AUTO: 57.7 FL (ref 37–54)
EGFRCR SERPLBLD CKD-EPI 2021: 71.9 ML/MIN/1.73
EOSINOPHIL # BLD AUTO: 0.38 10*3/MM3 (ref 0–0.4)
EOSINOPHIL NFR BLD AUTO: 4.5 % (ref 0.3–6.2)
ERYTHROCYTE [DISTWIDTH] IN BLOOD BY AUTOMATED COUNT: 18.2 % (ref 12.3–15.4)
GLUCOSE BLDC GLUCOMTR-MCNC: 151 MG/DL (ref 70–105)
GLUCOSE BLDC GLUCOMTR-MCNC: 156 MG/DL (ref 70–105)
GLUCOSE BLDC GLUCOMTR-MCNC: 156 MG/DL (ref 70–105)
GLUCOSE BLDC GLUCOMTR-MCNC: 162 MG/DL (ref 70–105)
GLUCOSE BLDC GLUCOMTR-MCNC: 200 MG/DL (ref 70–105)
GLUCOSE BLDC GLUCOMTR-MCNC: 205 MG/DL (ref 70–105)
GLUCOSE SERPL-MCNC: 162 MG/DL (ref 65–99)
HCT VFR BLD AUTO: 33.2 % (ref 37.5–51)
HGB BLD-MCNC: 10 G/DL (ref 13–17.7)
IMM GRANULOCYTES # BLD AUTO: 0.06 10*3/MM3 (ref 0–0.05)
IMM GRANULOCYTES NFR BLD AUTO: 0.7 % (ref 0–0.5)
LYMPHOCYTES # BLD AUTO: 0.84 10*3/MM3 (ref 0.7–3.1)
LYMPHOCYTES NFR BLD AUTO: 10 % (ref 19.6–45.3)
MCH RBC QN AUTO: 26 PG (ref 26.6–33)
MCHC RBC AUTO-ENTMCNC: 30.1 G/DL (ref 31.5–35.7)
MCV RBC AUTO: 86.5 FL (ref 79–97)
MONOCYTES # BLD AUTO: 0.63 10*3/MM3 (ref 0.1–0.9)
MONOCYTES NFR BLD AUTO: 7.5 % (ref 5–12)
NEUTROPHILS NFR BLD AUTO: 6.37 10*3/MM3 (ref 1.7–7)
NEUTROPHILS NFR BLD AUTO: 76.2 % (ref 42.7–76)
NRBC BLD AUTO-RTO: 0 /100 WBC (ref 0–0.2)
PLATELET # BLD AUTO: 180 10*3/MM3 (ref 140–450)
PMV BLD AUTO: 10.7 FL (ref 6–12)
POTASSIUM SERPL-SCNC: 3.2 MMOL/L (ref 3.5–5.2)
POTASSIUM SERPL-SCNC: 3.5 MMOL/L (ref 3.5–5.2)
RBC # BLD AUTO: 3.84 10*6/MM3 (ref 4.14–5.8)
SODIUM SERPL-SCNC: 143 MMOL/L (ref 136–145)
WBC NRBC COR # BLD AUTO: 8.37 10*3/MM3 (ref 3.4–10.8)

## 2024-12-21 PROCEDURE — 85025 COMPLETE CBC W/AUTO DIFF WBC: CPT | Performed by: HOSPITALIST

## 2024-12-21 PROCEDURE — 63710000001 INSULIN LISPRO (HUMAN) PER 5 UNITS: Performed by: UROLOGY

## 2024-12-21 PROCEDURE — 25010000002 ERTAPENEM PER 500 MG: Performed by: NURSE PRACTITIONER

## 2024-12-21 PROCEDURE — 80048 BASIC METABOLIC PNL TOTAL CA: CPT | Performed by: HOSPITALIST

## 2024-12-21 PROCEDURE — 84132 ASSAY OF SERUM POTASSIUM: CPT | Performed by: HOSPITALIST

## 2024-12-21 PROCEDURE — 82948 REAGENT STRIP/BLOOD GLUCOSE: CPT | Performed by: HOSPITALIST

## 2024-12-21 PROCEDURE — 82948 REAGENT STRIP/BLOOD GLUCOSE: CPT

## 2024-12-21 RX ORDER — POTASSIUM CHLORIDE 1500 MG/1
40 TABLET, EXTENDED RELEASE ORAL EVERY 4 HOURS
Status: COMPLETED | OUTPATIENT
Start: 2024-12-21 | End: 2024-12-21

## 2024-12-21 RX ADMIN — ERTAPENEM SODIUM 1000 MG: 1 INJECTION, POWDER, LYOPHILIZED, FOR SOLUTION INTRAMUSCULAR; INTRAVENOUS at 12:02

## 2024-12-21 RX ADMIN — POTASSIUM CHLORIDE 40 MEQ: 1500 TABLET, EXTENDED RELEASE ORAL at 12:01

## 2024-12-21 RX ADMIN — MIDODRINE HYDROCHLORIDE 5 MG: 5 TABLET ORAL at 17:09

## 2024-12-21 RX ADMIN — Medication 10 ML: at 20:21

## 2024-12-21 RX ADMIN — MIDODRINE HYDROCHLORIDE 5 MG: 5 TABLET ORAL at 12:02

## 2024-12-21 RX ADMIN — APIXABAN 5 MG: 5 TABLET, FILM COATED ORAL at 08:54

## 2024-12-21 RX ADMIN — INSULIN LISPRO 4 UNITS: 100 INJECTION, SOLUTION INTRAVENOUS; SUBCUTANEOUS at 23:40

## 2024-12-21 RX ADMIN — INSULIN LISPRO 2 UNITS: 100 INJECTION, SOLUTION INTRAVENOUS; SUBCUTANEOUS at 17:08

## 2024-12-21 RX ADMIN — PANTOPRAZOLE SODIUM 40 MG: 40 TABLET, DELAYED RELEASE ORAL at 06:01

## 2024-12-21 RX ADMIN — METOPROLOL TARTRATE 25 MG: 25 TABLET, FILM COATED ORAL at 20:20

## 2024-12-21 RX ADMIN — METOPROLOL TARTRATE 25 MG: 25 TABLET, FILM COATED ORAL at 08:50

## 2024-12-21 RX ADMIN — POTASSIUM CHLORIDE 40 MEQ: 1500 TABLET, EXTENDED RELEASE ORAL at 20:20

## 2024-12-21 RX ADMIN — GUAIFENESIN 1200 MG: 600 TABLET, MULTILAYER, EXTENDED RELEASE ORAL at 08:50

## 2024-12-21 RX ADMIN — INSULIN LISPRO 4 UNITS: 100 INJECTION, SOLUTION INTRAVENOUS; SUBCUTANEOUS at 12:17

## 2024-12-21 RX ADMIN — Medication 1000 MCG: at 08:50

## 2024-12-21 RX ADMIN — LEVOTHYROXINE SODIUM 100 MCG: 100 TABLET ORAL at 08:51

## 2024-12-21 RX ADMIN — GUAIFENESIN 1200 MG: 600 TABLET, MULTILAYER, EXTENDED RELEASE ORAL at 20:20

## 2024-12-21 RX ADMIN — TAMSULOSIN HYDROCHLORIDE 0.8 MG: 0.4 CAPSULE ORAL at 20:20

## 2024-12-21 RX ADMIN — Medication 10 ML: at 08:54

## 2024-12-21 RX ADMIN — POTASSIUM CHLORIDE 40 MEQ: 1500 TABLET, EXTENDED RELEASE ORAL at 06:01

## 2024-12-21 RX ADMIN — POTASSIUM CHLORIDE 40 MEQ: 1500 TABLET, EXTENDED RELEASE ORAL at 23:39

## 2024-12-21 RX ADMIN — ASPIRIN 81 MG: 81 TABLET, COATED ORAL at 08:50

## 2024-12-21 RX ADMIN — MIDODRINE HYDROCHLORIDE 5 MG: 5 TABLET ORAL at 08:51

## 2024-12-21 RX ADMIN — INSULIN GLARGINE-YFGN 10 UNITS: 100 INJECTION, SOLUTION SUBCUTANEOUS at 08:50

## 2024-12-21 RX ADMIN — FLUOXETINE 40 MG: 20 CAPSULE ORAL at 08:51

## 2024-12-21 RX ADMIN — APIXABAN 5 MG: 5 TABLET, FILM COATED ORAL at 20:20

## 2024-12-21 RX ADMIN — Medication 10 ML: at 09:06

## 2024-12-21 RX ADMIN — INSULIN LISPRO 2 UNITS: 100 INJECTION, SOLUTION INTRAVENOUS; SUBCUTANEOUS at 06:01

## 2024-12-21 RX ADMIN — INSULIN LISPRO 2 UNITS: 100 INJECTION, SOLUTION INTRAVENOUS; SUBCUTANEOUS at 01:15

## 2024-12-21 RX ADMIN — FINASTERIDE 5 MG: 5 TABLET, FILM COATED ORAL at 08:51

## 2024-12-21 NOTE — CASE MANAGEMENT/SOCIAL WORK
Continued Stay Note   [facility]     Patient Name: Jamin Hennessy  MRN: [id number]  Today's Date: 12/21/2024    Admit Date: 12/17/2024    Plan: SNF, referral pending. Will need precert. Will need PASRR.   Discharge Plan       Row Name 12/21/24 1614       Plan    Plan SNF, referral pending. Will need precert. Will need PASRR.    Patient/Family in Agreement with Plan yes    Provided Post Acute Provider List? Yes    Post Acute Provider List --  SNF    Provided Post Acute Provider Quality & Resource List? Yes    Post Acute Provider Quality and Resource List --  SNF    Delivered To Patient    Method of Delivery In person    Plan Comments CM received message from LORA Shelley stating pt's wife had arrived at pt's bedside yelling at him about administering home IV abx. CM also received message from Dr. De expressing concern about pt's discharge plan to home. CM followed up with pt at the bedside. Pt agreeable to discharge to SNF for safe administration of antibiotics. Pt chose 1. [facility] and 2. [facility]. CM placed referrals in epic basket and notified facility liaisons, Monica and Lori.             Expected Discharge Date and Time       Expected Discharge Date Expected Discharge Time    Dec 20, 2024           Nancy Rodriguez RN     Office Phone: [phone]  Office Cell: [phone]

## 2024-12-21 NOTE — PROGRESS NOTES
Infectious Diseases Progress Note      LOS: 3 days   Patient Care Team:  Jhonny Heller MD as PCP - General (Family Medicine)  Frederick George MD as Consulting Physician (Nephrology)    Chief Complaint: Fatigue    Subjective     The patient had no fever during the last 24 hours.  Remained hemodynamically stable.  Denied having any new complaints today.    Review of Systems:   Review of Systems   Constitutional:  Positive for fatigue.   HENT: Negative.     Eyes: Negative.    Respiratory: Negative.     Cardiovascular: Negative.    Gastrointestinal: Negative.    Genitourinary: Negative.    Musculoskeletal: Negative.    Skin: Negative.    Neurological: Negative.    Hematological: Negative.    Psychiatric/Behavioral: Negative.          Objective     Vital Signs  Temp:  [97.6 °F (36.4 °C)-98.4 °F (36.9 °C)] 97.9 °F (36.6 °C)  Heart Rate:  [] 89  Resp:  [12-18] 14  BP: (105-129)/(61-87) 106/61    Physical Exam:  Physical Exam  Vitals and nursing note reviewed.   Constitutional:       Appearance: He is well-developed.   HENT:      Head: Normocephalic and atraumatic.   Eyes:      Pupils: Pupils are equal, round, and reactive to light.   Cardiovascular:      Rate and Rhythm: Normal rate and regular rhythm.      Heart sounds: Normal heart sounds.   Pulmonary:      Effort: Pulmonary effort is normal. No respiratory distress.      Breath sounds: Normal breath sounds. No wheezing or rales.   Abdominal:      General: Bowel sounds are normal. There is no distension.      Palpations: Abdomen is soft. There is no mass.      Tenderness: There is no abdominal tenderness. There is no guarding or rebound.   Musculoskeletal:         General: No deformity. Normal range of motion.      Cervical back: Normal range of motion and neck supple.   Skin:     General: Skin is warm.      Findings: No erythema or rash.   Neurological:      Mental Status: He is alert and oriented to person, place, and time.      Cranial Nerves: No cranial nerve  deficit.          Results Review:    I have reviewed all clinical data, test, lab, and imaging results.     Radiology  No Radiology Exams Resulted Within Past 24 Hours    Cardiology    Laboratory    Results from last 7 days   Lab Units 12/21/24  0146 12/19/24 2227 12/19/24 0203 12/17/24  2356 12/17/24  1232   WBC 10*3/mm3 8.37 9.42 12.86* 23.19*  --    HEMOGLOBIN g/dL 10.0* 10.6* 10.0* 11.0*  --    HEMOGLOBIN, POC g/dL  --   --   --   --  13.6   HEMATOCRIT % 33.2* 35.1* 34.2* 36.5*  --    HEMATOCRIT POC %  --   --   --   --  40   PLATELETS 10*3/mm3 180 169 176 173  --      Results from last 7 days   Lab Units 12/21/24  0146 12/19/24 2227 12/19/24  0203 12/17/24  2304   SODIUM mmol/L 143 139 139 133*   POTASSIUM mmol/L 3.5 3.5 3.0* 3.8   CHLORIDE mmol/L 109* 104 101 99   CO2 mmol/L 25.5 24.5 25.4 19.9*   BUN mg/dL 31* 39* 38* 36*   CREATININE mg/dL 1.11 1.35* 1.52* 1.83*   GLUCOSE mg/dL 162* 215* 185* 342*   CALCIUM mg/dL 8.3* 8.1* 8.3* 8.3*                 Microbiology   Microbiology Results (last 10 days)       ** No results found for the last 240 hours. **            Medication Review:       Schedule Meds  apixaban, 5 mg, Oral, Q12H  aspirin, 81 mg, Oral, Daily  ertapenem, 1,000 mg, Intravenous, Q24H  finasteride, 5 mg, Oral, Daily  FLUoxetine, 40 mg, Oral, Daily  [Held by provider] furosemide, 40 mg, Oral, BID  guaiFENesin, 1,200 mg, Oral, BID  insulin glargine, 10 Units, Subcutaneous, Daily  insulin lispro, 2-9 Units, Subcutaneous, Q6H  levothyroxine, 100 mcg, Oral, Daily  lidocaine, 20 mL, Infiltration, Once  metoprolol tartrate, 25 mg, Oral, BID  midodrine, 5 mg, Oral, TID AC  pantoprazole, 40 mg, Oral, Q AM  sodium chloride, 10 mL, Intravenous, Q12H  sodium chloride, 10 mL, Intravenous, Q12H  tamsulosin, 0.8 mg, Oral, Nightly  vitamin B-12, 1,000 mcg, Oral, Daily        Infusion Meds       PRN Meds    acetaminophen **OR** acetaminophen    senna-docusate sodium **AND** polyethylene glycol **AND** bisacodyl  **AND** bisacodyl    Calcium Replacement - Follow Nurse / BPA Driven Protocol    cyclobenzaprine    dextrose    dextrose    glucagon (human recombinant)    HYDROcodone-acetaminophen    HYDROmorphone **AND** naloxone    Magnesium Standard Dose Replacement - Follow Nurse / BPA Driven Protocol    Morphine    Morphine **AND** naloxone    nitroglycerin    ondansetron ODT **OR** ondansetron    Phosphorus Replacement - Follow Nurse / BPA Driven Protocol    Potassium Replacement - Follow Nurse / BPA Driven Protocol    sodium chloride    sodium chloride    sodium chloride    sodium chloride        Assessment & Plan       Antimicrobial Therapy   1.  IV ertapenem        2.        3.        4.        5.              Assessment     Obstructive uropathy with urine cultures from PAUL Kumar growing ESBL  Proteus mirabilis.  Patient is status post cystoscopy with right stent placement and stone removal.  No urine or blood cultures were taken at our facility.       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  Appears that the Perry catheter was changed out at admission.     S/p mitral valve replacement in the past     Type 2 diabetes     Morbid obesity     Plan     Continue IV ertapenem 1 g every 24 hours-will plan on 10 days of treatment.-Last day on 12/29/2024  Have asked nursing staff to contact [facility] to see if blood cultures were taken and if there were any positive blood cultures  Patient will need a midline before discharge-please remove when IV antibiotics are completed  Continue supportive care  A.m. labs       Allison Box MD  12/21/24  15:33 EST    Note is dictated utilizing voice recognition software/Dragon

## 2024-12-21 NOTE — PLAN OF CARE
Goal Outcome Evaluation:         Pt. Remains on Contact Isolation/ESBL urine. Continues O2@3L n/c. Denies SOA. HOB elevated at all times. F/C patent draining luca color urine. Fluids encouraged. Denies pain or discomfort this shift.

## 2024-12-21 NOTE — PROGRESS NOTES
[facility] MEDICINE SERVICE  DAILY PROGRESS NOTE    NAME: Jamin Hennessy  : 1955  MRN: [id number]      LOS: 3 days     PROVIDER OF SERVICE: Oscar De MD    Chief Complaint: UTI (urinary tract infection)    Subjective:     Interval History:  History taken from: patient chart RN    Afebrile pain controlled        Review of Systems:   Review of Systems  All negative except as above  Objective:     Vital Signs  Temp:  [97.6 °F (36.4 °C)-98.4 °F (36.9 °C)] 97.9 °F (36.6 °C)  Heart Rate:  [] 89  Resp:  [12-18] 14  BP: (105-129)/(61-87) 106/61  Flow (L/min) (Oxygen Therapy):  [3-4.5] 4.5   Body mass index is 42.62 kg/m².    Physical Exam  Physical Exam  NAD morbidly obese  RRR S1 and S2 audible  Lungs with fair air entry  Abdomen soft nontender nondistended        Diagnostic Data    Results from last 7 days   Lab Units 24  0146   WBC 10*3/mm3 8.37   HEMOGLOBIN g/dL 10.0*   HEMATOCRIT % 33.2*   PLATELETS 10*3/mm3 180   GLUCOSE mg/dL 162*   CREATININE mg/dL 1.11   BUN mg/dL 31*   SODIUM mmol/L 143   POTASSIUM mmol/L 3.5   ANION GAP mmol/L 8.5       No radiology results for the last day      I reviewed the patient's new clinical results.  I reviewed the patient's new imaging results and agree with the interpretation.    Assessment/Plan:     Active and Resolved Problems  Active Hospital Problems    Diagnosis  POA    **UTI (urinary tract infection) [N39.0]  Yes      Resolved Hospital Problems   No resolved problems to display.       #Obstructive uropathy with catheter associated UTI  Per [facility] OSH CT showed 6 mm stone with upstream mild to moderate hydronephrosis  Seen by urology underwent cystoscopy with right ureteroscopy stone extraction and stent placement on      OSH urine culture came back growing Proteus mirabilis resistant to Cipro, gentamicin, nitrofurantoin, tobramycin and Bactrim.  Sensitive to ertapenem  continue ertapenem.  Infectious disease following appreciate  recommendation plan for total 10 days of IV antibiotics.  Midline in place  Monitor blood pressure closely        #JONNIE  continue to hold diuretics for now  Status post IV fluids  BMP daily  Avoid NSAIDs           #History of MS  Stable     #DM2  Continue Lantus 10 units daily along with ISS lispro  Diabetic diet     #Paroxysmal A-fib  Continue metoprolol with holding parameters  Continue liquis 5 mg twice daily  Continue home dose of midodrine and amiodarone     #Hypothyroidism  Continue home dose of levothyroxine    VTE Prophylaxis:  Pharmacologic & mechanical VTE prophylaxis orders are present.             Disposition Planning:     Barriers to Discharge: Medically ready for discharge awaiting safe dispo Home versus rehab        Time: 40 minutes     Code Status and Medical Interventions: No CPR (Do Not Attempt to Resuscitate); Limited Support; No intubation (DNI)   Ordered at: 12/17/24 1711     Medical Intervention Limits:    No intubation (DNI)     Code Status (Patient has no pulse and is not breathing):    No CPR (Do Not Attempt to Resuscitate)     Medical Interventions (Patient has pulse or is breathing):    Limited Support       Signature: Electronically signed by Oscar De MD, 12/21/24, 11:42 EST.  [facility] Hospitalist Team

## 2024-12-21 NOTE — CONSULTS
Midline Line Insertion Procedure Note    Procedure: Insertion of #18G/10CM PowerGlide    Indications:  Home IV therapy    Procedure Details:   Sterile technique was used including antiseptics, gloves, hand hygiene, mask, and sheet.    #18G/10CM PowerGlide inserted to the L Cephalic vein per hospital protocol by Cordelia Strickland RN.     Non-pulsatile blood return: yes    Ultrasound Guidance: Yes    Lot #: [id number]  Expiration date: 09/30/2025    Complications:  none    Findings:  Catheter inserted to 10 cm, with 0 cm exposed.   Mid upper arm circumference is 32 cm.  Catheter was flushed with 20 cc NS and sterile dressing applied.   Patient tolerated procedure well.    Recommendations:  Verbal and/or written Care/Maintenance instructions provided to patient.   Primary nurse notified that midline is okay to use at this time.     Cordelia Strickland RN  12/20/24  19:48 EST

## 2024-12-22 LAB
ANION GAP SERPL CALCULATED.3IONS-SCNC: 6.8 MMOL/L (ref 5–15)
BASOPHILS # BLD AUTO: 0.08 10*3/MM3 (ref 0–0.2)
BASOPHILS NFR BLD AUTO: 0.8 % (ref 0–1.5)
BUN SERPL-MCNC: 24 MG/DL (ref 8–23)
BUN/CREAT SERPL: 18.8 (ref 7–25)
CALCIUM SPEC-SCNC: 8.7 MG/DL (ref 8.6–10.5)
CHLORIDE SERPL-SCNC: 109 MMOL/L (ref 98–107)
CO2 SERPL-SCNC: 24.2 MMOL/L (ref 22–29)
CREAT SERPL-MCNC: 1.28 MG/DL (ref 0.76–1.27)
DEPRECATED RDW RBC AUTO: 58.1 FL (ref 37–54)
EGFRCR SERPLBLD CKD-EPI 2021: 60.6 ML/MIN/1.73
EOSINOPHIL # BLD AUTO: 0.43 10*3/MM3 (ref 0–0.4)
EOSINOPHIL NFR BLD AUTO: 4.3 % (ref 0.3–6.2)
ERYTHROCYTE [DISTWIDTH] IN BLOOD BY AUTOMATED COUNT: 18.2 % (ref 12.3–15.4)
GLUCOSE BLDC GLUCOMTR-MCNC: 136 MG/DL (ref 70–105)
GLUCOSE BLDC GLUCOMTR-MCNC: 152 MG/DL (ref 70–105)
GLUCOSE BLDC GLUCOMTR-MCNC: 152 MG/DL (ref 70–105)
GLUCOSE BLDC GLUCOMTR-MCNC: 180 MG/DL (ref 70–105)
GLUCOSE SERPL-MCNC: 127 MG/DL (ref 65–99)
HCT VFR BLD AUTO: 33.7 % (ref 37.5–51)
HGB BLD-MCNC: 10.3 G/DL (ref 13–17.7)
IMM GRANULOCYTES # BLD AUTO: 0.1 10*3/MM3 (ref 0–0.05)
IMM GRANULOCYTES NFR BLD AUTO: 1 % (ref 0–0.5)
LYMPHOCYTES # BLD AUTO: 0.92 10*3/MM3 (ref 0.7–3.1)
LYMPHOCYTES NFR BLD AUTO: 9.2 % (ref 19.6–45.3)
MCH RBC QN AUTO: 26.5 PG (ref 26.6–33)
MCHC RBC AUTO-ENTMCNC: 30.6 G/DL (ref 31.5–35.7)
MCV RBC AUTO: 86.6 FL (ref 79–97)
MONOCYTES # BLD AUTO: 0.82 10*3/MM3 (ref 0.1–0.9)
MONOCYTES NFR BLD AUTO: 8.2 % (ref 5–12)
NEUTROPHILS NFR BLD AUTO: 7.6 10*3/MM3 (ref 1.7–7)
NEUTROPHILS NFR BLD AUTO: 76.5 % (ref 42.7–76)
NRBC BLD AUTO-RTO: 0 /100 WBC (ref 0–0.2)
PLATELET # BLD AUTO: 197 10*3/MM3 (ref 140–450)
PMV BLD AUTO: 10.4 FL (ref 6–12)
POTASSIUM SERPL-SCNC: 4.6 MMOL/L (ref 3.5–5.2)
RBC # BLD AUTO: 3.89 10*6/MM3 (ref 4.14–5.8)
SODIUM SERPL-SCNC: 140 MMOL/L (ref 136–145)
WBC NRBC COR # BLD AUTO: 9.95 10*3/MM3 (ref 3.4–10.8)

## 2024-12-22 PROCEDURE — 25010000002 ERTAPENEM PER 500 MG: Performed by: NURSE PRACTITIONER

## 2024-12-22 PROCEDURE — 63710000001 INSULIN LISPRO (HUMAN) PER 5 UNITS: Performed by: HOSPITALIST

## 2024-12-22 PROCEDURE — 87040 BLOOD CULTURE FOR BACTERIA: CPT | Performed by: NURSE PRACTITIONER

## 2024-12-22 PROCEDURE — 82948 REAGENT STRIP/BLOOD GLUCOSE: CPT

## 2024-12-22 PROCEDURE — 80048 BASIC METABOLIC PNL TOTAL CA: CPT | Performed by: HOSPITALIST

## 2024-12-22 PROCEDURE — 85025 COMPLETE CBC W/AUTO DIFF WBC: CPT | Performed by: HOSPITALIST

## 2024-12-22 PROCEDURE — 93010 ELECTROCARDIOGRAM REPORT: CPT | Performed by: INTERNAL MEDICINE

## 2024-12-22 PROCEDURE — 82948 REAGENT STRIP/BLOOD GLUCOSE: CPT | Performed by: HOSPITALIST

## 2024-12-22 PROCEDURE — 93005 ELECTROCARDIOGRAM TRACING: CPT | Performed by: HOSPITALIST

## 2024-12-22 RX ORDER — AMIODARONE HYDROCHLORIDE 200 MG/1
200 TABLET ORAL DAILY
Status: DISCONTINUED | OUTPATIENT
Start: 2024-12-22 | End: 2024-12-23 | Stop reason: HOSPADM

## 2024-12-22 RX ORDER — GLIPIZIDE 5 MG/1
5 TABLET ORAL DAILY
Qty: 30 TABLET | Refills: 0 | Status: SHIPPED | OUTPATIENT
Start: 2024-12-22 | End: 2025-01-21

## 2024-12-22 RX ORDER — AMIODARONE HYDROCHLORIDE 200 MG/1
200 TABLET ORAL DAILY
Status: DISCONTINUED | OUTPATIENT
Start: 2024-12-23 | End: 2024-12-22

## 2024-12-22 RX ORDER — METOPROLOL TARTRATE 25 MG/1
25 TABLET, FILM COATED ORAL ONCE
Status: COMPLETED | OUTPATIENT
Start: 2024-12-22 | End: 2024-12-22

## 2024-12-22 RX ORDER — INSULIN LISPRO 100 [IU]/ML
2-9 INJECTION, SOLUTION INTRAVENOUS; SUBCUTANEOUS
Status: DISCONTINUED | OUTPATIENT
Start: 2024-12-22 | End: 2024-12-23 | Stop reason: HOSPADM

## 2024-12-22 RX ADMIN — LEVOTHYROXINE SODIUM 100 MCG: 100 TABLET ORAL at 08:03

## 2024-12-22 RX ADMIN — GUAIFENESIN 1200 MG: 600 TABLET, MULTILAYER, EXTENDED RELEASE ORAL at 21:36

## 2024-12-22 RX ADMIN — APIXABAN 5 MG: 5 TABLET, FILM COATED ORAL at 08:03

## 2024-12-22 RX ADMIN — APIXABAN 5 MG: 5 TABLET, FILM COATED ORAL at 21:39

## 2024-12-22 RX ADMIN — PANTOPRAZOLE SODIUM 40 MG: 40 TABLET, DELAYED RELEASE ORAL at 05:41

## 2024-12-22 RX ADMIN — GUAIFENESIN 1200 MG: 600 TABLET, MULTILAYER, EXTENDED RELEASE ORAL at 08:03

## 2024-12-22 RX ADMIN — Medication 1000 MCG: at 08:03

## 2024-12-22 RX ADMIN — MIDODRINE HYDROCHLORIDE 5 MG: 5 TABLET ORAL at 11:24

## 2024-12-22 RX ADMIN — HYDROCODONE BITARTRATE AND ACETAMINOPHEN 2 TABLET: 7.5; 325 TABLET ORAL at 08:03

## 2024-12-22 RX ADMIN — FLUOXETINE 40 MG: 20 CAPSULE ORAL at 08:03

## 2024-12-22 RX ADMIN — Medication 10 ML: at 21:39

## 2024-12-22 RX ADMIN — HYDROCODONE BITARTRATE AND ACETAMINOPHEN 2 TABLET: 7.5; 325 TABLET ORAL at 21:36

## 2024-12-22 RX ADMIN — ASPIRIN 81 MG: 81 TABLET, COATED ORAL at 08:04

## 2024-12-22 RX ADMIN — TAMSULOSIN HYDROCHLORIDE 0.8 MG: 0.4 CAPSULE ORAL at 21:36

## 2024-12-22 RX ADMIN — Medication 10 ML: at 08:04

## 2024-12-22 RX ADMIN — METOPROLOL TARTRATE 25 MG: 25 TABLET, FILM COATED ORAL at 08:03

## 2024-12-22 RX ADMIN — METOPROLOL TARTRATE 25 MG: 25 TABLET, FILM COATED ORAL at 18:09

## 2024-12-22 RX ADMIN — AMIODARONE HYDROCHLORIDE 200 MG: 200 TABLET ORAL at 23:02

## 2024-12-22 RX ADMIN — MIDODRINE HYDROCHLORIDE 5 MG: 5 TABLET ORAL at 08:03

## 2024-12-22 RX ADMIN — METOPROLOL TARTRATE 25 MG: 25 TABLET, FILM COATED ORAL at 21:39

## 2024-12-22 RX ADMIN — FINASTERIDE 5 MG: 5 TABLET, FILM COATED ORAL at 08:03

## 2024-12-22 RX ADMIN — INSULIN GLARGINE-YFGN 10 UNITS: 100 INJECTION, SOLUTION SUBCUTANEOUS at 08:04

## 2024-12-22 RX ADMIN — FUROSEMIDE 40 MG: 40 TABLET ORAL at 21:39

## 2024-12-22 RX ADMIN — INSULIN LISPRO 2 UNITS: 100 INJECTION, SOLUTION INTRAVENOUS; SUBCUTANEOUS at 17:58

## 2024-12-22 RX ADMIN — ERTAPENEM SODIUM 1000 MG: 1 INJECTION, POWDER, LYOPHILIZED, FOR SOLUTION INTRAMUSCULAR; INTRAVENOUS at 11:24

## 2024-12-22 NOTE — DISCHARGE SUMMARY
"             [facility] Medicine Services  Discharge Summary    Date of Service: 2024  Patient Name: Jamin Hennessy  : 1955  MRN: [id number]    Date of Admission: 2024  Discharge Diagnosis: #Obstructive uropathy with catheter associated UTI  #JONNIE-improved  #Uncontrolled DM2  Date of Discharge: 2024  Primary Care Physician: Jhonny Heller MD      Presenting Problem:   UTI (urinary tract infection) [N39.0]    Active and Resolved Hospital Problems:  Active Hospital Problems    Diagnosis POA    **UTI (urinary tract infection) [N39.0] Yes      Resolved Hospital Problems   No resolved problems to display.         Hospital Course     HPI:  Admitting team HPI   Jamin Hennessy is a 69 y.o. male with a CMH of A-fib, CAD, hyperlipidemia, hypertension, type 2 diabetes, GERD, multiple sclerosis, tobacco use who presented to [facility] on 2024 as a transfer from [facility].  Patient is a poor historian, reports that he went to [facility] due to kidney stone.  Patient states he just \"felt like he had one\".  Of note, patient has a chronic indwelling catheter.     Hospital Course:  #Obstructive uropathy with catheter associated UTI  Per [facility]PI OSH CT showed 6 mm stone with upstream mild to moderate hydronephrosis  Seen by urology underwent cystoscopy with right ureteroscopy stone extraction and stent placement on      OSH urine culture came back growing Proteus mirabilis resistant to Cipro, gentamicin, nitrofurantoin, tobramycin and Bactrim.  Sensitive to ertapenem  continue ertapenem.  Infectious disease following appreciate recommendation plan for total 10 days of IV antibiotics.  Midline in place. End date of antibiotics 24. Remove mid line after course of antibiotics finishes         #JONNIE-Improved  Resume home dose of lasix  Repeat BMP in 3 to 5 days   Avoid NSAIDs           #History of MS  Stable     #Uncontrolled DM2  A1c 9.45.  Started on Lantus and lispro while in hospital. "  Blood sugar trend reviewed  Patient mentions being taken off oral diabetic medications in the past  Start glipizide 5 mg daily  Patient has a glucometer at home advised to check blood sugars 3 times a day and follow-up with PCP for further titration      #Paroxysmal A-fib  Increase metoprolol to 50 twice daily  Continue eliquis 5 mg twice daily  Continue home dose of midodrine and amiodarone     #Hypothyroidism  Continue home dose of levothyroxine        DISCHARGE Follow Up Recommendations for labs and diagnostics:   Follow-up with urology  Finish course of IV ertapenem.  Remove midline after  Repeat BMP in 3 to 5 days discharge  Monitor blood glucose follow-up with PCP      Day of Discharge     Vital Signs:  Temp:  [97.9 °F (36.6 °C)-99.6 °F (37.6 °C)] 97.9 °F (36.6 °C)  Heart Rate:  [108-130] 108  Resp:  [15-16] 15  BP: (106-149)/(69-99) 123/89  Flow (L/min) (Oxygen Therapy):  [2] 2    Physical Exam:  Physical Exam   NAD morbidly obese  RRR S1 and S2 audible  Lungs with fair air entry  Abdomen soft nontender nondistended         Pertinent  and/or Most Recent Results     LAB RESULTS:      Lab 12/22/24  0337 12/21/24  0146 12/19/24  2227 12/19/24  0203 12/17/24  2356   WBC 9.95 8.37 9.42 12.86* 23.19*   HEMOGLOBIN 10.3* 10.0* 10.6* 10.0* 11.0*   HEMATOCRIT 33.7* 33.2* 35.1* 34.2* 36.5*   PLATELETS 197 180 169 176 173   NEUTROS ABS 7.60* 6.37 7.78* 11.18* 21.94*   IMMATURE GRANS (ABS) 0.10* 0.06* 0.08* 0.10* 0.20*   LYMPHS ABS 0.92 0.84 0.55* 0.59* 0.36*   MONOS ABS 0.82 0.63 0.74 0.90 0.65   EOS ABS 0.43* 0.38 0.23 0.06 0.00   MCV 86.6 86.5 85.8 85.5 85.1         Lab 12/22/24  0337 12/21/24  1724 12/21/24  0146 12/19/24  2227 12/19/24  0203 12/17/24  2304   SODIUM 140  --  143 139 139 133*   POTASSIUM 4.6 3.2* 3.5 3.5 3.0* 3.8   CHLORIDE 109*  --  109* 104 101 99   CO2 24.2  --  25.5 24.5 25.4 19.9*   ANION GAP 6.8  --  8.5 10.5 12.6 14.1   BUN 24*  --  31* 39* 38* 36*   CREATININE 1.28*  --  1.11 1.35* 1.52* 1.83*    EGFR 60.6  --  71.9 56.8* 49.3* 39.5*   GLUCOSE 127*  --  162* 215* 185* 342*   CALCIUM 8.7  --  8.3* 8.1* 8.3* 8.3*   MAGNESIUM  --   --   --   --  1.9  --    HEMOGLOBIN A1C  --   --   --  9.45*  --   --                          Brief Urine Lab Results  (Last result in the past 365 days)        Color   Clarity   Blood   Leuk Est   Nitrite   Protein   CREAT   Urine HCG        09/24/24 0458 Yellow   Turbid  Comment: Result checked     Large (3+)   Large (3+)   Negative   30 mg/dL (1+)                 Microbiology Results (last 10 days)       ** No results found for the last 240 hours. **                 Results for orders placed during the hospital encounter of 02/27/20    Duplex Venous Lower Extremity - Bilateral CAR    Interpretation Summary  · Chronic left lower extremity superficial thrombophlebitis noted in the small saphenous.  · All other veins appeared normal bilaterally.      Results for orders placed during the hospital encounter of 02/27/20    Duplex Venous Lower Extremity - Bilateral CAR    Interpretation Summary  · Chronic left lower extremity superficial thrombophlebitis noted in the small saphenous.  · All other veins appeared normal bilaterally.      Results for orders placed during the hospital encounter of 09/24/24    Adult Transthoracic Echo Complete W/ Cont if Necessary Per Protocol    Interpretation Summary    Left ventricular systolic function is normal. Calculated left ventricular EF = 64.3% Left ventricular ejection fraction appears to be 61 - 65%.    Left ventricular wall thickness is consistent with mild concentric hypertrophy.    The left atrial cavity is moderately dilated.    There is a bioprosthetic mitral valve present.    Estimated right ventricular systolic pressure from tricuspid regurgitation is normal (<35 mmHg).      Labs Pending at Discharge:  Pending Results       Procedure [Order ID] Specimen - Date/Time    Blood Culture - Blood, Arm, Left [[id number]] Collected: 12/22/24 3721     Specimen: Blood from Arm, Left Updated: 12/22/24 1549    Blood Culture - Blood, Arm, Left [[id number]] Collected: 12/22/24 1511    Specimen: Blood from Arm, Left Updated: 12/22/24 1549    STONE ANALYSIS - Calculus, Ureter, Right [[id number]] Collected: 12/17/24 1404    Specimen: Calculus from Ureter, Right Updated: 12/17/24 1511            Procedures Performed  Procedure(s):  CYSTOSCOPY URETEROSCOPY RETROGRADE PYELOGRAM HOLMIUM LASER STENT INSERTION         Consults:   Consults       Date and Time Order Name Status Description    12/20/2024 10:03 AM Inpatient Infectious Diseases Consult Completed     12/17/2024 10:11 AM Inpatient Urology Consult Completed               Discharge Details        Discharge Medications        New Medications        Instructions Start Date   ertapenem 1,000 mg in sodium chloride 0.9 % 100 mL IVPB   1,000 mg, Intravenous, Every 24 Hours      glipizide 5 MG tablet  Commonly known as: Glucotrol   5 mg, Oral, Daily             Changes to Medications        Instructions Start Date   metoprolol tartrate 50 MG tablet  Commonly known as: LOPRESSOR  What changed: how much to take   50 mg, Oral, 2 Times Daily             Continue These Medications        Instructions Start Date   acetaminophen 325 MG tablet  Commonly known as: TYLENOL   650 mg, Every 8 Hours PRN      acetic acid 0.25 % irrigation   15 mL, Daily      amiodarone 200 MG tablet  Commonly known as: PACERONE   200 mg, Daily      apixaban 5 MG tablet tablet  Commonly known as: ELIQUIS   5 mg, Oral, Every 12 Hours Scheduled      aspirin 81 MG EC tablet   81 mg, Daily      atorvastatin 40 MG tablet  Commonly known as: LIPITOR   40 mg, Nightly      carboxymethylcellulose 0.5 % solution  Commonly known as: REFRESH PLUS   1 drop, 4 Times Daily      cholecalciferol 25 MCG (1000 UT) tablet  Commonly known as: VITAMIN D3   1,000 Units, Daily      cyclobenzaprine 5 MG tablet  Commonly known as: FLEXERIL   5 mg, Oral, 2 Times Daily PRN       ferrous sulfate 325 (65 FE) MG tablet   325 mg, 3 Times Weekly      finasteride 5 MG tablet  Commonly known as: PROSCAR   5 mg, Daily      FLUoxetine 20 MG capsule  Commonly known as: PROzac   40 mg, Daily      furosemide 40 MG tablet  Commonly known as: LASIX   40 mg, Daily      glatiramer acetate 20 MG/ML injection  Commonly known as: GLATOPA   20 mg, Daily      guaiFENesin 600 MG 12 hr tablet  Commonly known as: MUCINEX   1,200 mg, 2 Times Daily      levothyroxine 100 MCG tablet  Commonly known as: SYNTHROID, LEVOTHROID   100 mcg, Daily      loperamide 2 MG capsule  Commonly known as: IMODIUM   2 mg, 2 Times Daily PRN      midodrine 5 MG tablet  Commonly known as: PROAMATINE   5 mg, 3 Times Daily Before Meals      mirtazapine 15 MG tablet  Commonly known as: REMERON   7.5 mg, Nightly      multivitamin with minerals tablet tablet   1 tablet, Daily      omeprazole 20 MG capsule  Commonly known as: priLOSEC   20 mg, Daily      ondansetron 8 MG tablet  Commonly known as: ZOFRAN   8 mg, Every 8 Hours PRN      promethazine 25 MG tablet  Commonly known as: PHENERGAN   12.5 mg, Every 6 Hours PRN      risperiDONE 0.25 MG tablet dispersible disintegrating tablet  Commonly known as: risperDAL M-TABS   0.25 mg, Nightly      sodium chloride 0.9 % irrigation  Commonly known as: NS   Daily      tamsulosin 0.4 MG capsule 24 hr capsule  Commonly known as: FLOMAX   2 capsules, Nightly               No Known Allergies      Discharge Disposition:   Home-Health Care [facility]    Diet:  Hospital:  Diet Order   Procedures    Diet: Diabetic; Consistent Carbohydrate; Fluid Consistency: Thin (IDDSI 0)         Discharge Activity:         CODE STATUS:  Code Status and Medical Interventions: No CPR (Do Not Attempt to Resuscitate); Limited Support; No intubation (DNI)   Ordered at: 12/17/24 0205     Medical Intervention Limits:    No intubation (DNI)     Code Status (Patient has no pulse and is not breathing):    No CPR (Do Not Attempt to  Resuscitate)     Medical Interventions (Patient has pulse or is breathing):    Limited Support         No future appointments.    Additional Instructions for the Follow-ups that You Need to Schedule       Ambulatory Referral to Home Health (Hospital)   As directed      Face to Face Visit Date: 12/19/2024   Follow-up provider for Plan of Care?: I treated the patient in an acute care facility and will not continue treatment after discharge.   Follow-up provider: REBECCA STEVEN [[id number]]   Reason/Clinical Findings: YESY roder   Describe mobility limitations that make leaving home difficult: patient is bed bound and cannot partake in outpatient therapy   Nursing/Therapeutic Services Requested: Skilled Nursing Physical Therapy Occupational Therapy   Skilled nursing orders: Medication education Cardiopulmonary assessments Monthly catheter care   PT orders: Strengthening   Occupational orders: Activities of daily living   Frequency: Other                Time spent on Discharge including face to face service:  45 minutes    Signature: Electronically signed by Oscar De MD, 12/23/24, 11:02 EST.  [facility] Hospitalist Team

## 2024-12-22 NOTE — CASE MANAGEMENT/SOCIAL WORK
"Physicians Statement of Medical Necessity for  Ambulance Transportation    GENERAL INFORMATION     Name: Jamin Hennessy  YOB: 1955  Medicare #: [id number]   Transport Date: 12/22/24 (Valid for round trips this date, or for scheduled repetitive trips for 60 days from the date signed below.)  Origin: [facility]  Destination: [address] [place] [address] [address] [place] [address]  Is the Patient's stay covered under Medicare Part A (PPS/DRG?)Yes  Closest appropriate facility? Yes  If this a hosp-hosp transfer? No  Is this a hospice patient? No    MEDICAL NECESSITY QUESTIONAIRE    Ambulance Transportation is medically necessary only if other means of transportation are contraindicated or would be potentially harmful to the patient.  To meet this requirement, the patient must be either \"bed confined\" or suffer from a condition such that transport by means other than an ambulance is contraindicated by the patient's condition.  The following questions must be answered by the healthcare professional signing below for this form to be valid:     1) Describe the MEDICAL CONDITION (physical and/or mental) of this patient AT THE TIME OF AMBULANCE TRANSPORT that requires the patient to be transported in an ambulance, and why transport by other means is contraindicated by the patient's condition: bedbound  Past Medical History:   Diagnosis Date    A-fib     CAD (coronary artery disease)     Elevated cholesterol     Hypertension     MI (myocardial infarction)     Non-insulin dependent type 2 diabetes mellitus       Past Surgical History:   Procedure Laterality Date    BRONCHOSCOPY N/A 3/4/2020    Procedure: BRONCHOSCOPY with bronchioalveolar lavage;  Surgeon: Melissa Cole MD;  Location: [facility] ENDOSCOPY;  Service: Pulmonary;  Laterality: N/A;   pneumonia    CARDIAC CATHETERIZATION      CARDIAC CATHETERIZATION N/A 1/20/2020    Procedure: Left Heart Cath;  Surgeon: Migdalia Beth MD;  Location: [facility] CATH INVASIVE " "LOCATION;  Service: Cardiovascular    CARDIAC CATHETERIZATION N/A 1/20/2020    Procedure: Left ventriculography;  Surgeon: Migdalia Bteh MD;  Location: [facility] CATH INVASIVE LOCATION;  Service: Cardiovascular    CARDIAC CATHETERIZATION N/A 1/20/2020    Procedure: Coronary angiography;  Surgeon: Migdalia Beth MD;  Location: [facility] CATH INVASIVE LOCATION;  Service: Cardiovascular    CORONARY ANGIOPLASTY WITH STENT PLACEMENT      CYSTOSCOPY, URETEROSCOPY, RETROGRADE PYELOGRAM, STENT INSERTION Right 12/17/2024    Procedure: CYSTOSCOPY URETEROSCOPY RETROGRADE PYELOGRAM HOLMIUM LASER STENT INSERTION;  Surgeon: Jamin Estrella MD;  Location: [facility] MAIN OR;  Service: Urology;  Laterality: Right;    MITRAL VALVE REPAIR/REPLACEMENT N/A 1/22/2020    Procedure: MITRAL VALVE REPAIR/REPLACEMENT;  Surgeon: Fallon Cole MD;  Location: [facility] CVOR;  Service: Cardiothoracic;  Laterality: N/A;  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  3) Can this patient safely be transported by car or wheelchair van (I.e., may safely sit during transport, without an attendant or monitoring?)No   4. In addition to completing questions 1-3 above, please check any of the following conditions that apply*:          *Note: supporting documentation for any boxes checked must be maintained in the patient's medical records Unable to sit in a chair or wheelchair due to decubitus ulcers or other wounds and Other History of MS, unable to bear weight, bedbound.      SIGNATURE OF PHYSICIAN OR OTHER AUTHORIZED HEALTHCARE PROFESSIONAL    I certify that the above information is true and correct based on my evaluation of this patient, and " represent that the patient requires transport by ambulance and that other forms of transport are contraindicated.  I understand that this information will be used by the Centers for Medicare and Medicaid Services (CMS) to support the determiniation of medical necessity for ambulance services, and I represent that I have personal knowledge of the patient's condition at the time of transport.    x   If this box is checked, I also certify that the patient is physically or mentally incapable of signing the ambulance service's claim form and that the institution with which I am affiliated has furnished care, services or assistance to the patient.  My signature below is made on behalf of the patient pursuant to 42 .36(b)(4). In accordance with 42 .37, the specific reason(s) that the patient is physically or mentally incapable of signing the claim for is as follows:     Signature of Physician or Healthcare Professional    Nancy Rodriguez RN  Date/Time:   12/22/24 9706     (For Scheduled repetitive transport, this form is not valid for transports performed more than 60 days after this date).                                                                                                                                            --------------------------------------------------------------------------------------------  Printed Name and Credentials of Physician or Authorized Healthcare Professional     *Form must be signed by patient's attending physician for scheduled, repetitive transports,.  For non-repetitive ambulance transports, if unable to obtain the signature of the attending physician, any of the following may sign (please select below):     Physician  Clinical Nurse Specialist  Registered Nurse     Physician Assistant  Discharge Planner  Licensed Practical Nurse     Nurse Practitioner

## 2024-12-22 NOTE — PLAN OF CARE
Goal Outcome Evaluation: Patient continues to receive his IV antibiotic. Patient is needing to continue IV ATB at discharge. A midline was placed, and he will to going to an ECF for this because wife didn't want to do the therapy. But called last night and stated that she wants him home and will go ahead a do the Antibiotic. Case management will be notified to call wife and speak with her. Patient being turned every 2 hours. Call light with in reach. Plan of care on going.

## 2024-12-22 NOTE — CASE MANAGEMENT/SOCIAL WORK
Continued Stay Note  [facility]     Patient Name: Jamin Hennessy  MRN: [id number]  Today's Date: 12/22/2024    Admit Date: 12/17/2024    Plan: Home with [facility] referrals, accepted. Transport via EMS.   Discharge Plan       Row Name 12/22/24 1441       Plan    Plan Home with [facility] referrals, accepted. Transport via EMS.    Plan Comments CM received message from Nancy PAULINO stating she had spoken to pt's wife via telephone and wife is now agreeable to administer home abx as she has a friend that can help. CM met with pt at bedside to discuss his preference. Pt states he is comfortable discharging home with wife and her friend to administer abx. CM verified with [facility] liaisonCher, that they would still be able to provide medication. Cher states medication will be delivered 12/23 between 3-5pm. CM also verified with [facility] liaisonGiulia, that they would be able to visit pt on 12/23. CM later received request for EMS transport to be arranged. CM entered medical necessity form and EMS request. Nancy PAULINO and Joceline with dispatch notified.             Expected Discharge Date and Time       Expected Discharge Date Expected Discharge Time    Dec 22, 2024           Nancy Rodriguez RN     Office Phone: [phone]  Office Cell: [phone]

## 2024-12-23 ENCOUNTER — READMISSION MANAGEMENT (OUTPATIENT)
Dept: CALL CENTER | Facility: HOSPITAL | Age: 69
End: 2024-12-23
Payer: MEDICARE

## 2024-12-23 VITALS
BODY MASS INDEX: 42.52 KG/M2 | TEMPERATURE: 97.9 F | SYSTOLIC BLOOD PRESSURE: 123 MMHG | OXYGEN SATURATION: 94 % | HEIGHT: 70 IN | DIASTOLIC BLOOD PRESSURE: 89 MMHG | RESPIRATION RATE: 15 BRPM | WEIGHT: 297 LBS | HEART RATE: 108 BPM

## 2024-12-23 LAB
GLUCOSE BLDC GLUCOMTR-MCNC: 128 MG/DL (ref 70–105)
GLUCOSE BLDC GLUCOMTR-MCNC: 141 MG/DL (ref 70–105)

## 2024-12-23 PROCEDURE — 63710000001 INSULIN GLARGINE PER 5 UNITS: Performed by: STUDENT IN AN ORGANIZED HEALTH CARE EDUCATION/TRAINING PROGRAM

## 2024-12-23 PROCEDURE — 82948 REAGENT STRIP/BLOOD GLUCOSE: CPT | Performed by: HOSPITALIST

## 2024-12-23 PROCEDURE — 25010000002 ERTAPENEM PER 500 MG: Performed by: NURSE PRACTITIONER

## 2024-12-23 PROCEDURE — 82948 REAGENT STRIP/BLOOD GLUCOSE: CPT

## 2024-12-23 RX ORDER — METOPROLOL TARTRATE 50 MG
50 TABLET ORAL 2 TIMES DAILY
Status: DISCONTINUED | OUTPATIENT
Start: 2024-12-23 | End: 2024-12-23 | Stop reason: HOSPADM

## 2024-12-23 RX ORDER — METOPROLOL TARTRATE 50 MG
50 TABLET ORAL 2 TIMES DAILY
Qty: 60 TABLET | Refills: 0 | Status: SHIPPED | OUTPATIENT
Start: 2024-12-23 | End: 2025-01-22

## 2024-12-23 RX ADMIN — ERTAPENEM SODIUM 1000 MG: 1 INJECTION, POWDER, LYOPHILIZED, FOR SOLUTION INTRAMUSCULAR; INTRAVENOUS at 09:19

## 2024-12-23 RX ADMIN — MIDODRINE HYDROCHLORIDE 5 MG: 5 TABLET ORAL at 08:25

## 2024-12-23 RX ADMIN — INSULIN GLARGINE-YFGN 10 UNITS: 100 INJECTION, SOLUTION SUBCUTANEOUS at 08:25

## 2024-12-23 RX ADMIN — FLUOXETINE 40 MG: 20 CAPSULE ORAL at 08:25

## 2024-12-23 RX ADMIN — APIXABAN 5 MG: 5 TABLET, FILM COATED ORAL at 08:25

## 2024-12-23 RX ADMIN — LEVOTHYROXINE SODIUM 100 MCG: 100 TABLET ORAL at 08:25

## 2024-12-23 RX ADMIN — Medication 10 ML: at 08:26

## 2024-12-23 RX ADMIN — ASPIRIN 81 MG: 81 TABLET, COATED ORAL at 08:24

## 2024-12-23 RX ADMIN — GUAIFENESIN 1200 MG: 600 TABLET, MULTILAYER, EXTENDED RELEASE ORAL at 08:24

## 2024-12-23 RX ADMIN — FUROSEMIDE 40 MG: 40 TABLET ORAL at 08:25

## 2024-12-23 RX ADMIN — PANTOPRAZOLE SODIUM 40 MG: 40 TABLET, DELAYED RELEASE ORAL at 06:07

## 2024-12-23 RX ADMIN — METOPROLOL TARTRATE 50 MG: 25 TABLET, FILM COATED ORAL at 08:27

## 2024-12-23 RX ADMIN — HYDROCODONE BITARTRATE AND ACETAMINOPHEN 2 TABLET: 7.5; 325 TABLET ORAL at 08:40

## 2024-12-23 RX ADMIN — FINASTERIDE 5 MG: 5 TABLET, FILM COATED ORAL at 08:25

## 2024-12-23 RX ADMIN — Medication 1000 MCG: at 08:26

## 2024-12-23 NOTE — PROGRESS NOTES
[facility] MEDICINE SERVICE  DAILY PROGRESS NOTE    NAME: Jamin Hennessy  : 1955  MRN: [id number]      LOS: 5 days     PROVIDER OF SERVICE: Oscar De MD    Chief Complaint: UTI (urinary tract infection)    Subjective:     Interval History:  History taken from: patient chart RN    Afebrile pain controlled          Review of Systems:   Review of Systems  All negative except as above   Objective:     Vital Signs  Temp:  [97.9 °F (36.6 °C)-99.6 °F (37.6 °C)] 97.9 °F (36.6 °C)  Heart Rate:  [108-130] 108  Resp:  [15-16] 15  BP: (106-149)/(69-99) 123/89  Flow (L/min) (Oxygen Therapy):  [2] 2   Body mass index is 42.62 kg/m².    Physical Exam  Physical Exam       Diagnostic Data    Results from last 7 days   Lab Units 24  0337   WBC 10*3/mm3 9.95   HEMOGLOBIN g/dL 10.3*   HEMATOCRIT % 33.7*   PLATELETS 10*3/mm3 197   GLUCOSE mg/dL 127*   CREATININE mg/dL 1.28*   BUN mg/dL 24*   SODIUM mmol/L 140   POTASSIUM mmol/L 4.6   ANION GAP mmol/L 6.8       No radiology results for the last day      I reviewed the patient's new clinical results.  I reviewed the patient's new imaging results and agree with the interpretation.    Assessment/Plan:     Active and Resolved Problems  Active Hospital Problems    Diagnosis  POA    **UTI (urinary tract infection) [N39.0]  Yes      Resolved Hospital Problems   No resolved problems to display.       #Obstructive uropathy with catheter associated UTI  Per [facility] OSH CT showed 6 mm stone with upstream mild to moderate hydronephrosis  Seen by urology underwent cystoscopy with right ureteroscopy stone extraction and stent placement on      OSH urine culture came back growing Proteus mirabilis resistant to Cipro, gentamicin, nitrofurantoin, tobramycin and Bactrim.  Sensitive to ertapenem  continue ertapenem.  Infectious disease following appreciate recommendation plan for total 10 days of IV antibiotics.  Midline in place. End date of antibiotics 24. Remove  mid line after course of antibiotics finishes         #JONNIE-Improved  Resume home dose of lasix  Repeat BMP in 3 to 5 days   Avoid NSAIDs           #History of MS  Stable     #Uncontrolled DM2  A1c 9.45.  Started on Lantus and lispro while in hospital.  Blood sugar trend reviewed  Patient mentions being taken off oral diabetic medications in the past  Start glipizide 5 mg daily  Patient has a glucometer at home advised to check blood sugars 3 times a day and follow-up with PCP for further titration      #Paroxysmal A-fib  Increase metoprolol to 50 twice daily  Continue eliquis 5 mg twice daily  Continue home dose of midodrine and amiodarone     #Hypothyroidism  Continue home dose of levothyroxine    VTE Prophylaxis:  Pharmacologic & mechanical VTE prophylaxis orders are present.             Disposition Planning:     Barriers to Discharge: Pending heart rate control  Anticipated Date of Discharge: 12/23  Place of Discharge: Home      Time: 40 minutes     Code Status and Medical Interventions: No CPR (Do Not Attempt to Resuscitate); Limited Support; No intubation (DNI)   Ordered at: 12/17/24 1711     Medical Intervention Limits:    No intubation (DNI)     Code Status (Patient has no pulse and is not breathing):    No CPR (Do Not Attempt to Resuscitate)     Medical Interventions (Patient has pulse or is breathing):    Limited Support       Signature: Electronically signed by Oscar De MD, 12/23/24, 11:02 EST.  [facility] Hospitalist Team

## 2024-12-23 NOTE — PLAN OF CARE
Goal Outcome Evaluation:            Patient has been sleeping throughout this shift with complaint of pain - see MAR; molina catheter in place; room air; cysto with Rt stent plcd on 12/17. Plan home with [facility] and [facility] . Ongoing plan of care. Q2h turns.

## 2024-12-23 NOTE — PLAN OF CARE
Problem: Skin Injury Risk Increased  Goal: Skin Health and Integrity  Outcome: Met  Intervention: Optimize Skin Protection  Recent Flowsheet Documentation  Taken 12/23/2024 0832 by Faiza Hinojosa RN  Activity Management: activity encouraged  Pressure Reduction Techniques: weight shift assistance provided  Head of Bed (HOB) Positioning: HOB elevated  Pressure Reduction Devices:   pressure-redistributing mattress utilized   specialty bed utilized  Skin Protection: incontinence pads utilized     Problem: Fall Injury Risk  Goal: Absence of Fall and Fall-Related Injury  Outcome: Met  Intervention: Identify and Manage Contributors  Recent Flowsheet Documentation  Taken 12/23/2024 0832 by Faiza Hinojosa RN  Medication Review/Management: medications reviewed  Intervention: Promote Injury-Free Environment  Recent Flowsheet Documentation  Taken 12/23/2024 1000 by Faiza Hinojosa RN  Safety Promotion/Fall Prevention: safety round/check completed  Taken 12/23/2024 0832 by Faiza Hinojosa RN  Safety Promotion/Fall Prevention: safety round/check completed   Goal Outcome Evaluation:   Pt to discharge home with home health and wife today. He will transport via EMS. He will be discharging with a molina catheter and a midline in place. Pt was educated on molina and midline before discharge. Discharge paperwork discussed with pt and sent with him at discharge. Out of hospital DNR and medical necessity form for EMS was sent with EMS. Wife was notified of pts discharge. No concerns.

## 2024-12-23 NOTE — CASE MANAGEMENT/SOCIAL WORK
Continued Stay Note  [facility]     Patient Name: Jamin Hennessy  MRN: [id number]  Today's Date: 12/23/2024    Admit Date: 12/17/2024    Plan: Home with [facility] referrals, accepted. Transport via EMS.   Discharge Plan       Row Name 12/23/24 0905       Plan    Plan Comments CM placed EMS request via Epic basket and completed a new medical necessity form and placed in EMR.             Gal Bryan RN      Cell number [phone]  Office number [phone]

## 2024-12-23 NOTE — CASE MANAGEMENT/SOCIAL WORK
"Physicians Statement of Medical Necessity for  Ambulance Transportation    GENERAL INFORMATION     Name: Jamin Hennessy  YOB: 1955  Medicare #:     [id number]     Transport Date: 12/23/2024 (Valid for round trips this date, or for scheduled repetitive trips for 60 days from the date signed below.)  Origin: [facility]  Destination: [address] [place] [address].   Is the Patient's stay covered under Medicare Part A (PPS/DRG?)Yes  Closest appropriate facility? Yes  If this a hosp-hosp transfer? No  Is this a hospice patient? No    MEDICAL NECESSITY QUESTIONAIRE    Ambulance Transportation is medically necessary only if other means of transportation are contraindicated or would be potentially harmful to the patient.  To meet this requirement, the patient must be either \"bed confined\" or suffer from a condition such that transport by means other than an ambulance is contraindicated by the patient's condition.  The following questions must be answered by the healthcare professional signing below for this form to be valid:     1) Describe the MEDICAL CONDITION (physical and/or mental) of this patient AT THE TIME OF AMBULANCE TRANSPORT that requires the patient to be transported in an ambulance, and why transport by other means is contraindicated by the patient's condition:     Patient morbidly obese and bedbound at baseline. Unable to stand and pivot to wheelchair, unable to tolerate a seated position for transfers.     Past Medical History:   Diagnosis Date    A-fib     CAD (coronary artery disease)     Elevated cholesterol     Hypertension     MI (myocardial infarction)     Non-insulin dependent type 2 diabetes mellitus       Past Surgical History:   Procedure Laterality Date    BRONCHOSCOPY N/A 3/4/2020    Procedure: BRONCHOSCOPY with bronchioalveolar lavage;  Surgeon: Melissa Cole MD;  Location: [facility];  Service: Pulmonary;  Laterality: N/A;   pneumonia    " "CARDIAC CATHETERIZATION      CARDIAC CATHETERIZATION N/A 1/20/2020    Procedure: Left Heart Cath;  Surgeon: Migdalia Beth MD;  Location: [facility] CATH INVASIVE LOCATION;  Service: Cardiovascular    CARDIAC CATHETERIZATION N/A 1/20/2020    Procedure: Left ventriculography;  Surgeon: Migdalia Beth MD;  Location: [facility] CATH INVASIVE LOCATION;  Service: Cardiovascular    CARDIAC CATHETERIZATION N/A 1/20/2020    Procedure: Coronary angiography;  Surgeon: Migdalia Beth MD;  Location: [facility] CATH INVASIVE LOCATION;  Service: Cardiovascular    CORONARY ANGIOPLASTY WITH STENT PLACEMENT      CYSTOSCOPY, URETEROSCOPY, RETROGRADE PYELOGRAM, STENT INSERTION Right 12/17/2024    Procedure: CYSTOSCOPY URETEROSCOPY RETROGRADE PYELOGRAM HOLMIUM LASER STENT INSERTION;  Surgeon: Jamin Estrella MD;  Location: [facility] MAIN OR;  Service: Urology;  Laterality: Right;    MITRAL VALVE REPAIR/REPLACEMENT N/A 1/22/2020    Procedure: MITRAL VALVE REPAIR/REPLACEMENT;  Surgeon: Fallon Cole MD;  Location: [facility] CVOR;  Service: Cardiothoracic;  Laterality: N/A;  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  3) Can this patient safely be transported by car or wheelchair van (I.e., may safely sit during transport, without an attendant or monitoring?)No   4. In addition to completing questions 1-3 above, please check any of the following conditions that apply*:          *Note: supporting documentation for any boxes checked must be maintained in the patient's medical records Unable to tolerate seated position for time needed to transport, Unable to sit in a chair or wheelchair due to decubitus ulcers or other wounds, and " Morbid obesity requires additional personnel/equipment to safely handle patient      SIGNATURE OF PHYSICIAN OR OTHER AUTHORIZED HEALTHCARE PROFESSIONAL    I certify that the above information is true and correct based on my evaluation of this patient, and represent that the patient requires transport by ambulance and that other forms of transport are contraindicated.  I understand that this information will be used by the Centers for Medicare and Medicaid Services (CMS) to support the determiniation of medical necessity for ambulance services, and I represent that I have personal knowledge of the patient's condition at the time of transport.    x   If this box is checked, I also certify that the patient is physically or mentally incapable of signing the ambulance service's claim form and that the institution with which I am affiliated has furnished care, services or assistance to the patient.  My signature below is made on behalf of the patient pursuant to 42 .36(b)(4). In accordance with 42 .37, the specific reason(s) that the patient is physically or mentally incapable of signing the claim for is as follows:     Signature of Physician or Healthcare Professional     Gal Bryan RN Date/Time:     12/23/24     (For Scheduled repetitive transport, this form is not valid for transports performed more than 60 days after this date).                                                                                                                                            --------------------------------------------------------------------------------------------  Printed Name and Credentials of Physician or Authorized Healthcare Professional     *Form must be signed by patient's attending physician for scheduled, repetitive transports,.  For non-repetitive ambulance transports, if unable to obtain the signature of the attending physician, any of the following may sign (please select below):     Physician   Clinical Nurse Specialist  Registered Nurse  x    Physician Assistant  Discharge Planner  Licensed Practical Nurse     Nurse Practitioner  x

## 2024-12-24 NOTE — CASE MANAGEMENT/SOCIAL WORK
Case Management Discharge Note      Final Note: Home w. [facility] and [facility] for IV abx.    Provided Post Acute Provider List?: Yes  Post Acute Provider List:  (SNF)  Provided Post Acute Provider Quality & Resource List?: Yes  Post Acute Provider Quality and Resource List:  (SNF)  Delivered To: Patient  Method of Delivery: In person    Selected Continued Care - Discharged on 12/23/2024 Admission date: 12/17/2024 - Discharge disposition: Home-Health Care Svc          Dialysis/Infusion Coordination complete.      Service Provider Services Address Phone Fax Patient Preferred    [facility] [id number] [id number] [phone] --              Home Medical Care Coordination complete.      Service Provider Services Address Phone Fax Patient Preferred    [facility] [address] [place] [address] [phone] --                    Transportation Services  Ambulance: [facility]    Final Discharge Disposition Code: 06 - home with home health care

## 2024-12-24 NOTE — OUTREACH NOTE
Prep Survey      Flowsheet Row Responses   [facility] facility patient discharged from? [facility]   Is LACE score < 7 ? No   Eligibility Readm Mgmt   Discharge diagnosis Obstructive uropathy with catheter associated UTI   Does the patient have one of the following disease processes/diagnoses(primary or secondary)? Other   Does the patient have Home health ordered? Yes   What is the Home health agency?  [facility], IV abx from [facility]   Is there a DME ordered? No   Prep survey completed? Yes            Adelaida TELLEZ - Registered Nurse

## 2024-12-25 LAB
QT INTERVAL: 336 MS
QTC INTERVAL: 491 MS

## 2024-12-26 NOTE — CASE MANAGEMENT/SOCIAL WORK
Continued Stay Note  [facility]     Patient Name: Jamin Hennessy  MRN: [id number]  Today's Date: 12/26/2024    Admit Date: 12/17/2024     Discharge Plan       Row Name 12/26/24 1152       Plan    Plan Comments Per Erica with [facility], spouse stated patient will need EMS since he is bed bound. CM called [facility] and was informed that when EMS transported patient home last week, it was hard to get into the patient's home since he lives in an apartment. THERESA called [facility] and and spoke with Kaylyn. CM asked if they would reconsider patient since HH would be his only option. Per Kaylyn, they will accept again and can see the patient on Monday for labs/line care and molina care. [facility], CM updated Daniel with [facility]. THERESA called spouse and explained, spouse verbalized understanding and is aware [facility] will see patient on 12/30.      Row Name 12/26/24 6494       Plan    Plan Comments [facility] unable to provide services. THERESA called [facility] and spoke with Erica. Patient will need to come in 12/30 for labs and to remove line. [place] will call spouse Debbi to schedule. CM called spouse to inform, spouse verbalized understanding. Spouse stated patient did not get one dose due to the powder not going into the fluid bag. THERESA asked liaison Chucho with [facility] if they can call and speak to spouse, and may need to deliver one more dose. Per liajamel Chucho, they sent a week supply and the patient has more than enough. CM informed spouse.          ARELI BaroneN, RN, CCM    [address] [address]  Phone: [phone]  Fax: [phone]

## 2024-12-26 NOTE — CASE MANAGEMENT/SOCIAL WORK
Continued Stay Note  [facility]     Patient Name: Jamin Hennessy  MRN: [id number]  Today's Date: 12/26/2024    Admit Date: 12/17/2024     Discharge Plan       Row Name 12/26/24 0943       Plan    Plan Comments Sukhwindermyron Anali  unable to provide services. THERESA called [facility] and spoke with Erica. Patient will need to come in 12/30 for labs and to remove line. Eriac will call spouse Debbi to schedule. CM called spouse to inform, spouse verbalized understanding. Spouse stated patient did not get one dose due to the powder not going into the fluid bag. CM asked liaison Chucho with [facility] if they can call and speak to spouse, and may need to deliver one more dose. Per liaison Chucho, they sent a week supply and the patient has more than enough. CM informed spouse.          ARELI BaroneN, RN, CCM    [address] [address]  Phone: [phone]  Fax: [phone]

## 2024-12-27 LAB
BACTERIA SPEC AEROBE CULT: NORMAL
BACTERIA SPEC AEROBE CULT: NORMAL

## 2025-01-02 LAB
CA CARBONATE CRY STONE QL IR: 10 %
COLOR STONE: NORMAL
COM MFR STONE: 70 %
COMPN STONE: NORMAL
LABORATORY COMMENT REPORT: NORMAL
LABORATORY COMMENT REPORT: NORMAL
Lab: NORMAL
Lab: NORMAL
PHOTO: NORMAL
SIZE STONE: NORMAL MM
SPEC SOURCE SUBJ: NORMAL
STONE ANALYSIS-IMP: NORMAL
TRI-PHOS MFR STONE: 20 %
WT STONE: 10 MG

## 2025-01-03 ENCOUNTER — READMISSION MANAGEMENT (OUTPATIENT)
Dept: CALL CENTER | Facility: HOSPITAL | Age: 70
End: 2025-01-03
Payer: MEDICARE

## 2025-01-03 NOTE — OUTREACH NOTE
Medical Week 2 Survey      Flowsheet Row Responses   [facility] facility patient discharged from? [facility]   Does the patient have one of the following disease processes/diagnoses(primary or secondary)? Other   Week 2 attempt successful? No   Unsuccessful attempts Attempt 1            Kylah Sam Registered Nurse

## 2025-01-08 ENCOUNTER — READMISSION MANAGEMENT (OUTPATIENT)
Dept: CALL CENTER | Facility: HOSPITAL | Age: 70
End: 2025-01-08
Payer: MEDICARE

## 2025-01-08 NOTE — OUTREACH NOTE
Medical Week 2 Survey      Flowsheet Row Responses   [facility] facility patient discharged from? [facility]   Does the patient have one of the following disease processes/diagnoses(primary or secondary)? Other   Week 2 attempt successful? No   Unsuccessful attempts Attempt 2  [All numbers listed were attempted ,  no answer]            Shania BARNETT - Registered Nurse

## 2025-01-09 ENCOUNTER — APPOINTMENT (OUTPATIENT)
Dept: OTHER | Facility: HOSPITAL | Age: 70
DRG: 698 | End: 2025-01-09
Payer: OTHER GOVERNMENT

## 2025-01-10 ENCOUNTER — READMISSION MANAGEMENT (OUTPATIENT)
Dept: CALL CENTER | Facility: HOSPITAL | Age: 70
End: 2025-01-10
Payer: MEDICARE

## 2025-01-10 ENCOUNTER — APPOINTMENT (OUTPATIENT)
Dept: CT IMAGING | Facility: HOSPITAL | Age: 70
DRG: 698 | End: 2025-01-10
Payer: OTHER GOVERNMENT

## 2025-01-10 ENCOUNTER — HOSPITAL ENCOUNTER (INPATIENT)
Facility: HOSPITAL | Age: 70
LOS: 2 days | Discharge: HOME | DRG: 698 | End: 2025-01-12
Attending: INTERNAL MEDICINE | Admitting: INTERNAL MEDICINE
Payer: OTHER GOVERNMENT

## 2025-01-10 PROBLEM — L98.429 SACRAL ULCER: Status: ACTIVE | Noted: 2025-01-10

## 2025-01-10 PROBLEM — G35 MULTIPLE SCLEROSIS: Status: ACTIVE | Noted: 2025-01-10

## 2025-01-10 PROBLEM — N39.0 COMPLICATED UTI (URINARY TRACT INFECTION): Status: ACTIVE | Noted: 2025-01-10

## 2025-01-10 LAB
ANION GAP SERPL CALCULATED.3IONS-SCNC: 13.7 MMOL/L (ref 5–15)
BACTERIA UR QL AUTO: ABNORMAL /HPF
BASOPHILS # BLD AUTO: 0.08 10*3/MM3 (ref 0–0.2)
BASOPHILS NFR BLD AUTO: 0.7 % (ref 0–1.5)
BILIRUB UR QL STRIP: NEGATIVE
BUN SERPL-MCNC: 21 MG/DL (ref 8–23)
BUN/CREAT SERPL: 16.9 (ref 7–25)
CALCIUM SPEC-SCNC: 8.7 MG/DL (ref 8.6–10.5)
CHLORIDE SERPL-SCNC: 100 MMOL/L (ref 98–107)
CLARITY UR: ABNORMAL
CO2 SERPL-SCNC: 25.3 MMOL/L (ref 22–29)
COLOR UR: YELLOW
CREAT SERPL-MCNC: 1.24 MG/DL (ref 0.76–1.27)
DEPRECATED RDW RBC AUTO: 59.5 FL (ref 37–54)
EGFRCR SERPLBLD CKD-EPI 2021: 62.9 ML/MIN/1.73
EOSINOPHIL # BLD AUTO: 0.32 10*3/MM3 (ref 0–0.4)
EOSINOPHIL NFR BLD AUTO: 2.9 % (ref 0.3–6.2)
ERYTHROCYTE [DISTWIDTH] IN BLOOD BY AUTOMATED COUNT: 19.1 % (ref 12.3–15.4)
GLUCOSE SERPL-MCNC: 196 MG/DL (ref 65–99)
GLUCOSE UR STRIP-MCNC: NEGATIVE MG/DL
HCT VFR BLD AUTO: 42.5 % (ref 37.5–51)
HGB BLD-MCNC: 12.8 G/DL (ref 13–17.7)
HGB UR QL STRIP.AUTO: ABNORMAL
HYALINE CASTS UR QL AUTO: ABNORMAL /LPF
IMM GRANULOCYTES # BLD AUTO: 0.04 10*3/MM3 (ref 0–0.05)
IMM GRANULOCYTES NFR BLD AUTO: 0.4 % (ref 0–0.5)
KETONES UR QL STRIP: NEGATIVE
LEUKOCYTE ESTERASE UR QL STRIP.AUTO: ABNORMAL
LYMPHOCYTES # BLD AUTO: 0.75 10*3/MM3 (ref 0.7–3.1)
LYMPHOCYTES NFR BLD AUTO: 6.8 % (ref 19.6–45.3)
MCH RBC QN AUTO: 26.2 PG (ref 26.6–33)
MCHC RBC AUTO-ENTMCNC: 30.1 G/DL (ref 31.5–35.7)
MCV RBC AUTO: 86.9 FL (ref 79–97)
MONOCYTES # BLD AUTO: 1.07 10*3/MM3 (ref 0.1–0.9)
MONOCYTES NFR BLD AUTO: 9.7 % (ref 5–12)
NEUTROPHILS NFR BLD AUTO: 79.5 % (ref 42.7–76)
NEUTROPHILS NFR BLD AUTO: 8.82 10*3/MM3 (ref 1.7–7)
NITRITE UR QL STRIP: NEGATIVE
NRBC BLD AUTO-RTO: 0 /100 WBC (ref 0–0.2)
PH UR STRIP.AUTO: 5.5 [PH] (ref 5–8)
PLATELET # BLD AUTO: 204 10*3/MM3 (ref 140–450)
PMV BLD AUTO: 10.4 FL (ref 6–12)
POTASSIUM SERPL-SCNC: 3.3 MMOL/L (ref 3.5–5.2)
POTASSIUM SERPL-SCNC: 4.5 MMOL/L (ref 3.5–5.2)
PROT UR QL STRIP: ABNORMAL
RBC # BLD AUTO: 4.89 10*6/MM3 (ref 4.14–5.8)
RBC # UR STRIP: ABNORMAL /HPF
REF LAB TEST METHOD: ABNORMAL
SODIUM SERPL-SCNC: 139 MMOL/L (ref 136–145)
SP GR UR STRIP: 1.01 (ref 1–1.03)
SQUAMOUS #/AREA URNS HPF: ABNORMAL /HPF
UROBILINOGEN UR QL STRIP: ABNORMAL
WBC # UR STRIP: ABNORMAL /HPF
WBC NRBC COR # BLD AUTO: 11.08 10*3/MM3 (ref 3.4–10.8)
YEAST URNS QL MICRO: ABNORMAL /HPF

## 2025-01-10 PROCEDURE — 74176 CT ABD & PELVIS W/O CONTRAST: CPT

## 2025-01-10 PROCEDURE — 84132 ASSAY OF SERUM POTASSIUM: CPT

## 2025-01-10 PROCEDURE — 87040 BLOOD CULTURE FOR BACTERIA: CPT

## 2025-01-10 PROCEDURE — 85025 COMPLETE CBC W/AUTO DIFF WBC: CPT | Performed by: INTERNAL MEDICINE

## 2025-01-10 PROCEDURE — 81001 URINALYSIS AUTO W/SCOPE: CPT

## 2025-01-10 PROCEDURE — 25010000002 CEFTRIAXONE PER 250 MG: Performed by: INTERNAL MEDICINE

## 2025-01-10 PROCEDURE — 87086 URINE CULTURE/COLONY COUNT: CPT

## 2025-01-10 PROCEDURE — 80048 BASIC METABOLIC PNL TOTAL CA: CPT | Performed by: INTERNAL MEDICINE

## 2025-01-10 RX ORDER — FINASTERIDE 5 MG/1
5 TABLET, FILM COATED ORAL DAILY
Status: DISCONTINUED | OUTPATIENT
Start: 2025-01-10 | End: 2025-01-12 | Stop reason: HOSPADM

## 2025-01-10 RX ORDER — SODIUM CHLORIDE 0.9 % (FLUSH) 0.9 %
10 SYRINGE (ML) INJECTION EVERY 12 HOURS SCHEDULED
Status: DISCONTINUED | OUTPATIENT
Start: 2025-01-10 | End: 2025-01-12 | Stop reason: HOSPADM

## 2025-01-10 RX ORDER — PANTOPRAZOLE SODIUM 40 MG/1
40 TABLET, DELAYED RELEASE ORAL
Status: DISCONTINUED | OUTPATIENT
Start: 2025-01-10 | End: 2025-01-12 | Stop reason: HOSPADM

## 2025-01-10 RX ORDER — SODIUM CHLORIDE 0.9 % (FLUSH) 0.9 %
10 SYRINGE (ML) INJECTION AS NEEDED
Status: DISCONTINUED | OUTPATIENT
Start: 2025-01-10 | End: 2025-01-12 | Stop reason: HOSPADM

## 2025-01-10 RX ORDER — NITROGLYCERIN 0.4 MG/1
0.4 TABLET SUBLINGUAL
Status: DISCONTINUED | OUTPATIENT
Start: 2025-01-10 | End: 2025-01-12 | Stop reason: HOSPADM

## 2025-01-10 RX ORDER — AMIODARONE HYDROCHLORIDE 200 MG/1
200 TABLET ORAL
Status: DISCONTINUED | OUTPATIENT
Start: 2025-01-10 | End: 2025-01-12 | Stop reason: HOSPADM

## 2025-01-10 RX ORDER — ALUMINA, MAGNESIA, AND SIMETHICONE 2400; 2400; 240 MG/30ML; MG/30ML; MG/30ML
15 SUSPENSION ORAL EVERY 6 HOURS PRN
Status: DISCONTINUED | OUTPATIENT
Start: 2025-01-10 | End: 2025-01-12 | Stop reason: HOSPADM

## 2025-01-10 RX ORDER — METOPROLOL TARTRATE 50 MG
50 TABLET ORAL EVERY 12 HOURS SCHEDULED
Status: DISCONTINUED | OUTPATIENT
Start: 2025-01-10 | End: 2025-01-12 | Stop reason: HOSPADM

## 2025-01-10 RX ORDER — LEVOTHYROXINE SODIUM 100 UG/1
100 TABLET ORAL
Status: DISCONTINUED | OUTPATIENT
Start: 2025-01-10 | End: 2025-01-12 | Stop reason: HOSPADM

## 2025-01-10 RX ORDER — SODIUM CHLORIDE 9 MG/ML
40 INJECTION, SOLUTION INTRAVENOUS AS NEEDED
Status: DISCONTINUED | OUTPATIENT
Start: 2025-01-10 | End: 2025-01-12 | Stop reason: HOSPADM

## 2025-01-10 RX ORDER — POTASSIUM CHLORIDE 1500 MG/1
40 TABLET, EXTENDED RELEASE ORAL EVERY 4 HOURS
Status: COMPLETED | OUTPATIENT
Start: 2025-01-10 | End: 2025-01-10

## 2025-01-10 RX ORDER — BISACODYL 5 MG/1
5 TABLET, DELAYED RELEASE ORAL DAILY PRN
Status: DISCONTINUED | OUTPATIENT
Start: 2025-01-10 | End: 2025-01-12 | Stop reason: HOSPADM

## 2025-01-10 RX ORDER — POLYETHYLENE GLYCOL 3350 17 G/17G
17 POWDER, FOR SOLUTION ORAL DAILY PRN
Status: DISCONTINUED | OUTPATIENT
Start: 2025-01-10 | End: 2025-01-12 | Stop reason: HOSPADM

## 2025-01-10 RX ORDER — MIRTAZAPINE 15 MG/1
15 TABLET, FILM COATED ORAL NIGHTLY
Status: DISCONTINUED | OUTPATIENT
Start: 2025-01-10 | End: 2025-01-12 | Stop reason: HOSPADM

## 2025-01-10 RX ORDER — BISACODYL 10 MG
10 SUPPOSITORY, RECTAL RECTAL DAILY PRN
Status: DISCONTINUED | OUTPATIENT
Start: 2025-01-10 | End: 2025-01-12 | Stop reason: HOSPADM

## 2025-01-10 RX ORDER — AMOXICILLIN 250 MG
2 CAPSULE ORAL 2 TIMES DAILY
Status: DISCONTINUED | OUTPATIENT
Start: 2025-01-10 | End: 2025-01-12 | Stop reason: HOSPADM

## 2025-01-10 RX ORDER — KETOROLAC TROMETHAMINE 30 MG/ML
15 INJECTION, SOLUTION INTRAMUSCULAR; INTRAVENOUS EVERY 6 HOURS PRN
Status: DISCONTINUED | OUTPATIENT
Start: 2025-01-10 | End: 2025-01-11

## 2025-01-10 RX ORDER — TAMSULOSIN HYDROCHLORIDE 0.4 MG/1
0.4 CAPSULE ORAL NIGHTLY
Status: DISCONTINUED | OUTPATIENT
Start: 2025-01-10 | End: 2025-01-12 | Stop reason: HOSPADM

## 2025-01-10 RX ADMIN — FINASTERIDE 5 MG: 5 TABLET, FILM COATED ORAL at 11:15

## 2025-01-10 RX ADMIN — LEVOTHYROXINE SODIUM 100 MCG: 100 TABLET ORAL at 09:18

## 2025-01-10 RX ADMIN — METOPROLOL TARTRATE 50 MG: 50 TABLET, FILM COATED ORAL at 09:18

## 2025-01-10 RX ADMIN — PANTOPRAZOLE SODIUM 40 MG: 40 TABLET, DELAYED RELEASE ORAL at 09:18

## 2025-01-10 RX ADMIN — AMIODARONE HYDROCHLORIDE 200 MG: 200 TABLET ORAL at 09:18

## 2025-01-10 RX ADMIN — TAMSULOSIN HYDROCHLORIDE 0.4 MG: 0.4 CAPSULE ORAL at 20:09

## 2025-01-10 RX ADMIN — Medication 10 ML: at 09:19

## 2025-01-10 RX ADMIN — POTASSIUM CHLORIDE 40 MEQ: 1500 TABLET, EXTENDED RELEASE ORAL at 13:47

## 2025-01-10 RX ADMIN — FLUOXETINE HYDROCHLORIDE 20 MG: 20 CAPSULE ORAL at 09:18

## 2025-01-10 RX ADMIN — SENNOSIDES AND DOCUSATE SODIUM 2 TABLET: 8.6; 5 TABLET ORAL at 20:09

## 2025-01-10 RX ADMIN — POTASSIUM CHLORIDE 40 MEQ: 1500 TABLET, EXTENDED RELEASE ORAL at 09:19

## 2025-01-10 RX ADMIN — Medication 10 ML: at 02:39

## 2025-01-10 RX ADMIN — MIRTAZAPINE 15 MG: 15 TABLET, FILM COATED ORAL at 20:09

## 2025-01-10 RX ADMIN — Medication 10 ML: at 20:09

## 2025-01-10 RX ADMIN — CEFTRIAXONE SODIUM 2000 MG: 2 INJECTION, POWDER, FOR SOLUTION INTRAMUSCULAR; INTRAVENOUS at 02:39

## 2025-01-10 RX ADMIN — METOPROLOL TARTRATE 50 MG: 50 TABLET, FILM COATED ORAL at 20:09

## 2025-01-10 NOTE — CONSULTS
Urology Consult Note    Patient:Jamin Hennessy :1955  Room:[facility]  Admit Date1/10/2025  Age:69 y.o.     SEX:male     DOS:1/10/2025     MR:[id number]     Visit:[id number]       Attending: Wyatt Wilson,*  Referring Provider: Steve  Reason for Consultation: Right pyelonephritis    Patient Care Team:  Jhonny Heller MD as PCP - General (Family Medicine)  Frederick George MD as Consulting Physician (Nephrology)    Chief complaint hematuria    Subjective .     History of present illness: 69-year-old male who has been seen by Dr. Estrella previously.  Patient underwent cystoscopy with right ureteroscopic stone extraction and placement of a right ureteral stent on 2024.  He has not kept follow-up for removal of his stent.  Patient developed hematuria and went to an outside emergency room.  Patient showed signs of sepsis with tachycardia and elevated white count of 15,000.  A CT scan was performed and it was reported that the ureteral stent appeared infected.  Patient was therefore transferred to [facility].  I have not seen his recent CT scan but I did review his CT scan from  and there was a suggestion of a left ureteral calculus at that time also.  Patient is feeling better and is currently afebrile.  Serum creatinine is 1.24 with a white blood cell count of 11.08.    Review of Systems  10 point review of systems were reviewed and are negative except for:  Constitution:  positive for See HPI    History  Past Medical History:   Diagnosis Date    A-fib     CAD (coronary artery disease)     Elevated cholesterol     Hypertension     MI (myocardial infarction)     Non-insulin dependent type 2 diabetes mellitus      Past Surgical History:   Procedure Laterality Date    BRONCHOSCOPY N/A 3/4/2020    Procedure: BRONCHOSCOPY with bronchioalveolar lavage;  Surgeon: Melissa Cole MD;  Location: [facility] ENDOSCOPY;  Service: Pulmonary;  Laterality: N/A;   pneumonia    CARDIAC  CATHETERIZATION      CARDIAC CATHETERIZATION N/A 1/20/2020    Procedure: Left Heart Cath;  Surgeon: Migdalia Beth MD;  Location: [facility] CATH INVASIVE LOCATION;  Service: Cardiovascular    CARDIAC CATHETERIZATION N/A 1/20/2020    Procedure: Left ventriculography;  Surgeon: Migdalia Beth MD;  Location: [facility] CATH INVASIVE LOCATION;  Service: Cardiovascular    CARDIAC CATHETERIZATION N/A 1/20/2020    Procedure: Coronary angiography;  Surgeon: Migdalia Beth MD;  Location: [facility] CATH INVASIVE LOCATION;  Service: Cardiovascular    CORONARY ANGIOPLASTY WITH STENT PLACEMENT      CYSTOSCOPY, URETEROSCOPY, RETROGRADE PYELOGRAM, STENT INSERTION Right 12/17/2024    Procedure: CYSTOSCOPY URETEROSCOPY RETROGRADE PYELOGRAM HOLMIUM LASER STENT INSERTION;  Surgeon: Jamin Estrella MD;  Location: [facility] MAIN OR;  Service: Urology;  Laterality: Right;    MITRAL VALVE REPAIR/REPLACEMENT N/A 1/22/2020    Procedure: MITRAL VALVE REPAIR/REPLACEMENT;  Surgeon: Fallon Cole MD;  Location: [facility] CVOR;  Service: Cardiothoracic;  Laterality: N/A;  patient has endocarditis mitral valve replaced with 31mm knox II     Social History     Socioeconomic History    Marital status:    Tobacco Use    Smoking status: Some Days     Current packs/day: 0.25     Types: Cigarettes    Smokeless tobacco: Current     Types: Chew   Vaping Use    Vaping status: Never Used   Substance and Sexual Activity    Alcohol use: Not Currently    Drug use: Never    Sexual activity: Defer     Family History   Problem Relation Age of Onset    Hypertension Mother      Allergy  No Known Allergies  Prior to Admission medications    Medication Sig Start Date End Date Taking? Authorizing Provider   acetaminophen (TYLENOL) 325 MG tablet Take 2 tablets by mouth Every 8 (Eight) Hours As Needed for Mild Pain.    Provider, MD Gisele   acetic acid 0.25 % irrigation Irrigate with 15 mL to the affected area as directed by provider  Daily.    Gisele Lauren MD   amiodarone (PACERONE) 200 MG tablet Take 1 tablet by mouth Daily.    Gisele Lauren MD   apixaban (ELIQUIS) 5 MG tablet tablet Take 1 tablet by mouth Every 12 (Twelve) Hours. 1/28/20   Юлия Banuelos APRN   aspirin 81 MG EC tablet Take 1 tablet by mouth Daily.    Gisele Lauren MD   atorvastatin (LIPITOR) 40 MG tablet Take 1 tablet by mouth Every Night.    Gisele Lauren MD   carboxymethylcellulose (REFRESH PLUS) 0.5 % solution Administer 1 drop to both eyes 4 (Four) Times a Day.    Gisele Lauren MD   Cholecalciferol 25 MCG (1000 UT) tablet Take 1 tablet by mouth Daily.    Gisele Lauren MD   cyclobenzaprine (FLEXERIL) 5 MG tablet Take 1 tablet by mouth 2 (Two) Times a Day As Needed for Muscle Spasms. 2/3/20   Юлия Banuelos APRN   ferrous sulfate 325 (65 FE) MG tablet Take 1 tablet by mouth 3 (Three) Times a Week. Monday, Wednesday, Friday.    Gisele Lauren MD   finasteride (PROSCAR) 5 MG tablet Take 1 tablet by mouth Daily.    Gisele Lauren MD   FLUoxetine (PROzac) 20 MG capsule Take 2 capsules by mouth Daily.    Gisele Lauren MD   furosemide (LASIX) 40 MG tablet Take 1 tablet by mouth Daily.    Gisele Lauren MD   glatiramer acetate (GLATOPA) 20 MG/ML injection Inject 1 mL under the skin into the appropriate area as directed Daily.    Gisele Lauren MD   glipizide (Glucotrol) 5 MG tablet Take 1 tablet by mouth Daily for 30 days. 12/22/24 1/21/25  Oscar De MD   guaiFENesin (MUCINEX) 600 MG 12 hr tablet Take 2 tablets by mouth 2 (Two) Times a Day.    Gisele Lauren MD   levothyroxine (SYNTHROID, LEVOTHROID) 100 MCG tablet Take 1 tablet by mouth Daily.    Gisele Lauren MD   loperamide (IMODIUM) 2 MG capsule Take 1 capsule by mouth 2 (Two) Times a Day As Needed for Diarrhea.    Gisele Lauren MD   metoprolol tartrate (LOPRESSOR) 50 MG tablet Take 1 tablet by mouth 2  (Two) Times a Day for 30 days. 24  Oscar De MD   midodrine (PROAMATINE) 5 MG tablet Take 1 tablet by mouth 3 (Three) Times a Day Before Meals.    Gisele Lauren MD   mirtazapine (REMERON) 15 MG tablet Take 0.5 tablets by mouth Every Night.    Gisele Lauren MD   multivitamin with minerals tablet tablet Take 1 tablet by mouth Daily.    Gisele Lauren MD   omeprazole (priLOSEC) 20 MG capsule Take 1 capsule by mouth Daily.    Gisele Lauren MD   ondansetron (ZOFRAN) 8 MG tablet Take 1 tablet by mouth Every 8 (Eight) Hours As Needed for Nausea.    Gisele Lauren MD   promethazine (PHENERGAN) 25 MG tablet Take 0.5 tablets by mouth Every 6 (Six) Hours As Needed for Nausea or Vomiting.    Gisele Lauren MD   risperiDONE (risperDAL M-TABS) 0.25 MG tablet dispersible disintegrating tablet Place 1 tablet on the tongue Every Night.    Gisele Lauren MD   sodium chloride (NS) 0.9 % irrigation Irrigate with  to the affected area as directed by provider Daily. For molina catheter    Gisele Lauren MD   tamsulosin (FLOMAX) 0.4 MG capsule 24 hr capsule Take 2 capsules by mouth Every Night.    Gisele Lauren MD         Objective     tMax 24 hours:  Temp (24hrs), Av.5 °F (36.9 °C), Min:98.3 °F (36.8 °C), Max:98.7 °F (37.1 °C)    Vital Sign Ranges:  Temp:  [98.3 °F (36.8 °C)-98.7 °F (37.1 °C)] 98.7 °F (37.1 °C)  Heart Rate:  [] 96  Resp:  [12-17] 12  BP: (107-140)/(74-83) 140/83  Intake and Output Last 3 Shifts:  I/O last 3 completed shifts:  In: -   Out: 400 [Urine:400]      Physical Exam:   General Appearance alert, appears stated age, and cooperative  Head normocephalic, without obvious abnormality and atraumatic  Abdomen no guarding and no rebound tenderness  Skin no bleeding, bruising or rash  Neurologic Mental Status orientated to person, place, time and situation    Results Review:     Lab Results (last 24 hours)       Procedure  Component Value Units Date/Time    Basic Metabolic Panel [[id number]]  (Abnormal) Collected: 01/10/25 0227    Specimen: Blood from Arm, Right Updated: 01/10/25 0259     Glucose 196 mg/dL      BUN 21 mg/dL      Creatinine 1.24 mg/dL      Sodium 139 mmol/L      Potassium 3.3 mmol/L      Chloride 100 mmol/L      CO2 25.3 mmol/L      Calcium 8.7 mg/dL      BUN/Creatinine Ratio 16.9     Anion Gap 13.7 mmol/L      eGFR 62.9 mL/min/1.73     Narrative:      GFR Categories in Chronic Kidney Disease (CKD)      GFR Category          GFR (mL/min/1.73)    Interpretation  G1                     90 or greater         Normal or high (1)  G2                      60-89                Mild decrease (1)  G3a                   45-59                Mild to moderate decrease  G3b                   30-44                Moderate to severe decrease  G4                    15-29                Severe decrease  G5                    14 or less           Kidney failure          (1)In the absence of evidence of kidney disease, neither GFR category G1 or G2 fulfill the criteria for CKD.    eGFR calculation 2021 CKD-EPI creatinine equation, which does not include race as a factor    CBC Auto Differential [[id number]]  (Abnormal) Collected: 01/10/25 0227    Specimen: Blood from Arm, Right Updated: 01/10/25 0236     WBC 11.08 10*3/mm3      RBC 4.89 10*6/mm3      Hemoglobin 12.8 g/dL      Hematocrit 42.5 %      MCV 86.9 fL      MCH 26.2 pg      MCHC 30.1 g/dL      RDW 19.1 %      RDW-SD 59.5 fl      MPV 10.4 fL      Platelets 204 10*3/mm3      Neutrophil % 79.5 %      Lymphocyte % 6.8 %      Monocyte % 9.7 %      Eosinophil % 2.9 %      Basophil % 0.7 %      Immature Grans % 0.4 %      Neutrophils, Absolute 8.82 10*3/mm3      Lymphocytes, Absolute 0.75 10*3/mm3      Monocytes, Absolute 1.07 10*3/mm3      Eosinophils, Absolute 0.32 10*3/mm3      Basophils, Absolute 0.08 10*3/mm3      Immature Grans, Absolute 0.04 10*3/mm3      nRBC 0.0 /100 WBC        "     No results found for: \"URINECX\"     Imaging Results (Last 7 Days)       ** No results found for the last 168 hours. **            Inpatient Meds:   Scheduled Meds:amiodarone, 200 mg, Oral, Q24H  cefTRIAXone, 2,000 mg, Intravenous, Q24H  finasteride, 5 mg, Oral, Daily  FLUoxetine, 20 mg, Oral, Daily  levothyroxine, 100 mcg, Oral, Q AM  metoprolol tartrate, 50 mg, Oral, Q12H  mirtazapine, 15 mg, Oral, Nightly  pantoprazole, 40 mg, Oral, Q AM  senna-docusate sodium, 2 tablet, Oral, BID  sodium chloride, 10 mL, Intravenous, Q12H  tamsulosin, 0.4 mg, Oral, Nightly       Continuous Infusions:    PRN Meds:.  aluminum-magnesium hydroxide-simethicone    senna-docusate sodium **AND** polyethylene glycol **AND** bisacodyl **AND** bisacodyl    ketorolac    nitroglycerin    sodium chloride    sodium chloride      Assessment & Plan     Principal Problem:    Sepsis without acute organ dysfunction  Active Problems:    Non-insulin dependent type 2 diabetes mellitus    BPH with obstruction/lower urinary tract symptoms    S/P MVR (mitral valve replacement)    Hypercholesterolemia    Paroxysmal atrial fibrillation    Complicated UTI (urinary tract infection)    Multiple sclerosis    Sacral ulcer    Urosepsis  Status post right ureteroscopic stone extraction with stent placement and the stent is still in place  Questionable left ureteral calculus  Chronic retention for which she has a Perry catheter    Plan  CT scan stat  If there is a obstructing stone on the left side he will need placement of a left ureteral stent  Antibiotics per primary  Will eventually need stent removal on the right side but his infection needs to be treated first      I discussed the patient's findings and my recommendations with patient and nursing staff    Thank you for this  consult    Dayron Strickland MD  01/10/25  07:22 EST    "

## 2025-01-10 NOTE — H&P
[facility] Medicine Services  History & Physical    Patient Name: Jamin Hennessy  : 1955  MRN: [id number]  Primary Care Physician:  Jhonny Heller MD  Date of admission: 1/10/2025  Date and Time of Service: 1/10/2025 at 01:00    Subjective      Chief Complaint: In the urine    History of Present Illness: Jamin Hennessy is a 69 y.o. male with a past medical history of morbid obesity, atrial fibrillation paroxysmal, type 2 diabetes non-insulin-dependent, hyperlipidemia, multiple sclerosis, hypothyroid, mixed hyperlipidemia, a former smoker,sacral ulcer and previous prosthetic pig valve mitral valve replaced.  The patient was in his usual state of health and he started experiencing some blood in his urine he did not report any pain.  But he went to the ER in [place].  CT scan was performed and showed that the ureteral stent appeared infected.  Dr. Kenneth Quispe contacted Dr. Strickland of urology who requested that the patient be transferred here and he would evaluate the patient in the morning.  The patient met sepsis criteria because his heart rates were in the 100 and he had a white count of 15,000.  He also had a mildly elevated lactic at 2.4.  He was started on antibiotics and given some fluids.  Currently he is afebrile and vital signs are stable on admission.    Patient states that he took his apixaban on the morning of  last dose    Review of Systems  Blood in the urine however the patient denies any other systemic symptoms including denies pain, shortness of breath, fever, chills, abdominal pain, cough, congestion, rash, headache, altered mental status, nausea, vomiting, diarrhea and the rest the 15 essential review of systems have been reviewed and are negative  Personal History     Past Medical History:   Diagnosis Date    A-fib     CAD (coronary artery disease)     Elevated cholesterol     Hypertension     MI (myocardial infarction)     Non-insulin dependent type 2 diabetes mellitus         Past Surgical History:   Procedure Laterality Date    BRONCHOSCOPY N/A 3/4/2020    Procedure: BRONCHOSCOPY with bronchioalveolar lavage;  Surgeon: Melissa Cole MD;  Location: [facility] ENDOSCOPY;  Service: Pulmonary;  Laterality: N/A;   pneumonia    CARDIAC CATHETERIZATION      CARDIAC CATHETERIZATION N/A 1/20/2020    Procedure: Left Heart Cath;  Surgeon: Migdalia Beth MD;  Location: [facility] CATH INVASIVE LOCATION;  Service: Cardiovascular    CARDIAC CATHETERIZATION N/A 1/20/2020    Procedure: Left ventriculography;  Surgeon: Migdalia Beth MD;  Location: [facility] CATH INVASIVE LOCATION;  Service: Cardiovascular    CARDIAC CATHETERIZATION N/A 1/20/2020    Procedure: Coronary angiography;  Surgeon: Migdalia Beth MD;  Location: [facility] CATH INVASIVE LOCATION;  Service: Cardiovascular    CORONARY ANGIOPLASTY WITH STENT PLACEMENT      CYSTOSCOPY, URETEROSCOPY, RETROGRADE PYELOGRAM, STENT INSERTION Right 12/17/2024    Procedure: CYSTOSCOPY URETEROSCOPY RETROGRADE PYELOGRAM HOLMIUM LASER STENT INSERTION;  Surgeon: Jamin Estrella MD;  Location: [facility] MAIN OR;  Service: Urology;  Laterality: Right;    MITRAL VALVE REPAIR/REPLACEMENT N/A 1/22/2020    Procedure: MITRAL VALVE REPAIR/REPLACEMENT;  Surgeon: Fallon Cole MD;  Location: [facility] CVOR;  Service: Cardiothoracic;  Laterality: N/A;  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 Otherwise pertinent FHx was reviewed and not pertinent to current issue.    Social History:  reports that he has been smoking cigarettes. His smokeless tobacco use includes chew. He reports that he does not currently use alcohol. He reports that he does not use drugs.    Home Medications:  Prior to Admission Medications       Prescriptions Last Dose Informant Patient Reported? Taking?    acetaminophen (TYLENOL) 325 MG tablet   Yes No    Take 2 tablets by  mouth Every 8 (Eight) Hours As Needed for Mild Pain.    acetic acid 0.25 % irrigation   Yes No    Irrigate with 15 mL to the affected area as directed by provider Daily.    amiodarone (PACERONE) 200 MG tablet   Yes No    Take 1 tablet by mouth Daily.    apixaban (ELIQUIS) 5 MG tablet tablet   No No    Take 1 tablet by mouth Every 12 (Twelve) Hours.    aspirin 81 MG EC tablet  Spouse/Significant Other Yes No    Take 1 tablet by mouth Daily.    atorvastatin (LIPITOR) 40 MG tablet   Yes No    Take 1 tablet by mouth Every Night.    carboxymethylcellulose (REFRESH PLUS) 0.5 % solution   Yes No    Administer 1 drop to both eyes 4 (Four) Times a Day.    Cholecalciferol 25 MCG (1000 UT) tablet   Yes No    Take 1 tablet by mouth Daily.    cyclobenzaprine (FLEXERIL) 5 MG tablet   No No    Take 1 tablet by mouth 2 (Two) Times a Day As Needed for Muscle Spasms.    ferrous sulfate 325 (65 FE) MG tablet   Yes No    Take 1 tablet by mouth 3 (Three) Times a Week. Monday, Wednesday, Friday.    finasteride (PROSCAR) 5 MG tablet   Yes No    Take 1 tablet by mouth Daily.    FLUoxetine (PROzac) 20 MG capsule   Yes No    Take 2 capsules by mouth Daily.    furosemide (LASIX) 40 MG tablet   Yes No    Take 1 tablet by mouth Daily.    glatiramer acetate (GLATOPA) 20 MG/ML injection   Yes No    Inject 1 mL under the skin into the appropriate area as directed Daily.    glipizide (Glucotrol) 5 MG tablet   No No    Take 1 tablet by mouth Daily for 30 days.    guaiFENesin (MUCINEX) 600 MG 12 hr tablet   Yes No    Take 2 tablets by mouth 2 (Two) Times a Day.    levothyroxine (SYNTHROID, LEVOTHROID) 100 MCG tablet   Yes No    Take 1 tablet by mouth Daily.    loperamide (IMODIUM) 2 MG capsule   Yes No    Take 1 capsule by mouth 2 (Two) Times a Day As Needed for Diarrhea.    metoprolol tartrate (LOPRESSOR) 50 MG tablet   No No    Take 1 tablet by mouth 2 (Two) Times a Day for 30 days.    midodrine (PROAMATINE) 5 MG tablet   Yes No    Take 1 tablet by  mouth 3 (Three) Times a Day Before Meals.    mirtazapine (REMERON) 15 MG tablet   Yes No    Take 0.5 tablets by mouth Every Night.    multivitamin with minerals tablet tablet   Yes No    Take 1 tablet by mouth Daily.    omeprazole (priLOSEC) 20 MG capsule  Spouse/Significant Other Yes No    Take 1 capsule by mouth Daily.    ondansetron (ZOFRAN) 8 MG tablet   Yes No    Take 1 tablet by mouth Every 8 (Eight) Hours As Needed for Nausea.    promethazine (PHENERGAN) 25 MG tablet   Yes No    Take 0.5 tablets by mouth Every 6 (Six) Hours As Needed for Nausea or Vomiting.    risperiDONE (risperDAL M-TABS) 0.25 MG tablet dispersible disintegrating tablet   Yes No    Place 1 tablet on the tongue Every Night.    sodium chloride (NS) 0.9 % irrigation   Yes No    Irrigate with  to the affected area as directed by provider Daily. For molina catheter    tamsulosin (FLOMAX) 0.4 MG capsule 24 hr capsule   Yes No    Take 2 capsules by mouth Every Night.              Allergies:  No Known Allergies    Objective      Vitals:   Temp:  [98.4 °F (36.9 °C)] 98.4 °F (36.9 °C)  Heart Rate:  [108] 108  Resp:  [17] 17  BP: (107)/(81) 107/81  There is no height or weight on file to calculate BMI.  Physical Exam  General Obese male no apparent distress  Head atraumatic normocephalic  Eyes pupils equal round reactive light ocular motion intact  Nares no discharge  Oropharynx membranes moist no erythema  Neck somewhat difficult exam because of his body habitus but could not appreciate any JVD  Pulmonary lungs were clear to auscultation bilaterally no accessory muscle use  Abdomen obese positive bowel sounds no guarding no rebound no distention cannot appreciate any hepatosplenomegaly but limited exam to body habitus  Extremities 2+ edema bilateral symmetric patient states this is his baseline  Derm he reports a sacral wound he has boots on both heels but no ulcers  Psych patient was alert and oriented x 4 appropriate mood and affect  Neuro cranial  nerves II through XII intact able to move all extremities no obvious focal neurologic deficit  Diagnostic Data:  Lab Results (last 24 hours)       ** No results found for the last 24 hours. **        White count was reported to be 15,200, potassium 3.2, glucose 236, and creatinine 1.26 with a BUN of 24     Imaging Results (Last 24 Hours)       ** No results found for the last 24 hours. **        ER physician reported that he had a ureteral stent that appeared infected on CT scan      Assessment & Plan        This is a 69 y.o. male with:    Active and Resolved Problems  Active Hospital Problems    Diagnosis  POA    **Sepsis without acute organ dysfunction [A41.9]  Yes     Priority: High    Complicated UTI (urinary tract infection) [N39.0]  Yes     Priority: High    Multiple sclerosis [G35]  Yes    Sacral ulcer [L98.429]  Yes    Paroxysmal atrial fibrillation [I48.0]  Yes    Hypercholesterolemia [E78.00]  Yes    S/P MVR (mitral valve replacement) [Z95.2]  Not Applicable    Non-insulin dependent type 2 diabetes mellitus [E11.9]  Yes    BPH with obstruction/lower urinary tract symptoms [N40.1, N13.8]  Yes      Resolved Hospital Problems   No resolved problems to display.       1.  Sepsis without organ dysfunction based on the outside ER of tachycardia heart rates in the 100s and a white blood cell count of 15,200 lactic at 2.4 and a source of infection ureteral stent.  Patient was given fluids and his heart rate improved and started on ceftriaxone.  Reviewed previous microbiology reason no resistant organism was identified.  2.  Complicated urinary tract infection/ureteral stent infected urology consulted Dr. Strickland  3.  Multiple sclerosis patient takes glatiramer injection every day we do not have this on formulary asked patient to have his wife bring it  4.  Sacral ulcer have consulted wound care  5.  Paroxysmal atrial fibrillation continue metoprolol 50 twice a day along with amiodarone of apixaban was in the morning  of January 9  6.  Hypercholesterolemia atorvastatin 40  7.  Status post mitral valve replacement noted  8.  Non-insulin-dependent type 2 diabetes have him on correction insulin now holding his oral medications  9.  BPH continuing Flomax and finasteride    Chronic anticoagulation secondary to atrial fibrillation apixaban is being held because of possible urologic procedure last dose January 9 in the morning  , Holding anticoagulation for possible procedure by urology last dose  VTE Prophylaxis:  Mechanical VTE prophylaxis orders are present.        The patient desires to be as follows:    CODE STATUS:    Level Of Support Discussed With: Patient  Code Status (Patient has no pulse and is not breathing): CPR (Attempt to Resuscitate)  Medical Interventions (Patient has pulse or is breathing): Full Support        Admission Status:  I believe this patient meets inpatient status.    Expected Length of Stay: 2    PDMP and Medication Dispenses via Sidebar reviewed and consistent with patient reported medications.    I discussed the patient's findings and my recommendations with patient and nursing staff.      Signature:     This document has been electronically signed by Ray Lee MD on January 10, 2025 01:57 EST   [facility] Hospitalist Team

## 2025-01-10 NOTE — OUTREACH NOTE
Medical Week 3 Survey      Flowsheet Row Responses   [facility] facility patient discharged from? [facility]   Does the patient have one of the following disease processes/diagnoses(primary or secondary)? Other   Week 3 attempt successful? No   Unsuccessful attempts Attempt 1   Revoke Readmitted            Yolette BARNETT - Registered Nurse

## 2025-01-10 NOTE — PLAN OF CARE
Goal Outcome Evaluation:         OT consulted with nursing and PT regarding pt's baseline in addition to calling pt's spouse, Debbi Hennessy. Spouse reports that pt has been bed bound for approximately 2 years and that other than medical concerns pt is basically at his functional baseline. He was requiring max A to dep A for all BADLs and continues to require this level of assistance. OT to sign off on orders as pt has not experienced a significant change from functional baseline. Please reach out if status changes and OT services are warranted.

## 2025-01-10 NOTE — PLAN OF CARE
Goal Outcome Evaluation:  Plan of Care Reviewed With: patient        Progress: no change  Outcome Evaluation: Pt resting quietly in bed, oriented X4 with minor forgetfulness. Tachy on EKG. Denies pain. Call light within reach, care continues.         Problem: Adult Inpatient Plan of Care  Goal: Plan of Care Review  Outcome: Progressing  Flowsheets (Taken 1/10/2025 0550)  Progress: no change  Outcome Evaluation: Pt resting quietly in bed, oriented X4 with minor forgetfulness. Tachy on EKG. Denies pain. Call light within reach, care continues.  Plan of Care Reviewed With: patient  Goal: Patient-Specific Goal (Individualized)  Outcome: Progressing  Goal: Absence of Hospital-Acquired Illness or Injury  Outcome: Progressing  Intervention: Identify and Manage Fall Risk  Recent Flowsheet Documentation  Taken 1/10/2025 0400 by Danya Sanchez RN  Safety Promotion/Fall Prevention:   activity supervised   assistive device/personal items within reach   clutter free environment maintained   fall prevention program maintained   nonskid shoes/slippers when out of bed   room organization consistent   safety round/check completed  Taken 1/10/2025 0200 by Danya Sanchez RN  Safety Promotion/Fall Prevention:   activity supervised   assistive device/personal items within reach   clutter free environment maintained   fall prevention program maintained   nonskid shoes/slippers when out of bed   room organization consistent   safety round/check completed  Taken 1/10/2025 0138 by Danya Sanchez, LORA  Safety Promotion/Fall Prevention:   activity supervised   assistive device/personal items within reach   clutter free environment maintained   fall prevention program maintained   nonskid shoes/slippers when out of bed   safety round/check completed   room organization consistent  Intervention: Prevent Skin Injury  Recent Flowsheet Documentation  Taken 1/10/2025 0400 by Danya Sanchez, RN  Body Position: weight shifting  Taken 1/10/2025 0138 by Danya Sanchez,  RN  Body Position:   right   turned  Skin Protection:   incontinence pads utilized   pulse oximeter probe site changed  Intervention: Prevent and Manage VTE (Venous Thromboembolism) Risk  Recent Flowsheet Documentation  Taken 1/10/2025 0138 by Danya Sanchez RN  VTE Prevention/Management:   bilateral   SCDs (sequential compression devices) off   patient refused intervention  Intervention: Prevent Infection  Recent Flowsheet Documentation  Taken 1/10/2025 0400 by Danya Sanchez RN  Infection Prevention:   hand hygiene promoted   personal protective equipment utilized   rest/sleep promoted   single patient room provided  Taken 1/10/2025 0200 by Danya Sanchez RN  Infection Prevention:   hand hygiene promoted   personal protective equipment utilized   rest/sleep promoted   single patient room provided  Taken 1/10/2025 0138 by Danya Sacnhez RN  Infection Prevention:   hand hygiene promoted   personal protective equipment utilized   rest/sleep promoted   single patient room provided  Goal: Optimal Comfort and Wellbeing  Outcome: Progressing  Intervention: Provide Person-Centered Care  Recent Flowsheet Documentation  Taken 1/10/2025 0400 by Danya Sanchez RN  Trust Relationship/Rapport:   care explained   thoughts/feelings acknowledged  Taken 1/10/2025 0138 by Danya Sanchez RN  Trust Relationship/Rapport:   care explained   thoughts/feelings acknowledged  Goal: Readiness for Transition of Care  Outcome: Progressing  Intervention: Mutually Develop Transition Plan  Recent Flowsheet Documentation  Taken 1/10/2025 0132 by Danya Sanchez RN  Transportation Anticipated: family or friend will provide  Patient/Family Anticipated Services at Transition: none  Patient/Family Anticipates Transition to: home with family  Taken 1/10/2025 0130 by Danya Sanchez RN  Equipment Currently Used at Home:   wheelchair, motorized   grab bar   shower chair     Problem: Violence Risk or Actual  Goal: Anger and Impulse Control  Outcome: Progressing  Intervention:  Minimize Safety Risk  Recent Flowsheet Documentation  Taken 1/10/2025 0400 by Danya Sanchez RN  Enhanced Safety Measures: bed alarm set  Taken 1/10/2025 0200 by Danya Sanchez RN  Enhanced Safety Measures: bed alarm set  Taken 1/10/2025 0138 by Danya Sanchez RN  Enhanced Safety Measures: bed alarm set  Intervention: Promote Self-Control  Recent Flowsheet Documentation  Taken 1/10/2025 0138 by Danya Sanchez RN  Supportive Measures:   self-care encouraged   relaxation techniques promoted     Problem: Skin Injury Risk Increased  Goal: Skin Health and Integrity  Outcome: Progressing  Intervention: Optimize Skin Protection  Recent Flowsheet Documentation  Taken 1/10/2025 0138 by Danya Sanchez RN  Pressure Reduction Techniques: weight shift assistance provided  Head of Bed (HOB) Positioning: HOB elevated  Pressure Reduction Devices:   pressure-redistributing mattress utilized   heel offloading device utilized  Skin Protection:   incontinence pads utilized   pulse oximeter probe site changed     Problem: Comorbidity Management  Goal: Blood Glucose Level Within Target Range  Outcome: Progressing  Intervention: Monitor and Manage Glycemia  Recent Flowsheet Documentation  Taken 1/10/2025 0400 by Danya Sanchez RN  Medication Review/Management: medications reviewed  Taken 1/10/2025 0200 by Danya Sanchez RN  Medication Review/Management: medications reviewed  Taken 1/10/2025 0138 by Danya Sanchez RN  Medication Review/Management: medications reviewed  Goal: Maintenance of Heart Failure Symptom Control  Outcome: Progressing  Intervention: Maintain Heart Failure Management  Recent Flowsheet Documentation  Taken 1/10/2025 0400 by Danya Sanchez RN  Medication Review/Management: medications reviewed  Taken 1/10/2025 0200 by Danya Sanchez RN  Medication Review/Management: medications reviewed  Taken 1/10/2025 0138 by Danya Sanchez RN  Medication Review/Management: medications reviewed  Goal: Blood Pressure in Desired Range  Outcome:  Progressing  Intervention: Maintain Blood Pressure Management  Recent Flowsheet Documentation  Taken 1/10/2025 0400 by Danya Sanchez RN  Medication Review/Management: medications reviewed  Taken 1/10/2025 0200 by Danya Sanchez RN  Medication Review/Management: medications reviewed  Taken 1/10/2025 0138 by Danya Sanchez RN  Medication Review/Management: medications reviewed  Goal: Maintenance of Osteoarthritis Symptom Control  Outcome: Progressing  Intervention: Maintain Osteoarthritis Symptom Control  Recent Flowsheet Documentation  Taken 1/10/2025 0400 by Danya Sanchez RN  Medication Review/Management: medications reviewed  Taken 1/10/2025 0200 by Danya Sanchez RN  Medication Review/Management: medications reviewed  Taken 1/10/2025 0138 by Danya Sanchez RN  Medication Review/Management: medications reviewed     Problem: Sepsis/Septic Shock  Goal: Optimal Coping  Outcome: Progressing  Intervention: Support Patient and Family Response  Recent Flowsheet Documentation  Taken 1/10/2025 0138 by Danya Sanchez RN  Supportive Measures:   self-care encouraged   relaxation techniques promoted  Goal: Absence of Bleeding  Outcome: Progressing  Goal: Blood Glucose Level Within Target Range  Outcome: Progressing  Goal: Absence of Infection Signs and Symptoms  Outcome: Progressing  Intervention: Initiate Sepsis Management  Recent Flowsheet Documentation  Taken 1/10/2025 0400 by Danya Sanchez RN  Infection Prevention:   hand hygiene promoted   personal protective equipment utilized   rest/sleep promoted   single patient room provided  Taken 1/10/2025 0200 by Danya Sanchez RN  Infection Prevention:   hand hygiene promoted   personal protective equipment utilized   rest/sleep promoted   single patient room provided  Taken 1/10/2025 0138 by Danya Sanchez RN  Infection Prevention:   hand hygiene promoted   personal protective equipment utilized   rest/sleep promoted   single patient room provided  Goal: Optimal Nutrition Delivery  Outcome: Progressing

## 2025-01-10 NOTE — LETTER
EMS Transport Request  For use at [facility] [place], [place], [place], and [place] only   Patient Name: Jamin Hennessy : 1955   Weight:(!) 138 kg (305 lb 1.9 oz) Pick-up Location: [facility] BLS/ALS: BLS/ALS: BLS   Insurance: [employer] MEDICARE REPLACEMENT Auth End Date: n/a   Pre-Cert #: D/C Summary complete:    Destination: Home How many stairs 0, Will the patient be on the main level Yes, Is there a ramp available No, Can the patient stand and pivot No, Address [address] [facility], [address] [place] [address], and Name/contact number for who will be present Debbi Hennessy, wife [phone]   Contact Precautions: Other Contact: VRE   Equipment (O2, Fluids, etc.): None   Arrive By Date/Time: anytime Stretcher/WC: Stretcher   CM Requesting: Ana Lilia Ortiz RN Ext: [phone]   Notes/Medical Necessity: 138kg, bedbound, [place] lift, Max assist, Dx of MS, left gluteal pressure injury.      ______________________________________________________________________    *Only 2 patient bags OR 1 carry-on size bag are permitted.  Wheelchairs and walkers CANNOT transported with the patient. Acknowledge: Yes

## 2025-01-10 NOTE — PLAN OF CARE
Problem: Adult Inpatient Plan of Care  Goal: Plan of Care Review  Outcome: Progressing  Flowsheets (Taken 1/10/2025 1305)  Progress: no change  Plan of Care Reviewed With: patient  Goal: Patient-Specific Goal (Individualized)  Outcome: Progressing  Goal: Absence of Hospital-Acquired Illness or Injury  Outcome: Progressing  Intervention: Identify and Manage Fall Risk  Recent Flowsheet Documentation  Taken 1/10/2025 1000 by Anna Gomez RN  Safety Promotion/Fall Prevention:   activity supervised   assistive device/personal items within reach   clutter free environment maintained   fall prevention program maintained   nonskid shoes/slippers when out of bed   room organization consistent   safety round/check completed  Taken 1/10/2025 0859 by Anna Gomez RN  Safety Promotion/Fall Prevention:   activity supervised   assistive device/personal items within reach   clutter free environment maintained   fall prevention program maintained   nonskid shoes/slippers when out of bed   room organization consistent   safety round/check completed  Intervention: Prevent Skin Injury  Recent Flowsheet Documentation  Taken 1/10/2025 0859 by Anna Gomez RN  Skin Protection: transparent dressing maintained  Intervention: Prevent Infection  Recent Flowsheet Documentation  Taken 1/10/2025 1000 by Anna Gomez RN  Infection Prevention: hand hygiene promoted  Taken 1/10/2025 0859 by Anna Gomez RN  Infection Prevention: hand hygiene promoted  Goal: Optimal Comfort and Wellbeing  Outcome: Progressing  Intervention: Provide Person-Centered Care  Recent Flowsheet Documentation  Taken 1/10/2025 0859 by Anna Gomez RN  Trust Relationship/Rapport:   care explained   thoughts/feelings acknowledged  Goal: Readiness for Transition of Care  Outcome: Progressing     Problem: Violence Risk or Actual  Goal: Anger and Impulse Control  Outcome: Progressing  Intervention: Minimize Safety Risk  Recent Flowsheet  Documentation  Taken 1/10/2025 1000 by Anna Gomez RN  Enhanced Safety Measures: bed alarm set  Taken 1/10/2025 0859 by Anna Gomez RN  Enhanced Safety Measures: bed alarm set  Intervention: Promote Self-Control  Recent Flowsheet Documentation  Taken 1/10/2025 0859 by Anna Gomez RN  Supportive Measures: self-care encouraged     Problem: Skin Injury Risk Increased  Goal: Skin Health and Integrity  Outcome: Progressing  Intervention: Optimize Skin Protection  Recent Flowsheet Documentation  Taken 1/10/2025 0859 by Anna Gomez RN  Activity Management: bedrest  Pressure Reduction Techniques:   frequent weight shift encouraged   weight shift assistance provided  Pressure Reduction Devices:   positioning supports utilized   pressure-redistributing mattress utilized  Skin Protection: transparent dressing maintained     Problem: Comorbidity Management  Goal: Blood Glucose Level Within Target Range  Outcome: Progressing  Intervention: Monitor and Manage Glycemia  Recent Flowsheet Documentation  Taken 1/10/2025 1000 by Anna Gomez RN  Medication Review/Management: medications reviewed  Taken 1/10/2025 0859 by Anna Gomez RN  Medication Review/Management: medications reviewed  Goal: Maintenance of Heart Failure Symptom Control  Outcome: Progressing  Intervention: Maintain Heart Failure Management  Recent Flowsheet Documentation  Taken 1/10/2025 1000 by Anna Gomez RN  Medication Review/Management: medications reviewed  Taken 1/10/2025 0859 by Anna Gomez RN  Medication Review/Management: medications reviewed  Goal: Blood Pressure in Desired Range  Outcome: Progressing  Intervention: Maintain Blood Pressure Management  Recent Flowsheet Documentation  Taken 1/10/2025 1000 by Anna Gomez RN  Medication Review/Management: medications reviewed  Taken 1/10/2025 0859 by Anna Gomez RN  Medication Review/Management: medications reviewed  Goal: Maintenance  of Osteoarthritis Symptom Control  Outcome: Progressing  Intervention: Maintain Osteoarthritis Symptom Control  Recent Flowsheet Documentation  Taken 1/10/2025 1000 by Anna Gomez RN  Medication Review/Management: medications reviewed  Taken 1/10/2025 0859 by Anna Gomez RN  Activity Management: bedrest  Medication Review/Management: medications reviewed     Problem: Sepsis/Septic Shock  Goal: Optimal Coping  Outcome: Progressing  Intervention: Support Patient and Family Response  Recent Flowsheet Documentation  Taken 1/10/2025 0859 by Anna Gomez RN  Supportive Measures: self-care encouraged  Family/Support System Care: self-care encouraged  Goal: Absence of Bleeding  Outcome: Progressing  Goal: Blood Glucose Level Within Target Range  Outcome: Progressing  Goal: Absence of Infection Signs and Symptoms  Outcome: Progressing  Intervention: Initiate Sepsis Management  Recent Flowsheet Documentation  Taken 1/10/2025 1000 by Anna Gomez RN  Infection Prevention: hand hygiene promoted  Taken 1/10/2025 0859 by Anna Gomez RN  Infection Prevention: hand hygiene promoted  Intervention: Promote Recovery  Recent Flowsheet Documentation  Taken 1/10/2025 0859 by Anna Gomez RN  Activity Management: bedrest  Goal: Optimal Nutrition Delivery  Outcome: Progressing   Goal Outcome Evaluation:  Plan of Care Reviewed With: patient        Progress: no change

## 2025-01-10 NOTE — PROGRESS NOTES
Nonbillable note  Patient seen and examined at bedside this morning.  Will order blood cultures for today. However he has already received a dose of Rocephin.  Seen by urology who stated to continue patient antibiotics for complicated UTI.  Will send an order for UA and urine cultures as well.  Urology stated that he will need eventual stent removal however treat UTI first

## 2025-01-10 NOTE — PLAN OF CARE
Goal Outcome Evaluation:    PT consulted with nursing regarding pt's baseline and OT discussed with spouse. Spouse reports that pt has been bed bound for approximately 2 years and is DEP for ADLs and [place] for transfers. PT will sign off at this time.

## 2025-01-10 NOTE — DISCHARGE PLACEMENT REQUEST
"Perri Eller (69 y.o. Male)       Date of Birth   1955    Social Security Number       Address   [address] [place] [address]    Home Phone   [phone]    MRN   [id number]       [place]   None    Marital Status                               Admission Date   1/10/25    Admission Type   Urgent    Admitting Provider   Ray Lee MD    Attending Provider   Wyatt Wilson MD    Department, Room/Bed   [facility], 213/1       Discharge Date       Discharge Disposition       Discharge Destination                                 Attending Provider: Wyatt Wilson MD    Allergies: No Known Allergies    Isolation: Contact   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)   Code Status: CPR    Ht: 177.8 cm (70\")   Wt: 138 kg (305 lb 1.9 oz)    Admission Cmt: None   Principal Problem: Sepsis without acute organ dysfunction [A41.9]                   Active Insurance as of 1/10/2025       Primary Coverage       Payor Plan Insurance Group Employer/Plan Group    HUMANA MEDICARE REPLACEMENT HUMANA MED ADV PPO [id number]       Payor Plan Address Payor Plan Phone Number Payor Plan Fax Number Effective Dates    [address] [address] [phone]  2/1/2020 - None Entered    [facility] [address]         Subscriber Name Subscriber Birth Date Member ID       PERRI ELLER 1955 [id number]               Secondary Coverage       Payor Plan Insurance Group Employer/Plan Group    [facility] CCN OPTUM        Payor Plan Address Payor Plan Phone Number Payor Plan Fax Number Effective Dates    [address] [phone]  1/1/2024 - None Entered    [facility] [address]         Subscriber Name Subscriber Birth Date Member ID       PERRI ELLER 1955 [id number]                     Emergency Contacts        (Rel.) Home Phone Work Phone Mobile Phone    RONIT ELLER (Spouse) [phone] -- [phone]    nursing,facility [phone] -- --      "         Insurance Information                  HUMANA MEDICARE REPLACEMENT/HUMANA MED ADV PPO Phone: [phone]    Subscriber: Jamin Hennessy Subscriber#: [id number]    Group#: [id number] Precert#: [id number]    Authorization#: -- Effective Date: --        [facility] OPTUM Phone: [phone]    Subscriber: Jamin Hennessy Subscriber#: [id number]    Group#: -- Precert#: [id number]    Authorization#: [id number] Effective Date: --

## 2025-01-10 NOTE — CONSULTS
"Diabetes Education  Assessment/Teaching    Patient Name:  Jamin Hennessy  YOB: 1955  MRN: [id number]  Admit Date:  1/10/2025      Assessment Date:  1/10/2025  Flowsheet Row Most Recent Value   General Information     Referral From: , MD order  [MD consult for blood glucose >200 and on 12/19/2024 A1c was 9.45%.]   Height 177.8 cm (70\")   Height Method Stated   Weight 138 kg (305 lb 1.9 oz)   Weight Method Bed scale   Pregnancy Assessment    Diabetes History    What type of diabetes do you have? Type 2   Length of Diabetes Diagnosis 10 + years  [Wife stated he was diagnosed about 10 years ago.]   Current DM knowledge fair   Do you test your blood sugar at home? no   Have you had high blood sugar? (>140mg/dl) yes   How often do you have high blood sugar? unknown   When was your last high blood sugar? Admission blood sugar 196   Education Preferences    What areas of diabetes would you like to learn about? avoiding high blood sugar, diabetes complications, testing my blood sugar at home   Nutrition Information    Assessment Topics    Problem Solving - Assessment Needs education   Reducing Risk - Assessment Needs education   Monitoring - Assessment Needs education   DM Goals    Problem Solving - Goal Today   Reducing Risk - Goal Today   Monitoring - Goal Today            Flowsheet Row Most Recent Value   DM Education Needs    Meter Needs meter   Problem Solving Hyperglycemia, Signs, Symptoms, Treatment   Reducing Risks A1C testing  [On 12/19/2024 A1c was 9.45%.]   Discharge Plan Home   Motivation Not interested   Teaching Method Discussion, Handouts   Patient Response Other (comment)  [Called wife on phone and had discussion with wife per patient's permission.]              Other Comments:  A1c info sheet given with discussion on A1c target and healthy blood sugar range. Patient sleepy and stated that wife handle his care at home. Called wife and she said that [facility] told [facility] didn't need to " check his blood sugar anymore. Explained to patient with his A1c result patient should be having his blood sugar checked regularly. Wife stated she would check with the [facility] nurse that comes to check on patient monthly. Wife stated that patient has been drinking about 6 regular Cokes a day. Explained to wife how sugared beverages affect the blood sugar and recommended unsweetened beverages. Wife stated patient's diabetes meds were stopped about a year ago due to low blood sugars. Wife stated she would ask the [facility] about [facility]s for patient's diabetes. Wife has no further questions or concerns related to diabetes at this time.        Electronically signed by:  Giulia Boyce RN  01/10/25 18:02 EST

## 2025-01-11 LAB
ALBUMIN SERPL-MCNC: 3.1 G/DL (ref 3.5–5.2)
ALBUMIN/GLOB SERPL: 0.9 G/DL
ALP SERPL-CCNC: 90 U/L (ref 39–117)
ALT SERPL W P-5'-P-CCNC: 23 U/L (ref 1–41)
ANION GAP SERPL CALCULATED.3IONS-SCNC: 9.2 MMOL/L (ref 5–15)
AST SERPL-CCNC: 24 U/L (ref 1–40)
BACTERIA SPEC AEROBE CULT: NORMAL
BILIRUB SERPL-MCNC: 0.3 MG/DL (ref 0–1.2)
BUN SERPL-MCNC: 23 MG/DL (ref 8–23)
BUN/CREAT SERPL: 18.5 (ref 7–25)
CALCIUM SPEC-SCNC: 8.8 MG/DL (ref 8.6–10.5)
CHLORIDE SERPL-SCNC: 104 MMOL/L (ref 98–107)
CO2 SERPL-SCNC: 28.8 MMOL/L (ref 22–29)
CREAT SERPL-MCNC: 1.24 MG/DL (ref 0.76–1.27)
DEPRECATED RDW RBC AUTO: 62.1 FL (ref 37–54)
EGFRCR SERPLBLD CKD-EPI 2021: 62.9 ML/MIN/1.73
ERYTHROCYTE [DISTWIDTH] IN BLOOD BY AUTOMATED COUNT: 19.3 % (ref 12.3–15.4)
GLOBULIN UR ELPH-MCNC: 3.5 GM/DL
GLUCOSE SERPL-MCNC: 218 MG/DL (ref 65–99)
HCT VFR BLD AUTO: 36.6 % (ref 37.5–51)
HGB BLD-MCNC: 11 G/DL (ref 13–17.7)
MCH RBC QN AUTO: 26.6 PG (ref 26.6–33)
MCHC RBC AUTO-ENTMCNC: 30.1 G/DL (ref 31.5–35.7)
MCV RBC AUTO: 88.6 FL (ref 79–97)
PLATELET # BLD AUTO: 187 10*3/MM3 (ref 140–450)
PMV BLD AUTO: 11 FL (ref 6–12)
POTASSIUM SERPL-SCNC: 3.8 MMOL/L (ref 3.5–5.2)
PROT SERPL-MCNC: 6.6 G/DL (ref 6–8.5)
RBC # BLD AUTO: 4.13 10*6/MM3 (ref 4.14–5.8)
SODIUM SERPL-SCNC: 142 MMOL/L (ref 136–145)
WBC NRBC COR # BLD AUTO: 7.42 10*3/MM3 (ref 3.4–10.8)

## 2025-01-11 PROCEDURE — 85027 COMPLETE CBC AUTOMATED: CPT

## 2025-01-11 PROCEDURE — 25010000002 CEFTRIAXONE PER 250 MG: Performed by: INTERNAL MEDICINE

## 2025-01-11 PROCEDURE — 80053 COMPREHEN METABOLIC PANEL: CPT

## 2025-01-11 RX ORDER — ACETAMINOPHEN 500 MG
500 TABLET ORAL ONCE
Status: COMPLETED | OUTPATIENT
Start: 2025-01-11 | End: 2025-01-11

## 2025-01-11 RX ADMIN — AMIODARONE HYDROCHLORIDE 200 MG: 200 TABLET ORAL at 09:24

## 2025-01-11 RX ADMIN — FINASTERIDE 5 MG: 5 TABLET, FILM COATED ORAL at 09:24

## 2025-01-11 RX ADMIN — SENNOSIDES AND DOCUSATE SODIUM 2 TABLET: 8.6; 5 TABLET ORAL at 20:30

## 2025-01-11 RX ADMIN — APIXABAN 5 MG: 5 TABLET, FILM COATED ORAL at 20:30

## 2025-01-11 RX ADMIN — CEFTRIAXONE SODIUM 2000 MG: 2 INJECTION, POWDER, FOR SOLUTION INTRAMUSCULAR; INTRAVENOUS at 01:44

## 2025-01-11 RX ADMIN — APIXABAN 5 MG: 5 TABLET, FILM COATED ORAL at 16:05

## 2025-01-11 RX ADMIN — MIRTAZAPINE 15 MG: 15 TABLET, FILM COATED ORAL at 20:31

## 2025-01-11 RX ADMIN — TAMSULOSIN HYDROCHLORIDE 0.4 MG: 0.4 CAPSULE ORAL at 20:31

## 2025-01-11 RX ADMIN — LEVOTHYROXINE SODIUM 100 MCG: 100 TABLET ORAL at 05:16

## 2025-01-11 RX ADMIN — METOPROLOL TARTRATE 50 MG: 50 TABLET, FILM COATED ORAL at 09:24

## 2025-01-11 RX ADMIN — PANTOPRAZOLE SODIUM 40 MG: 40 TABLET, DELAYED RELEASE ORAL at 05:16

## 2025-01-11 RX ADMIN — FLUOXETINE HYDROCHLORIDE 20 MG: 20 CAPSULE ORAL at 09:24

## 2025-01-11 RX ADMIN — Medication 10 ML: at 20:31

## 2025-01-11 RX ADMIN — METOPROLOL TARTRATE 50 MG: 50 TABLET, FILM COATED ORAL at 20:30

## 2025-01-11 RX ADMIN — ACETAMINOPHEN 500 MG: 500 TABLET, FILM COATED ORAL at 16:05

## 2025-01-11 NOTE — PLAN OF CARE
Problem: Adult Inpatient Plan of Care  Goal: Plan of Care Review  Outcome: Progressing  Goal: Patient-Specific Goal (Individualized)  Outcome: Progressing  Goal: Absence of Hospital-Acquired Illness or Injury  Outcome: Progressing  Intervention: Identify and Manage Fall Risk  Recent Flowsheet Documentation  Taken 1/11/2025 1400 by Jennifer Barbosa RN  Safety Promotion/Fall Prevention: safety round/check completed  Goal: Optimal Comfort and Wellbeing  Outcome: Progressing  Goal: Readiness for Transition of Care  Outcome: Progressing     Problem: Violence Risk or Actual  Goal: Anger and Impulse Control  Outcome: Progressing     Problem: Skin Injury Risk Increased  Goal: Skin Health and Integrity  Outcome: Progressing     Problem: Comorbidity Management  Goal: Blood Glucose Level Within Target Range  Outcome: Progressing  Goal: Maintenance of Heart Failure Symptom Control  Outcome: Progressing  Goal: Blood Pressure in Desired Range  Outcome: Progressing  Goal: Maintenance of Osteoarthritis Symptom Control  Outcome: Progressing     Problem: Sepsis/Septic Shock  Goal: Optimal Coping  Outcome: Progressing  Goal: Absence of Bleeding  Outcome: Progressing  Goal: Blood Glucose Level Within Target Range  Outcome: Progressing  Goal: Absence of Infection Signs and Symptoms  Outcome: Progressing  Goal: Optimal Nutrition Delivery  Outcome: Progressing     Problem: Fall Injury Risk  Goal: Absence of Fall and Fall-Related Injury  Outcome: Progressing  Intervention: Promote Injury-Free Environment  Recent Flowsheet Documentation  Taken 1/11/2025 1400 by Jennifer Barbosa RN  Safety Promotion/Fall Prevention: safety round/check completed   Goal Outcome Evaluation:

## 2025-01-11 NOTE — PROGRESS NOTES
[facility] MEDICINE SERVICE  DAILY PROGRESS NOTE    NAME: Jamin Hennessy  : 1955  MRN: [id number]      LOS: 1 day     PROVIDER OF SERVICE: Wyatt Wilson MD    Chief Complaint: Sepsis without acute organ dysfunction    Subjective:     Interval History:  History taken from: patient    Patient seen and examined at bedside this morning.  No acute complaints overnight        Review of Systems: Negative except described above  Review of Systems    Objective:     Vital Signs  Temp:  [97.6 °F (36.4 °C)-98.8 °F (37.1 °C)] 97.8 °F (36.6 °C)  Heart Rate:  [] 96  Resp:  [9-19] 13  BP: (104-127)/(75-91) 116/87  Flow (L/min) (Oxygen Therapy):  [2-3] 3   Body mass index is 43.78 kg/m².    Physical Exam  Physical Exam  Constitutional:       Comments: NAD    Cardiovascular:      Comments:  RRR, S1 & S2   Pulmonary:      Comments:  Lungs CTA   Abdominal:      Comments:  ABD soft, NT            Diagnostic Data    Results from last 7 days   Lab Units 01/10/25  1801 01/10/25  0227   WBC 10*3/mm3  --  11.08*   HEMOGLOBIN g/dL  --  12.8*   HEMATOCRIT %  --  42.5   PLATELETS 10*3/mm3  --  204   GLUCOSE mg/dL  --  196*   CREATININE mg/dL  --  1.24   BUN mg/dL  --  21   SODIUM mmol/L  --  139   POTASSIUM mmol/L 4.5 3.3*   ANION GAP mmol/L  --  13.7       CT Abdomen Pelvis Stone Protocol    Result Date: 1/10/2025  Impression: 1.There is an indwelling right ureteral stent which appears adequately positioned. Bilateral nonobstructive nephrolithiasis. No ureteral calculi are identified. 2.Left pleural effusion and left basilar atelectasis versus infiltrate, partially visualized. 3.Hepatic steatosis. 4.Postsurgical changes of gastric bypass. 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 5.Additional findings as detailed above. Electronically Signed: Aristides Polanco MD  1/10/2025 8:58 AM EST  Workstation ID: [id number]       I reviewed the patient's new clinical results.    Assessment/Plan:      Active and Resolved Problems  Active Hospital Problems    Diagnosis  POA    **Sepsis without acute organ dysfunction [A41.9]  Yes    Complicated UTI (urinary tract infection) [N39.0]  Yes    Multiple sclerosis [G35]  Yes    Sacral ulcer [L98.429]  Yes    Paroxysmal atrial fibrillation [I48.0]  Yes    Hypercholesterolemia [E78.00]  Yes    S/P MVR (mitral valve replacement) [Z95.2]  Not Applicable    Non-insulin dependent type 2 diabetes mellitus [E11.9]  Yes    BPH with obstruction/lower urinary tract symptoms [N40.1, N13.8]  Yes      Resolved Hospital Problems   No resolved problems to display.       1.  Sepsis without organ dysfunction based on the outside ER of tachycardia heart rates in the 100s and a white blood cell count of 15,200 lactic at 2.4 and a source of infection ureteral stent.  Patient was given fluids and his heart rate improved and started on ceftriaxone.  Reviewed previous microbiology reason no resistant organism was identified.  2.  Complicated urinary tract infection/ureteral stent infected, urology consulted and state right stenting correct position and no acute urological intervention needed at the moment and to continue antibiotics.  Blood culture and urine cultures pending  3.  Multiple sclerosis patient takes glatiramer injection every day we do not have this on formulary asked patient to have his wife bring it  4.  Sacral ulcer have consulted wound care  5.  Paroxysmal atrial fibrillation continue metoprolol 50 twice a day along with amiodarone of apixaban was in the morning of January 9.  Will resume Eliquis as no urological intervention planned as per urology  6.  Hypercholesterolemia atorvastatin 40  7.  Status post mitral valve replacement noted  8.  Non-insulin-dependent type 2 diabetes have him on correction insulin now holding his oral medications  9.  BPH continuing Flomax and finasteride       VTE Prophylaxis:  Mechanical VTE prophylaxis orders are present.              Disposition Planning:        Anticipated Date of Discharge: 1/16/2025         Time: 35 minutes     Code Status and Medical Interventions: CPR (Attempt to Resuscitate); Full Support   Ordered at: 01/10/25 0116     Level Of Support Discussed With:    Patient     Code Status (Patient has no pulse and is not breathing):    CPR (Attempt to Resuscitate)     Medical Interventions (Patient has pulse or is breathing):    Full Support       Signature: Electronically signed by Wyatt Wilson MD, 01/11/25, 12:24 [facility].  [facility], [place], [facility]

## 2025-01-11 NOTE — PLAN OF CARE
Goal Outcome Evaluation:         No new concerns overnight. No c/o discomfort. Able to make needs known. Sleeping between care. Call light in reach. IV ABT given as ordered.

## 2025-01-11 NOTE — PROGRESS NOTES
"[facility] Progress Note    Chief Complaint:  no problems          Vital Signs  /91 (BP Location: Left arm, Patient Position: Lying)   Pulse 96   Temp 97.6 °F (36.4 °C) (Oral)   Resp 17   Ht 177.8 cm (70\")   Wt (!) 138 kg (305 lb 1.9 oz)   SpO2 98%   BMI 43.78 kg/m²     Physical Exam:  No change     Results Review:      Lab Results (last 24 hours)       Procedure Component Value Units Date/Time    Potassium [[id number]]  (Normal) Collected: 01/10/25 1801    Specimen: Blood from Arm, Left Updated: 01/10/25 1831     Potassium 4.5 mmol/L      Comment: Specimen hemolyzed.  Result may be falsely elevated.       Urinalysis, Microscopic Only - Indwelling Urethral Catheter [[id number]]  (Abnormal) Collected: 01/10/25 1347    Specimen: Urine from Indwelling Urethral Catheter Updated: 01/10/25 1438     RBC, UA 21-50 /HPF      WBC, UA Too Numerous to Count /HPF      Bacteria, UA Trace /HPF      Squamous Epithelial Cells, UA 3-6 /HPF      Yeast, UA       Moderate/2+ Budding Yeast w/Hyphae     /HPF     Hyaline Casts, UA 7-12 /LPF      Methodology Manual Light Microscopy    Urine Culture - Urine, Indwelling Urethral Catheter [[id number]] Collected: 01/10/25 1347    Specimen: Urine from Indwelling Urethral Catheter Updated: 01/10/25 1438    Urinalysis With Culture If Indicated - Indwelling Urethral Catheter [[id number]]  (Abnormal) Collected: 01/10/25 1347    Specimen: Urine from Indwelling Urethral Catheter Updated: 01/10/25 1411     Color, UA Yellow     Appearance, UA Turbid     pH, UA 5.5     Specific Gravity, UA 1.013     Glucose, UA Negative     Ketones, UA Negative     Bilirubin, UA Negative     Blood, UA Large (3+)     Protein,  mg/dL (2+)     Leuk Esterase, UA Large (3+)     Nitrite, UA Negative     Urobilinogen, UA 1.0 E.U./dL    Narrative:      In absence of clinical symptoms, the presence of pyuria, bacteria, and/or nitrites on the urinalysis result does not correlate with infection.    Blood Culture - " Blood, Hand, Right [[id number]] Collected: 01/10/25 1201    Specimen: Blood from Hand, Right Updated: 01/10/25 1239          Imaging Results (Last 24 Hours)       Procedure Component Value Units Date/Time    CT Outside Films [[id number]] Resulted: 01/10/25 1145     Updated: 01/10/25 1145    Narrative:      This procedure was auto-finalized with no dictation required.    CT Abdomen Pelvis Stone Protocol [[id number]] Collected: 01/10/25 0844     Updated: 01/10/25 0900    Narrative:      CT ABDOMEN PELVIS STONE PROTOCOL    Date of Exam: 1/10/2025 8:41 AM EST    Indication: Ureteral calculi.    Comparison: CT of the abdomen and pelvis dated 1/15/2022    Technique: Axial CT images were obtained of the abdomen and pelvis without the administration of contrast. Sagittal and coronal reconstructions were performed.  Automated exposure control and iterative reconstruction methods were used.      Findings:    Liver: The liver is unremarkable in morphology and decreased in attenuation. Evaluation for focal liver lesions is limited without IV contrast. No biliary dilation is seen.    Gallbladder: Unremarkable.    Pancreas: Unremarkable.    Spleen: Unremarkable.    Adrenal glands: Unremarkable.    Genitourinary tract: There is an indwelling right ureteral stent which appears adequately positioned. There are several nonobstructing calculi within the right kidney measuring up to 7 mm. There are multiple nonobstructing calculi within the left kidney   measuring up to 12 mm. There is a 6 mm nonobstructing calculus within the left renal pelvis. No hydronephrosis is seen. No ureteral calculi are identified. Urinary bladder is partially decompressed with indwelling Perry catheter. Intraluminal air noted   within the bladder. Prostate gland is unremarkable.    Gastrointestinal tract: Postsurgical changes of gastric bypass. 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 No evidence of bowel obstruction. Moderate colonic stool  is present. Further evaluation of the hollow viscera is limited due to   the lack of IV contrast administration.    Appendix: No findings to suggest acute appendicitis.    Other findings: No free air or free fluid is identified. No pathologically enlarged lymph nodes are seen. Vascular calcifications are present.    Bones and soft tissues: No acute osseous lesion is identified. Bones are demineralized. There are degenerative changes within the spine, and there is grade 1 anterolisthesis at L4-L5. Partially visualized surgical hardware within the left femur. The   visualized superficial soft tissues demonstrate no acute abnormality. Some of the soft tissues extend outside the field-of-view of the study.    Lung bases: Left pleural effusion and left basilar atelectasis versus infiltrate, partially visualized. Right middle lobe granuloma. Coronary artery calcifications and mitral annulus calcification are seen.      Impression:      Impression:  1.There is an indwelling right ureteral stent which appears adequately positioned. Bilateral nonobstructive nephrolithiasis. No ureteral calculi are identified.  2.Left pleural effusion and left basilar atelectasis versus infiltrate, partially visualized.  3.Hepatic steatosis.  4.Postsurgical changes of gastric bypass. 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  5.Additional findings as detailed above.          Electronically Signed: Aristides Polanco MD    1/10/2025 8:58 AM EST    Workstation ID: [id number]            Medication Review:   I have personally reviewed    Current Facility-Administered Medications:     aluminum-magnesium hydroxide-simethicone (MAALOX MAX) 400-400-40 MG/5ML suspension 15 mL, 15 mL, Oral, Q6H PRN, Ray Lee MD    amiodarone (PACERONE) tablet 200 mg, 200 mg, Oral, Q24H, Ray Lee MD, 200 mg at 01/10/25 0918    sennosides-docusate (PERICOLACE) 8.6-50 MG per tablet 2 tablet, 2 tablet, Oral, BID, 2 tablet at 01/10/25 2009  **AND** polyethylene glycol (MIRALAX) packet 17 g, 17 g, Oral, Daily PRN **AND** bisacodyl (DULCOLAX) EC tablet 5 mg, 5 mg, Oral, Daily PRN **AND** bisacodyl (DULCOLAX) suppository 10 mg, 10 mg, Rectal, Daily PRN, Ray Lee MD    Calcium Replacement - Follow Nurse / BPA Driven Protocol, , Not Applicable, PRN, Wyatt Wilson MD    cefTRIAXone (ROCEPHIN) 2,000 mg in sodium chloride 0.9 % 100 mL MBP, 2,000 mg, Intravenous, Q24H, Ray Lee MD, Last Rate: 200 mL/hr at 01/11/25 0144, 2,000 mg at 01/11/25 0144    finasteride (PROSCAR) tablet 5 mg, 5 mg, Oral, Daily, Ray Lee MD, 5 mg at 01/10/25 1115    FLUoxetine (PROzac) capsule 20 mg, 20 mg, Oral, Daily, Ray Lee MD, 20 mg at 01/10/25 0918    ketorolac (TORADOL) injection 15 mg, 15 mg, Intravenous, Q6H PRN, Ray Lee MD    levothyroxine (SYNTHROID, LEVOTHROID) tablet 100 mcg, 100 mcg, Oral, Q AM, Ray Lee MD, 100 mcg at 01/11/25 0516    Magnesium Standard Dose Replacement - Follow Nurse / BPA Driven Protocol, , Not Applicable, PRN, Wyatt Wilson MD    metoprolol tartrate (LOPRESSOR) tablet 50 mg, 50 mg, Oral, Q12H, Ray Lee MD, 50 mg at 01/10/25 2009    mirtazapine (REMERON) tablet 15 mg, 15 mg, Oral, Nightly, Ray Lee MD, 15 mg at 01/10/25 2009    nitroglycerin (NITROSTAT) SL tablet 0.4 mg, 0.4 mg, Sublingual, Q5 Min PRN, Ray Lee MD    pantoprazole (PROTONIX) EC tablet 40 mg, 40 mg, Oral, Q AM, Ray Lee MD, 40 mg at 01/11/25 0516    Phosphorus Replacement - Follow Nurse / BPA Driven Protocol, , Not Applicable, PRSteve MCDANIEL Muhammad Haaris, MD    Potassium Replacement - Follow Nurse / BPA Driven Protocol, , Not Applicable, PRSteve MCDANIEL Muhammad Haaris, MD    sodium chloride 0.9 % flush 10 mL, 10 mL, Intravenous, Q12H, Ray Lee MD, 10 mL at 01/10/25 2009    sodium chloride 0.9 % flush 10 mL, 10 mL, Intravenous, PRN, Ray Lee MD    sodium chloride 0.9 %  infusion 40 mL, 40 mL, Intravenous, PRN, Ray Lee MD    tamsulosin (FLOMAX) 24 hr capsule 0.4 mg, 0.4 mg, Oral, Nightly, Ray Lee MD, 0.4 mg at 01/10/25 2009    Allergies:    Patient has no known allergies.    Assessment:    Active Problems:    Sepsis without acute organ dysfunction    Non-insulin dependent type 2 diabetes mellitus    BPH with obstruction/lower urinary tract symptoms    S/P MVR (mitral valve replacement)    Hypercholesterolemia    Paroxysmal atrial fibrillation    Complicated UTI (urinary tract infection)    Multiple sclerosis    Sacral ulcer      Baldo renal stones    Plan:    S/p right stent in good position  Will need to keep fu with Dr. Estrella  No urologic intervention needed  Will sign off       Mina Espino MD    1/11/2025  07:57 EST

## 2025-01-12 ENCOUNTER — READMISSION MANAGEMENT (OUTPATIENT)
Dept: CALL CENTER | Facility: HOSPITAL | Age: 70
End: 2025-01-12
Payer: MEDICARE

## 2025-01-12 VITALS
BODY MASS INDEX: 43.68 KG/M2 | HEIGHT: 70 IN | WEIGHT: 305.12 LBS | RESPIRATION RATE: 17 BRPM | TEMPERATURE: 97.7 F | HEART RATE: 81 BPM | OXYGEN SATURATION: 95 % | SYSTOLIC BLOOD PRESSURE: 121 MMHG | DIASTOLIC BLOOD PRESSURE: 81 MMHG

## 2025-01-12 LAB
ALBUMIN SERPL-MCNC: 3 G/DL (ref 3.5–5.2)
ALBUMIN/GLOB SERPL: 0.8 G/DL
ALP SERPL-CCNC: 84 U/L (ref 39–117)
ALT SERPL W P-5'-P-CCNC: 20 U/L (ref 1–41)
ANION GAP SERPL CALCULATED.3IONS-SCNC: 10.3 MMOL/L (ref 5–15)
AST SERPL-CCNC: 24 U/L (ref 1–40)
BILIRUB SERPL-MCNC: 0.3 MG/DL (ref 0–1.2)
BUN SERPL-MCNC: 22 MG/DL (ref 8–23)
BUN/CREAT SERPL: 19.1 (ref 7–25)
CALCIUM SPEC-SCNC: 8.8 MG/DL (ref 8.6–10.5)
CHLORIDE SERPL-SCNC: 105 MMOL/L (ref 98–107)
CO2 SERPL-SCNC: 27.7 MMOL/L (ref 22–29)
CREAT SERPL-MCNC: 1.15 MG/DL (ref 0.76–1.27)
DEPRECATED RDW RBC AUTO: 61.7 FL (ref 37–54)
EGFRCR SERPLBLD CKD-EPI 2021: 68.9 ML/MIN/1.73
ERYTHROCYTE [DISTWIDTH] IN BLOOD BY AUTOMATED COUNT: 19 % (ref 12.3–15.4)
GLOBULIN UR ELPH-MCNC: 3.7 GM/DL
GLUCOSE SERPL-MCNC: 156 MG/DL (ref 65–99)
HCT VFR BLD AUTO: 36 % (ref 37.5–51)
HGB BLD-MCNC: 10.6 G/DL (ref 13–17.7)
MCH RBC QN AUTO: 26 PG (ref 26.6–33)
MCHC RBC AUTO-ENTMCNC: 29.4 G/DL (ref 31.5–35.7)
MCV RBC AUTO: 88.2 FL (ref 79–97)
PLATELET # BLD AUTO: 188 10*3/MM3 (ref 140–450)
PMV BLD AUTO: 10.8 FL (ref 6–12)
POTASSIUM SERPL-SCNC: 3.8 MMOL/L (ref 3.5–5.2)
PROT SERPL-MCNC: 6.7 G/DL (ref 6–8.5)
RBC # BLD AUTO: 4.08 10*6/MM3 (ref 4.14–5.8)
SODIUM SERPL-SCNC: 143 MMOL/L (ref 136–145)
WBC NRBC COR # BLD AUTO: 6.87 10*3/MM3 (ref 3.4–10.8)

## 2025-01-12 PROCEDURE — 80053 COMPREHEN METABOLIC PANEL: CPT

## 2025-01-12 PROCEDURE — 25010000002 CEFTRIAXONE PER 250 MG: Performed by: INTERNAL MEDICINE

## 2025-01-12 PROCEDURE — 85027 COMPLETE CBC AUTOMATED: CPT

## 2025-01-12 RX ORDER — ACETAMINOPHEN 325 MG/1
650 TABLET ORAL EVERY 6 HOURS PRN
Status: DISCONTINUED | OUTPATIENT
Start: 2025-01-12 | End: 2025-01-12 | Stop reason: HOSPADM

## 2025-01-12 RX ORDER — CEFDINIR 300 MG/1
300 CAPSULE ORAL 2 TIMES DAILY
Qty: 8 CAPSULE | Refills: 0 | Status: SHIPPED | OUTPATIENT
Start: 2025-01-12 | End: 2025-01-22 | Stop reason: HOSPADM

## 2025-01-12 RX ADMIN — AMIODARONE HYDROCHLORIDE 200 MG: 200 TABLET ORAL at 10:06

## 2025-01-12 RX ADMIN — Medication 10 ML: at 10:07

## 2025-01-12 RX ADMIN — SENNOSIDES AND DOCUSATE SODIUM 2 TABLET: 8.6; 5 TABLET ORAL at 10:05

## 2025-01-12 RX ADMIN — ACETAMINOPHEN 650 MG: 325 TABLET, FILM COATED ORAL at 00:52

## 2025-01-12 RX ADMIN — PANTOPRAZOLE SODIUM 40 MG: 40 TABLET, DELAYED RELEASE ORAL at 05:33

## 2025-01-12 RX ADMIN — ACETAMINOPHEN 650 MG: 325 TABLET, FILM COATED ORAL at 11:18

## 2025-01-12 RX ADMIN — LEVOTHYROXINE SODIUM 100 MCG: 100 TABLET ORAL at 05:33

## 2025-01-12 RX ADMIN — CEFTRIAXONE SODIUM 2000 MG: 2 INJECTION, POWDER, FOR SOLUTION INTRAMUSCULAR; INTRAVENOUS at 01:43

## 2025-01-12 RX ADMIN — METOPROLOL TARTRATE 50 MG: 50 TABLET, FILM COATED ORAL at 10:05

## 2025-01-12 RX ADMIN — APIXABAN 5 MG: 5 TABLET, FILM COATED ORAL at 10:06

## 2025-01-12 RX ADMIN — FLUOXETINE HYDROCHLORIDE 20 MG: 20 CAPSULE ORAL at 10:06

## 2025-01-12 RX ADMIN — FINASTERIDE 5 MG: 5 TABLET, FILM COATED ORAL at 10:06

## 2025-01-12 NOTE — PLAN OF CARE
Goal Outcome Evaluation:  Plan of Care Reviewed With: patient        Progress: improving     Patient resting abed, plans for discharge home today via EMS, wife will be present at home, patient oxygen saturation is above 94% on room air, no pain noted, questions and concerns answered.       Problem: Adult Inpatient Plan of Care  Goal: Plan of Care Review  Outcome: Progressing  Flowsheets (Taken 1/12/2025 1210)  Progress: improving  Plan of Care Reviewed With: patient  Goal: Patient-Specific Goal (Individualized)  Outcome: Progressing  Goal: Absence of Hospital-Acquired Illness or Injury  Outcome: Progressing  Intervention: Identify and Manage Fall Risk  Recent Flowsheet Documentation  Taken 1/12/2025 1207 by Izabel Cordero RN  Safety Promotion/Fall Prevention: safety round/check completed  Taken 1/12/2025 1000 by Izabel Cordero RN  Safety Promotion/Fall Prevention: safety round/check completed  Taken 1/12/2025 0800 by Izabel Cordero RN  Safety Promotion/Fall Prevention:   activity supervised   fall prevention program maintained   mobility aid in reach   nonskid shoes/slippers when out of bed   safety round/check completed  Intervention: Prevent Infection  Recent Flowsheet Documentation  Taken 1/12/2025 0800 by Izabel Cordero RN  Infection Prevention: personal protective equipment utilized  Goal: Optimal Comfort and Wellbeing  Outcome: Progressing  Intervention: Provide Person-Centered Care  Recent Flowsheet Documentation  Taken 1/12/2025 0800 by Izabel Cordero RN  Trust Relationship/Rapport:   care explained   questions answered   questions encouraged   reassurance provided  Goal: Readiness for Transition of Care  Outcome: Progressing     Problem: Violence Risk or Actual  Goal: Anger and Impulse Control  Outcome: Progressing  Intervention: Minimize Safety Risk  Recent Flowsheet Documentation  Taken 1/12/2025 1000 by Izabel Cordero RN  Enhanced Safety Measures: bed alarm set  Taken 1/12/2025 0800 by Gil  LORA Paiz  Enhanced Safety Measures: bed alarm set     Problem: Skin Injury Risk Increased  Goal: Skin Health and Integrity  Outcome: Progressing  Intervention: Optimize Skin Protection  Recent Flowsheet Documentation  Taken 1/12/2025 0800 by Izabel Cordero RN  Pressure Reduction Techniques: frequent weight shift encouraged  Pressure Reduction Devices: pressure-redistributing mattress utilized     Problem: Comorbidity Management  Goal: Blood Glucose Level Within Target Range  Outcome: Progressing  Intervention: Monitor and Manage Glycemia  Recent Flowsheet Documentation  Taken 1/12/2025 0800 by Izabel Cordero RN  Medication Review/Management: medications reviewed  Goal: Maintenance of Heart Failure Symptom Control  Outcome: Progressing  Intervention: Maintain Heart Failure Management  Recent Flowsheet Documentation  Taken 1/12/2025 0800 by Izabel Cordero RN  Medication Review/Management: medications reviewed  Goal: Blood Pressure in Desired Range  Outcome: Progressing  Intervention: Maintain Blood Pressure Management  Recent Flowsheet Documentation  Taken 1/12/2025 0800 by Izabel Cordero RN  Medication Review/Management: medications reviewed  Goal: Maintenance of Osteoarthritis Symptom Control  Outcome: Progressing  Intervention: Maintain Osteoarthritis Symptom Control  Recent Flowsheet Documentation  Taken 1/12/2025 0800 by Izabel Cordero RN  Medication Review/Management: medications reviewed     Problem: Sepsis/Septic Shock  Goal: Optimal Coping  Outcome: Progressing  Goal: Absence of Bleeding  Outcome: Progressing  Goal: Blood Glucose Level Within Target Range  Outcome: Progressing  Goal: Absence of Infection Signs and Symptoms  Outcome: Progressing  Intervention: Initiate Sepsis Management  Recent Flowsheet Documentation  Taken 1/12/2025 0800 by Izabel Cordero RN  Infection Prevention: personal protective equipment utilized  Isolation Precautions:   contact   precautions maintained  Goal: Optimal  Nutrition Delivery  Outcome: Progressing     Problem: Fall Injury Risk  Goal: Absence of Fall and Fall-Related Injury  Outcome: Progressing  Intervention: Identify and Manage Contributors  Recent Flowsheet Documentation  Taken 1/12/2025 0800 by Izabel Cordero RN  Medication Review/Management: medications reviewed  Intervention: Promote Injury-Free Environment  Recent Flowsheet Documentation  Taken 1/12/2025 1207 by Izabel Cordero, RN  Safety Promotion/Fall Prevention: safety round/check completed  Taken 1/12/2025 1000 by Izabel Cordero, RN  Safety Promotion/Fall Prevention: safety round/check completed  Taken 1/12/2025 0800 by Izabel Cordero, RN  Safety Promotion/Fall Prevention:   activity supervised   fall prevention program maintained   mobility aid in reach   nonskid shoes/slippers when out of bed   safety round/check completed

## 2025-01-12 NOTE — DISCHARGE SUMMARY
"             [facility] Medicine Services  Discharge Summary    Date of Service: 2025  Patient Name: Jamin Hennessy  : 1955  MRN: [id number]    Date of Admission: 1/10/2025  Discharge Diagnosis: Sepsis without acute organ dysfunction  Date of Discharge: 2025  Primary Care Physician: Jhonny Heller MD      Presenting Problem:   Complicated UTI (urinary tract infection) [N39.0]    Active and Resolved Hospital Problems:  Active Hospital Problems    Diagnosis POA    **Sepsis without acute organ dysfunction [A41.9] Yes    Complicated UTI (urinary tract infection) [N39.0] Yes    Multiple sclerosis [G35] Yes    Sacral ulcer [L98.429] Yes    Paroxysmal atrial fibrillation [I48.0] Yes    Hypercholesterolemia [E78.00] Yes    S/P MVR (mitral valve replacement) [Z95.2] Not Applicable    Non-insulin dependent type 2 diabetes mellitus [E11.9] Yes    BPH with obstruction/lower urinary tract symptoms [N40.1, N13.8] Yes      Resolved Hospital Problems   No resolved problems to display.         Hospital Course     HPI:    \"Jamin Hennessy is a 69 y.o. male with a past medical history of morbid obesity, atrial fibrillation paroxysmal, type 2 diabetes non-insulin-dependent, hyperlipidemia, multiple sclerosis, hypothyroid, mixed hyperlipidemia, a former smoker,sacral ulcer and previous prosthetic pig valve mitral valve replaced.  The patient was in his usual state of health and he started experiencing some blood in his urine he did not report any pain.  But he went to the ER in [place].  CT scan was performed and showed that the ureteral stent appeared infected.  Dr. Kenneth Quispe contacted Dr. Strickland of urology who requested that the patient be transferred here and he would evaluate the patient in the morning.  The patient met sepsis criteria because his heart rates were in the 100 and he had a white count of 15,000.  He also had a mildly elevated lactic at 2.4.  He was started on antibiotics and given some fluids.  " "Currently he is afebrile and vital signs are stable on admission. \"    Hospital Course:  Sepsis without organ dysfunction based on the outside ER of tachycardia heart rates in the 100s and a white blood cell count of 15,200 lactic at 2.4 and a source of infection ureteral stent.  Patient was given fluids and his heart rate improved and started on ceftriaxone.  Reviewed previous microbiology reason no resistant organism was identified.  Blood cultures during this hospital which showed no growth to date and urine culture showed <25,000 nelson.  Will continue patient on cefdinir on discharge to complete course, he has received 3 days of IV Rocephin   2.  Complicated urinary tract infection/ureteral stent infected, urology consulted and state right stenting correct position and no acute urological intervention needed at the moment and to continue antibiotics.  CT abdomen/pelvis did not show any findings of pyelonephritis.  Will have him follow-up with urology outpatient  3.  Multiple sclerosis patient takes glatiramer injection every day we do not have this on formulary asked patient to have his wife bring it  4.  Sacral ulcer have consulted wound care  5.  Paroxysmal atrial fibrillation continue metoprolol 50 twice a day along with amiodarone of apixaban was in the morning of January 9.  Will resume Eliquis as no urological intervention planned as per urology  6.  Hypercholesterolemia atorvastatin 40  7.  Status post mitral valve replacement noted  8.  Non-insulin-dependent type 2 diabetes have him on correction insulin now holding his oral medications  9.  BPH continuing Flomax and finasteride        DISCHARGE Follow Up Recommendations for labs and diagnostics: Follow-up with urology and PCP in the next few days within 1 week        Day of Discharge     Vital Signs:  Temp:  [97.6 °F (36.4 °C)-98.8 °F (37.1 °C)] 97.7 °F (36.5 °C)  Heart Rate:  [] 94  Resp:  [13-14] 14  BP: (107-133)/(69-88) 127/69  Flow (L/min) " (Oxygen Therapy):  [3] 3    Physical Exam:  Physical Exam  Constitutional:       Comments: NAD    Cardiovascular:      Comments:  RRR, S1 & S2   Pulmonary:      Comments:  Lungs CTA   Abdominal:      Comments:  ABD soft, NT             Pertinent  and/or Most Recent Results     LAB RESULTS:      Lab 01/12/25  0250 01/11/25  1611 01/10/25  0227   WBC 6.87 7.42 11.08*   HEMOGLOBIN 10.6* 11.0* 12.8*   HEMATOCRIT 36.0* 36.6* 42.5   PLATELETS 188 187 204   NEUTROS ABS  --   --  8.82*   IMMATURE GRANS (ABS)  --   --  0.04   LYMPHS ABS  --   --  0.75   MONOS ABS  --   --  1.07*   EOS ABS  --   --  0.32   MCV 88.2 88.6 86.9         Lab 01/12/25  0250 01/11/25  1611 01/10/25  1801 01/10/25  0227   SODIUM 143 142  --  139   POTASSIUM 3.8 3.8 4.5 3.3*   CHLORIDE 105 104  --  100   CO2 27.7 28.8  --  25.3   ANION GAP 10.3 9.2  --  13.7   BUN 22 23  --  21   CREATININE 1.15 1.24  --  1.24   EGFR 68.9 62.9  --  62.9   GLUCOSE 156* 218*  --  196*   CALCIUM 8.8 8.8  --  8.7         Lab 01/12/25  0250 01/11/25  1611   TOTAL PROTEIN 6.7 6.6   ALBUMIN 3.0* 3.1*   GLOBULIN 3.7 3.5   ALT (SGPT) 20 23   AST (SGOT) 24 24   BILIRUBIN 0.3 0.3   ALK PHOS 84 90                     Brief Urine Lab Results  (Last result in the past 365 days)        Color   Clarity   Blood   Leuk Est   Nitrite   Protein   CREAT   Urine HCG        01/10/25 1347 Yellow   Turbid   Large (3+)   Large (3+)   Negative   100 mg/dL (2+)                 Microbiology Results (last 10 days)       Procedure Component Value - Date/Time    Urine Culture - Urine, Indwelling Urethral Catheter [[id number]] Collected: 01/10/25 1347    Lab Status: Final result Specimen: Urine from Indwelling Urethral Catheter Updated: 01/11/25 1231     Urine Culture <25,000 CFU/mL Mixed Cheryl Isolated    Narrative:      Specimen contains mixed organisms of questionable pathogenicity suggestive of contamination. If symptoms persist, suggest recollection.  Colonization of the urinary tract without  infection is common. Treatment is discouraged unless the patient is symptomatic, pregnant, or undergoing an invasive urologic procedure.    Blood Culture - Blood, Hand, Right [[id number]]  (Normal) Collected: 01/10/25 1201    Lab Status: Preliminary result Specimen: Blood from Hand, Right Updated: 01/11/25 1246     Blood Culture No growth at 24 hours            CT Abdomen Pelvis Stone Protocol    Result Date: 1/10/2025  Impression: Impression: 1.There is an indwelling right ureteral stent which appears adequately positioned. Bilateral nonobstructive nephrolithiasis. No ureteral calculi are identified. 2.Left pleural effusion and left basilar atelectasis versus infiltrate, partially visualized. 3.Hepatic steatosis. 4.Postsurgical changes of gastric bypass. 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 5.Additional findings as detailed above. Electronically Signed: Aristides Polanco MD  1/10/2025 8:58 AM EST  Workstation ID: [id number]     Results for orders placed during the hospital encounter of 02/27/20    Duplex Venous Lower Extremity - Bilateral CAR    Interpretation Summary  · Chronic left lower extremity superficial thrombophlebitis noted in the small saphenous.  · All other veins appeared normal bilaterally.      Results for orders placed during the hospital encounter of 02/27/20    Duplex Venous Lower Extremity - Bilateral CAR    Interpretation Summary  · Chronic left lower extremity superficial thrombophlebitis noted in the small saphenous.  · All other veins appeared normal bilaterally.      Results for orders placed during the hospital encounter of 09/24/24    Adult Transthoracic Echo Complete W/ Cont if Necessary Per Protocol    Interpretation Summary    Left ventricular systolic function is normal. Calculated left ventricular EF = 64.3% Left ventricular ejection fraction appears to be 61 - 65%.    Left ventricular wall thickness is consistent with mild concentric hypertrophy.    The left  atrial cavity is moderately dilated.    There is a bioprosthetic mitral valve present.    Estimated right ventricular systolic pressure from tricuspid regurgitation is normal (<35 mmHg).      Labs Pending at Discharge:  Pending Results       None            Procedures Performed           Consults:   Consults       Date and Time Order Name Status Description    1/10/2025  1:46 AM Inpatient Urology Consult Completed     12/20/2024 10:03 AM Inpatient Infectious Diseases Consult Completed     12/17/2024 10:11 AM Inpatient Urology Consult Completed               Discharge Details        Discharge Medications        ASK your doctor about these medications        Instructions Start Date   acetaminophen 325 MG tablet  Commonly known as: TYLENOL   650 mg, Every 8 Hours PRN      acetic acid 0.25 % irrigation   15 mL, Daily      amiodarone 200 MG tablet  Commonly known as: PACERONE   200 mg, Daily      apixaban 5 MG tablet tablet  Commonly known as: ELIQUIS   5 mg, Oral, Every 12 Hours Scheduled      aspirin 81 MG EC tablet   81 mg, Daily      carboxymethylcellulose 0.5 % solution  Commonly known as: REFRESH PLUS   1 drop, 4 Times Daily      cholecalciferol 25 MCG (1000 UT) tablet  Commonly known as: VITAMIN D3   1,000 Units, Daily      cyclobenzaprine 5 MG tablet  Commonly known as: FLEXERIL   5 mg, Oral, 2 Times Daily PRN      ferrous sulfate 325 (65 FE) MG tablet   325 mg, 3 Times Weekly      finasteride 5 MG tablet  Commonly known as: PROSCAR   5 mg, Daily      FLUoxetine 20 MG capsule  Commonly known as: PROzac   40 mg, Daily      furosemide 40 MG tablet  Commonly known as: LASIX   40 mg, Daily      glatiramer acetate 20 MG/ML injection  Commonly known as: GLATOPA   20 mg, Daily      glipizide 5 MG tablet  Commonly known as: Glucotrol   5 mg, Oral, Daily      guaiFENesin 600 MG 12 hr tablet  Commonly known as: MUCINEX   1,200 mg, 2 Times Daily      levothyroxine 100 MCG tablet  Commonly known as: SYNTHROID, LEVOTHROID    100 mcg, Daily      loperamide 2 MG capsule  Commonly known as: IMODIUM   2 mg, 2 Times Daily PRN      metoprolol tartrate 50 MG tablet  Commonly known as: LOPRESSOR   50 mg, Oral, 2 Times Daily      midodrine 5 MG tablet  Commonly known as: PROAMATINE   5 mg, 3 Times Daily Before Meals      mirtazapine 15 MG tablet  Commonly known as: REMERON   7.5 mg, Nightly      multivitamin with minerals tablet tablet   1 tablet, Daily      omeprazole 20 MG capsule  Commonly known as: priLOSEC   20 mg, Daily      ondansetron 8 MG tablet  Commonly known as: ZOFRAN   8 mg, Every 8 Hours PRN      promethazine 25 MG tablet  Commonly known as: PHENERGAN   12.5 mg, Every 6 Hours PRN      risperiDONE 0.25 MG tablet dispersible disintegrating tablet  Commonly known as: risperDAL M-TABS   0.25 mg, Nightly      sodium chloride 0.9 % irrigation  Commonly known as: NS   Daily      tamsulosin 0.4 MG capsule 24 hr capsule  Commonly known as: FLOMAX   2 capsules, Nightly               No Known Allergies      Discharge Disposition: home      Diet:  Hospital:  Diet Order   Procedures    Diet: Regular/House, Diabetic; Consistent Carbohydrate; Fluid Consistency: Thin (IDDSI 0)         Discharge Activity:         CODE STATUS:  Code Status and Medical Interventions: CPR (Attempt to Resuscitate); Full Support   Ordered at: 01/10/25 0116     Level Of Support Discussed With:    Patient     Code Status (Patient has no pulse and is not breathing):    CPR (Attempt to Resuscitate)     Medical Interventions (Patient has pulse or is breathing):    Full Support         No future appointments.        Time spent on Discharge including face to face service:  35 minutes    Signature: Electronically signed by Wyatt Wilson MD, 01/12/25, 08:46 EST.  [facility] Hospitalist Team

## 2025-01-12 NOTE — PLAN OF CARE
Goal Outcome Evaluation:         No  new concerns overnight. Able to make needs known. Call light in reach. Refuses to turn at times, weight shifting noted.

## 2025-01-12 NOTE — CASE MANAGEMENT/SOCIAL WORK
"Physicians Statement of Medical Necessity for  Ambulance Transportation    GENERAL INFORMATION     Name: Jamin Hennessy  YOB: 1955  Medicare #: [id number]   Transport Date: 1/12/25 (Valid for round trips this date, or for scheduled repetitive trips for 60 days from the date signed below.)  Origin: [facility]  Destination: Home: [address] [place] [address], [address]   [place], [place]  Is the Patient's stay covered under Medicare Part A (PPS/DRG?)Yes  Closest appropriate facility? Yes  If this a hosp-hosp transfer? No  Is this a hospice patient? No    MEDICAL NECESSITY QUESTIONAIRE    Ambulance Transportation is medically necessary only if other means of transportation are contraindicated or would be potentially harmful to the patient.  To meet this requirement, the patient must be either \"bed confined\" or suffer from a condition such that transport by means other than an ambulance is contraindicated by the patient's condition.  The following questions must be answered by the healthcare professional signing below for this form to be valid:     1) Describe the MEDICAL CONDITION (physical and/or mental) of this patient AT THE TIME OF AMBULANCE TRANSPORT that requires the patient to be transported in an ambulance, and why transport by other means is contraindicated by the patient's condition:     Bed bound, [place] lift, Max assist, dx of MS, left gluteal pressure injury.     Past Medical History:   Diagnosis Date    A-fib     CAD (coronary artery disease)     Elevated cholesterol     Hypertension     MI (myocardial infarction)     Non-insulin dependent type 2 diabetes mellitus       Past Surgical History:   Procedure Laterality Date    BRONCHOSCOPY N/A 3/4/2020    Procedure: BRONCHOSCOPY with bronchioalveolar lavage;  Surgeon: Melissa Cole MD;  Location: [facility] ENDOSCOPY;  Service: Pulmonary;  Laterality: N/A;   pneumonia    CARDIAC CATHETERIZATION      CARDIAC CATHETERIZATION N/A 1/20/2020    Procedure: Left " "Heart Cath;  Surgeon: Migdalia Beth MD;  Location: [facility] CATH INVASIVE LOCATION;  Service: Cardiovascular    CARDIAC CATHETERIZATION N/A 1/20/2020    Procedure: Left ventriculography;  Surgeon: Migdalia Beth MD;  Location: [facility] CATH INVASIVE LOCATION;  Service: Cardiovascular    CARDIAC CATHETERIZATION N/A 1/20/2020    Procedure: Coronary angiography;  Surgeon: Migdalia Beth MD;  Location: [facility] CATH INVASIVE LOCATION;  Service: Cardiovascular    CORONARY ANGIOPLASTY WITH STENT PLACEMENT      CYSTOSCOPY, URETEROSCOPY, RETROGRADE PYELOGRAM, STENT INSERTION Right 12/17/2024    Procedure: CYSTOSCOPY URETEROSCOPY RETROGRADE PYELOGRAM HOLMIUM LASER STENT INSERTION;  Surgeon: Jamin Estrella MD;  Location: [facility] MAIN OR;  Service: Urology;  Laterality: Right;    MITRAL VALVE REPAIR/REPLACEMENT N/A 1/22/2020    Procedure: MITRAL VALVE REPAIR/REPLACEMENT;  Surgeon: Fallon Cole MD;  Location: [facility] CVOR;  Service: Cardiothoracic;  Laterality: N/A;  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  3) Can this patient safely be transported by car or wheelchair van (I.e., may safely sit during transport, without an attendant or monitoring?)No   4. In addition to completing questions 1-3 above, please check any of the following conditions that apply*:          *Note: supporting documentation for any boxes checked must be maintained in the patient's medical records Unable to tolerate seated position for time needed to transport and Unable to sit in a chair or wheelchair due to decubitus ulcers or other wounds      SIGNATURE OF PHYSICIAN OR OTHER AUTHORIZED HEALTHCARE PROFESSIONAL    I certify that " the above information is true and correct based on my evaluation of this patient, and represent that the patient requires transport by ambulance and that other forms of transport are contraindicated.  I understand that this information will be used by the Centers for Medicare and Medicaid Services (CMS) to support the determiniation of medical necessity for ambulance services, and I represent that I have personal knowledge of the patient's condition at the time of transport.       If this box is checked, I also certify that the patient is physically or mentally incapable of signing the ambulance service's claim form and that the institution with which I am affiliated has furnished care, services or assistance to the patient.  My signature below is made on behalf of the patient pursuant to 42 .36(b)(4). In accordance with 42 .37, the specific reason(s) that the patient is physically or mentally incapable of signing the claim for is as follows: x    Signature of Physician or Healthcare Professional Ana Lilia Ortiz RN,  Date/Time:   1/12/25 1120     (For Scheduled repetitive transport, this form is not valid for transports performed more than 60 days after this date).                           Ana Lilia Ortiz RN                                                                                                                 --------------------------------------------------------------------------------------------  Printed Name and Credentials of Physician or Authorized Healthcare Professional     *Form must be signed by patient's attending physician for scheduled, repetitive transports,.  For non-repetitive ambulance transports, if unable to obtain the signature of the attending physician, any of the following may sign (please select below):     Physician  Clinical Nurse Specialist  Registered Nurse x    Physician Assistant  Discharge Planner  Licensed Practical Nurse     Nurse  Practitioner   x

## 2025-01-12 NOTE — CASE MANAGEMENT/SOCIAL WORK
Continued Stay Note  MINDI Sawyer     Patient Name: Jamin Hennessy  MRN: [id number]  Today's Date: 1/12/2025    Admit Date: 1/10/2025       Discharge Plan       Row Name 01/12/25 1822       Plan    Plan Comments CM requested HHC order from attending MD. Patient's oxygen weaned - on room air at time of discharge. Per attending, patient does not require walking oximetry. EMS stretcher requested and medical necessity form completed.               Ana Lilia Ortiz RN     Office: [phone]  Fax: [phone]

## 2025-01-12 NOTE — PROGRESS NOTES
"[facility] Progress Note    Chief Complaint:  no problems          Vital Signs  /78 (BP Location: Left arm, Patient Position: Lying)   Pulse 99   Temp 97.1 °F (36.2 °C) (Axillary)   Resp 16   Ht 177.8 cm (70\")   Wt (!) 138 kg (305 lb 1.9 oz)   SpO2 95%   BMI 43.78 kg/m²     Physical Exam:  No change     Results Review:      Lab Results (last 24 hours)       Procedure Component Value Units Date/Time    Comprehensive Metabolic Panel [[id number]]  (Abnormal) Collected: 01/12/25 0250    Specimen: Blood Updated: 01/12/25 0422     Glucose 156 mg/dL      BUN 22 mg/dL      Creatinine 1.15 mg/dL      Sodium 143 mmol/L      Potassium 3.8 mmol/L      Comment: Slight hemolysis detected by analyzer. Result may be falsely elevated.        Chloride 105 mmol/L      CO2 27.7 mmol/L      Calcium 8.8 mg/dL      Total Protein 6.7 g/dL      Albumin 3.0 g/dL      ALT (SGPT) 20 U/L      AST (SGOT) 24 U/L      Alkaline Phosphatase 84 U/L      Total Bilirubin 0.3 mg/dL      Globulin 3.7 gm/dL      A/G Ratio 0.8 g/dL      BUN/Creatinine Ratio 19.1     Anion Gap 10.3 mmol/L      eGFR 68.9 mL/min/1.73     Narrative:      GFR Categories in Chronic Kidney Disease (CKD)      GFR Category          GFR (mL/min/1.73)    Interpretation  G1                     90 or greater         Normal or high (1)  G2                      60-89                Mild decrease (1)  G3a                   45-59                Mild to moderate decrease  G3b                   30-44                Moderate to severe decrease  G4                    15-29                Severe decrease  G5                    14 or less           Kidney failure          (1)In the absence of evidence of kidney disease, neither GFR category G1 or G2 fulfill the criteria for CKD.    eGFR calculation 2021 CKD-EPI creatinine equation, which does not include race as a factor    CBC (No Diff) [[id number]]  (Abnormal) Collected: 01/12/25 0250    Specimen: Blood Updated: 01/12/25 0347     " WBC 6.87 10*3/mm3      RBC 4.08 10*6/mm3      Hemoglobin 10.6 g/dL      Hematocrit 36.0 %      MCV 88.2 fL      MCH 26.0 pg      MCHC 29.4 g/dL      RDW 19.0 %      RDW-SD 61.7 fl      MPV 10.8 fL      Platelets 188 10*3/mm3     Comprehensive Metabolic Panel [[id number]]  (Abnormal) Collected: 01/11/25 1611    Specimen: Blood Updated: 01/11/25 1754     Glucose 218 mg/dL      BUN 23 mg/dL      Creatinine 1.24 mg/dL      Sodium 142 mmol/L      Potassium 3.8 mmol/L      Chloride 104 mmol/L      CO2 28.8 mmol/L      Calcium 8.8 mg/dL      Total Protein 6.6 g/dL      Albumin 3.1 g/dL      ALT (SGPT) 23 U/L      AST (SGOT) 24 U/L      Alkaline Phosphatase 90 U/L      Total Bilirubin 0.3 mg/dL      Globulin 3.5 gm/dL      A/G Ratio 0.9 g/dL      BUN/Creatinine Ratio 18.5     Anion Gap 9.2 mmol/L      eGFR 62.9 mL/min/1.73     Narrative:      GFR Categories in Chronic Kidney Disease (CKD)      GFR Category          GFR (mL/min/1.73)    Interpretation  G1                     90 or greater         Normal or high (1)  G2                      60-89                Mild decrease (1)  G3a                   45-59                Mild to moderate decrease  G3b                   30-44                Moderate to severe decrease  G4                    15-29                Severe decrease  G5                    14 or less           Kidney failure          (1)In the absence of evidence of kidney disease, neither GFR category G1 or G2 fulfill the criteria for CKD.    eGFR calculation 2021 CKD-EPI creatinine equation, which does not include race as a factor    CBC (No Diff) [[id number]]  (Abnormal) Collected: 01/11/25 1611    Specimen: Blood Updated: 01/11/25 1739     WBC 7.42 10*3/mm3      RBC 4.13 10*6/mm3      Hemoglobin 11.0 g/dL      Hematocrit 36.6 %      MCV 88.6 fL      MCH 26.6 pg      MCHC 30.1 g/dL      RDW 19.3 %      RDW-SD 62.1 fl      MPV 11.0 fL      Platelets 187 10*3/mm3     Blood Culture - Blood, Hand, Right [[id number]]   (Normal) Collected: 01/10/25 1201    Specimen: Blood from Hand, Right Updated: 01/11/25 1246     Blood Culture No growth at 24 hours    Urine Culture - Urine, Indwelling Urethral Catheter [[id number]] Collected: 01/10/25 1347    Specimen: Urine from Indwelling Urethral Catheter Updated: 01/11/25 1231     Urine Culture <25,000 CFU/mL Mixed Cheryl Isolated    Narrative:      Specimen contains mixed organisms of questionable pathogenicity suggestive of contamination. If symptoms persist, suggest recollection.  Colonization of the urinary tract without infection is common. Treatment is discouraged unless the patient is symptomatic, pregnant, or undergoing an invasive urologic procedure.          Imaging Results (Last 24 Hours)       ** No results found for the last 24 hours. **            Medication Review:   I have personally reviewed    Current Facility-Administered Medications:     acetaminophen (TYLENOL) tablet 650 mg, 650 mg, Oral, Q6H PRN, Karlos Winchester MD, 650 mg at 01/12/25 0052    aluminum-magnesium hydroxide-simethicone (MAALOX MAX) 400-400-40 MG/5ML suspension 15 mL, 15 mL, Oral, Q6H PRN, Ray Lee MD    amiodarone (PACERONE) tablet 200 mg, 200 mg, Oral, Q24H, Ray Lee MD, 200 mg at 01/12/25 1006    apixaban (ELIQUIS) tablet 5 mg, 5 mg, Oral, Q12H, Wyatt Wilson MD, 5 mg at 01/12/25 1006    sennosides-docusate (PERICOLACE) 8.6-50 MG per tablet 2 tablet, 2 tablet, Oral, BID, 2 tablet at 01/12/25 1005 **AND** polyethylene glycol (MIRALAX) packet 17 g, 17 g, Oral, Daily PRN **AND** bisacodyl (DULCOLAX) EC tablet 5 mg, 5 mg, Oral, Daily PRN **AND** bisacodyl (DULCOLAX) suppository 10 mg, 10 mg, Rectal, Daily PRN, Ray Lee MD    Calcium Replacement - Follow Nurse / BPA Driven Protocol, , Not Applicable, PRN, Wyatt Wilson MD    cefTRIAXone (ROCEPHIN) 2,000 mg in sodium chloride 0.9 % 100 mL MBP, 2,000 mg, Intravenous, Q24H, Ray Lee MD, Last Rate: 200 mL/hr  at 01/12/25 0143, 2,000 mg at 01/12/25 0143    finasteride (PROSCAR) tablet 5 mg, 5 mg, Oral, Daily, Ray Lee MD, 5 mg at 01/12/25 1006    FLUoxetine (PROzac) capsule 20 mg, 20 mg, Oral, Daily, Ray Lee MD, 20 mg at 01/12/25 1006    levothyroxine (SYNTHROID, LEVOTHROID) tablet 100 mcg, 100 mcg, Oral, Q AM, Ray Lee MD, 100 mcg at 01/12/25 0533    Magnesium Standard Dose Replacement - Follow Nurse / BPA Driven Protocol, , Not Applicable, PRJONAS, Wyatt Wilson MD    metoprolol tartrate (LOPRESSOR) tablet 50 mg, 50 mg, Oral, Q12H, Ray Lee MD, 50 mg at 01/12/25 1005    mirtazapine (REMERON) tablet 15 mg, 15 mg, Oral, Nightly, Ray Lee MD, 15 mg at 01/11/25 2031    nitroglycerin (NITROSTAT) SL tablet 0.4 mg, 0.4 mg, Sublingual, Q5 Min PRN, Ray Lee MD    pantoprazole (PROTONIX) EC tablet 40 mg, 40 mg, Oral, Q AM, Ray Lee MD, 40 mg at 01/12/25 0533    Phosphorus Replacement - Follow Nurse / BPA Driven Protocol, , Not Applicable, PRSteve MCDANIEL Muhammad Haaris, MD    Potassium Replacement - Follow Nurse / BPA Driven Protocol, , Not Applicable, Steve BENITO Muhammad Haaris, MD    sodium chloride 0.9 % flush 10 mL, 10 mL, Intravenous, Q12H, Ray Lee MD, 10 mL at 01/12/25 1007    sodium chloride 0.9 % flush 10 mL, 10 mL, Intravenous, PRN, Ray Lee MD    sodium chloride 0.9 % infusion 40 mL, 40 mL, Intravenous, PRN, Ray Lee MD    tamsulosin (FLOMAX) 24 hr capsule 0.4 mg, 0.4 mg, Oral, Nightly, Ray Lee MD, 0.4 mg at 01/11/25 2031    Allergies:    Patient has no known allergies.    Assessment:    Active Problems:    Sepsis without acute organ dysfunction    Non-insulin dependent type 2 diabetes mellitus    BPH with obstruction/lower urinary tract symptoms    S/P MVR (mitral valve replacement)    Hypercholesterolemia    Paroxysmal atrial fibrillation    Complicated UTI (urinary tract infection)    Multiple sclerosis    Sacral  ulcer      Baldo renal stones    Plan:    S/p right stent in good position  Will need to keep fu with Dr. Estrella  No urologic intervention needed  Will sign off       Mina Espino MD    1/12/2025  10:59 EST

## 2025-01-12 NOTE — OUTREACH NOTE
Prep Survey      Flowsheet Row Responses   [facility] facility patient discharged from? [facility]   Is LACE score < 7 ? No   Eligibility Readm Mgmt   Discharge diagnosis Sepsis without acute organ dysfunction   Does the patient have one of the following disease processes/diagnoses(primary or secondary)? Sepsis   Does the patient have Home health ordered? Yes   What is the Home health agency?  [facility]   Prep survey completed? Yes            Heather ARROYO - Registered Nurse

## 2025-01-13 NOTE — CASE MANAGEMENT/SOCIAL WORK
Case Management Discharge Note      Final Note: Home with [facility] Medical Care Coordination complete.      Service Provider Services Address Phone Fax Patient Preferred    [facility] [address] [place] [address] [phone] --                      Transportation Services  Ambulance: [facility]    Final Discharge Disposition Code: 06 - home with home health care

## 2025-01-15 ENCOUNTER — APPOINTMENT (OUTPATIENT)
Dept: OTHER | Facility: HOSPITAL | Age: 70
End: 2025-01-15
Payer: OTHER GOVERNMENT

## 2025-01-15 ENCOUNTER — READMISSION MANAGEMENT (OUTPATIENT)
Dept: CALL CENTER | Facility: HOSPITAL | Age: 70
End: 2025-01-15
Payer: MEDICARE

## 2025-01-15 ENCOUNTER — HOSPITAL ENCOUNTER (INPATIENT)
Facility: HOSPITAL | Age: 70
LOS: 7 days | Discharge: HOME | End: 2025-01-22
Attending: INTERNAL MEDICINE | Admitting: STUDENT IN AN ORGANIZED HEALTH CARE EDUCATION/TRAINING PROGRAM
Payer: OTHER GOVERNMENT

## 2025-01-15 PROBLEM — R41.82 AMS (ALTERED MENTAL STATUS): Status: ACTIVE | Noted: 2025-01-15

## 2025-01-15 LAB
ANION GAP SERPL CALCULATED.3IONS-SCNC: 11.3 MMOL/L (ref 5–15)
BACTERIA SPEC AEROBE CULT: NORMAL
BACTERIA UR QL AUTO: ABNORMAL /HPF
BASOPHILS # BLD AUTO: 0.04 10*3/MM3 (ref 0–0.2)
BASOPHILS NFR BLD AUTO: 0.6 % (ref 0–1.5)
BILIRUB UR QL STRIP: NEGATIVE
BUN SERPL-MCNC: 20 MG/DL (ref 8–23)
BUN/CREAT SERPL: 15.2 (ref 7–25)
CALCIUM SPEC-SCNC: 8.6 MG/DL (ref 8.6–10.5)
CHLORIDE SERPL-SCNC: 102 MMOL/L (ref 98–107)
CLARITY UR: ABNORMAL
CO2 SERPL-SCNC: 26.7 MMOL/L (ref 22–29)
COLOR UR: ABNORMAL
CREAT SERPL-MCNC: 1.32 MG/DL (ref 0.76–1.27)
D-LACTATE SERPL-SCNC: 1.5 MMOL/L (ref 0.5–2)
D-LACTATE SERPL-SCNC: 2.7 MMOL/L (ref 0.5–2)
DEPRECATED RDW RBC AUTO: 61.3 FL (ref 37–54)
EGFRCR SERPLBLD CKD-EPI 2021: 58.4 ML/MIN/1.73
EOSINOPHIL # BLD AUTO: 0.13 10*3/MM3 (ref 0–0.4)
EOSINOPHIL NFR BLD AUTO: 2 % (ref 0.3–6.2)
ERYTHROCYTE [DISTWIDTH] IN BLOOD BY AUTOMATED COUNT: 19.4 % (ref 12.3–15.4)
GLUCOSE BLDC GLUCOMTR-MCNC: 148 MG/DL (ref 70–105)
GLUCOSE SERPL-MCNC: 177 MG/DL (ref 65–99)
GLUCOSE UR STRIP-MCNC: NEGATIVE MG/DL
HCT VFR BLD AUTO: 37 % (ref 37.5–51)
HGB BLD-MCNC: 10.9 G/DL (ref 13–17.7)
HGB UR QL STRIP.AUTO: ABNORMAL
HYALINE CASTS UR QL AUTO: ABNORMAL /LPF
IMM GRANULOCYTES # BLD AUTO: 0.05 10*3/MM3 (ref 0–0.05)
IMM GRANULOCYTES NFR BLD AUTO: 0.8 % (ref 0–0.5)
KETONES UR QL STRIP: NEGATIVE
LEUKOCYTE ESTERASE UR QL STRIP.AUTO: ABNORMAL
LYMPHOCYTES # BLD AUTO: 0.22 10*3/MM3 (ref 0.7–3.1)
LYMPHOCYTES NFR BLD AUTO: 3.5 % (ref 19.6–45.3)
MAGNESIUM SERPL-MCNC: 1.7 MG/DL (ref 1.6–2.4)
MCH RBC QN AUTO: 25.5 PG (ref 26.6–33)
MCHC RBC AUTO-ENTMCNC: 29.5 G/DL (ref 31.5–35.7)
MCV RBC AUTO: 86.7 FL (ref 79–97)
MONOCYTES # BLD AUTO: 0.71 10*3/MM3 (ref 0.1–0.9)
MONOCYTES NFR BLD AUTO: 11.2 % (ref 5–12)
MRSA DNA SPEC QL NAA+PROBE: ABNORMAL
NEUTROPHILS NFR BLD AUTO: 5.21 10*3/MM3 (ref 1.7–7)
NEUTROPHILS NFR BLD AUTO: 81.9 % (ref 42.7–76)
NITRITE UR QL STRIP: NEGATIVE
NRBC BLD AUTO-RTO: 0 /100 WBC (ref 0–0.2)
PH UR STRIP.AUTO: <=5 [PH] (ref 5–8)
PLATELET # BLD AUTO: 186 10*3/MM3 (ref 140–450)
PMV BLD AUTO: 10.1 FL (ref 6–12)
POTASSIUM SERPL-SCNC: 3.9 MMOL/L (ref 3.5–5.2)
PROCALCITONIN SERPL-MCNC: 0.16 NG/ML (ref 0–0.25)
PROT UR QL STRIP: ABNORMAL
RBC # BLD AUTO: 4.27 10*6/MM3 (ref 4.14–5.8)
RBC # UR STRIP: ABNORMAL /HPF
REF LAB TEST METHOD: ABNORMAL
SODIUM SERPL-SCNC: 140 MMOL/L (ref 136–145)
SP GR UR STRIP: 1.02 (ref 1–1.03)
SQUAMOUS #/AREA URNS HPF: ABNORMAL /HPF
UROBILINOGEN UR QL STRIP: ABNORMAL
WBC # UR STRIP: ABNORMAL /HPF
WBC NRBC COR # BLD AUTO: 6.36 10*3/MM3 (ref 3.4–10.8)

## 2025-01-15 PROCEDURE — 84145 PROCALCITONIN (PCT): CPT

## 2025-01-15 PROCEDURE — 81001 URINALYSIS AUTO W/SCOPE: CPT

## 2025-01-15 PROCEDURE — 25010000002 VANCOMYCIN PER 500 MG: Performed by: STUDENT IN AN ORGANIZED HEALTH CARE EDUCATION/TRAINING PROGRAM

## 2025-01-15 PROCEDURE — 80048 BASIC METABOLIC PNL TOTAL CA: CPT

## 2025-01-15 PROCEDURE — 82948 REAGENT STRIP/BLOOD GLUCOSE: CPT

## 2025-01-15 PROCEDURE — 87040 BLOOD CULTURE FOR BACTERIA: CPT

## 2025-01-15 PROCEDURE — 25810000003 SODIUM CHLORIDE 0.9 % SOLUTION 500 ML FLEX CONT: Performed by: STUDENT IN AN ORGANIZED HEALTH CARE EDUCATION/TRAINING PROGRAM

## 2025-01-15 PROCEDURE — 25010000002 CEFEPIME PER 500 MG

## 2025-01-15 PROCEDURE — 83605 ASSAY OF LACTIC ACID: CPT

## 2025-01-15 PROCEDURE — 85025 COMPLETE CBC W/AUTO DIFF WBC: CPT

## 2025-01-15 PROCEDURE — 25010000002 VANCOMYCIN 1 G RECONSTITUTED SOLUTION 1 EACH VIAL: Performed by: STUDENT IN AN ORGANIZED HEALTH CARE EDUCATION/TRAINING PROGRAM

## 2025-01-15 PROCEDURE — 25810000003 DEXTROSE 5 % AND SODIUM CHLORIDE 0.9 % 5-0.9 % SOLUTION

## 2025-01-15 PROCEDURE — 87899 AGENT NOS ASSAY W/OPTIC: CPT | Performed by: NURSE PRACTITIONER

## 2025-01-15 PROCEDURE — 87449 NOS EACH ORGANISM AG IA: CPT | Performed by: NURSE PRACTITIONER

## 2025-01-15 PROCEDURE — 83735 ASSAY OF MAGNESIUM: CPT

## 2025-01-15 PROCEDURE — 94761 N-INVAS EAR/PLS OXIMETRY MLT: CPT

## 2025-01-15 PROCEDURE — 87086 URINE CULTURE/COLONY COUNT: CPT

## 2025-01-15 PROCEDURE — 87641 MR-STAPH DNA AMP PROBE: CPT

## 2025-01-15 RX ORDER — POLYETHYLENE GLYCOL 3350 17 G/17G
17 POWDER, FOR SOLUTION ORAL DAILY PRN
Status: DISCONTINUED | OUTPATIENT
Start: 2025-01-15 | End: 2025-01-22 | Stop reason: HOSPADM

## 2025-01-15 RX ORDER — BISACODYL 5 MG/1
5 TABLET, DELAYED RELEASE ORAL DAILY PRN
Status: DISCONTINUED | OUTPATIENT
Start: 2025-01-15 | End: 2025-01-22 | Stop reason: HOSPADM

## 2025-01-15 RX ORDER — DEXTROSE MONOHYDRATE AND SODIUM CHLORIDE 5; .9 G/100ML; G/100ML
75 INJECTION, SOLUTION INTRAVENOUS CONTINUOUS
Status: DISCONTINUED | OUTPATIENT
Start: 2025-01-15 | End: 2025-01-16

## 2025-01-15 RX ORDER — SODIUM CHLORIDE 9 MG/ML
40 INJECTION, SOLUTION INTRAVENOUS AS NEEDED
Status: DISCONTINUED | OUTPATIENT
Start: 2025-01-15 | End: 2025-01-22 | Stop reason: HOSPADM

## 2025-01-15 RX ORDER — BISACODYL 10 MG
10 SUPPOSITORY, RECTAL RECTAL DAILY PRN
Status: DISCONTINUED | OUTPATIENT
Start: 2025-01-15 | End: 2025-01-22 | Stop reason: HOSPADM

## 2025-01-15 RX ORDER — ACETAMINOPHEN 325 MG/1
650 TABLET ORAL EVERY 4 HOURS PRN
Status: DISCONTINUED | OUTPATIENT
Start: 2025-01-15 | End: 2025-01-22 | Stop reason: HOSPADM

## 2025-01-15 RX ORDER — ONDANSETRON 2 MG/ML
4 INJECTION INTRAMUSCULAR; INTRAVENOUS EVERY 6 HOURS PRN
Status: DISCONTINUED | OUTPATIENT
Start: 2025-01-15 | End: 2025-01-22 | Stop reason: HOSPADM

## 2025-01-15 RX ORDER — ONDANSETRON 4 MG/1
4 TABLET, ORALLY DISINTEGRATING ORAL EVERY 6 HOURS PRN
Status: DISCONTINUED | OUTPATIENT
Start: 2025-01-15 | End: 2025-01-22 | Stop reason: HOSPADM

## 2025-01-15 RX ORDER — SODIUM CHLORIDE 0.9 % (FLUSH) 0.9 %
10 SYRINGE (ML) INJECTION EVERY 12 HOURS SCHEDULED
Status: DISCONTINUED | OUTPATIENT
Start: 2025-01-15 | End: 2025-01-22 | Stop reason: HOSPADM

## 2025-01-15 RX ORDER — ACETAMINOPHEN 160 MG/5ML
650 SOLUTION ORAL EVERY 4 HOURS PRN
Status: DISCONTINUED | OUTPATIENT
Start: 2025-01-15 | End: 2025-01-22 | Stop reason: HOSPADM

## 2025-01-15 RX ORDER — NICOTINE POLACRILEX 4 MG
15 LOZENGE BUCCAL
Status: DISCONTINUED | OUTPATIENT
Start: 2025-01-15 | End: 2025-01-22 | Stop reason: HOSPADM

## 2025-01-15 RX ORDER — SODIUM CHLORIDE 0.9 % (FLUSH) 0.9 %
10 SYRINGE (ML) INJECTION AS NEEDED
Status: DISCONTINUED | OUTPATIENT
Start: 2025-01-15 | End: 2025-01-22 | Stop reason: HOSPADM

## 2025-01-15 RX ORDER — IBUPROFEN 600 MG/1
1 TABLET ORAL
Status: DISCONTINUED | OUTPATIENT
Start: 2025-01-15 | End: 2025-01-22 | Stop reason: HOSPADM

## 2025-01-15 RX ORDER — DEXTROSE MONOHYDRATE 25 G/50ML
25 INJECTION, SOLUTION INTRAVENOUS
Status: DISCONTINUED | OUTPATIENT
Start: 2025-01-15 | End: 2025-01-22 | Stop reason: HOSPADM

## 2025-01-15 RX ORDER — ATORVASTATIN CALCIUM 40 MG/1
40 TABLET, FILM COATED ORAL NIGHTLY
COMMUNITY

## 2025-01-15 RX ORDER — ACETAMINOPHEN 650 MG/1
650 SUPPOSITORY RECTAL EVERY 4 HOURS PRN
Status: DISCONTINUED | OUTPATIENT
Start: 2025-01-15 | End: 2025-01-22 | Stop reason: HOSPADM

## 2025-01-15 RX ORDER — AMOXICILLIN 250 MG
2 CAPSULE ORAL 2 TIMES DAILY PRN
Status: DISCONTINUED | OUTPATIENT
Start: 2025-01-15 | End: 2025-01-22 | Stop reason: HOSPADM

## 2025-01-15 RX ORDER — INSULIN LISPRO 100 [IU]/ML
2-7 INJECTION, SOLUTION INTRAVENOUS; SUBCUTANEOUS EVERY 6 HOURS
Status: DISCONTINUED | OUTPATIENT
Start: 2025-01-15 | End: 2025-01-17

## 2025-01-15 RX ORDER — LANOLIN ALCOHOL/MO/W.PET/CERES
1000 CREAM (GRAM) TOPICAL DAILY
COMMUNITY

## 2025-01-15 RX ADMIN — CEFEPIME 2000 MG: 2 INJECTION, POWDER, FOR SOLUTION INTRAVENOUS at 19:45

## 2025-01-15 RX ADMIN — VANCOMYCIN HYDROCHLORIDE 2500 MG: 500 INJECTION, POWDER, LYOPHILIZED, FOR SOLUTION INTRAVENOUS at 20:53

## 2025-01-15 RX ADMIN — ACETAMINOPHEN 650 MG: 325 TABLET, FILM COATED ORAL at 18:00

## 2025-01-15 RX ADMIN — DEXTROSE AND SODIUM CHLORIDE 75 ML/HR: 5; 900 INJECTION, SOLUTION INTRAVENOUS at 19:42

## 2025-01-15 RX ADMIN — Medication 10 ML: at 20:00

## 2025-01-15 NOTE — PROGRESS NOTES
"Pharmacy Antimicrobial Dosing Service    Subjective:  Jamin Hennessy is a 69 y.o.male admitted with AMS. Pharmacy has been consulted to dose Vancomycin for possible empiric, pneumonia.    PMH: AMS    MRSA swab pending      Assessment/Plan    1. Day #1 Vancomycin: Goal -600 mcg*h/mL.   Patient to receive a loading dose of vancomycin 2500 mg IV x 1 (~21.9 mg/kg ABW).  Will start on a maintenance dose of vancomycin 1000 mg IV q12h (~ 11.19 mg/kg AdjBW mg/kg), giving a predicted steady state AUC of 559 mg/L*hr  Vancomycin peak and trough scheduled for 11/17 prior to the 3rd maintenance dose.    2. Day #1 Cefepime: 2000 mg IV q8h for estCrCl > 60 mL/min.    Will continue to monitor drug levels, renal function, culture and sensitivities, and patient clinical status.       Objective:  Relevant clinical data and objective history reviewed:  177.8 cm (70\")   114 kg (251 lb 5.2 oz)   Ideal body weight: 73 kg (160 lb 15 oz)  Adjusted ideal body weight: 89.4 kg (197 lb 1.5 oz)  Body mass index is 36.06 kg/m².        Results from last 7 days   Lab Units 01/15/25  1630 01/12/25  0250 01/11/25  1611   CREATININE mg/dL 1.32* 1.15 1.24     Estimated Creatinine Clearance: 66.8 mL/min (A) (by C-G formula based on SCr of 1.32 mg/dL (H)).  No intake/output data recorded.    Results from last 7 days   Lab Units 01/15/25  1630 01/12/25  0250 01/11/25  1611   WBC 10*3/mm3 6.36 6.87 7.42     Temperature    01/15/25 1523   Temp: (!) 102.1 °F (38.9 °C)     Baseline culture/source/susceptibility:  Microbiology Results (last 10 days)       Procedure Component Value - Date/Time    Urine Culture - Urine, Indwelling Urethral Catheter [[id number]] Collected: 01/10/25 1347    Lab Status: Final result Specimen: Urine from Indwelling Urethral Catheter Updated: 01/11/25 1231     Urine Culture <25,000 CFU/mL Mixed Cheryl Isolated    Narrative:      Specimen contains mixed organisms of questionable pathogenicity suggestive of contamination. If " symptoms persist, suggest recollection.  Colonization of the urinary tract without infection is common. Treatment is discouraged unless the patient is symptomatic, pregnant, or undergoing an invasive urologic procedure.    Blood Culture - Blood, Hand, Right [[id number]]  (Normal) Collected: 01/10/25 1201    Lab Status: Final result Specimen: Blood from Hand, Right Updated: 01/15/25 1245     Blood Culture No growth at 5 days          Milton Tay [facility]  01/15/25 18:16 EST

## 2025-01-15 NOTE — H&P
[facility] Medicine Services  History & Physical    Patient Name: Jamin Hennessy  : 1955  MRN: [id number]  Primary Care Physician:  Jhonny Heller MD  Date of admission: 1/15/2025  Date and Time of Service: 1/15/2025 at 1601    Subjective      Chief Complaint: Altered mental status    History of Present Illness: Jamin Hennessy is a 69 y.o. male with a CMH of MS, paroxysmal A-fib, coronary artery disease, type 2 diabetes mellitus, status post MVR, HLD, hypertension, GERD who presented to [facility] on 1/15/2025 from [facility] with altered mental status.  Patient is currently a poor historian given acuity of condition.  No family at bedside.  Per reports from OSH, patient was confused, acting bizarre and having visual hallucinations.  No reports of fall or head injury. Of note, patient was admitted on 1/10/2025 to 2025 for sepsis in the setting of complicated UTI in the presence of ureteral stent.  Patient improved with IV Rocephin and was discharged home with cefdinir.  Per nursing reports, wife did not obtain antibiotics once discharged home.    On OSH evaluation, patient was noted to be borderline hypotensive with systolics in the 80-90s.  Labs are pertinent for WBC 7.5.  Lactic acid was 2.2.  UA with moderate leukocytes and 11-20 WBCs..  Respiratory panel was positive for COVID and flu.  CT head with no evidence of acute findings.  CT chest revealed moderate left-sided pleural effusion as well as consolidation in the left lower lobe and lingula.  CT abdomen pelvis also done with incidental findings of pneumoperitoneum in the left upper quadrant as well as moderate to large colonic stool burden, right double-J ureteral stent with similar positioning experience. Patient was given 3 L IV fluids with good response of blood pressure.  Patient also given ampicillin as well as Tamiflu. Given recent admission, [facility] was transferred to [facility] [place] [facility] to hospital service for further  evaluation management of sepsis, COVID and influenza infection.      Review of Systems   Unable to perform ROS: Acuity of condition       Personal History     Past Medical History:   Diagnosis Date    A-fib     CAD (coronary artery disease)     Elevated cholesterol     Hypertension     MI (myocardial infarction)     Non-insulin dependent type 2 diabetes mellitus        Past Surgical History:   Procedure Laterality Date    BRONCHOSCOPY N/A 3/4/2020    Procedure: BRONCHOSCOPY with bronchioalveolar lavage;  Surgeon: Melissa Cole MD;  Location: [facility] ENDOSCOPY;  Service: Pulmonary;  Laterality: N/A;   pneumonia    CARDIAC CATHETERIZATION      CARDIAC CATHETERIZATION N/A 1/20/2020    Procedure: Left Heart Cath;  Surgeon: Migdalia Beth MD;  Location: [facility] CATH INVASIVE LOCATION;  Service: Cardiovascular    CARDIAC CATHETERIZATION N/A 1/20/2020    Procedure: Left ventriculography;  Surgeon: Migdalia Beth MD;  Location: [facility] CATH INVASIVE LOCATION;  Service: Cardiovascular    CARDIAC CATHETERIZATION N/A 1/20/2020    Procedure: Coronary angiography;  Surgeon: Migdalia Beth MD;  Location: [facility] CATH INVASIVE LOCATION;  Service: Cardiovascular    CORONARY ANGIOPLASTY WITH STENT PLACEMENT      CYSTOSCOPY, URETEROSCOPY, RETROGRADE PYELOGRAM, STENT INSERTION Right 12/17/2024    Procedure: CYSTOSCOPY URETEROSCOPY RETROGRADE PYELOGRAM HOLMIUM LASER STENT INSERTION;  Surgeon: Jamin Estrella MD;  Location: [facility] MAIN OR;  Service: Urology;  Laterality: Right;    MITRAL VALVE REPAIR/REPLACEMENT N/A 1/22/2020    Procedure: MITRAL VALVE REPAIR/REPLACEMENT;  Surgeon: Fallon Cole MD;  Location: [facility] CVOR;  Service: Cardiothoracic;  Laterality: N/A;  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 Otherwise pertinent FHx was reviewed and not pertinent to current issue.    Social History:   reports that he has been smoking cigarettes. His smokeless tobacco use includes chew. He reports that he does not currently use alcohol. He reports that he does not use drugs.    Home Medications:  Prior to Admission Medications       Prescriptions Last Dose Informant Patient Reported? Taking?    acetaminophen (TYLENOL) 325 MG tablet   Yes No    Take 2 tablets by mouth Every 8 (Eight) Hours As Needed for Mild Pain.    acetic acid 0.25 % irrigation   Yes No    Irrigate with 15 mL to the affected area as directed by provider Daily.    amiodarone (PACERONE) 200 MG tablet   Yes No    Take 1 tablet by mouth Daily.    apixaban (ELIQUIS) 5 MG tablet tablet   No No    Take 1 tablet by mouth Every 12 (Twelve) Hours.    aspirin 81 MG EC tablet  Spouse/Significant Other Yes No    Take 1 tablet by mouth Daily.    carboxymethylcellulose (REFRESH PLUS) 0.5 % solution   Yes No    Administer 1 drop to both eyes 4 (Four) Times a Day.    cefdinir (OMNICEF) 300 MG capsule   No No    Take 1 capsule by mouth 2 (Two) Times a Day for 4 days.    Cholecalciferol 25 MCG (1000 UT) tablet   Yes No    Take 1 tablet by mouth Daily.    cyclobenzaprine (FLEXERIL) 5 MG tablet   No No    Take 1 tablet by mouth 2 (Two) Times a Day As Needed for Muscle Spasms.    ferrous sulfate 325 (65 FE) MG tablet   Yes No    Take 1 tablet by mouth 3 (Three) Times a Week. Monday, Wednesday, Friday.    finasteride (PROSCAR) 5 MG tablet   Yes No    Take 1 tablet by mouth Daily.    FLUoxetine (PROzac) 20 MG capsule   Yes No    Take 2 capsules by mouth Daily.    furosemide (LASIX) 40 MG tablet   Yes No    Take 1 tablet by mouth Daily.    glatiramer acetate (GLATOPA) 20 MG/ML injection   Yes No    Inject 1 mL under the skin into the appropriate area as directed Daily.    glipizide (Glucotrol) 5 MG tablet   No No    Take 1 tablet by mouth Daily for 30 days.    guaiFENesin (MUCINEX) 600 MG 12 hr tablet   Yes No    Take 2 tablets by mouth 2 (Two) Times a Day.     levothyroxine (SYNTHROID, LEVOTHROID) 100 MCG tablet   Yes No    Take 1 tablet by mouth Daily.    loperamide (IMODIUM) 2 MG capsule   Yes No    Take 1 capsule by mouth 2 (Two) Times a Day As Needed for Diarrhea.    metoprolol tartrate (LOPRESSOR) 50 MG tablet   No No    Take 1 tablet by mouth 2 (Two) Times a Day for 30 days.    midodrine (PROAMATINE) 5 MG tablet   Yes No    Take 1 tablet by mouth 3 (Three) Times a Day Before Meals.    mirtazapine (REMERON) 15 MG tablet   Yes No    Take 0.5 tablets by mouth Every Night.    multivitamin with minerals tablet tablet   Yes No    Take 1 tablet by mouth Daily.    omeprazole (priLOSEC) 20 MG capsule  Spouse/Significant Other Yes No    Take 1 capsule by mouth Daily.    ondansetron (ZOFRAN) 8 MG tablet   Yes No    Take 1 tablet by mouth Every 8 (Eight) Hours As Needed for Nausea.    promethazine (PHENERGAN) 25 MG tablet   Yes No    Take 0.5 tablets by mouth Every 6 (Six) Hours As Needed for Nausea or Vomiting.    risperiDONE (risperDAL M-TABS) 0.25 MG tablet dispersible disintegrating tablet   Yes No    Place 1 tablet on the tongue Every Night.    sodium chloride (NS) 0.9 % irrigation   Yes No    Irrigate with  to the affected area as directed by provider Daily. For molina catheter    tamsulosin (FLOMAX) 0.4 MG capsule 24 hr capsule   Yes No    Take 2 capsules by mouth Every Night.              Allergies:  No Known Allergies    Objective      Vitals:   Temp:  [102.1 °F (38.9 °C)] 102.1 °F (38.9 °C)  Heart Rate:  [118] 118  Resp:  [27] 27  BP: (132)/(85) 132/85  There is no height or weight on file to calculate BMI.    Physical Exam  General: 70 yo WM, Alert and oriented x3, obese, no acute distress.  HENT: Normocephalic, normal hearing, moist oral mucosa, no scleral icterus.  Neck: Supple, nontender, no carotid bruits, no JVD, no LAD.  Lungs: Coarse rhonchi on left, nonlabored respiration.  Heart: RRR, no murmur, gallop  Abdomen: Soft, nontender, nondistended, + bowel  sounds.  Musculoskeletal: 3+ BLE pitting edema. Normal range of motion and strength, no tenderness.  Skin: Skin is warm, dry and pink, no rashes or lesions.  Psychiatric: Cooperative, appropriate mood and affect.      Diagnostic Data:  Lab Results (last 24 hours)       ** No results found for the last 24 hours. **             Imaging Results (Last 24 Hours)       ** No results found for the last 24 hours. **              Assessment & Plan        This is a 69 y.o. male with:    Active and Resolved Problems  Active Hospital Problems    Diagnosis  POA    **AMS (altered mental status) [R41.82]  Yes      Resolved Hospital Problems   No resolved problems to display.     Sepsis  Pneumonia  Left pleural effusion  Acute respiratory failure with hypoxia  COVID infection  Influenza infection  Altered mental status  On 2L NC, b/l RA  COVID and flu positive  CT chest with moderate left pleural effusion, left lower lobe and lingula consolidation  WBC 6.36, LA 2.4, repeat lactic acid 2.7 and Pro-Suman 0.16  SIRS criteria: source (UTI/PNA), HR> 90, febrile   Received ampicillin at OSH, changed to cefepime and add vancomycin for HCAP coverage given recent hospitalization  MRSA screen pending  N.p.o. lethargic, D5 normal saline  Tylenol as needed for fever  Fall precautions  ID consulted  Pulmonology consulted    Urinary tract infection   History of recent UTI in setting of right ureteral stent  Was on cefdinir 300 mg twice daily until 1/16/2025, wife did not obtain from pharmacy  UA at OSH with 11-20 WBCs and large leukocyte esterase, recently admitted for UTI  Repeat UA, previous cultures contaminated  Urology evaluated patient during previous admission, no urology intervention    Pneumoperitoneum  CT abdomen pelvis from OSH incidentally notes 1 tiny bubble of pneumoperitoneum in the left upper quadrant, uncertain etiology  General Surgery consulted     Paroxysmal A-fib  Currently sinus tachycardia in the setting of fever  Continue  amiodarone and Eliquis once reconciled  Telemetry     Chronic hypotension-BP soft at outside facility, patient is on midodrine, continue   CAD/hyperlipidemia-continue ASA  Diabetes Mellitus, type II-BG every 6 while NPO, low SSI, hold glipizide, Hypoglycemia protocol   Multiple sclerosis-takes glatiramer injection daily, not on formulary, as needed Flexeril  Hypothyroidism-continue levothyroxine 100 mcg  Valvular heart disease status post MVR  Sacral ulcer-present on admission wound care consulted  BPH-continue Flomax and finasteride  GERD-continue PPI    VTE Prophylaxis:  Mechanical VTE prophylaxis orders are present.        The patient desires to be as follows:    CODE STATUS:    Medical Intervention Limits: No intubation (DNI)  Code Status (Patient has no pulse and is not breathing): No CPR (Do Not Attempt to Resuscitate)  Medical Interventions (Patient has pulse or is breathing): Limited Support        Debbi Hennessy, who can be contacted at [phone], is the designated person to make medical decisions on the patient's behalf if He is incapable of doing so. This was clarified with patient and/or next of kin on 1/15/2025 during the course of this H&P.    Admission Status:  I believe this patient meets inpatient status.    Expected Length of Stay: 3 to 4 days    PDMP and Medication Dispenses via Sidebar reviewed and consistent with patient reported medications.    I discussed the patient's findings and my recommendations with patient.      Signature:     This document has been electronically signed by Phyllis Castro PA-C on January 15, 2025 16:01 [facility] Hospitalist Team

## 2025-01-15 NOTE — LETTER
EMS Transport Request  For use at [facility], [place], [facility], [place], and [place] only   Patient Name: Jamin Hennessy : 1955   Weight:114 kg (251 lb 5.2 oz) Pick-up Location: [facility] [place][note 3] BLS/ALS: BLS/ALS: BLS   Insurance: [employer] MEDICARE REPLACEMENT Auth End Date:    Pre-Cert #: D/C Summary complete:    Destination: Home How many stairs None, Will the patient be on the main level yes, Is there a ramp available Not needed, Can the patient stand and pivot no, Address [address] [place], and Name/contact number for who will be present Debbi Hennessy/spouse.  [phone]   Contact Precautions: COVID (Tested + on 25)   Equipment (O2, Fluids, etc.): None   Arrive By Date/Time: 25 Stretcher/WC: Stretcher   CM Requesting: Audrey Boyce RN Ext: [phone]   Notes/Medical Necessity: George lift for transfers.  Bed Bound.  Has hospital bed in home.      ______________________________________________________________________    *Only 2 patient bags OR 1 carry-on size bag are permitted.  Wheelchairs and walkers CANNOT transported with the patient. Acknowledge: Yes]

## 2025-01-15 NOTE — OUTREACH NOTE
Sepsis Week 1 Survey      Flowsheet Row Responses   [facility] facility patient discharged from? [facility]   Does the patient have one of the following disease processes/diagnoses(primary or secondary)? Sepsis   Week 1 attempt successful? No   Unsuccessful attempts Attempt 1            Heather ARROYO - Registered Nurse

## 2025-01-16 LAB
ANION GAP SERPL CALCULATED.3IONS-SCNC: 12 MMOL/L (ref 5–15)
BASOPHILS # BLD AUTO: 0.03 10*3/MM3 (ref 0–0.2)
BASOPHILS NFR BLD AUTO: 0.5 % (ref 0–1.5)
BUN SERPL-MCNC: 20 MG/DL (ref 8–23)
BUN/CREAT SERPL: 16.8 (ref 7–25)
CALCIUM SPEC-SCNC: 8.5 MG/DL (ref 8.6–10.5)
CHLORIDE SERPL-SCNC: 105 MMOL/L (ref 98–107)
CO2 SERPL-SCNC: 24 MMOL/L (ref 22–29)
CREAT SERPL-MCNC: 1.19 MG/DL (ref 0.76–1.27)
DEPRECATED RDW RBC AUTO: 64.6 FL (ref 37–54)
EGFRCR SERPLBLD CKD-EPI 2021: 66.1 ML/MIN/1.73
EOSINOPHIL # BLD AUTO: 0.06 10*3/MM3 (ref 0–0.4)
EOSINOPHIL NFR BLD AUTO: 1 % (ref 0.3–6.2)
ERYTHROCYTE [DISTWIDTH] IN BLOOD BY AUTOMATED COUNT: 19.7 % (ref 12.3–15.4)
GLUCOSE BLDC GLUCOMTR-MCNC: 120 MG/DL (ref 70–105)
GLUCOSE BLDC GLUCOMTR-MCNC: 124 MG/DL (ref 70–105)
GLUCOSE BLDC GLUCOMTR-MCNC: 142 MG/DL (ref 70–105)
GLUCOSE BLDC GLUCOMTR-MCNC: 250 MG/DL (ref 70–105)
GLUCOSE BLDC GLUCOMTR-MCNC: 328 MG/DL (ref 70–105)
GLUCOSE SERPL-MCNC: 142 MG/DL (ref 65–99)
HCT VFR BLD AUTO: 37.1 % (ref 37.5–51)
HGB BLD-MCNC: 10.7 G/DL (ref 13–17.7)
IMM GRANULOCYTES # BLD AUTO: 0.04 10*3/MM3 (ref 0–0.05)
IMM GRANULOCYTES NFR BLD AUTO: 0.7 % (ref 0–0.5)
L PNEUMO1 AG UR QL IA: NEGATIVE
LYMPHOCYTES # BLD AUTO: 0.38 10*3/MM3 (ref 0.7–3.1)
LYMPHOCYTES NFR BLD AUTO: 6.6 % (ref 19.6–45.3)
MAGNESIUM SERPL-MCNC: 1.8 MG/DL (ref 1.6–2.4)
MCH RBC QN AUTO: 25.7 PG (ref 26.6–33)
MCHC RBC AUTO-ENTMCNC: 28.8 G/DL (ref 31.5–35.7)
MCV RBC AUTO: 89 FL (ref 79–97)
MONOCYTES # BLD AUTO: 1.17 10*3/MM3 (ref 0.1–0.9)
MONOCYTES NFR BLD AUTO: 20.4 % (ref 5–12)
NEUTROPHILS NFR BLD AUTO: 4.06 10*3/MM3 (ref 1.7–7)
NEUTROPHILS NFR BLD AUTO: 70.8 % (ref 42.7–76)
NRBC BLD AUTO-RTO: 0 /100 WBC (ref 0–0.2)
PLATELET # BLD AUTO: 172 10*3/MM3 (ref 140–450)
PMV BLD AUTO: 10.7 FL (ref 6–12)
POTASSIUM SERPL-SCNC: 4.1 MMOL/L (ref 3.5–5.2)
RBC # BLD AUTO: 4.17 10*6/MM3 (ref 4.14–5.8)
S PNEUM AG SPEC QL LA: NEGATIVE
SODIUM SERPL-SCNC: 141 MMOL/L (ref 136–145)
WBC NRBC COR # BLD AUTO: 5.74 10*3/MM3 (ref 3.4–10.8)

## 2025-01-16 PROCEDURE — 85025 COMPLETE CBC W/AUTO DIFF WBC: CPT

## 2025-01-16 PROCEDURE — 83735 ASSAY OF MAGNESIUM: CPT

## 2025-01-16 PROCEDURE — 25010000002 CEFEPIME PER 500 MG

## 2025-01-16 PROCEDURE — 80048 BASIC METABOLIC PNL TOTAL CA: CPT

## 2025-01-16 PROCEDURE — 25810000003 SODIUM CHLORIDE 0.9 % SOLUTION 250 ML FLEX CONT: Performed by: STUDENT IN AN ORGANIZED HEALTH CARE EDUCATION/TRAINING PROGRAM

## 2025-01-16 PROCEDURE — 97161 PT EVAL LOW COMPLEX 20 MIN: CPT

## 2025-01-16 PROCEDURE — 25010000002 REMDESIVIR 100 MG RECONSTITUTED SOLUTION: Performed by: INTERNAL MEDICINE

## 2025-01-16 PROCEDURE — 82948 REAGENT STRIP/BLOOD GLUCOSE: CPT

## 2025-01-16 PROCEDURE — 25010000002 VANCOMYCIN 1 G RECONSTITUTED SOLUTION 1 EACH VIAL: Performed by: STUDENT IN AN ORGANIZED HEALTH CARE EDUCATION/TRAINING PROGRAM

## 2025-01-16 PROCEDURE — 94799 UNLISTED PULMONARY SVC/PX: CPT

## 2025-01-16 PROCEDURE — 63710000001 INSULIN LISPRO (HUMAN) PER 5 UNITS

## 2025-01-16 PROCEDURE — 99253 IP/OBS CNSLTJ NEW/EST LOW 45: CPT | Performed by: SURGERY

## 2025-01-16 PROCEDURE — 25810000003 SODIUM CHLORIDE 0.9 % SOLUTION: Performed by: INTERNAL MEDICINE

## 2025-01-16 PROCEDURE — 25010000002 CEFTAZIDIME 2 G RECONSTITUTED SOLUTION 1 EACH VIAL: Performed by: INTERNAL MEDICINE

## 2025-01-16 PROCEDURE — XW033E5 INTRODUCTION OF REMDESIVIR ANTI-INFECTIVE INTO PERIPHERAL VEIN, PERCUTANEOUS APPROACH, NEW TECHNOLOGY GROUP 5: ICD-10-PCS | Performed by: FAMILY MEDICINE

## 2025-01-16 PROCEDURE — 63710000001 PREDNISONE PER 1 MG: Performed by: INTERNAL MEDICINE

## 2025-01-16 RX ORDER — PREDNISONE 20 MG/1
40 TABLET ORAL
Status: DISCONTINUED | OUTPATIENT
Start: 2025-01-16 | End: 2025-01-18

## 2025-01-16 RX ORDER — BUDESONIDE AND FORMOTEROL FUMARATE DIHYDRATE 160; 4.5 UG/1; UG/1
2 AEROSOL RESPIRATORY (INHALATION)
Status: DISCONTINUED | OUTPATIENT
Start: 2025-01-16 | End: 2025-01-22 | Stop reason: HOSPADM

## 2025-01-16 RX ORDER — OSELTAMIVIR PHOSPHATE 75 MG/1
75 CAPSULE ORAL EVERY 12 HOURS SCHEDULED
Status: COMPLETED | OUTPATIENT
Start: 2025-01-16 | End: 2025-01-21

## 2025-01-16 RX ORDER — PANTOPRAZOLE SODIUM 40 MG/10ML
40 INJECTION, POWDER, LYOPHILIZED, FOR SOLUTION INTRAVENOUS
Status: DISCONTINUED | OUTPATIENT
Start: 2025-01-16 | End: 2025-01-18 | Stop reason: SDUPTHER

## 2025-01-16 RX ORDER — GUAIFENESIN 600 MG/1
1200 TABLET, EXTENDED RELEASE ORAL EVERY 12 HOURS SCHEDULED
Status: DISCONTINUED | OUTPATIENT
Start: 2025-01-16 | End: 2025-01-18

## 2025-01-16 RX ADMIN — PREDNISONE 40 MG: 20 TABLET ORAL at 10:19

## 2025-01-16 RX ADMIN — MICONAZOLE NITRATE 1 APPLICATION: 20 POWDER TOPICAL at 21:23

## 2025-01-16 RX ADMIN — BUDESONIDE AND FORMOTEROL FUMARATE DIHYDRATE 2 PUFF: 160; 4.5 AEROSOL RESPIRATORY (INHALATION) at 19:50

## 2025-01-16 RX ADMIN — INSULIN LISPRO 5 UNITS: 100 INJECTION, SOLUTION INTRAVENOUS; SUBCUTANEOUS at 17:11

## 2025-01-16 RX ADMIN — Medication 10 ML: at 08:55

## 2025-01-16 RX ADMIN — PANTOPRAZOLE SODIUM 40 MG: 40 INJECTION, POWDER, FOR SOLUTION INTRAVENOUS at 17:11

## 2025-01-16 RX ADMIN — SODIUM CHLORIDE 200 MG: 9 INJECTION, SOLUTION INTRAVENOUS at 16:15

## 2025-01-16 RX ADMIN — Medication 10 ML: at 21:23

## 2025-01-16 RX ADMIN — SODIUM CHLORIDE 1000 MG: 9 INJECTION, SOLUTION INTRAVENOUS at 08:55

## 2025-01-16 RX ADMIN — CEFEPIME 2000 MG: 2 INJECTION, POWDER, FOR SOLUTION INTRAVENOUS at 02:38

## 2025-01-16 RX ADMIN — GUAIFENESIN 1200 MG: 600 TABLET ORAL at 10:19

## 2025-01-16 RX ADMIN — CEFEPIME 2000 MG: 2 INJECTION, POWDER, FOR SOLUTION INTRAVENOUS at 11:16

## 2025-01-16 RX ADMIN — OSELTAMIVIR PHOSPHATE 75 MG: 75 CAPSULE ORAL at 21:22

## 2025-01-16 RX ADMIN — MICONAZOLE NITRATE 1 APPLICATION: 20 POWDER TOPICAL at 15:46

## 2025-01-16 RX ADMIN — CEFTAZIDIME 2000 MG: 2 INJECTION, POWDER, FOR SOLUTION INTRAVENOUS at 15:55

## 2025-01-16 RX ADMIN — Medication 1 APPLICATION: at 21:23

## 2025-01-16 RX ADMIN — SODIUM CHLORIDE 1000 MG: 9 INJECTION, SOLUTION INTRAVENOUS at 21:22

## 2025-01-16 RX ADMIN — CEFTAZIDIME 2000 MG: 2 INJECTION, POWDER, FOR SOLUTION INTRAVENOUS at 23:26

## 2025-01-16 RX ADMIN — GUAIFENESIN 1200 MG: 600 TABLET ORAL at 21:22

## 2025-01-16 NOTE — CONSULTS
Diabetes Education    Patient Name:  Jamin Hennessy  YOB: 1955  MRN: [id number]  Admit Date:  1/15/2025    Consult received per database referral. Pt with hx of diabetes. Per home med list, pt taking Glipizide 5 mg daily. Last A1c in [facility] was from 12/19/2024 and result was 9.45%. Pt is in enhanced droplet precautions. Educator contacted wife per phone call to complete assessment and provide necessary education. Wife helps with pt's care at home. Pt with hx of diabetes for past 10 years. Diabetes educator recently spoke with wife and completed assessment on 1/10/2025 when pt was admitted to [facility] last. Wife states pt uses [facility] benefits and VA nurse visits pt every 5 weeks. Wife states Glipizide was discontinued at some point last year due to pt having issues with hypoglycemia. Educator reviewed A1c result from 12/19/2024 of 9.45%. Discussed how this relates to bs levels. Discussed pt should be checking bs most days of the week. Pt does not currently have bs meter. Wife states she will discuss getting bs meter and supplies with [facility] nurse. Pt usually eating 3 meals/day. Wife understands importance of bs control. Prior to last admission, pt had been drinking regular soda. Wife states since discharged on 1/12/2025, pt has only had 1 regular soda. Wife states she is giving pt water. Encouraged wife to continue this. Wife states not needing additional info at this time.       Electronically signed by:  Yoly Kelly RN  01/16/25 12:00 EST

## 2025-01-16 NOTE — CONSULTS
Infectious Diseases Consult Note    Referring Provider: Roosevelt Napoles MD    Reason for Consultation: sepsis    Patient Care Team:  Jhonny Heller MD as PCP - General (Family Medicine)  Frederick George MD as Consulting Physician (Nephrology)    Chief complaint weakness, mental status change, shortness of breath    Subjective     History of present illness:      This is a 69-year-old male presents to the hospital on 1/15/2024 from [facility] where he was diagnosed with COVID and influenza.  The outside CT shows possible pneumoperitoneum but repeat CT at this facility did not show the same findings.  Patient does have significant left lower lobe pneumonia not consistent with COVID-pneumonia.  Patient apparently had some mental status change and appears lethargic but is able to answer questions appropriately.  States has been feeling poorly over the last several days with some mild abdominal pain shortness of breath, coughing up phlegm but denies any significant urinary symptoms.    Review of Systems   Review of Systems   Constitutional:  Positive for fatigue.   Respiratory:  Positive for cough and shortness of breath.    Gastrointestinal:  Positive for abdominal pain.   Neurological:  Positive for weakness.   Psychiatric/Behavioral:  Positive for confusion.        Medications  Medications Prior to Admission   Medication Sig Dispense Refill Last Dose/Taking    acetaminophen (TYLENOL) 325 MG tablet Take 2 tablets by mouth Every 8 (Eight) Hours As Needed for Mild Pain.   Past Week    acetic acid 0.25 % irrigation Irrigate with 15 mL to the affected area as directed by provider Daily.   Past Week    amiodarone (PACERONE) 200 MG tablet Take 1 tablet by mouth Daily.   Past Week    apixaban (ELIQUIS) 5 MG tablet tablet Take 1 tablet by mouth Every 12 (Twelve) Hours. 60 tablet 3 Past Week    aspirin 81 MG EC tablet Take 1 tablet by mouth Daily.   Past Week    atorvastatin (LIPITOR) 40 MG tablet Take 1 tablet by mouth Every  Night.   Past Week    carboxymethylcellulose (REFRESH PLUS) 0.5 % solution Administer 1 drop to both eyes 4 (Four) Times a Day.   Past Week    cyclobenzaprine (FLEXERIL) 5 MG tablet Take 1 tablet by mouth 2 (Two) Times a Day As Needed for Muscle Spasms. 15 tablet 0 Past Week    ferrous sulfate 325 (65 FE) MG tablet Take 1 tablet by mouth 3 (Three) Times a Week. Monday, Wednesday, Friday.   Past Week    FLUoxetine (PROzac) 20 MG capsule Take 2 capsules by mouth Daily.   Past Week    furosemide (LASIX) 40 MG tablet Take 1 tablet by mouth Daily.   Past Week    glatiramer acetate (GLATOPA) 20 MG/ML injection Inject 1 mL under the skin into the appropriate area as directed Daily.   Past Week    levothyroxine (SYNTHROID, LEVOTHROID) 100 MCG tablet Take 1 tablet by mouth Daily.   Past Week    metoprolol tartrate (LOPRESSOR) 50 MG tablet Take 1 tablet by mouth 2 (Two) Times a Day for 30 days. (Patient taking differently: Take 0.5 tablets by mouth 2 (Two) Times a Day.) 60 tablet 0 Past Week    mirtazapine (REMERON) 15 MG tablet Take 0.5 tablets by mouth Every Night.   Past Week    omeprazole (priLOSEC) 20 MG capsule Take 1 capsule by mouth Daily.   Past Week    risperiDONE (risperDAL M-TABS) 0.25 MG tablet dispersible disintegrating tablet Place 1 tablet on the tongue Every Night.   Past Week    vitamin B-12 (CYANOCOBALAMIN) 1000 MCG tablet Take 1 tablet by mouth Daily.   Past Week    cefdinir (OMNICEF) 300 MG capsule Take 1 capsule by mouth 2 (Two) Times a Day for 4 days. 8 capsule 0 Unknown    Cholecalciferol 25 MCG (1000 UT) tablet Take 1 tablet by mouth Daily.   Unknown    finasteride (PROSCAR) 5 MG tablet Take 1 tablet by mouth Daily.   Unknown    glipizide (Glucotrol) 5 MG tablet Take 1 tablet by mouth Daily for 30 days. 30 tablet 0 Unknown    guaiFENesin (MUCINEX) 600 MG 12 hr tablet Take 2 tablets by mouth 2 (Two) Times a Day.   Unknown    loperamide (IMODIUM) 2 MG capsule Take 1 capsule by mouth 2 (Two) Times a Day  As Needed for Diarrhea.   Unknown    midodrine (PROAMATINE) 5 MG tablet Take 1 tablet by mouth 3 (Three) Times a Day Before Meals.   Unknown    multivitamin with minerals tablet tablet Take 1 tablet by mouth Daily.   Unknown    ondansetron (ZOFRAN) 8 MG tablet Take 1 tablet by mouth Every 8 (Eight) Hours As Needed for Nausea.   Unknown    promethazine (PHENERGAN) 25 MG tablet Take 0.5 tablets by mouth Every 6 (Six) Hours As Needed for Nausea or Vomiting.   Unknown    sodium chloride (NS) 0.9 % irrigation Irrigate with  to the affected area as directed by provider Daily. For molina catheter   Unknown    tamsulosin (FLOMAX) 0.4 MG capsule 24 hr capsule Take 2 capsules by mouth Every Night.   Unknown       History  Past Medical History:   Diagnosis Date    A-fib     CAD (coronary artery disease)     Elevated cholesterol     Hypertension     MI (myocardial infarction)     Non-insulin dependent type 2 diabetes mellitus      Past Surgical History:   Procedure Laterality Date    BRONCHOSCOPY N/A 3/4/2020    Procedure: BRONCHOSCOPY with bronchioalveolar lavage;  Surgeon: Melissa Cole MD;  Location: [facility] ENDOSCOPY;  Service: Pulmonary;  Laterality: N/A;   pneumonia    CARDIAC CATHETERIZATION      CARDIAC CATHETERIZATION N/A 1/20/2020    Procedure: Left Heart Cath;  Surgeon: Migdalia Beth MD;  Location: [facility] CATH INVASIVE LOCATION;  Service: Cardiovascular    CARDIAC CATHETERIZATION N/A 1/20/2020    Procedure: Left ventriculography;  Surgeon: Migdalia Beth MD;  Location: [facility] CATH INVASIVE LOCATION;  Service: Cardiovascular    CARDIAC CATHETERIZATION N/A 1/20/2020    Procedure: Coronary angiography;  Surgeon: Migdalia Beth MD;  Location: [facility] CATH INVASIVE LOCATION;  Service: Cardiovascular    CORONARY ANGIOPLASTY WITH STENT PLACEMENT      CYSTOSCOPY, URETEROSCOPY, RETROGRADE PYELOGRAM, STENT INSERTION Right 12/17/2024    Procedure: CYSTOSCOPY URETEROSCOPY RETROGRADE PYELOGRAM  HOLMIUM LASER STENT INSERTION;  Surgeon: Jamin Estrella MD;  Location: [facility] MAIN OR;  Service: Urology;  Laterality: Right;    MITRAL VALVE REPAIR/REPLACEMENT N/A 1/22/2020    Procedure: MITRAL VALVE REPAIR/REPLACEMENT;  Surgeon: Fallon Cole MD;  Location: [facility] CVOR;  Service: Cardiothoracic;  Laterality: N/A;  patient has endocarditis mitral valve replaced with 31mm knox II       Family History  Family History   Problem Relation Age of Onset    Hypertension Mother        Social History   reports that he has quit smoking. His smoking use included cigarettes. His smokeless tobacco use includes chew. He reports that he does not currently use alcohol. He reports that he does not use drugs.    Allergies  Patient has no known allergies.    Objective     Vital Signs   Vital Signs (last 24 hours)         01/15 0700  01/16 0659 01/16 0700  01/16 1652   Most Recent      Temp (°F) 98.9 -  102.1    95.8 -  98.2     98.2 (36.8) 01/16 1646    Heart Rate 104 -  118    108 -  124     108 01/16 1646    Resp 16 -  27      20     20 01/16 1646    BP 97/70 -  143/91    114/79 -  164/109     164/109 01/16 1646    SpO2 (%) 92 -  99    0 -  98     0 01/16 1500    Flow (L/min) (Oxygen Therapy)   2      2     2 01/16 1600            Physical Exam:  Physical Exam  Vitals and nursing note reviewed.   Constitutional:       General: He is not in acute distress.     Appearance: He is well-developed. He is obese. He is ill-appearing. He is not diaphoretic.   HENT:      Head: Normocephalic and atraumatic.   Eyes:      Conjunctiva/sclera: Conjunctivae normal.      Pupils: Pupils are equal, round, and reactive to light.   Cardiovascular:      Rate and Rhythm: Normal rate and regular rhythm.      Heart sounds: Normal heart sounds, S1 normal and S2 normal.   Pulmonary:      Effort: Pulmonary effort is normal. No respiratory distress.      Breath sounds: No stridor. Rales present. No wheezing.   Abdominal:      General: Bowel sounds  are normal. There is no distension.      Palpations: Abdomen is soft. There is no mass.      Tenderness: There is no abdominal tenderness. There is no guarding.      Comments: No significant abdominal   Genitourinary:     Comments: Perry catheter  Musculoskeletal:         General: No deformity.      Cervical back: Neck supple.   Skin:     General: Skin is warm and dry.      Coloration: Skin is not pale.      Findings: No erythema or rash.      Comments: Patient has some stage II pressure ulcerations to the scrotum and buttocks and some moisture associated dermatitis-nothing that looks infected   Neurological:      Mental Status: He is alert.      Cranial Nerves: No cranial nerve deficit.      Motor: Weakness present.      Comments: Alert and oriented x 2-fairly lethargic         Microbiology  Microbiology Results (last 10 days)       Procedure Component Value - Date/Time    Urine Culture - Urine, Indwelling Urethral Catheter [[id number]]  (Normal) Collected: 01/15/25 1954    Lab Status: Preliminary result Specimen: Urine from Indwelling Urethral Catheter Updated: 01/16/25 1049     Urine Culture No growth    MRSA Screen, PCR (Inpatient) - Swab, Nares [[id number]]  (Abnormal) Collected: 01/15/25 1935    Lab Status: Final result Specimen: Swab from Nares Updated: 01/15/25 2142     MRSA PCR MRSA Detected    Narrative:      The negative predictive value of this diagnostic test is high and should only be used to consider de-escalating anti-MRSA therapy. A positive result may indicate colonization with MRSA and must be correlated clinically.    Urine Culture - Urine, Indwelling Urethral Catheter [[id number]] Collected: 01/10/25 1347    Lab Status: Final result Specimen: Urine from Indwelling Urethral Catheter Updated: 01/11/25 1231     Urine Culture <25,000 CFU/mL Mixed Cheryl Isolated    Narrative:      Specimen contains mixed organisms of questionable pathogenicity suggestive of contamination. If symptoms persist, suggest  recollection.  Colonization of the urinary tract without infection is common. Treatment is discouraged unless the patient is symptomatic, pregnant, or undergoing an invasive urologic procedure.    Blood Culture - Blood, Hand, Right [[id number]]  (Normal) Collected: 01/10/25 1201    Lab Status: Final result Specimen: Blood from Hand, Right Updated: 01/15/25 1245     Blood Culture No growth at 5 days            Laboratory  Results from last 7 days   Lab Units 01/16/25  0235   WBC 10*3/mm3 5.74   HEMOGLOBIN g/dL 10.7*   HEMATOCRIT % 37.1*   PLATELETS 10*3/mm3 172     Results from last 7 days   Lab Units 01/16/25  0235   SODIUM mmol/L 141   POTASSIUM mmol/L 4.1   CHLORIDE mmol/L 105   CO2 mmol/L 24.0   BUN mg/dL 20   CREATININE mg/dL 1.19   GLUCOSE mg/dL 142*   CALCIUM mg/dL 8.5*     Results from last 7 days   Lab Units 01/16/25  0235   SODIUM mmol/L 141   POTASSIUM mmol/L 4.1   CHLORIDE mmol/L 105   CO2 mmol/L 24.0   BUN mg/dL 20   CREATININE mg/dL 1.19   GLUCOSE mg/dL 142*   CALCIUM mg/dL 8.5*                   Radiology  Imaging Results (Last 72 Hours)       Procedure Component Value Units Date/Time    CT Outside Films [[id number]] Resulted: 01/16/25 0923     Updated: 01/16/25 0923    Narrative:      This procedure was auto-finalized with no dictation required.    CT Outside Films [[id number]] Resulted: 01/16/25 0923     Updated: 01/16/25 0923    Narrative:      This procedure was auto-finalized with no dictation required.    CT Outside Films [[id number]] Resulted: 01/16/25 0923     Updated: 01/16/25 0923    Narrative:      This procedure was auto-finalized with no dictation required.            Cardiology      Results Review:  I have reviewed all clinical data, test, lab, and imaging results.       Schedule Meds  budesonide-formoterol, 2 puff, Inhalation, BID - RT  cefTAZidime, 2,000 mg, Intravenous, Q8H  guaiFENesin, 1,200 mg, Oral, Q12H  hydrocortisone-bacitracin-zinc oxide-nystatin, 1 Application, Topical,  BID  insulin lispro, 2-7 Units, Subcutaneous, Q6H  miconazole, 1 Application, Topical, Q12H  oseltamivir, 75 mg, Oral, Q12H  pantoprazole, 40 mg, Intravenous, Daily With Dinner  predniSONE, 40 mg, Oral, Daily With Breakfast  remdesivir, 200 mg, Intravenous, Q24H   Followed by  [START ON 1/17/2025] remdesivir, 100 mg, Intravenous, Q24H  sodium chloride, 10 mL, Intravenous, Q12H  vancomycin, 1,000 mg, Intravenous, Q12H        Infusion Meds  Pharmacy Consult - Pharmacy to dose,   Pharmacy to dose vancomycin,         PRN Meds    acetaminophen **OR** acetaminophen **OR** acetaminophen    senna-docusate sodium **AND** polyethylene glycol **AND** bisacodyl **AND** bisacodyl    Calcium Replacement - Follow Nurse / BPA Driven Protocol    dextrose    dextrose    glucagon (human recombinant)    Magnesium Standard Dose Replacement - Follow Nurse / BPA Driven Protocol    melatonin    ondansetron ODT **OR** ondansetron    Pharmacy Consult - Pharmacy to dose    Pharmacy to dose vancomycin    Phosphorus Replacement - Follow Nurse / BPA Driven Protocol    Potassium Replacement - Follow Nurse / BPA Driven Protocol    sodium chloride    sodium chloride      Assessment & Plan       Assessment    Left lower lobe infiltrate concerning for bacterial pneumonia.  Patient is currently on 2 L oxygen via nasal cannula.  MRSA screen was positive    Recent diagnosis of COVID-19 infection at outlying facility/[facility] prior to admission.  Imaging studies not consistent with COVID-pneumonia  The patient was also diagnosed with influenza based on transfer note.    Toxic metabolic encephalopathy on admission.  May be related to above.    Recent admission in December 2024 with obstructive uropathy with urine cultures from [facility] growing ESBL  Proteus mirabilis.  Patient status post cystoscopy with right stent placement and stone removal.  Patient was treated with 10 days of IV ertapenem which was completed on 12/29/2024.  Current urine  culture is negative.  Patient was noted to have a chronic Perry catheter secondary to urine retention    Multiple sclerosis.  Patient is bedbound secondary to that    History of mitral valve replacement in the past    Type 2 diabetes-most recent A1c is 9.45    Morbid obesity    Plan    Discontinue IV cefepime concerning for cefepime worsening encephalopathy  Start IV ceftazidime 2 g every 8 hours  Continue vancomycin for now  Start patient on remdesivir for 3 days as a prophylaxis  Start p.o. Tamiflu 75 mg every 12 hours for 5 days  Urine for Legionella and pneumococcal antigen  Sputum culture  Waiting on blood cultures  Continue supportive care  A.m. labs  Patient will need to be on enhanced airborne isolation for 10 days from the first positive COVID screen  Appropriate PPE was used during this assessment          Fay Bradford, YUE  01/16/25  16:52 EST    Note is dictated utilizing voice recognition software/Dragon

## 2025-01-16 NOTE — PLAN OF CARE
"Goal Outcome Evaluation:  Plan of Care Reviewed With: patient  Jamin Hennessy is a 69 y.o. male with a CMH of MS, sacral ulcer, A-fib, coronary artery disease, type 2 diabetes mellitus, status post MVR, HLD, hypertension, GERD who presented to [facility] on 1/15/2025 from [facility] with altered mental status. Respiratory panel was positive for COVID and flu.  CT head with no evidence of acute findings.  CT chest revealed moderate left-sided pleural effusion.Patient was transferred to [facility] admitted to hospital service for further evaluation management of sepsis, COVID and influenza infection. Per wound care note, Patient has 2 open areas 1 is more to the left of the sacrum, the other to the left buttock. Pt A and Ox  4 . RN in room  trying to get IV started and pt does not flinch with the procedure. Pt reports living with spouse in an apt and being \"bedbound\" for ~ 2 years. He reports he no longer gets up to w/c with a [place] lift. Pt has 0/5 strength BLE and ~ 75% limitation in PROM BLE. Pt is at his baseline for mobility. PT will complete PT orders. PT recommendation is 24/7 care or ECF if caregiver burden is too great for pt's spouse.           Anticipated Discharge Disposition (PT): home with 24/7 care                        "

## 2025-01-16 NOTE — PROGRESS NOTES
[facility] MEDICINE SERVICE  DAILY PROGRESS NOTE    NAME: Jamin Hennessy  : 1955  MRN: [id number]      LOS: 1 day     PROVIDER OF SERVICE: YUE Atwood    Chief Complaint: AMS (altered mental status)    Subjective:     Interval History:  History taken from: patient    Patient otherwise feels well, states he feels better today than he did yesterday.  No family at bedside during encounter.  Patient does report he is bed bound at home and does not walk stating this is due to the fact that he has multiple sclerosis as well as a broken hip.        Review of Systems:   Review of Systems   Respiratory:  Positive for shortness of breath and wheezing.    Cardiovascular:  Negative for chest pain.   Neurological:  Negative for dizziness and headaches.       Objective:     Vital Signs  Temp:  [98.9 °F (37.2 °C)-102.1 °F (38.9 °C)] 98.9 °F (37.2 °C)  Heart Rate:  [104-118] 110  Resp:  [16-27] 16  BP: ()/(70-91) 117/80  Flow (L/min) (Oxygen Therapy):  [2] 2   Body mass index is 36.06 kg/m².    Physical Exam  Physical Exam  General: 70 yo male, Alert and oriented, obese, no acute distress.  HENT: Normocephalic, normal hearing, moist oral mucosa, no scleral icterus.  Neck: Supple, nontender, no carotid bruits, no JVD, no LAD.  Lungs: Wheezing noted, nonlabored respiration.  Heart: RRR, no murmur, gallop or edema.  Abdomen: Soft, nontender, nondistended, + bowel sounds.  Musculoskeletal: Normal range of motion and strength, no tenderness or swelling.  Skin: Skin is warm, dry and pink, no rashes or lesions.  Psychiatric: Cooperative, appropriate mood and affect.       Diagnostic Data    Results from last 7 days   Lab Units 25  0235 01/15/25  1630 25  0250   WBC 10*3/mm3 5.74   < > 6.87   HEMOGLOBIN g/dL 10.7*   < > 10.6*   HEMATOCRIT % 37.1*   < > 36.0*   PLATELETS 10*3/mm3 172   < > 188   GLUCOSE mg/dL 142*   < > 156*   CREATININE mg/dL 1.19   < > 1.15   BUN mg/dL 20   < > 22   SODIUM  mmol/L 141   < > 143   POTASSIUM mmol/L 4.1   < > 3.8   AST (SGOT) U/L  --   --  24   ALT (SGPT) U/L  --   --  20   ALK PHOS U/L  --   --  84   BILIRUBIN mg/dL  --   --  0.3   ANION GAP mmol/L 12.0   < > 10.3    < > = values in this interval not displayed.       No radiology results for the last day      I reviewed the patient's new clinical results.    Assessment/Plan:     Active and Resolved Problems  Active Hospital Problems    Diagnosis  POA    **AMS (altered mental status) [R41.82]  Yes      Resolved Hospital Problems   No resolved problems to display.       Sepsis  Pneumonia  Left pleural effusion  Acute respiratory failure with hypoxia  COVID infection  Influenza infection  Altered mental status  On 2L NC, b/l RA  COVID and flu positive  CT chest with moderate left pleural effusion, left lower lobe and lingula consolidation  On arrival WBC 6.36, LA 2.4, repeat lactic acid 2.7 and Pro-Suman 0.16  SIRS criteria: source (UTI/PNA), HR> 90, febrile   Received ampicillin at OSH, changed to cefepime and add vancomycin for HCAP coverage given recent hospitalization  MRSA screen positive  N.p.o. lethargic, D5 normal saline-obtain SLP eval prior to initiation of diet  Tylenol as needed for fever  Fall precautions  ID consulted, appreciate recommendations  Pulmonology consulted, noted plans for monitoring of pleural effusion as well as initiation of Symbicort, prednisone     Urinary tract infection   History of recent UTI in setting of right ureteral stent  Was supposed to be on cefdinir 300 mg twice daily until 1/16/2025, wife did not obtain from pharmacy  UA at OSH with 11-20 WBCs and large leukocyte esterase, recently admitted for UTI  Repeat UA, shows large blood, 2+ protein, 2+ leuk esterase, too numerous to count RBC and WBC as well as unable to determine due to loaded field of bacteria  Urology evaluated patient during previous admission, no urology intervention  Urine culture preliminary for no growth at this  time     Pneumoperitoneum  CT abdomen pelvis from OSH incidentally notes 1 tiny bubble of pneumoperitoneum in the left upper quadrant, uncertain etiology  General Surgery consulted      Paroxysmal A-fib  Currently sinus tachycardia in the setting of fever  Continue amiodarone and Eliquis once reconciled  Telemetry     Chronic hypotension-BP soft at outside facility, patient is on midodrine, continue   CAD/hyperlipidemia-continue ASA  Diabetes Mellitus, type II-BG every 6 while NPO, low SSI, hold glipizide, Hypoglycemia protocol   Multiple sclerosis-takes glatiramer injection daily, not on formulary, as needed Flexeril  Hypothyroidism-continue levothyroxine 100 mcg  Valvular heart disease status post MVR  Sacral ulcer-present on admission wound care consulted  BPH-continue Flomax and finasteride  GERD-continue PPI       VTE Prophylaxis:  Mechanical VTE prophylaxis orders are present.             Disposition Planning:     Barriers to Discharge: Continued care  Anticipated Date of Discharge: 1-  Place of Discharge: Pending course      Time: 35 minutes     Code Status and Medical Interventions: No CPR (Do Not Attempt to Resuscitate); Limited Support; No intubation (DNI)   Ordered at: 01/15/25 1600     Medical Intervention Limits:    No intubation (DNI)     Code Status (Patient has no pulse and is not breathing):    No CPR (Do Not Attempt to Resuscitate)     Medical Interventions (Patient has pulse or is breathing):    Limited Support       Signature: Electronically signed by YUE Atwood, 01/16/25, 08:42 EST.  [facility] Hospitalist Team

## 2025-01-16 NOTE — CONSULTS
GENERAL SURGERY CONSULT      Patient Care Team:  Jhonny Heller MD as PCP - General (Family Medicine)  Frederick George MD as Consulting Physician (Nephrology)  Referring provider: Jhonny Heller MD  Chief complaint pneumoperitoneum  Subjective     This is a 60 gentleman who was transferred here from [facility] for altered mental status.  He has a medical history of MS, atrial fibrillation, COPD coronary artery disease type 2 diabetes and has had a history of mitral valve replacement.  He also has a history of gastric bypass.  He was recently hospitalized for a couple of days here for complicated urinary tract infection and also has history of ureteral stent.  He was discharged home however the patient did not receive antibiotics at home.    Upon initial evaluation in the emergency room he was found to be positive for COVID and flu.  CT scan was performed at an outside facility that demonstrated pneumoperitoneum of the left upper quadrant as well as moderate to large colonic stool burden in the right ureteral stent.    After he was transferred here repeat CT scan was performed and there is no mention of any pneumoperitoneum.  The outside hospital notes 1 tiny bubble of pneumoperitoneum in the left upper quadrant of uncertain etiology.  I reviewed the images and I cannot find any evidence of free air myself.    Review of Systems   All systems were reviewed and negative except for:  Gastrointestinal: positive for  nausea, pain and See HPI    History  Past Medical History:   Diagnosis Date    A-fib     CAD (coronary artery disease)     Elevated cholesterol     Hypertension     MI (myocardial infarction)     Non-insulin dependent type 2 diabetes mellitus      Past Surgical History:   Procedure Laterality Date    BRONCHOSCOPY N/A 3/4/2020    Procedure: BRONCHOSCOPY with bronchioalveolar lavage;  Surgeon: Melissa Cole MD;  Location: [facility] ENDOSCOPY;  Service: Pulmonary;  Laterality: N/A;   pneumonia    CARDIAC  CATHETERIZATION      CARDIAC CATHETERIZATION N/A 1/20/2020    Procedure: Left Heart Cath;  Surgeon: Migdalia Beth MD;  Location: [facility] CATH INVASIVE LOCATION;  Service: Cardiovascular    CARDIAC CATHETERIZATION N/A 1/20/2020    Procedure: Left ventriculography;  Surgeon: Migdalia Beth MD;  Location: [facility] CATH INVASIVE LOCATION;  Service: Cardiovascular    CARDIAC CATHETERIZATION N/A 1/20/2020    Procedure: Coronary angiography;  Surgeon: Migdalia Beth MD;  Location: [facility] CATH INVASIVE LOCATION;  Service: Cardiovascular    CORONARY ANGIOPLASTY WITH STENT PLACEMENT      CYSTOSCOPY, URETEROSCOPY, RETROGRADE PYELOGRAM, STENT INSERTION Right 12/17/2024    Procedure: CYSTOSCOPY URETEROSCOPY RETROGRADE PYELOGRAM HOLMIUM LASER STENT INSERTION;  Surgeon: Jamin Estrella MD;  Location: [facility] MAIN OR;  Service: Urology;  Laterality: Right;    MITRAL VALVE REPAIR/REPLACEMENT N/A 1/22/2020    Procedure: MITRAL VALVE REPAIR/REPLACEMENT;  Surgeon: Fallon Cole MD;  Location: [facility] CVOR;  Service: Cardiothoracic;  Laterality: N/A;  patient has endocarditis mitral valve replaced with 31mm knox II     Family History   Problem Relation Age of Onset    Hypertension Mother      Social History     Tobacco Use    Smoking status: Former     Current packs/day: 0.25     Types: Cigarettes    Smokeless tobacco: Current     Types: Chew   Vaping Use    Vaping status: Never Used   Substance Use Topics    Alcohol use: Not Currently    Drug use: Never     Medications Prior to Admission   Medication Sig Dispense Refill Last Dose/Taking    acetaminophen (TYLENOL) 325 MG tablet Take 2 tablets by mouth Every 8 (Eight) Hours As Needed for Mild Pain.   Past Week    acetic acid 0.25 % irrigation Irrigate with 15 mL to the affected area as directed by provider Daily.   Past Week    amiodarone (PACERONE) 200 MG tablet Take 1 tablet by mouth Daily.   Past Week    apixaban (ELIQUIS) 5 MG tablet tablet Take 1  tablet by mouth Every 12 (Twelve) Hours. 60 tablet 3 Past Week    aspirin 81 MG EC tablet Take 1 tablet by mouth Daily.   Past Week    atorvastatin (LIPITOR) 40 MG tablet Take 1 tablet by mouth Every Night.   Past Week    carboxymethylcellulose (REFRESH PLUS) 0.5 % solution Administer 1 drop to both eyes 4 (Four) Times a Day.   Past Week    cyclobenzaprine (FLEXERIL) 5 MG tablet Take 1 tablet by mouth 2 (Two) Times a Day As Needed for Muscle Spasms. 15 tablet 0 Past Week    ferrous sulfate 325 (65 FE) MG tablet Take 1 tablet by mouth 3 (Three) Times a Week. Monday, Wednesday, Friday.   Past Week    FLUoxetine (PROzac) 20 MG capsule Take 2 capsules by mouth Daily.   Past Week    furosemide (LASIX) 40 MG tablet Take 1 tablet by mouth Daily.   Past Week    glatiramer acetate (GLATOPA) 20 MG/ML injection Inject 1 mL under the skin into the appropriate area as directed Daily.   Past Week    levothyroxine (SYNTHROID, LEVOTHROID) 100 MCG tablet Take 1 tablet by mouth Daily.   Past Week    metoprolol tartrate (LOPRESSOR) 50 MG tablet Take 1 tablet by mouth 2 (Two) Times a Day for 30 days. (Patient taking differently: Take 0.5 tablets by mouth 2 (Two) Times a Day.) 60 tablet 0 Past Week    mirtazapine (REMERON) 15 MG tablet Take 0.5 tablets by mouth Every Night.   Past Week    omeprazole (priLOSEC) 20 MG capsule Take 1 capsule by mouth Daily.   Past Week    risperiDONE (risperDAL M-TABS) 0.25 MG tablet dispersible disintegrating tablet Place 1 tablet on the tongue Every Night.   Past Week    vitamin B-12 (CYANOCOBALAMIN) 1000 MCG tablet Take 1 tablet by mouth Daily.   Past Week    cefdinir (OMNICEF) 300 MG capsule Take 1 capsule by mouth 2 (Two) Times a Day for 4 days. 8 capsule 0 Unknown    Cholecalciferol 25 MCG (1000 UT) tablet Take 1 tablet by mouth Daily.   Unknown    finasteride (PROSCAR) 5 MG tablet Take 1 tablet by mouth Daily.   Unknown    glipizide (Glucotrol) 5 MG tablet Take 1 tablet by mouth Daily for 30 days.  30 tablet 0 Unknown    guaiFENesin (MUCINEX) 600 MG 12 hr tablet Take 2 tablets by mouth 2 (Two) Times a Day.   Unknown    loperamide (IMODIUM) 2 MG capsule Take 1 capsule by mouth 2 (Two) Times a Day As Needed for Diarrhea.   Unknown    midodrine (PROAMATINE) 5 MG tablet Take 1 tablet by mouth 3 (Three) Times a Day Before Meals.   Unknown    multivitamin with minerals tablet tablet Take 1 tablet by mouth Daily.   Unknown    ondansetron (ZOFRAN) 8 MG tablet Take 1 tablet by mouth Every 8 (Eight) Hours As Needed for Nausea.   Unknown    promethazine (PHENERGAN) 25 MG tablet Take 0.5 tablets by mouth Every 6 (Six) Hours As Needed for Nausea or Vomiting.   Unknown    sodium chloride (NS) 0.9 % irrigation Irrigate with  to the affected area as directed by provider Daily. For molina catheter   Unknown    tamsulosin (FLOMAX) 0.4 MG capsule 24 hr capsule Take 2 capsules by mouth Every Night.   Unknown     Allergies:  Patient has no known allergies.    Objective     Vital Signs  Temp:  [95.8 °F (35.4 °C)-102.1 °F (38.9 °C)] 98 °F (36.7 °C)  Heart Rate:  [104-120] 120  Resp:  [16-27] 20  BP: ()/(70-91) 130/85    Physical Exam:      General Appearance:    Alert, cooperative, in no acute distress   Head:    Normocephalic, without obvious abnormality, atraumatic,    Eyes:            Lids and lashes normal, conjunctivae and sclerae normal, no   icterus, no pallor, corneas clear, PERRLA   Ears:    Ears appear intact with no abnormalities noted   Throat:   No oral lesions, no thrush, oral mucosa moist   Neck:   No adenopathy, supple, trachea midline, no thyromegaly, no   carotid bruit, no JVD   Back:     No kyphosis present, no scoliosis present, no skin lesions,      erythema or scars, no tenderness to percussion or                   palpation,   range of motion normal   Lungs:     Clear to auscultation,respirations regular, even and                  unlabored    Heart:    Regular rhythm and normal rate, normal S1 and S2, no             murmur, no gallop, no rub, no click   Chest Wall:    No abnormalities observed   Abdomen:     Normal bowel sounds, no masses, no organomegaly, soft        non-tender, non-distended, no guarding, no rebound                tenderness   Rectal:     Deferred   Extremities: No edema, good ROM   Pulses:   Pulses palpable and equal bilaterally   Skin:   No bleeding, bruising or rash   Lymph nodes:   No palpable adenopathy   Neurologic:   Cranial nerves 2 - 12 grossly intact, sensation intact, DTR       present and equal bilaterally     Lab Results (last 24 hours)       Procedure Component Value Units Date/Time    POC Glucose Once [[id number]]  (Abnormal) Collected: 01/16/25 1116    Specimen: Blood Updated: 01/16/25 1117     Glucose 124 mg/dL      Comment: Serial Number: [id number]:  [id number]       Urine Culture - Urine, Indwelling Urethral Catheter [[id number]]  (Normal) Collected: 01/15/25 1954    Specimen: Urine from Indwelling Urethral Catheter Updated: 01/16/25 1049     Urine Culture No growth    POC Glucose Once [[id number]]  (Abnormal) Collected: 01/16/25 0616    Specimen: Blood Updated: 01/16/25 0618     Glucose 142 mg/dL      Comment: Serial Number: [id number]:  [id number]       Basic Metabolic Panel [[id number]]  (Abnormal) Collected: 01/16/25 0235    Specimen: Blood from Arm, Right Updated: 01/16/25 0424     Glucose 142 mg/dL      BUN 20 mg/dL      Creatinine 1.19 mg/dL      Sodium 141 mmol/L      Potassium 4.1 mmol/L      Comment: Specimen hemolyzed.  Result may be falsely elevated.        Chloride 105 mmol/L      CO2 24.0 mmol/L      Calcium 8.5 mg/dL      BUN/Creatinine Ratio 16.8     Anion Gap 12.0 mmol/L      eGFR 66.1 mL/min/1.73     Narrative:      GFR Categories in Chronic Kidney Disease (CKD)      GFR Category          GFR (mL/min/1.73)    Interpretation  G1                     90 or greater         Normal or high (1)  G2                      60-89                Mild decrease  (1)  G3a                   45-59                Mild to moderate decrease  G3b                   30-44                Moderate to severe decrease  G4                    15-29                Severe decrease  G5                    14 or less           Kidney failure          (1)In the absence of evidence of kidney disease, neither GFR category G1 or G2 fulfill the criteria for CKD.    eGFR calculation 2021 CKD-EPI creatinine equation, which does not include race as a factor    Magnesium [[id number]]  (Normal) Collected: 01/16/25 0235    Specimen: Blood from Arm, Right Updated: 01/16/25 0424     Magnesium 1.8 mg/dL     CBC & Differential [[id number]]  (Abnormal) Collected: 01/16/25 0235    Specimen: Blood from Arm, Right Updated: 01/16/25 0401    Narrative:      The following orders were created for panel order CBC & Differential.  Procedure                               Abnormality         Status                     ---------                               -----------         ------                     CBC Auto Differential[[id number]]        Abnormal            Final result               Scan Slide[[id number]]                                                                    Please view results for these tests on the individual orders.    CBC Auto Differential [[id number]]  (Abnormal) Collected: 01/16/25 0235    Specimen: Blood from Arm, Right Updated: 01/16/25 0401     WBC 5.74 10*3/mm3      RBC 4.17 10*6/mm3      Hemoglobin 10.7 g/dL      Hematocrit 37.1 %      MCV 89.0 fL      MCH 25.7 pg      MCHC 28.8 g/dL      RDW 19.7 %      RDW-SD 64.6 fl      MPV 10.7 fL      Platelets 172 10*3/mm3      Neutrophil % 70.8 %      Lymphocyte % 6.6 %      Monocyte % 20.4 %      Eosinophil % 1.0 %      Basophil % 0.5 %      Immature Grans % 0.7 %      Neutrophils, Absolute 4.06 10*3/mm3      Lymphocytes, Absolute 0.38 10*3/mm3      Monocytes, Absolute 1.17 10*3/mm3      Eosinophils, Absolute 0.06 10*3/mm3      Basophils, Absolute 0.03  10*3/mm3      Immature Grans, Absolute 0.04 10*3/mm3      nRBC 0.0 /100 WBC     POC Glucose Once [[id number]]  (Abnormal) Collected: 01/16/25 0134    Specimen: Blood Updated: 01/16/25 0136     Glucose 120 mg/dL      Comment: Serial Number: [id number]:  [id number]       MRSA Screen, PCR (Inpatient) - Swab, Nares [[id number]]  (Abnormal) Collected: 01/15/25 1935    Specimen: Swab from Nares Updated: 01/15/25 2142     MRSA PCR MRSA Detected    Narrative:      The negative predictive value of this diagnostic test is high and should only be used to consider de-escalating anti-MRSA therapy. A positive result may indicate colonization with MRSA and must be correlated clinically.    Urinalysis, Microscopic Only - Indwelling Urethral Catheter [[id number]]  (Abnormal) Collected: 01/15/25 1954    Specimen: Urine from Indwelling Urethral Catheter Updated: 01/15/25 2034     RBC, UA Too Numerous to Count /HPF      WBC, UA Too Numerous to Count /HPF      Bacteria, UA       Unable to determine due to loaded field     /HPF     Squamous Epithelial Cells, UA None Seen /HPF      Hyaline Casts, UA None Seen /LPF      Methodology Manual Light Microscopy    STAT Lactic Acid, Reflex [[id number]]  (Normal) Collected: 01/15/25 1953    Specimen: Blood from Arm, Right Updated: 01/15/25 2033     Lactate 1.5 mmol/L     Urinalysis With Culture If Indicated - Indwelling Urethral Catheter [[id number]]  (Abnormal) Collected: 01/15/25 1954    Specimen: Urine from Indwelling Urethral Catheter Updated: 01/15/25 2024     Color, UA Shania     Appearance, UA Cloudy     pH, UA <=5.0     Specific Gravity, UA 1.020     Glucose, UA Negative     Ketones, UA Negative     Bilirubin, UA Negative     Blood, UA Large (3+)     Protein,  mg/dL (2+)     Leuk Esterase, UA Moderate (2+)     Nitrite, UA Negative     Urobilinogen, UA 1.0 E.U./dL    Narrative:      In absence of clinical symptoms, the presence of pyuria, bacteria, and/or nitrites on the urinalysis  result does not correlate with infection.    Blood Culture - Blood, Arm, Left [[id number]] Collected: 01/15/25 1927    Specimen: Blood from Arm, Left Updated: 01/15/25 2008    Blood Culture - Blood, Arm, Right [[id number]] Collected: 01/15/25 1927    Specimen: Blood from Arm, Right Updated: 01/15/25 2008    POC Glucose Once [[id number]]  (Abnormal) Collected: 01/15/25 1758    Specimen: Blood Updated: 01/15/25 1759     Glucose 148 mg/dL      Comment: Serial Number: [id number]:  [id number]       Basic Metabolic Panel [[id number]]  (Abnormal) Collected: 01/15/25 1630    Specimen: Blood Updated: 01/15/25 1705     Glucose 177 mg/dL      BUN 20 mg/dL      Creatinine 1.32 mg/dL      Sodium 140 mmol/L      Potassium 3.9 mmol/L      Chloride 102 mmol/L      CO2 26.7 mmol/L      Calcium 8.6 mg/dL      BUN/Creatinine Ratio 15.2     Anion Gap 11.3 mmol/L      eGFR 58.4 mL/min/1.73     Narrative:      GFR Categories in Chronic Kidney Disease (CKD)      GFR Category          GFR (mL/min/1.73)    Interpretation  G1                     90 or greater         Normal or high (1)  G2                      60-89                Mild decrease (1)  G3a                   45-59                Mild to moderate decrease  G3b                   30-44                Moderate to severe decrease  G4                    15-29                Severe decrease  G5                    14 or less           Kidney failure          (1)In the absence of evidence of kidney disease, neither GFR category G1 or G2 fulfill the criteria for CKD.    eGFR calculation 2021 CKD-EPI creatinine equation, which does not include race as a factor    Magnesium [[id number]]  (Normal) Collected: 01/15/25 1630    Specimen: Blood Updated: 01/15/25 1705     Magnesium 1.7 mg/dL     Procalcitonin [[id number]]  (Normal) Collected: 01/15/25 1630    Specimen: Blood Updated: 01/15/25 1705     Procalcitonin 0.16 ng/mL     Narrative:      As a Marker for Sepsis (Non-Neonates):    1. <0.5  "ng/mL represents a low risk of severe sepsis and/or septic shock.  2. >2 ng/mL represents a high risk of severe sepsis and/or septic shock.    As a Marker for Lower Respiratory Tract Infections that require antibiotic therapy:    PCT on Admission    Antibiotic Therapy       6-12 Hrs later    >0.5                Strongly Recommended  >0.25 - <0.5        Recommended   0.1 - 0.25          Discouraged              Remeasure/reassess PCT  <0.1                Strongly Discouraged     Remeasure/reassess PCT    As 28 day mortality risk marker: \"Change in Procalcitonin Result\" (>80% or <=80%) if Day 0 (or Day 1) and Day 4 values are available. Refer to [email or web address]    Change in PCT <=80%  A decrease of PCT levels below or equal to 80% defines a positive change in PCT test result representing a higher risk for 28-day all-cause mortality of patients diagnosed with severe sepsis for septic shock.    Change in PCT >80%  A decrease of PCT levels of more than 80% defines a negative change in PCT result representing a lower risk for 28-day all-cause mortality of patients diagnosed with severe sepsis or septic shock.       Lactic Acid, Plasma [[id number]]  (Abnormal) Collected: 01/15/25 1630    Specimen: Blood Updated: 01/15/25 1701     Lactate 2.7 mmol/L     CBC & Differential [[id number]]  (Abnormal) Collected: 01/15/25 1630    Specimen: Blood Updated: 01/15/25 1639    Narrative:      The following orders were created for panel order CBC & Differential.  Procedure                               Abnormality         Status                     ---------                               -----------         ------                     CBC Auto Differential[[id number]]        Abnormal            Final result                 Please view results for these tests on the individual orders.    CBC Auto Differential [[id number]]  (Abnormal) Collected: 01/15/25 1630    Specimen: Blood Updated: 01/15/25 1639     WBC 6.36 10*3/mm3  "     RBC 4.27 10*6/mm3      Hemoglobin 10.9 g/dL      Hematocrit 37.0 %      MCV 86.7 fL      MCH 25.5 pg      MCHC 29.5 g/dL      RDW 19.4 %      RDW-SD 61.3 fl      MPV 10.1 fL      Platelets 186 10*3/mm3      Neutrophil % 81.9 %      Lymphocyte % 3.5 %      Monocyte % 11.2 %      Eosinophil % 2.0 %      Basophil % 0.6 %      Immature Grans % 0.8 %      Neutrophils, Absolute 5.21 10*3/mm3      Lymphocytes, Absolute 0.22 10*3/mm3      Monocytes, Absolute 0.71 10*3/mm3      Eosinophils, Absolute 0.13 10*3/mm3      Basophils, Absolute 0.04 10*3/mm3      Immature Grans, Absolute 0.05 10*3/mm3      nRBC 0.0 /100 WBC             Imaging Results (Last 24 Hours)       Procedure Component Value Units Date/Time    CT Outside Films [[id number]] Resulted: 01/16/25 0923     Updated: 01/16/25 0923    Narrative:      This procedure was auto-finalized with no dictation required.    CT Outside Films [[id number]] Resulted: 01/16/25 0923     Updated: 01/16/25 0923    Narrative:      This procedure was auto-finalized with no dictation required.    CT Outside Films [[id number]] Resulted: 01/16/25 0923     Updated: 01/16/25 0923    Narrative:      This procedure was auto-finalized with no dictation required.            Results Review:    I reviewed the patient's new clinical results.  I reviewed the patient's new imaging results and agree with the interpretation.    Assessment & Plan       AMS (altered mental status)    69-year-old gentleman admitted with altered mental status and noted to be positive for COVID and flu and was recently hospitalized for urinary tract infection and did not get his antibiotics at home.  During the workup CT scan demonstrated a tiny bubble of pneumoperitoneum at an outside CT scan.  His CT scan was repeated on this admission and this was not mentioned in the report.  I reviewed the images and I do not see any free air.    On exam he is nontender.  He does have a history of gastric bypass.  He has no epigastric  tenderness.    Will place on Protonix.  Patient's with a history of gastric bypass oriented to risk of marginal ulcer.  Avoid NSAIDs.    Will sign off please call if needed      Rossana Garcia MD  01/16/25  13:11 EST

## 2025-01-16 NOTE — SIGNIFICANT NOTE
01/16/25 1358   Readmission Indications   Is the patient and/or family able to complete the readmission assessment questions? Yes  (Wife, Debbi)   Is this hospitalization related to the prior hospital diagnosis? No   Recommendation for rehospitalization   Did you speak with your physician prior to coming to the hospital No   Follow-up Appointments   Do you have a PCP? Yes  (Jhonny Heller)   Did you have an appointment with PCP after your hospitalization? No   Did you have an appointment with a Specialist? No  (Urology)   Are you current with the Pulmonary Clinic? No   Are you current with the CHF Clinic? No   Medications   Did you have newly prescribed medications at discharge? Yes  (Cefdinir)   Did you understand the reasons for your medications at discharge and how to take them? No   Did you understand the side effects of your medications? No   Are you taking all of you prescribed medications? No   If not, why? Drug unavailable  ([facility], drug arrived 1[facility]16.)   What pharmacy was used to fill prescription(s[facility]   Were medications picked up? No   Discharge Instructions   Did you understand your discharge instructions? Yes   Did your family/caregiver hear your instructions? Yes   Were you told to eat a special diet? Yes   Did you adhere to the diet? Yes   Were you given a number of someone to call if you had questions or concerns? Yes   Index discharge location/services   Where did you go upon discharge? Home with services   Do you have supportive family or friends in the home? Yes   What services were arranged at discharge? Home Health   [facility]   Have you had a Home Health visit since discharge? Yes  (1/14)   Discharge Readiness   On a scale of 1-5 (5 being well prepared), how ready were you for discharge 4   Recommendation based on interview Education on diagnosis/self management;Additional caregiver support   Palliative Care/Hospice   Are  you current with Palliative Care? No   Are you current with Hospice Care? No   Advance Directives (For Healthcare)   Pre-existing AND/MOST/POLST Order No   Advance Directive Status Patient does not have advance directive   Have you reviewed your Advance Directive and is it valid for this stay? Not applicable   Patient Requests Assistance on Advance Directives Patient Declined   Readmission Assessment Final Comments   Final Comments Previous: Sepsis, source of infection ureteral stent. WBC 15.2. Lactic 2.4. Tachycardia. Urology consult. IV Rocephin. Urology verified the stent is in correct position and no acute urological intervention needed and antibiotics continued. PT/OT recommended home health, wife is caregiver. [facility] Drug arrived 1/16 per wife and patient was discharged 1/12.  Current: AMS, Covid & Flu +, CT chest moderate pleural effusion, Lactic 2.4, repeat 2.7.  Temp 102. Requiring 2L O2. 3L IV fluids, Tamiflu. IV cefepime and vancomycin. D5 infusion. Perry catheter.  ID, pulm, wound care, general surgery, diabetes educator consults. Patient is bed bound due to MS. NPO-obtain SLP eval. To start Symbicort, prednisone. CT abdomen pelvis from OSH incidentally notes 1 tiny bubble of pneumoperitoneum in the left upper quadrant, uncertain etiology, General Surgery consulted. PT/OT priyaals. LACE 12.

## 2025-01-16 NOTE — THERAPY EVALUATION
Patient Name: Jamin Hennessy  : 1955    MRN: [id number]                              Today's Date: 2025       Admit Date: 1/15/2025    Visit Dx: No diagnosis found.  Patient Active Problem List   Diagnosis    Atrial fibrillation with RVR    Coronary artery disease due to lipid rich plaque    CAD S/P percutaneous coronary angioplasty    Essential hypertension    Dyslipidemia    Non-insulin dependent type 2 diabetes mellitus    Diabetic neuropathy    GERD without esophagitis    BPH with obstruction/lower urinary tract symptoms    B12 deficiency    Vitamin D deficiency    JARRETT (generalized anxiety disorder)    Tobacco abuse    Obesity (BMI 30-39.9)    Acute bacterial endocarditis    S/P MVR (mitral valve replacement)    Endocarditis of mitral valve    Non-sustained ventricular tachycardia    Acute myocardial infarction    Hypercholesterolemia    Shortness of breath    Sepsis associated hypotension    Chest pain    Vitamin E deficiency    HCAP (healthcare-associated pneumonia)    Pneumonia of left lung due to infectious organism    Eosinophilia    Impetigo    Microcytic anemia    Elevated liver enzymes    Paroxysmal atrial fibrillation    Preoperative cardiovascular examination    Sepsis without acute organ dysfunction    Pneumonia    Acute UTI (urinary tract infection)    Acute on chronic renal insufficiency    Lung cancer    Elevated troponin    UTI (urinary tract infection)    Complicated UTI (urinary tract infection)    Multiple sclerosis    Sacral ulcer    AMS (altered mental status)     Past Medical History:   Diagnosis Date    A-fib     CAD (coronary artery disease)     Elevated cholesterol     Hypertension     MI (myocardial infarction)     Non-insulin dependent type 2 diabetes mellitus      Past Surgical History:   Procedure Laterality Date    BRONCHOSCOPY N/A 3/4/2020    Procedure: BRONCHOSCOPY with bronchioalveolar lavage;  Surgeon: Melissa Cole MD;  Location: [facility];  Service:  "Pulmonary;  Laterality: N/A;   pneumonia    CARDIAC CATHETERIZATION      CARDIAC CATHETERIZATION N/A 1/20/2020    Procedure: Left Heart Cath;  Surgeon: Migdalia Beth MD;  Location: [facility] CATH INVASIVE LOCATION;  Service: Cardiovascular    CARDIAC CATHETERIZATION N/A 1/20/2020    Procedure: Left ventriculography;  Surgeon: Migdalia Beth MD;  Location: [facility] CATH INVASIVE LOCATION;  Service: Cardiovascular    CARDIAC CATHETERIZATION N/A 1/20/2020    Procedure: Coronary angiography;  Surgeon: Migdalia Beth MD;  Location: [facility] CATH INVASIVE LOCATION;  Service: Cardiovascular    CORONARY ANGIOPLASTY WITH STENT PLACEMENT      CYSTOSCOPY, URETEROSCOPY, RETROGRADE PYELOGRAM, STENT INSERTION Right 12/17/2024    Procedure: CYSTOSCOPY URETEROSCOPY RETROGRADE PYELOGRAM HOLMIUM LASER STENT INSERTION;  Surgeon: Jamin Estrella MD;  Location: [facility] MAIN OR;  Service: Urology;  Laterality: Right;    MITRAL VALVE REPAIR/REPLACEMENT N/A 1/22/2020    Procedure: MITRAL VALVE REPAIR/REPLACEMENT;  Surgeon: Fallon Cole MD;  [facility];  Service: Cardiothoracic;  Laterality: N/A;  patient has endocarditis mitral valve replaced with 31mm knox II      General Information       Row Name 01/16/25 1531          Physical Therapy Time and Intention    Document Type evaluation  -BR     Mode of Treatment physical therapy  -BR       Row Name 01/16/25 1534 01/16/25 1531       General Information    Patient Profile Reviewed -- yes  -BR    Prior Level of Function -- dependent:;bed mobility;transfer  Pt reports he has not transferred out of bed with lift for \"a long time\". Pt tries to assist with rolling in bed with bed rails at home.  -BR    Existing Precautions/Restrictions other (see comments)  sacral/buttocks STI  -BR fall  -BR    Barriers to Rehab -- medically complex;previous functional deficit  -BR      Row Name 01/16/25 1531          Living Environment    People in Home spouse  -BR       " Row Name 01/16/25 1531          Home Main Entrance    Number of Stairs, Main Entrance none  -BR       Row Name 01/16/25 1531          Stairs Within Home, Primary    Number of Stairs, Within Home, Primary none  -BR       Row Name 01/16/25 1531          Cognition    Orientation Status (Cognition) oriented x 4  -BR       Row Name 01/16/25 1531          Safety Issues/Impairments Affecting Functional Mobility    Impairments Affecting Function (Mobility) strength;range of motion (ROM)  -BR               User Key  (r) = Recorded By, (t) = Taken By, (c) = Cosigned By      Initials Name Provider Type    BR Coreen Loaiza PT Physical Therapist                   Mobility       Row Name 01/16/25 1534          Bed Mobility    Bed Mobility rolling left;rolling right;scooting/bridging  -BR     Rolling Left [place] (Bed Mobility) maximum assist (25% patient effort);1 person to manage equipment  -BR     Rolling Right [place] (Bed Mobility) maximum assist (25% patient effort);1 person to manage equipment  -BR     Scooting/Bridging [place] (Bed Mobility) dependent (less than 25% patient effort);2 person assist  -BR     Assistive Device (Bed Mobility) repositioning sheet;bed rails  -BR       Row Name 01/16/25 1534          Bed-Chair Transfer    Comment, (Bed-Chair Transfer) Pt dependent for transfers at baseline.  -BR       Row Name 01/16/25 1534          Gait/Stairs (Locomotion)    Patient was able to Ambulate no, other medical factors prevent ambulation  -BR     Reason Patient was unable to Ambulate Non-Ambulatory at Baseline  -BR               User Key  (r) = Recorded By, (t) = Taken By, (c) = Cosigned By      Initials Name Provider Type    Coreen Duarte PT Physical Therapist                   Obj/Interventions       Row Name 01/16/25 1535          Range of Motion Comprehensive    Comment, General Range of Motion PROM BLE ~75% limitation at hips, knees and ankles  -BR       Row Name 01/16/25 1531           "Strength Comprehensive (MMT)    Comment, General Manual Muscle Testing (MMT) Assessment BLE 0/5  -BR       Row Name 01/16/25 1535          Balance    Balance Assessment sitting static balance  -BR     Static Sitting Balance unable to assess  -BR               User Key  (r) = Recorded By, (t) = Taken By, (c) = Cosigned By      Initials Name Provider Type    BR Coreen Loaiza, PT Physical Therapist                   Goals/Plan    No documentation.                  Clinical Impression       Row Name 01/16/25 1536          Pain    Pretreatment Pain Rating 0/10 - no pain  -BR     Posttreatment Pain Rating 0/10 - no pain  -BR     Pre/Posttreatment Pain Comment Pt did not flinch with RN trying to start IV in pt's left forearm.  -BR       Row Name 01/16/25 1536          Plan of Care Review    Plan of Care Reviewed With patient  -BR     Outcome Evaluation Jamin Hennessy is a 69 y.o. male with a CMH of MS, sacral ulcer, A-fib, coronary artery disease, type 2 diabetes mellitus, status post MVR, HLD, hypertension, GERD who presented to [facility] on 1/15/2025 from [facility] with altered mental status. Respiratory panel was positive for COVID and flu.  CT head with no evidence of acute findings.  CT chest revealed moderate left-sided pleural effusion.Patient was transferred to [facility] admitted to hospital service for further evaluation management of sepsis, COVID and influenza infection. Per wound care note, Patient has 2 open areas 1 is more to the left of the sacrum, the other to the left buttock. Pt A and Ox  4 . RN in room  trying to get IV started and pt does not flinch with the procedure. Pt reports living with spouse in an apt and being \"bedbound\" for ~ 2 years. He reports he no longer gets up to w/c with a [place] lift. Pt has 0/5 strength BLE and ~ 75% limitation in PROM BLE. Pt is at his baseline for mobility. PT will complete PT orders. PT recommendation is 24/7 care or ECF if caregiver burden is too great for " pt's spouse.  -BR       Row Name 01/16/25 1536          Therapy Assessment/Plan (PT)    Rehab Potential (PT) poor  -BR     Criteria for Skilled Interventions Met (PT) no;no problems identified which require skilled intervention  -BR     Therapy Frequency (PT) evaluation only  -BR       Row Name 01/16/25 1536          Vital Signs    Pre Systolic BP Rehab 125  -BR     Pre Treatment Diastolic BP 76  -BR     Pretreatment Heart Rate (beats/min) 117  -BR     Pre Patient Position Supine  -BR     Intra Patient Position Side Lying  -BR     Post Patient Position Supine  -BR       Row Name 01/16/25 1536          Positioning and Restraints    Pre-Treatment Position in bed  -BR     Post Treatment Position bed  -BR     In Bed notified nsg;fowlers;call light within reach;encouraged to call for assist;exit alarm on;side rails up x3;waffle boots/both  -BR               User Key  (r) = Recorded By, (t) = Taken By, (c) = Cosigned By      Initials Name Provider Type    BR Coreen Loaiza, PT Physical Therapist                   Outcome Measures       Row Name 01/16/25 1543 01/16/25 0800       How much help from another person do you currently need...    Turning from your back to your side while in flat bed without using bedrails? 1  -BR 1  -TC    Moving from lying on back to sitting on the side of a flat bed without bedrails? 1  -BR 1  -TC    Moving to and from a bed to a chair (including a wheelchair)? 1  -BR 1  -TC    Standing up from a chair using your arms (e.g., wheelchair, bedside chair)? 1  -BR 1  -TC    Climbing 3-5 steps with a railing? 1  -BR 1  -TC    To walk in hospital room? 1  -BR 1  -TC    AM-PAC 6 Clicks Score (PT) 6  -BR 6  -TC    Highest Level of Mobility Goal 2 --> Bed activities/dependent transfer  -BR 2 --> Bed activities/dependent transfer  -TC      Row Name 01/16/25 1543          Functional Assessment    Outcome Measure Options AM-PAC 6 Clicks Basic Mobility (PT)  -BR               User Key  (r) = Recorded By,  "(t) = Taken By, (c) = Cosigned By      Initials Name Provider Type    BR Coreen Loaiza PT Physical Therapist    Ramón Jennings RN Registered Nurse                                 Physical Therapy Education       Title: PT OT SLP Therapies (Done)       Topic: Physical Therapy (Done)       Point: Mobility training (Done)       Learning Progress Summary            Patient Acceptance, E,D, VU,DU by BR at 1/16/2025 1544                      Point: Body mechanics (Done)       Learning Progress Summary            Patient Acceptance, E,D, VU,DU by BR at 1/16/2025 1544                      Point: Precautions (Done)       Learning Progress Summary            Patient Acceptance, E,D, VU,DU by BR at 1/16/2025 1544                                      User Key       Initials Effective Dates Name Provider Type Discipline    PUJA 02/01/22 -  Coreen Loaiza PT Physical Therapist PT                  PT Recommendation and Plan     Outcome Evaluation: Jamin Hennessy is a 69 y.o. male with a CMH of MS, sacral ulcer, A-fib, coronary artery disease, type 2 diabetes mellitus, status post MVR, HLD, hypertension, GERD who presented [facility] 1/15/20[facility] from [facility] with altered mental status. Respiratory panel was positive for COVID and flu.  CT head with no evidence of acute findings.  CT chest revealed moderate left-sided pleural effusion.Patient was transferred to [facility] admitted to hospital service for further evaluation management of sepsis, COVID and influenza infection. Per wound care note, Patient has 2 open areas 1 is more to the left of the sacrum, the other to the left buttock. Pt A and Ox  4 . RN in room  trying to get IV started and pt does not flinch with the procedure. Pt reports living with spouse in an apt and being \"bedbound\" for ~ 2 years. He reports he no longer gets up to w/c with a [place] lift. Pt has 0/5 strength BLE and ~ 75% limitation in PROM BLE. Pt is at his baseline for mobility. PT will " complete PT orders. PT recommendation is 24/7 care or ECF if caregiver burden is too great for pt's spouse.     Time Calculation:         PT Charges       Row Name 01/16/25 1544             Time Calculation    Start Time 1510  -BR      Stop Time 1529  -BR      Time Calculation (min) 19 min  -BR      PT Received On 01/16/25  -BR         Time Calculation- PT    Total Timed Code Minutes- PT 0 minute(s)  -BR                User Key  (r) = Recorded By, (t) = Taken By, (c) = Cosigned By      Initials Name Provider Type    Coreen Duarte, PT Physical Therapist                  Therapy Charges for Today       Code Description Service Date Service Provider Modifiers Qty    59808735491 HC PT EVAL LOW COMPLEXITY 3 1/16/2025 Coreen Loaiza, PT GP 1            PT G-Codes  Outcome Measure Options: AM-PAC 6 Clicks Basic Mobility (PT)  AM-PAC 6 Clicks Score (PT): 6  PT Discharge Summary  Anticipated Discharge Disposition (PT): home with 24/7 care    Coreen Loaiza, PT  1/16/2025

## 2025-01-16 NOTE — PLAN OF CARE
"Goal Outcome Evaluation:  Plan of Care Reviewed With: patient        Progress: improving  Outcome Evaluation: Patient is bedbound at home. PT is recommending patient go home with 24/7 care. Dr. Garcia with general surgery will see patient at some point today, and patient is NPO in case they want to do surgery for incidental \"tiny\" pneumoperitoneum of the LUQ. Patient was ordered IV steroids, inhalers, and incentive spirometer per pulmonolgy for COVID. Patient does have left pleural effusion, but no intervention needed at this time per pulmonology.                             "

## 2025-01-16 NOTE — CASE MANAGEMENT/SOCIAL WORK
Discharge Planning Assessment   [place]     Patient Name: Jamin Hennessy  MRN: [id number]  Today's Date: 1/16/2025    Admit Date: 1/15/2025    Plan: From home with [facility]. (need criss order). PT/OT evals pending. EMS transport.   Discharge Needs Assessment       Row Name 01/16/25 1346       Living Environment    People in Home spouse    Name(s) of People in Home Wife, Debbi    Current Living Arrangements apartment    Potentially Unsafe Housing Conditions none    In the past 12 months has the electric, gas, oil, or water company threatened to shut off services in your home? No    Primary Care Provided by homecare agency;spouse/significant other    Provides Primary Care For no one, unable/limited ability to care for self    Family Caregiver if Needed spouse    Family Caregiver Names Debbi    Quality of Family Relationships helpful;involved;supportive    Able to Return to Prior Arrangements yes       Resource/Environmental Concerns    Resource/Environmental Concerns home accessibility    Transportation Concerns none       Transportation Needs    In the past 12 months, has lack of transportation kept you from medical appointments or from getting medications? no    In the past 12 months, has lack of transportation kept you from meetings, work, or from getting things needed for daily living? No       Food Insecurity    Within the past 12 months, you worried that your food would run out before you got the money to buy more. Never true    Within the past 12 months, the food you bought just didn't last and you didn't have money to get more. Never true       Transition Planning    Patient/Family Anticipates Transition to home with help/services    Patient/Family Anticipated Services at Transition home health care    Transportation Anticipated health plan transportation       Discharge Needs Assessment    Readmission Within the Last 30 Days current reason for admission unrelated to previous admission    Current  Outpatient/Agency/Support Group homecare agency    Equipment Currently Used at Home ramp;lift device;hospital bed;wheelchair;shower chair;other (see comments)  standing lift, [place]sc, [place]    Concerns to be Addressed discharge planning    Do you want help finding or keeping work or a job? I do not need or want help    Do you want help with school or training? For example, starting or completing job training or getting a high school diploma, GED or equivalent No    Anticipated Changes Related to Illness inability to care for self    Equipment Needed After Discharge none    Outpatient/Agency/Support Group Needs homecare agency    Current Discharge Risk chronically ill;physical impairment                   Discharge Plan       Row Name 01/16/25 0014       Plan    Plan From home with [facility] . (need criss order). PT/OT evals pending. EMS transport.    Patient/Family in Agreement with Plan yes    Plan Comments CM called wifeDebbi to discuss readmission and Cm assessment. Confirmed PCP, insurance. A[facility]able [facility]. Wife Debbi provides daily care for patient. Patient has a hospital bed, bsc, [place] lift and stand lift, wheelchair.  Patient is current with [facility] . Patient will require EMS transport at discharge due to being bed bound. DC Barriers: [id number], npo for speech eval, IV steroids, PT/OT evals, IV abx x2, 2L O2                  Demographic Summary       Row Name 01/16/25 1346       General Information    Admission Type inpatient    Arrived From emergency department    Referral Source admission list    Reason for Consult discharge planning    Preferred Language English       Contact Information    Permission Granted to Share Info With                    Functional Status       Row Name 01/16/25 1342       Functional Status    Usual Activity Tolerance poor    Current Activity Tolerance poor       Functional Status, IADL    Medications independent     Meal Preparation completely dependent    Housekeeping completely dependent    Laundry completely dependent    Shopping completely dependent    IADL Comments wife is caregiver             Aida Russell RN     Office: [phone]

## 2025-01-16 NOTE — CONSULTS
Group: [facility]         CONSULT NOTE    Patient Identification:  Jamin Hennessy  69 y.o.  male  1955  [id number]            Requesting physician: Attending physician    Reason for Consultation: Pleural effusion, pneumonia and hypoxia      History of Present Illness:  69-year-old male with history of paroxysmal atrial fibrillation, CAD, status post MVR, HTN, DM, HLD and GERD who presented 1/15/2025 with altered mental status, confusion and hallucinations.  He had a recent admission from 1/10/2025 - 1/12/2025 for UTI and sepsis in the presence of ureteral stent and was discharged home on cefdinir.  CT of the chest revealed moderate left-sided pleural effusion and consolidation left lower lobe.  Initially patient had temperature 102.1 but is afebrile today, blood pressure 121/73 and oxygen saturation 96% on room air, .      CT Abdomen Pelvis Stone Protocol    Result Date: 1/10/2025  Impression: 1.There is an indwelling right ureteral stent which appears adequately positioned. Bilateral nonobstructive nephrolithiasis. No ureteral calculi are identified. 2.Left pleural effusion and left basilar atelectasis versus infiltrate, partially visualized. 3.Hepatic steatosis. 4.Postsurgical changes of gastric bypass. 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 5.Additional findings as detailed above. Electronically Signed: Aristides Polanco MD  1/10/2025 8:58 AM EST  Workstation ID: [id number]       Assessment:    Small Left pleural effusion  Hypoxemia  UTI status post right ureteral stent  Pneumoperitoneum  Paroxysmal atrial fibrillation  CAD  Status post mitral valve replacement  Diabetes mellitus  Multiple sclerosis  Hypothyroidism  Chronic sacral ulcer  BPH  GERD  Anemia    PFTs 1/18/2020: Restrictive pattern with decreased oxygen diffusion capacity: FEV1/FVC 74. FEV1 is 1.42 which is 46% predicted FVC is 1.91 which is 43% predicted. There is 24% improvement in FEV1 value after  bronchodilator challenge. MVV is 26% predicted. Diffusion capacity is 46%     Recommendations:  The Left effusion is small, monitor it for now    Pt is wheezing: start symbicort and prednisone  Incentive spirometry    Oxygen supplement and titration to maintain saturation 90 to 95%: Currently on 2 L per nasal cannula    Antibiotics: Cefepime vancomycin    Glucose control    Chronic anticoagulation: Eliquis currently on hold    HR control: On amiodarone and metoprolol at home: Currently on hold      I personally reviewed the radiological studies      Review of Sytems:  Constitutional: Negative for chills, and fever and positive for malaise/fatigue.   HENT: Negative.    Eyes: Negative.    Cardiovascular: Negative.    Respiratory: Positive for cough and shortness of breath.    Skin: Negative.    Musculoskeletal: Negative.    Gastrointestinal: Negative.    Genitourinary: Negative.    Neurological: Negative.    Psychiatric/Behavioral: Negative.    Past Medical History:  Past Medical History:   Diagnosis Date    A-fib     CAD (coronary artery disease)     Elevated cholesterol     Hypertension     MI (myocardial infarction)     Non-insulin dependent type 2 diabetes mellitus        Past Surgical History:  Past Surgical History:   Procedure Laterality Date    BRONCHOSCOPY N/A 3/4/2020    Procedure: BRONCHOSCOPY with bronchioalveolar lavage;  Surgeon: Melissa Cole MD;  Location: [facility] ENDOSCOPY;  Service: Pulmonary;  Laterality: N/A;   pneumonia    CARDIAC CATHETERIZATION      CARDIAC CATHETERIZATION N/A 1/20/2020    Procedure: Left Heart Cath;  Surgeon: Migdalia Beth MD;  Location: [facility] CATH INVASIVE LOCATION;  Service: Cardiovascular    CARDIAC CATHETERIZATION N/A 1/20/2020    Procedure: Left ventriculography;  Surgeon: Migdalia Beth MD;  Location: [facility] CATH INVASIVE LOCATION;  Service: Cardiovascular    CARDIAC CATHETERIZATION N/A 1/20/2020    Procedure: Coronary angiography;  Surgeon:  Migdalia Beth MD;  Location: [facility] CATH INVASIVE LOCATION;  Service: Cardiovascular    CORONARY ANGIOPLASTY WITH STENT PLACEMENT      CYSTOSCOPY, URETEROSCOPY, RETROGRADE PYELOGRAM, STENT INSERTION Right 12/17/2024    Procedure: CYSTOSCOPY URETEROSCOPY RETROGRADE PYELOGRAM HOLMIUM LASER STENT INSERTION;  Surgeon: Jamin Estrella MD;  Location: [facility] MAIN OR;  Service: Urology;  Laterality: Right;    MITRAL VALVE REPAIR/REPLACEMENT N/A 1/22/2020    Procedure: MITRAL VALVE REPAIR/REPLACEMENT;  Surgeon: Fallon Cole MD;  Location: [facility] CVOR;  Service: Cardiothoracic;  Laterality: N/A;  patient has endocarditis mitral valve replaced with 31mm knox II        Home Meds:  Medications Prior to Admission   Medication Sig Dispense Refill Last Dose/Taking    acetaminophen (TYLENOL) 325 MG tablet Take 2 tablets by mouth Every 8 (Eight) Hours As Needed for Mild Pain.   Past Week    acetic acid 0.25 % irrigation Irrigate with 15 mL to the affected area as directed by provider Daily.   Past Week    amiodarone (PACERONE) 200 MG tablet Take 1 tablet by mouth Daily.   Past Week    apixaban (ELIQUIS) 5 MG tablet tablet Take 1 tablet by mouth Every 12 (Twelve) Hours. 60 tablet 3 Past Week    aspirin 81 MG EC tablet Take 1 tablet by mouth Daily.   Past Week    atorvastatin (LIPITOR) 40 MG tablet Take 1 tablet by mouth Every Night.   Past Week    carboxymethylcellulose (REFRESH PLUS) 0.5 % solution Administer 1 drop to both eyes 4 (Four) Times a Day.   Past Week    cyclobenzaprine (FLEXERIL) 5 MG tablet Take 1 tablet by mouth 2 (Two) Times a Day As Needed for Muscle Spasms. 15 tablet 0 Past Week    ferrous sulfate 325 (65 FE) MG tablet Take 1 tablet by mouth 3 (Three) Times a Week. Monday, Wednesday, Friday.   Past Week    FLUoxetine (PROzac) 20 MG capsule Take 2 capsules by mouth Daily.   Past Week    furosemide (LASIX) 40 MG tablet Take 1 tablet by mouth Daily.   Past Week    glatiramer acetate (GLATOPA) 20  MG/ML injection Inject 1 mL under the skin into the appropriate area as directed Daily.   Past Week    levothyroxine (SYNTHROID, LEVOTHROID) 100 MCG tablet Take 1 tablet by mouth Daily.   Past Week    metoprolol tartrate (LOPRESSOR) 50 MG tablet Take 1 tablet by mouth 2 (Two) Times a Day for 30 days. (Patient taking differently: Take 0.5 tablets by mouth 2 (Two) Times a Day.) 60 tablet 0 Past Week    mirtazapine (REMERON) 15 MG tablet Take 0.5 tablets by mouth Every Night.   Past Week    omeprazole (priLOSEC) 20 MG capsule Take 1 capsule by mouth Daily.   Past Week    risperiDONE (risperDAL M-TABS) 0.25 MG tablet dispersible disintegrating tablet Place 1 tablet on the tongue Every Night.   Past Week    vitamin B-12 (CYANOCOBALAMIN) 1000 MCG tablet Take 1 tablet by mouth Daily.   Past Week    cefdinir (OMNICEF) 300 MG capsule Take 1 capsule by mouth 2 (Two) Times a Day for 4 days. 8 capsule 0 Unknown    Cholecalciferol 25 MCG (1000 UT) tablet Take 1 tablet by mouth Daily.   Unknown    finasteride (PROSCAR) 5 MG tablet Take 1 tablet by mouth Daily.   Unknown    glipizide (Glucotrol) 5 MG tablet Take 1 tablet by mouth Daily for 30 days. 30 tablet 0 Unknown    guaiFENesin (MUCINEX) 600 MG 12 hr tablet Take 2 tablets by mouth 2 (Two) Times a Day.   Unknown    loperamide (IMODIUM) 2 MG capsule Take 1 capsule by mouth 2 (Two) Times a Day As Needed for Diarrhea.   Unknown    midodrine (PROAMATINE) 5 MG tablet Take 1 tablet by mouth 3 (Three) Times a Day Before Meals.   Unknown    multivitamin with minerals tablet tablet Take 1 tablet by mouth Daily.   Unknown    ondansetron (ZOFRAN) 8 MG tablet Take 1 tablet by mouth Every 8 (Eight) Hours As Needed for Nausea.   Unknown    promethazine (PHENERGAN) 25 MG tablet Take 0.5 tablets by mouth Every 6 (Six) Hours As Needed for Nausea or Vomiting.   Unknown    sodium chloride (NS) 0.9 % irrigation Irrigate with  to the affected area as directed by provider Daily. For molina catheter   None " Unknown    tamsulosin (FLOMAX) 0.4 MG capsule 24 hr capsule Take 2 capsules by mouth Every Night.   Unknown       Allergies:  No Known Allergies    Social History:   Social History     Socioeconomic History    Marital status:    Tobacco Use    Smoking status: Former     Current packs/day: 0.25     Types: Cigarettes    Smokeless tobacco: Current     Types: Chew   Vaping Use    Vaping status: Never Used   Substance and Sexual Activity    Alcohol use: Not Currently    Drug use: Never    Sexual activity: Defer       Family History:  Family History   Problem Relation Age of Onset    Hypertension Mother        Physical Exam:  /73 (BP Location: Left arm, Patient Position: Lying)   Pulse 115   Temp 95.8 °F (35.4 °C) (Temporal)   Resp 20   Ht 177.8 cm (70\")   Wt 114 kg (251 lb 5.2 oz)   SpO2 96%   BMI 36.06 kg/m²  Body mass index is 36.06 kg/m². 96% 114 kg (251 lb 5.2 oz)  General Appearance:  Alert   HEENT:  Normocephalic, without obvious abnormality, Conjunctiva/corneas clear,.   Nares normal, no drainage     Neck:  Supple, symmetrical, trachea midline. No JVD.  Lungs /Chest wall:   wheezing, Bilateral basal rhonchi, respirations unlabored, symmetrical wall movement.     Heart:  Regular rate and rhythm, S1 S2 normal  Abdomen: Soft, non-tender, no masses, no organomegaly.    Extremities: trace edema, no clubbing or cyanosis    LABS:  Lab Results   Component Value Date    CALCIUM 8.5 (L) 01/16/2025    PHOS 3.7 09/30/2024     Results from last 7 days   Lab Units 01/16/25  0235 01/15/25  1630 01/12/25  0250 01/11/25  1611   MAGNESIUM mg/dL 1.8 1.7  --   --    SODIUM mmol/L 141 140 143 142   POTASSIUM mmol/L 4.1 3.9 3.8 3.8   CHLORIDE mmol/L 105 102 105 104   CO2 mmol/L 24.0 26.7 27.7 28.8   BUN mg/dL 20 20 22 23   CREATININE mg/dL 1.19 1.32* 1.15 1.24   GLUCOSE mg/dL 142* 177* 156* 218*   CALCIUM mg/dL 8.5* 8.6 8.8 8.8   WBC 10*3/mm3 5.74 6.36 6.87 7.42   HEMOGLOBIN g/dL 10.7* 10.9* 10.6* 11.0*   PLATELETS " 10*3/mm3 172 186 188 187   ALT (SGPT) U/L  --   --  20 23   AST (SGOT) U/L  --   --  24 24   PROCALCITONIN ng/mL  --  0.16  --   --      Lab Results   Component Value Date    CKTOTAL 33 02/28/2020    TROPONINT 37 (H) 09/24/2024         Results from last 7 days   Lab Units 01/10/25  1347 01/10/25  1201   BLOODCX   --  No growth at 5 days   URINECX  <25,000 CFU/mL Mixed Cheryl Isolated  --      Results from last 7 days   Lab Units 01/15/25  1953 01/15/25  1630   PROCALCITONIN ng/mL  --  0.16   LACTATE mmol/L 1.5 2.7*                 Results from last 7 days   Lab Units 01/10/25  1347 01/10/25  1201   BLOODCX   --  No growth at 5 days   URINECX  <25,000 CFU/mL Mixed Cheryl Isolated  --      Lab Results   Component Value Date    TSH 2.210 09/24/2024     Estimated Creatinine Clearance: 74.1 mL/min (by C-G formula based on SCr of 1.19 mg/dL).  Results from last 7 days   Lab Units 01/15/25  1954 01/10/25  1347   NITRITE UA  Negative Negative   WBC UA /HPF Too Numerous to Count* Too Numerous to Count*   BACTERIA UA /HPF Unable to determine due to loaded field* Trace*   SQUAM EPITHEL UA /HPF None Seen 3-6*   URINECX   --  <25,000 CFU/mL Mixed Cheryl Isolated        Imaging:  Imaging Results (Last 24 Hours)       ** No results found for the last 24 hours. **              Current Meds:   SCHEDULE  cefepime, 2,000 mg, Intravenous, Q8H  insulin lispro, 2-7 Units, Subcutaneous, Q6H  sodium chloride, 10 mL, Intravenous, Q12H  vancomycin, 1,000 mg, Intravenous, Q12H      Infusions  dextrose 5 % and sodium chloride 0.9 %, 75 mL/hr, Last Rate: 75 mL/hr (01/15/25 1942)  Pharmacy to dose vancomycin,       PRNs    acetaminophen **OR** acetaminophen **OR** acetaminophen    senna-docusate sodium **AND** polyethylene glycol **AND** bisacodyl **AND** bisacodyl    Calcium Replacement - Follow Nurse / BPA Driven Protocol    dextrose    dextrose    glucagon (human recombinant)    Magnesium Standard Dose Replacement - Follow Nurse / BPA Driven  Protocol    melatonin    ondansetron ODT **OR** ondansetron    Pharmacy to dose vancomycin    Phosphorus Replacement - Follow Nurse / BPA Driven Protocol    Potassium Replacement - Follow Nurse / BPA Driven Protocol    sodium chloride    sodium chloride        Deandre Enamorado MD  1/16/2025  09:01 EST      Much of this encounter note is an electronic transcription/translation of spoken language to printed text using Dragon Software.

## 2025-01-16 NOTE — NURSING NOTE
69-year-old male presents to the hospital with altered mental status changes.  Patient is awake and alert.  Not the best historian.  He believes that the wounds have been present for several months.  He thinks they started when he was at a rehab facility.  He is unaware of any current treatment.  Patient is incontinent of stool has a Perry catheter in place.  Patient has generalized edema and is very difficult to turn in position change.    Patient has 2 open areas 1 is more to the left of the sacrum, the other to the left buttock.  Both appear to be middermal stage twos.  I do believe the more sacral buttocks.  1 is most likely related to the incontinence into moisture and shear but I do believe pressure is a component to the injury.  Because of where the areas are located and because of his loose stools.  I would recommend using Stephanie's Magic Butt cream at this time.  Also would recommend placing the patient on an agility offloading surface.  Patient does have a red papular rash to all skin folds including the axilla.  Will recommend a 2% miconazole powder to those areas.  Patient has offloading boots in place.  Recommend to continue with pressure injury prevention strategies

## 2025-01-17 LAB
ALBUMIN SERPL-MCNC: 3.1 G/DL (ref 3.5–5.2)
ALP SERPL-CCNC: 53 U/L (ref 39–117)
ALT SERPL W P-5'-P-CCNC: 33 U/L (ref 1–41)
ANION GAP SERPL CALCULATED.3IONS-SCNC: 9.4 MMOL/L (ref 5–15)
ANISOCYTOSIS BLD QL: ABNORMAL
AST SERPL-CCNC: 45 U/L (ref 1–40)
BACTERIA SPEC AEROBE CULT: NO GROWTH
BILIRUB CONJ SERPL-MCNC: 0.1 MG/DL (ref 0–0.3)
BILIRUB INDIRECT SERPL-MCNC: 0.1 MG/DL
BILIRUB SERPL-MCNC: 0.2 MG/DL (ref 0–1.2)
BUN SERPL-MCNC: 21 MG/DL (ref 8–23)
BUN/CREAT SERPL: 16.2 (ref 7–25)
CALCIUM SPEC-SCNC: 8 MG/DL (ref 8.6–10.5)
CHLORIDE SERPL-SCNC: 109 MMOL/L (ref 98–107)
CO2 SERPL-SCNC: 23.6 MMOL/L (ref 22–29)
CREAT SERPL-MCNC: 1.3 MG/DL (ref 0.76–1.27)
DACRYOCYTES BLD QL SMEAR: ABNORMAL
DEPRECATED RDW RBC AUTO: 62.9 FL (ref 37–54)
EGFRCR SERPLBLD CKD-EPI 2021: 59.5 ML/MIN/1.73
ERYTHROCYTE [DISTWIDTH] IN BLOOD BY AUTOMATED COUNT: 19.2 % (ref 12.3–15.4)
GLUCOSE BLDC GLUCOMTR-MCNC: 134 MG/DL (ref 70–105)
GLUCOSE BLDC GLUCOMTR-MCNC: 160 MG/DL (ref 70–105)
GLUCOSE BLDC GLUCOMTR-MCNC: 217 MG/DL (ref 70–105)
GLUCOSE BLDC GLUCOMTR-MCNC: 223 MG/DL (ref 70–105)
GLUCOSE BLDC GLUCOMTR-MCNC: 295 MG/DL (ref 70–105)
GLUCOSE SERPL-MCNC: 211 MG/DL (ref 65–99)
HCT VFR BLD AUTO: 33 % (ref 37.5–51)
HGB BLD-MCNC: 9.5 G/DL (ref 13–17.7)
LARGE PLATELETS: ABNORMAL
LYMPHOCYTES # BLD MANUAL: 0.6 10*3/MM3 (ref 0.7–3.1)
LYMPHOCYTES NFR BLD MANUAL: 5 % (ref 5–12)
MAGNESIUM SERPL-MCNC: 1.8 MG/DL (ref 1.6–2.4)
MCH RBC QN AUTO: 25.6 PG (ref 26.6–33)
MCHC RBC AUTO-ENTMCNC: 28.8 G/DL (ref 31.5–35.7)
MCV RBC AUTO: 88.9 FL (ref 79–97)
MICROCYTES BLD QL: ABNORMAL
MONOCYTES # BLD: 0.17 10*3/MM3 (ref 0.1–0.9)
NEUTROPHILS # BLD AUTO: 2.56 10*3/MM3 (ref 1.7–7)
NEUTROPHILS NFR BLD MANUAL: 58 % (ref 42.7–76)
NEUTS BAND NFR BLD MANUAL: 19 % (ref 0–5)
PLATELET # BLD AUTO: 152 10*3/MM3 (ref 140–450)
PMV BLD AUTO: 10.1 FL (ref 6–12)
POIKILOCYTOSIS BLD QL SMEAR: ABNORMAL
POLYCHROMASIA BLD QL SMEAR: ABNORMAL
POTASSIUM SERPL-SCNC: 3.6 MMOL/L (ref 3.5–5.2)
POTASSIUM SERPL-SCNC: 3.7 MMOL/L (ref 3.5–5.2)
PROT SERPL-MCNC: 6.5 G/DL (ref 6–8.5)
RBC # BLD AUTO: 3.71 10*6/MM3 (ref 4.14–5.8)
SCAN SLIDE: NORMAL
SMALL PLATELETS BLD QL SMEAR: ADEQUATE
SODIUM SERPL-SCNC: 142 MMOL/L (ref 136–145)
VANCOMYCIN PEAK SERPL-MCNC: 20.9 MCG/ML (ref 20–40)
VANCOMYCIN TROUGH SERPL-MCNC: 17.6 MCG/ML (ref 5–20)
VARIANT LYMPHS NFR BLD MANUAL: 11 % (ref 0–5)
VARIANT LYMPHS NFR BLD MANUAL: 7 % (ref 19.6–45.3)
WBC MORPH BLD: NORMAL
WBC NRBC COR # BLD AUTO: 3.33 10*3/MM3 (ref 3.4–10.8)

## 2025-01-17 PROCEDURE — 80202 ASSAY OF VANCOMYCIN: CPT | Performed by: STUDENT IN AN ORGANIZED HEALTH CARE EDUCATION/TRAINING PROGRAM

## 2025-01-17 PROCEDURE — 63710000001 INSULIN LISPRO (HUMAN) PER 5 UNITS

## 2025-01-17 PROCEDURE — 94799 UNLISTED PULMONARY SVC/PX: CPT

## 2025-01-17 PROCEDURE — 82948 REAGENT STRIP/BLOOD GLUCOSE: CPT

## 2025-01-17 PROCEDURE — 94761 N-INVAS EAR/PLS OXIMETRY MLT: CPT

## 2025-01-17 PROCEDURE — 25810000003 SODIUM CHLORIDE 0.9 % SOLUTION 250 ML FLEX CONT: Performed by: INTERNAL MEDICINE

## 2025-01-17 PROCEDURE — 80048 BASIC METABOLIC PNL TOTAL CA: CPT

## 2025-01-17 PROCEDURE — 25010000002 REMDESIVIR 100 MG/20ML SOLUTION 1 EACH VIAL: Performed by: INTERNAL MEDICINE

## 2025-01-17 PROCEDURE — 94664 DEMO&/EVAL PT USE INHALER: CPT

## 2025-01-17 PROCEDURE — 63710000001 PREDNISONE PER 1 MG: Performed by: INTERNAL MEDICINE

## 2025-01-17 PROCEDURE — 80076 HEPATIC FUNCTION PANEL: CPT | Performed by: INTERNAL MEDICINE

## 2025-01-17 PROCEDURE — 85025 COMPLETE CBC W/AUTO DIFF WBC: CPT

## 2025-01-17 PROCEDURE — 85007 BL SMEAR W/DIFF WBC COUNT: CPT

## 2025-01-17 PROCEDURE — 83735 ASSAY OF MAGNESIUM: CPT

## 2025-01-17 PROCEDURE — 25010000002 VANCOMYCIN 1.5-0.9 GM/500ML-% SOLUTION: Performed by: NURSE PRACTITIONER

## 2025-01-17 PROCEDURE — 25010000002 CEFTAZIDIME 2 G RECONSTITUTED SOLUTION 1 EACH VIAL: Performed by: INTERNAL MEDICINE

## 2025-01-17 PROCEDURE — 84132 ASSAY OF SERUM POTASSIUM: CPT | Performed by: FAMILY MEDICINE

## 2025-01-17 RX ORDER — INSULIN LISPRO 100 [IU]/ML
2-7 INJECTION, SOLUTION INTRAVENOUS; SUBCUTANEOUS
Status: DISCONTINUED | OUTPATIENT
Start: 2025-01-18 | End: 2025-01-22 | Stop reason: HOSPADM

## 2025-01-17 RX ORDER — VANCOMYCIN/0.9 % SOD CHLORIDE 1.5G/250ML
1500 PLASTIC BAG, INJECTION (ML) INTRAVENOUS EVERY 24 HOURS
Status: COMPLETED | OUTPATIENT
Start: 2025-01-17 | End: 2025-01-21

## 2025-01-17 RX ORDER — POTASSIUM CHLORIDE 1500 MG/1
40 TABLET, EXTENDED RELEASE ORAL EVERY 4 HOURS
Status: COMPLETED | OUTPATIENT
Start: 2025-01-17 | End: 2025-01-17

## 2025-01-17 RX ADMIN — INSULIN LISPRO 3 UNITS: 100 INJECTION, SOLUTION INTRAVENOUS; SUBCUTANEOUS at 01:32

## 2025-01-17 RX ADMIN — OSELTAMIVIR PHOSPHATE 75 MG: 75 CAPSULE ORAL at 20:05

## 2025-01-17 RX ADMIN — GUAIFENESIN 1200 MG: 600 TABLET ORAL at 09:57

## 2025-01-17 RX ADMIN — Medication 10 ML: at 09:59

## 2025-01-17 RX ADMIN — BUDESONIDE AND FORMOTEROL FUMARATE DIHYDRATE 2 PUFF: 160; 4.5 AEROSOL RESPIRATORY (INHALATION) at 08:16

## 2025-01-17 RX ADMIN — OSELTAMIVIR PHOSPHATE 75 MG: 75 CAPSULE ORAL at 09:56

## 2025-01-17 RX ADMIN — MICONAZOLE NITRATE 1 APPLICATION: 20 POWDER TOPICAL at 10:16

## 2025-01-17 RX ADMIN — PREDNISONE 40 MG: 20 TABLET ORAL at 09:57

## 2025-01-17 RX ADMIN — SODIUM CHLORIDE 100 MG: 9 INJECTION, SOLUTION INTRAVENOUS at 16:13

## 2025-01-17 RX ADMIN — POTASSIUM CHLORIDE 40 MEQ: 1500 TABLET, EXTENDED RELEASE ORAL at 11:06

## 2025-01-17 RX ADMIN — INSULIN LISPRO 4 UNITS: 100 INJECTION, SOLUTION INTRAVENOUS; SUBCUTANEOUS at 18:32

## 2025-01-17 RX ADMIN — Medication 1 APPLICATION: at 10:16

## 2025-01-17 RX ADMIN — GUAIFENESIN 1200 MG: 600 TABLET ORAL at 20:05

## 2025-01-17 RX ADMIN — INSULIN LISPRO 2 UNITS: 100 INJECTION, SOLUTION INTRAVENOUS; SUBCUTANEOUS at 06:33

## 2025-01-17 RX ADMIN — BUDESONIDE AND FORMOTEROL FUMARATE DIHYDRATE 2 PUFF: 160; 4.5 AEROSOL RESPIRATORY (INHALATION) at 20:36

## 2025-01-17 RX ADMIN — POTASSIUM CHLORIDE 40 MEQ: 1500 TABLET, EXTENDED RELEASE ORAL at 06:33

## 2025-01-17 RX ADMIN — Medication 1500 MG: at 13:55

## 2025-01-17 RX ADMIN — ACETAMINOPHEN 650 MG: 325 TABLET, FILM COATED ORAL at 20:05

## 2025-01-17 RX ADMIN — INSULIN LISPRO 3 UNITS: 100 INJECTION, SOLUTION INTRAVENOUS; SUBCUTANEOUS at 23:56

## 2025-01-17 RX ADMIN — PANTOPRAZOLE SODIUM 40 MG: 40 INJECTION, POWDER, FOR SOLUTION INTRAVENOUS at 18:33

## 2025-01-17 RX ADMIN — CEFTAZIDIME 2000 MG: 2 INJECTION, POWDER, FOR SOLUTION INTRAVENOUS at 23:50

## 2025-01-17 RX ADMIN — CEFTAZIDIME 2000 MG: 2 INJECTION, POWDER, FOR SOLUTION INTRAVENOUS at 15:23

## 2025-01-17 RX ADMIN — MICONAZOLE NITRATE 1 APPLICATION: 20 POWDER TOPICAL at 20:05

## 2025-01-17 RX ADMIN — CEFTAZIDIME 2000 MG: 2 INJECTION, POWDER, FOR SOLUTION INTRAVENOUS at 06:33

## 2025-01-17 NOTE — CONSULTS
Nutrition Services  Patient Name: Jamin Hennessy  YOB: 1955  MRN: [id number]  Admission date: 1/15/2025    Start trial of Boost Glucose Control BID (Provides 380 kcals, 32 g protein if consumed)     NUTRITION SCREENING      Trending Narrative: 1/17: Pt assessed due to skin concerns. Pt currently with stage 2 areas of breakdown. Has Hx gastric bypass -- would benefit from low-CHO high PRO supplement to support adequate intake/skin healing. Will start supplement today. Documented weight Hx reflects variance in recent weights versus long-term UBW; based on NFPE seems more C/W UBW range -- does not currently meet criteria for malnutrition Dx per AND/ASPEN guidelines. Will continue to monitor.        PO Diet: Diet: Regular/House; Fluid Consistency: Thin (IDDSI 0)   PO Supplements: None ordered    Trending PO Intake:  100% intake at one recent meal - will continue to monitor        Nutritionally-Pertinent Medications RDN Reviewed, C/W clinical course         Labs (reviewed below): Reviewed      Results from last 7 days   Lab Units 01/17/25  1057 01/17/25  0158 01/16/25  0235 01/15/25  1630 01/12/25  0250 01/11/25  1611   SODIUM mmol/L  --  142 141 140 143 142   POTASSIUM mmol/L 3.7 3.6 4.1 3.9 3.8 3.8   CHLORIDE mmol/L  --  109* 105 102 105 104   CO2 mmol/L  --  23.6 24.0 26.7 27.7 28.8   BUN mg/dL  --  21 20 20 22 23   CREATININE mg/dL  --  1.30* 1.19 1.32* 1.15 1.24   CALCIUM mg/dL  --  8.0* 8.5* 8.6 8.8 8.8   BILIRUBIN mg/dL 0.2  --   --   --  0.3 0.3   ALK PHOS U/L 53  --   --   --  84 90   ALT (SGPT) U/L 33  --   --   --  20 23   AST (SGOT) U/L 45*  --   --   --  24 24   GLUCOSE mg/dL  --  211* 142* 177* 156* 218*     Results from last 7 days   Lab Units 01/17/25  0158 01/16/25  0235 01/15/25  1630   MAGNESIUM mg/dL 1.8 1.8 1.7   HEMOGLOBIN g/dL 9.5* 10.7* 10.9*   HEMATOCRIT % 33.0* 37.1* 37.0*     Lab Results   Component Value Date    HGBA1C 9.45 (H) 12/19/2024          GI Function:  BM 1/16      "  Skin: WOCN note describes two mid-dermal stage 2 wounds; one is more to the left of the sacrum, the other to the left buttock        Weight Review: Estimated body mass index is 36.06 kg/m² as calculated from the following:    Height as of this encounter: 177.8 cm (70\").    Weight as of this encounter: 114 kg (251 lb 5.2 oz).    Comment:   1/17: Scale weight 114 kg - pt with weight loss documented compared to weights from 09/24, but NFPE not currently significant for malnutrition -- will monitor closely (current weight more C/W historic UBW?) - will continue to monitor weight trends.    Wt Readings from Last 30 Encounters:   01/15/25 1559 114 kg (251 lb 5.2 oz)   01/10/25 0138 (!) 138 kg (305 lb 1.9 oz)   12/17/24 1533 135 kg (297 lb)   09/30/24 0523 (!) 138 kg (304 lb 3.8 oz)   09/28/24 0500 (!) 138 kg (304 lb 0.2 oz)   09/27/24 0500 135 kg (297 lb 13.5 oz)   09/26/24 0500 135 kg (297 lb 9.9 oz)   09/25/24 0534 135 kg (296 lb 15.4 oz)   09/24/24 1316 135 kg (297 lb 13.5 oz)   09/24/24 0100 133 kg (294 lb 1.5 oz)   07/08/20 1520 113 kg (250 lb)   03/10/20 0500 116 kg (255 lb 11.7 oz)   03/09/20 0600 115 kg (253 lb 12 oz)   03/08/20 0530 117 kg (257 lb 0.9 oz)   03/06/20 0500 116 kg (255 lb 15.3 oz)   03/05/20 0457 116 kg (255 lb 8.2 oz)   03/04/20 0455 119 kg (262 lb 5.6 oz)   03/02/20 0400 119 kg (261 lb 14.5 oz)   03/01/20 0400 115 kg (254 lb 6.6 oz)   02/29/20 0400 116 kg (256 lb 13.4 oz)   02/29/20 0027 116 kg (256 lb 13.4 oz)   02/03/20 0600 119 kg (263 lb 3.7 oz)   02/02/20 0500 121 kg (266 lb 12.1 oz)   02/01/20 0537 121 kg (267 lb 3.2 oz)   01/31/20 0500 121 kg (267 lb 10.2 oz)   01/30/20 0600 122 kg (268 lb 4.8 oz)   01/28/20 0558 119 kg (262 lb 5.6 oz)   01/28/20 0500 119 kg (262 lb 5.6 oz)   01/27/20 0537 119 kg (261 lb 14.5 oz)   01/27/20 0400 119 kg (261 lb 14.5 oz)   01/26/20 0700 124 kg (273 lb 9.5 oz)   01/25/20 0600 118 kg (260 lb 5.8 oz)   01/24/20 0400 121 kg (265 lb 14 oz)   01/23/20 0525 123 kg " (270 lb 1 oz)   01/22/20 0600 115 kg (254 lb 10.1 oz)   01/22/20 0500 115 kg (254 lb 10.1 oz)   01/22/20 0430 115 kg (254 lb 10.1 oz)   01/21/20 0555 117 kg (257 lb 0.9 oz)   01/21/20 0400 117 kg (257 lb 0.9 oz)   01/20/20 2100 118 kg (260 lb 5.8 oz)   01/20/20 0600 118 kg (260 lb 5.8 oz)   01/20/20 0407 118 kg (260 lb 5.8 oz)   01/19/20 0600 117 kg (258 lb 9.6 oz)   01/18/20 0600 118 kg (260 lb 5.8 oz)   01/17/20 0626 121 kg (267 lb 12.8 oz)   01/16/20 0630 120 kg (264 lb 5.3 oz)   01/15/20 0400 121 kg (266 lb 1.5 oz)   01/14/20 0907 122 kg (269 lb 10 oz)   01/14/20 0209 124 kg (272 lb 7.8 oz)   01/14/20 0035 124 kg (272 lb 7.8 oz)        Protein Requirements    EST Needs, Method, Wt used 98 g/day (1.3 g/kg)         Trending Physical   Appearance, NFPE 1/17: NFPE completed and not consistent with nutrition diagnosis of malnutrition at this time using AND/ASPEN criteria             Nutrition Problem Statement: Increased nutrient needs (protein) R/t skin healing; as evidenced by mid-dermal stage 2 wounds.        Nutrition Intervention: Start Boost Glucose Control BID (Provides 380 kcals, 32 g protein if consumed) for additional protein to support wound healing  This supplement likely more tolerable with pt's gastric bypass anatomy, as it is lower in carbohydrate than Boost Original.    Will also add consistent carbohydrate modification to diet order.        Monitoring/Evaluation I&O, PO intake, Pertinent labs, Weight, Skin status, GI status, POC/GOC        RD to follow up per protocol.    Electronically signed by:  Kristen Sullivan RD  01/17/25 12:41 EST

## 2025-01-17 NOTE — PROGRESS NOTES
[facility] MEDICINE SERVICE  DAILY PROGRESS NOTE    NAME: Jamin Hennessy  : 1955  MRN: [id number]      LOS: 2 days     PROVIDER OF SERVICE: YUE Atwood    Chief Complaint: AMS (altered mental status)    Subjective:     Interval History:  History taken from: patient    Patient otherwise feels well, states he feels better today than he did yesterday.  No family at bedside during encounter. Patient has given permission for medical team to discuss his care with patient wife.         Review of Systems:   Review of Systems   Respiratory:  Positive for shortness of breath and wheezing.    Cardiovascular:  Negative for chest pain.   Neurological:  Negative for dizziness and headaches.       Objective:     Vital Signs  Temp:  [97.5 °F (36.4 °C)-98.2 °F (36.8 °C)] 97.6 °F (36.4 °C)  Heart Rate:  [100-124] 101  Resp:  [11-20] 12  BP: (102-164)/() 102/65  Flow (L/min) (Oxygen Therapy):  [2-4] 2   Body mass index is 36.06 kg/m².    Physical Exam  Physical Exam  General: 70 yo male, Alert and oriented, obese, no acute distress.  HENT: Normocephalic, normal hearing, moist oral mucosa, no scleral icterus.  Neck: Supple, nontender, no carotid bruits, no JVD, no LAD.  Lungs: Wheezing noted, diminished breath sounds in right lung, nonlabored respiration.  Heart: RRR, no murmur, gallop or edema.  Abdomen: Soft, nontender, nondistended, + bowel sounds.  Musculoskeletal: Normal range of motion and strength, no tenderness or swelling.  Skin: Skin is warm, dry and pink, no rashes or lesions.  Psychiatric: Cooperative, appropriate mood and affect.       Diagnostic Data    Results from last 7 days   Lab Units 25  0158 01/15/25  1630 25  0250   WBC 10*3/mm3 3.33*   < > 6.87   HEMOGLOBIN g/dL 9.5*   < > 10.6*   HEMATOCRIT % 33.0*   < > 36.0*   PLATELETS 10*3/mm3 152   < > 188   GLUCOSE mg/dL 211*   < > 156*   CREATININE mg/dL 1.30*   < > 1.15   BUN mg/dL 21   < > 22   SODIUM mmol/L 142   < > 143    POTASSIUM mmol/L 3.6   < > 3.8   AST (SGOT) U/L  --   --  24   ALT (SGPT) U/L  --   --  20   ALK PHOS U/L  --   --  84   BILIRUBIN mg/dL  --   --  0.3   ANION GAP mmol/L 9.4   < > 10.3    < > = values in this interval not displayed.       No radiology results for the last day      I reviewed the patient's new clinical results.    Assessment/Plan:     Active and Resolved Problems  Active Hospital Problems    Diagnosis  POA    **AMS (altered mental status) [R41.82]  Yes      Resolved Hospital Problems   No resolved problems to display.       Sepsis  Pneumonia  Left pleural effusion  Acute respiratory failure with hypoxia  COVID infection  Influenza infection  Altered mental status  On 2L NC, b/l RA  COVID and flu positive, ID started remdesivir and oseltamvir  CT chest with moderate left pleural effusion, left lower lobe and lingula consolidation  On arrival WBC 6.36, LA 2.4, repeat lactic acid 2.7 and Pro-Suman 0.16  SIRS criteria: source (UTI/PNA), HR> 90, febrile   Received ampicillin at OSH, changed to cefepime and add vancomycin for HCAP coverage given recent hospitalization  MRSA screen positive  Tylenol as needed for fever  Fall precautions  ID consulted, appreciate recommendations- noted cefepime changed to ceftazidime, continue vanc  Pulmonology consulted, noted plans for monitoring of pleural effusion as well as initiation of Symbicort, prednisone- continue     Urinary tract infection   History of recent UTI in setting of right ureteral stent  Was supposed to be on cefdinir 300 mg twice daily until 1/16/2025, wife did not obtain from pharmacy  UA at OSH with 11-20 WBCs and large leukocyte esterase, recently admitted for UTI  Repeat UA, shows large blood, 2+ protein, 2+ leuk esterase, too numerous to count RBC and WBC as well as unable to determine due to loaded field of bacteria  Urology evaluated patient during previous admission, no urology intervention  Urine culture preliminary for no growth at this  time     Pneumoperitoneum  CT abdomen pelvis from OSH incidentally notes 1 tiny bubble of pneumoperitoneum in the left upper quadrant, uncertain etiology  Repeat CT shows no pneumoperitoneum present  General Surgery consulted, no surgical plans at this time     Paroxysmal A-fib  Currently sinus tachycardia in the setting of fever  Continue amiodarone and Eliquis once reconciled  Telemetry     Chronic hypotension-BP soft, patient is on midodrine, continue   CAD/hyperlipidemia-continue ASA  Diabetes Mellitus, type II-BG ACHS, low SSI, hold glipizide, Hypoglycemia protocol   Multiple sclerosis-takes glatiramer injection daily, not on formulary, as needed Flexeril  Hypothyroidism-continue levothyroxine 100 mcg  Valvular heart disease status post MVR  Sacral ulcer-present on admission wound care consulted and following  BPH-continue Flomax and finasteride  GERD-continue PPI       VTE Prophylaxis:  Mechanical VTE prophylaxis orders are present.             Disposition Planning:     Barriers to Discharge: Continued care  Anticipated Date of Discharge: 1-  Place of Discharge: Per wife, patient is to return home to her with 24/7 care      Time: 35 minutes     Code Status and Medical Interventions: No CPR (Do Not Attempt to Resuscitate); Limited Support; No intubation (DNI)   Ordered at: 01/15/25 1600     Medical Intervention Limits:    No intubation (DNI)     Code Status (Patient has no pulse and is not breathing):    No CPR (Do Not Attempt to Resuscitate)     Medical Interventions (Patient has pulse or is breathing):    Limited Support       Signature: Electronically signed by YUE Atwood, 01/17/25, 09:32 EST.  [facility] [place] Hospitalist Team

## 2025-01-17 NOTE — PROGRESS NOTES
Infectious Diseases Progress Note      LOS: 2 days   Patient Care Team:  Jhonny Heller MD as PCP - General (Family Medicine)  Frederick George MD as Consulting Physician (Nephrology)    Chief Complaint: Shortness of breath, weakness    Subjective       The patient has been afebrile for the last 24 hours.  The patient is on 2 L of oxygen by nasal cannula hemodynamically stable, and is tolerating antimicrobial therapy.  Patient is feeling better today      Review of Systems:   Review of Systems   Constitutional:  Positive for fatigue.   HENT: Negative.     Eyes: Negative.    Respiratory:  Positive for cough and shortness of breath.    Cardiovascular: Negative.    Gastrointestinal: Negative.    Endocrine: Negative.    Genitourinary: Negative.    Musculoskeletal: Negative.    Skin: Negative.    Neurological:  Positive for weakness.   Psychiatric/Behavioral: Negative.     All other systems reviewed and are negative.       Objective     Vital Signs  Temp:  [97.5 °F (36.4 °C)-98.2 °F (36.8 °C)] 98.2 °F (36.8 °C)  Heart Rate:  [100-120] 117  Resp:  [11-20] 17  BP: ()/() 84/40    Physical Exam:  Physical Exam  Vitals and nursing note reviewed.   Constitutional:       General: He is not in acute distress.     Appearance: He is well-developed. He is obese. He is ill-appearing. He is not diaphoretic.   HENT:      Head: Normocephalic and atraumatic.   Eyes:      Conjunctiva/sclera: Conjunctivae normal.      Pupils: Pupils are equal, round, and reactive to light.   Cardiovascular:      Rate and Rhythm: Normal rate and regular rhythm.      Heart sounds: Normal heart sounds, S1 normal and S2 normal.   Pulmonary:      Effort: Pulmonary effort is normal. No respiratory distress.      Breath sounds: No stridor. Rales present. No wheezing.   Abdominal:      General: Bowel sounds are normal. There is no distension.      Palpations: Abdomen is soft. There is no mass.      Tenderness: There is no abdominal tenderness. There is  no guarding.      Comments: No significant abdominal pain   Genitourinary:     Comments: Perry catheter  Musculoskeletal:         General: No deformity.      Cervical back: Neck supple.   Skin:     General: Skin is warm and dry.      Coloration: Skin is not pale.      Findings: No erythema or rash.      Comments: Patient has some stage II pressure ulcerations to the scrotum and buttocks and some moisture associated dermatitis-nothing that looks infected    Neurological:      Mental Status: He is alert and oriented to person, place, and time.      Motor: Weakness present.          Results Review:    I have reviewed all clinical data, test, lab, and imaging results.     Radiology  No Radiology Exams Resulted Within Past 24 Hours    Cardiology    Laboratory    Results from last 7 days   Lab Units 01/17/25  0158 01/16/25  0235 01/15/25  1630 01/12/25  0250 01/11/25  1611   WBC 10*3/mm3 3.33* 5.74 6.36 6.87 7.42   HEMOGLOBIN g/dL 9.5* 10.7* 10.9* 10.6* 11.0*   HEMATOCRIT % 33.0* 37.1* 37.0* 36.0* 36.6*   PLATELETS 10*3/mm3 152 172 186 188 187     Results from last 7 days   Lab Units 01/17/25  1057 01/17/25  0158 01/16/25  0235 01/15/25  1630 01/12/25  0250 01/11/25  1611 01/10/25  1801   SODIUM mmol/L  --  142 141 140 143 142  --    POTASSIUM mmol/L 3.7 3.6 4.1 3.9 3.8 3.8 4.5   CHLORIDE mmol/L  --  109* 105 102 105 104  --    CO2 mmol/L  --  23.6 24.0 26.7 27.7 28.8  --    BUN mg/dL  --  21 20 20 22 23  --    CREATININE mg/dL  --  1.30* 1.19 1.32* 1.15 1.24  --    GLUCOSE mg/dL  --  211* 142* 177* 156* 218*  --    ALBUMIN g/dL 3.1*  --   --   --  3.0* 3.1*  --    BILIRUBIN mg/dL 0.2  --   --   --  0.3 0.3  --    ALK PHOS U/L 53  --   --   --  84 90  --    AST (SGOT) U/L 45*  --   --   --  24 24  --    ALT (SGPT) U/L 33  --   --   --  20 23  --    CALCIUM mg/dL  --  8.0* 8.5* 8.6 8.8 8.8  --                  Microbiology   Microbiology Results (last 10 days)       Procedure Component Value - Date/Time    Urine Culture -  Urine, Indwelling Urethral Catheter [[id number]]  (Normal) Collected: 01/15/25 1954    Lab Status: Final result Specimen: Urine from Indwelling Urethral Catheter Updated: 01/17/25 1046     Urine Culture No growth    Legionella Antigen, Urine - Urine, Indwelling Urethral Catheter [[id number]]  (Normal) Collected: 01/15/25 1954    Lab Status: Final result Specimen: Urine from Indwelling Urethral Catheter Updated: 01/16/25 1738     LEGIONELLA ANTIGEN, URINE Negative    S. Pneumo Ag Urine or CSF - Urine, Indwelling Urethral Catheter [[id number]]  (Normal) Collected: 01/15/25 1954    Lab Status: Final result Specimen: Urine from Indwelling Urethral Catheter Updated: 01/16/25 1738     Strep Pneumo Ag Negative    MRSA Screen, PCR (Inpatient) - Swab, Nares [[id number]]  (Abnormal) Collected: 01/15/25 1935    Lab Status: Final result Specimen: Swab from Nares Updated: 01/15/25 2142     MRSA PCR MRSA Detected    Narrative:      The negative predictive value of this diagnostic test is high and should only be used to consider de-escalating anti-MRSA therapy. A positive result may indicate colonization with MRSA and must be correlated clinically.    Blood Culture - Blood, Arm, Left [[id number]]  (Normal) Collected: 01/15/25 1927    Lab Status: Preliminary result Specimen: Blood from Arm, Left Updated: 01/16/25 2015     Blood Culture No growth at 24 hours    Blood Culture - Blood, Arm, Right [[id number]]  (Normal) Collected: 01/15/25 1927    Lab Status: Preliminary result Specimen: Blood from Arm, Right Updated: 01/16/25 2015     Blood Culture No growth at 24 hours    Urine Culture - Urine, Indwelling Urethral Catheter [[id number]] Collected: 01/10/25 1347    Lab Status: Final result Specimen: Urine from Indwelling Urethral Catheter Updated: 01/11/25 1231     Urine Culture <25,000 CFU/mL Mixed Cheryl Isolated    Narrative:      Specimen contains mixed organisms of questionable pathogenicity suggestive of contamination. If symptoms  persist, suggest recollection.  Colonization of the urinary tract without infection is common. Treatment is discouraged unless the patient is symptomatic, pregnant, or undergoing an invasive urologic procedure.    Blood Culture - Blood, Hand, Right [[id number]]  (Normal) Collected: 01/10/25 1201    Lab Status: Final result Specimen: Blood from Hand, Right Updated: 01/15/25 1245     Blood Culture No growth at 5 days            Medication Review:       Schedule Meds  budesonide-formoterol, 2 puff, Inhalation, BID - RT  cefTAZidime, 2,000 mg, Intravenous, Q8H  guaiFENesin, 1,200 mg, Oral, Q12H  hydrocortisone-bacitracin-zinc oxide-nystatin, 1 Application, Topical, BID  insulin lispro, 2-7 Units, Subcutaneous, Q6H  miconazole, 1 Application, Topical, Q12H  oseltamivir, 75 mg, Oral, Q12H  pantoprazole, 40 mg, Intravenous, Daily With Dinner  predniSONE, 40 mg, Oral, Daily With Breakfast  remdesivir, 100 mg, Intravenous, Q24H  sodium chloride, 10 mL, Intravenous, Q12H  vancomycin, 1,500 mg, Intravenous, Q24H        Infusion Meds  Pharmacy Consult - Pharmacy to dose,   Pharmacy to dose vancomycin,         PRN Meds    acetaminophen **OR** acetaminophen **OR** acetaminophen    senna-docusate sodium **AND** polyethylene glycol **AND** bisacodyl **AND** bisacodyl    Calcium Replacement - Follow Nurse / BPA Driven Protocol    dextrose    dextrose    glucagon (human recombinant)    Magnesium Standard Dose Replacement - Follow Nurse / BPA Driven Protocol    melatonin    ondansetron ODT **OR** ondansetron    Pharmacy Consult - Pharmacy to dose    Pharmacy to dose vancomycin    Phosphorus Replacement - Follow Nurse / BPA Driven Protocol    Potassium Replacement - Follow Nurse / BPA Driven Protocol    sodium chloride    sodium chloride        Assessment & Plan       Antimicrobial Therapy   1.  IV ceftazidime        2.  IV vancomycin        3.        4.  IV Remdesivir        5.            Assessment     Left lower lobe infiltrate  concerning for bacterial pneumonia.  Patient is currently on 2 L oxygen via nasal cannula.  MRSA screen was positive.  Legionella and pneumococcal urine antigen is negative     Recent diagnosis of COVID-19 infection at outlying facility/[facility] prior to admission.  Imaging studies not consistent with COVID-pneumonia The patient was also diagnosed with influenza based on transfer note.     Toxic metabolic encephalopathy on admission.  May be related to above.  Appears to be back to baseline     Recent admission in December 2024 with obstructive uropathy with urine cultures from [facility] growing ESBL  Proteus mirabilis.  Patient status post cystoscopy with right stent placement and stone removal.  Patient was treated with 10 days of IV ertapenem which was completed on 12/29/2024.  Current urine culture is negative.  Patient was noted to have a chronic Perry catheter secondary to urine retention     Multiple sclerosis.  Patient is bedbound secondary to that     History of mitral valve replacement in the past     Type 2 diabetes-most recent A1c is 9.45     Morbid obesity     Plan     Continue IV ceftazidime 2 g every 8 hours  Continue vancomycin for now  Patient appears to be responding well to antibiotics-will continue for short course  Continue patient on remdesivir for 3 days as a prophylaxis  Continue p.o. Tamiflu 75 mg every 12 hours for 5 days  Sputum culture-pending collection  Continue supportive care  A.m. labs  Patient will need to be on enhanced airborne isolation for 10 days from the first positive COVID screen  Appropriate PPE was used during this assessment  Case discussed with pharmacy      Fay Bradford, YUE  01/17/25  16:24 EST    Note is dictated utilizing voice recognition software/Dragon

## 2025-01-17 NOTE — PLAN OF CARE
Problem: Adult Inpatient Plan of Care  Goal: Absence of Hospital-Acquired Illness or Injury  Intervention: Identify and Manage Fall Risk  Recent Flowsheet Documentation  Taken 1/17/2025 0400 by John Bailey RN  Safety Promotion/Fall Prevention:   safety round/check completed   room organization consistent   nonskid shoes/slippers when out of bed   fall prevention program maintained   clutter free environment maintained   assistive device/personal items within reach  Taken 1/17/2025 0200 by John Bailey RN  Safety Promotion/Fall Prevention:   safety round/check completed   room organization consistent   nonskid shoes/slippers when out of bed   fall prevention program maintained   clutter free environment maintained   assistive device/personal items within reach  Taken 1/17/2025 0000 by John Bailey RN  Safety Promotion/Fall Prevention:   safety round/check completed   room organization consistent   nonskid shoes/slippers when out of bed   fall prevention program maintained   assistive device/personal items within reach   clutter free environment maintained  Taken 1/16/2025 2200 by John Bailey RN  Safety Promotion/Fall Prevention:   safety round/check completed   room organization consistent   nonskid shoes/slippers when out of bed   fall prevention program maintained   clutter free environment maintained   assistive device/personal items within reach  Taken 1/16/2025 2000 by John Bailey RN  Safety Promotion/Fall Prevention:   safety round/check completed   room organization consistent   nonskid shoes/slippers when out of bed   fall prevention program maintained   assistive device/personal items within reach   clutter free environment maintained  Intervention: Prevent Skin Injury  Recent Flowsheet Documentation  Taken 1/16/2025 2000 by John Bailey, RN  Body Position: turned  Intervention: Prevent Infection  Recent Flowsheet Documentation  Taken 1/17/2025 0400 by John Bailey  RN  Infection Prevention:   environmental surveillance performed   hand hygiene promoted   personal protective equipment utilized   rest/sleep promoted   single patient room provided  Taken 1/17/2025 0000 by John Bailey RN  Infection Prevention:   environmental surveillance performed   hand hygiene promoted   personal protective equipment utilized   rest/sleep promoted   single patient room provided  Taken 1/16/2025 2000 by John Bailey RN  Infection Prevention:   environmental surveillance performed   hand hygiene promoted   personal protective equipment utilized   rest/sleep promoted   single patient room provided     Problem: Skin Injury Risk Increased  Goal: Skin Health and Integrity  Intervention: Optimize Skin Protection  Recent Flowsheet Documentation  Taken 1/17/2025 0400 by John Bailey RN  Pressure Reduction Techniques: frequent weight shift encouraged  Pressure Reduction Devices: pressure-redistributing mattress utilized  Taken 1/17/2025 0000 by John Bailey RN  Pressure Reduction Techniques: frequent weight shift encouraged  Pressure Reduction Devices: pressure-redistributing mattress utilized  Taken 1/16/2025 2000 by John Bailey RN  Pressure Reduction Techniques: frequent weight shift encouraged  Head of Bed (HOB) Positioning: HOB elevated  Pressure Reduction Devices: pressure-redistributing mattress utilized     Problem: Sepsis/Septic Shock  Goal: Absence of Infection Signs and Symptoms  Intervention: Initiate Sepsis Management  Recent Flowsheet Documentation  Taken 1/17/2025 0400 by John Bailey RN  Infection Prevention:   environmental surveillance performed   hand hygiene promoted   personal protective equipment utilized   rest/sleep promoted   single patient room provided  Isolation Precautions: precautions maintained  Taken 1/17/2025 0000 by John Bailey RN  Infection Prevention:   environmental surveillance performed   hand hygiene promoted   personal  protective equipment utilized   rest/sleep promoted   single patient room provided  Isolation Precautions: precautions maintained  Taken 1/16/2025 2000 by John Bailey RN  Infection Prevention:   environmental surveillance performed   hand hygiene promoted   personal protective equipment utilized   rest/sleep promoted   single patient room provided  Isolation Precautions: precautions maintained  Intervention: Promote Recovery  Recent Flowsheet Documentation  Taken 1/16/2025 2000 by John Bailey RN  Airway/Ventilation Management: airway patency maintained     Problem: Fall Injury Risk  Goal: Absence of Fall and Fall-Related Injury  Intervention: Promote Injury-Free Environment  Recent Flowsheet Documentation  Taken 1/17/2025 0400 by John Bailey, RN  Safety Promotion/Fall Prevention:   safety round/check completed   room organization consistent   nonskid shoes/slippers when out of bed   fall prevention program maintained   clutter free environment maintained   assistive device/personal items within reach  Taken 1/17/2025 0200 by John Bailey RN  Safety Promotion/Fall Prevention:   safety round/check completed   room organization consistent   nonskid shoes/slippers when out of bed   fall prevention program maintained   clutter free environment maintained   assistive device/personal items within reach  Taken 1/17/2025 0000 by John Bailey RN  Safety Promotion/Fall Prevention:   safety round/check completed   room organization consistent   nonskid shoes/slippers when out of bed   fall prevention program maintained   assistive device/personal items within reach   clutter free environment maintained  Taken 1/16/2025 2200 by John Bailey, RN  Safety Promotion/Fall Prevention:   safety round/check completed   room organization consistent   nonskid shoes/slippers when out of bed   fall prevention program maintained   clutter free environment maintained   assistive device/personal items within  reach  Taken 1/16/2025 2000 by John Bailey, RN  Safety Promotion/Fall Prevention:   safety round/check completed   room organization consistent   nonskid shoes/slippers when out of bed   fall prevention program maintained   assistive device/personal items within reach   clutter free environment maintained   Goal Outcome Evaluation:      Patient continues to receive IV abx. Patient has been calm and cooperative with care.

## 2025-01-17 NOTE — PROGRESS NOTES
Daily Progress Note          Assessment    Small Left pleural effusion  Hypoxemia  UTI status post right ureteral stent  Pneumoperitoneum  Paroxysmal atrial fibrillation  CAD  Status post mitral valve replacement  Diabetes mellitus  Multiple sclerosis  Hypothyroidism  Chronic sacral ulcer  BPH  GERD  Anemia     PFTs 1/18/2020: Restrictive pattern with decreased oxygen diffusion capacity: FEV1/FVC 74. FEV1 is 1.42 which is 46% predicted FVC is 1.91 which is 43% predicted. There is 24% improvement in FEV1 value after bronchodilator challenge. MVV is 26% predicted. Diffusion capacity is 46%      Recommendations:  The Left effusion is small, monitor it for now     Pt is wheezing: start symbicort and prednisone  Incentive spirometry     Oxygen supplement and titration to maintain saturation 90 to 95%: Currently on 2 L per nasal cannula     Antibiotics: Cefepime vancomycin     Glucose control     Chronic anticoagulation: Eliquis currently on hold     HR control: On amiodarone and metoprolol at home: Currently on hold        I personally reviewed the radiological studies             LOS: 2 days     Subjective     Cough and shortness of breath    Objective     Vital signs for last 24 hours:  Vitals:    01/16/25 2330 01/17/25 0816 01/17/25 0817 01/17/25 1140   BP: 102/65   101/76   BP Location: Left arm   Left arm   Patient Position: Lying   Lying   Pulse: 101 108 101 120   Resp: 11 17 12 18   Temp: 97.6 °F (36.4 °C)   98 °F (36.7 °C)   TempSrc: Axillary   Oral   SpO2: 97% 92% 93% 92%   Weight:       Height:           Intake/Output last 3 shifts:  I/O last 3 completed shifts:  In: 250 [I.V.:250]  Out: 2000 [Urine:2000]  Intake/Output this shift:  No intake/output data recorded.      Radiology  Imaging Results (Last 24 Hours)       ** No results found for the last 24 hours. **            Labs:  Results from last 7 days   Lab Units 01/17/25  0158   WBC 10*3/mm3 3.33*   HEMOGLOBIN g/dL 9.5*   HEMATOCRIT % 33.0*   PLATELETS  10*3/mm3 152     Results from last 7 days   Lab Units 01/17/25  1057 01/17/25  0158   SODIUM mmol/L  --  142   POTASSIUM mmol/L 3.7 3.6   CHLORIDE mmol/L  --  109*   CO2 mmol/L  --  23.6   BUN mg/dL  --  21   CREATININE mg/dL  --  1.30*   CALCIUM mg/dL  --  8.0*   BILIRUBIN mg/dL 0.2  --    ALK PHOS U/L 53  --    ALT (SGPT) U/L 33  --    AST (SGOT) U/L 45*  --    GLUCOSE mg/dL  --  211*         Results from last 7 days   Lab Units 01/17/25  1057 01/12/25  0250 01/11/25  1611   ALBUMIN g/dL 3.1* 3.0* 3.1*             Results from last 7 days   Lab Units 01/17/25  0158   MAGNESIUM mg/dL 1.8                   Meds:   SCHEDULE  budesonide-formoterol, 2 puff, Inhalation, BID - RT  cefTAZidime, 2,000 mg, Intravenous, Q8H  guaiFENesin, 1,200 mg, Oral, Q12H  hydrocortisone-bacitracin-zinc oxide-nystatin, 1 Application, Topical, BID  insulin lispro, 2-7 Units, Subcutaneous, Q6H  miconazole, 1 Application, Topical, Q12H  oseltamivir, 75 mg, Oral, Q12H  pantoprazole, 40 mg, Intravenous, Daily With Dinner  predniSONE, 40 mg, Oral, Daily With Breakfast  remdesivir, 100 mg, Intravenous, Q24H  sodium chloride, 10 mL, Intravenous, Q12H  vancomycin, 1,500 mg, Intravenous, Q24H      Infusions  Pharmacy Consult - Pharmacy to dose,   Pharmacy to dose vancomycin,       PRNs    acetaminophen **OR** acetaminophen **OR** acetaminophen    senna-docusate sodium **AND** polyethylene glycol **AND** bisacodyl **AND** bisacodyl    Calcium Replacement - Follow Nurse / BPA Driven Protocol    dextrose    dextrose    glucagon (human recombinant)    Magnesium Standard Dose Replacement - Follow Nurse / BPA Driven Protocol    melatonin    ondansetron ODT **OR** ondansetron    Pharmacy Consult - Pharmacy to dose    Pharmacy to dose vancomycin    Phosphorus Replacement - Follow Nurse / BPA Driven Protocol    Potassium Replacement - Follow Nurse / BPA Driven Protocol    sodium chloride    sodium chloride    Physical Exam:  General Appearance:  Alert    HEENT:  Normocephalic, without obvious abnormality, Conjunctiva/corneas clear,.   Nares normal, no drainage     Neck:  Supple, symmetrical, trachea midline.   Lungs /Chest wall:   Bilateral basal rhonchi, respirations unlabored, symmetrical wall movement.     Heart:  Regular rate and rhythm, S1 S2 normal  Abdomen: Soft, non-tender, no masses, no organomegaly.    Extremities: Trace edema, no clubbing or cyanosis     ROS  Constitutional: Negative for chills, fever and malaise/fatigue.   HENT: Negative.    Eyes: Negative.    Cardiovascular: Negative.    Respiratory: Positive for cough and shortness of breath.    Skin: Negative.    Musculoskeletal: Negative.    Gastrointestinal: Negative.    Genitourinary: Negative.    Neurological: Chronic weakness in lower extremities      I reviewed the recent clinical results  I personally reviewed the latest radiological studies    Part of this note may be an electronic transcription/translation of spoken language to printed text using the Dragon Dictation System.

## 2025-01-17 NOTE — PROGRESS NOTES
"Pharmacy Antimicrobial Dosing Service    Subjective:  Jamin Hennessy is a 69 y.o.male admitted with AMS. Pharmacy has been consulted to dose Vancomycin for possible empiric, pneumonia.    PMH: AMS    MRSA nares positive       Assessment/Plan    1. Day #3 Vancomycin: Goal -600 mcg*h/mL.   Loading dose of Vanc 2500 mg IV (~21.9 mg/kg ABW) given followed by scheduled Vanc 1000 mg IV q12h (~ 11.19 mg/kg AdjBW). Peak 01/17 @0158 20.90, Trough  @1059 17.60 for projected DSA=513 mcg*h/mL. Will change dose to Vanc 1500mg (17 mg/kg AdjBW) for projected GZX=525 mcg*h/mL. Peak scheduled 01/19 @1800, Trough 1/20 @1300.     2. Day #2 Ceftazidime: 2000 mg IV q8h for estCrCl > 60 mL/min.    Will continue to monitor drug levels, renal function, culture and sensitivities, and patient clinical status.       Objective:  Relevant clinical data and objective history reviewed:  177.8 cm (70\")   114 kg (251 lb 5.2 oz)   Ideal body weight: 73 kg (160 lb 15 oz)  Adjusted ideal body weight: 89.4 kg (197 lb 1.5 oz)  Body mass index is 36.06 kg/m².    Results from last 7 days   Lab Units 01/17/25  1059 01/17/25  0158   VANCOMYCIN PK mcg/mL  --  20.90   VANCOMYCIN TR mcg/mL 17.60  --      Results from last 7 days   Lab Units 01/17/25  0158 01/16/25  0235 01/15/25  1630   CREATININE mg/dL 1.30* 1.19 1.32*     Estimated Creatinine Clearance: 67.8 mL/min (A) (by C-G formula based on SCr of 1.3 mg/dL (H)).  I/O last 3 completed shifts:  In: 250 [I.V.:250]  Out: 2000 [Urine:2000]    Results from last 7 days   Lab Units 01/17/25  0158 01/16/25  0235 01/15/25  1630   WBC 10*3/mm3 3.33* 5.74 6.36     Temperature    01/16/25 2148 01/16/25 2330 01/17/25 1140   Temp: 97.5 °F (36.4 °C) 97.6 °F (36.4 °C) 98 °F (36.7 °C)     Baseline culture/source/susceptibility:  Microbiology Results (last 10 days)       Procedure Component Value - Date/Time    Urine Culture - Urine, Indwelling Urethral Catheter [[id number]]  (Normal) Collected: 01/15/25 1954    " Lab Status: Final result Specimen: Urine from Indwelling Urethral Catheter Updated: 01/17/25 1046     Urine Culture No growth    Legionella Antigen, Urine - Urine, Indwelling Urethral Catheter [[id number]]  (Normal) Collected: 01/15/25 1954    Lab Status: Final result Specimen: Urine from Indwelling Urethral Catheter Updated: 01/16/25 1738     LEGIONELLA ANTIGEN, URINE Negative    S. Pneumo Ag Urine or CSF - Urine, Indwelling Urethral Catheter [[id number]]  (Normal) Collected: 01/15/25 1954    Lab Status: Final result Specimen: Urine from Indwelling Urethral Catheter Updated: 01/16/25 1738     Strep Pneumo Ag Negative    MRSA Screen, PCR (Inpatient) - Swab, Nares [[id number]]  (Abnormal) Collected: 01/15/25 1935    Lab Status: Final result Specimen: Swab from Nares Updated: 01/15/25 2142     MRSA PCR MRSA Detected    Narrative:      The negative predictive value of this diagnostic test is high and should only be used to consider de-escalating anti-MRSA therapy. A positive result may indicate colonization with MRSA and must be correlated clinically.    Blood Culture - Blood, Arm, Left [[id number]]  (Normal) Collected: 01/15/25 1927    Lab Status: Preliminary result Specimen: Blood from Arm, Left Updated: 01/16/25 2015     Blood Culture No growth at 24 hours    Blood Culture - Blood, Arm, Right [[id number]]  (Normal) Collected: 01/15/25 1927    Lab Status: Preliminary result Specimen: Blood from Arm, Right Updated: 01/16/25 2015     Blood Culture No growth at 24 hours    Urine Culture - Urine, Indwelling Urethral Catheter [[id number]] Collected: 01/10/25 1347    Lab Status: Final result Specimen: Urine from Indwelling Urethral Catheter Updated: 01/11/25 1231     Urine Culture <25,000 CFU/mL Mixed Cheryl Isolated    Narrative:      Specimen contains mixed organisms of questionable pathogenicity suggestive of contamination. If symptoms persist, suggest recollection.  Colonization of the urinary tract without infection is  common. Treatment is discouraged unless the patient is symptomatic, pregnant, or undergoing an invasive urologic procedure.    Blood Culture - Blood, Hand, Right [[id number]]  (Normal) Collected: 01/10/25 1201    Lab Status: Final result Specimen: Blood from Hand, Right Updated: 01/15/25 1245     Blood Culture No growth at 5 days          Delia Quinones RPH  01/17/25 12:49 EST

## 2025-01-17 NOTE — PLAN OF CARE
Goal Outcome Evaluation:  Plan of Care Reviewed With: patient        Progress: improving  Outcome Evaluation: Patient continuing to get vanc, cefTAZidime, and remdesivir. Pulmonary function continuing to improve. Patient's speech sounds less garbled today. Patient does not report and pain and reports to feeling better than he did yesterday.

## 2025-01-18 LAB
ALBUMIN SERPL-MCNC: 2.9 G/DL (ref 3.5–5.2)
ALP SERPL-CCNC: 49 U/L (ref 39–117)
ALT SERPL W P-5'-P-CCNC: 28 U/L (ref 1–41)
ANION GAP SERPL CALCULATED.3IONS-SCNC: 12.1 MMOL/L (ref 5–15)
AST SERPL-CCNC: 37 U/L (ref 1–40)
BASOPHILS # BLD AUTO: 0 10*3/MM3 (ref 0–0.2)
BASOPHILS NFR BLD AUTO: 0 % (ref 0–1.5)
BILIRUB CONJ SERPL-MCNC: 0.1 MG/DL (ref 0–0.3)
BILIRUB INDIRECT SERPL-MCNC: 0.1 MG/DL
BILIRUB SERPL-MCNC: 0.2 MG/DL (ref 0–1.2)
BUN SERPL-MCNC: 17 MG/DL (ref 8–23)
BUN/CREAT SERPL: 15.2 (ref 7–25)
CALCIUM SPEC-SCNC: 7.3 MG/DL (ref 8.6–10.5)
CHLORIDE SERPL-SCNC: 113 MMOL/L (ref 98–107)
CO2 SERPL-SCNC: 19.9 MMOL/L (ref 22–29)
CREAT SERPL-MCNC: 1.12 MG/DL (ref 0.76–1.27)
DEPRECATED RDW RBC AUTO: 63.7 FL (ref 37–54)
EGFRCR SERPLBLD CKD-EPI 2021: 71.1 ML/MIN/1.73
EOSINOPHIL # BLD AUTO: 0 10*3/MM3 (ref 0–0.4)
EOSINOPHIL NFR BLD AUTO: 0 % (ref 0.3–6.2)
ERYTHROCYTE [DISTWIDTH] IN BLOOD BY AUTOMATED COUNT: 19.3 % (ref 12.3–15.4)
GLUCOSE BLDC GLUCOMTR-MCNC: 117 MG/DL (ref 70–105)
GLUCOSE BLDC GLUCOMTR-MCNC: 142 MG/DL (ref 70–105)
GLUCOSE BLDC GLUCOMTR-MCNC: 219 MG/DL (ref 70–105)
GLUCOSE BLDC GLUCOMTR-MCNC: 224 MG/DL (ref 70–105)
GLUCOSE SERPL-MCNC: 148 MG/DL (ref 65–99)
HCT VFR BLD AUTO: 33.9 % (ref 37.5–51)
HGB BLD-MCNC: 9.7 G/DL (ref 13–17.7)
IMM GRANULOCYTES # BLD AUTO: 0.02 10*3/MM3 (ref 0–0.05)
IMM GRANULOCYTES NFR BLD AUTO: 0.6 % (ref 0–0.5)
LYMPHOCYTES # BLD AUTO: 0.5 10*3/MM3 (ref 0.7–3.1)
LYMPHOCYTES NFR BLD AUTO: 15.5 % (ref 19.6–45.3)
MAGNESIUM SERPL-MCNC: 1.7 MG/DL (ref 1.6–2.4)
MCH RBC QN AUTO: 25.5 PG (ref 26.6–33)
MCHC RBC AUTO-ENTMCNC: 28.6 G/DL (ref 31.5–35.7)
MCV RBC AUTO: 89.2 FL (ref 79–97)
MONOCYTES # BLD AUTO: 0.54 10*3/MM3 (ref 0.1–0.9)
MONOCYTES NFR BLD AUTO: 16.8 % (ref 5–12)
NEUTROPHILS NFR BLD AUTO: 2.16 10*3/MM3 (ref 1.7–7)
NEUTROPHILS NFR BLD AUTO: 67.1 % (ref 42.7–76)
NRBC BLD AUTO-RTO: 0 /100 WBC (ref 0–0.2)
PLATELET # BLD AUTO: 171 10*3/MM3 (ref 140–450)
PMV BLD AUTO: 10.5 FL (ref 6–12)
POTASSIUM SERPL-SCNC: 3 MMOL/L (ref 3.5–5.2)
PROT SERPL-MCNC: 5.8 G/DL (ref 6–8.5)
RBC # BLD AUTO: 3.8 10*6/MM3 (ref 4.14–5.8)
SODIUM SERPL-SCNC: 145 MMOL/L (ref 136–145)
WBC NRBC COR # BLD AUTO: 3.22 10*3/MM3 (ref 3.4–10.8)

## 2025-01-18 PROCEDURE — 80048 BASIC METABOLIC PNL TOTAL CA: CPT

## 2025-01-18 PROCEDURE — 63710000001 INSULIN LISPRO (HUMAN) PER 5 UNITS: Performed by: FAMILY MEDICINE

## 2025-01-18 PROCEDURE — 94664 DEMO&/EVAL PT USE INHALER: CPT

## 2025-01-18 PROCEDURE — 25010000002 REMDESIVIR 100 MG/20ML SOLUTION 1 EACH VIAL: Performed by: INTERNAL MEDICINE

## 2025-01-18 PROCEDURE — 83735 ASSAY OF MAGNESIUM: CPT

## 2025-01-18 PROCEDURE — 94799 UNLISTED PULMONARY SVC/PX: CPT

## 2025-01-18 PROCEDURE — 25810000003 SODIUM CHLORIDE 0.9 % SOLUTION 250 ML FLEX CONT: Performed by: INTERNAL MEDICINE

## 2025-01-18 PROCEDURE — 82948 REAGENT STRIP/BLOOD GLUCOSE: CPT

## 2025-01-18 PROCEDURE — 80076 HEPATIC FUNCTION PANEL: CPT | Performed by: INTERNAL MEDICINE

## 2025-01-18 PROCEDURE — 63710000001 PREDNISONE PER 1 MG: Performed by: INTERNAL MEDICINE

## 2025-01-18 PROCEDURE — 25010000002 VANCOMYCIN 1.5-0.9 GM/500ML-% SOLUTION: Performed by: NURSE PRACTITIONER

## 2025-01-18 PROCEDURE — 85025 COMPLETE CBC W/AUTO DIFF WBC: CPT

## 2025-01-18 PROCEDURE — 94761 N-INVAS EAR/PLS OXIMETRY MLT: CPT

## 2025-01-18 PROCEDURE — 87070 CULTURE OTHR SPECIMN AEROBIC: CPT | Performed by: NURSE PRACTITIONER

## 2025-01-18 PROCEDURE — 25010000002 CEFTAZIDIME 2 G RECONSTITUTED SOLUTION 1 EACH VIAL: Performed by: INTERNAL MEDICINE

## 2025-01-18 PROCEDURE — 87205 SMEAR GRAM STAIN: CPT | Performed by: NURSE PRACTITIONER

## 2025-01-18 RX ORDER — FINASTERIDE 5 MG/1
5 TABLET, FILM COATED ORAL DAILY
Status: DISCONTINUED | OUTPATIENT
Start: 2025-01-18 | End: 2025-01-22 | Stop reason: HOSPADM

## 2025-01-18 RX ORDER — RISPERIDONE 0.5 MG/1
0.25 TABLET, ORALLY DISINTEGRATING ORAL NIGHTLY
Status: DISCONTINUED | OUTPATIENT
Start: 2025-01-18 | End: 2025-01-22 | Stop reason: HOSPADM

## 2025-01-18 RX ORDER — ACETAMINOPHEN 325 MG/1
650 TABLET ORAL EVERY 8 HOURS PRN
Status: DISCONTINUED | OUTPATIENT
Start: 2025-01-18 | End: 2025-01-22 | Stop reason: HOSPADM

## 2025-01-18 RX ORDER — MULTIPLE VITAMINS W/ MINERALS TAB 9MG-400MCG
1 TAB ORAL DAILY
Status: DISCONTINUED | OUTPATIENT
Start: 2025-01-18 | End: 2025-01-22 | Stop reason: HOSPADM

## 2025-01-18 RX ORDER — ASPIRIN 81 MG/1
81 TABLET ORAL DAILY
Status: DISCONTINUED | OUTPATIENT
Start: 2025-01-18 | End: 2025-01-18

## 2025-01-18 RX ORDER — PREDNISONE 20 MG/1
20 TABLET ORAL
Status: DISCONTINUED | OUTPATIENT
Start: 2025-01-19 | End: 2025-01-20

## 2025-01-18 RX ORDER — TAMSULOSIN HYDROCHLORIDE 0.4 MG/1
0.8 CAPSULE ORAL NIGHTLY
Status: DISCONTINUED | OUTPATIENT
Start: 2025-01-18 | End: 2025-01-22 | Stop reason: HOSPADM

## 2025-01-18 RX ORDER — LEVOTHYROXINE SODIUM 100 UG/1
100 TABLET ORAL
Status: DISCONTINUED | OUTPATIENT
Start: 2025-01-18 | End: 2025-01-22 | Stop reason: HOSPADM

## 2025-01-18 RX ORDER — ASPIRIN 81 MG/1
81 TABLET ORAL DAILY
Status: DISCONTINUED | OUTPATIENT
Start: 2025-01-18 | End: 2025-01-22 | Stop reason: HOSPADM

## 2025-01-18 RX ORDER — ATORVASTATIN CALCIUM 40 MG/1
40 TABLET, FILM COATED ORAL NIGHTLY
Status: DISCONTINUED | OUTPATIENT
Start: 2025-01-18 | End: 2025-01-22 | Stop reason: HOSPADM

## 2025-01-18 RX ORDER — AMIODARONE HYDROCHLORIDE 200 MG/1
200 TABLET ORAL DAILY
Status: DISCONTINUED | OUTPATIENT
Start: 2025-01-18 | End: 2025-01-22 | Stop reason: HOSPADM

## 2025-01-18 RX ORDER — FUROSEMIDE 40 MG/1
40 TABLET ORAL DAILY
Status: DISCONTINUED | OUTPATIENT
Start: 2025-01-18 | End: 2025-01-22 | Stop reason: HOSPADM

## 2025-01-18 RX ORDER — MIRTAZAPINE 7.5 MG/1
7.5 TABLET, FILM COATED ORAL NIGHTLY
Status: DISCONTINUED | OUTPATIENT
Start: 2025-01-18 | End: 2025-01-22 | Stop reason: HOSPADM

## 2025-01-18 RX ORDER — POTASSIUM CHLORIDE 1500 MG/1
40 TABLET, EXTENDED RELEASE ORAL EVERY 4 HOURS
Status: COMPLETED | OUTPATIENT
Start: 2025-01-18 | End: 2025-01-18

## 2025-01-18 RX ORDER — MULTIVITAMIN WITH IRON
1000 TABLET ORAL DAILY
Status: DISCONTINUED | OUTPATIENT
Start: 2025-01-18 | End: 2025-01-22 | Stop reason: HOSPADM

## 2025-01-18 RX ORDER — PANTOPRAZOLE SODIUM 40 MG/1
40 TABLET, DELAYED RELEASE ORAL
Status: DISCONTINUED | OUTPATIENT
Start: 2025-01-18 | End: 2025-01-22 | Stop reason: HOSPADM

## 2025-01-18 RX ADMIN — PANTOPRAZOLE SODIUM 40 MG: 40 TABLET, DELAYED RELEASE ORAL at 12:29

## 2025-01-18 RX ADMIN — ASPIRIN 81 MG: 81 TABLET, COATED ORAL at 12:29

## 2025-01-18 RX ADMIN — INSULIN LISPRO 3 UNITS: 100 INJECTION, SOLUTION INTRAVENOUS; SUBCUTANEOUS at 21:07

## 2025-01-18 RX ADMIN — OSELTAMIVIR PHOSPHATE 75 MG: 75 CAPSULE ORAL at 09:35

## 2025-01-18 RX ADMIN — ACETAMINOPHEN 650 MG: 325 TABLET, FILM COATED ORAL at 20:56

## 2025-01-18 RX ADMIN — TAMSULOSIN HYDROCHLORIDE 0.8 MG: 0.4 CAPSULE ORAL at 20:57

## 2025-01-18 RX ADMIN — POTASSIUM CHLORIDE 40 MEQ: 1500 TABLET, EXTENDED RELEASE ORAL at 12:30

## 2025-01-18 RX ADMIN — APIXABAN 5 MG: 5 TABLET, FILM COATED ORAL at 20:56

## 2025-01-18 RX ADMIN — CEFTAZIDIME 2000 MG: 2 INJECTION, POWDER, FOR SOLUTION INTRAVENOUS at 14:56

## 2025-01-18 RX ADMIN — PREDNISONE 40 MG: 20 TABLET ORAL at 09:35

## 2025-01-18 RX ADMIN — POTASSIUM CHLORIDE 40 MEQ: 1500 TABLET, EXTENDED RELEASE ORAL at 14:59

## 2025-01-18 RX ADMIN — MICONAZOLE NITRATE 1 APPLICATION: 20 POWDER TOPICAL at 21:26

## 2025-01-18 RX ADMIN — AMIODARONE HYDROCHLORIDE 200 MG: 200 TABLET ORAL at 12:29

## 2025-01-18 RX ADMIN — Medication 1 APPLICATION: at 09:42

## 2025-01-18 RX ADMIN — Medication 10 ML: at 21:07

## 2025-01-18 RX ADMIN — ATORVASTATIN CALCIUM 40 MG: 40 TABLET, FILM COATED ORAL at 20:56

## 2025-01-18 RX ADMIN — POTASSIUM CHLORIDE 40 MEQ: 1500 TABLET, EXTENDED RELEASE ORAL at 06:12

## 2025-01-18 RX ADMIN — RISPERIDONE 0.25 MG: 0.5 TABLET, ORALLY DISINTEGRATING ORAL at 20:56

## 2025-01-18 RX ADMIN — MIRTAZAPINE 7.5 MG: 7.5 TABLET, FILM COATED ORAL at 20:56

## 2025-01-18 RX ADMIN — BUDESONIDE AND FORMOTEROL FUMARATE DIHYDRATE 2 PUFF: 160; 4.5 AEROSOL RESPIRATORY (INHALATION) at 12:07

## 2025-01-18 RX ADMIN — APIXABAN 5 MG: 5 TABLET, FILM COATED ORAL at 12:29

## 2025-01-18 RX ADMIN — INSULIN LISPRO 2 UNITS: 100 INJECTION, SOLUTION INTRAVENOUS; SUBCUTANEOUS at 18:12

## 2025-01-18 RX ADMIN — Medication 1 TABLET: at 12:29

## 2025-01-18 RX ADMIN — CEFTAZIDIME 2000 MG: 2 INJECTION, POWDER, FOR SOLUTION INTRAVENOUS at 06:12

## 2025-01-18 RX ADMIN — FUROSEMIDE 40 MG: 40 TABLET ORAL at 12:29

## 2025-01-18 RX ADMIN — MICONAZOLE NITRATE 1 APPLICATION: 20 POWDER TOPICAL at 09:37

## 2025-01-18 RX ADMIN — FLUOXETINE 40 MG: 20 CAPSULE ORAL at 12:29

## 2025-01-18 RX ADMIN — OSELTAMIVIR PHOSPHATE 75 MG: 75 CAPSULE ORAL at 20:56

## 2025-01-18 RX ADMIN — FINASTERIDE 5 MG: 5 TABLET, FILM COATED ORAL at 12:29

## 2025-01-18 RX ADMIN — Medication 10 ML: at 09:36

## 2025-01-18 RX ADMIN — SODIUM CHLORIDE 100 MG: 9 INJECTION, SOLUTION INTRAVENOUS at 16:32

## 2025-01-18 RX ADMIN — LEVOTHYROXINE SODIUM 100 MCG: 100 TABLET ORAL at 12:30

## 2025-01-18 RX ADMIN — Medication 1500 MG: at 14:52

## 2025-01-18 RX ADMIN — BUDESONIDE AND FORMOTEROL FUMARATE DIHYDRATE 2 PUFF: 160; 4.5 AEROSOL RESPIRATORY (INHALATION) at 19:08

## 2025-01-18 RX ADMIN — GUAIFENESIN 1200 MG: 600 TABLET ORAL at 08:50

## 2025-01-18 RX ADMIN — Medication 1 APPLICATION: at 21:06

## 2025-01-18 NOTE — PROGRESS NOTES
Infectious Diseases Progress Note      LOS: 3 days   Patient Care Team:  Jhonny Heller MD as PCP - General (Family Medicine)  Frederick George MD as Consulting Physician (Nephrology)    Chief Complaint: Shortness of breath, weakness    Subjective       The patient has been afebrile for the last 24 hours.  The patient is on 2 L of oxygen by nasal cannula hemodynamically stable, and is tolerating antimicrobial therapy.  Patient is feeling better today      Review of Systems:   Review of Systems   Constitutional:  Positive for fatigue.   HENT: Negative.     Eyes: Negative.    Respiratory:  Positive for cough and shortness of breath.    Cardiovascular: Negative.    Gastrointestinal: Negative.    Endocrine: Negative.    Genitourinary: Negative.    Musculoskeletal: Negative.    Skin: Negative.    Neurological:  Positive for weakness.   Psychiatric/Behavioral: Negative.     All other systems reviewed and are negative.       Objective     Vital Signs  Temp:  [97.2 °F (36.2 °C)-98.2 °F (36.8 °C)] 97.2 °F (36.2 °C)  Heart Rate:  [100-120] 100  Resp:  [15-22] 22  BP: ()/(40-98) 145/98    Physical Exam:  Physical Exam  Vitals and nursing note reviewed.   Constitutional:       General: He is not in acute distress.     Appearance: He is well-developed. He is obese. He is ill-appearing. He is not diaphoretic.   HENT:      Head: Normocephalic and atraumatic.   Eyes:      Conjunctiva/sclera: Conjunctivae normal.      Pupils: Pupils are equal, round, and reactive to light.   Cardiovascular:      Rate and Rhythm: Normal rate and regular rhythm.      Heart sounds: Normal heart sounds, S1 normal and S2 normal.   Pulmonary:      Effort: Pulmonary effort is normal. No respiratory distress.      Breath sounds: No stridor. Rales present. No wheezing.   Abdominal:      General: Bowel sounds are normal. There is no distension.      Palpations: Abdomen is soft. There is no mass.      Tenderness: There is no abdominal tenderness. There is  no guarding.      Comments: No significant abdominal pain   Genitourinary:     Comments: Perry catheter  Musculoskeletal:         General: No deformity.      Cervical back: Neck supple.   Skin:     General: Skin is warm and dry.      Coloration: Skin is not pale.      Findings: No erythema or rash.      Comments: Patient has some stage II pressure ulcerations to the scrotum and buttocks and some moisture associated dermatitis-nothing that looks infected    Neurological:      Mental Status: He is alert and oriented to person, place, and time.      Motor: Weakness present.          Results Review:    I have reviewed all clinical data, test, lab, and imaging results.     Radiology  No Radiology Exams Resulted Within Past 24 Hours    Cardiology    Laboratory    Results from last 7 days   Lab Units 01/18/25  0325 01/17/25  0158 01/16/25  0235 01/15/25  1630 01/12/25  0250 01/11/25  1611   WBC 10*3/mm3 3.22* 3.33* 5.74 6.36 6.87 7.42   HEMOGLOBIN g/dL 9.7* 9.5* 10.7* 10.9* 10.6* 11.0*   HEMATOCRIT % 33.9* 33.0* 37.1* 37.0* 36.0* 36.6*   PLATELETS 10*3/mm3 171 152 172 186 188 187     Results from last 7 days   Lab Units 01/18/25  0325 01/17/25  1057 01/17/25  0158 01/16/25  0235 01/15/25  1630 01/12/25  0250 01/11/25  1611   SODIUM mmol/L 145  --  142 141 140 143 142   POTASSIUM mmol/L 3.0* 3.7 3.6 4.1 3.9 3.8 3.8   CHLORIDE mmol/L 113*  --  109* 105 102 105 104   CO2 mmol/L 19.9*  --  23.6 24.0 26.7 27.7 28.8   BUN mg/dL 17  --  21 20 20 22 23   CREATININE mg/dL 1.12  --  1.30* 1.19 1.32* 1.15 1.24   GLUCOSE mg/dL 148*  --  211* 142* 177* 156* 218*   ALBUMIN g/dL 2.9* 3.1*  --   --   --  3.0* 3.1*   BILIRUBIN mg/dL 0.2 0.2  --   --   --  0.3 0.3   ALK PHOS U/L 49 53  --   --   --  84 90   AST (SGOT) U/L 37 45*  --   --   --  24 24   ALT (SGPT) U/L 28 33  --   --   --  20 23   CALCIUM mg/dL 7.3*  --  8.0* 8.5* 8.6 8.8 8.8                 Microbiology   Microbiology Results (last 10 days)       Procedure Component Value -  Date/Time    Respiratory Culture - Sputum, Cough [[id number]] Collected: 01/18/25 0933    Lab Status: Preliminary result Specimen: Sputum from Cough Updated: 01/18/25 1044     Gram Stain Moderate (3+) WBCs per low power field      Rare (1+) Epithelial cells per low power field      Few (2+) Gram positive cocci    Urine Culture - Urine, Indwelling Urethral Catheter [[id number]]  (Normal) Collected: 01/15/25 1954    Lab Status: Final result Specimen: Urine from Indwelling Urethral Catheter Updated: 01/17/25 1046     Urine Culture No growth    Legionella Antigen, Urine - Urine, Indwelling Urethral Catheter [[id number]]  (Normal) Collected: 01/15/25 1954    Lab Status: Final result Specimen: Urine from Indwelling Urethral Catheter Updated: 01/16/25 1738     LEGIONELLA ANTIGEN, URINE Negative    S. Pneumo Ag Urine or CSF - Urine, Indwelling Urethral Catheter [[id number]]  (Normal) Collected: 01/15/25 1954    Lab Status: Final result Specimen: Urine from Indwelling Urethral Catheter Updated: 01/16/25 1738     Strep Pneumo Ag Negative    MRSA Screen, PCR (Inpatient) - Swab, Nares [[id number]]  (Abnormal) Collected: 01/15/25 1935    Lab Status: Final result Specimen: Swab from Nares Updated: 01/15/25 2142     MRSA PCR MRSA Detected    Narrative:      The negative predictive value of this diagnostic test is high and should only be used to consider de-escalating anti-MRSA therapy. A positive result may indicate colonization with MRSA and must be correlated clinically.    Blood Culture - Blood, Arm, Left [[id number]]  (Normal) Collected: 01/15/25 1927    Lab Status: Preliminary result Specimen: Blood from Arm, Left Updated: 01/17/25 2015     Blood Culture No growth at 2 days    Blood Culture - Blood, Arm, Right [[id number]]  (Normal) Collected: 01/15/25 1927    Lab Status: Preliminary result Specimen: Blood from Arm, Right Updated: 01/17/25 2015     Blood Culture No growth at 2 days    Urine Culture - Urine, Indwelling Urethral  Catheter [[id number]] Collected: 01/10/25 1347    Lab Status: Final result Specimen: Urine from Indwelling Urethral Catheter Updated: 01/11/25 1231     Urine Culture <25,000 CFU/mL Mixed Cheryl Isolated    Narrative:      Specimen contains mixed organisms of questionable pathogenicity suggestive of contamination. If symptoms persist, suggest recollection.  Colonization of the urinary tract without infection is common. Treatment is discouraged unless the patient is symptomatic, pregnant, or undergoing an invasive urologic procedure.    Blood Culture - Blood, Hand, Right [[id number]]  (Normal) Collected: 01/10/25 1201    Lab Status: Final result Specimen: Blood from Hand, Right Updated: 01/15/25 1245     Blood Culture No growth at 5 days            Medication Review:       Schedule Meds  amiodarone, 200 mg, Oral, Daily  apixaban, 5 mg, Oral, Q12H  aspirin, 81 mg, Oral, Daily  atorvastatin, 40 mg, Oral, Nightly  budesonide-formoterol, 2 puff, Inhalation, BID - RT  cefTAZidime, 2,000 mg, Intravenous, Q8H  finasteride, 5 mg, Oral, Daily  FLUoxetine, 40 mg, Oral, Daily  furosemide, 40 mg, Oral, Daily  hydrocortisone-bacitracin-zinc oxide-nystatin, 1 Application, Topical, BID  insulin lispro, 2-7 Units, Subcutaneous, 4x Daily With Meals & Nightly  levothyroxine, 100 mcg, Oral, Q AM  miconazole, 1 Application, Topical, Q12H  mirtazapine, 7.5 mg, Oral, Nightly  multivitamin with minerals, 1 tablet, Oral, Daily  oseltamivir, 75 mg, Oral, Q12H  pantoprazole, 40 mg, Oral, QAM AC  potassium chloride ER, 40 mEq, Oral, Q4H  [START ON 1/19/2025] predniSONE, 20 mg, Oral, Daily With Breakfast  remdesivir, 100 mg, Intravenous, Q24H  risperiDONE, 0.25 mg, Oral, Nightly  sodium chloride, 10 mL, Intravenous, Q12H  tamsulosin, 0.8 mg, Oral, Nightly  vancomycin, 1,500 mg, Intravenous, Q24H  vitamin B-12, 1,000 mcg, Oral, Daily        Infusion Meds  Pharmacy Consult - Pharmacy to dose,   Pharmacy to dose vancomycin,         PRN Meds     acetaminophen **OR** acetaminophen **OR** acetaminophen    acetaminophen    senna-docusate sodium **AND** polyethylene glycol **AND** bisacodyl **AND** bisacodyl    Calcium Replacement - Follow Nurse / BPA Driven Protocol    dextrose    dextrose    glucagon (human recombinant)    Magnesium Standard Dose Replacement - Follow Nurse / BPA Driven Protocol    melatonin    ondansetron ODT **OR** ondansetron    Pharmacy Consult - Pharmacy to dose    Pharmacy to dose vancomycin    Phosphorus Replacement - Follow Nurse / BPA Driven Protocol    Potassium Replacement - Follow Nurse / BPA Driven Protocol    sodium chloride    sodium chloride        Assessment & Plan       Antimicrobial Therapy   1.  IV ceftazidime        2.  IV vancomycin        3.  P.o. Tamiflu        4.  IV Remdesivir        5.            Assessment     Left lower lobe infiltrate concerning for bacterial pneumonia.  Patient is currently on 2 L oxygen via nasal cannula.  MRSA screen was positive.  Legionella and pneumococcal urine antigen is negative     Recent diagnosis of COVID-19 infection at outlying facility/[facility] prior to admission.  Imaging studies not consistent with COVID-pneumonia The patient was also diagnosed with influenza based on transfer note.     Toxic metabolic encephalopathy on admission.  May be related to above.  Appears to be back to baseline     Recent admission in December 2024 with obstructive uropathy with urine cultures from [facility] growing ESBL  Proteus mirabilis.  Patient status post cystoscopy with right stent placement and stone removal.  Patient was treated with 10 days of IV ertapenem which was completed on 12/29/2024.  Current urine culture is negative.  Patient was noted to have a chronic Perry catheter secondary to urine retention     Multiple sclerosis.  Patient is bedbound secondary to that     History of mitral valve replacement in the past     Type 2 diabetes-most recent A1c is 9.45     Morbid obesity      Plan     Continue IV ceftazidime 2 g every 8 hours  Continue vancomycin for now  Patient appears to be responding well to antibiotics-will continue for short course  Continue patient on remdesivir for 3 days as a prophylaxis-will discontinue after today's dose  Continue p.o. Tamiflu 75 mg every 12 hours for 5 days  Sputum culture-pending   Continue supportive care  A.m. labs  Patient will need to be on enhanced airborne isolation for 10 days from the first positive COVID screen  Appropriate PPE was used during this assessment        Fay Bradford, APRN  01/18/25  11:36 EST    Note is dictated utilizing voice recognition software/Dragon

## 2025-01-18 NOTE — PLAN OF CARE
Problem: Adult Inpatient Plan of Care  Goal: Plan of Care Review  Outcome: Progressing  Flowsheets (Taken 1/18/2025 0400)  Progress: improving  Plan of Care Reviewed With: patient     Problem: Adult Inpatient Plan of Care  Goal: Absence of Hospital-Acquired Illness or Injury  Intervention: Prevent Skin Injury  Recent Flowsheet Documentation  Taken 1/18/2025 0356 by Maki Oliva RN  Body Position: turned  Taken 1/18/2025 0001 by Maki Oliva RN  Body Position:   turned   left  Taken 1/17/2025 1935 by Maki Oliva RN  Body Position: weight shifting  Skin Protection:   drying agents applied   incontinence pads utilized   skin sealant/moisture barrier applied     Problem: Adult Inpatient Plan of Care  Goal: Absence of Hospital-Acquired Illness or Injury  Intervention: Prevent and Manage VTE (Venous Thromboembolism) Risk  Recent Flowsheet Documentation  Taken 1/17/2025 1935 by Maki Oliva RN  VTE Prevention/Management: patient refused intervention     Problem: Adult Inpatient Plan of Care  Goal: Optimal Comfort and Wellbeing  Outcome: Progressing  Intervention: Monitor Pain and Promote Comfort  Recent Flowsheet Documentation  Taken 1/17/2025 2005 by Maki Oliva RN  Pain Management Interventions: pain medication given     Problem: Skin Injury Risk Increased  Goal: Skin Health and Integrity  Outcome: Progressing  Intervention: Optimize Skin Protection  Recent Flowsheet Documentation  Taken 1/17/2025 1935 by Maki Oliva RN  Pressure Reduction Techniques:   frequent weight shift encouraged   heels elevated off bed   positioned off wounds   pressure points protected   weight shift assistance provided  Head of Bed (HOB) Positioning: HOB elevated  Pressure Reduction Devices:   specialty bed utilized   heel offloading device utilized  Skin Protection:   drying agents applied   incontinence pads utilized   skin sealant/moisture barrier applied     Problem: Sepsis/Septic Shock  Goal:  Optimal Coping  Outcome: Progressing  Goal: Absence of Bleeding  Outcome: Progressing  Goal: Blood Glucose Level Within Target Range  Outcome: Progressing  Goal: Absence of Infection Signs and Symptoms  Outcome: Progressing  Intervention: Initiate Sepsis Management  Recent Flowsheet Documentation  Taken 1/17/2025 1935 by Maki Oliva RN  Infection Prevention:   single patient room provided   rest/sleep promoted   personal protective equipment utilized   hand hygiene promoted  Isolation Precautions:   precautions maintained   droplet   enhanced contact  Goal: Optimal Nutrition Delivery  Outcome: Progressing     Problem: Fall Injury Risk  Goal: Absence of Fall and Fall-Related Injury  Outcome: Progressing  Intervention: Identify and Manage Contributors  Recent Flowsheet Documentation  Taken 1/17/2025 1935 by Maki Oliva RN  Medication Review/Management: medications reviewed  Intervention: Promote Injury-Free Environment  Recent Flowsheet Documentation  Taken 1/18/2025 0356 by Maki Oliva RN  Safety Promotion/Fall Prevention: safety round/check completed  Taken 1/18/2025 0200 by Maki Oliva RN  Safety Promotion/Fall Prevention: safety round/check completed  Taken 1/18/2025 0001 by Maki Oliva RN  Safety Promotion/Fall Prevention: safety round/check completed  Taken 1/17/2025 2200 by Maki Oliva RN  Safety Promotion/Fall Prevention: safety round/check completed  Taken 1/17/2025 2005 by Maki Oliva RN  Safety Promotion/Fall Prevention: safety round/check completed  Taken 1/17/2025 1935 by Maki Oliva RN  Safety Promotion/Fall Prevention:   safety round/check completed   room organization consistent   fall prevention program maintained   clutter free environment maintained     Problem: Pneumonia  Goal: Fluid Balance  Outcome: Progressing  Goal: Absence of Infection Signs and Symptoms  Outcome: Progressing  Intervention: Prevent Infection Progression  Recent Flowsheet  Documentation  Taken 1/17/2025 1935 by Maki Oliva, RN  Isolation Precautions:   precautions maintained   droplet   enhanced contact  Goal: Effective Oxygenation and Ventilation  Outcome: Progressing  Intervention: Promote Airway Secretion Clearance  Recent Flowsheet Documentation  Taken 1/17/2025 1935 by Maki Oliva, RN  Cough And Deep Breathing: done independently per patient  Intervention: Optimize Oxygenation and Ventilation  Recent Flowsheet Documentation  Taken 1/17/2025 1935 by Maki Oliva, RN  Head of Bed (HOB) Positioning: HOB elevated   Goal Outcome Evaluation:  Plan of Care Reviewed With: patient        Progress: improving

## 2025-01-18 NOTE — PROGRESS NOTES
"[facility] MEDICINE SERVICE  DAILY PROGRESS NOTE    NAME: Jamin Hennessy  : 1955  MRN: [id number]      LOS: 3 days     PROVIDER OF SERVICE: YUE Atwood    Chief Complaint: AMS (altered mental status)    Subjective:     Interval History:  History taken from: patient    Patient otherwise feels well, states he feels better today than he did yesterday.  No family at bedside during encounter. Patient has given permission for medical team to discuss his care with patient wife. Patient today states \"my wife is mad at me so clark if I have a home to go to\".         Review of Systems:   Review of Systems   Respiratory:  Positive for shortness of breath and wheezing.    Cardiovascular:  Negative for chest pain.   Neurological:  Negative for dizziness and headaches.       Objective:     Vital Signs  Temp:  [97.2 °F (36.2 °C)-98.2 °F (36.8 °C)] 98 °F (36.7 °C)  Heart Rate:  [] 110  Resp:  [15-22] 18  BP: ()/(40-98) 140/75  Flow (L/min) (Oxygen Therapy):  [2] 2   Body mass index is 36.06 kg/m².    Physical Exam  Physical Exam  General: 70 yo male, Alert and oriented, obese, no acute distress.  HENT: Normocephalic, normal hearing, moist oral mucosa, no scleral icterus.  Neck: Supple, nontender, no carotid bruits, no JVD, no LAD.  Lungs: Wheezing noted, diminished breath sounds in right lung, nonlabored respiration.  Heart: RRR, no murmur, gallop or edema.  Abdomen: Soft, nontender, nondistended, + bowel sounds.  Musculoskeletal: Normal range of motion and strength, no tenderness or swelling.  Skin: Skin is warm, dry and pink, no rashes or lesions.  Psychiatric: Cooperative, appropriate mood and affect.       Diagnostic Data    Results from last 7 days   Lab Units 25  0325   WBC 10*3/mm3 3.22*   HEMOGLOBIN g/dL 9.7*   HEMATOCRIT % 33.9*   PLATELETS 10*3/mm3 171   GLUCOSE mg/dL 148*   CREATININE mg/dL 1.12   BUN mg/dL 17   SODIUM mmol/L 145   POTASSIUM mmol/L 3.0*   AST (SGOT) U/L 37   ALT " (SGPT) U/L 28   ALK PHOS U/L 49   BILIRUBIN mg/dL 0.2   ANION GAP mmol/L 12.1       No radiology results for the last day      I reviewed the patient's new clinical results.    Assessment/Plan:     Active and Resolved Problems  Active Hospital Problems    Diagnosis  POA    **AMS (altered mental status) [R41.82]  Yes      Resolved Hospital Problems   No resolved problems to display.       Sepsis  Pneumonia  Left pleural effusion  Acute respiratory failure with hypoxia  COVID infection  Influenza infection  Altered mental status  On 2L NC, b/l RA  COVID and flu positive, ID started remdesivir and oseltamvir  CT chest with moderate left pleural effusion, left lower lobe and lingula consolidation  On arrival WBC 6.36, LA 2.4, repeat lactic acid 2.7 and Pro-Suman 0.16  SIRS criteria: source (UTI/PNA), HR> 90, febrile   Received ampicillin at OSH, changed to cefepime and add vancomycin for HCAP coverage given recent hospitalization  MRSA screen positive  Tylenol as needed for fever  Fall precautions  ID consulted, appreciate recommendations- noted cefepime changed to ceftazidime, continue vanc  Pulmonology consulted, noted plans for monitoring of pleural effusion as well as initiation of Symbicort, prednisone- continue     Urinary tract infection   History of recent UTI in setting of right ureteral stent  Was supposed to be on cefdinir 300 mg twice daily until 1/16/2025, wife did not obtain from pharmacy  UA at OSH with 11-20 WBCs and large leukocyte esterase, recently admitted for UTI  Repeat UA, shows large blood, 2+ protein, 2+ leuk esterase, too numerous to count RBC and WBC as well as unable to determine due to loaded field of bacteria  Urology evaluated patient during previous admission, no urology intervention  Urine culture negative     Pneumoperitoneum  CT abdomen pelvis from OSH incidentally notes 1 tiny bubble of pneumoperitoneum in the left upper quadrant, uncertain etiology  Repeat CT shows no pneumoperitoneum  present  General Surgery consulted, no surgical plans at this time     Paroxysmal A-fib  Currently sinus tachycardia in the setting of fever  Continue amiodarone and Eliquis once reconciled  Telemetry     Chronic hypotension-BP soft, patient is on midodrine, continue   CAD/hyperlipidemia-continue ASA  Diabetes Mellitus, type II-BG ACHS, low SSI, hold glipizide, Hypoglycemia protocol   Multiple sclerosis-takes glatiramer injection daily, not on formulary, as needed Flexeril  Hypothyroidism-continue levothyroxine 100 mcg  Valvular heart disease status post MVR  Sacral ulcer-present on admission wound care consulted and following  BPH-continue Flomax and finasteride  GERD-continue PPI       VTE Prophylaxis:  Pharmacologic & mechanical VTE prophylaxis orders are present.             Disposition Planning:     Barriers to Discharge: Continued care  Anticipated Date of Discharge: 1-  Place of Discharge: Per wife, patient is to return home to her with 24/7 care      Time: 35 minutes     Code Status and Medical Interventions: No CPR (Do Not Attempt to Resuscitate); Limited Support; No intubation (DNI)   Ordered at: 01/15/25 1600     Medical Intervention Limits:    No intubation (DNI)     Code Status (Patient has no pulse and is not breathing):    No CPR (Do Not Attempt to Resuscitate)     Medical Interventions (Patient has pulse or is breathing):    Limited Support       Signature: Electronically signed by YUE Atwood, 01/18/25, 12:37 EST.  [facility] Hospitalist Team

## 2025-01-18 NOTE — PROGRESS NOTES
Daily Progress Note          Assessment    Small Left pleural effusion  Hypoxemia  UTI status post right ureteral stent  Pneumoperitoneum  Paroxysmal atrial fibrillation  CAD  Status post mitral valve replacement  Diabetes mellitus  Multiple sclerosis  Hypothyroidism  Chronic sacral ulcer  BPH  GERD  Anemia     PFTs 1/18/2020: Restrictive pattern with decreased oxygen diffusion capacity: FEV1/FVC 74. FEV1 is 1.42 which is 46% predicted FVC is 1.91 which is 43% predicted. There is 24% improvement in FEV1 value after bronchodilator challenge. MVV is 26% predicted. Diffusion capacity is 46%      Recommendations:  Respiratory status is gradually improving     the Left effusion is small, monitor it for now     The wheezing is improving, continue symbicort and decrease prednisone    Incentive spirometry     Oxygen supplement and titration to maintain saturation 90 to 95%: Currently on 2 L per nasal cannula     Antibiotics: As per ID     Glucose control     Chronic anticoagulation: Eliquis     HR control: On amiodarone        I personally reviewed the radiological studies             LOS: 3 days     Subjective     Cough and shortness of breath    Objective     Vital signs for last 24 hours:  Vitals:    01/17/25 2036 01/17/25 2038 01/17/25 2345 01/18/25 0322   BP:   118/67 145/98   BP Location:   Left arm    Patient Position:   Lying    Pulse: 108 111 106 100   Resp: 16 18 15 22   Temp:   97.6 °F (36.4 °C) 97.2 °F (36.2 °C)   TempSrc:   Oral Axillary   SpO2: 98% 98% 100% 100%   Weight:       Height:           Intake/Output last 3 shifts:  I/O last 3 completed shifts:  In: 610 [P.O.:360; I.V.:250]  Out: 3300 [Urine:3300]  Intake/Output this shift:  No intake/output data recorded.      Radiology  Imaging Results (Last 24 Hours)       ** No results found for the last 24 hours. **            Labs:  Results from last 7 days   Lab Units 01/18/25  0325   WBC 10*3/mm3 3.22*   HEMOGLOBIN g/dL 9.7*   HEMATOCRIT % 33.9*   PLATELETS  10*3/mm3 171     Results from last 7 days   Lab Units 01/18/25  0325   SODIUM mmol/L 145   POTASSIUM mmol/L 3.0*   CHLORIDE mmol/L 113*   CO2 mmol/L 19.9*   BUN mg/dL 17   CREATININE mg/dL 1.12   CALCIUM mg/dL 7.3*   BILIRUBIN mg/dL 0.2   ALK PHOS U/L 49   ALT (SGPT) U/L 28   AST (SGOT) U/L 37   GLUCOSE mg/dL 148*         Results from last 7 days   Lab Units 01/18/25  0325 01/17/25  1057 01/12/25  0250   ALBUMIN g/dL 2.9* 3.1* 3.0*             Results from last 7 days   Lab Units 01/18/25  0325   MAGNESIUM mg/dL 1.7                   Meds:   SCHEDULE  amiodarone, 200 mg, Oral, Daily  apixaban, 5 mg, Oral, Q12H  aspirin, 81 mg, Oral, Daily  atorvastatin, 40 mg, Oral, Nightly  budesonide-formoterol, 2 puff, Inhalation, BID - RT  cefTAZidime, 2,000 mg, Intravenous, Q8H  finasteride, 5 mg, Oral, Daily  FLUoxetine, 40 mg, Oral, Daily  furosemide, 40 mg, Oral, Daily  hydrocortisone-bacitracin-zinc oxide-nystatin, 1 Application, Topical, BID  insulin lispro, 2-7 Units, Subcutaneous, 4x Daily With Meals & Nightly  levothyroxine, 100 mcg, Oral, Daily  miconazole, 1 Application, Topical, Q12H  mirtazapine, 7.5 mg, Oral, Nightly  multivitamin with minerals, 1 tablet, Oral, Daily  oseltamivir, 75 mg, Oral, Q12H  pantoprazole, 40 mg, Oral, Q AM  pantoprazole, 40 mg, Intravenous, Daily With Dinner  potassium chloride ER, 40 mEq, Oral, Q4H  predniSONE, 40 mg, Oral, Daily With Breakfast  remdesivir, 100 mg, Intravenous, Q24H  risperiDONE, 0.25 mg, Oral, Nightly  sodium chloride, 10 mL, Intravenous, Q12H  tamsulosin, 0.8 mg, Oral, Nightly  vancomycin, 1,500 mg, Intravenous, Q24H  vitamin B-12, 1,000 mcg, Oral, Daily      Infusions  Pharmacy Consult - Pharmacy to dose,   Pharmacy to dose vancomycin,       PRNs    acetaminophen **OR** acetaminophen **OR** acetaminophen    acetaminophen    senna-docusate sodium **AND** polyethylene glycol **AND** bisacodyl **AND** bisacodyl    Calcium Replacement - Follow Nurse / BPA Driven Protocol     dextrose    dextrose    glucagon (human recombinant)    Magnesium Standard Dose Replacement - Follow Nurse / BPA Driven Protocol    melatonin    ondansetron ODT **OR** ondansetron    Pharmacy Consult - Pharmacy to dose    Pharmacy to dose vancomycin    Phosphorus Replacement - Follow Nurse / BPA Driven Protocol    Potassium Replacement - Follow Nurse / BPA Driven Protocol    sodium chloride    sodium chloride    Physical Exam:  General Appearance:  Alert   HEENT:  Normocephalic, without obvious abnormality, Conjunctiva/corneas clear,.   Nares normal, no drainage     Neck:  Supple, symmetrical, trachea midline.   Lungs /Chest wall:   Bilateral basal rhonchi, respirations unlabored, symmetrical wall movement.     Heart: Rate controlled, S1 S2 normal  Abdomen: Soft, non-tender, no masses, no organomegaly.    Extremities: + Bilateral lower extremity edema, no clubbing or cyanosis     ROS  Constitutional: Negative for chills, fever and malaise/fatigue.   HENT: Negative.    Eyes: Negative.    Cardiovascular: Negative.    Respiratory: Positive for cough and shortness of breath.    Skin: Negative.    Musculoskeletal: Negative.    Gastrointestinal: Negative.    Genitourinary: Negative.    Neurological: Chronic weakness in lower extremities      I reviewed the recent clinical results  I personally reviewed the latest radiological studies    Part of this note may be an electronic transcription/translation of spoken language to printed text using the Dragon Dictation System.

## 2025-01-19 LAB
ANION GAP SERPL CALCULATED.3IONS-SCNC: 12.2 MMOL/L (ref 5–15)
BASOPHILS # BLD AUTO: 0.01 10*3/MM3 (ref 0–0.2)
BASOPHILS NFR BLD AUTO: 0.3 % (ref 0–1.5)
BUN SERPL-MCNC: 17 MG/DL (ref 8–23)
BUN/CREAT SERPL: 13.5 (ref 7–25)
CALCIUM SPEC-SCNC: 8.1 MG/DL (ref 8.6–10.5)
CHLORIDE SERPL-SCNC: 109 MMOL/L (ref 98–107)
CO2 SERPL-SCNC: 21.8 MMOL/L (ref 22–29)
CREAT SERPL-MCNC: 1.26 MG/DL (ref 0.76–1.27)
DEPRECATED RDW RBC AUTO: 60.3 FL (ref 37–54)
EGFRCR SERPLBLD CKD-EPI 2021: 61.7 ML/MIN/1.73
EOSINOPHIL # BLD AUTO: 0 10*3/MM3 (ref 0–0.4)
EOSINOPHIL NFR BLD AUTO: 0 % (ref 0.3–6.2)
ERYTHROCYTE [DISTWIDTH] IN BLOOD BY AUTOMATED COUNT: 18.9 % (ref 12.3–15.4)
GLUCOSE BLDC GLUCOMTR-MCNC: 141 MG/DL (ref 70–105)
GLUCOSE BLDC GLUCOMTR-MCNC: 185 MG/DL (ref 70–105)
GLUCOSE BLDC GLUCOMTR-MCNC: 260 MG/DL (ref 70–105)
GLUCOSE BLDC GLUCOMTR-MCNC: 301 MG/DL (ref 70–105)
GLUCOSE SERPL-MCNC: 223 MG/DL (ref 65–99)
HCT VFR BLD AUTO: 32.6 % (ref 37.5–51)
HGB BLD-MCNC: 9.6 G/DL (ref 13–17.7)
IMM GRANULOCYTES # BLD AUTO: 0.04 10*3/MM3 (ref 0–0.05)
IMM GRANULOCYTES NFR BLD AUTO: 1.3 % (ref 0–0.5)
LYMPHOCYTES # BLD AUTO: 0.46 10*3/MM3 (ref 0.7–3.1)
LYMPHOCYTES NFR BLD AUTO: 14.8 % (ref 19.6–45.3)
MAGNESIUM SERPL-MCNC: 1.8 MG/DL (ref 1.6–2.4)
MCH RBC QN AUTO: 25.7 PG (ref 26.6–33)
MCHC RBC AUTO-ENTMCNC: 29.4 G/DL (ref 31.5–35.7)
MCV RBC AUTO: 87.4 FL (ref 79–97)
MONOCYTES # BLD AUTO: 0.4 10*3/MM3 (ref 0.1–0.9)
MONOCYTES NFR BLD AUTO: 12.9 % (ref 5–12)
NEUTROPHILS NFR BLD AUTO: 2.2 10*3/MM3 (ref 1.7–7)
NEUTROPHILS NFR BLD AUTO: 70.7 % (ref 42.7–76)
NRBC BLD AUTO-RTO: 0 /100 WBC (ref 0–0.2)
PLATELET # BLD AUTO: 156 10*3/MM3 (ref 140–450)
PMV BLD AUTO: 10.3 FL (ref 6–12)
POTASSIUM SERPL-SCNC: 4 MMOL/L (ref 3.5–5.2)
RBC # BLD AUTO: 3.73 10*6/MM3 (ref 4.14–5.8)
SODIUM SERPL-SCNC: 143 MMOL/L (ref 136–145)
VANCOMYCIN PEAK SERPL-MCNC: 34.2 MCG/ML (ref 20–40)
WBC NRBC COR # BLD AUTO: 3.11 10*3/MM3 (ref 3.4–10.8)

## 2025-01-19 PROCEDURE — 85025 COMPLETE CBC W/AUTO DIFF WBC: CPT

## 2025-01-19 PROCEDURE — 99222 1ST HOSP IP/OBS MODERATE 55: CPT | Performed by: INTERNAL MEDICINE

## 2025-01-19 PROCEDURE — 94799 UNLISTED PULMONARY SVC/PX: CPT

## 2025-01-19 PROCEDURE — 63710000001 INSULIN LISPRO (HUMAN) PER 5 UNITS: Performed by: FAMILY MEDICINE

## 2025-01-19 PROCEDURE — 80202 ASSAY OF VANCOMYCIN: CPT | Performed by: FAMILY MEDICINE

## 2025-01-19 PROCEDURE — 63710000001 PREDNISONE PER 1 MG: Performed by: INTERNAL MEDICINE

## 2025-01-19 PROCEDURE — 93010 ELECTROCARDIOGRAM REPORT: CPT | Performed by: INTERNAL MEDICINE

## 2025-01-19 PROCEDURE — 25010000002 VANCOMYCIN 1.5-0.9 GM/500ML-% SOLUTION: Performed by: NURSE PRACTITIONER

## 2025-01-19 PROCEDURE — 25010000002 CEFTAZIDIME 2 G RECONSTITUTED SOLUTION 1 EACH VIAL: Performed by: INTERNAL MEDICINE

## 2025-01-19 PROCEDURE — 83735 ASSAY OF MAGNESIUM: CPT

## 2025-01-19 PROCEDURE — 82948 REAGENT STRIP/BLOOD GLUCOSE: CPT

## 2025-01-19 PROCEDURE — 80048 BASIC METABOLIC PNL TOTAL CA: CPT

## 2025-01-19 PROCEDURE — 93005 ELECTROCARDIOGRAM TRACING: CPT | Performed by: INTERNAL MEDICINE

## 2025-01-19 RX ORDER — DILTIAZEM HCL IN NACL,ISO-OSM 125 MG/125
5-15 PLASTIC BAG, INJECTION (ML) INTRAVENOUS
Status: DISCONTINUED | OUTPATIENT
Start: 2025-01-19 | End: 2025-01-20

## 2025-01-19 RX ORDER — DILTIAZEM HYDROCHLORIDE 5 MG/ML
10 INJECTION INTRAVENOUS ONCE
Status: COMPLETED | OUTPATIENT
Start: 2025-01-19 | End: 2025-01-19

## 2025-01-19 RX ORDER — DILTIAZEM HCL/D5W 125 MG/125
5-15 PLASTIC BAG, INJECTION (ML) INTRAVENOUS
Status: DISCONTINUED | OUTPATIENT
Start: 2025-01-19 | End: 2025-01-19

## 2025-01-19 RX ADMIN — INSULIN LISPRO 4 UNITS: 100 INJECTION, SOLUTION INTRAVENOUS; SUBCUTANEOUS at 17:54

## 2025-01-19 RX ADMIN — AMIODARONE HYDROCHLORIDE 200 MG: 200 TABLET ORAL at 09:05

## 2025-01-19 RX ADMIN — MICONAZOLE NITRATE 1 APPLICATION: 20 POWDER TOPICAL at 09:05

## 2025-01-19 RX ADMIN — CEFTAZIDIME 2000 MG: 2 INJECTION, POWDER, FOR SOLUTION INTRAVENOUS at 23:27

## 2025-01-19 RX ADMIN — Medication 1500 MG: at 14:47

## 2025-01-19 RX ADMIN — PANTOPRAZOLE SODIUM 40 MG: 40 TABLET, DELAYED RELEASE ORAL at 08:54

## 2025-01-19 RX ADMIN — RISPERIDONE 0.25 MG: 0.5 TABLET, ORALLY DISINTEGRATING ORAL at 21:48

## 2025-01-19 RX ADMIN — CEFTAZIDIME 2000 MG: 2 INJECTION, POWDER, FOR SOLUTION INTRAVENOUS at 15:23

## 2025-01-19 RX ADMIN — Medication 10 ML: at 21:49

## 2025-01-19 RX ADMIN — FUROSEMIDE 40 MG: 40 TABLET ORAL at 09:04

## 2025-01-19 RX ADMIN — APIXABAN 5 MG: 5 TABLET, FILM COATED ORAL at 09:05

## 2025-01-19 RX ADMIN — DILTIAZEM HYDROCHLORIDE 10 MG: 5 INJECTION, SOLUTION INTRAVENOUS at 14:43

## 2025-01-19 RX ADMIN — FINASTERIDE 5 MG: 5 TABLET, FILM COATED ORAL at 09:05

## 2025-01-19 RX ADMIN — TAMSULOSIN HYDROCHLORIDE 0.8 MG: 0.4 CAPSULE ORAL at 21:48

## 2025-01-19 RX ADMIN — Medication 1 APPLICATION: at 09:06

## 2025-01-19 RX ADMIN — OSELTAMIVIR PHOSPHATE 75 MG: 75 CAPSULE ORAL at 21:48

## 2025-01-19 RX ADMIN — MIRTAZAPINE 7.5 MG: 7.5 TABLET, FILM COATED ORAL at 21:48

## 2025-01-19 RX ADMIN — Medication 10 ML: at 09:07

## 2025-01-19 RX ADMIN — Medication 1000 MCG: at 09:06

## 2025-01-19 RX ADMIN — Medication 1 TABLET: at 09:04

## 2025-01-19 RX ADMIN — MICONAZOLE NITRATE 1 APPLICATION: 20 POWDER TOPICAL at 21:49

## 2025-01-19 RX ADMIN — OSELTAMIVIR PHOSPHATE 75 MG: 75 CAPSULE ORAL at 09:04

## 2025-01-19 RX ADMIN — BUDESONIDE AND FORMOTEROL FUMARATE DIHYDRATE 2 PUFF: 160; 4.5 AEROSOL RESPIRATORY (INHALATION) at 19:56

## 2025-01-19 RX ADMIN — ASPIRIN 81 MG: 81 TABLET, COATED ORAL at 09:04

## 2025-01-19 RX ADMIN — DILTIAZEM HYDROCHLORIDE 5 MG/HR: 5 INJECTION INTRAVENOUS at 15:02

## 2025-01-19 RX ADMIN — LEVOTHYROXINE SODIUM 100 MCG: 100 TABLET ORAL at 06:19

## 2025-01-19 RX ADMIN — Medication 1 APPLICATION: at 21:49

## 2025-01-19 RX ADMIN — ATORVASTATIN CALCIUM 40 MG: 40 TABLET, FILM COATED ORAL at 21:48

## 2025-01-19 RX ADMIN — CEFTAZIDIME 2000 MG: 2 INJECTION, POWDER, FOR SOLUTION INTRAVENOUS at 00:08

## 2025-01-19 RX ADMIN — APIXABAN 5 MG: 5 TABLET, FILM COATED ORAL at 21:48

## 2025-01-19 RX ADMIN — INSULIN LISPRO 5 UNITS: 100 INJECTION, SOLUTION INTRAVENOUS; SUBCUTANEOUS at 21:48

## 2025-01-19 RX ADMIN — INSULIN LISPRO 2 UNITS: 100 INJECTION, SOLUTION INTRAVENOUS; SUBCUTANEOUS at 12:06

## 2025-01-19 RX ADMIN — PREDNISONE 20 MG: 20 TABLET ORAL at 09:04

## 2025-01-19 RX ADMIN — FLUOXETINE 40 MG: 20 CAPSULE ORAL at 09:04

## 2025-01-19 RX ADMIN — CEFTAZIDIME 2000 MG: 2 INJECTION, POWDER, FOR SOLUTION INTRAVENOUS at 06:19

## 2025-01-19 NOTE — PROGRESS NOTES
Infectious Diseases Progress Note      LOS: 4 days   Patient Care Team:  Jhonny Heller MD as PCP - General (Family Medicine)  Frederick George MD as Consulting Physician (Nephrology)    Chief Complaint: Shortness of breath, weakness    Subjective       The patient has been afebrile for the last 24 hours.  The patient is currently on room air      Review of Systems:   Review of Systems   Constitutional:  Positive for fatigue.   HENT: Negative.     Eyes: Negative.    Respiratory:  Positive for cough and shortness of breath.    Cardiovascular: Negative.    Gastrointestinal: Negative.    Endocrine: Negative.    Genitourinary: Negative.    Musculoskeletal: Negative.    Skin: Negative.    Neurological:  Positive for weakness.   Psychiatric/Behavioral: Negative.     All other systems reviewed and are negative.       Objective     Vital Signs  Temp:  [95.3 °F (35.2 °C)-98.2 °F (36.8 °C)] 96.7 °F (35.9 °C)  Heart Rate:  [] 110  Resp:  [12-19] 17  BP: (121-137)/(74-96) 131/76    Physical Exam:  Physical Exam  Vitals and nursing note reviewed.   Constitutional:       General: He is not in acute distress.     Appearance: He is well-developed. He is obese. He is ill-appearing. He is not diaphoretic.   HENT:      Head: Normocephalic and atraumatic.   Eyes:      Conjunctiva/sclera: Conjunctivae normal.      Pupils: Pupils are equal, round, and reactive to light.   Cardiovascular:      Rate and Rhythm: Normal rate and regular rhythm.      Heart sounds: Normal heart sounds, S1 normal and S2 normal.   Pulmonary:      Effort: Pulmonary effort is normal. No respiratory distress.      Breath sounds: No stridor. Rales present. No wheezing.   Abdominal:      General: Bowel sounds are normal. There is no distension.      Palpations: Abdomen is soft. There is no mass.      Tenderness: There is no abdominal tenderness. There is no guarding.      Comments: No significant abdominal pain   Genitourinary:     Comments: [place]  catheter  Musculoskeletal:         General: No deformity.      Cervical back: Neck supple.   Skin:     General: Skin is warm and dry.      Coloration: Skin is not pale.      Findings: No erythema or rash.      Comments: Patient has some stage II pressure ulcerations to the scrotum and buttocks and some moisture associated dermatitis-nothing that looks infected    Neurological:      Mental Status: He is alert and oriented to person, place, and time.      Motor: Weakness present.          Results Review:    I have reviewed all clinical data, test, lab, and imaging results.     Radiology  No Radiology Exams Resulted Within Past 24 Hours    Cardiology    Laboratory    Results from last 7 days   Lab Units 01/19/25  0016 01/18/25  0325 01/17/25  0158 01/16/25  0235 01/15/25  1630   WBC 10*3/mm3 3.11* 3.22* 3.33* 5.74 6.36   HEMOGLOBIN g/dL 9.6* 9.7* 9.5* 10.7* 10.9*   HEMATOCRIT % 32.6* 33.9* 33.0* 37.1* 37.0*   PLATELETS 10*3/mm3 156 171 152 172 186     Results from last 7 days   Lab Units 01/19/25  0016 01/18/25  0325 01/17/25  1057 01/17/25  0158 01/16/25  0235 01/15/25  1630   SODIUM mmol/L 143 145  --  142 141 140   POTASSIUM mmol/L 4.0 3.0* 3.7 3.6 4.1 3.9   CHLORIDE mmol/L 109* 113*  --  109* 105 102   CO2 mmol/L 21.8* 19.9*  --  23.6 24.0 26.7   BUN mg/dL 17 17  --  21 20 20   CREATININE mg/dL 1.26 1.12  --  1.30* 1.19 1.32*   GLUCOSE mg/dL 223* 148*  --  211* 142* 177*   ALBUMIN g/dL  --  2.9* 3.1*  --   --   --    BILIRUBIN mg/dL  --  0.2 0.2  --   --   --    ALK PHOS U/L  --  49 53  --   --   --    AST (SGOT) U/L  --  37 45*  --   --   --    ALT (SGPT) U/L  --  28 33  --   --   --    CALCIUM mg/dL 8.1* 7.3*  --  8.0* 8.5* 8.6                 Microbiology   Microbiology Results (last 10 days)       Procedure Component Value - Date/Time    Respiratory Culture - Sputum, Cough [[id number]] Collected: 01/18/25 0907    Lab Status: Preliminary result Specimen: Sputum from Cough Updated: 01/19/25 2879     Respiratory  Culture Moderate growth (3+) Normal Respiratory Cheryl     Gram Stain Moderate (3+) WBCs per low power field      Rare (1+) Epithelial cells per low power field      Few (2+) Gram positive cocci    Urine Culture - Urine, Indwelling Urethral Catheter [[id number]]  (Normal) Collected: 01/15/25 1954    Lab Status: Final result Specimen: Urine from Indwelling Urethral Catheter Updated: 01/17/25 1046     Urine Culture No growth    Legionella Antigen, Urine - Urine, Indwelling Urethral Catheter [[id number]]  (Normal) Collected: 01/15/25 1954    Lab Status: Final result Specimen: Urine from Indwelling Urethral Catheter Updated: 01/16/25 1738     LEGIONELLA ANTIGEN, URINE Negative    S. Pneumo Ag Urine or CSF - Urine, Indwelling Urethral Catheter [[id number]]  (Normal) Collected: 01/15/25 1954    Lab Status: Final result Specimen: Urine from Indwelling Urethral Catheter Updated: 01/16/25 1738     Strep Pneumo Ag Negative    MRSA Screen, PCR (Inpatient) - Swab, Nares [[id number]]  (Abnormal) Collected: 01/15/25 1935    Lab Status: Final result Specimen: Swab from Nares Updated: 01/15/25 2142     MRSA PCR MRSA Detected    Narrative:      The negative predictive value of this diagnostic test is high and should only be used to consider de-escalating anti-MRSA therapy. A positive result may indicate colonization with MRSA and must be correlated clinically.    Blood Culture - Blood, Arm, Left [[id number]]  (Normal) Collected: 01/15/25 1927    Lab Status: Preliminary result Specimen: Blood from Arm, Left Updated: 01/18/25 2016     Blood Culture No growth at 3 days    Blood Culture - Blood, Arm, Right [[id number]]  (Normal) Collected: 01/15/25 1927    Lab Status: Preliminary result Specimen: Blood from Arm, Right Updated: 01/18/25 2016     Blood Culture No growth at 3 days    Urine Culture - Urine, Indwelling Urethral Catheter [[id number]] Collected: 01/10/25 1347    Lab Status: Final result Specimen: Urine from Indwelling Urethral  Catheter Updated: 01/11/25 1231     Urine Culture <25,000 CFU/mL Mixed Cheryl Isolated    Narrative:      Specimen contains mixed organisms of questionable pathogenicity suggestive of contamination. If symptoms persist, suggest recollection.  Colonization of the urinary tract without infection is common. Treatment is discouraged unless the patient is symptomatic, pregnant, or undergoing an invasive urologic procedure.    Blood Culture - Blood, Hand, Right [[id number]]  (Normal) Collected: 01/10/25 1201    Lab Status: Final result Specimen: Blood from Hand, Right Updated: 01/15/25 1245     Blood Culture No growth at 5 days            Medication Review:       Schedule Meds  amiodarone, 200 mg, Oral, Daily  apixaban, 5 mg, Oral, Q12H  aspirin, 81 mg, Oral, Daily  atorvastatin, 40 mg, Oral, Nightly  budesonide-formoterol, 2 puff, Inhalation, BID - RT  cefTAZidime, 2,000 mg, Intravenous, Q8H  finasteride, 5 mg, Oral, Daily  FLUoxetine, 40 mg, Oral, Daily  furosemide, 40 mg, Oral, Daily  hydrocortisone-bacitracin-zinc oxide-nystatin, 1 Application, Topical, BID  insulin lispro, 2-7 Units, Subcutaneous, 4x Daily With Meals & Nightly  levothyroxine, 100 mcg, Oral, Q AM  miconazole, 1 Application, Topical, Q12H  mirtazapine, 7.5 mg, Oral, Nightly  multivitamin with minerals, 1 tablet, Oral, Daily  oseltamivir, 75 mg, Oral, Q12H  pantoprazole, 40 mg, Oral, QAM AC  predniSONE, 20 mg, Oral, Daily With Breakfast  risperiDONE, 0.25 mg, Oral, Nightly  sodium chloride, 10 mL, Intravenous, Q12H  tamsulosin, 0.8 mg, Oral, Nightly  vancomycin, 1,500 mg, Intravenous, Q24H  vitamin B-12, 1,000 mcg, Oral, Daily        Infusion Meds  dilTIAZem HCl-Sodium Chloride, 5-15 mg/hr, Last Rate: 5 mg/hr (01/19/25 1502)  Pharmacy to dose vancomycin,         PRN Meds    acetaminophen **OR** acetaminophen **OR** acetaminophen    acetaminophen    senna-docusate sodium **AND** polyethylene glycol **AND** bisacodyl **AND** bisacodyl    Calcium Replacement  - Follow Nurse / BPA Driven Protocol    dextrose    dextrose    glucagon (human recombinant)    Magnesium Standard Dose Replacement - Follow Nurse / BPA Driven Protocol    melatonin    ondansetron ODT **OR** ondansetron    Pharmacy to dose vancomycin    Phosphorus Replacement - Follow Nurse / BPA Driven Protocol    Potassium Replacement - Follow Nurse / BPA Driven Protocol    sodium chloride    sodium chloride        Assessment & Plan       Antimicrobial Therapy   1.  IV ceftazidime        2.  IV vancomycin        3.  P.o. Tamiflu        4.  IV Remdesivir        5.            Assessment     Left lower lobe infiltrate concerning for bacterial pneumonia.  Patient is currently on 2 L oxygen via nasal cannula.  MRSA screen was positive.  Legionella and pneumococcal urine antigen is negative     Recent diagnosis of COVID-19 infection at outlying facility/[facility] prior to admission.  Imaging studies not consistent with COVID-pneumonia The patient was also diagnosed with influenza based on transfer note.  The patient was treated with IV remdesivir as prophylaxis     Toxic metabolic encephalopathy on admission.  May be related to above.  Appears to be back to baseline     Recent admission in December 2024 with obstructive uropathy with urine cultures from [facility] growing ESBL  Proteus mirabilis.  Patient status post cystoscopy with right stent placement and stone removal.  Patient was treated with 10 days of IV ertapenem which was completed on 12/29/2024.  Current urine culture is negative.  Patient was noted to have a chronic Perry catheter secondary to urine retention     Multiple sclerosis.  Patient is bedbound secondary to that     History of mitral valve replacement in the past     Type 2 diabetes-most recent A1c is 9.45     Morbid obesity     Plan     Continue IV ceftazidime 2 g every 8 hours  Continue vancomycin for now  Patient appears to be responding well to antibiotics-will continue for short  course  May switch to p.o. antibiotics on discharge    Continue p.o. Tamiflu 75 mg every 12 hours for 5 days    Continue supportive care  A.m. labs  Patient will need to be on enhanced airborne isolation for 10 days from the first positive COVID screen  Appropriate PPE was used during this assessment        Allison Box MD  01/19/25  16:12 EST    Note is dictated utilizing voice recognition software/Dragon

## 2025-01-19 NOTE — PLAN OF CARE
Goal Outcome Evaluation:   GAVE BED BATH. REPLACED POTASSIUM AND WILL BE DONE FOLLOW UP AT NIGHT AS SCHEDULED. HAD ABX THERAPY.

## 2025-01-19 NOTE — CONSULTS
Referring Provider: Dr. Roosevelt Napoles  Reason for Consultation: Tachycardia    Chief complaint shortness of breath    Cardiology assessment and plan    Tachycardia  Likely atrial fibrillation with rapid antler response  Left lower lobe pneumonia  Positive COVID status  Hypoxic respiratory failure  Altered mental status  History of mitral valve endocarditis status post mitral valve replacement surgery of the bioprosthetic valve  Coronary artery disease prior PCI and stenting  Paroxysmal atrial fibrillation  History of hypertension  Hyperlipidemia  Diabetes mellitus  Chronic kidney disease  Hypothyroidism  Lung cancer status post right chemotherapy  Left ventricular systolic function is normal. Calculated left ventricular EF = 64.3% Left ventricular ejection fraction appears to be 61 - 65%.     Patient is alert and awake  Complaining of cough congestion  Denies any chest discomfort  Looks comfortable  Tmax is 98 pulse is 68-1 22 respirations are 19 blood pressure is 120/92 sats are 97%  Sodium is 143 potassium is 4.0 creatinine is 1.26 white cell count is 3.1 hemoglobin is 9.6  Current medications include aspirin 81 mg p.o. once a day patient is on amiodarone 200 mg p.o. once a day Lipitor 40 mg p.o. once a day Lasix 40 mg p.o. once a day patient on anticoagulation therapy with Eliquis 5 mg p.o. twice daily  Start patient on Cardizem drip for rate control  Twelve-lead EKG  Continue current medical therapy  Continue close monitoring and follow-up  Further recommendation based on patient course          History of present illness:  Jamin Hennessy is a 69 y.o. male who presents with past medical history that is significant for history of hypertension history of hyperlipidemia history of coronary artery disease prior PCI and stenting history of paroxysmal atrial fibrillation history of mitral valve endocarditis status post mitral valve replacement surgery with a bioprosthetic valve on chronic anticoagulation therapy  with Eliquis  Denies any new complaints  No syncope  No chest discomfort  Complaining of cough congestion and shortness of breath  No syncope  No chest discomfort  Review of Systems  Review of Systems   Constitutional: Negative for chills, decreased appetite and malaise/fatigue.   HENT:  Negative for congestion and nosebleeds.    Eyes:  Negative for blurred vision and double vision.   Cardiovascular:  Positive for dyspnea on exertion and irregular heartbeat. Negative for leg swelling, near-syncope, orthopnea, palpitations and paroxysmal nocturnal dyspnea.   Respiratory:  Positive for cough and shortness of breath.    Hematologic/Lymphatic: Negative for adenopathy. Does not bruise/bleed easily.   Skin:  Negative for color change and rash.   Musculoskeletal:  Negative for back pain and joint pain.   Gastrointestinal:  Negative for bloating, abdominal pain, hematemesis and hematochezia.   Genitourinary:  Negative for flank pain and hematuria.   Neurological:  Negative for dizziness and focal weakness.   Psychiatric/Behavioral:  Negative for altered mental status. The patient does not have insomnia.        Past Medical History  Past Medical History:   Diagnosis Date    A-fib     CAD (coronary artery disease)     Elevated cholesterol     Hypertension     MI (myocardial infarction)     Non-insulin dependent type 2 diabetes mellitus     and   Past Surgical History:   Procedure Laterality Date    BRONCHOSCOPY N/A 3/4/2020    Procedure: BRONCHOSCOPY with bronchioalveolar lavage;  Surgeon: Melissa Cole MD;  Location: [facility] ENDOSCOPY;  Service: Pulmonary;  Laterality: N/A;   pneumonia    CARDIAC CATHETERIZATION      CARDIAC CATHETERIZATION N/A 1/20/2020    Procedure: Left Heart Cath;  Surgeon: Migdalia Beth MD;  Location: [facility] CATH INVASIVE LOCATION;  Service: Cardiovascular    CARDIAC CATHETERIZATION N/A 1/20/2020    Procedure: Left ventriculography;  Surgeon: Migdalia Beth MD;  Location: [facility]  CATH INVASIVE LOCATION;  Service: Cardiovascular    CARDIAC CATHETERIZATION N/A 1/20/2020    Procedure: Coronary angiography;  Surgeon: Migdalia Beth MD;  Location: [facility] CATH INVASIVE LOCATION;  Service: Cardiovascular    CORONARY ANGIOPLASTY WITH STENT PLACEMENT      CYSTOSCOPY, URETEROSCOPY, RETROGRADE PYELOGRAM, STENT INSERTION Right 12/17/2024    Procedure: CYSTOSCOPY URETEROSCOPY RETROGRADE PYELOGRAM HOLMIUM LASER STENT INSERTION;  Surgeon: Jamin Estrella MD;  Location: [facility] MAIN OR;  Service: Urology;  Laterality: Right;    MITRAL VALVE REPAIR/REPLACEMENT N/A 1/22/2020    Procedure: MITRAL VALVE REPAIR/REPLACEMENT;  Surgeon: Fallon Cole MD;  Location: [facility] CVOR;  Service: Cardiothoracic;  Laterality: N/A;  patient has endocarditis mitral valve replaced with 31mm knox II       Family History  Family History   Problem Relation Age of Onset    Hypertension Mother        Social History  Social History     Socioeconomic History    Marital status:    Tobacco Use    Smoking status: Former     Current packs/day: 0.25     Types: Cigarettes    Smokeless tobacco: Current     Types: Chew   Vaping Use    Vaping status: Never Used   Substance and Sexual Activity    Alcohol use: Not Currently    Drug use: Never    Sexual activity: Defer       Objective     Physical Exam:  Constitutional:       Appearance: Well-developed.   Eyes:      Conjunctiva/sclera: Conjunctivae normal.      Pupils: Pupils are equal, round, and reactive to light.   HENT:      Head: Normocephalic and atraumatic.   Neck:      Thyroid: No thyromegaly.   Pulmonary:      Effort: Pulmonary effort is normal.      Breath sounds: Examination of the right-middle field reveals rales. Examination of the left-middle field reveals rales. Examination of the right-lower field reveals decreased breath sounds and rales. Examination of the left-lower field reveals decreased breath sounds and rales. Decreased breath sounds present. Rales  "present.   Cardiovascular:      Abnormal PMI. Tachycardia present. Regularly irregular rhythm.      S4 Gallop.    Pulses:     Intact distal pulses.   Edema:     Peripheral edema absent.   Abdominal:      General: Bowel sounds are normal.      Palpations: Abdomen is soft.   Musculoskeletal:      Cervical back: Normal range of motion and neck supple. Skin:     General: Skin is warm.   Neurological:      Mental Status: Alert and oriented to person, place, and time.         Vital Signs  Vitals:    01/19/25 0845 01/19/25 0900 01/19/25 1000 01/19/25 1200   BP:    121/92   BP Location:    Left arm   Patient Position:       Pulse:  (!) 122 111 103   Resp:    19   Temp:    97.8 °F (36.6 °C)   TempSrc:       SpO2: 97%  96% 97%   Weight:       Height:           Weight  Flowsheet Rows      Flowsheet Row First Filed Value   Admission Height 177.8 cm (70\") Documented at 01/15/2025 1559   Admission Weight 114 kg (251 lb 5.2 oz) Documented at 01/15/2025 1559                Results Review:  Lab Results (last 24 hours)       Procedure Component Value Units Date/Time    POC Glucose 4x Daily Before Meals & at Bedtime [[id number]]  (Abnormal) Collected: 01/19/25 1200    Specimen: Blood Updated: 01/19/25 1201     Glucose 185 mg/dL      Comment: Serial Number: [id number]:  [id number]       Respiratory Culture - Sputum, Cough [[id number]] Collected: 01/18/25 0933    Specimen: Sputum from Cough Updated: 01/19/25 1147     Respiratory Culture Moderate growth (3+) Normal Respiratory Cheryl     Gram Stain Moderate (3+) WBCs per low power field      Rare (1+) Epithelial cells per low power field      Few (2+) Gram positive cocci    POC Glucose 4x Daily Before Meals & at Bedtime [[id number]]  (Abnormal) Collected: 01/19/25 0805    Specimen: Blood Updated: 01/19/25 0806     Glucose 141 mg/dL      Comment: Serial Number: [id number]:  [id number]       Basic Metabolic Panel [[id number]]  (Abnormal) Collected: 01/19/25 0016    Specimen: Blood " Updated: 01/19/25 0118     Glucose 223 mg/dL      BUN 17 mg/dL      Creatinine 1.26 mg/dL      Sodium 143 mmol/L      Potassium 4.0 mmol/L      Comment: Specimen hemolyzed.  Result may be falsely elevated. Result checked          Chloride 109 mmol/L      CO2 21.8 mmol/L      Calcium 8.1 mg/dL      BUN/Creatinine Ratio 13.5     Anion Gap 12.2 mmol/L      eGFR 61.7 mL/min/1.73     Narrative:      GFR Categories in Chronic Kidney Disease (CKD)      GFR Category          GFR (mL/min/1.73)    Interpretation  G1                     90 or greater         Normal or high (1)  G2                      60-89                Mild decrease (1)  G3a                   45-59                Mild to moderate decrease  G3b                   30-44                Moderate to severe decrease  G4                    15-29                Severe decrease  G5                    14 or less           Kidney failure          (1)In the absence of evidence of kidney disease, neither GFR category G1 or G2 fulfill the criteria for CKD.    eGFR calculation 2021 CKD-EPI creatinine equation, which does not include race as a factor    Magnesium [[id number]]  (Normal) Collected: 01/19/25 0016    Specimen: Blood Updated: 01/19/25 0118     Magnesium 1.8 mg/dL     CBC & Differential [[id number]]  (Abnormal) Collected: 01/19/25 0016    Specimen: Blood Updated: 01/19/25 0028    Narrative:      The following orders were created for panel order CBC & Differential.  Procedure                               Abnormality         Status                     ---------                               -----------         ------                     CBC Auto Differential[[id number]]        Abnormal            Final result                 Please view results for these tests on the individual orders.    CBC Auto Differential [[id number]]  (Abnormal) Collected: 01/19/25 0016    Specimen: Blood Updated: 01/19/25 0028     WBC 3.11 10*3/mm3      RBC 3.73 10*6/mm3      Hemoglobin 9.6  g/dL      Hematocrit 32.6 %      MCV 87.4 fL      MCH 25.7 pg      MCHC 29.4 g/dL      RDW 18.9 %      RDW-SD 60.3 fl      MPV 10.3 fL      Platelets 156 10*3/mm3      Neutrophil % 70.7 %      Lymphocyte % 14.8 %      Monocyte % 12.9 %      Eosinophil % 0.0 %      Basophil % 0.3 %      Immature Grans % 1.3 %      Neutrophils, Absolute 2.20 10*3/mm3      Lymphocytes, Absolute 0.46 10*3/mm3      Monocytes, Absolute 0.40 10*3/mm3      Eosinophils, Absolute 0.00 10*3/mm3      Basophils, Absolute 0.01 10*3/mm3      Immature Grans, Absolute 0.04 10*3/mm3      nRBC 0.0 /100 WBC     POC Glucose Once [[id number]]  (Abnormal) Collected: 01/18/25 2059    Specimen: Blood Updated: 01/18/25 2101     Glucose 219 mg/dL      Comment: Serial Number: [id number]:  [id number]       Blood Culture - Blood, Arm, Left [[id number]]  (Normal) Collected: 01/15/25 1927    Specimen: Blood from Arm, Left Updated: 01/18/25 2016     Blood Culture No growth at 3 days    Blood Culture - Blood, Arm, Right [[id number]]  (Normal) Collected: 01/15/25 1927    Specimen: Blood from Arm, Right Updated: 01/18/25 2016     Blood Culture No growth at 3 days    POC Glucose 4x Daily Before Meals & at Bedtime [[id number]]  (Abnormal) Collected: 01/18/25 1727    Specimen: Blood Updated: 01/18/25 1729     Glucose 224 mg/dL      Comment: Serial Number: [id number]:  [id number]             Imaging Results (Last 72 Hours)       ** No results found for the last 72 hours. **            Results for orders placed during the hospital encounter of 09/24/24    Adult Transthoracic Echo Complete W/ Cont if Necessary Per Protocol    Interpretation Summary    Left ventricular systolic function is normal. Calculated left ventricular EF = 64.3% Left ventricular ejection fraction appears to be 61 - 65%.    Left ventricular wall thickness is consistent with mild concentric hypertrophy.    The left atrial cavity is moderately dilated.    There is a bioprosthetic mitral valve  present.    Estimated right ventricular systolic pressure from tricuspid regurgitation is normal (<35 mmHg).      Medication Review  Scheduled Meds:amiodarone, 200 mg, Oral, Daily  apixaban, 5 mg, Oral, Q12H  aspirin, 81 mg, Oral, Daily  atorvastatin, 40 mg, Oral, Nightly  budesonide-formoterol, 2 puff, Inhalation, BID - RT  cefTAZidime, 2,000 mg, Intravenous, Q8H  finasteride, 5 mg, Oral, Daily  FLUoxetine, 40 mg, Oral, Daily  furosemide, 40 mg, Oral, Daily  hydrocortisone-bacitracin-zinc oxide-nystatin, 1 Application, Topical, BID  insulin lispro, 2-7 Units, Subcutaneous, 4x Daily With Meals & Nightly  levothyroxine, 100 mcg, Oral, Q AM  miconazole, 1 Application, Topical, Q12H  mirtazapine, 7.5 mg, Oral, Nightly  multivitamin with minerals, 1 tablet, Oral, Daily  oseltamivir, 75 mg, Oral, Q12H  pantoprazole, 40 mg, Oral, QAM AC  predniSONE, 20 mg, Oral, Daily With Breakfast  risperiDONE, 0.25 mg, Oral, Nightly  sodium chloride, 10 mL, Intravenous, Q12H  tamsulosin, 0.8 mg, Oral, Nightly  vancomycin, 1,500 mg, Intravenous, Q24H  vitamin B-12, 1,000 mcg, Oral, Daily      Continuous Infusions:Pharmacy to dose vancomycin,       PRN Meds:.  acetaminophen **OR** acetaminophen **OR** acetaminophen    acetaminophen    senna-docusate sodium **AND** polyethylene glycol **AND** bisacodyl **AND** bisacodyl    Calcium Replacement - Follow Nurse / BPA Driven Protocol    dextrose    dextrose    glucagon (human recombinant)    Magnesium Standard Dose Replacement - Follow Nurse / BPA Driven Protocol    melatonin    ondansetron ODT **OR** ondansetron    Pharmacy to dose vancomycin    Phosphorus Replacement - Follow Nurse / BPA Driven Protocol    Potassium Replacement - Follow Nurse / BPA Driven Protocol    sodium chloride    sodium chloride    Lab Results   Component Value Date    GLUCOSE 223 (H) 01/19/2025    BUN 17 01/19/2025    CREATININE 1.26 01/19/2025    EGFR 61.7 01/19/2025    BCR 13.5 01/19/2025    K 4.0 01/19/2025    CO2  21.8 (L) 01/19/2025    CALCIUM 8.1 (L) 01/19/2025    ALBUMIN 2.9 (L) 01/18/2025    BILITOT 0.2 01/18/2025    AST 37 01/18/2025    ALT 28 01/18/2025     Results for orders placed during the hospital encounter of 01/14/20    Cardiac Catheterization/Vascular Study    Narrative  Table formatting from the original result was not included.  Cardiac Catheterization Operative Report    Jamin Hennessy  [id number]  1/20/2020      He underwent cardiac catheterization.    Indications for the procedure include: pre-operative for cardiac surgery    Indication  Preop for valvular heart surgery  Mitral valve endocarditis  Prior history of known coronary artery disease prior PCI and stenting  Atrial fibrillation    Procedure Details:  The risks, benefits, complications, treatment options, and expected outcomes were discussed with the patient. The patient and/or family concurred with the proposed plan, giving informed consent.    After informed consent the patient was brought to the cath lab after appropriate IV hydration was begun and oral premedication was given. He was further sedated with fentanyl. He was prepped and draped in the usual manner. Using the modified Seldinger access technique, a 6 Bengali sheath was placed in the femoral artery. A left heart catheterization with coronary arteriography was performed. Findings are discussed below.    After the procedure was completed, sedation was stopped and the sheaths and catheters were all removed. Hemostasis was achieved per established hospital protocols.    Findings:    Hemodynamics  central aortic pressure systolic 120 diastolic 70  Left ventricular end-diastolic pressure was 14  Left Main  angiographically normal  RCA  Dominant vessel  Proximal 30 to 40% stenosis  Mid right coronary artery has angiographic 40-50% stenosis  Distal right coronary artery has angiographic 50% stenosis before the PLB and PDA bifurcation  Ostium of the PLB branch of the right coronary artery has  angiographic 70% stenosis  LAD  patent stent in the mid LAD with no evidence of any significant in-stent restenosis  Distal portion of the LAD has angiographic 50% stenosis  Circ  Mid 40 to 50% stenosis  2 large OM branches with no evidence of any significant angiographic stenosis  LV  Normal LV systolic function with normal wall motion with estimated LV ejection fraction of 55%  Coronary Dominance  Right coronary artery    Estimated Blood Loss:  less than 50 mL    Complications:  None; patient tolerated the procedure well.    Disposition: To [facility].    Condition: stable    Impressions:  Patent stent in the mid LAD  Mild to moderate obstructive disease involving the left circumflex and LAD system  Severe stenosis involving the ostium of the PLB branch likely will benefit from bypass surgery during the surgical procedure  Normal LV systolic function  Normal wall motion  Normal left-sided filling pressures    Recommendations:  Continue current medical management  Plans for surgical evaluation for mitral valve endocarditis  Likely patient will benefit from a single-vessel coronary artery bypass surgery for the PLB branch of the right coronary artery for significant ostial stenosis  Test results discussed with the patient     Results for orders placed during the hospital encounter of 09/24/24    Adult Transthoracic Echo Complete W/ Cont if Necessary Per Protocol    Interpretation Summary    Left ventricular systolic function is normal. Calculated left ventricular EF = 64.3% Left ventricular ejection fraction appears to be 61 - 65%.    Left ventricular wall thickness is consistent with mild concentric hypertrophy.    The left atrial cavity is moderately dilated.    There is a bioprosthetic mitral valve present.    Estimated right ventricular systolic pressure from tricuspid regurgitation is normal (<35 mmHg).     Lab Results   Component Value Date    CHOL 90 01/15/2020    TRIG 119 01/15/2020    HDL 29 (L) 01/15/2020    LDL  37 01/15/2020            Assessment & Plan       AMS (altered mental status)          Migdalia Beth MD  01/19/25  13:42 EST

## 2025-01-19 NOTE — PROGRESS NOTES
[facility] MEDICINE SERVICE  DAILY PROGRESS NOTE    NAME: Jamin Hennessy  : 1955  MRN: [id number]      LOS: 4 days     PROVIDER OF SERVICE: YUE Atwood    Chief Complaint: AMS (altered mental status)    Subjective:     Interval History:  History taken from: patient    Patient otherwise feels well, states he feels better today than he did yesterday.  No family at bedside during encounter. Patient wife on phone during encounter, reports she is able to care for him at home and wants him discharged home upon discharge.         Review of Systems:   Review of Systems   Respiratory:  Positive for shortness of breath and wheezing.    Cardiovascular:  Negative for chest pain.   Neurological:  Negative for dizziness and headaches.       Objective:     Vital Signs  Temp:  [95.3 °F (35.2 °C)-98.2 °F (36.8 °C)] 97.8 °F (36.6 °C)  Heart Rate:  [] 103  Resp:  [12-19] 19  BP: (121-137)/(74-96) 121/92  Flow (L/min) (Oxygen Therapy):  [2] 2   Body mass index is 36.06 kg/m².    Physical Exam  Physical Exam  General: 68 yo male, Alert and oriented, obese, no acute distress.  HENT: Normocephalic, normal hearing, moist oral mucosa, no scleral icterus.  Neck: Supple, nontender, no carotid bruits, no JVD, no LAD.  Lungs: Wheezing noted, diminished breath sounds in right lung, nonlabored respiration.  Heart: RRR, no murmur, gallop or edema.  Abdomen: Soft, nontender, nondistended, + bowel sounds.  Musculoskeletal: Normal range of motion and strength, no tenderness or swelling.  Skin: Skin is warm, dry and pink, no rashes or lesions.  Psychiatric: Cooperative, appropriate mood and affect.       Diagnostic Data    Results from last 7 days   Lab Units 25  0016 25  0325   WBC 10*3/mm3 3.11* 3.22*   HEMOGLOBIN g/dL 9.6* 9.7*   HEMATOCRIT % 32.6* 33.9*   PLATELETS 10*3/mm3 156 171   GLUCOSE mg/dL 223* 148*   CREATININE mg/dL 1.26 1.12   BUN mg/dL 17 17   SODIUM mmol/L 143 145   POTASSIUM mmol/L 4.0  3.0*   AST (SGOT) U/L  --  37   ALT (SGPT) U/L  --  28   ALK PHOS U/L  --  49   BILIRUBIN mg/dL  --  0.2   ANION GAP mmol/L 12.2 12.1       No radiology results for the last day      I reviewed the patient's new clinical results.    Assessment/Plan:     Active and Resolved Problems  Active Hospital Problems    Diagnosis  POA    **AMS (altered mental status) [R41.82]  Yes      Resolved Hospital Problems   No resolved problems to display.       Sepsis  Pneumonia  Left pleural effusion  Acute respiratory failure with hypoxia  COVID infection  Influenza infection  Altered mental status  On 2L NC, b/l RA  COVID and flu positive, ID started remdesivir and oseltamvir  Patient finished course of remdesivir  CT chest with moderate left pleural effusion, left lower lobe and lingula consolidation  On arrival WBC 6.36, LA 2.4, repeat lactic acid 2.7 and Pro-Suman 0.16  SIRS criteria: source (UTI/PNA), HR> 90, febrile   Received ampicillin at OSH, changed to cefepime and add vancomycin for HCAP coverage given recent hospitalization  MRSA screen positive  Tylenol as needed for fever  Fall precautions  ID consulted, appreciate recommendations- noted cefepime changed to ceftazidime, continue vanc  Pulmonology consulted, noted plans for monitoring of pleural effusion as well as initiation of Symbicort, prednisone- continue  Per ID and pulmonology, patient cleared for discharge and is to be discharged on 100mg Doxycycline BID for 7 days as well as finish course of Tamiflu. Patient is to be sent home with PO prednisone and symbicort  Will hold discharge due to tachycardia. Cardiology consulted, appreciate recommendations     Urinary tract infection   History of recent UTI in setting of right ureteral stent  Was supposed to be on cefdinir 300 mg twice daily until 1/16/2025, wife did not obtain from pharmacy  UA at OSH with 11-20 WBCs and large leukocyte esterase, recently admitted for UTI  Repeat UA, shows large blood, 2+ protein, 2+  leuk esterase, too numerous to count RBC and WBC as well as unable to determine due to loaded field of bacteria  Urology evaluated patient during previous admission, no urology intervention  Urine culture negative     Pneumoperitoneum  CT abdomen pelvis from OSH incidentally notes 1 tiny bubble of pneumoperitoneum in the left upper quadrant, uncertain etiology  Repeat CT shows no pneumoperitoneum present  General Surgery consulted, no surgical plans at this time     Paroxysmal A-fib  Currently sinus tachycardia in the setting of fever  Continue amiodarone and Eliquis once reconciled  Telemetry     Chronic hypotension-BP stable, patient is on midodrine, continue   CAD/hyperlipidemia-continue ASA  Diabetes Mellitus, type II-BG ACHS, low SSI, hold glipizide, Hypoglycemia protocol   Multiple sclerosis-takes glatiramer injection daily, not on formulary, as needed Flexeril  Hypothyroidism-continue levothyroxine 100 mcg  Valvular heart disease status post MVR  Sacral ulcer-present on admission wound care consulted and following  BPH-continue Flomax and finasteride  GERD-continue PPI       VTE Prophylaxis:  Pharmacologic & mechanical VTE prophylaxis orders are present.             Disposition Planning:     Barriers to Discharge: Continued care, tachycardia  Anticipated Date of Discharge: 1-  Place of Discharge: Per wife, patient is to return home to her with /7 care      Time: 35 minutes     Code Status and Medical Interventions: No CPR (Do Not Attempt to Resuscitate); Limited Support; No intubation (DNI)   Ordered at: 01/15/25 1600     Medical Intervention Limits:    No intubation (DNI)     Code Status (Patient has no pulse and is not breathing):    No CPR (Do Not Attempt to Resuscitate)     Medical Interventions (Patient has pulse or is breathing):    Limited Support       Signature: Electronically signed by YUE Atwood, 01/19/25, 12:25 EST.  [facility] Hospitalist Team

## 2025-01-19 NOTE — PLAN OF CARE
Goal Outcome Evaluation:         CARDIOLOGY CONSULTED. STARTED CARDIZEM DRIP.

## 2025-01-19 NOTE — PLAN OF CARE
Problem: Adult Inpatient Plan of Care  Goal: Absence of Hospital-Acquired Illness or Injury  Outcome: Progressing  Intervention: Identify and Manage Fall Risk  Recent Flowsheet Documentation  Taken 1/19/2025 0030 by Maki Oliva RN  Safety Promotion/Fall Prevention: safety round/check completed  Taken 1/18/2025 2300 by Maki Oliva RN  Safety Promotion/Fall Prevention: safety round/check completed  Taken 1/18/2025 2200 by Maki Oliva RN  Safety Promotion/Fall Prevention: safety round/check completed  Taken 1/18/2025 2045 by Maki Oliva RN  Safety Promotion/Fall Prevention:   safety round/check completed   room organization consistent   nonskid shoes/slippers when out of bed   fall prevention program maintained  Intervention: Prevent Skin Injury  Recent Flowsheet Documentation  Taken 1/18/2025 2045 by Maki Oliva RN  Body Position: turned  Intervention: Prevent Infection  Recent Flowsheet Documentation  Taken 1/18/2025 2045 by Maki Oliva RN  Infection Prevention:   single patient room provided   rest/sleep promoted   personal protective equipment utilized   hand hygiene promoted     Problem: Adult Inpatient Plan of Care  Goal: Optimal Comfort and Wellbeing  Outcome: Progressing     Problem: Fall Injury Risk  Goal: Absence of Fall and Fall-Related Injury  Outcome: Progressing  Intervention: Promote Injury-Free Environment  Recent Flowsheet Documentation  Taken 1/19/2025 0030 by Maki Oliva RN  Safety Promotion/Fall Prevention: safety round/check completed  Taken 1/18/2025 2300 by Maki Oliva RN  Safety Promotion/Fall Prevention: safety round/check completed  Taken 1/18/2025 2200 by Maki Oliva RN  Safety Promotion/Fall Prevention: safety round/check completed  Taken 1/18/2025 2045 by Maki Oliva RN  Safety Promotion/Fall Prevention:   safety round/check completed   room organization consistent   nonskid shoes/slippers when out of bed   fall  prevention program maintained     Problem: Pneumonia  Goal: Fluid Balance  Outcome: Progressing  Goal: Absence of Infection Signs and Symptoms  Outcome: Progressing  Intervention: Prevent Infection Progression  Recent Flowsheet Documentation  Taken 1/18/2025 2045 by Maki Oliva, RN  Isolation Precautions:   precautions maintained   enhanced contact   droplet  Goal: Effective Oxygenation and Ventilation  Outcome: Progressing  Intervention: Optimize Oxygenation and Ventilation  Recent Flowsheet Documentation  Taken 1/18/2025 2045 by Maki Oliva, RN  Head of Bed (HOB) Positioning: HOB elevated   Goal Outcome Evaluation:  Plan of Care Reviewed With: patient        Progress: no change

## 2025-01-19 NOTE — PROGRESS NOTES
Daily Progress Note          Assessment    Small Left pleural effusion  Hypoxemia  UTI status post right ureteral stent  Pneumoperitoneum  Paroxysmal atrial fibrillation  CAD  Status post mitral valve replacement  Diabetes mellitus  Multiple sclerosis  Hypothyroidism  Chronic sacral ulcer  BPH  GERD  Anemia     PFTs 1/18/2020: Restrictive pattern with decreased oxygen diffusion capacity: FEV1/FVC 74. FEV1 is 1.42 which is 46% predicted FVC is 1.91 which is 43% predicted. There is 24% improvement in FEV1 value after bronchodilator challenge. MVV is 26% predicted. Diffusion capacity is 46%      Recommendations:  Respiratory status is gradually improving     the Left effusion is small, monitor it for now     The wheezing resolved, continue symbicort and short course prednisone    Incentive spirometry     Oxygen supplement and titration to maintain saturation 90 to 95%: Currently on 2 L per nasal cannula     Antibiotics: As per ID     Glucose control     Chronic anticoagulation: Eliquis     HR control: On amiodarone        I personally reviewed the radiological studies             LOS: 4 days     Subjective     Cough and shortness of breath    Objective     Vital signs for last 24 hours:  Vitals:    01/19/25 0700 01/19/25 0844 01/19/25 0845 01/19/25 0900   BP:  137/84     BP Location:  Left arm     Patient Position:  Lying     Pulse:  120  (!) 122   Resp:  16     Temp:  98 °F (36.7 °C)     TempSrc:       SpO2: 97% (!) 76% 97%    Weight:       Height:           Intake/Output last 3 shifts:  I/O last 3 completed shifts:  In: 720 [P.O.:720]  Out: 5050 [Urine:5050]  Intake/Output this shift:  No intake/output data recorded.      Radiology  Imaging Results (Last 24 Hours)       ** No results found for the last 24 hours. **            Labs:  Results from last 7 days   Lab Units 01/19/25  0016   WBC 10*3/mm3 3.11*   HEMOGLOBIN g/dL 9.6*   HEMATOCRIT % 32.6*   PLATELETS 10*3/mm3 156     Results from last 7 days   Lab Units  01/19/25  0016 01/18/25  0325   SODIUM mmol/L 143 145   POTASSIUM mmol/L 4.0 3.0*   CHLORIDE mmol/L 109* 113*   CO2 mmol/L 21.8* 19.9*   BUN mg/dL 17 17   CREATININE mg/dL 1.26 1.12   CALCIUM mg/dL 8.1* 7.3*   BILIRUBIN mg/dL  --  0.2   ALK PHOS U/L  --  49   ALT (SGPT) U/L  --  28   AST (SGOT) U/L  --  37   GLUCOSE mg/dL 223* 148*         Results from last 7 days   Lab Units 01/18/25  0325 01/17/25  1057   ALBUMIN g/dL 2.9* 3.1*             Results from last 7 days   Lab Units 01/19/25  0016   MAGNESIUM mg/dL 1.8                   Meds:   SCHEDULE  amiodarone, 200 mg, Oral, Daily  apixaban, 5 mg, Oral, Q12H  aspirin, 81 mg, Oral, Daily  atorvastatin, 40 mg, Oral, Nightly  budesonide-formoterol, 2 puff, Inhalation, BID - RT  cefTAZidime, 2,000 mg, Intravenous, Q8H  finasteride, 5 mg, Oral, Daily  FLUoxetine, 40 mg, Oral, Daily  furosemide, 40 mg, Oral, Daily  hydrocortisone-bacitracin-zinc oxide-nystatin, 1 Application, Topical, BID  insulin lispro, 2-7 Units, Subcutaneous, 4x Daily With Meals & Nightly  levothyroxine, 100 mcg, Oral, Q AM  miconazole, 1 Application, Topical, Q12H  mirtazapine, 7.5 mg, Oral, Nightly  multivitamin with minerals, 1 tablet, Oral, Daily  oseltamivir, 75 mg, Oral, Q12H  pantoprazole, 40 mg, Oral, QAM AC  predniSONE, 20 mg, Oral, Daily With Breakfast  risperiDONE, 0.25 mg, Oral, Nightly  sodium chloride, 10 mL, Intravenous, Q12H  tamsulosin, 0.8 mg, Oral, Nightly  vancomycin, 1,500 mg, Intravenous, Q24H  vitamin B-12, 1,000 mcg, Oral, Daily      Infusions  Pharmacy Consult - Pharmacy to dose,   Pharmacy to dose vancomycin,       PRNs    acetaminophen **OR** acetaminophen **OR** acetaminophen    acetaminophen    senna-docusate sodium **AND** polyethylene glycol **AND** bisacodyl **AND** bisacodyl    Calcium Replacement - Follow Nurse / BPA Driven Protocol    dextrose    dextrose    glucagon (human recombinant)    Magnesium Standard Dose Replacement - Follow Nurse / BPA Driven Protocol     melatonin    ondansetron ODT **OR** ondansetron    Pharmacy Consult - Pharmacy to dose    Pharmacy to dose vancomycin    Phosphorus Replacement - Follow Nurse / BPA Driven Protocol    Potassium Replacement - Follow Nurse / BPA Driven Protocol    sodium chloride    sodium chloride    Physical Exam:  General Appearance:  Alert   HEENT:  Normocephalic, without obvious abnormality, Conjunctiva/corneas clear,.   Nares normal, no drainage     Neck:  Supple, symmetrical, trachea midline.   Lungs /Chest wall:   Bilateral basal rhonchi, respirations unlabored, symmetrical wall movement.     Heart: Rate controlled, S1 S2 normal  Abdomen: Soft, non-tender, no masses, no organomegaly.    Extremities: + Bilateral lower extremity edema, no clubbing or cyanosis     ROS  Constitutional: Negative for chills, fever and malaise/fatigue.   HENT: Negative.    Eyes: Negative.    Cardiovascular: Negative.    Respiratory: Positive for cough and shortness of breath.    Skin: Negative.    Musculoskeletal: Negative.    Gastrointestinal: Negative.    Genitourinary: Negative.    Neurological: Chronic weakness in lower extremities      I reviewed the recent clinical results  I personally reviewed the latest radiological studies    Part of this note may be an electronic transcription/translation of spoken language to printed text using the Dragon Dictation System.

## 2025-01-19 NOTE — PLAN OF CARE
Goal Outcome Evaluation:         Called cardiology for consult for tachycardia

## 2025-01-20 LAB
ALBUMIN SERPL-MCNC: 3.3 G/DL (ref 3.5–5.2)
ALBUMIN/GLOB SERPL: 1.1 G/DL
ALP SERPL-CCNC: 53 U/L (ref 39–117)
ALT SERPL W P-5'-P-CCNC: 27 U/L (ref 1–41)
ANION GAP SERPL CALCULATED.3IONS-SCNC: 9.7 MMOL/L (ref 5–15)
AST SERPL-CCNC: 34 U/L (ref 1–40)
BACTERIA SPEC AEROBE CULT: NORMAL
BACTERIA SPEC AEROBE CULT: NORMAL
BACTERIA SPEC RESP CULT: NORMAL
BASOPHILS # BLD AUTO: 0.01 10*3/MM3 (ref 0–0.2)
BASOPHILS NFR BLD AUTO: 0.2 % (ref 0–1.5)
BILIRUB SERPL-MCNC: 0.2 MG/DL (ref 0–1.2)
BUN SERPL-MCNC: 20 MG/DL (ref 8–23)
BUN/CREAT SERPL: 18 (ref 7–25)
CALCIUM SPEC-SCNC: 8.1 MG/DL (ref 8.6–10.5)
CHLORIDE SERPL-SCNC: 106 MMOL/L (ref 98–107)
CO2 SERPL-SCNC: 26.3 MMOL/L (ref 22–29)
CREAT SERPL-MCNC: 1.11 MG/DL (ref 0.76–1.27)
DEPRECATED RDW RBC AUTO: 59.7 FL (ref 37–54)
EGFRCR SERPLBLD CKD-EPI 2021: 71.9 ML/MIN/1.73
EOSINOPHIL # BLD AUTO: 0.01 10*3/MM3 (ref 0–0.4)
EOSINOPHIL NFR BLD AUTO: 0.2 % (ref 0.3–6.2)
ERYTHROCYTE [DISTWIDTH] IN BLOOD BY AUTOMATED COUNT: 18.7 % (ref 12.3–15.4)
GLOBULIN UR ELPH-MCNC: 3.1 GM/DL
GLUCOSE BLDC GLUCOMTR-MCNC: 151 MG/DL (ref 70–105)
GLUCOSE BLDC GLUCOMTR-MCNC: 166 MG/DL (ref 70–105)
GLUCOSE BLDC GLUCOMTR-MCNC: 171 MG/DL (ref 70–105)
GLUCOSE BLDC GLUCOMTR-MCNC: 285 MG/DL (ref 70–105)
GLUCOSE SERPL-MCNC: 168 MG/DL (ref 65–99)
GRAM STN SPEC: NORMAL
HCT VFR BLD AUTO: 32.4 % (ref 37.5–51)
HGB BLD-MCNC: 9.6 G/DL (ref 13–17.7)
IMM GRANULOCYTES # BLD AUTO: 0.1 10*3/MM3 (ref 0–0.05)
IMM GRANULOCYTES NFR BLD AUTO: 2.4 % (ref 0–0.5)
LYMPHOCYTES # BLD AUTO: 0.62 10*3/MM3 (ref 0.7–3.1)
LYMPHOCYTES NFR BLD AUTO: 14.7 % (ref 19.6–45.3)
MCH RBC QN AUTO: 25.6 PG (ref 26.6–33)
MCHC RBC AUTO-ENTMCNC: 29.6 G/DL (ref 31.5–35.7)
MCV RBC AUTO: 86.4 FL (ref 79–97)
MONOCYTES # BLD AUTO: 0.49 10*3/MM3 (ref 0.1–0.9)
MONOCYTES NFR BLD AUTO: 11.6 % (ref 5–12)
NEUTROPHILS NFR BLD AUTO: 3 10*3/MM3 (ref 1.7–7)
NEUTROPHILS NFR BLD AUTO: 70.9 % (ref 42.7–76)
NRBC BLD AUTO-RTO: 0 /100 WBC (ref 0–0.2)
PLATELET # BLD AUTO: 145 10*3/MM3 (ref 140–450)
PMV BLD AUTO: 10.4 FL (ref 6–12)
POTASSIUM SERPL-SCNC: 3.3 MMOL/L (ref 3.5–5.2)
POTASSIUM SERPL-SCNC: 3.8 MMOL/L (ref 3.5–5.2)
PROT SERPL-MCNC: 6.4 G/DL (ref 6–8.5)
QT INTERVAL: 355 MS
QT INTERVAL: 603 MS
QTC INTERVAL: 501 MS
QTC INTERVAL: 620 MS
RBC # BLD AUTO: 3.75 10*6/MM3 (ref 4.14–5.8)
SODIUM SERPL-SCNC: 142 MMOL/L (ref 136–145)
VANCOMYCIN TROUGH SERPL-MCNC: 20 MCG/ML (ref 5–20)
WBC NRBC COR # BLD AUTO: 4.23 10*3/MM3 (ref 3.4–10.8)

## 2025-01-20 PROCEDURE — 99232 SBSQ HOSP IP/OBS MODERATE 35: CPT | Performed by: STUDENT IN AN ORGANIZED HEALTH CARE EDUCATION/TRAINING PROGRAM

## 2025-01-20 PROCEDURE — 80053 COMPREHEN METABOLIC PANEL: CPT | Performed by: INTERNAL MEDICINE

## 2025-01-20 PROCEDURE — 84132 ASSAY OF SERUM POTASSIUM: CPT | Performed by: FAMILY MEDICINE

## 2025-01-20 PROCEDURE — 82948 REAGENT STRIP/BLOOD GLUCOSE: CPT

## 2025-01-20 PROCEDURE — 93005 ELECTROCARDIOGRAM TRACING: CPT | Performed by: INTERNAL MEDICINE

## 2025-01-20 PROCEDURE — 80202 ASSAY OF VANCOMYCIN: CPT | Performed by: FAMILY MEDICINE

## 2025-01-20 PROCEDURE — 25010000002 VANCOMYCIN 1.5-0.9 GM/500ML-% SOLUTION: Performed by: NURSE PRACTITIONER

## 2025-01-20 PROCEDURE — 63710000001 INSULIN LISPRO (HUMAN) PER 5 UNITS: Performed by: FAMILY MEDICINE

## 2025-01-20 PROCEDURE — 93010 ELECTROCARDIOGRAM REPORT: CPT | Performed by: INTERNAL MEDICINE

## 2025-01-20 PROCEDURE — 94799 UNLISTED PULMONARY SVC/PX: CPT

## 2025-01-20 PROCEDURE — 63710000001 PREDNISONE PER 1 MG: Performed by: INTERNAL MEDICINE

## 2025-01-20 PROCEDURE — 85025 COMPLETE CBC W/AUTO DIFF WBC: CPT | Performed by: INTERNAL MEDICINE

## 2025-01-20 PROCEDURE — 25010000002 CEFTAZIDIME 2 G RECONSTITUTED SOLUTION 1 EACH VIAL: Performed by: INTERNAL MEDICINE

## 2025-01-20 PROCEDURE — 94761 N-INVAS EAR/PLS OXIMETRY MLT: CPT

## 2025-01-20 PROCEDURE — 94664 DEMO&/EVAL PT USE INHALER: CPT

## 2025-01-20 RX ORDER — METOPROLOL TARTRATE 25 MG/1
25 TABLET, FILM COATED ORAL EVERY 12 HOURS SCHEDULED
Status: DISCONTINUED | OUTPATIENT
Start: 2025-01-20 | End: 2025-01-22 | Stop reason: HOSPADM

## 2025-01-20 RX ORDER — POTASSIUM CHLORIDE 1500 MG/1
40 TABLET, EXTENDED RELEASE ORAL EVERY 4 HOURS
Status: COMPLETED | OUTPATIENT
Start: 2025-01-20 | End: 2025-01-20

## 2025-01-20 RX ADMIN — INSULIN LISPRO 2 UNITS: 100 INJECTION, SOLUTION INTRAVENOUS; SUBCUTANEOUS at 14:02

## 2025-01-20 RX ADMIN — DILTIAZEM HYDROCHLORIDE 8 MG/HR: 5 INJECTION INTRAVENOUS at 06:22

## 2025-01-20 RX ADMIN — Medication 1000 MCG: at 09:51

## 2025-01-20 RX ADMIN — POTASSIUM CHLORIDE 40 MEQ: 1500 TABLET, EXTENDED RELEASE ORAL at 05:51

## 2025-01-20 RX ADMIN — MIRTAZAPINE 7.5 MG: 7.5 TABLET, FILM COATED ORAL at 21:16

## 2025-01-20 RX ADMIN — FUROSEMIDE 40 MG: 40 TABLET ORAL at 07:44

## 2025-01-20 RX ADMIN — MICONAZOLE NITRATE 1 APPLICATION: 20 POWDER TOPICAL at 21:22

## 2025-01-20 RX ADMIN — OSELTAMIVIR PHOSPHATE 75 MG: 75 CAPSULE ORAL at 21:17

## 2025-01-20 RX ADMIN — INSULIN LISPRO 2 UNITS: 100 INJECTION, SOLUTION INTRAVENOUS; SUBCUTANEOUS at 08:08

## 2025-01-20 RX ADMIN — Medication 1 APPLICATION: at 21:23

## 2025-01-20 RX ADMIN — METOPROLOL TARTRATE 25 MG: 25 TABLET, FILM COATED ORAL at 21:22

## 2025-01-20 RX ADMIN — FINASTERIDE 5 MG: 5 TABLET, FILM COATED ORAL at 07:44

## 2025-01-20 RX ADMIN — INSULIN LISPRO 4 UNITS: 100 INJECTION, SOLUTION INTRAVENOUS; SUBCUTANEOUS at 21:16

## 2025-01-20 RX ADMIN — APIXABAN 5 MG: 5 TABLET, FILM COATED ORAL at 21:17

## 2025-01-20 RX ADMIN — MICONAZOLE NITRATE 1 APPLICATION: 20 POWDER TOPICAL at 07:52

## 2025-01-20 RX ADMIN — TAMSULOSIN HYDROCHLORIDE 0.8 MG: 0.4 CAPSULE ORAL at 21:17

## 2025-01-20 RX ADMIN — AMIODARONE HYDROCHLORIDE 200 MG: 200 TABLET ORAL at 07:44

## 2025-01-20 RX ADMIN — CEFTAZIDIME 2000 MG: 2 INJECTION, POWDER, FOR SOLUTION INTRAVENOUS at 05:51

## 2025-01-20 RX ADMIN — Medication 10 ML: at 21:19

## 2025-01-20 RX ADMIN — CEFTAZIDIME 2000 MG: 2 INJECTION, POWDER, FOR SOLUTION INTRAVENOUS at 23:38

## 2025-01-20 RX ADMIN — ASPIRIN 81 MG: 81 TABLET, COATED ORAL at 07:44

## 2025-01-20 RX ADMIN — PANTOPRAZOLE SODIUM 40 MG: 40 TABLET, DELAYED RELEASE ORAL at 07:44

## 2025-01-20 RX ADMIN — INSULIN LISPRO 2 UNITS: 100 INJECTION, SOLUTION INTRAVENOUS; SUBCUTANEOUS at 18:23

## 2025-01-20 RX ADMIN — BUDESONIDE AND FORMOTEROL FUMARATE DIHYDRATE 2 PUFF: 160; 4.5 AEROSOL RESPIRATORY (INHALATION) at 20:06

## 2025-01-20 RX ADMIN — POTASSIUM CHLORIDE 40 MEQ: 1500 TABLET, EXTENDED RELEASE ORAL at 09:51

## 2025-01-20 RX ADMIN — OSELTAMIVIR PHOSPHATE 75 MG: 75 CAPSULE ORAL at 07:44

## 2025-01-20 RX ADMIN — Medication 1 APPLICATION: at 07:52

## 2025-01-20 RX ADMIN — METOPROLOL TARTRATE 25 MG: 25 TABLET, FILM COATED ORAL at 13:57

## 2025-01-20 RX ADMIN — BUDESONIDE AND FORMOTEROL FUMARATE DIHYDRATE 2 PUFF: 160; 4.5 AEROSOL RESPIRATORY (INHALATION) at 06:47

## 2025-01-20 RX ADMIN — ATORVASTATIN CALCIUM 40 MG: 40 TABLET, FILM COATED ORAL at 21:17

## 2025-01-20 RX ADMIN — APIXABAN 5 MG: 5 TABLET, FILM COATED ORAL at 07:43

## 2025-01-20 RX ADMIN — LEVOTHYROXINE SODIUM 100 MCG: 100 TABLET ORAL at 05:51

## 2025-01-20 RX ADMIN — Medication 1500 MG: at 13:57

## 2025-01-20 RX ADMIN — Medication 10 ML: at 07:45

## 2025-01-20 RX ADMIN — PREDNISONE 20 MG: 20 TABLET ORAL at 08:08

## 2025-01-20 RX ADMIN — CEFTAZIDIME 2000 MG: 2 INJECTION, POWDER, FOR SOLUTION INTRAVENOUS at 14:02

## 2025-01-20 RX ADMIN — FLUOXETINE 40 MG: 20 CAPSULE ORAL at 07:44

## 2025-01-20 RX ADMIN — RISPERIDONE 0.25 MG: 0.5 TABLET, ORALLY DISINTEGRATING ORAL at 21:18

## 2025-01-20 RX ADMIN — Medication 1 TABLET: at 07:44

## 2025-01-20 NOTE — PROGRESS NOTES
Daily Progress Note          Assessment    Small Left pleural effusion  Hypoxemia  UTI status post right ureteral stent  Pneumoperitoneum  Paroxysmal atrial fibrillation  CAD  Status post mitral valve replacement  Diabetes mellitus  Multiple sclerosis  Hypothyroidism  Chronic sacral ulcer  BPH  GERD  Anemia     PFTs 1/18/2020: Restrictive pattern with decreased oxygen diffusion capacity: FEV1/FVC 74. FEV1 is 1.42 which is 46% predicted FVC is 1.91 which is 43% predicted. There is 24% improvement in FEV1 value after bronchodilator challenge. MVV is 26% predicted. Diffusion capacity is 46%      Recommendations:  Respiratory status is gradually improving     the Left effusion is small, monitor it for now     The wheezing resolved, continue symbicort and discontinue prednisone    Incentive spirometry     Oxygen supplement and titration to maintain saturation 90 to 95%: Currently on 1 L per nasal cannula     Antibiotics: As per ID     Glucose control     Chronic anticoagulation: Eliquis     HR control: On amiodarone        I personally reviewed the radiological studies             LOS: 5 days     Subjective     Cough and shortness of breath    Objective     Vital signs for last 24 hours:  Vitals:    01/20/25 0648 01/20/25 0700 01/20/25 0730 01/20/25 0815   BP:  135/84 134/76 136/99   BP Location:   Right arm    Patient Position:   Lying    Pulse: 65 66 67 114   Resp: 14  15    Temp:   96 °F (35.6 °C)    TempSrc:   Axillary    SpO2: 98%  99%    Weight:       Height:           Intake/Output last 3 shifts:  I/O last 3 completed shifts:  In: 840 [P.O.:840]  Out: 6650 [Urine:6650]  Intake/Output this shift:  I/O this shift:  In: -   Out: 800 [Urine:800]      Radiology  Imaging Results (Last 24 Hours)       ** No results found for the last 24 hours. **            Labs:  Results from last 7 days   Lab Units 01/20/25  0358   WBC 10*3/mm3 4.23   HEMOGLOBIN g/dL 9.6*   HEMATOCRIT % 32.4*   PLATELETS 10*3/mm3 145     Results from  last 7 days   Lab Units 01/20/25  0358   SODIUM mmol/L 142   POTASSIUM mmol/L 3.3*   CHLORIDE mmol/L 106   CO2 mmol/L 26.3   BUN mg/dL 20   CREATININE mg/dL 1.11   CALCIUM mg/dL 8.1*   BILIRUBIN mg/dL 0.2   ALK PHOS U/L 53   ALT (SGPT) U/L 27   AST (SGOT) U/L 34   GLUCOSE mg/dL 168*         Results from last 7 days   Lab Units 01/20/25  0358 01/18/25  0325 01/17/25  1057   ALBUMIN g/dL 3.3* 2.9* 3.1*             Results from last 7 days   Lab Units 01/19/25  0016   MAGNESIUM mg/dL 1.8                   Meds:   SCHEDULE  amiodarone, 200 mg, Oral, Daily  apixaban, 5 mg, Oral, Q12H  aspirin, 81 mg, Oral, Daily  atorvastatin, 40 mg, Oral, Nightly  budesonide-formoterol, 2 puff, Inhalation, BID - RT  cefTAZidime, 2,000 mg, Intravenous, Q8H  finasteride, 5 mg, Oral, Daily  FLUoxetine, 40 mg, Oral, Daily  furosemide, 40 mg, Oral, Daily  hydrocortisone-bacitracin-zinc oxide-nystatin, 1 Application, Topical, BID  insulin lispro, 2-7 Units, Subcutaneous, 4x Daily With Meals & Nightly  levothyroxine, 100 mcg, Oral, Q AM  miconazole, 1 Application, Topical, Q12H  mirtazapine, 7.5 mg, Oral, Nightly  multivitamin with minerals, 1 tablet, Oral, Daily  oseltamivir, 75 mg, Oral, Q12H  pantoprazole, 40 mg, Oral, QAM AC  predniSONE, 20 mg, Oral, Daily With Breakfast  risperiDONE, 0.25 mg, Oral, Nightly  sodium chloride, 10 mL, Intravenous, Q12H  tamsulosin, 0.8 mg, Oral, Nightly  vancomycin, 1,500 mg, Intravenous, Q24H  vitamin B-12, 1,000 mcg, Oral, Daily      Infusions  dilTIAZem HCl-Sodium Chloride, 5-15 mg/hr, Last Rate: 5 mg/hr (01/20/25 0815)  Pharmacy to dose vancomycin,       PRNs    acetaminophen **OR** acetaminophen **OR** acetaminophen    acetaminophen    senna-docusate sodium **AND** polyethylene glycol **AND** bisacodyl **AND** bisacodyl    Calcium Replacement - Follow Nurse / BPA Driven Protocol    dextrose    dextrose    glucagon (human recombinant)    Magnesium Standard Dose Replacement - Follow Nurse / BPA Driven  Protocol    melatonin    ondansetron ODT **OR** ondansetron    Pharmacy to dose vancomycin    Phosphorus Replacement - Follow Nurse / BPA Driven Protocol    Potassium Replacement - Follow Nurse / BPA Driven Protocol    sodium chloride    sodium chloride    Physical Exam:  General Appearance:  Alert   HEENT:  Normocephalic, without obvious abnormality, Conjunctiva/corneas clear,.   Nares normal, no drainage     Neck:  Supple, symmetrical, trachea midline.   Lungs /Chest wall:   Bilateral basal rhonchi, respirations unlabored, symmetrical wall movement.     Heart: Rate controlled, S1 S2 normal  Abdomen: Soft, non-tender, no masses, no organomegaly.    Extremities: + Bilateral lower extremity edema, no clubbing or cyanosis     ROS  Constitutional: Negative for chills, fever and malaise/fatigue.   HENT: Negative.    Eyes: Negative.    Cardiovascular: Negative.    Respiratory: Positive for cough and shortness of breath.    Skin: Negative.    Musculoskeletal: Negative.    Gastrointestinal: Negative.    Genitourinary: Negative.    Neurological: Chronic weakness in lower extremities      I reviewed the recent clinical results  I personally reviewed the latest radiological studies    Part of this note may be an electronic transcription/translation of spoken language to printed text using the Dragon Dictation System.

## 2025-01-20 NOTE — PROGRESS NOTES
Infectious Diseases Progress Note      LOS: 5 days   Patient Care Team:  Jhonny Heller MD as PCP - General (Family Medicine)  Frederick George MD as Consulting Physician (Nephrology)    Chief Complaint: Shortness of breath, weakness    Subjective       The patient has been afebrile for the last 24 hours.  The patient is currently on on 1 L of oxygen by nasal cannula but is O2 saturation is 97%.  He has no new complaints and is hemodynamically stable      Review of Systems:   Review of Systems   Constitutional:  Positive for fatigue.   HENT: Negative.     Eyes: Negative.    Respiratory:  Positive for cough and shortness of breath.    Cardiovascular: Negative.    Gastrointestinal: Negative.    Endocrine: Negative.    Genitourinary: Negative.    Musculoskeletal: Negative.    Skin: Negative.    Neurological:  Positive for weakness.   Psychiatric/Behavioral: Negative.     All other systems reviewed and are negative.       Objective     Vital Signs  Temp:  [96 °F (35.6 °C)-98.2 °F (36.8 °C)] 97 °F (36.1 °C)  Heart Rate:  [] 76  Resp:  [10-19] 16  BP: (115-165)/() 122/67    Physical Exam:  Physical Exam  Vitals and nursing note reviewed.   Constitutional:       General: He is not in acute distress.     Appearance: He is well-developed. He is obese. He is ill-appearing. He is not diaphoretic.   HENT:      Head: Normocephalic and atraumatic.   Eyes:      Conjunctiva/sclera: Conjunctivae normal.      Pupils: Pupils are equal, round, and reactive to light.   Cardiovascular:      Rate and Rhythm: Normal rate and regular rhythm.      Heart sounds: Normal heart sounds, S1 normal and S2 normal.   Pulmonary:      Effort: Pulmonary effort is normal. No respiratory distress.      Breath sounds: No stridor. Rales present. No wheezing.   Abdominal:      General: Bowel sounds are normal. There is no distension.      Palpations: Abdomen is soft. There is no mass.      Tenderness: There is no abdominal tenderness. There is no  guarding.      Comments: No significant abdominal pain   Genitourinary:     Comments: Perry catheter  Musculoskeletal:         General: No deformity.      Cervical back: Neck supple.   Skin:     General: Skin is warm and dry.      Coloration: Skin is not pale.      Findings: No erythema or rash.      Comments: Patient has some stage II pressure ulcerations to the scrotum and buttocks and some moisture associated dermatitis-nothing that looks infected    Neurological:      Mental Status: He is alert and oriented to person, place, and time.      Motor: Weakness present.          Results Review:    I have reviewed all clinical data, test, lab, and imaging results.     Radiology  No Radiology Exams Resulted Within Past 24 Hours    Cardiology    Laboratory    Results from last 7 days   Lab Units 01/20/25  0358 01/19/25  0016 01/18/25  0325 01/17/25  0158 01/16/25  0235 01/15/25  1630   WBC 10*3/mm3 4.23 3.11* 3.22* 3.33* 5.74 6.36   HEMOGLOBIN g/dL 9.6* 9.6* 9.7* 9.5* 10.7* 10.9*   HEMATOCRIT % 32.4* 32.6* 33.9* 33.0* 37.1* 37.0*   PLATELETS 10*3/mm3 145 156 171 152 172 186     Results from last 7 days   Lab Units 01/20/25  0358 01/19/25  0016 01/18/25  0325 01/17/25  1057 01/17/25  0158 01/16/25  0235 01/15/25  1630   SODIUM mmol/L 142 143 145  --  142 141 140   POTASSIUM mmol/L 3.3* 4.0 3.0* 3.7 3.6 4.1 3.9   CHLORIDE mmol/L 106 109* 113*  --  109* 105 102   CO2 mmol/L 26.3 21.8* 19.9*  --  23.6 24.0 26.7   BUN mg/dL 20 17 17  --  21 20 20   CREATININE mg/dL 1.11 1.26 1.12  --  1.30* 1.19 1.32*   GLUCOSE mg/dL 168* 223* 148*  --  211* 142* 177*   ALBUMIN g/dL 3.3*  --  2.9* 3.1*  --   --   --    BILIRUBIN mg/dL 0.2  --  0.2 0.2  --   --   --    ALK PHOS U/L 53  --  49 53  --   --   --    AST (SGOT) U/L 34  --  37 45*  --   --   --    ALT (SGPT) U/L 27  --  28 33  --   --   --    CALCIUM mg/dL 8.1* 8.1* 7.3*  --  8.0* 8.5* 8.6                 Microbiology   Microbiology Results (last 10 days)       Procedure Component  Value - Date/Time    Respiratory Culture - Sputum, Cough [[id number]] Collected: 01/18/25 0933    Lab Status: Final result Specimen: Sputum from Cough Updated: 01/20/25 0940     Respiratory Culture Moderate growth (3+) Normal Respiratory Cheryl     Gram Stain Moderate (3+) WBCs per low power field      Rare (1+) Epithelial cells per low power field      Few (2+) Gram positive cocci    Urine Culture - Urine, Indwelling Urethral Catheter [[id number]]  (Normal) Collected: 01/15/25 1954    Lab Status: Final result Specimen: Urine from Indwelling Urethral Catheter Updated: 01/17/25 1046     Urine Culture No growth    Legionella Antigen, Urine - Urine, Indwelling Urethral Catheter [[id number]]  (Normal) Collected: 01/15/25 1954    Lab Status: Final result Specimen: Urine from Indwelling Urethral Catheter Updated: 01/16/25 1738     LEGIONELLA ANTIGEN, URINE Negative    S. Pneumo Ag Urine or CSF - Urine, Indwelling Urethral Catheter [[id number]]  (Normal) Collected: 01/15/25 1954    Lab Status: Final result Specimen: Urine from Indwelling Urethral Catheter Updated: 01/16/25 1738     Strep Pneumo Ag Negative    MRSA Screen, PCR (Inpatient) - Swab, Nares [[id number]]  (Abnormal) Collected: 01/15/25 1935    Lab Status: Final result Specimen: Swab from Nares Updated: 01/15/25 2142     MRSA PCR MRSA Detected    Narrative:      The negative predictive value of this diagnostic test is high and should only be used to consider de-escalating anti-MRSA therapy. A positive result may indicate colonization with MRSA and must be correlated clinically.    Blood Culture - Blood, Arm, Left [[id number]]  (Normal) Collected: 01/15/25 1927    Lab Status: Preliminary result Specimen: Blood from Arm, Left Updated: 01/19/25 2016     Blood Culture No growth at 4 days    Blood Culture - Blood, Arm, Right [[id number]]  (Normal) Collected: 01/15/25 1927    Lab Status: Preliminary result Specimen: Blood from Arm, Right Updated: 01/19/25 2016     Blood Culture  No growth at 4 days    Urine Culture - Urine, Indwelling Urethral Catheter [[id number]] Collected: 01/10/25 1347    Lab Status: Final result Specimen: Urine from Indwelling Urethral Catheter Updated: 01/11/25 1231     Urine Culture <25,000 CFU/mL Mixed Cheryl Isolated    Narrative:      Specimen contains mixed organisms of questionable pathogenicity suggestive of contamination. If symptoms persist, suggest recollection.  Colonization of the urinary tract without infection is common. Treatment is discouraged unless the patient is symptomatic, pregnant, or undergoing an invasive urologic procedure.            Medication Review:       Schedule Meds  amiodarone, 200 mg, Oral, Daily  apixaban, 5 mg, Oral, Q12H  aspirin, 81 mg, Oral, Daily  atorvastatin, 40 mg, Oral, Nightly  budesonide-formoterol, 2 puff, Inhalation, BID - RT  cefTAZidime, 2,000 mg, Intravenous, Q8H  finasteride, 5 mg, Oral, Daily  FLUoxetine, 40 mg, Oral, Daily  furosemide, 40 mg, Oral, Daily  hydrocortisone-bacitracin-zinc oxide-nystatin, 1 Application, Topical, BID  insulin lispro, 2-7 Units, Subcutaneous, 4x Daily With Meals & Nightly  levothyroxine, 100 mcg, Oral, Q AM  metoprolol tartrate, 25 mg, Oral, Q12H  miconazole, 1 Application, Topical, Q12H  mirtazapine, 7.5 mg, Oral, Nightly  multivitamin with minerals, 1 tablet, Oral, Daily  oseltamivir, 75 mg, Oral, Q12H  pantoprazole, 40 mg, Oral, QAM AC  risperiDONE, 0.25 mg, Oral, Nightly  sodium chloride, 10 mL, Intravenous, Q12H  tamsulosin, 0.8 mg, Oral, Nightly  vancomycin, 1,500 mg, Intravenous, Q24H  vitamin B-12, 1,000 mcg, Oral, Daily        Infusion Meds  Pharmacy to dose vancomycin,         PRN Meds    acetaminophen **OR** acetaminophen **OR** acetaminophen    acetaminophen    senna-docusate sodium **AND** polyethylene glycol **AND** bisacodyl **AND** bisacodyl    Calcium Replacement - Follow Nurse / BPA Driven Protocol    dextrose    dextrose    glucagon (human recombinant)    Magnesium  Standard Dose Replacement - Follow Nurse / BPA Driven Protocol    melatonin    ondansetron ODT **OR** ondansetron    Pharmacy to dose vancomycin    Phosphorus Replacement - Follow Nurse / BPA Driven Protocol    Potassium Replacement - Follow Nurse / BPA Driven Protocol    sodium chloride    sodium chloride        Assessment & Plan       Antimicrobial Therapy   1.  IV ceftazidime        2.  IV vancomycin        3.  P.o. Tamiflu        4.  IV Remdesivir        5.            Assessment     Left lower lobe infiltrate concerning for bacterial pneumonia.  Patient is currently on 2 L oxygen via nasal cannula.  MRSA screen was positive.  Legionella and pneumococcal urine antigen is negative     Recent diagnosis of COVID-19 infection at outlying facility/[facility] prior to admission.  Imaging studies not consistent with COVID-pneumonia The patient was also diagnosed with influenza based on transfer note.  The patient was treated with IV remdesivir as prophylaxis     Toxic metabolic encephalopathy on admission.  May be related to above.  Appears to be back to baseline     Recent admission in December 2024 with obstructive uropathy with urine cultures from [facility] growing ESBL  Proteus mirabilis.  Patient status post cystoscopy with right stent placement and stone removal.  Patient was treated with 10 days of IV ertapenem which was completed on 12/29/2024.  Current urine culture is negative.  Patient was noted to have a chronic Perry catheter secondary to urine retention     Multiple sclerosis.  Patient is bedbound secondary to that     History of mitral valve replacement in the past     Type 2 diabetes-most recent A1c is 9.45     Morbid obesity     Plan     Continue IV ceftazidime 2 g every 8 hours  Continue vancomycin for now  Patient appears to be responding well to antibiotics-will continue for short course  May switch to p.o. doxycycline 100 mg twice daily at discharge for 7 days total treatment    Continue p.o.  Tamiflu 75 mg every 12 hours for 5 days-will be completed today    Continue supportive care  Okay to discharge from Infectious Disease standpoint  Patient will need to be on enhanced airborne isolation for 10 days from the first positive COVID screen  Appropriate PPE was used during this assessment        Fay Bradford, APRN  01/20/25  12:57 EST    Note is dictated utilizing voice recognition software/Dragon

## 2025-01-20 NOTE — PLAN OF CARE
Problem: Adult Inpatient Plan of Care  Goal: Absence of Hospital-Acquired Illness or Injury  Intervention: Identify and Manage Fall Risk  Description: Perform standard risk assessment on admission using a validated tool or comprehensive approach appropriate to the patient; reassess fall risk frequently, with change in status or transfer to another level of care.  Communicate risk to interprofessional healthcare team; ensure fall risk visible cue.  Determine need for increased observation, equipment and environmental modification, as well as use of supportive, nonskid footwear.  Adjust safety measures to individual needs and identified risk factors.  Reinforce the importance of active participation with fall risk prevention, safety, and physical activity with the patient and family.  Perform regular intentional rounding to assess need for position change, pain assessment and personal needs, including assistance with toileting.  Recent Flowsheet Documentation  Taken 1/20/2025 0200 by Bob Esteban LPN  Safety Promotion/Fall Prevention:   safety round/check completed   room organization consistent   nonskid shoes/slippers when out of bed   muscle strengthening facilitated   fall prevention program maintained   mobility aid in reach   lighting adjusted   elopement precautions   clutter free environment maintained   assistive device/personal items within reach   activity supervised  Taken 1/20/2025 0000 by Bob Esteban LPN  Safety Promotion/Fall Prevention:   safety round/check completed   room organization consistent   nonskid shoes/slippers when out of bed   muscle strengthening facilitated   mobility aid in reach   lighting adjusted   fall prevention program maintained   clutter free environment maintained   assistive device/personal items within reach   activity supervised  Taken 1/19/2025 2000 by Bob Esteban LPN  Safety Promotion/Fall Prevention:   safety round/check completed   room  organization consistent   nonskid shoes/slippers when out of bed   mobility aid in reach   muscle strengthening facilitated   lighting adjusted   fall prevention program maintained   clutter free environment maintained   assistive device/personal items within reach   activity supervised  Intervention: Prevent Skin Injury  Description: Perform a screening for skin injury risk, such as pressure or moisture-associated skin damage on admission and at regular intervals throughout hospital stay.  Keep all areas of skin (especially folds) clean and dry.  Maintain adequate skin hydration.  Relieve and redistribute pressure and protect bony prominences and skin at risk for injury; implement measures based on patient-specific risk factors.  Match turning and repositioning schedule to clinical condition.  Encourage weight shift frequently; assist with reposition if unable to complete independently.  Float heels off bed; avoid pressure on the Achilles tendon.  Keep skin free from extended contact with medical devices.  Optimize nutrition and hydration.  Encourage functional activity and mobility, as early as tolerated.  Use aids (e.g., slide boards, mechanical lift) during transfer.  Recent Flowsheet Documentation  Taken 1/20/2025 0200 by Bob Esteban LPN  Body Position: weight shifting  Taken 1/20/2025 0000 by Bob Esteban LPN  Skin Protection: incontinence pads utilized  Taken 1/19/2025 2000 by Bob Esteban LPN  Body Position: weight shifting  Intervention: Prevent and Manage VTE (Venous Thromboembolism) Risk  Description: Assess for VTE (venous thromboembolism) risk.  Promote early mobilization; encourage both active and passive leg exercises, if unable to ambulate.  Initiate and maintain compression or other therapy, as indicated, based on identified risk in accordance with organizational protocol and provider order.  Recognize the patient's individual risk for bleeding before initiating  pharmacologic thromboprophylaxis.  Recent Flowsheet Documentation  Taken 1/20/2025 0000 by Bob Esteban LPN  VTE Prevention/Management:   SCDs (sequential compression devices) off   patient refused intervention  Taken 1/19/2025 2000 by Bob Esteban LPN  VTE Prevention/Management:   SCDs (sequential compression devices) off   patient refused intervention  Intervention: Prevent Infection  Description: Maintain skin and mucous membrane integrity; promote hand, oral and pulmonary hygiene.  Optimize fluid balance, nutrition, sleep and glycemic control to maximize infection resistance.  Identify potential sources of infection early to prevent or mitigate progression of infection (e.g., wound, lines, devices).  Evaluate ongoing need for invasive devices; remove promptly when no longer indicated.  Review vaccination status.  Recent Flowsheet Documentation  Taken 1/20/2025 0200 by Bob Esteban LPN  Infection Prevention:   visitors restricted/screened   single patient room provided   rest/sleep promoted  Taken 1/20/2025 0000 by Bob Esteban LPN  Infection Prevention:   visitors restricted/screened   single patient room provided   rest/sleep promoted   personal protective equipment utilized   hand hygiene promoted  Taken 1/19/2025 2000 by Bob Esteban LPN  Infection Prevention:   visitors restricted/screened   single patient room provided   rest/sleep promoted   personal protective equipment utilized   hand hygiene promoted  Goal: Optimal Comfort and Wellbeing  Intervention: Monitor Pain and Promote Comfort  Description: Assess pain level, treatment efficacy and patient response at regular intervals using a consistent pain scale.  Consider the presence and impact of preexisting chronic pain.  Encourage patient and caregiver involvement in pain assessment, interventions and safety measures.  Promote activity; balance with sleep and rest to enhance healing.  Recent Flowsheet  "Documentation  Taken 1/20/2025 0000 by Bob Esteban LPN  Pain Management Interventions:   position adjusted   pain management plan reviewed with patient/caregiver   medication offered but refused  Taken 1/19/2025 2000 by Bob Esteban LPN  Pain Management Interventions:   pain management plan reviewed with patient/caregiver   medication offered but refused   Goal Outcome Evaluation:plan of care reviewed, enhance iso , afib to aflutter pt is on cardizem gtt, VS q 30\" , bedrest, turn and repostion, F/C for retention , cont to monitor                                             "

## 2025-01-20 NOTE — PROGRESS NOTES
"Pharmacy Antimicrobial Dosing Service    Subjective:  Jamin Hennessy is a 69 y.o.male admitted with AMS. Pharmacy has been consulted to dose Vancomycin for possible empiric, pneumonia.    PMH: AMS  MRSA nares positive       Assessment/Plan    1. Day #6 Vancomycin: Goal -600 mcg*h/mL.   Patient is currently on Vancomycin 1500mg (17 mg/kg AdjBW) IV q24h. Vancomycin peak level  34.2 mcg/mL, trough level 20 mcg/mL. Predicted  mcg*h/mL. Will continue current regimen and plan to check random level on 1/24.    2. Day #5 Ceftazidime: 2000 mg IV q8h for estCrCl > 60 mL/min.    Will continue to monitor drug levels, renal function, culture and sensitivities, and patient clinical status.       Objective:  Relevant clinical data and objective history reviewed:  177.8 cm (70\")   114 kg (251 lb 5.2 oz)   Ideal body weight: 73 kg (160 lb 15 oz)  Adjusted ideal body weight: 89.4 kg (197 lb 1.5 oz)  Body mass index is 36.06 kg/m².    Results from last 7 days   Lab Units 01/20/25  1408 01/19/25  1805 01/17/25  1059 01/17/25  0158   VANCOMYCIN PK mcg/mL  --  34.20  --  20.90   VANCOMYCIN TR mcg/mL 20.00  --  17.60  --      Results from last 7 days   Lab Units 01/20/25  0358 01/19/25  0016 01/18/25  0325   CREATININE mg/dL 1.11 1.26 1.12     Estimated Creatinine Clearance: 79.4 mL/min (by C-G formula based on SCr of 1.11 mg/dL).  I/O last 3 completed shifts:  In: 840 [P.O.:840]  Out: 6650 [Urine:6650]    Results from last 7 days   Lab Units 01/20/25  0358 01/19/25  0016 01/18/25  0325   WBC 10*3/mm3 4.23 3.11* 3.22*     Temperature    01/20/25 0330 01/20/25 0730 01/20/25 1135   Temp: 97.8 °F (36.6 °C) 96 °F (35.6 °C) 97 °F (36.1 °C)     Baseline culture/source/susceptibility:  Microbiology Results (last 10 days)       Procedure Component Value - Date/Time    Respiratory Culture - Sputum, Cough [[id number]] Collected: 01/18/25 0933    Lab Status: Final result Specimen: Sputum from Cough Updated: 01/20/25 0940     " Respiratory Culture Moderate growth (3+) Normal Respiratory Cheryl     Gram Stain Moderate (3+) WBCs per low power field      Rare (1+) Epithelial cells per low power field      Few (2+) Gram positive cocci    Urine Culture - Urine, Indwelling Urethral Catheter [[id number]]  (Normal) Collected: 01/15/25 1954    Lab Status: Final result Specimen: Urine from Indwelling Urethral Catheter Updated: 01/17/25 1046     Urine Culture No growth    Legionella Antigen, Urine - Urine, Indwelling Urethral Catheter [[id number]]  (Normal) Collected: 01/15/25 1954    Lab Status: Final result Specimen: Urine from Indwelling Urethral Catheter Updated: 01/16/25 1738     LEGIONELLA ANTIGEN, URINE Negative    S. Pneumo Ag Urine or CSF - Urine, Indwelling Urethral Catheter [[id number]]  (Normal) Collected: 01/15/25 1954    Lab Status: Final result Specimen: Urine from Indwelling Urethral Catheter Updated: 01/16/25 1738     Strep Pneumo Ag Negative    MRSA Screen, PCR (Inpatient) - Swab, Nares [[id number]]  (Abnormal) Collected: 01/15/25 1935    Lab Status: Final result Specimen: Swab from Nares Updated: 01/15/25 2142     MRSA PCR MRSA Detected    Narrative:      The negative predictive value of this diagnostic test is high and should only be used to consider de-escalating anti-MRSA therapy. A positive result may indicate colonization with MRSA and must be correlated clinically.    Blood Culture - Blood, Arm, Left [[id number]]  (Normal) Collected: 01/15/25 1927    Lab Status: Preliminary result Specimen: Blood from Arm, Left Updated: 01/19/25 2016     Blood Culture No growth at 4 days    Blood Culture - Blood, Arm, Right [[id number]]  (Normal) Collected: 01/15/25 1927    Lab Status: Preliminary result Specimen: Blood from Arm, Right Updated: 01/19/25 2016     Blood Culture No growth at 4 days          Kalani Hawk RPH  01/20/25 15:28 EST

## 2025-01-20 NOTE — PROGRESS NOTES
Cardiology Progress  Note      Patient Care Team:  Jhonny Heller MD as PCP - General (Family Medicine)  Frederick George MD as Consulting Physician (Nephrology)    PATIENT IDENTIFICATION  Name: Jamin Hennessy  Age: 69 y.o.  Sex: male  :  1955  MRN: [id number]      Length of stay:    LOS: 5 days           REASON FOR FOLLOW-UP:  Atrial fibrillation with rapid ventricular response/intermittent flutter  History of paroxysmal atrial fibrillation  Elevated chads vascular score      INTERVAL HISTORY  Chart and labs reviewed.  Patient remains in atrial fibrillation at 118 bpm.  He is currently on IV Cardizem drip and maintenance amiodarone 200 mg daily.  Pressure 144/82.  Attending note reviewed-feels well, continuing to improve.  Still with some shortness of breath and wheezing.      SUBJECTIVE    Shortness of breath-improving  Wheezing      REVIEW OF SYSTEMS:  Pertinent items are noted in HPI, all other systems reviewed and negative    OBJECTIVE   Globin 9.6  Potassium 3.3    ASSESSMENT  Atrial fibrillation with rapid ventricular response  Left lower lobe pneumonia  Positive COVID-19 status  Acute hypoxic respiratory failure  Altered mental status  History of mitral valve endocarditis status post bioprosthetic mitral valve replacement  Coronary artery disease with prior PCI/stenting  History of paroxysmal atrial fibrillation  Primary hypertension  Dyslipidemia  Chronic kidney disease  Diabetes mellitus 2  History of lung cancer status postchemotherapy  Elevated chads vascular score  CHADS-VASc Risk Assessment               3 Total Score    1 Hypertension    1 DM    1 Age 65-74    Criteria that do not apply:    CHF    Age >/= 75    PRIOR STROKE/TIA/THROMBO    Vascular Disease    Sex: Female                RECOMMENDATIONS  Start metoprolol to tartrate 25 mg every 12 hours.  Wean off IV diltiazem.  Continue anticoagulation therapy with Eliquis.  Will schedule patient for AARON guided cardioversion tomorrow.   "Strict NPO after midnight.  Monitor and replace electrolytes per protocol.            CHF Guideline Directed Medical Therapy  Beta Blocker:   ARNI/ACE/ARB:   SGLT 2 inhibitors:   Diuretics:   Aldosterone Antagonist:   Vasodilators & Nitrates:       Vital Signs  Visit Vitals  /67   Pulse 76   Temp 97 °F (36.1 °C)   Resp 16   Ht 177.8 cm (70\")   Wt 114 kg (251 lb 5.2 oz)   SpO2 97%   BMI 36.06 kg/m²     Oxygen Therapy  SpO2: 97 %  Pulse Oximetry Type: Continuous  Device (Oxygen Therapy): nasal cannula  Flow (L/min) (Oxygen Therapy): 1  Flowsheet Rows      Flowsheet Row First Filed Value   Admission Height 177.8 cm (70\") Documented at 01/15/2025 1559   Admission Weight 114 kg (251 lb 5.2 oz) Documented at 01/15/2025 1559          Intake & Output (last 3 days)         01/17 0701  01/18 0700 01/18 0701  01/19 0700 01/19 0701  01/20 0700 01/20 0701 01/21 0700    P.O. 360 360 480     I.V. (mL/kg)        Total Intake(mL/kg) 360 (3.2) 360 (3.2) 480 (4.2)     Urine (mL/kg/hr) 2000 (0.7) 3050 (1.1) 6100 (2.2) 800 (1.9)    Stool 0 0 0     Total Output 2000 3050 6100 800    Net -1640 -2690 -5620 -800            Stool Unmeasured Occurrence 2 x 0 x 1 x           Lines, Drains & Airways       Active LDAs       Name Placement date Placement time Site Days    Peripheral IV 01/10/25 0200 Right Antecubital 01/10/25  0200  Antecubital  10    Peripheral IV 01/19/25 1522 Left;Posterior Hand 01/19/25  1522  Hand  less than 1    Peripheral IV 01/19/25 2112 Anterior;Left;Proximal Forearm 01/19/25 2112  Forearm  less than 1    Urethral Catheter 01/10/25  1354  -- 9                           /67   Pulse 76   Temp 97 °F (36.1 °C)   Resp 16   Ht 177.8 cm (70\")   Wt 114 kg (251 lb 5.2 oz)   SpO2 97%   BMI 36.06 kg/m²   Intake/Output last 3 shifts:  I/O last 3 completed shifts:  In: 840 [P.O.:840]  Out: 6650 [Urine:6650]  Intake/Output this shift:  I/O this shift:  In: -   Out: 2500 [Urine:2500]    PHYSICAL " EXAM:    General: Alert, cooperative, no distress, appears stated age  Head:  Normocephalic, atraumatic, mucous membranes moist  Eyes:  Conjunctivae/corneas clear, EOM's intact     Neck:  Supple,  no adenopathy; no JVD or bruit  Lungs: Clear to auscultation bilaterally, no wheezes, rhonchi or rales are noted  Chest wall: No tenderness  Heart::  Irregularly irregular rate and rhythm, S1 and S2 normal, no murmur, rub or gallop  Abdomen: Soft, nontender, nondistended, bowel sounds active  Extremities: No cyanosis, clubbing, or edema   Pulses: 2+ and symmetric all extremities  Skin:  No rashes or lesions  Neuro/psych: A&O x3. CN II through XII are grossly intact with appropriate affect        Scheduled Meds:      amiodarone, 200 mg, Oral, Daily  apixaban, 5 mg, Oral, Q12H  aspirin, 81 mg, Oral, Daily  atorvastatin, 40 mg, Oral, Nightly  budesonide-formoterol, 2 puff, Inhalation, BID - RT  cefTAZidime, 2,000 mg, Intravenous, Q8H  finasteride, 5 mg, Oral, Daily  FLUoxetine, 40 mg, Oral, Daily  furosemide, 40 mg, Oral, Daily  hydrocortisone-bacitracin-zinc oxide-nystatin, 1 Application, Topical, BID  insulin lispro, 2-7 Units, Subcutaneous, 4x Daily With Meals & Nightly  levothyroxine, 100 mcg, Oral, Q AM  metoprolol tartrate, 25 mg, Oral, Q12H  miconazole, 1 Application, Topical, Q12H  mirtazapine, 7.5 mg, Oral, Nightly  multivitamin with minerals, 1 tablet, Oral, Daily  oseltamivir, 75 mg, Oral, Q12H  pantoprazole, 40 mg, Oral, QAM AC  risperiDONE, 0.25 mg, Oral, Nightly  sodium chloride, 10 mL, Intravenous, Q12H  tamsulosin, 0.8 mg, Oral, Nightly  vancomycin, 1,500 mg, Intravenous, Q24H  vitamin B-12, 1,000 mcg, Oral, Daily        Continuous Infusions:    Pharmacy to dose vancomycin,         PRN Meds:      acetaminophen **OR** acetaminophen **OR** acetaminophen    acetaminophen    senna-docusate sodium **AND** polyethylene glycol **AND** bisacodyl **AND** bisacodyl    Calcium Replacement - Follow Nurse / BPA Driven  Protocol    dextrose    dextrose    glucagon (human recombinant)    Magnesium Standard Dose Replacement - Follow Nurse / BPA Driven Protocol    melatonin    ondansetron ODT **OR** ondansetron    Pharmacy to dose vancomycin    Phosphorus Replacement - Follow Nurse / BPA Driven Protocol    Potassium Replacement - Follow Nurse / BPA Driven Protocol    sodium chloride    sodium chloride        Results Review:     I reviewed the patient's new clinical results.    CBC    Results from last 7 days   Lab Units 01/20/25  0358 01/19/25  0016 01/18/25  0325 01/17/25  0158 01/16/25  0235 01/15/25  1630   WBC 10*3/mm3 4.23 3.11* 3.22* 3.33* 5.74 6.36   HEMOGLOBIN g/dL 9.6* 9.6* 9.7* 9.5* 10.7* 10.9*   PLATELETS 10*3/mm3 145 156 171 152 172 186     Cr Clearance Estimated Creatinine Clearance: 79.4 mL/min (by C-G formula based on SCr of 1.11 mg/dL).  Coag     HbA1C   Lab Results   Component Value Date    HGBA1C 9.45 (H) 12/19/2024    HGBA1C 6.6 (H) 02/27/2020    HGBA1C 5.7 (H) 01/15/2020     Blood Glucose   Glucose   Date/Time Value Ref Range Status   01/20/2025 0750 166 (H) 70 - 105 mg/dL Final     Comment:     Serial Number: [id number]:  [id number]   01/19/2025 2044 301 (H) 70 - 105 mg/dL Final     Comment:     Serial Number: [id number]:  [id number]   01/19/2025 1715 260 (H) 70 - 105 mg/dL Final     Comment:     Serial Number: [id number]:  [id number]   01/19/2025 1200 185 (H) 70 - 105 mg/dL Final     Comment:     Serial Number: [id number]:  [id number]   01/19/2025 0805 141 (H) 70 - 105 mg/dL Final     Comment:     Serial Number: [id number]:  [id number]   01/18/2025 2059 219 (H) 70 - 105 mg/dL Final     Comment:     Serial Number: [id number]:  [id number]   01/18/2025 1727 224 (H) 70 - 105 mg/dL Final     Comment:     Serial Number: [id number]:  [id number]   01/18/2025 1235 142 (H) 70 - 105 mg/dL Final     Comment:     Serial Number: [id number]:  [id number]     Infection   Results  "from last 7 days   Lab Units 01/18/25  0933 01/15/25  1954 01/15/25  1927 01/15/25  1630   BLOODCX   --   --  No growth at 4 days  No growth at 4 days  --    RESPCX  Moderate growth (3+) Normal Respiratory Cheryl  --   --   --    URINECX   --  No growth  --   --    PROCALCITONIN ng/mL  --   --   --  0.16     CMP   Results from last 7 days   Lab Units 01/20/25  0358 01/19/25  0016 01/18/25  0325 01/17/25  1057 01/17/25  0158 01/16/25  0235 01/15/25  1630   SODIUM mmol/L 142 143 145  --  142 141 140   POTASSIUM mmol/L 3.3* 4.0 3.0* 3.7 3.6 4.1 3.9   CHLORIDE mmol/L 106 109* 113*  --  109* 105 102   CO2 mmol/L 26.3 21.8* 19.9*  --  23.6 24.0 26.7   BUN mg/dL 20 17 17  --  21 20 20   CREATININE mg/dL 1.11 1.26 1.12  --  1.30* 1.19 1.32*   GLUCOSE mg/dL 168* 223* 148*  --  211* 142* 177*   ALBUMIN g/dL 3.3*  --  2.9* 3.1*  --   --   --    BILIRUBIN mg/dL 0.2  --  0.2 0.2  --   --   --    ALK PHOS U/L 53  --  49 53  --   --   --    AST (SGOT) U/L 34  --  37 45*  --   --   --    ALT (SGPT) U/L 27  --  28 33  --   --   --      ABG      UA    Results from last 7 days   Lab Units 01/15/25  1954   NITRITE UA  Negative   WBC UA /HPF Too Numerous to Count*   BACTERIA UA /HPF Unable to determine due to loaded field*   SQUAM EPITHEL UA /HPF None Seen   URINECX  No growth     TERESA  No results found for: \"POCMETH\", \"POCAMPHET\", \"POCBARBITUR\", \"POCBENZO\", \"POCCOCAINE\", \"POCOPIATES\", \"POCOXYCODO\", \"POCPHENCYC\", \"POCPROPOXY\", \"POCTHC\", \"POCTRICYC\"  Lysis Labs   Results from last 7 days   Lab Units 01/20/25  0358 01/19/25  0016 01/18/25  0325 01/17/25  0158 01/16/25  0235 01/15/25  1630   HEMOGLOBIN g/dL 9.6* 9.6* 9.7* 9.5* 10.7* 10.9*   PLATELETS 10*3/mm3 145 156 171 152 172 186   CREATININE mg/dL 1.11 1.26 1.12 1.30* 1.19 1.32*     Radiology(recent) No radiology results for the last day            X-rays, labs reviewed personally by physician.    ECG/EMG Results (most recent)       Procedure Component Value Units Date/Time    Telemetry " Scan [[id number]] Resulted: 01/15/25     Updated: 01/17/25 1152    Telemetry Scan [[id number]] Resulted: 01/15/25     Updated: 01/17/25 1155    Telemetry Scan [[id number]] Resulted: 01/15/25     Updated: 01/17/25 1155    Telemetry Scan [[id number]] Resulted: 01/15/25     Updated: 01/17/25 1157    Telemetry Scan [[id number]] Resulted: 01/15/25     Updated: 01/17/25 1157    Telemetry Scan [[id number]] Resulted: 01/15/25     Updated: 01/17/25 1158    ECG 12 Lead Rhythm Change [[id number]] Collected: 01/19/25 1431     Updated: 01/19/25 1433     QT Interval 355 ms      QTC Interval 501 ms     Narrative:      HEART NALT=865  bpm  RR Nucxgvaa=253  ms  UT Interval=  ms  P Horizontal Axis=  deg  P Front Axis=  deg  QRSD Impifbns=358  ms  QT Zzcyjwol=558  ms  DAkC=369  ms  QRS Axis=-38  deg  T Wave Axis=-14  deg  - ABNORMAL ECG -  Atrial flutter with predominant 2:1 AV block  Left axis deviation  Low voltage, extremity leads  Prolonged QT interval  Date and Time of Study:2025-01-19 14:31:25    ECG 12 Lead Rhythm Change [[id number]] Collected: 01/20/25 0225     Updated: 01/20/25 0226     QT Interval 603 ms      QTC Interval 620 ms     Narrative:      HEART RATE=60  bpm  RR Wrzxtcbb=965  ms  UT Interval=  ms  P Horizontal Axis=  deg  P Front Axis=  deg  QRSD Uhmormyp=886  ms  QT Yzilyzse=266  ms  ECvD=941  ms  QRS Axis=-20  deg  T Wave Axis=-24  deg  - ABNORMAL ECG -  Atrial flutter with predominant 4:1 AV block  Nonspecific intraventricular conduction delay  Inferior infarct, age indeterminate  Prolonged QT interval  Date and Time of Study:2025-01-20 02:25:23    Telemetry Scan [[id number]] Resulted: 01/15/25     Updated: 01/20/25 0817    Telemetry Scan [[id number]] Resulted: 01/15/25     Updated: 01/20/25 0900    Telemetry Scan [[id number]] Resulted: 01/15/25     Updated: 01/20/25 0908    Telemetry Scan [[id number]] Resulted: 01/15/25     Updated: 01/20/25 1026              Medication Review:   I have reviewed the patient's current  "medication list  Scheduled Meds:amiodarone, 200 mg, Oral, Daily  apixaban, 5 mg, Oral, Q12H  aspirin, 81 mg, Oral, Daily  atorvastatin, 40 mg, Oral, Nightly  budesonide-formoterol, 2 puff, Inhalation, BID - RT  cefTAZidime, 2,000 mg, Intravenous, Q8H  finasteride, 5 mg, Oral, Daily  FLUoxetine, 40 mg, Oral, Daily  furosemide, 40 mg, Oral, Daily  hydrocortisone-bacitracin-zinc oxide-nystatin, 1 Application, Topical, BID  insulin lispro, 2-7 Units, Subcutaneous, 4x Daily With Meals & Nightly  levothyroxine, 100 mcg, Oral, Q AM  metoprolol tartrate, 25 mg, Oral, Q12H  miconazole, 1 Application, Topical, Q12H  mirtazapine, 7.5 mg, Oral, Nightly  multivitamin with minerals, 1 tablet, Oral, Daily  oseltamivir, 75 mg, Oral, Q12H  pantoprazole, 40 mg, Oral, QAM AC  risperiDONE, 0.25 mg, Oral, Nightly  sodium chloride, 10 mL, Intravenous, Q12H  tamsulosin, 0.8 mg, Oral, Nightly  vancomycin, 1,500 mg, Intravenous, Q24H  vitamin B-12, 1,000 mcg, Oral, Daily      Continuous Infusions:Pharmacy to dose vancomycin,       PRN Meds:.  acetaminophen **OR** acetaminophen **OR** acetaminophen    acetaminophen    senna-docusate sodium **AND** polyethylene glycol **AND** bisacodyl **AND** bisacodyl    Calcium Replacement - Follow Nurse / BPA Driven Protocol    dextrose    dextrose    glucagon (human recombinant)    Magnesium Standard Dose Replacement - Follow Nurse / BPA Driven Protocol    melatonin    ondansetron ODT **OR** ondansetron    Pharmacy to dose vancomycin    Phosphorus Replacement - Follow Nurse / BPA Driven Protocol    Potassium Replacement - Follow Nurse / BPA Driven Protocol    sodium chloride    sodium chloride    Imaging:  Imaging Results (Last 72 Hours)       ** No results found for the last 72 hours. **              YUE Kerns  01/20/25  12:25 EST       EMR Dragon/Transcription:   \"Dictated utilizing Dragon dictation\".       Electronically signed by YUE Kerns, 01/20/25, 10:38 AM EST.  Copied " text in this note has been reviewed by me and is accurate as of 01/20/25.    Cardiology attending:  Seen and examined.  Chart and labs reviewed. Independent interpretations of cardiac testing was performed. History and exam findings are verified.  Assessment and plan notated by APC after being formulated by attending consultant.  Note that greater than 50% of the time spent in care of the patient was provided by attending consultant.    During my evaluation patient complained of palpitations but stated shortness of breath is starting to improve.  Denies chest pain, dizziness, lightheadedness, presyncope or syncope.  On exam he is comfortable on nasal cannula  Irregular rhythm, normal rate  Scattered rhonchi bilaterally  Minimal pitting edema    Assessment/plan  Atrial fibrillation with rapid response, improving  Left lower lobe pneumonia  Positive COVID-19  Altered mental status  Coronary disease status post PCI  Hypertension  Hyperlipidemia  CKD  Diabetes    --Continue to monitor patient closely, consider AARON/cardioversion tomorrow  -- Continue amiodarone 200 mg daily, Eliquis 5 mg twice daily for anticoagulation  -- Patient seems to be mildly volume overloaded, continue furosemide 40 mg daily  -- Defer antibiotic therapy per primary team    Electronically signed by Armando Cordero MD, 01/20/25, 5:50 PM EST.

## 2025-01-20 NOTE — PROGRESS NOTES
[facility] MEDICINE SERVICE  DAILY PROGRESS NOTE    NAME: Jamin Hennessy  : 1955  MRN: [id number]      LOS: 5 days     PROVIDER OF SERVICE: YUE Haq    Chief Complaint: AMS (altered mental status)    Subjective:     Interval History:  History taken from: patient    Patient otherwise feels well, states he feels better today than he did yesterday.  No family at bedside during encounter.       Review of Systems:   Review of Systems   Respiratory:  Positive for shortness of breath and wheezing.    Cardiovascular:  Negative for chest pain.   Neurological:  Negative for dizziness and headaches.       Objective:     Vital Signs  Temp:  [96 °F (35.6 °C)-98.2 °F (36.8 °C)] 96 °F (35.6 °C)  Heart Rate:  [] 114  Resp:  [10-19] 15  BP: (115-165)/() 136/99  Flow (L/min) (Oxygen Therapy):  [1-2] 1   Body mass index is 36.06 kg/m².    Physical Exam  Physical Exam  General: 70 yo male, Alert and oriented, obese, no acute distress.  HENT: Normocephalic, normal hearing, moist oral mucosa, no scleral icterus.  Neck: Supple, nontender, no carotid bruits, no JVD, no LAD.  Lungs: Wheezing noted, diminished breath sounds in right lung, nonlabored respiration.  Heart: Tachycardia RRR, no murmur, gallop or edema.  Abdomen: Soft, nontender, nondistended, + bowel sounds.  Musculoskeletal: Normal range of motion and strength, no tenderness or swelling.  Skin: Skin is warm, dry and pink, no rashes or lesions.  Psychiatric: Cooperative, appropriate mood and affect.       Diagnostic Data    Results from last 7 days   Lab Units 25  0358   WBC 10*3/mm3 4.23   HEMOGLOBIN g/dL 9.6*   HEMATOCRIT % 32.4*   PLATELETS 10*3/mm3 145   GLUCOSE mg/dL 168*   CREATININE mg/dL 1.11   BUN mg/dL 20   SODIUM mmol/L 142   POTASSIUM mmol/L 3.3*   AST (SGOT) U/L 34   ALT (SGPT) U/L 27   ALK PHOS U/L 53   BILIRUBIN mg/dL 0.2   ANION GAP mmol/L 9.7       No radiology results for the last day      I reviewed the  patient's new clinical results.    Assessment/Plan:     Active and Resolved Problems  Active Hospital Problems    Diagnosis  POA    **AMS (altered mental status) [R41.82]  Yes      Resolved Hospital Problems   No resolved problems to display.       Sepsis  Pneumonia  Left pleural effusion  Acute respiratory failure with hypoxia  COVID infection  Influenza infection  Altered mental status  On 1L NC, b/l RA  COVID and flu positive, ID started remdesivir and oseltamvir  Patient finished course of remdesivir  CT chest with moderate left pleural effusion, left lower lobe and lingula consolidation  On arrival WBC 6.36, LA 2.4, repeat lactic acid 2.7 and Pro-Suman 0.16  SIRS criteria: source (UTI/PNA), HR> 90, febrile   Received ampicillin at OSH, changed to cefepime and add vancomycin for HCAP coverage given recent hospitalization  MRSA screen positive  Tylenol as needed for fever  Fall precautions  ID consulted, appreciate recommendations- noted cefepime changed to ceftazidime, continue vanc  Pulmonology consulted, noted plans for monitoring of pleural effusion as well as initiation of Symbicort, prednisone- continue  Per ID and pulmonology, patient cleared for discharge and is to be discharged on 100mg Doxycycline BID for 7 days as well as finish course of Tamiflu for total of 5 days. Patient is to be sent home with PO prednisone and symbicort  Will hold discharge due to tachycardia. Cardiology consulted, appreciate recommendations     Urinary tract infection   History of recent UTI in setting of right ureteral stent  Was supposed to be on cefdinir 300 mg twice daily until 1/16/2025, wife did not obtain from pharmacy  UA at OSH with 11-20 WBCs and large leukocyte esterase, recently admitted for UTI  Repeat UA, shows large blood, 2+ protein, 2+ leuk esterase, too numerous to count RBC and WBC as well as unable to determine due to loaded field of bacteria  Urology evaluated patient during previous admission, no urology  intervention  Urine culture negative     Pneumoperitoneum  CT abdomen pelvis from OSH incidentally notes 1 tiny bubble of pneumoperitoneum in the left upper quadrant, uncertain etiology  Repeat CT shows no pneumoperitoneum present  General Surgery consulted, no surgical plans at this time     Paroxysmal A-fib  Currently sinus tachycardia in the setting of fever  Continue amiodarone and Eliquis once reconciled  Telemetry     Chronic hypotension-BP stable, patient is on midodrine, continue   CAD/hyperlipidemia-continue ASA  Diabetes Mellitus, type II-BG ACHS, low SSI, hold glipizide, Hypoglycemia protocol   Multiple sclerosis-takes glatiramer injection daily, not on formulary, as needed Flexeril  Hypothyroidism-continue levothyroxine 100 mcg  Valvular heart disease status post MVR  Sacral ulcer-present on admission wound care consulted and following  BPH-continue Flomax and finasteride  GERD-continue PPI       Patient seen and examined at bedside.  Patient continues to be on Cardizem drip and will titrate as necessary.  Will appreciate cardiology recommendations as far as transitioning patient to p.o. with choice of either p.o. Cardizem/increasing current and amiodarone.  Patient currently on Eliquis, as well as aspirin for AC    VTE Prophylaxis:  Pharmacologic & mechanical VTE prophylaxis orders are present.             Disposition Planning:     Barriers to Discharge: Continued care, tachycardia  Anticipated Date of Discharge: 1-  Place of Discharge: Per wife, patient is to return home to her with 24/7 care      Time: 35 minutes     Code Status and Medical Interventions: No CPR (Do Not Attempt to Resuscitate); Limited Support; No intubation (DNI)   Ordered at: 01/15/25 1600     Medical Intervention Limits:    No intubation (DNI)     Code Status (Patient has no pulse and is not breathing):    No CPR (Do Not Attempt to Resuscitate)     Medical Interventions (Patient has pulse or is breathing):    Limited Support        Signature: Electronically signed by YUE Haq, 01/20/25, 09:59 EST.  [facility] Hospitalist Team

## 2025-01-20 NOTE — PLAN OF CARE
On iv abx, room air, K replaced.  Has not complained of pain or soa          Problem: Adult Inpatient Plan of Care  Goal: Plan of Care Review  Outcome: Progressing  Goal: Patient-Specific Goal (Individualized)  Outcome: Progressing  Goal: Absence of Hospital-Acquired Illness or Injury  Outcome: Progressing  Intervention: Identify and Manage Fall Risk  Intervention: Prevent Skin Injury  Intervention: Prevent and Manage VTE (Venous Thromboembolism) Risk  Goal: Optimal Comfort and Wellbeing  Outcome: Progressing  Goal: Readiness for Transition of Care  Outcome: Progressing     Problem: Comorbidity Management  Goal: Maintenance of Asthma Control  Outcome: Progressing  Goal: Maintenance of Behavioral Health Symptom Control  Outcome: Progressing  Goal: Maintenance of COPD Symptom Control  Outcome: Progressing  Goal: Blood Glucose Level Within Target Range  Outcome: Progressing  Goal: Maintenance of Heart Failure Symptom Control  Outcome: Progressing  Goal: Blood Pressure in Desired Range  Outcome: Progressing  Goal: Maintenance of Osteoarthritis Symptom Control  Outcome: Progressing  Goal: Bariatric Home Regimen Maintained  Outcome: Progressing  Goal: Maintenance of Seizure Control  Outcome: Progressing     Problem: Skin Injury Risk Increased  Goal: Skin Health and Integrity  Outcome: Progressing  Intervention: Optimize Skin Protection     Problem: Sepsis/Septic Shock  Goal: Optimal Coping  Outcome: Progressing  Goal: Absence of Bleeding  Outcome: Progressing  Goal: Blood Glucose Level Within Target Range  Outcome: Progressing  Goal: Absence of Infection Signs and Symptoms  Outcome: Progressing  Goal: Optimal Nutrition Delivery  Outcome: Progressing     Problem: Fall Injury Risk  Goal: Absence of Fall and Fall-Related Injury  Outcome: Progressing  Intervention: Promote Injury-Free Environment     Problem: Pneumonia  Goal: Fluid Balance  Outcome: Progressing  Goal: Absence of Infection Signs and Symptoms  Outcome:  Progressing  Goal: Effective Oxygenation and Ventilation  Outcome: Progressing   Goal Outcome Evaluation:

## 2025-01-21 ENCOUNTER — ANESTHESIA (OUTPATIENT)
Facility: HOSPITAL | Age: 70
End: 2025-01-21

## 2025-01-21 ENCOUNTER — ANESTHESIA EVENT (OUTPATIENT)
Facility: HOSPITAL | Age: 70
End: 2025-01-21

## 2025-01-21 ENCOUNTER — APPOINTMENT (OUTPATIENT)
Dept: CARDIOLOGY | Facility: HOSPITAL | Age: 70
End: 2025-01-21
Payer: OTHER GOVERNMENT

## 2025-01-21 LAB
ANION GAP SERPL CALCULATED.3IONS-SCNC: 10.4 MMOL/L (ref 5–15)
BASOPHILS # BLD AUTO: 0.01 10*3/MM3 (ref 0–0.2)
BASOPHILS NFR BLD AUTO: 0.2 % (ref 0–1.5)
BUN SERPL-MCNC: 20 MG/DL (ref 8–23)
BUN/CREAT SERPL: 18.9 (ref 7–25)
CALCIUM SPEC-SCNC: 7.9 MG/DL (ref 8.6–10.5)
CHLORIDE SERPL-SCNC: 111 MMOL/L (ref 98–107)
CO2 SERPL-SCNC: 23.6 MMOL/L (ref 22–29)
CREAT SERPL-MCNC: 1.06 MG/DL (ref 0.76–1.27)
DEPRECATED RDW RBC AUTO: 60.4 FL (ref 37–54)
EGFRCR SERPLBLD CKD-EPI 2021: 76 ML/MIN/1.73
EOSINOPHIL # BLD AUTO: 0.1 10*3/MM3 (ref 0–0.4)
EOSINOPHIL NFR BLD AUTO: 2 % (ref 0.3–6.2)
ERYTHROCYTE [DISTWIDTH] IN BLOOD BY AUTOMATED COUNT: 19.2 % (ref 12.3–15.4)
GLUCOSE BLDC GLUCOMTR-MCNC: 156 MG/DL (ref 70–105)
GLUCOSE BLDC GLUCOMTR-MCNC: 171 MG/DL (ref 70–105)
GLUCOSE BLDC GLUCOMTR-MCNC: 190 MG/DL (ref 70–105)
GLUCOSE BLDC GLUCOMTR-MCNC: 211 MG/DL (ref 70–105)
GLUCOSE SERPL-MCNC: 152 MG/DL (ref 65–99)
HCT VFR BLD AUTO: 33.4 % (ref 37.5–51)
HGB BLD-MCNC: 9.9 G/DL (ref 13–17.7)
IMM GRANULOCYTES # BLD AUTO: 0.1 10*3/MM3 (ref 0–0.05)
IMM GRANULOCYTES NFR BLD AUTO: 2 % (ref 0–0.5)
LYMPHOCYTES # BLD AUTO: 0.69 10*3/MM3 (ref 0.7–3.1)
LYMPHOCYTES NFR BLD AUTO: 13.9 % (ref 19.6–45.3)
MCH RBC QN AUTO: 25.5 PG (ref 26.6–33)
MCHC RBC AUTO-ENTMCNC: 29.6 G/DL (ref 31.5–35.7)
MCV RBC AUTO: 86.1 FL (ref 79–97)
MONOCYTES # BLD AUTO: 0.6 10*3/MM3 (ref 0.1–0.9)
MONOCYTES NFR BLD AUTO: 12 % (ref 5–12)
NEUTROPHILS NFR BLD AUTO: 3.48 10*3/MM3 (ref 1.7–7)
NEUTROPHILS NFR BLD AUTO: 69.9 % (ref 42.7–76)
NRBC BLD AUTO-RTO: 0 /100 WBC (ref 0–0.2)
PLATELET # BLD AUTO: 158 10*3/MM3 (ref 140–450)
PMV BLD AUTO: 10.4 FL (ref 6–12)
POTASSIUM SERPL-SCNC: 3.6 MMOL/L (ref 3.5–5.2)
POTASSIUM SERPL-SCNC: 4.3 MMOL/L (ref 3.5–5.2)
RBC # BLD AUTO: 3.88 10*6/MM3 (ref 4.14–5.8)
SODIUM SERPL-SCNC: 145 MMOL/L (ref 136–145)
WBC NRBC COR # BLD AUTO: 4.98 10*3/MM3 (ref 3.4–10.8)

## 2025-01-21 PROCEDURE — 94664 DEMO&/EVAL PT USE INHALER: CPT

## 2025-01-21 PROCEDURE — 94761 N-INVAS EAR/PLS OXIMETRY MLT: CPT

## 2025-01-21 PROCEDURE — 25010000002 CEFTAZIDIME 2 G RECONSTITUTED SOLUTION 1 EACH VIAL: Performed by: INTERNAL MEDICINE

## 2025-01-21 PROCEDURE — 63710000001 PREDNISONE PER 1 MG: Performed by: INTERNAL MEDICINE

## 2025-01-21 PROCEDURE — 99232 SBSQ HOSP IP/OBS MODERATE 35: CPT | Performed by: INTERNAL MEDICINE

## 2025-01-21 PROCEDURE — 84132 ASSAY OF SERUM POTASSIUM: CPT | Performed by: NURSE PRACTITIONER

## 2025-01-21 PROCEDURE — 94799 UNLISTED PULMONARY SVC/PX: CPT

## 2025-01-21 PROCEDURE — 25010000002 VANCOMYCIN 1.5-0.9 GM/500ML-% SOLUTION: Performed by: NURSE PRACTITIONER

## 2025-01-21 PROCEDURE — 85025 COMPLETE CBC W/AUTO DIFF WBC: CPT | Performed by: NURSE PRACTITIONER

## 2025-01-21 PROCEDURE — 82948 REAGENT STRIP/BLOOD GLUCOSE: CPT

## 2025-01-21 PROCEDURE — 80048 BASIC METABOLIC PNL TOTAL CA: CPT | Performed by: NURSE PRACTITIONER

## 2025-01-21 PROCEDURE — 25010000002 CEFTAZIDIME 2 G RECONSTITUTED SOLUTION 1 EACH VIAL: Performed by: NURSE PRACTITIONER

## 2025-01-21 PROCEDURE — 63710000001 INSULIN LISPRO (HUMAN) PER 5 UNITS: Performed by: FAMILY MEDICINE

## 2025-01-21 PROCEDURE — 94618 PULMONARY STRESS TESTING: CPT

## 2025-01-21 RX ORDER — POTASSIUM CHLORIDE 1500 MG/1
40 TABLET, EXTENDED RELEASE ORAL EVERY 4 HOURS
Status: COMPLETED | OUTPATIENT
Start: 2025-01-21 | End: 2025-01-21

## 2025-01-21 RX ORDER — PREDNISONE 20 MG/1
20 TABLET ORAL
Status: DISCONTINUED | OUTPATIENT
Start: 2025-01-21 | End: 2025-01-22 | Stop reason: HOSPADM

## 2025-01-21 RX ORDER — POTASSIUM CHLORIDE 1500 MG/1
40 TABLET, EXTENDED RELEASE ORAL EVERY 4 HOURS
Status: DISCONTINUED | OUTPATIENT
Start: 2025-01-21 | End: 2025-01-21

## 2025-01-21 RX ADMIN — CEFTAZIDIME 2000 MG: 2 INJECTION, POWDER, FOR SOLUTION INTRAVENOUS at 23:16

## 2025-01-21 RX ADMIN — FINASTERIDE 5 MG: 5 TABLET, FILM COATED ORAL at 08:32

## 2025-01-21 RX ADMIN — LEVOTHYROXINE SODIUM 100 MCG: 100 TABLET ORAL at 06:14

## 2025-01-21 RX ADMIN — INSULIN LISPRO 2 UNITS: 100 INJECTION, SOLUTION INTRAVENOUS; SUBCUTANEOUS at 11:49

## 2025-01-21 RX ADMIN — TAMSULOSIN HYDROCHLORIDE 0.8 MG: 0.4 CAPSULE ORAL at 21:46

## 2025-01-21 RX ADMIN — Medication 1 APPLICATION: at 08:33

## 2025-01-21 RX ADMIN — METOPROLOL TARTRATE 25 MG: 25 TABLET, FILM COATED ORAL at 08:32

## 2025-01-21 RX ADMIN — APIXABAN 5 MG: 5 TABLET, FILM COATED ORAL at 08:32

## 2025-01-21 RX ADMIN — Medication 1000 MCG: at 08:33

## 2025-01-21 RX ADMIN — ATORVASTATIN CALCIUM 40 MG: 40 TABLET, FILM COATED ORAL at 21:47

## 2025-01-21 RX ADMIN — PANTOPRAZOLE SODIUM 40 MG: 40 TABLET, DELAYED RELEASE ORAL at 08:32

## 2025-01-21 RX ADMIN — METOPROLOL TARTRATE 25 MG: 25 TABLET, FILM COATED ORAL at 21:49

## 2025-01-21 RX ADMIN — Medication 10 ML: at 08:33

## 2025-01-21 RX ADMIN — Medication 10 ML: at 20:57

## 2025-01-21 RX ADMIN — MICONAZOLE NITRATE 1 APPLICATION: 20 POWDER TOPICAL at 21:50

## 2025-01-21 RX ADMIN — FLUOXETINE 40 MG: 20 CAPSULE ORAL at 08:32

## 2025-01-21 RX ADMIN — OSELTAMIVIR PHOSPHATE 75 MG: 75 CAPSULE ORAL at 08:32

## 2025-01-21 RX ADMIN — INSULIN LISPRO 3 UNITS: 100 INJECTION, SOLUTION INTRAVENOUS; SUBCUTANEOUS at 17:33

## 2025-01-21 RX ADMIN — POTASSIUM CHLORIDE 40 MEQ: 1500 TABLET, EXTENDED RELEASE ORAL at 16:26

## 2025-01-21 RX ADMIN — POTASSIUM CHLORIDE 40 MEQ: 1500 TABLET, EXTENDED RELEASE ORAL at 11:49

## 2025-01-21 RX ADMIN — Medication 1 TABLET: at 08:33

## 2025-01-21 RX ADMIN — FUROSEMIDE 40 MG: 40 TABLET ORAL at 08:32

## 2025-01-21 RX ADMIN — APIXABAN 5 MG: 5 TABLET, FILM COATED ORAL at 21:48

## 2025-01-21 RX ADMIN — ASPIRIN 81 MG: 81 TABLET, COATED ORAL at 08:32

## 2025-01-21 RX ADMIN — PREDNISONE 20 MG: 20 TABLET ORAL at 09:53

## 2025-01-21 RX ADMIN — Medication 1 APPLICATION: at 21:50

## 2025-01-21 RX ADMIN — AMIODARONE HYDROCHLORIDE 200 MG: 200 TABLET ORAL at 08:32

## 2025-01-21 RX ADMIN — MICONAZOLE NITRATE 1 APPLICATION: 20 POWDER TOPICAL at 08:33

## 2025-01-21 RX ADMIN — CEFTAZIDIME 2000 MG: 2 INJECTION, POWDER, FOR SOLUTION INTRAVENOUS at 16:26

## 2025-01-21 RX ADMIN — RISPERIDONE 0.25 MG: 0.5 TABLET, ORALLY DISINTEGRATING ORAL at 21:48

## 2025-01-21 RX ADMIN — MIRTAZAPINE 7.5 MG: 7.5 TABLET, FILM COATED ORAL at 21:47

## 2025-01-21 RX ADMIN — Medication 1500 MG: at 13:26

## 2025-01-21 RX ADMIN — BUDESONIDE AND FORMOTEROL FUMARATE DIHYDRATE 2 PUFF: 160; 4.5 AEROSOL RESPIRATORY (INHALATION) at 07:38

## 2025-01-21 RX ADMIN — INSULIN LISPRO 2 UNITS: 100 INJECTION, SOLUTION INTRAVENOUS; SUBCUTANEOUS at 21:46

## 2025-01-21 RX ADMIN — CEFTAZIDIME 2000 MG: 2 INJECTION, POWDER, FOR SOLUTION INTRAVENOUS at 06:14

## 2025-01-21 NOTE — PROGRESS NOTES
[facility] MEDICINE SERVICE  DAILY PROGRESS NOTE    NAME: Jamin Hennessy  : 1955  MRN: [id number]      LOS: 6 days     PROVIDER OF SERVICE: YUE Haq    Chief Complaint: AMS (altered mental status)    Subjective:     Interval History:  History taken from: patient    Patient otherwise feels well, states he feels better today than he did yesterday.  No family at bedside during encounter.       Review of Systems:   Review of Systems   Respiratory:  Positive for shortness of breath and wheezing.    Cardiovascular:  Negative for chest pain.   Neurological:  Negative for dizziness and headaches.       Objective:     Vital Signs  Temp:  [97 °F (36.1 °C)-98.8 °F (37.1 °C)] 98 °F (36.7 °C)  Heart Rate:  [] 107  Resp:  [15-23] 20  BP: (121-132)/(67-85) 132/85  Flow (L/min) (Oxygen Therapy):  [1] 1   Body mass index is 36.06 kg/m².    Physical Exam  Physical Exam  General: 70 yo male, Alert and oriented, obese, no acute distress.  HENT: Normocephalic, normal hearing, moist oral mucosa, no scleral icterus.  Neck: Supple, nontender, no carotid bruits, no JVD, no LAD.  Lungs: Wheezing noted, diminished breath sounds in right lung, nonlabored respiration.  Heart: Tachycardia RRR, no murmur, gallop or edema.  Abdomen: Soft, nontender, nondistended, + bowel sounds.  Musculoskeletal: Normal range of motion and strength, no tenderness or swelling.  Skin: Skin is warm, dry and pink, no rashes or lesions.  Psychiatric: Cooperative, appropriate mood and affect.       Diagnostic Data    Results from last 7 days   Lab Units 25  0434 25  0259 25  1625 25  0358   WBC 10*3/mm3 4.98  --   --  4.23   HEMOGLOBIN g/dL 9.9*  --   --  9.6*   HEMATOCRIT % 33.4*  --   --  32.4*   PLATELETS 10*3/mm3 158  --   --  145   GLUCOSE mg/dL  --  152*  --  168*   CREATININE mg/dL  --  1.06  --  1.11   BUN mg/dL  --  20  --  20   SODIUM mmol/L  --  145  --  142   POTASSIUM mmol/L  --  3.6   < > 3.3*    AST (SGOT) U/L  --   --   --  34   ALT (SGPT) U/L  --   --   --  27   ALK PHOS U/L  --   --   --  53   BILIRUBIN mg/dL  --   --   --  0.2   ANION GAP mmol/L  --  10.4  --  9.7    < > = values in this interval not displayed.       No radiology results for the last day      I reviewed the patient's new clinical results.    Assessment/Plan:     Active and Resolved Problems  Active Hospital Problems    Diagnosis  POA    **AMS (altered mental status) [R41.82]  Yes      Resolved Hospital Problems   No resolved problems to display.       Sepsis-resolved  Pneumonia  Left pleural effusion  Acute respiratory failure with hypoxia-resolved  COVID infection  Influenza infection  Altered mental status  On 1L NC, b/l RA  COVID and flu positive, ID started remdesivir and oseltamvir  Patient finished course of remdesivir  CT chest with moderate left pleural effusion, left lower lobe and lingula consolidation  On arrival WBC 6.36, LA 2.4, repeat lactic acid 2.7 and Pro-Suman 0.16  SIRS criteria: source (UTI/PNA), HR> 90, febrile   Received ampicillin at OSH, changed to cefepime and add vancomycin for HCAP coverage given recent hospitalization  MRSA screen positive  Tylenol as needed for fever  Fall precautions  ID consulted, appreciate recommendations- noted cefepime changed to ceftazidime, continue vanc  Pulmonology consulted, noted plans for monitoring of pleural effusion as well as initiation of Symbicort, prednisone- continue  Per ID and pulmonology, patient cleared for discharge and is to be discharged on 100mg Doxycycline BID for 7 day. Patient is to be sent home with PO prednisone and symbicort       Urinary tract infection   History of recent UTI in setting of right ureteral stent  Was supposed to be on cefdinir 300 mg twice daily until 1/16/2025, wife did not obtain from pharmacy  UA at OSH with 11-20 WBCs and large leukocyte esterase, recently admitted for UTI  Repeat UA, shows large blood, 2+ protein, 2+ leuk esterase, too  numerous to count RBC and WBC as well as unable to determine due to loaded field of bacteria  Urology evaluated patient during previous admission, no urology intervention  Urine culture negative     Pneumoperitoneum  Initial CT abdomen pelvis from OSH incidentally notes 1 tiny bubble of pneumoperitoneum in the left upper quadrant, uncertain etiology  Repeat CT shows no pneumoperitoneum present  General Surgery consulted, no surgical plans at this time     Paroxysmal A-fib  Plan for patient to undergo cardioversion on 1/21/2025  Currently sinus tachycardia.   Continue amiodarone and Eliquis   Telemetry     Chronic hypotension-BP stable, patient is on midodrine, continue   CAD/hyperlipidemia-continue ASA  Diabetes Mellitus, type II-BG ACHS, low SSI, hold glipizide, Hypoglycemia protocol   Multiple sclerosis-takes glatiramer injection daily, not on formulary, as needed Flexeril  Hypothyroidism-continue levothyroxine 100 mcg  Valvular heart disease status post MVR  Sacral ulcer-present on admission wound care consulted and following  BPH-continue Flomax and finasteride  GERD-continue PPI       Patient seen and examined at bedside.  Plan is for patient to undergo cardioversion today.  Patient currently on Eliquis, as well as aspirin for AC And hopefully able to discharge home tomorrow.  Patient is currently now on 1 L nasal cannula.  Will order walk test for after cardioversion.      VTE Prophylaxis:  Pharmacologic & mechanical VTE prophylaxis orders are present.             Disposition Planning:     Barriers to Discharge: Continued care, tachycardia  Anticipated Date of Discharge: 1-  Place of Discharge: Per wife, patient is to return home to her with 24/7 care      Time: 35 minutes     Code Status and Medical Interventions: No CPR (Do Not Attempt to Resuscitate); Limited Support; No intubation (DNI)   Ordered at: 01/15/25 1600     Medical Intervention Limits:    No intubation (DNI)     Code Status (Patient has  no pulse and is not breathing):    No CPR (Do Not Attempt to Resuscitate)     Medical Interventions (Patient has pulse or is breathing):    Limited Support       Signature: Electronically signed by YUE Haq, 01/21/25, 10:51 EST.  [facility] Hospitalist Team

## 2025-01-21 NOTE — SIGNIFICANT NOTE
MD cancelled procedure. Will follow-up once patient is over covid to see if AARON/CV is still necessary.

## 2025-01-21 NOTE — PROGRESS NOTES
Daily Progress Note          Assessment    Small Left pleural effusion  Hypoxemia  UTI status post right ureteral stent  Pneumoperitoneum  Paroxysmal atrial fibrillation  CAD  Status post mitral valve replacement  Diabetes mellitus  Multiple sclerosis  Hypothyroidism  Chronic sacral ulcer  BPH  GERD  Anemia     PFTs 1/18/2020: Restrictive pattern with decreased oxygen diffusion capacity: FEV1/FVC 74. FEV1 is 1.42 which is 46% predicted FVC is 1.91 which is 43% predicted. There is 24% improvement in FEV1 value after bronchodilator challenge. MVV is 26% predicted. Diffusion capacity is 46%      Recommendations:    the Left effusion is small, monitor it for now     The wheezing is back again, continue symbicort and resume prednisone    Incentive spirometry     Oxygen supplement and titration to maintain saturation 90 to 95%: Currently on 1 L per nasal cannula     Antibiotics: As per ID     Glucose control     Chronic anticoagulation: Eliquis     HR control: On amiodarone, cardiology planning cardioversion today        I personally reviewed the radiological studies             LOS: 6 days     Subjective     Cough and shortness of breath    Objective     Vital signs for last 24 hours:  Vitals:    01/21/25 0406 01/21/25 0738 01/21/25 0740 01/21/25 0835   BP: 121/82   132/85   BP Location: Left arm   Left arm   Patient Position: Lying   Lying   Pulse: 72  106 107   Resp: 15 16 16 20   Temp: 97.9 °F (36.6 °C)   98 °F (36.7 °C)   TempSrc: Axillary   Axillary   SpO2: 95%  95%    Weight:       Height:           Intake/Output last 3 shifts:  I/O last 3 completed shifts:  In: 120 [P.O.:120]  Out: 7000 [Urine:7000]  Intake/Output this shift:  I/O this shift:  In: -   Out: 650 [Urine:650]      Radiology  Imaging Results (Last 24 Hours)       ** No results found for the last 24 hours. **            Labs:  Results from last 7 days   Lab Units 01/21/25  0434   WBC 10*3/mm3 4.98   HEMOGLOBIN g/dL 9.9*   HEMATOCRIT % 33.4*   PLATELETS  10*3/mm3 158     Results from last 7 days   Lab Units 01/21/25  0259 01/20/25  1625 01/20/25  0358   SODIUM mmol/L 145  --  142   POTASSIUM mmol/L 3.6   < > 3.3*   CHLORIDE mmol/L 111*  --  106   CO2 mmol/L 23.6  --  26.3   BUN mg/dL 20  --  20   CREATININE mg/dL 1.06  --  1.11   CALCIUM mg/dL 7.9*  --  8.1*   BILIRUBIN mg/dL  --   --  0.2   ALK PHOS U/L  --   --  53   ALT (SGPT) U/L  --   --  27   AST (SGOT) U/L  --   --  34   GLUCOSE mg/dL 152*  --  168*    < > = values in this interval not displayed.         Results from last 7 days   Lab Units 01/20/25  0358 01/18/25  0325 01/17/25  1057   ALBUMIN g/dL 3.3* 2.9* 3.1*             Results from last 7 days   Lab Units 01/19/25  0016   MAGNESIUM mg/dL 1.8                   Meds:   SCHEDULE  amiodarone, 200 mg, Oral, Daily  apixaban, 5 mg, Oral, Q12H  aspirin, 81 mg, Oral, Daily  atorvastatin, 40 mg, Oral, Nightly  budesonide-formoterol, 2 puff, Inhalation, BID - RT  cefTAZidime, 2,000 mg, Intravenous, Q8H  finasteride, 5 mg, Oral, Daily  FLUoxetine, 40 mg, Oral, Daily  furosemide, 40 mg, Oral, Daily  hydrocortisone-bacitracin-zinc oxide-nystatin, 1 Application, Topical, BID  insulin lispro, 2-7 Units, Subcutaneous, 4x Daily With Meals & Nightly  levothyroxine, 100 mcg, Oral, Q AM  metoprolol tartrate, 25 mg, Oral, Q12H  miconazole, 1 Application, Topical, Q12H  mirtazapine, 7.5 mg, Oral, Nightly  multivitamin with minerals, 1 tablet, Oral, Daily  pantoprazole, 40 mg, Oral, QAM AC  potassium chloride ER, 40 mEq, Oral, Q4H  risperiDONE, 0.25 mg, Oral, Nightly  sodium chloride, 10 mL, Intravenous, Q12H  tamsulosin, 0.8 mg, Oral, Nightly  vancomycin, 1,500 mg, Intravenous, Q24H  vitamin B-12, 1,000 mcg, Oral, Daily      Infusions  Pharmacy to dose vancomycin,       PRNs    acetaminophen **OR** acetaminophen **OR** acetaminophen    acetaminophen    senna-docusate sodium **AND** polyethylene glycol **AND** bisacodyl **AND** bisacodyl    Calcium Replacement - Follow Nurse  / BPA Driven Protocol    dextrose    dextrose    glucagon (human recombinant)    Magnesium Standard Dose Replacement - Follow Nurse / BPA Driven Protocol    melatonin    ondansetron ODT **OR** ondansetron    Pharmacy to dose vancomycin    Phosphorus Replacement - Follow Nurse / BPA Driven Protocol    Potassium Replacement - Follow Nurse / BPA Driven Protocol    sodium chloride    sodium chloride    Physical Exam:  General Appearance:  Alert   HEENT:  Normocephalic, without obvious abnormality, Conjunctiva/corneas clear,.   Nares normal, no drainage     Neck:  Supple, symmetrical, trachea midline.   Lungs /Chest wall:   Mild wheezing and mild bilateral basal rhonchi, respirations unlabored, symmetrical wall movement.     Heart: Tachycardic, S1 S2 normal  Abdomen: Soft, non-tender, no masses, no organomegaly.    Extremities: + Bilateral lower extremity edema, no clubbing or cyanosis     ROS  Constitutional: Negative for chills, fever and malaise/fatigue.   HENT: Negative.    Eyes: Negative.    Cardiovascular: Negative.    Respiratory: Positive for cough and shortness of breath.    Skin: Negative.    Musculoskeletal: Negative.    Gastrointestinal: Negative.    Genitourinary: Negative.    Neurological: Chronic weakness in lower extremities      I reviewed the recent clinical results  I personally reviewed the latest radiological studies    Part of this note may be an electronic transcription/translation of spoken language to printed text using the Dragon Dictation System.

## 2025-01-21 NOTE — PROGRESS NOTES
Nutrition Services  Patient Name: Jamin Hennessy  YOB: 1955  MRN: [id number]  Admission date: 1/15/2025    PROGRESS NOTE      Nutrition Intervention Updates: Continue NPO for procedure.     When ready to resume diet, suggest Consistent Carbohydrate diet to help optimize glucose control        Encounter Information: Progress note to check on PO intakes. Pt has actually been eating well while diet order active. Is NPO presently for cardioversion today. Will likely be able to resume diet following procedure.       PO Diet: NPO Diet NPO Type: Strict NPO   PO Supplements: None ordered   PO Intake:  Was eating 100% at meals when diet order active   Has been NPO since 00:01 today 1/21       Current nutrition support:    Nutrition support review:        Labs (reviewed below): Hyperglycemia at times; mild to moderate; likely R/t side effect of steroid medication       GI Function:  Stool Output  Stool Unmeasured Occurrence: 1 (01/20/25 1300)  Bowel Incontinence: Yes (01/20/25 1300)  Stool Amount: Medium (01/20/25 1300)  Stool Consistency: loose (01/18/25 0800)  Perineal Care: catheter care provided (01/20/25 2103)          Brief weight review   Wt Readings from Last 10 Encounters:   01/15/25 1559 114 kg (251 lb 5.2 oz)   01/10/25 0138 (!) 138 kg (305 lb 1.9 oz)   12/17/24 1533 135 kg (297 lb)   09/30/24 0523 (!) 138 kg (304 lb 3.8 oz)   09/28/24 0500 (!) 138 kg (304 lb 0.2 oz)   09/27/24 0500 135 kg (297 lb 13.5 oz)   09/26/24 0500 135 kg (297 lb 9.9 oz)   09/25/24 0534 135 kg (296 lb 15.4 oz)   09/24/24 1316 135 kg (297 lb 13.5 oz)   09/24/24 0100 133 kg (294 lb 1.5 oz)   07/08/20 1520 113 kg (250 lb)   03/10/20 0500 116 kg (255 lb 11.7 oz)   03/09/20 0600 115 kg (253 lb 12 oz)   03/08/20 0530 117 kg (257 lb 0.9 oz)   03/06/20 0500 116 kg (255 lb 15.3 oz)   03/05/20 0457 116 kg (255 lb 8.2 oz)   03/04/20 0455 119 kg (262 lb 5.6 oz)   03/02/20 0400 119 kg (261 lb 14.5 oz)   03/01/20 0400 115 kg (254 lb 6.6  oz)   02/29/20 0400 116 kg (256 lb 13.4 oz)   02/29/20 0027 116 kg (256 lb 13.4 oz)   02/03/20 0600 119 kg (263 lb 3.7 oz)   02/02/20 0500 121 kg (266 lb 12.1 oz)   02/01/20 0537 121 kg (267 lb 3.2 oz)   01/31/20 0500 121 kg (267 lb 10.2 oz)   01/30/20 0600 122 kg (268 lb 4.8 oz)   01/28/20 0558 119 kg (262 lb 5.6 oz)   01/28/20 0500 119 kg (262 lb 5.6 oz)   01/27/20 0537 119 kg (261 lb 14.5 oz)   01/27/20 0400 119 kg (261 lb 14.5 oz)   01/26/20 0700 124 kg (273 lb 9.5 oz)   01/25/20 0600 118 kg (260 lb 5.8 oz)   01/24/20 0400 121 kg (265 lb 14 oz)   01/23/20 0525 123 kg (270 lb 1 oz)   01/22/20 0600 115 kg (254 lb 10.1 oz)   01/22/20 0500 115 kg (254 lb 10.1 oz)   01/22/20 0430 115 kg (254 lb 10.1 oz)   01/21/20 0555 117 kg (257 lb 0.9 oz)   01/21/20 0400 117 kg (257 lb 0.9 oz)   01/20/20 2100 118 kg (260 lb 5.8 oz)   01/20/20 0600 118 kg (260 lb 5.8 oz)   01/20/20 0407 118 kg (260 lb 5.8 oz)   01/19/20 0600 117 kg (258 lb 9.6 oz)   01/18/20 0600 118 kg (260 lb 5.8 oz)   01/17/20 0626 121 kg (267 lb 12.8 oz)   01/16/20 0630 120 kg (264 lb 5.3 oz)   01/15/20 0400 121 kg (266 lb 1.5 oz)   01/14/20 0907 122 kg (269 lb 10 oz)   01/14/20 0209 124 kg (272 lb 7.8 oz)   01/14/20 0035 124 kg (272 lb 7.8 oz)        Results from last 7 days   Lab Units 01/21/25  0259 01/20/25  1625 01/20/25  0358 01/19/25  0016 01/18/25  0325 01/17/25  1057   SODIUM mmol/L 145  --  142 143 145  --    POTASSIUM mmol/L 3.6 3.8 3.3* 4.0 3.0* 3.7   CHLORIDE mmol/L 111*  --  106 109* 113*  --    CO2 mmol/L 23.6  --  26.3 21.8* 19.9*  --    BUN mg/dL 20 --  20 17 17  --    CREATININE mg/dL 1.06  --  1.11 1.26 1.12  --    CALCIUM mg/dL 7.9*  --  8.1* 8.1* 7.3*  --    BILIRUBIN mg/dL  --   --  0.2  --  0.2 0.2   ALK PHOS U/L  --   --  53  --  49 53   ALT (SGPT) U/L  --   --  27  --  28 33   AST (SGOT) U/L  --   --  34  --  37 45*   GLUCOSE mg/dL 152*  --  168* 223* 148*  --      Results from last 7 days   Lab Units 01/21/25  0434 01/20/25  0358  01/19/25  0016 01/18/25  0325 01/17/25  0158   MAGNESIUM mg/dL  --   --  1.8 1.7 1.8   HEMOGLOBIN g/dL 9.9*   < > 9.6* 9.7* 9.5*   HEMATOCRIT % 33.4*   < > 32.6* 33.9* 33.0*    < > = values in this interval not displayed.     RD to follow up per protocol.    Electronically signed by:  Kristen Sullivan RD  01/21/25 15:33 EST

## 2025-01-21 NOTE — PROCEDURES
Patient states he is unable to walk.  States that he has MS.  I had patient  exercise with his arms.  He states he cannot move his legs to exercise them.  Exercise Oximetry    Patient Name:Jamin Hennessy   MRN: [id number]   Date: 01/21/25             ROOM AIR BASELINE   SpO2% 93   Heart Rate 66   Blood Pressure      EXERCISE ON ROOM AIR SpO2% EXERCISE ON O2 @  LPM SpO2%   1 MINUTE 95 1 MINUTE    2 MINUTES 96 2 MINUTES    3 MINUTES 94 3 MINUTES    4 MINUTES 95 4 MINUTES    5 MINUTES 95 5 MINUTES    6 MINUTES 94 6 MINUTES               Distance Walked  See note above. Distance Walked   Dyspnea (Mabel Scale)   Dyspnea (Mabel Scale)   Fatigue (Mabel Scale)   Fatigue (Mabel Scale)   SpO2% Post Exercise  94 SpO2% Post Exercise   HR Post Exercise  65 HR Post Exercise   Time to Recovery   Time to Recovery     Comments: See note above.

## 2025-01-21 NOTE — CASE MANAGEMENT/SOCIAL WORK
Continued Stay Note  [facility]     Patient Name: Jamin Hennessy  MRN: [id number]  Today's Date: 1/21/2025    Admit Date: 1/15/2025    Plan: From home with Gaudencio METZ (need criss order). PT/OT evals pending. EMS transport.   Discharge Plan       Row Name 01/21/25 1017       Plan    Plan Comments Barrier to D/C: Cardioversion on 1/21/25               Expected Discharge Date and Time       Expected Discharge Date Expected Discharge Time    Jan 21, 2025               Audrey Boyce RN  RN/.  Office Ph. [phone]  Cell Ph.  [phone]

## 2025-01-21 NOTE — PLAN OF CARE
Goal Outcome Evaluation:      Pt resting comfortably throughout the night with no complaints. Pt is NPO after 0000 hours with a scheduled cardioversion in the morning. Pt has a Perry catheter for chronic urinary retention. Safety precautions in place and will continue to monitor.

## 2025-01-21 NOTE — PROGRESS NOTES
Infectious Diseases Progress Note      LOS: 6 days   Patient Care Team:  Jhonny Heller MD as PCP - General (Family Medicine)  Frederick George MD as Consulting Physician (Nephrology)    Chief Complaint: Shortness of breath, weakness    Subjective       The patient has been afebrile for the last 24 hours.  The patient is currently on room air he has no new complaints and is hemodynamically stable      Review of Systems:   Review of Systems   Constitutional:  Positive for fatigue.   HENT: Negative.     Eyes: Negative.    Respiratory:  Positive for cough and shortness of breath.         Improved   Cardiovascular: Negative.    Gastrointestinal: Negative.    Endocrine: Negative.    Genitourinary: Negative.    Musculoskeletal: Negative.    Skin: Negative.    Neurological:  Positive for weakness.   Psychiatric/Behavioral: Negative.     All other systems reviewed and are negative.       Objective     Vital Signs  Temp:  [97.5 °F (36.4 °C)-98.8 °F (37.1 °C)] 98.1 °F (36.7 °C)  Heart Rate:  [] 66  Resp:  [15-23] 18  BP: (110-132)/(70-85) 110/76    Physical Exam:  Physical Exam  Vitals and nursing note reviewed.   Constitutional:       General: He is not in acute distress.     Appearance: He is well-developed. He is obese. He is ill-appearing. He is not diaphoretic.   HENT:      Head: Normocephalic and atraumatic.   Eyes:      Conjunctiva/sclera: Conjunctivae normal.      Pupils: Pupils are equal, round, and reactive to light.   Cardiovascular:      Rate and Rhythm: Normal rate and regular rhythm.      Heart sounds: Normal heart sounds, S1 normal and S2 normal.   Pulmonary:      Effort: Pulmonary effort is normal. No respiratory distress.      Breath sounds: No stridor. Rales present. No wheezing.   Abdominal:      General: Bowel sounds are normal. There is no distension.      Palpations: Abdomen is soft. There is no mass.      Tenderness: There is no abdominal tenderness. There is no guarding.      Comments: No  significant abdominal pain   Genitourinary:     Comments: Perry catheter  Musculoskeletal:         General: No deformity.      Cervical back: Neck supple.   Skin:     General: Skin is warm and dry.      Coloration: Skin is not pale.      Findings: No erythema or rash.      Comments: Patient has some stage II pressure ulcerations to the scrotum and buttocks and some moisture associated dermatitis-nothing that looks infected    Neurological:      Mental Status: He is alert and oriented to person, place, and time.      Motor: Weakness present.          Results Review:    I have reviewed all clinical data, test, lab, and imaging results.     Radiology  No Radiology Exams Resulted Within Past 24 Hours    Cardiology    Laboratory    Results from last 7 days   Lab Units 01/21/25  0434 01/20/25  0358 01/19/25  0016 01/18/25  0325 01/17/25  0158 01/16/25  0235 01/15/25  1630   WBC 10*3/mm3 4.98 4.23 3.11* 3.22* 3.33* 5.74 6.36   HEMOGLOBIN g/dL 9.9* 9.6* 9.6* 9.7* 9.5* 10.7* 10.9*   HEMATOCRIT % 33.4* 32.4* 32.6* 33.9* 33.0* 37.1* 37.0*   PLATELETS 10*3/mm3 158 145 156 171 152 172 186     Results from last 7 days   Lab Units 01/21/25  0259 01/20/25  1625 01/20/25  0358 01/19/25  0016 01/18/25  0325 01/17/25  1057 01/17/25  0158 01/16/25  0235 01/15/25  1630   SODIUM mmol/L 145  --  142 143 145  --  142 141 140   POTASSIUM mmol/L 3.6 3.8 3.3* 4.0 3.0* 3.7 3.6 4.1 3.9   CHLORIDE mmol/L 111*  --  106 109* 113*  --  109* 105 102   CO2 mmol/L 23.6  --  26.3 21.8* 19.9*  --  23.6 24.0 26.7   BUN mg/dL 20  --  20 17 17  --  21 20 20   CREATININE mg/dL 1.06  --  1.11 1.26 1.12  --  1.30* 1.19 1.32*   GLUCOSE mg/dL 152*  --  168* 223* 148*  --  211* 142* 177*   ALBUMIN g/dL  --   --  3.3*  --  2.9* 3.1*  --   --   --    BILIRUBIN mg/dL  --   --  0.2  --  0.2 0.2  --   --   --    ALK PHOS U/L  --   --  53  --  49 53  --   --   --    AST (SGOT) U/L  --   --  34  --  37 45*  --   --   --    ALT (SGPT) U/L  --   --  27  --  28 33  --    --   --    CALCIUM mg/dL 7.9*  --  8.1* 8.1* 7.3*  --  8.0* 8.5* 8.6                 Microbiology   Microbiology Results (last 10 days)       Procedure Component Value - Date/Time    Respiratory Culture - Sputum, Cough [[id number]] Collected: 01/18/25 0933    Lab Status: Final result Specimen: Sputum from Cough Updated: 01/20/25 0940     Respiratory Culture Moderate growth (3+) Normal Respiratory Cheryl     Gram Stain Moderate (3+) WBCs per low power field      Rare (1+) Epithelial cells per low power field      Few (2+) Gram positive cocci    Urine Culture - Urine, Indwelling Urethral Catheter [[id number]]  (Normal) Collected: 01/15/25 1954    Lab Status: Final result Specimen: Urine from Indwelling Urethral Catheter Updated: 01/17/25 1046     Urine Culture No growth    Legionella Antigen, Urine - Urine, Indwelling Urethral Catheter [[id number]]  (Normal) Collected: 01/15/25 1954    Lab Status: Final result Specimen: Urine from Indwelling Urethral Catheter Updated: 01/16/25 1738     LEGIONELLA ANTIGEN, URINE Negative    S. Pneumo Ag Urine or CSF - Urine, Indwelling Urethral Catheter [[id number]]  (Normal) Collected: 01/15/25 1954    Lab Status: Final result Specimen: Urine from Indwelling Urethral Catheter Updated: 01/16/25 1738     Strep Pneumo Ag Negative    MRSA Screen, PCR (Inpatient) - Swab, Nares [[id number]]  (Abnormal) Collected: 01/15/25 1935    Lab Status: Final result Specimen: Swab from Nares Updated: 01/15/25 2142     MRSA PCR MRSA Detected    Narrative:      The negative predictive value of this diagnostic test is high and should only be used to consider de-escalating anti-MRSA therapy. A positive result may indicate colonization with MRSA and must be correlated clinically.    Blood Culture - Blood, Arm, Left [[id number]]  (Normal) Collected: 01/15/25 1927    Lab Status: Final result Specimen: Blood from Arm, Left Updated: 01/20/25 2015     Blood Culture No growth at 5 days    Blood Culture - Blood, Arm,  Right [[id number]]  (Normal) Collected: 01/15/25 1927    Lab Status: Final result Specimen: Blood from Arm, Right Updated: 01/20/25 2015     Blood Culture No growth at 5 days            Medication Review:       Schedule Meds  amiodarone, 200 mg, Oral, Daily  apixaban, 5 mg, Oral, Q12H  aspirin, 81 mg, Oral, Daily  atorvastatin, 40 mg, Oral, Nightly  budesonide-formoterol, 2 puff, Inhalation, BID - RT  cefTAZidime, 2,000 mg, Intravenous, Q8H  finasteride, 5 mg, Oral, Daily  FLUoxetine, 40 mg, Oral, Daily  furosemide, 40 mg, Oral, Daily  hydrocortisone-bacitracin-zinc oxide-nystatin, 1 Application, Topical, BID  insulin lispro, 2-7 Units, Subcutaneous, 4x Daily With Meals & Nightly  levothyroxine, 100 mcg, Oral, Q AM  metoprolol tartrate, 25 mg, Oral, Q12H  miconazole, 1 Application, Topical, Q12H  mirtazapine, 7.5 mg, Oral, Nightly  multivitamin with minerals, 1 tablet, Oral, Daily  pantoprazole, 40 mg, Oral, QAM AC  potassium chloride ER, 40 mEq, Oral, Q4H  predniSONE, 20 mg, Oral, Daily With Breakfast  risperiDONE, 0.25 mg, Oral, Nightly  sodium chloride, 10 mL, Intravenous, Q12H  tamsulosin, 0.8 mg, Oral, Nightly  vancomycin, 1,500 mg, Intravenous, Q24H  vitamin B-12, 1,000 mcg, Oral, Daily        Infusion Meds  Pharmacy to dose vancomycin,         PRN Meds    acetaminophen **OR** acetaminophen **OR** acetaminophen    acetaminophen    senna-docusate sodium **AND** polyethylene glycol **AND** bisacodyl **AND** bisacodyl    Calcium Replacement - Follow Nurse / BPA Driven Protocol    dextrose    dextrose    glucagon (human recombinant)    Magnesium Standard Dose Replacement - Follow Nurse / BPA Driven Protocol    melatonin    ondansetron ODT **OR** ondansetron    Pharmacy to dose vancomycin    Phosphorus Replacement - Follow Nurse / BPA Driven Protocol    Potassium Replacement - Follow Nurse / BPA Driven Protocol    sodium chloride    sodium chloride        Assessment & Plan       Antimicrobial Therapy   1.  IV  ceftazidime        2.  IV vancomycin        3.         4.          5.            Assessment     Left lower lobe infiltrate concerning for bacterial pneumonia.  Patient is currently on room air.  MRSA screen was positive.  Legionella and pneumococcal urine antigen is negative     Recent diagnosis of COVID-19 infection at outlying facility/[facility] prior to admission.  Imaging studies not consistent with COVID-pneumonia The patient was also diagnosed with influenza based on transfer note.  The patient was treated with IV remdesivir as prophylaxis.  Patient received 5 days of p.o. Tamiflu which was completed on 1/20/2025     Toxic metabolic encephalopathy on admission.  May be related to above.  Appears to be back to baseline     Recent admission in December 2024 with obstructive uropathy with urine cultures from [facility] growing ESBL  Proteus mirabilis.  Patient status post cystoscopy with right stent placement and stone removal.  Patient was treated with 10 days of IV ertapenem which was completed on 12/29/2024.  Current urine culture is negative.  Patient was noted to have a chronic Perry catheter secondary to urine retention     Multiple sclerosis.  Patient is bedbound secondary to that     History of mitral valve replacement in the past     Type 2 diabetes-most recent A1c is 9.45     Morbid obesity     Plan     Continue IV ceftazidime 2 g every 8 hours  Continue vancomycin for now  Will treat with above antibiotics for 7 days-will discontinue after today's doses  Continue supportive care  Okay to discharge from Infectious Disease standpoint  Not much more to add from infectious disease standpoint-we will sign off at this time-please call with any questions.    Patient will need to be on enhanced airborne isolation for 10 days from the first positive COVID screen  Appropriate PPE was used during this assessment    The above note was transcribed for Dr. Box-physical exam and review of systems were  performed by him        Fay Bradford, APRN  01/21/25  14:33 EST    Note is dictated utilizing voice recognition software/Dragon

## 2025-01-22 ENCOUNTER — READMISSION MANAGEMENT (OUTPATIENT)
Dept: CALL CENTER | Facility: HOSPITAL | Age: 70
End: 2025-01-22
Payer: MEDICARE

## 2025-01-22 VITALS
DIASTOLIC BLOOD PRESSURE: 96 MMHG | BODY MASS INDEX: 35.98 KG/M2 | SYSTOLIC BLOOD PRESSURE: 155 MMHG | RESPIRATION RATE: 16 BRPM | OXYGEN SATURATION: 91 % | WEIGHT: 251.32 LBS | HEART RATE: 108 BPM | TEMPERATURE: 96.9 F | HEIGHT: 70 IN

## 2025-01-22 LAB
GLUCOSE BLDC GLUCOMTR-MCNC: 149 MG/DL (ref 70–105)
GLUCOSE BLDC GLUCOMTR-MCNC: 181 MG/DL (ref 70–105)

## 2025-01-22 PROCEDURE — 82948 REAGENT STRIP/BLOOD GLUCOSE: CPT

## 2025-01-22 PROCEDURE — 63710000001 INSULIN LISPRO (HUMAN) PER 5 UNITS: Performed by: FAMILY MEDICINE

## 2025-01-22 PROCEDURE — 63710000001 PREDNISONE PER 1 MG: Performed by: INTERNAL MEDICINE

## 2025-01-22 RX ORDER — TAMSULOSIN HYDROCHLORIDE 0.4 MG/1
0.8 CAPSULE ORAL NIGHTLY
Qty: 30 CAPSULE | Refills: 0 | Status: SHIPPED | OUTPATIENT
Start: 2025-01-22

## 2025-01-22 RX ORDER — TAMSULOSIN HYDROCHLORIDE 0.4 MG/1
0.8 CAPSULE ORAL NIGHTLY
Qty: 30 CAPSULE | Refills: 0 | Status: SHIPPED | OUTPATIENT
Start: 2025-01-22 | End: 2025-01-22

## 2025-01-22 RX ORDER — BUDESONIDE AND FORMOTEROL FUMARATE DIHYDRATE 160; 4.5 UG/1; UG/1
2 AEROSOL RESPIRATORY (INHALATION)
Qty: 10.2 G | Refills: 0 | Status: SHIPPED | OUTPATIENT
Start: 2025-01-22 | End: 2025-01-22

## 2025-01-22 RX ORDER — PREDNISONE 20 MG/1
20 TABLET ORAL
Qty: 3 TABLET | Refills: 0 | Status: SHIPPED | OUTPATIENT
Start: 2025-01-23 | End: 2025-01-22

## 2025-01-22 RX ORDER — METOPROLOL TARTRATE 25 MG/1
25 TABLET, FILM COATED ORAL EVERY 12 HOURS SCHEDULED
Qty: 60 TABLET | Refills: 0 | Status: SHIPPED | OUTPATIENT
Start: 2025-01-22 | End: 2025-02-21

## 2025-01-22 RX ORDER — BUDESONIDE AND FORMOTEROL FUMARATE DIHYDRATE 160; 4.5 UG/1; UG/1
2 AEROSOL RESPIRATORY (INHALATION)
Qty: 10.2 G | Refills: 0 | Status: SHIPPED | OUTPATIENT
Start: 2025-01-22 | End: 2025-02-21

## 2025-01-22 RX ORDER — PREDNISONE 20 MG/1
20 TABLET ORAL
Qty: 3 TABLET | Refills: 0 | Status: SHIPPED | OUTPATIENT
Start: 2025-01-23 | End: 2025-01-26

## 2025-01-22 RX ORDER — METOPROLOL TARTRATE 25 MG/1
25 TABLET, FILM COATED ORAL EVERY 12 HOURS SCHEDULED
Qty: 60 TABLET | Refills: 0 | Status: SHIPPED | OUTPATIENT
Start: 2025-01-22 | End: 2025-01-22

## 2025-01-22 RX ADMIN — Medication 1000 MCG: at 09:16

## 2025-01-22 RX ADMIN — LEVOTHYROXINE SODIUM 100 MCG: 100 TABLET ORAL at 05:40

## 2025-01-22 RX ADMIN — PANTOPRAZOLE SODIUM 40 MG: 40 TABLET, DELAYED RELEASE ORAL at 08:00

## 2025-01-22 RX ADMIN — Medication 1 TABLET: at 09:16

## 2025-01-22 RX ADMIN — AMIODARONE HYDROCHLORIDE 200 MG: 200 TABLET ORAL at 09:16

## 2025-01-22 RX ADMIN — FINASTERIDE 5 MG: 5 TABLET, FILM COATED ORAL at 09:16

## 2025-01-22 RX ADMIN — MICONAZOLE NITRATE 1 APPLICATION: 20 POWDER TOPICAL at 09:17

## 2025-01-22 RX ADMIN — INSULIN LISPRO 2 UNITS: 100 INJECTION, SOLUTION INTRAVENOUS; SUBCUTANEOUS at 11:55

## 2025-01-22 RX ADMIN — Medication 10 ML: at 09:17

## 2025-01-22 RX ADMIN — METOPROLOL TARTRATE 25 MG: 25 TABLET, FILM COATED ORAL at 09:16

## 2025-01-22 RX ADMIN — Medication 1 APPLICATION: at 09:18

## 2025-01-22 RX ADMIN — ASPIRIN 81 MG: 81 TABLET, COATED ORAL at 09:17

## 2025-01-22 RX ADMIN — APIXABAN 5 MG: 5 TABLET, FILM COATED ORAL at 09:16

## 2025-01-22 RX ADMIN — PREDNISONE 20 MG: 20 TABLET ORAL at 09:16

## 2025-01-22 RX ADMIN — FUROSEMIDE 40 MG: 40 TABLET ORAL at 09:16

## 2025-01-22 RX ADMIN — FLUOXETINE 40 MG: 20 CAPSULE ORAL at 09:16

## 2025-01-22 NOTE — PLAN OF CARE
Goal Outcome Evaluation:  Plan of Care Reviewed With: patient         Pt slept through the night with no complaints. Pt has possible discharge on today's date back home. Safety precautions in place and will continue to monitor.

## 2025-01-22 NOTE — DISCHARGE SUMMARY
[facility] Medicine Services  Discharge Summary    Date of Service: 2025  Patient Name: Jamin Hennessy  : 1955  MRN: [id number]    Date of Admission: 1/15/2025  Discharge Diagnosis: AMS (altered mental status)  Date of Discharge: 2025  Primary Care Physician: Jhonny Heller MD      Presenting Problem:   AMS (altered mental status) [R41.82]    Active and Resolved Hospital Problems:  Active Hospital Problems    Diagnosis POA    **AMS (altered mental status) [R41.82] Yes      Resolved Hospital Problems   No resolved problems to display.         Hospital Course     HPI:  History of Present Illness: Jamin Hennessy is a 69 y.o. male with a CMH of MS, paroxysmal A-fib, coronary artery disease, type 2 diabetes mellitus, status post MVR, HLD, hypertension, GERD who presented to [facility] on 1/15/2025 from [facility] with altered mental status.  Patient is currently a poor historian given acuity of condition.  No family at bedside.  Per reports from OSH, patient was confused, acting bizarre and having visual hallucinations.  No reports of fall or head injury. Of note, patient was admitted on 1/10/2025 to 2025 for sepsis in the setting of complicated UTI in the presence of ureteral stent.  Patient improved with IV Rocephin and was discharged home with cefdinir.  Per nursing reports, wife did not obtain antibiotics once discharged home.     On OSH evaluation, patient was noted to be borderline hypotensive with systolics in the 80-90s.  Labs are pertinent for WBC 7.5.  Lactic acid was 2.2.  UA with moderate leukocytes and 11-20 WBCs..  Respiratory panel was positive for COVID and flu.  CT head with no evidence of acute findings.  CT chest revealed moderate left-sided pleural effusion as well as consolidation in the left lower lobe and lingula.  CT abdomen pelvis also done with incidental findings of pneumoperitoneum in the left upper quadrant as well as moderate to large colonic  stool burden, right double-J ureteral stent with similar positioning experience. Patient was given 3 L IV fluids with good response of blood pressure.  Patient also given ampicillin as well as Tamiflu. Given recent admission, patient was transferred to [facility] admitted to hospital service for further evaluation management of sepsis, COVID and influenza infection.      Hospital Course:    Patient was admitted secondary to above.  Patient noted to have pneumoperitoneum on CT imaging.  This prompted general surgery evaluation.  We repeated CT imaging here there was no evidence of new pneumoperitoneum.  General surgery services signed off.    Chest imaging findings were consistent with moderate left pleural effusion left lower lobe as well as lingula consolidation.  Patient was noted to be COVID and flu positive.  Patient also noted to have acute hypoxemic respiratory failure.  This prompted pulmonary consultation.  Patient was started on supportive care.  No acute thoracentesis was recommended.  Patient was started on appropriate O2 via nasal cannula aerosols as well as IV antibiotics.  We also place consultation to infectious disease services given patient's history of chronic immunosuppression secondary to multiple sclerosis.  While patient was inpatient he was maintained on IV Unasyn.  At time of discharge she will be transition to oral doxycycline.    Patient was also noted to have an abnormal urinalysis with a recent UTI.  Patient was supposed to be on cefdinir 300 mg twice daily until January 16, 2025.  However the wife did not obtain this from the pharmacy.  Repeat urine culture was noted.  Urine culture however was negative.    Noted to have proximal atrial fibrillation.  This prompted cardiology consultation.  Medical management was initiated.  Recommendation was made for AARON cardioversion however given patient's flu positive as well as COVID-positive status they recommended he improve his pulmonary  status in the next several weeks and he can follow-up as an outpatient for cardioversion.    Pulmonary, cardiology, as well as infectious disease have now signed off.  They have recommended discharge home.  Wife is a 24/7 caregiver outpatient.  Patient was evaluated by therapy however at baseline he is essentially bedridden.  Given his progressive multiple sclerosis.    Below for final discharge medication recommendations.  Patient also noted to have a sacral decubitus ulcer.  This was present on admission.  Wound care was consulted.    Health services will follow-up at time of discharge as well.    Does have prior history of type 2 diabetes.  Education was conducted through hospital course.    Patient stable for discharge home with appropriate outpatient follow-up.  Primary care follow-up in the next 7 to 10 days.      DISCHARGE Follow Up Recommendations for labs and diagnostics:   PCP in the next 7 to 10 days.  BMP, CBC in the next 2 weeks.      Day of Discharge     Vital Signs:  Temp:  [96.9 °F (36.1 °C)-98 °F (36.7 °C)] 96.9 °F (36.1 °C)  Heart Rate:  [] 108  Resp:  [13-22] 16  BP: (124-155)/(78-96) 155/96  Flow (L/min) (Oxygen Therapy):  [2] 2    Physical Exam:  Physical Exam       Pertinent  and/or Most Recent Results     LAB RESULTS:      Lab 01/21/25  0434 01/20/25  0358 01/19/25  0016 01/18/25  0325 01/17/25  0158 01/16/25  0235 01/15/25  1953 01/15/25  1630   WBC 4.98 4.23 3.11* 3.22* 3.33* 5.74  --  6.36   HEMOGLOBIN 9.9* 9.6* 9.6* 9.7* 9.5* 10.7*  --  10.9*   HEMATOCRIT 33.4* 32.4* 32.6* 33.9* 33.0* 37.1*  --  37.0*   PLATELETS 158 145 156 171 152 172  --  186   NEUTROS ABS 3.48 3.00 2.20 2.16 2.56 4.06  --  5.21   IMMATURE GRANS (ABS) 0.10* 0.10* 0.04 0.02  --  0.04  --  0.05   LYMPHS ABS 0.69* 0.62* 0.46* 0.50*  --  0.38*  --  0.22*   MONOS ABS 0.60 0.49 0.40 0.54  --  1.17*  --  0.71   EOS ABS 0.10 0.01 0.00 0.00  --  0.06  --  0.13   MCV 86.1 86.4 87.4 89.2 88.9 89.0  --  86.7   PROCALCITONIN   --   --   --   --   --   --   --  0.16   LACTATE  --   --   --   --   --   --  1.5 2.7*         Lab 01/21/25 2004 01/21/25 0259 01/20/25 1625 01/20/25 0358 01/19/25 0016 01/18/25 0325 01/17/25  1057 01/17/25  0158 01/16/25  0235 01/15/25  1630   SODIUM  --  145  --  142 143 145  --  142 141 140   POTASSIUM 4.3 3.6 3.8 3.3* 4.0 3.0*   < > 3.6 4.1 3.9   CHLORIDE  --  111*  --  106 109* 113*  --  109* 105 102   CO2  --  23.6  --  26.3 21.8* 19.9*  --  23.6 24.0 26.7   ANION GAP  --  10.4  --  9.7 12.2 12.1  --  9.4 12.0 11.3   BUN  --  20  --  20 17 17  --  21 20 20   CREATININE  --  1.06  --  1.11 1.26 1.12  --  1.30* 1.19 1.32*   EGFR  --  76.0  --  71.9 61.7 71.1  --  59.5* 66.1 58.4*   GLUCOSE  --  152*  --  168* 223* 148*  --  211* 142* 177*   CALCIUM  --  7.9*  --  8.1* 8.1* 7.3*  --  8.0* 8.5* 8.6   MAGNESIUM  --   --   --   --  1.8 1.7  --  1.8 1.8 1.7    < > = values in this interval not displayed.         Lab 01/20/25 0358 01/18/25 0325 01/17/25  1057   TOTAL PROTEIN 6.4 5.8* 6.5   ALBUMIN 3.3* 2.9* 3.1*   GLOBULIN 3.1  --   --    ALT (SGPT) 27 28 33   AST (SGOT) 34 37 45*   BILIRUBIN 0.2 0.2 0.2   INDIRECT BILIRUBIN  --  0.1 0.1   BILIRUBIN DIRECT  --  0.1 0.1   ALK PHOS 53 49 53                     Brief Urine Lab Results  (Last result in the past 365 days)        Color   Clarity   Blood   Leuk Est   Nitrite   Protein   CREAT   Urine HCG        01/15/25 1954 Shania   Cloudy   Large (3+)   Moderate (2+)   Negative   100 mg/dL (2+)                 Microbiology Results (last 10 days)       Procedure Component Value - Date/Time    Respiratory Culture - Sputum, Cough [[id number]] Collected: 01/18/25 0933    Lab Status: Final result Specimen: Sputum from Cough Updated: 01/20/25 0940     Respiratory Culture Moderate growth (3+) Normal Respiratory Cheryl     Gram Stain Moderate (3+) WBCs per low power field      Rare (1+) Epithelial cells per low power field      Few (2+) Gram positive cocci    Urine Culture -  Urine, Indwelling Urethral Catheter [[id number]]  (Normal) Collected: 01/15/25 1954    Lab Status: Final result Specimen: Urine from Indwelling Urethral Catheter Updated: 01/17/25 1046     Urine Culture No growth    Legionella Antigen, Urine - Urine, Indwelling Urethral Catheter [[id number]]  (Normal) Collected: 01/15/25 1954    Lab Status: Final result Specimen: Urine from Indwelling Urethral Catheter Updated: 01/16/25 1738     LEGIONELLA ANTIGEN, URINE Negative    S. Pneumo Ag Urine or CSF - Urine, Indwelling Urethral Catheter [[id number]]  (Normal) Collected: 01/15/25 1954    Lab Status: Final result Specimen: Urine from Indwelling Urethral Catheter Updated: 01/16/25 1738     Strep Pneumo Ag Negative    MRSA Screen, PCR (Inpatient) - Swab, Nares [[id number]]  (Abnormal) Collected: 01/15/25 1935    Lab Status: Final result Specimen: Swab from Nares Updated: 01/15/25 2142     MRSA PCR MRSA Detected    Narrative:      The negative predictive value of this diagnostic test is high and should only be used to consider de-escalating anti-MRSA therapy. A positive result may indicate colonization with MRSA and must be correlated clinically.    Blood Culture - Blood, Arm, Left [[id number]]  (Normal) Collected: 01/15/25 1927    Lab Status: Final result Specimen: Blood from Arm, Left Updated: 01/20/25 2015     Blood Culture No growth at 5 days    Blood Culture - Blood, Arm, Right [[id number]]  (Normal) Collected: 01/15/25 1927    Lab Status: Final result Specimen: Blood from Arm, Right Updated: 01/20/25 2015     Blood Culture No growth at 5 days            CT Abdomen Pelvis Stone Protocol    Result Date: 1/10/2025  Impression: Impression: 1.There is an indwelling right ureteral stent which appears adequately positioned. Bilateral nonobstructive nephrolithiasis. No ureteral calculi are identified. 2.Left pleural effusion and left basilar atelectasis versus infiltrate, partially visualized. 3.Hepatic steatosis. 4.Postsurgical changes  of gastric bypass. 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 5.Additional findings as detailed above. Electronically Signed: Aristides Polanco MD  1/10/2025 8:58 AM EST  Workstation ID: [id number]     Results for orders placed during the hospital encounter of 02/27/20    Duplex Venous Lower Extremity - Bilateral CAR    Interpretation Summary  · Chronic left lower extremity superficial thrombophlebitis noted in the small saphenous.  · All other veins appeared normal bilaterally.      Results for orders placed during the hospital encounter of 02/27/20    Duplex Venous Lower Extremity - Bilateral CAR    Interpretation Summary  · Chronic left lower extremity superficial thrombophlebitis noted in the small saphenous.  · All other veins appeared normal bilaterally.      Results for orders placed during the hospital encounter of 09/24/24    Adult Transthoracic Echo Complete W/ Cont if Necessary Per Protocol    Interpretation Summary    Left ventricular systolic function is normal. Calculated left ventricular EF = 64.3% Left ventricular ejection fraction appears to be 61 - 65%.    Left ventricular wall thickness is consistent with mild concentric hypertrophy.    The left atrial cavity is moderately dilated.    There is a bioprosthetic mitral valve present.    Estimated right ventricular systolic pressure from tricuspid regurgitation is normal (<35 mmHg).      Labs Pending at Discharge:  Pending Results       Procedure [Order ID] Specimen - Date/Time    Adult Transesophageal Echo (AARON) W/ Cont if Necessary Per Protocol [[id number]]     Cardioversion External in Cardiology Department [[id number]] Resulted: 01/21/25 1544     Updated: 01/21/25 1544            Procedures Performed    01/21 1500 Walking Oximetry      Consults:   Consults       Date and Time Order Name Status Description    1/19/2025 12:25 PM Inpatient Cardiology Consult      1/15/2025  6:08 PM Inpatient General Surgery Consult Completed      1/15/2025  6:06 PM Inpatient Infectious Diseases Consult Completed     1/15/2025  6:01 PM Inpatient Pulmonology Consult Completed     1/10/2025  1:46 AM Inpatient Urology Consult Completed     12/20/2024 10:03 AM Inpatient Infectious Diseases Consult Completed     12/17/2024 10:11 AM Inpatient Urology Consult Completed               Discharge Details        Discharge Medications        New Medications        Instructions Start Date   budesonide-formoterol 160-4.5 MCG/ACT inhaler  Commonly known as: SYMBICORT   2 puffs, Inhalation, 2 Times Daily - RT      hydrocortisone-bacitracin-zinc oxide-nystatin  Commonly known as: MAGIC BARRIER   1 Application, Topical, 2 Times Daily      predniSONE 20 MG tablet  Commonly known as: DELTASONE   20 mg, Oral, Daily With Breakfast   Start Date: January 23, 2025            Changes to Medications        Instructions Start Date   metoprolol tartrate 25 MG tablet  Commonly known as: LOPRESSOR  What changed:   medication strength  how much to take  when to take this   25 mg, Oral, Every 12 Hours Scheduled             Continue These Medications        Instructions Start Date   acetaminophen 325 MG tablet  Commonly known as: TYLENOL   650 mg, Every 8 Hours PRN      acetic acid 0.25 % irrigation   15 mL, Daily      amiodarone 200 MG tablet  Commonly known as: PACERONE   200 mg, Daily      apixaban 5 MG tablet tablet  Commonly known as: ELIQUIS   5 mg, Oral, Every 12 Hours Scheduled      aspirin 81 MG EC tablet   81 mg, Daily      atorvastatin 40 MG tablet  Commonly known as: LIPITOR   40 mg, Nightly      carboxymethylcellulose 0.5 % solution  Commonly known as: REFRESH PLUS   1 drop, 4 Times Daily      cholecalciferol 25 MCG (1000 UT) tablet  Commonly known as: VITAMIN D3   1,000 Units, Daily      cyclobenzaprine 5 MG tablet  Commonly known as: FLEXERIL   5 mg, Oral, 2 Times Daily PRN      ferrous sulfate 325 (65 FE) MG tablet   325 mg, 3 Times Weekly      finasteride 5 MG tablet  Commonly  known as: PROSCAR   5 mg, Daily      FLUoxetine 20 MG capsule  Commonly known as: PROzac   40 mg, Daily      furosemide 40 MG tablet  Commonly known as: LASIX   40 mg, Daily      glatiramer acetate 20 MG/ML injection  Commonly known as: GLATOPA   20 mg, Daily      glipizide 5 MG tablet  Commonly known as: Glucotrol   5 mg, Oral, Daily      guaiFENesin 600 MG 12 hr tablet  Commonly known as: MUCINEX   1,200 mg, 2 Times Daily      levothyroxine 100 MCG tablet  Commonly known as: SYNTHROID, LEVOTHROID   100 mcg, Daily      loperamide 2 MG capsule  Commonly known as: IMODIUM   2 mg, 2 Times Daily PRN      midodrine 5 MG tablet  Commonly known as: PROAMATINE   5 mg, 3 Times Daily Before Meals      mirtazapine 15 MG tablet  Commonly known as: REMERON   7.5 mg, Nightly      multivitamin with minerals tablet tablet   1 tablet, Daily      omeprazole 20 MG capsule  Commonly known as: priLOSEC   20 mg, Daily      ondansetron 8 MG tablet  Commonly known as: ZOFRAN   8 mg, Every 8 Hours PRN      promethazine 25 MG tablet  Commonly known as: PHENERGAN   12.5 mg, Every 6 Hours PRN      risperiDONE 0.25 MG tablet dispersible disintegrating tablet  Commonly known as: risperDAL M-TABS   0.25 mg, Nightly      sodium chloride 0.9 % irrigation  Commonly known as: NS   Daily      tamsulosin 0.4 MG capsule 24 hr capsule  Commonly known as: FLOMAX   0.8 mg, Oral, Nightly      vitamin B-12 1000 MCG tablet  Commonly known as: CYANOCOBALAMIN   1,000 mcg, Daily             Stop These Medications      cefdinir 300 MG capsule  Commonly known as: OMNICEF              No Known Allergies      Discharge Disposition:   Home or Self Care    Diet:  Hospital:  Diet Order   Procedures    Diet: Cardiac, Diabetic; Healthy Heart (2-3 Na+); Consistent Carbohydrate; Fluid Consistency: Thin (IDDSI 0)         Discharge Activity:         CODE STATUS:  Code Status and Medical Interventions: No CPR (Do Not Attempt to Resuscitate); Limited Support; No intubation  (DNI)   Ordered at: 01/15/25 1600     Medical Intervention Limits:    No intubation (DNI)     Code Status (Patient has no pulse and is not breathing):    No CPR (Do Not Attempt to Resuscitate)     Medical Interventions (Patient has pulse or is breathing):    Limited Support         No future appointments.        Time spent on Discharge including face to face service: 45 minutes    Signature: Electronically signed by Roosevelt Napoles MD, 01/22/25, 13:02 EST.  [facility], [place], [facility]

## 2025-01-22 NOTE — DISCHARGE INSTR - OTHER ORDERS
Dr Migdalia Beth/Cardiologist is requesting followup appt. In 3-4 weeks  Please call [phone].    Follow up with Dr Enamorado/Pulmonary in 4 weeks  [phone]

## 2025-01-22 NOTE — CASE MANAGEMENT/SOCIAL WORK
Continued Stay Note   [facility]     Patient Name: Jamin Hennessy  MRN: [id number]  Today's Date: 1/22/2025    Admit Date: 1/15/2025    Plan: D/C Plan: Home with [facility]  Spoke to spouse about scheduling appt with PCP and Cardiology.  [facility] stated [facility] will have to schedule, because they also arrange transport for him.  Needs stretcher ambulance/bed bound.  Placed instructions on AVS to call Cardiologist for a followup appt. in 3-4 weeks.  PCP is followup is on AVS   Discharge Plan       Row Name 01/22/25 1104       Plan    Plan D/C Plan: Home with [facility]  Spoke to spouse about scheduling appt with PCP and Cardiology.  She stated [facility] will have to schedule, because they also arrange transport for him.  Needs stretcher ambulance/bed bound.  Placed instructions on AVS to call Cardiologist for a followup appt. in 3-4 weeks.  PCP is followup is on AVS               Expected Discharge Date and Time       Expected Discharge Date Expected Discharge Time    Jan 22, 2025               Audrey Boyce RN  RN/.  Office Ph. [phone]  Cell Ph.  [phone]

## 2025-01-22 NOTE — OUTREACH NOTE
Prep Survey      Flowsheet Row Responses   Alevism facility patient discharged from? [facility]   Is LACE score < 7 ? No   Eligibility Readm Mgmt   Discharge diagnosis AMS (altered mental status)   Does the patient have one of the following disease processes/diagnoses(primary or secondary)? Other   Does the patient have Home health ordered? Yes   What is the Home health agency?  [facility]    Is there a DME ordered? No   Prep survey completed? Yes            Adelaida TELLEZ - Registered Nurse

## 2025-01-22 NOTE — PROGRESS NOTES
Daily Progress Note          Assessment    Small Left pleural effusion  hypoxemia  UTI status post right ureteral stent  Pneumoperitoneum  Paroxysmal atrial fibrillation  CAD  Status post mitral valve replacement  Diabetes mellitus  Multiple sclerosis  Hypothyroidism  Chronic sacral ulcer  BPH  GERD  Anemia     PFTs 1/18/2020: Restrictive pattern with decreased oxygen diffusion capacity: FEV1/FVC 74. FEV1 is 1.42 which is 46% predicted FVC is 1.91 which is 43% predicted. There is 24% improvement in FEV1 value after bronchodilator challenge. MVV is 26% predicted. Diffusion capacity is 46%      Recommendations:         continue symbicort and  prednisone    Incentive spirometry     Oxygen supplement and titration to maintain saturation 90 to 95%: Currently on 1 L per nasal cannula     Antibiotics: As per ID completed     Glucose control     Chronic anticoagulation: Eliquis     HR control: On amiodarone, cardiology postponed cardioversion until the infection is controlled        I personally reviewed the radiological studies             LOS: 7 days     Subjective     Cough and shortness of breath    Objective     Vital signs for last 24 hours:  Vitals:    01/22/25 0700 01/22/25 0734 01/22/25 0800 01/22/25 0900   BP:  155/96     BP Location:  Left arm     Patient Position:  Lying     Pulse: 77 112 (!) 123 108   Resp:  16     Temp:  96.9 °F (36.1 °C)     TempSrc:  Axillary     SpO2: 97% 93% 95% 91%   Weight:       Height:           Intake/Output last 3 shifts:  I/O last 3 completed shifts:  In: 240 [P.O.:240]  Out: 6200 [Urine:6200]  Intake/Output this shift:  No intake/output data recorded.      Radiology  Imaging Results (Last 24 Hours)       ** No results found for the last 24 hours. **            Labs:  Results from last 7 days   Lab Units 01/21/25  0434   WBC 10*3/mm3 4.98   HEMOGLOBIN g/dL 9.9*   HEMATOCRIT % 33.4*   PLATELETS 10*3/mm3 158     Results from last 7 days   Lab Units 01/21/25 2004 01/21/25  0259  01/20/25  1625 01/20/25  0358   SODIUM mmol/L  --  145  --  142   POTASSIUM mmol/L 4.3 3.6   < > 3.3*   CHLORIDE mmol/L  --  111*  --  106   CO2 mmol/L  --  23.6  --  26.3   BUN mg/dL  --  20  --  20   CREATININE mg/dL  --  1.06  --  1.11   CALCIUM mg/dL  --  7.9*  --  8.1*   BILIRUBIN mg/dL  --   --   --  0.2   ALK PHOS U/L  --   --   --  53   ALT (SGPT) U/L  --   --   --  27   AST (SGOT) U/L  --   --   --  34   GLUCOSE mg/dL  --  152*  --  168*    < > = values in this interval not displayed.         Results from last 7 days   Lab Units 01/20/25  0358 01/18/25  0325 01/17/25  1057   ALBUMIN g/dL 3.3* 2.9* 3.1*             Results from last 7 days   Lab Units 01/19/25  0016   MAGNESIUM mg/dL 1.8                   Meds:   SCHEDULE  amiodarone, 200 mg, Oral, Daily  apixaban, 5 mg, Oral, Q12H  aspirin, 81 mg, Oral, Daily  atorvastatin, 40 mg, Oral, Nightly  budesonide-formoterol, 2 puff, Inhalation, BID - RT  finasteride, 5 mg, Oral, Daily  FLUoxetine, 40 mg, Oral, Daily  furosemide, 40 mg, Oral, Daily  hydrocortisone-bacitracin-zinc oxide-nystatin, 1 Application, Topical, BID  insulin lispro, 2-7 Units, Subcutaneous, 4x Daily With Meals & Nightly  levothyroxine, 100 mcg, Oral, Q AM  metoprolol tartrate, 25 mg, Oral, Q12H  miconazole, 1 Application, Topical, Q12H  mirtazapine, 7.5 mg, Oral, Nightly  multivitamin with minerals, 1 tablet, Oral, Daily  pantoprazole, 40 mg, Oral, QAM AC  predniSONE, 20 mg, Oral, Daily With Breakfast  risperiDONE, 0.25 mg, Oral, Nightly  sodium chloride, 10 mL, Intravenous, Q12H  tamsulosin, 0.8 mg, Oral, Nightly  vitamin B-12, 1,000 mcg, Oral, Daily      Infusions  Pharmacy to dose vancomycin,       PRNs    acetaminophen **OR** acetaminophen **OR** acetaminophen    acetaminophen    senna-docusate sodium **AND** polyethylene glycol **AND** bisacodyl **AND** bisacodyl    Calcium Replacement - Follow Nurse / BPA Driven Protocol    dextrose    dextrose    glucagon (human recombinant)     Magnesium Standard Dose Replacement - Follow Nurse / BPA Driven Protocol    melatonin    ondansetron ODT **OR** ondansetron    Pharmacy to dose vancomycin    Phosphorus Replacement - Follow Nurse / BPA Driven Protocol    Potassium Replacement - Follow Nurse / BPA Driven Protocol    sodium chloride    sodium chloride    Physical Exam:  General Appearance:  Alert   HEENT:  Normocephalic, without obvious abnormality, Conjunctiva/corneas clear,.   Nares normal, no drainage     Neck:  Supple, symmetrical, trachea midline.   Lungs /Chest wall:   Mild wheezing and mild bilateral basal rhonchi, respirations unlabored, symmetrical wall movement.     Heart: Tachycardic, S1 S2 normal  Abdomen: Soft, non-tender, no masses, no organomegaly.    Extremities: + Bilateral lower extremity edema, no clubbing or cyanosis     ROS  Constitutional: Negative for chills, fever and malaise/fatigue.   HENT: Negative.    Eyes: Negative.    Cardiovascular: Negative.    Respiratory: Positive for cough and shortness of breath.    Skin: Negative.    Musculoskeletal: Negative.    Gastrointestinal: Negative.    Genitourinary: Negative.    Neurological: Chronic weakness in lower extremities      I reviewed the recent clinical results  I personally reviewed the latest radiological studies    Part of this note may be an electronic transcription/translation of spoken language to printed text using the Dragon Dictation System.

## 2025-01-22 NOTE — PLAN OF CARE
Goal Outcome Evaluation:         Patient alert, disoriented to date. Plan to dc home today via EMS.

## 2025-01-22 NOTE — PROGRESS NOTES
Cardiology Progress Note      PATIENT IDENTIFICATION    Name: Jamin Hennessy  Age: 69 y.o. Sex: male : 1955  MRN: [id number]    Requesting Provider    Roosevelt Napoles MD     LOS: 6 days       Reason For Followup:  Atrial fibrillation      Subjective:    Interval History:  Seen examined.  Chart and labs reviewed.  Patient was brought down for AARON cardioversion.  Patient remained in atrial flutter however his rate was very well-controlled with rates in the 60s and 70s.  Given the high risk of aerosolization during a AARON and the stability of the patient's rhythm, the decision was made to cancel the patient's procedure today and can reconsider when patient has recovered from a respiratory standpoint.    Review of Systems:  A complete review of systems was undertaken with the pertinent cardiovascular findings listed in history of present illness and all other systems being negative.     Assessment & Plan    Impressions:  Paroxysmal atrial fibrillation  Left lower lobe pneumonia  COVID infection  Acute respiratory failure-improving  History of mitral valve endocarditis status post bioprosthetic mitral valve replacement  Coronary artery disease with prior PCI and stenting    Recommendations:  Given the high risk of aerosolization during a AARON and the stability of the patient's rhythm, the decision was made to cancel the patient's procedure today and can reconsider when patient has recovered from a respiratory standpoint.  Continue with negative chronotropic agents as pressure tolerates  Further recommendations as patient's course progresses        Objective:    Medication Review:   Scheduled Meds:amiodarone, 200 mg, Oral, Daily  apixaban, 5 mg, Oral, Q12H  aspirin, 81 mg, Oral, Daily  atorvastatin, 40 mg, Oral, Nightly  budesonide-formoterol, 2 puff, Inhalation, BID - RT  cefTAZidime, 2,000 mg, Intravenous, Q8H  finasteride, 5 mg, Oral, Daily  FLUoxetine, 40 mg, Oral, Daily  furosemide, 40 mg, Oral,  Daily  hydrocortisone-bacitracin-zinc oxide-nystatin, 1 Application, Topical, BID  insulin lispro, 2-7 Units, Subcutaneous, 4x Daily With Meals & Nightly  levothyroxine, 100 mcg, Oral, Q AM  metoprolol tartrate, 25 mg, Oral, Q12H  miconazole, 1 Application, Topical, Q12H  mirtazapine, 7.5 mg, Oral, Nightly  multivitamin with minerals, 1 tablet, Oral, Daily  pantoprazole, 40 mg, Oral, QAM AC  predniSONE, 20 mg, Oral, Daily With Breakfast  risperiDONE, 0.25 mg, Oral, Nightly  sodium chloride, 10 mL, Intravenous, Q12H  tamsulosin, 0.8 mg, Oral, Nightly  vitamin B-12, 1,000 mcg, Oral, Daily      Continuous Infusions:Pharmacy to dose vancomycin,       PRN Meds:.  acetaminophen **OR** acetaminophen **OR** acetaminophen    acetaminophen    senna-docusate sodium **AND** polyethylene glycol **AND** bisacodyl **AND** bisacodyl    Calcium Replacement - Follow Nurse / BPA Driven Protocol    dextrose    dextrose    glucagon (human recombinant)    Magnesium Standard Dose Replacement - Follow Nurse / BPA Driven Protocol    melatonin    ondansetron ODT **OR** ondansetron    Pharmacy to dose vancomycin    Phosphorus Replacement - Follow Nurse / BPA Driven Protocol    Potassium Replacement - Follow Nurse / BPA Driven Protocol    sodium chloride    sodium chloride      AMS (altered mental status)         Physical Exam:    General: Alert, cooperative, no distress, appears stated age.  Ill-appearing  Head:  Normocephalic, atraumatic, mucous membranes moist  Eyes:  Conjunctivae/corneas clear, EOMs intact     Neck:  Supple,  no bruit  Lungs:  Coarse and diminished bilaterally  Chest wall: No tenderness  Heart::  Regular rate and rhythm, S1 and S2 normal, 1/6 holosystolic murmur.  No rub or gallop  Abdomen: Soft, nontender, nondistended, bowel sounds active.  Obese  Extremities: No cyanosis, clubbing, or edema  Pulses: 2+ and symmetric all extremities  Skin:  No rashes or lesions  Neuro/psych: A&O x3. CN II through XII are grossly intact  with appropriate affect    Vital Signs:  Vitals:    01/21/25 1627 01/21/25 1700 01/21/25 1800 01/21/25 1900   BP: 124/78      BP Location: Left arm      Patient Position: Lying      Pulse: 65 64 65 64   Resp: 16      Temp: 97.6 °F (36.4 °C)      TempSrc: Axillary      SpO2: 94% 92% 93% 95%   Weight:       Height:         Wt Readings from Last 1 Encounters:   01/15/25 114 kg (251 lb 5.2 oz)       Intake/Output Summary (Last 24 hours) at 1/21/2025 1924  Last data filed at 1/21/2025 1400  Gross per 24 hour   Intake 120 ml   Output 4600 ml   Net -4480 ml         Results Review:     CBC    Results from last 7 days   Lab Units 01/21/25  0434 01/20/25  0358 01/19/25  0016 01/18/25  0325 01/17/25  0158 01/16/25  0235 01/15/25  1630   WBC 10*3/mm3 4.98 4.23 3.11* 3.22* 3.33* 5.74 6.36   HEMOGLOBIN g/dL 9.9* 9.6* 9.6* 9.7* 9.5* 10.7* 10.9*   PLATELETS 10*3/mm3 158 145 156 171 152 172 186     Cr Clearance Estimated Creatinine Clearance: 83.2 mL/min (by C-G formula based on SCr of 1.06 mg/dL).  Coag     HbA1C   Lab Results   Component Value Date    HGBA1C 9.45 (H) 12/19/2024    HGBA1C 6.6 (H) 02/27/2020    HGBA1C 5.7 (H) 01/15/2020     Blood Glucose   Glucose   Date/Time Value Ref Range Status   01/21/2025 1629 211 (H) 70 - 105 mg/dL Final     Comment:     Serial Number: [id number]:  [id number]   01/21/2025 1135 171 (H) 70 - 105 mg/dL Final     Comment:     Serial Number: [id number]:  [id number]   01/21/2025 0629 156 (H) 70 - 105 mg/dL Final     Comment:     Serial Number: [id number]:  [id number]   01/20/2025 2032 285 (H) 70 - 105 mg/dL Final     Comment:     Serial Number: [id number]:  [id number]   01/20/2025 1818 151 (H) 70 - 105 mg/dL Final     Comment:     Serial Number: [id number]:  [id number]   01/20/2025 1309 171 (H) 70 - 105 mg/dL Final     Comment:     Serial Number: [id number]:  [id number]   01/20/2025 0750 166 (H) 70 - 105 mg/dL Final     Comment:     Serial Number:  [id number]:  [id number]   01/19/2025 2044 301 (H) 70 - 105 mg/dL Final     Comment:     Serial Number: [id number]:  [id number]     Infection   Results from last 7 days   Lab Units 01/18/25  0933 01/15/25  1954 01/15/25  1927 01/15/25  1630   BLOODCX   --   --  No growth at 5 days  No growth at 5 days  --    RESPCX  Moderate growth (3+) Normal Respiratory Cheryl  --   --   --    URINECX   --  No growth  --   --    PROCALCITONIN ng/mL  --   --   --  0.16     CMP   Results from last 7 days   Lab Units 01/21/25  0259 01/20/25  1625 01/20/25  0358 01/19/25  0016 01/18/25  0325 01/17/25  1057 01/17/25  0158 01/16/25  0235 01/15/25  1630   SODIUM mmol/L 145  --  142 143 145  --  142 141 140   POTASSIUM mmol/L 3.6 3.8 3.3* 4.0 3.0* 3.7 3.6 4.1 3.9   CHLORIDE mmol/L 111*  --  106 109* 113*  --  109* 105 102   CO2 mmol/L 23.6  --  26.3 21.8* 19.9*  --  23.6 24.0 26.7   BUN mg/dL 20  --  20 17 17  --  21 20 20   CREATININE mg/dL 1.06  --  1.11 1.26 1.12  --  1.30* 1.19 1.32*   GLUCOSE mg/dL 152*  --  168* 223* 148*  --  211* 142* 177*   ALBUMIN g/dL  --   --  3.3*  --  2.9* 3.1*  --   --   --    BILIRUBIN mg/dL  --   --  0.2  --  0.2 0.2  --   --   --    ALK PHOS U/L  --   --  53  --  49 53  --   --   --    AST (SGOT) U/L  --   --  34  --  37 45*  --   --   --    ALT (SGPT) U/L  --   --  27  --  28 33  --   --   --      ABG      UA    Results from last 7 days   Lab Units 01/15/25  1954   NITRITE UA  Negative   WBC UA /HPF Too Numerous to Count*   BACTERIA UA /HPF Unable to determine due to loaded field*   SQUAM EPITHEL UA /HPF None Seen   URINECX  No growth     TERESA  No results found for: \"POCMETH\", \"POCAMPHET\", \"POCBARBITUR\", \"POCBENZO\", \"POCCOCAINE\", \"POCOPIATES\", \"POCOXYCODO\", \"POCPHENCYC\", \"POCPROPOXY\", \"POCTHC\", \"POCTRICYC\"  Lysis Labs   Results from last 7 days   Lab Units 01/21/25  0434 01/21/25  0259 01/20/25  0358 01/19/25  0016 01/18/25  0325 01/17/25  0158 01/16/25  0235 01/15/25  1630   HEMOGLOBIN g/dL " 9.9*  --  9.6* 9.6* 9.7* 9.5* 10.7* 10.9*   PLATELETS 10*3/mm3 158  --  145 156 171 152 172 186   CREATININE mg/dL  --  1.06 1.11 1.26 1.12 1.30* 1.19 1.32*     Radiology(recent) No radiology results for the last day            Imaging Results (Last 24 Hours)       ** No results found for the last 24 hours. **            Cardiac Studies:  Echo- Results for orders placed during the hospital encounter of 09/24/24    Adult Transthoracic Echo Complete W/ Cont if Necessary Per Protocol    Interpretation Summary    Left ventricular systolic function is normal. Calculated left ventricular EF = 64.3% Left ventricular ejection fraction appears to be 61 - 65%.    Left ventricular wall thickness is consistent with mild concentric hypertrophy.    The left atrial cavity is moderately dilated.    There is a bioprosthetic mitral valve present.    Estimated right ventricular systolic pressure from tricuspid regurgitation is normal (<35 mmHg).    Stress Myoview-  Cath-        Sarmad Littlejohn DO  01/21/25  19:24 EST    Copied information has been reviewed and is current as of 01/21/25

## 2025-01-22 NOTE — CASE MANAGEMENT/SOCIAL WORK
"Physicians Statement of Medical Necessity for  Ambulance Transportation    GENERAL INFORMATION     Name: Jamin Hennessy  YOB: 1955  Medicare #: [id number]  Transport Date: 1/22/25 (Valid for round trips this date, or for scheduled repetitive trips for 60 days from the date signed below.)  Origin: [facility]  Destination: [address] [place] [address]  [place] [address]  Is the Patient's stay covered under Medicare Part A (PPS/DRG?)Yes  Closest appropriate facility? Yes  If this a hosp-hosp transfer? No  Is this a hospice patient? No    MEDICAL NECESSITY QUESTIONAIRE    Ambulance Transportation is medically necessary only if other means of transportation are contraindicated or would be potentially harmful to the patient.  To meet this requirement, the patient must be either \"bed confined\" or suffer from a condition such that transport by means other than an ambulance is contraindicated by the patient's condition.  The following questions must be answered by the healthcare professional signing below for this form to be valid:     1) Describe the MEDICAL CONDITION (physical and/or mental) of this patient AT THE TIME OF AMBULANCE TRANSPORT that requires the patient to be transported in an ambulance, and why transport by other means is contraindicated by the patient's condition: Bed bound; George lift for tranfers.  Unable to sit unsupported.   Past Medical History:   Diagnosis Date    A-fib     CAD (coronary artery disease)     Elevated cholesterol     Hypertension     MI (myocardial infarction)     Non-insulin dependent type 2 diabetes mellitus       Past Surgical History:   Procedure Laterality Date    BRONCHOSCOPY N/A 3/4/2020    Procedure: BRONCHOSCOPY with bronchioalveolar lavage;  Surgeon: Melissa Cole MD;  Location: [facility] ENDOSCOPY;  Service: Pulmonary;  Laterality: N/A;   pneumonia    CARDIAC CATHETERIZATION      CARDIAC CATHETERIZATION N/A 1/20/2020    Procedure: Left Heart Cath;  Surgeon: " "Migdalia Beth MD;  Location: [facility] CATH INVASIVE LOCATION;  Service: Cardiovascular    CARDIAC CATHETERIZATION N/A 1/20/2020    Procedure: Left ventriculography;  Surgeon: Migdalia Beth MD;  Location: [facility] CATH INVASIVE LOCATION;  Service: Cardiovascular    CARDIAC CATHETERIZATION N/A 1/20/2020    Procedure: Coronary angiography;  Surgeon: Migdalia Beth MD;  Location: [facility] CATH INVASIVE LOCATION;  Service: Cardiovascular    CORONARY ANGIOPLASTY WITH STENT PLACEMENT      CYSTOSCOPY, URETEROSCOPY, RETROGRADE PYELOGRAM, STENT INSERTION Right 12/17/2024    Procedure: CYSTOSCOPY URETEROSCOPY RETROGRADE PYELOGRAM HOLMIUM LASER STENT INSERTION;  Surgeon: Jamin Estrella MD;  Location: [facility] MAIN OR;  Service: Urology;  Laterality: Right;    MITRAL VALVE REPAIR/REPLACEMENT N/A 1/22/2020    Procedure: MITRAL VALVE REPAIR/REPLACEMENT;  Surgeon: Fallon Cole MD;  Location: [facility] CVOR;  Service: Cardiothoracic;  Laterality: N/A;  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  3) Can this patient safely be transported by car or wheelchair van (I.e., may safely sit during transport, without an attendant or monitoring?)No   4. In addition to completing questions 1-3 above, please check any of the following conditions that apply*:          *Note: supporting documentation for any boxes checked must be maintained in the patient's medical records Moderate/severe pain on movement, Special handling/isolation/infection control precautions required, Unable to tolerate seated position for time needed to transport, and Unable to sit in a chair or wheelchair due to decubitus ulcers or other " wounds      SIGNATURE OF PHYSICIAN OR OTHER AUTHORIZED HEALTHCARE PROFESSIONAL    I certify that the above information is true and correct based on my evaluation of this patient, and represent that the patient requires transport by ambulance and that other forms of transport are contraindicated.  I understand that this information will be used by the Centers for Medicare and Medicaid Services (CMS) to support the determiniation of medical necessity for ambulance services, and I represent that I have personal knowledge of the patient's condition at the time of transport.    DS   If this box is checked, I also certify that the patient is physically or mentally incapable of signing the ambulance service's claim form and that the institution with which I am affiliated has furnished care, services or assistance to the patient.  My signature below is made on behalf of the patient pursuant to 42 .36(b)(4). In accordance with 42 .37, the specific reason(s) that the patient is physically or mentally incapable of signing the claim for is as follows:     Signature of Physician or Healthcare Professional   Audrey Boyce RN Date/Time:   1/22/25  (5417)     (For Scheduled repetitive transport, this form is not valid for transports performed more than 60 days after this date).                                                                                                                                            --------------------------------------------------------------------------------------------  Printed Name and Credentials of Physician or Authorized Healthcare Professional     *Form must be signed by patient's attending physician for scheduled, repetitive transports,.  For non-repetitive ambulance transports, if unable to obtain the signature of the attending physician, any of the following may sign (please select below):     Physician  Clinical Nurse Specialist x Registered Nurse     Physician Assistant   Discharge Planner  Licensed Practical Nurse     Nurse Practitioner x

## 2025-01-23 NOTE — PROGRESS NOTES
"Enter Query Response Below      Query Response: - Bacterial pneumonia unspecified              If applicable, please update the problem list.     Patient: Jamin Hennessy        : 1955  Account: [id number]           Admit Date: 1/15/2025        How to Respond to this query:       a. Click New Note     b. Answer query within the yellow box.                c. Update the Problem List, if applicable.      If you have any questions about this query contact me at: [email or web address]     Dr. Napoles,    Patient with history of type 2 diabetes, CKD, and multiple sclerosis presented 1/15 in transfer from outside facility for altered mental status. Notes include sepsis, UTI, COVID-19, influenza infection, MRSA PCR positive, and pneumonia. H&P notes \"patient was admitted on 1/10/2025 to 2025 for sepsis in the setting of complicated  UTI in the presence of ureteral stent. Patient improved with IV Rocephin and was discharged home with cefdinir.\" Infectious disease consult and progress notes include \"Left lower lobe infiltrate concerning for bacterial pneumonia,\" and \"Imaging studies not consistent with COVID-pneumonia The patient was also diagnosed with influenza based on transfer note.\" Patient treated with monitoring, supplemental oxygen, Remdesivir, Tamiflu, IV cefepime 1/15, IV vancomycin 1/15 - , and IV ceftazidime  - .     Please clarify the type of pneumonia the patient was treated/monitored for:     - Gram-negative pneumonia (excluding Haemophilus influenzae)  - Bacterial pneumonia unspecified  - MRSA pneumonia  - Influenza pneumonia  - Combination of above listed conditions (please specify) ___________________  - Other ___________________  - Unable to clinically determine    By submitting this query, we are merely seeking further clarification of documentation to accurately reflect all conditions that you are monitoring, evaluating, treating or that extend the hospitalization or utilize " additional resources of care. Please utilize your independent clinical judgment when addressing the question(s) above.     This query and your response, once completed, will be entered into the legal medical record.    Sincerely,  Octaviano Bender RN, CDS  Clinical Documentation Integrity Program

## 2025-01-23 NOTE — PROGRESS NOTES
"Enter Query Response Below      Query Response: Acute hypoxic respiratory failure was supported with additional clinical indicator              If applicable, please update the problem list.     Patient: Jamin Hennessy        : 1955  Account: [id number]           Admit Date: 1/15/2025        How to Respond to this query:       a. Click New Note     b. Answer query within the yellow box.                c. Update the Problem List, if applicable.      If you have any questions about this query contact me at: [email or web address]     Dr. Napoles,    Patient presented 1/15 in transfer from outside facility for altered mental status. Notes include sepsis, COVID-19, influenza infection, MRSA PCR positive, and pneumonia. H&P notes \"nonlabored  respiration,\" and acute hypoxic respiratory failure. Pulmonary consult on  notes hypoxemia, and infectious disease consult from same date includes \"Pulmonary effort is normal. No respiratory distress.\" Hospitalist progress notes and discharge summary also include acute hypoxic respiratory failure. Patient treated with monitoring, supplemental oxygen, PO prednisone, Remdesivir, Tamiflu, and IV antibiotics.     ABG was not assessed.    Nursing flowsheet documentation includes:  1/15: SpO2 92 - 98% on room air - 2 L/min; RR 27  : SpO2 94 - 99% on 2 - 4 L/min  : SpO2 92 - 100% on 2 L/min  : SpO2 98 - 100% on 2 L/min; RR 22  : SpO2 88 - 100% on 1 - 2 L/min (one reading of SpO2 71% on 2 L/min)  : SpO2 91 - 100% on room air - 1 L/min; RR 23    After study, was acute hypoxic failure clinically supported during this admission?     - Acute hypoxic respiratory failure was supported with additional clinical indicators ___________________  - Acute hypoxic respiratory failure was not supported  - Other ___________________  - Unable to clinically determine      By submitting this query, we are merely seeking further clarification of documentation to accurately " reflect all conditions that you are monitoring, evaluating, treating or that extend the hospitalization or utilize additional resources of care. Please utilize your independent clinical judgment when addressing the question(s) above.     This query and your response, once completed, will be entered into the legal medical record.    Sincerely,  Octaviano Bender RN, CDS  Clinical Documentation Integrity Program

## 2025-01-23 NOTE — CASE MANAGEMENT/SOCIAL WORK
Case Management Discharge Note      Final Note: Home              Home Medical Care Coordination complete.      Service Provider Services Address Phone Fax Patient Preferred    [facility] [address] [facility] [place] [address] [phone] --                   Transportation Services  Ambulance: [facility]    Final Discharge Disposition Code: 06 - home with home health care

## 2025-01-24 NOTE — PROGRESS NOTES
"Enter Query Response Below      Query Response: - Unable to clinically determine             If applicable, please update the problem list.     Patient: Jamin Hennessy        : 1955  Account: [id number]           Admit Date: 1/15/2025        How to Respond to this query:       a. Click New Note     b. Answer query within the yellow box.                c. Update the Problem List, if applicable.      If you have any questions about this query contact me at: [email or web address]     Fay Bradford APRN,    Patient presented 1/15 in transfer from outside facility for altered mental status. Notes include sepsis, UTI, COVID-19, influenza infection, MRSA PCR positive, and pneumonia. H&P notes \"Per reports from OSH, patient  was confused, acting bizarre and having visual hallucinations,\" and \"Alert and oriented x3.\" Infectious disease consult on  notes confusion, toxic metabolic encephalopathy, and \"Discontinue IV cefepime concerning for cefepime worsening encephalopathy.\" Pulmonary consult on  notes \"Neurological: Negative. Psychiatric/Behavioral: Negative.\" Patient treated with monitoring, supplemental oxygen, Remdesivir, Tamiflu, IV cefepime 1/15, IV vancomycin 1/15 - , and IV ceftazidime  - .     Nursing flowsheet documentation notes patient cognitively WDL/A&O x4 and GCS 14 - 15 for duration of stay.    After study, was toxic metabolic encephalopathy clinically supported during this admission?    - Toxic metabolic encephalopathy was supported with additional clinical indicators ___________________  - Toxic metabolic encephalopathy was not supported  - Other ___________________  - Unable to clinically determine    By submitting this query, we are merely seeking further clarification of documentation to accurately reflect all conditions that you are monitoring, evaluating, treating or that extend the hospitalization or utilize additional resources of care. Please utilize your " independent clinical judgment when addressing the question(s) above.     This query and your response, once completed, will be entered into the legal medical record.    Sincerely,  Octaviano Bender RN, CDS  Clinical Documentation Integrity Program

## 2025-01-27 ENCOUNTER — READMISSION MANAGEMENT (OUTPATIENT)
Dept: CALL CENTER | Facility: HOSPITAL | Age: 70
End: 2025-01-27
Payer: MEDICARE

## 2025-01-27 NOTE — OUTREACH NOTE
Medical Week 1 Survey      Flowsheet Row Responses   [facility] facility patient discharged from? [facility]   Does the patient have one of the following disease processes/diagnoses(primary or secondary)? Other   Week 1 attempt successful? No   Unsuccessful attempts Attempt 1            Marilee MILLER - Licensed Nurse

## 2025-01-30 NOTE — PROGRESS NOTES
Enter Query Response Below      Query Response: - Severe sepsis causing acute organ failure, as evidenced by acute hypoxic respiratory failure              If applicable, please update the problem list.     Patient: Jamin Hennessy        : 1955  Account: [id number]           Admit Date: 1/15/2025        How to Respond to this query:       a. Click New Note     b. Answer query within the yellow box.                c. Update the Problem List, if applicable.      If you have any questions about this query contact me at: [email or web address]     Dr. Napoles,    Patient presented 1/15 in transfer from outside facility for altered mental status. Notes include sepsis, COVID-19 infection, bacterial pneumonia, influenza infection, and acute hypoxic respiratory failure. Patient treated with monitoring, supplemental oxygen, Remdesivir, Tamiflu, PO prednisone, IV cefepime 1/15, IV vancomycin 1/15 - , and IV ceftazidime  - .     Please clarify the following:    - Severe sepsis causing acute organ failure, as evidenced by acute hypoxic respiratory failure  - Acute hypoxic respiratory failure due to ___________________  - Sepsis ruled out  - Other ___________________  - Unable to clinically determine    By submitting this query, we are merely seeking further clarification of documentation to accurately reflect all conditions that you are monitoring, evaluating, treating or that extend the hospitalization or utilize additional resources of care. Please utilize your independent clinical judgment when addressing the question(s) above.     This query and your response, once completed, will be entered into the legal medical record.    Sincerely,  Octaviano Bender RN, CDS  Clinical Documentation Integrity Program

## 2025-01-31 ENCOUNTER — READMISSION MANAGEMENT (OUTPATIENT)
Dept: CALL CENTER | Facility: HOSPITAL | Age: 70
End: 2025-01-31
Payer: MEDICARE

## 2025-01-31 NOTE — OUTREACH NOTE
Medical Week 1 Survey      Flowsheet Row Responses   [facility] facility patient discharged from? [facility]   Does the patient have one of the following disease processes/diagnoses(primary or secondary)? Other   Week 1 attempt successful? No   Unsuccessful attempts Attempt 2            Heather ARROYO - Registered Nurse

## 2025-02-10 ENCOUNTER — READMISSION MANAGEMENT (OUTPATIENT)
Dept: CALL CENTER | Facility: HOSPITAL | Age: 70
End: 2025-02-10
Payer: MEDICARE

## 2025-02-10 NOTE — OUTREACH NOTE
Medical Week 3 Survey      Flowsheet Row Responses   [facility] facility patient discharged from? [facility]   Does the patient have one of the following disease processes/diagnoses(primary or secondary)? Other   Week 3 attempt successful? No   Unsuccessful attempts Attempt 1   oke Perri SMITH - Registered Nurse

## (undated) DEVICE — COUNT NDL MAG XLG

## (undated) DEVICE — SUT VIC COAT PLS ANTIBAC TP1 27IN

## (undated) DEVICE — CATH DIAG IMPULSE FL4 6F 100CM

## (undated) DEVICE — GLV SURG SIGNATURE ESSENTIAL PF LTX SZ7.5

## (undated) DEVICE — BITEBLOCK ENDO W/STRAP 60F A/ LF DISP

## (undated) DEVICE — BAPTIST FLOYD BRONCHOSCOPY: Brand: MEDLINE INDUSTRIES, INC.

## (undated) DEVICE — ELECTRD BLD EZ CLN STD 6.5IN

## (undated) DEVICE — Device

## (undated) DEVICE — ROTATING SURGICAL PUNCHES, 1 PER POUCH: Brand: A&E MEDICAL / ROTATING SURGICAL PUNCHES

## (undated) DEVICE — 28 FR RIGHT ANGLE – SOFT PVC CATHETER: Brand: PVC THORACIC CATHETERS

## (undated) DEVICE — PRESSURE TUBING: Brand: TRUWAVE

## (undated) DEVICE — SUCTION CANISTER, 1500CC,SAFELINER: Brand: DEROYAL

## (undated) DEVICE — SUT PROLN 6/0 RB2 D/A 30IN 8711H

## (undated) DEVICE — TOWEL,OR,DSP,ST,WHITE,DLX,4/PK,20PK/CS: Brand: MEDLINE

## (undated) DEVICE — SOL IRRIG H2O 1000ML STRL

## (undated) DEVICE — SUT PROLN 7/0 BV1 D/A 30IN 8703H

## (undated) DEVICE — GLV SURG BIOGEL LTX PF 8

## (undated) DEVICE — PK CYSTO 50

## (undated) DEVICE — SYS PERFUS SEP PLATLT W TIPS CUST

## (undated) DEVICE — PK ATS CUST W CARDIOTOMY RESEVOIR

## (undated) DEVICE — SUT SILK 2/0 TIES 18IN A185H

## (undated) DEVICE — TP UMB COTN 1/8X36 U12T

## (undated) DEVICE — SUT ETHIB 2/0 CV V7 30IN 10PK PXX77

## (undated) DEVICE — CATH DIAG IMPULSE PIG .056 6F 110CM

## (undated) DEVICE — NITINOL WIRE WITH HYDROPHILIC TIP: Brand: SENSOR

## (undated) DEVICE — NDL PERC 1PRT THNWALL W/BASEPLT 18G 7CM

## (undated) DEVICE — SUT SILK 2 SUTUPAK TIE 60IN SA8H 2STRAND

## (undated) DEVICE — SUT PROLENE PP 7/0 BV175-6 24IN

## (undated) DEVICE — SUT PROLN 3/0 V7 D/A 36IN 8976H

## (undated) DEVICE — CATH IV INSYTE AUTOGARD SHLD 22G 1IN BK

## (undated) DEVICE — SHET AIR COMFRT GLID LAT 34X46IN

## (undated) DEVICE — SPONGE: SPECIALTY CHERRY DISS XR 100/CS: Brand: MEDICAL ACTION INDUSTRIES

## (undated) DEVICE — SUT PDS 0 CT-1 Z340H

## (undated) DEVICE — NITINOL STONE RETRIEVAL BASKET: Brand: ZERO TIP

## (undated) DEVICE — INSUFFLATION TUBING,LAPAROSCOPIC: Brand: DEROYAL

## (undated) DEVICE — MSK ENDO PORT O2 POM ELITE CURAPLEX A/

## (undated) DEVICE — BLD SCLPL BEAVR MINI STR 2BVL 180D LF

## (undated) DEVICE — GAUZE,SPONGE,4"X4",32PLY,XRAY,STRL,LF: Brand: MEDLINE

## (undated) DEVICE — IRRIGATOR BULB ASEPTO 60CC STRL

## (undated) DEVICE — SUT PROLN 4/0 V7 36IN 8975H

## (undated) DEVICE — FIBR LASR HOLMIUM 365 MICRON DISP

## (undated) DEVICE — KT NDL GUIDE STRL 18GA

## (undated) DEVICE — PK TRY HEART CATH 50

## (undated) DEVICE — SUT SILK 4/0 TIES 18IN A183H

## (undated) DEVICE — CANN VENI DLP SGL/STAGE RT/ANGL MTL/TP 28F

## (undated) DEVICE — SOL IRRIG NACL 9PCT 1000ML BTL

## (undated) DEVICE — CATH DIAG IMPULSE FR4 6F 100CM

## (undated) DEVICE — PK OPN HEART WHT WRP 50

## (undated) DEVICE — SUT PROLN 4/0 V5 36IN 8935H

## (undated) DEVICE — GW PTFE EMERALD HEPCOAT FC J TIP STD .035 3MM 150CM

## (undated) DEVICE — ANTIBACTERIAL UNDYED BRAIDED (POLYGLACTIN 910), SYNTHETIC ABSORBABLE SUTURE: Brand: COATED VICRYL

## (undated) DEVICE — CABL BIPOL W/ALLGTR CLIP/SM 12FT

## (undated) DEVICE — CANN VENI DLP SGL/STAGE DLP RT/ANGL EXT TP 24F

## (undated) DEVICE — CONTAINER,SPECIMEN,OR STERILE,4OZ: Brand: MEDLINE

## (undated) DEVICE — ELECTRD DEFIB M/FUNC PROPADZ STRL 2PK

## (undated) DEVICE — CONNECT Y INTERSEPT W/LL 3/8 X 3/8 X 3/8IN

## (undated) DEVICE — STPCK 3WY W 14IN PRESS LN

## (undated) DEVICE — SCANLAN® VASCU-STATT® II SINGLE-USE BULLDOG CLAMP W/FIRMER CLAMPING PRESS - MIDI ANGLED 45° (YELLOW), CLAMPING PRESSURE 75-80 G (2/STERILE PKG): Brand: SCANLAN® VASCU-STATT® II SINGLE-USE BULLDOG CLAMP W/FIRMER CLAMPING PRESS

## (undated) DEVICE — SKIN AFFIX SURG ADHESIVE 72/CS 0.55ML: Brand: MEDLINE

## (undated) DEVICE — TUBING, SUCTION, 1/4" X 12', STRAIGHT: Brand: MEDLINE

## (undated) DEVICE — SOL NACL 0.9PCT 1000ML

## (undated) DEVICE — ST ACC MICROPUNCTURE STFF/CANN PLAT/TP 4F 21G 40CM

## (undated) DEVICE — PINNACLE INTRODUCER SHEATH: Brand: PINNACLE

## (undated) DEVICE — CATHETER,URETHRAL,REDRUBBER,STERILE,20FR: Brand: MEDLINE

## (undated) DEVICE — SUT SILK 0 CT1 CR8 18IN C021D

## (undated) DEVICE — MARKR SKIN 2TP W/RULR

## (undated) DEVICE — SENSR CERBRL O2 PK/2

## (undated) DEVICE — TEMP PACING WIRE: Brand: MYO/WIRE

## (undated) DEVICE — SEALANT HEMO SURG COSEAL PREMIX 8ML

## (undated) DEVICE — CANN AORT ROOT DLP VNT/8IN 14G 7F

## (undated) DEVICE — PREP SPRY PVPI 10P 2OZ

## (undated) DEVICE — ST PERFUS M/

## (undated) DEVICE — 28 FR STRAIGHT – SOFT PVC CATHETER: Brand: PVC THORACIC CATHETERS

## (undated) DEVICE — INTENDED FOR TISSUE SEPARATION, AND OTHER PROCEDURES THAT REQUIRE A SHARP SURGICAL BLADE TO PUNCTURE OR CUT.: Brand: BARD-PARKER ® CARBON RIB-BACK BLADES

## (undated) DEVICE — ACCESSRAIL PLATFORM (STANDARD BLADE): Brand: ACCESSRAIL PLATFORM (STANDARD BLADE)

## (undated) DEVICE — SUT PROLN 5/0 V5 36IN 8934H

## (undated) DEVICE — KT SURG TURNOVER 050

## (undated) DEVICE — PK PERFUS CUST W/CARDIOPLEGIA

## (undated) DEVICE — CANN RETRGR STYLET RSCP 15F

## (undated) DEVICE — CORONARY ARTERY BYPASS GRAFT MARKERS, STAINLESS STEEL, DISTAL, WITHOUT HOLDER: Brand: ANASTOMARK CORONARY ARTERY BYPASS GRAFT MARKERS, STAINLESS STEEL, DISTAL

## (undated) DEVICE — BOOT SUT XRAY DETECT STD YEL/BLU CA/50

## (undated) DEVICE — BG BLD SYS

## (undated) DEVICE — HEMOCONCENTRATOR PERFUS LPS06

## (undated) DEVICE — BLOWER MISTER CLEARVIEW W/TBG

## (undated) DEVICE — ORGANIZER SUT SHELIGH 3T 213013